# Patient Record
Sex: MALE | Race: WHITE | NOT HISPANIC OR LATINO | Employment: OTHER | URBAN - METROPOLITAN AREA
[De-identification: names, ages, dates, MRNs, and addresses within clinical notes are randomized per-mention and may not be internally consistent; named-entity substitution may affect disease eponyms.]

---

## 2024-01-23 ENCOUNTER — HOSPITAL ENCOUNTER (INPATIENT)
Facility: HOSPITAL | Age: 64
LOS: 9 days | Discharge: NURSING, REHAB OR LONG-TERM CARE | DRG: 603 | End: 2024-02-01
Attending: INTERNAL MEDICINE | Admitting: INTERNAL MEDICINE
Payer: MEDICARE

## 2024-01-23 ENCOUNTER — APPOINTMENT (OUTPATIENT)
Dept: RADIOLOGY | Facility: HOSPITAL | Age: 64
DRG: 603 | End: 2024-01-23
Payer: MEDICARE

## 2024-01-23 ENCOUNTER — HOSPITAL ENCOUNTER (INPATIENT)
Facility: HOSPITAL | Age: 64
LOS: 1 days | Discharge: OTHER INSTITUTION | DRG: 603 | End: 2024-01-23
Attending: STUDENT IN AN ORGANIZED HEALTH CARE EDUCATION/TRAINING PROGRAM | Admitting: INTERNAL MEDICINE
Payer: MEDICARE

## 2024-01-23 ENCOUNTER — APPOINTMENT (OUTPATIENT)
Dept: CARDIOLOGY | Facility: HOSPITAL | Age: 64
DRG: 603 | End: 2024-01-23
Payer: MEDICARE

## 2024-01-23 VITALS
OXYGEN SATURATION: 98 % | DIASTOLIC BLOOD PRESSURE: 56 MMHG | TEMPERATURE: 97.3 F | HEART RATE: 75 BPM | HEIGHT: 74 IN | RESPIRATION RATE: 18 BRPM | BODY MASS INDEX: 33.07 KG/M2 | WEIGHT: 257.72 LBS | SYSTOLIC BLOOD PRESSURE: 122 MMHG

## 2024-01-23 DIAGNOSIS — A48.0 GAS GANGRENE OF FOOT (MULTI): Primary | ICD-10-CM

## 2024-01-23 DIAGNOSIS — A48.0 GAS GANGRENE OF FOOT (MULTI): ICD-10-CM

## 2024-01-23 DIAGNOSIS — N17.9 ACUTE KIDNEY INJURY (CMS-HCC): ICD-10-CM

## 2024-01-23 DIAGNOSIS — L97.513: ICD-10-CM

## 2024-01-23 DIAGNOSIS — E08.621: ICD-10-CM

## 2024-01-23 DIAGNOSIS — M86.171 OSTEOMYELITIS OF ANKLE OR FOOT, RIGHT, ACUTE (MULTI): Primary | ICD-10-CM

## 2024-01-23 DIAGNOSIS — L02.611 ABSCESS OF RIGHT FOOT: ICD-10-CM

## 2024-01-23 LAB
ALBUMIN SERPL-MCNC: 3.3 G/DL (ref 3.5–5)
ALP BLD-CCNC: 147 U/L (ref 35–125)
ALT SERPL-CCNC: 30 U/L (ref 5–40)
ANION GAP SERPL CALC-SCNC: 16 MMOL/L
ANION GAP SERPL CALC-SCNC: 17 MMOL/L
AST SERPL-CCNC: 37 U/L (ref 5–40)
BASOPHILS # BLD AUTO: 0.04 X10*3/UL (ref 0–0.1)
BASOPHILS NFR BLD AUTO: 0.2 %
BILIRUB SERPL-MCNC: 0.3 MG/DL (ref 0.1–1.2)
BUN SERPL-MCNC: 76 MG/DL (ref 8–25)
BUN SERPL-MCNC: 78 MG/DL (ref 8–25)
CALCIUM SERPL-MCNC: 10.1 MG/DL (ref 8.5–10.4)
CALCIUM SERPL-MCNC: 9.4 MG/DL (ref 8.5–10.4)
CHLORIDE SERPL-SCNC: 95 MMOL/L (ref 97–107)
CHLORIDE SERPL-SCNC: 96 MMOL/L (ref 97–107)
CK SERPL-CCNC: 738 U/L (ref 24–195)
CO2 SERPL-SCNC: 18 MMOL/L (ref 24–31)
CO2 SERPL-SCNC: 20 MMOL/L (ref 24–31)
CREAT SERPL-MCNC: 2 MG/DL (ref 0.4–1.6)
CREAT SERPL-MCNC: 2.5 MG/DL (ref 0.4–1.6)
EGFRCR SERPLBLD CKD-EPI 2021: 29 ML/MIN/1.73M*2
EGFRCR SERPLBLD CKD-EPI 2021: 37 ML/MIN/1.73M*2
EOSINOPHIL # BLD AUTO: 0.02 X10*3/UL (ref 0–0.7)
EOSINOPHIL NFR BLD AUTO: 0.1 %
ERYTHROCYTE [DISTWIDTH] IN BLOOD BY AUTOMATED COUNT: 12.9 % (ref 11.5–14.5)
FLUAV RNA RESP QL NAA+PROBE: NOT DETECTED
FLUBV RNA RESP QL NAA+PROBE: NOT DETECTED
GLUCOSE BLD MANUAL STRIP-MCNC: 99 MG/DL (ref 74–99)
GLUCOSE SERPL-MCNC: 116 MG/DL (ref 65–99)
GLUCOSE SERPL-MCNC: 95 MG/DL (ref 65–99)
HCT VFR BLD AUTO: 28.1 % (ref 41–52)
HGB BLD-MCNC: 9.7 G/DL (ref 13.5–17.5)
IMM GRANULOCYTES # BLD AUTO: 0.15 X10*3/UL (ref 0–0.7)
IMM GRANULOCYTES NFR BLD AUTO: 0.9 % (ref 0–0.9)
LACTATE BLDV-SCNC: 0.9 MMOL/L (ref 0.4–2)
LYMPHOCYTES # BLD AUTO: 0.64 X10*3/UL (ref 1.2–4.8)
LYMPHOCYTES NFR BLD AUTO: 3.8 %
MCH RBC QN AUTO: 30.9 PG (ref 26–34)
MCHC RBC AUTO-ENTMCNC: 34.5 G/DL (ref 32–36)
MCV RBC AUTO: 90 FL (ref 80–100)
MONOCYTES # BLD AUTO: 1.14 X10*3/UL (ref 0.1–1)
MONOCYTES NFR BLD AUTO: 6.9 %
NEUTROPHILS # BLD AUTO: 14.65 X10*3/UL (ref 1.2–7.7)
NEUTROPHILS NFR BLD AUTO: 88.1 %
NRBC BLD-RTO: 0 /100 WBCS (ref 0–0)
PLATELET # BLD AUTO: 202 X10*3/UL (ref 150–450)
POTASSIUM SERPL-SCNC: 3.8 MMOL/L (ref 3.4–5.1)
POTASSIUM SERPL-SCNC: 5.1 MMOL/L (ref 3.4–5.1)
PROT SERPL-MCNC: 7.1 G/DL (ref 5.9–7.9)
RBC # BLD AUTO: 3.14 X10*6/UL (ref 4.5–5.9)
SARS-COV-2 RNA RESP QL NAA+PROBE: NOT DETECTED
SODIUM SERPL-SCNC: 131 MMOL/L (ref 133–145)
SODIUM SERPL-SCNC: 131 MMOL/L (ref 133–145)
WBC # BLD AUTO: 16.6 X10*3/UL (ref 4.4–11.3)

## 2024-01-23 PROCEDURE — 76770 US EXAM ABDO BACK WALL COMP: CPT | Mod: FR

## 2024-01-23 PROCEDURE — 36415 COLL VENOUS BLD VENIPUNCTURE: CPT | Performed by: PHYSICIAN ASSISTANT

## 2024-01-23 PROCEDURE — 82947 ASSAY GLUCOSE BLOOD QUANT: CPT

## 2024-01-23 PROCEDURE — 76770 US EXAM ABDO BACK WALL COMP: CPT | Mod: FOREIGN READ | Performed by: RADIOLOGY

## 2024-01-23 PROCEDURE — 1200000002 HC GENERAL ROOM WITH TELEMETRY DAILY

## 2024-01-23 PROCEDURE — 80053 COMPREHEN METABOLIC PANEL: CPT | Performed by: PHYSICIAN ASSISTANT

## 2024-01-23 PROCEDURE — 82550 ASSAY OF CK (CPK): CPT | Performed by: INTERNAL MEDICINE

## 2024-01-23 PROCEDURE — 2500000004 HC RX 250 GENERAL PHARMACY W/ HCPCS (ALT 636 FOR OP/ED): Performed by: INTERNAL MEDICINE

## 2024-01-23 PROCEDURE — 87205 SMEAR GRAM STAIN: CPT | Mod: WESLAB | Performed by: PHYSICIAN ASSISTANT

## 2024-01-23 PROCEDURE — 96365 THER/PROPH/DIAG IV INF INIT: CPT

## 2024-01-23 PROCEDURE — 86901 BLOOD TYPING SEROLOGIC RH(D): CPT | Performed by: INTERNAL MEDICINE

## 2024-01-23 PROCEDURE — 80048 BASIC METABOLIC PNL TOTAL CA: CPT | Performed by: INTERNAL MEDICINE

## 2024-01-23 PROCEDURE — 87636 SARSCOV2 & INF A&B AMP PRB: CPT | Performed by: STUDENT IN AN ORGANIZED HEALTH CARE EDUCATION/TRAINING PROGRAM

## 2024-01-23 PROCEDURE — 83605 ASSAY OF LACTIC ACID: CPT | Performed by: PHYSICIAN ASSISTANT

## 2024-01-23 PROCEDURE — 87040 BLOOD CULTURE FOR BACTERIA: CPT | Mod: WESLAB | Performed by: PHYSICIAN ASSISTANT

## 2024-01-23 PROCEDURE — 73590 X-RAY EXAM OF LOWER LEG: CPT | Mod: RT,FR

## 2024-01-23 PROCEDURE — 83036 HEMOGLOBIN GLYCOSYLATED A1C: CPT | Performed by: INTERNAL MEDICINE

## 2024-01-23 PROCEDURE — 99291 CRITICAL CARE FIRST HOUR: CPT | Mod: 25 | Performed by: STUDENT IN AN ORGANIZED HEALTH CARE EDUCATION/TRAINING PROGRAM

## 2024-01-23 PROCEDURE — 96375 TX/PRO/DX INJ NEW DRUG ADDON: CPT

## 2024-01-23 PROCEDURE — 73630 X-RAY EXAM OF FOOT: CPT | Mod: RT

## 2024-01-23 PROCEDURE — 73590 X-RAY EXAM OF LOWER LEG: CPT | Mod: RIGHT SIDE | Performed by: RADIOLOGY

## 2024-01-23 PROCEDURE — 93005 ELECTROCARDIOGRAM TRACING: CPT

## 2024-01-23 PROCEDURE — 85610 PROTHROMBIN TIME: CPT | Performed by: INTERNAL MEDICINE

## 2024-01-23 PROCEDURE — 2500000004 HC RX 250 GENERAL PHARMACY W/ HCPCS (ALT 636 FOR OP/ED): Performed by: PHYSICIAN ASSISTANT

## 2024-01-23 PROCEDURE — 1210000001 HC SEMI-PRIVATE ROOM DAILY

## 2024-01-23 PROCEDURE — 96374 THER/PROPH/DIAG INJ IV PUSH: CPT | Mod: 59

## 2024-01-23 PROCEDURE — 85025 COMPLETE CBC W/AUTO DIFF WBC: CPT | Performed by: PHYSICIAN ASSISTANT

## 2024-01-23 RX ORDER — FINASTERIDE 5 MG/1
5 TABLET, FILM COATED ORAL DAILY
Status: DISCONTINUED | OUTPATIENT
Start: 2024-01-24 | End: 2024-02-01 | Stop reason: HOSPADM

## 2024-01-23 RX ORDER — ACETAMINOPHEN 325 MG/1
650 TABLET ORAL EVERY 4 HOURS PRN
Status: DISCONTINUED | OUTPATIENT
Start: 2024-01-23 | End: 2024-02-01 | Stop reason: HOSPADM

## 2024-01-23 RX ORDER — CARVEDILOL 12.5 MG/1
12.5 TABLET ORAL
Status: DISCONTINUED | OUTPATIENT
Start: 2024-01-24 | End: 2024-02-01 | Stop reason: HOSPADM

## 2024-01-23 RX ORDER — ASPIRIN 81 MG/1
81 TABLET ORAL DAILY
Status: CANCELLED | OUTPATIENT
Start: 2024-01-23

## 2024-01-23 RX ORDER — DEXTROSE 50 % IN WATER (D50W) INTRAVENOUS SYRINGE
25
Status: DISCONTINUED | OUTPATIENT
Start: 2024-01-23 | End: 2024-02-01 | Stop reason: HOSPADM

## 2024-01-23 RX ORDER — FINASTERIDE 5 MG/1
5 TABLET, FILM COATED ORAL DAILY
COMMUNITY

## 2024-01-23 RX ORDER — INSULIN LISPRO 100 [IU]/ML
0-10 INJECTION, SOLUTION INTRAVENOUS; SUBCUTANEOUS EVERY 4 HOURS
Status: DISCONTINUED | OUTPATIENT
Start: 2024-01-23 | End: 2024-01-25

## 2024-01-23 RX ORDER — FUROSEMIDE 20 MG/1
20 TABLET ORAL DAILY
COMMUNITY

## 2024-01-23 RX ORDER — INSULIN LISPRO 100 [IU]/ML
0-5 INJECTION, SOLUTION INTRAVENOUS; SUBCUTANEOUS
Status: CANCELLED | OUTPATIENT
Start: 2024-01-23

## 2024-01-23 RX ORDER — SODIUM CHLORIDE 9 MG/ML
75 INJECTION, SOLUTION INTRAVENOUS CONTINUOUS
Status: CANCELLED | OUTPATIENT
Start: 2024-01-23

## 2024-01-23 RX ORDER — DEXTROSE MONOHYDRATE 100 MG/ML
0.3 INJECTION, SOLUTION INTRAVENOUS ONCE AS NEEDED
Status: DISCONTINUED | OUTPATIENT
Start: 2024-01-23 | End: 2024-02-01 | Stop reason: HOSPADM

## 2024-01-23 RX ORDER — ATORVASTATIN CALCIUM 40 MG/1
40 TABLET, FILM COATED ORAL DAILY
Status: DISCONTINUED | OUTPATIENT
Start: 2024-01-24 | End: 2024-01-23

## 2024-01-23 RX ORDER — ACETAMINOPHEN 160 MG/5ML
650 SOLUTION ORAL EVERY 4 HOURS PRN
Status: DISCONTINUED | OUTPATIENT
Start: 2024-01-23 | End: 2024-01-25

## 2024-01-23 RX ORDER — FUROSEMIDE 20 MG/1
20 TABLET ORAL DAILY
Status: CANCELLED | OUTPATIENT
Start: 2024-01-23

## 2024-01-23 RX ORDER — VANCOMYCIN 1.25 G/250ML
1750 INJECTION, SOLUTION INTRAVENOUS ONCE
Status: COMPLETED | OUTPATIENT
Start: 2024-01-23 | End: 2024-01-23

## 2024-01-23 RX ORDER — SPIRONOLACTONE 25 MG/1
25 TABLET ORAL DAILY
COMMUNITY

## 2024-01-23 RX ORDER — OLANZAPINE 10 MG/1
20 TABLET ORAL NIGHTLY
Status: DISCONTINUED | OUTPATIENT
Start: 2024-01-23 | End: 2024-02-01 | Stop reason: HOSPADM

## 2024-01-23 RX ORDER — PANTOPRAZOLE SODIUM 40 MG/10ML
40 INJECTION, POWDER, LYOPHILIZED, FOR SOLUTION INTRAVENOUS DAILY
Status: DISCONTINUED | OUTPATIENT
Start: 2024-01-24 | End: 2024-01-25

## 2024-01-23 RX ORDER — ATORVASTATIN CALCIUM 40 MG/1
40 TABLET, FILM COATED ORAL
Status: ON HOLD | COMMUNITY
Start: 2023-11-27 | End: 2024-01-23 | Stop reason: WASHOUT

## 2024-01-23 RX ORDER — ASPIRIN 81 MG/1
81 TABLET ORAL DAILY
Status: DISCONTINUED | OUTPATIENT
Start: 2024-01-24 | End: 2024-02-01 | Stop reason: HOSPADM

## 2024-01-23 RX ORDER — FINASTERIDE 5 MG/1
5 TABLET, FILM COATED ORAL DAILY
Status: CANCELLED | OUTPATIENT
Start: 2024-01-23

## 2024-01-23 RX ORDER — HEPARIN SODIUM 5000 [USP'U]/ML
5000 INJECTION, SOLUTION INTRAVENOUS; SUBCUTANEOUS EVERY 8 HOURS
Status: DISCONTINUED | OUTPATIENT
Start: 2024-01-23 | End: 2024-02-01 | Stop reason: HOSPADM

## 2024-01-23 RX ORDER — TALC
3 POWDER (GRAM) TOPICAL NIGHTLY
Status: DISCONTINUED | OUTPATIENT
Start: 2024-01-23 | End: 2024-02-01 | Stop reason: HOSPADM

## 2024-01-23 RX ORDER — ASPIRIN 81 MG/1
81 TABLET ORAL DAILY
COMMUNITY

## 2024-01-23 RX ORDER — MEROPENEM 1 G/1
1 INJECTION, POWDER, FOR SOLUTION INTRAVENOUS EVERY 12 HOURS
Status: DISCONTINUED | OUTPATIENT
Start: 2024-01-24 | End: 2024-01-27

## 2024-01-23 RX ORDER — DEXTROSE 50 % IN WATER (D50W) INTRAVENOUS SYRINGE
25
Status: CANCELLED | OUTPATIENT
Start: 2024-01-23

## 2024-01-23 RX ORDER — CARVEDILOL 12.5 MG/1
12.5 TABLET ORAL
COMMUNITY

## 2024-01-23 RX ORDER — GEMFIBROZIL 600 MG/1
600 TABLET, FILM COATED ORAL
COMMUNITY

## 2024-01-23 RX ORDER — CARVEDILOL 12.5 MG/1
12.5 TABLET ORAL
Status: CANCELLED | OUTPATIENT
Start: 2024-01-23

## 2024-01-23 RX ORDER — POLYETHYLENE GLYCOL 3350 17 G/17G
17 POWDER, FOR SOLUTION ORAL DAILY
Status: DISCONTINUED | OUTPATIENT
Start: 2024-01-24 | End: 2024-01-25

## 2024-01-23 RX ORDER — SODIUM CHLORIDE 9 MG/ML
100 INJECTION, SOLUTION INTRAVENOUS CONTINUOUS
Status: DISCONTINUED | OUTPATIENT
Start: 2024-01-23 | End: 2024-01-25

## 2024-01-23 RX ORDER — OLANZAPINE 20 MG/1
20 TABLET ORAL NIGHTLY
COMMUNITY

## 2024-01-23 RX ORDER — LISINOPRIL 20 MG/1
20 TABLET ORAL DAILY
COMMUNITY

## 2024-01-23 RX ORDER — PANTOPRAZOLE SODIUM 40 MG/1
40 TABLET, DELAYED RELEASE ORAL DAILY
Status: DISCONTINUED | OUTPATIENT
Start: 2024-01-24 | End: 2024-02-01 | Stop reason: HOSPADM

## 2024-01-23 RX ORDER — DEXTROSE MONOHYDRATE 100 MG/ML
0.3 INJECTION, SOLUTION INTRAVENOUS ONCE AS NEEDED
Status: CANCELLED | OUTPATIENT
Start: 2024-01-23

## 2024-01-23 RX ORDER — ACETAMINOPHEN 650 MG/1
650 SUPPOSITORY RECTAL EVERY 4 HOURS PRN
Status: DISCONTINUED | OUTPATIENT
Start: 2024-01-23 | End: 2024-01-25

## 2024-01-23 RX ORDER — CLINDAMYCIN PHOSPHATE 600 MG/50ML
600 INJECTION, SOLUTION INTRAVENOUS ONCE
Status: COMPLETED | OUTPATIENT
Start: 2024-01-23 | End: 2024-01-23

## 2024-01-23 RX ORDER — ATORVASTATIN CALCIUM 40 MG/1
40 TABLET, FILM COATED ORAL DAILY
Status: CANCELLED | OUTPATIENT
Start: 2024-01-23

## 2024-01-23 RX ORDER — GEMFIBROZIL 600 MG/1
600 TABLET, FILM COATED ORAL
Status: CANCELLED | OUTPATIENT
Start: 2024-01-23

## 2024-01-23 RX ORDER — METFORMIN HYDROCHLORIDE EXTENDED-RELEASE TABLETS 500 MG/1
500 TABLET, FILM COATED, EXTENDED RELEASE ORAL
COMMUNITY

## 2024-01-23 RX ORDER — ATORVASTATIN CALCIUM 40 MG/1
40 TABLET, FILM COATED ORAL DAILY
COMMUNITY

## 2024-01-23 RX ADMIN — SODIUM CHLORIDE, SODIUM LACTATE, POTASSIUM CHLORIDE, AND CALCIUM CHLORIDE 500 ML: 600; 310; 30; 20 INJECTION, SOLUTION INTRAVENOUS at 23:34

## 2024-01-23 RX ADMIN — SODIUM CHLORIDE 100 ML/HR: 900 INJECTION, SOLUTION INTRAVENOUS at 23:32

## 2024-01-23 RX ADMIN — PIPERACILLIN SODIUM AND TAZOBACTAM SODIUM 3.38 G: 3; .375 INJECTION, SOLUTION INTRAVENOUS at 14:23

## 2024-01-23 RX ADMIN — VANCOMYCIN 1750 MG: 1.25 INJECTION, SOLUTION INTRAVENOUS at 14:23

## 2024-01-23 RX ADMIN — OLANZAPINE 20 MG: 10 TABLET, FILM COATED ORAL at 23:41

## 2024-01-23 RX ADMIN — HEPARIN SODIUM 5000 UNITS: 5000 INJECTION, SOLUTION INTRAVENOUS; SUBCUTANEOUS at 23:32

## 2024-01-23 RX ADMIN — CLINDAMYCIN PHOSPHATE 600 MG: 600 INJECTION, SOLUTION INTRAVENOUS at 15:14

## 2024-01-23 RX ADMIN — SODIUM CHLORIDE 1000 ML: 900 INJECTION, SOLUTION INTRAVENOUS at 14:17

## 2024-01-23 SDOH — SOCIAL STABILITY: SOCIAL INSECURITY: DOES ANYONE TRY TO KEEP YOU FROM HAVING/CONTACTING OTHER FRIENDS OR DOING THINGS OUTSIDE YOUR HOME?: NO

## 2024-01-23 SDOH — SOCIAL STABILITY: SOCIAL INSECURITY: HAVE YOU HAD THOUGHTS OF HARMING ANYONE ELSE?: NO

## 2024-01-23 SDOH — SOCIAL STABILITY: SOCIAL INSECURITY: HAS ANYONE EVER THREATENED TO HURT YOUR FAMILY OR YOUR PETS?: NO

## 2024-01-23 SDOH — SOCIAL STABILITY: SOCIAL INSECURITY: ARE THERE ANY APPARENT SIGNS OF INJURIES/BEHAVIORS THAT COULD BE RELATED TO ABUSE/NEGLECT?: NO

## 2024-01-23 SDOH — SOCIAL STABILITY: SOCIAL INSECURITY: DO YOU FEEL ANYONE HAS EXPLOITED OR TAKEN ADVANTAGE OF YOU FINANCIALLY OR OF YOUR PERSONAL PROPERTY?: NO

## 2024-01-23 SDOH — SOCIAL STABILITY: SOCIAL INSECURITY: ARE YOU OR HAVE YOU BEEN THREATENED OR ABUSED PHYSICALLY, EMOTIONALLY, OR SEXUALLY BY ANYONE?: NO

## 2024-01-23 SDOH — SOCIAL STABILITY: SOCIAL INSECURITY: ABUSE: ADULT

## 2024-01-23 SDOH — SOCIAL STABILITY: SOCIAL INSECURITY: WERE YOU ABLE TO COMPLETE ALL THE BEHAVIORAL HEALTH SCREENINGS?: YES

## 2024-01-23 SDOH — SOCIAL STABILITY: SOCIAL INSECURITY: DO YOU FEEL UNSAFE GOING BACK TO THE PLACE WHERE YOU ARE LIVING?: NO

## 2024-01-23 ASSESSMENT — LIFESTYLE VARIABLES
SKIP TO QUESTIONS 9-10: 0
HOW OFTEN DO YOU HAVE A DRINK CONTAINING ALCOHOL: 2-4 TIMES A MONTH
HOW MANY STANDARD DRINKS CONTAINING ALCOHOL DO YOU HAVE ON A TYPICAL DAY: 1 OR 2
REASON UNABLE TO ASSESS: NO
HAVE YOU EVER FELT YOU SHOULD CUT DOWN ON YOUR DRINKING: NO
EVER FELT BAD OR GUILTY ABOUT YOUR DRINKING: NO
HOW OFTEN DO YOU HAVE 6 OR MORE DRINKS ON ONE OCCASION: LESS THAN MONTHLY
HAVE PEOPLE ANNOYED YOU BY CRITICIZING YOUR DRINKING: NO
AUDIT-C TOTAL SCORE: 3
AUDIT-C TOTAL SCORE: 3
EVER HAD A DRINK FIRST THING IN THE MORNING TO STEADY YOUR NERVES TO GET RID OF A HANGOVER: NO

## 2024-01-23 ASSESSMENT — COGNITIVE AND FUNCTIONAL STATUS - GENERAL
MOBILITY SCORE: 24
PATIENT BASELINE BEDBOUND: NO
DAILY ACTIVITIY SCORE: 24

## 2024-01-23 ASSESSMENT — COLUMBIA-SUICIDE SEVERITY RATING SCALE - C-SSRS

## 2024-01-23 ASSESSMENT — PAIN SCALES - GENERAL
PAINLEVEL_OUTOF10: 7
PAINLEVEL_OUTOF10: 0 - NO PAIN

## 2024-01-23 ASSESSMENT — ACTIVITIES OF DAILY LIVING (ADL)
FEEDING YOURSELF: INDEPENDENT
HEARING - RIGHT EAR: FUNCTIONAL
DRESSING YOURSELF: INDEPENDENT
PATIENT'S MEMORY ADEQUATE TO SAFELY COMPLETE DAILY ACTIVITIES?: YES
LACK_OF_TRANSPORTATION: NO
WALKS IN HOME: INDEPENDENT
GROOMING: INDEPENDENT
TOILETING: INDEPENDENT
BATHING: INDEPENDENT
ADEQUATE_TO_COMPLETE_ADL: YES
HEARING - LEFT EAR: FUNCTIONAL
JUDGMENT_ADEQUATE_SAFELY_COMPLETE_DAILY_ACTIVITIES: YES

## 2024-01-23 ASSESSMENT — ENCOUNTER SYMPTOMS
MUSCULOSKELETAL NEGATIVE: 1
CONSTITUTIONAL NEGATIVE: 1
CARDIOVASCULAR NEGATIVE: 1
WOUND: 1
EYES NEGATIVE: 1
ENDOCRINE NEGATIVE: 1
ALLERGIC/IMMUNOLOGIC NEGATIVE: 1
HEMATOLOGIC/LYMPHATIC NEGATIVE: 1
PSYCHIATRIC NEGATIVE: 1
NEUROLOGICAL NEGATIVE: 1
RESPIRATORY NEGATIVE: 1
GASTROINTESTINAL NEGATIVE: 1

## 2024-01-23 ASSESSMENT — PAIN DESCRIPTION - FREQUENCY: FREQUENCY: CONSTANT/CONTINUOUS

## 2024-01-23 ASSESSMENT — PAIN - FUNCTIONAL ASSESSMENT
PAIN_FUNCTIONAL_ASSESSMENT: 0-10
PAIN_FUNCTIONAL_ASSESSMENT: 0-10

## 2024-01-23 ASSESSMENT — PATIENT HEALTH QUESTIONNAIRE - PHQ9
2. FEELING DOWN, DEPRESSED OR HOPELESS: NOT AT ALL
1. LITTLE INTEREST OR PLEASURE IN DOING THINGS: NOT AT ALL
SUM OF ALL RESPONSES TO PHQ9 QUESTIONS 1 & 2: 0

## 2024-01-23 ASSESSMENT — PAIN DESCRIPTION - ORIENTATION: ORIENTATION: RIGHT

## 2024-01-23 ASSESSMENT — PAIN DESCRIPTION - LOCATION: LOCATION: FOOT

## 2024-01-23 ASSESSMENT — PAIN DESCRIPTION - ONSET: ONSET: SUDDEN

## 2024-01-23 ASSESSMENT — PAIN DESCRIPTION - DESCRIPTORS: DESCRIPTORS: SORE

## 2024-01-23 ASSESSMENT — PAIN DESCRIPTION - PROGRESSION: CLINICAL_PROGRESSION: NOT CHANGED

## 2024-01-23 ASSESSMENT — PAIN DESCRIPTION - PAIN TYPE: TYPE: ACUTE PAIN

## 2024-01-23 NOTE — PROGRESS NOTES
Vancomycin Dosing by Pharmacy- EMERGENCY DEPARTMENT    Claude Coley is a 63 y.o. year old male who Pharmacy has been consulted to give a ONE TIME ONLY vancomycin dose in the Emergency Department for cellulitis, skin and soft tissue.     Visit Vitals  /51 (BP Location: Left arm, Patient Position: Sitting)   Pulse 86   Temp 36.3 °C (97.3 °F)   Resp 18        Lab Results   Component Value Date    CREATININE 0.7 07/09/2018        Patient weight is   Wt Readings from Last 1 Encounters:   01/23/24 117 kg (257 lb 11.5 oz)        Assessment/Plan     Patient will be given a one time loading dose of 1750 mg  Pharmacy will sign off at this time. If vancomycin is to be continued, please re-consult Pharmacy.       Nasir Naqvi, PharmD

## 2024-01-23 NOTE — ED NOTES
Placed call to pharmacy.   Both clindamycin and vanco are compatible.     Jazz Mccarthy, MAO  01/23/24 1505

## 2024-01-23 NOTE — ED PROVIDER NOTES
HPI   Chief Complaint   Patient presents with    Foot Infection     8 days ago I started having swelling and redness in my rt foot I have weeping and the pain is soriness       A 63-year-old male past medical history of hypertension, CHF, hyperlipidemia, and diabetes who presents to the emergency department with concerns right foot infection.  Patient states that he has never had any known ulcers or foot infections prior.  Patient states that he knows over the course of the last couple weeks he has had a opening on the right lower part of his foot.  He states that he has decreased sensation in the feet bilaterally.  The patient states that over the last 8 days he has noticed his right foot becoming swollen, red, tender, warm and this has been spreading up his leg to roughly mid calf.  He denies subjective fever or chills.  He states that he has not been seen for this foot redness yet.  Patient states that he went to urgent care today and they recommend he come to the emergency department for further evaluation.      Please see HPI for pertinent positive and negative ROS                    [place] Coma Scale Score: 15                  Patient History   History reviewed. No pertinent past medical history.  History reviewed. No pertinent surgical history.  No family history on file.  Social History     Tobacco Use    Smoking status: Not on file    Smokeless tobacco: Not on file   Substance Use Topics    Alcohol use: Not on file    Drug use: Not on file       Physical Exam   ED Triage Vitals [01/23/24 1117]   Temp Heart Rate Respirations BP   36.3 °C (97.3 °F) 86 18 119/51      Pulse Ox Temp src Heart Rate Source Patient Position   99 % -- Monitor Sitting      BP Location FiO2 (%)     Left arm --       Physical Exam  GENERAL APPEARANCE: Awake and alert. No acute distress.   VITAL SIGNS: As per the nurses' triage record.  HEENT: Normocephalic, atraumatic. Extraocular muscles are intact. Conjunctiva are pink. Negative  scleral icterus. Mucous membranes are moist. Tongue in the midline. Oropharynx clear, uvula midline.   NECK: Soft, nontender and supple, full gross ROM, no meningeal signs.  CHEST: Nontender to palpation. Clear to auscultation bilaterally. No rales, rhonchi, or wheezing. Symmetric rise and fall of chest wall.   HEART: Clear S1 and S2. Regular rate and rhythm. No murmurs appreciated on auscultation.  Strong and equal pulses in the extremities.  ABDOMEN: Soft, nontender, nondistended, positive bowel sounds, no palpable masses.  MUSCULOSKELETAL: The calves are nontender to palpation. Full gross active range of motion.   NEUROLOGICAL: Awake, alert and oriented x 3. Motor power intact in the upper and lower extremities. Sensation is intact to light touch in the upper and lower extremities. Patient answering questions appropriately.   IMMUNOLOGICAL: No lymphatic streaking noted  DERMATOLOGIC: Right foot extending to right distal leg with erythema, warmth, swelling, and edema.  There is a ulcer at the dorsal lateral aspect of right foot with erythematous base.  On the lateral aspect of right foot, there is a fluctuant tender region with overlying macerated skin.  PYSCH: Cooperative with appropriate mood and affect.    ED Course & MDM   ED Course as of 01/23/24 1615   Tue Jan 23, 2024   1458 Consult placed to podiatry. [SC]   1458 CMP shows evidence of acute kidney injury. [SC]   1458 CBC shows leukocytosis.  Neutrophilia. [SC]      ED Course User Index  [SC] Mairlee Massey PA-C         Diagnoses as of 01/23/24 1615   Gas gangrene of foot (CMS/HCC)   Abscess of right foot   Acute kidney injury (CMS/HCC)       Medical Decision Making  Parts of this chart have been completed using voice recognition software. Please excuse any errors of transcription.  My thought process and reason for plan has been formulated from the time that I saw the patient until the time of disposition and is not specific to one specific moment  during their visit and furthermore my MDM encompasses this entire chart and not only this text box.      HPI: Detailed above.    Exam: A medically appropriate exam performed, outlined above, given the known history and presentation.    History obtained from: Patient    Social Determinants of Health considered during this visit: Lives at home    Medications given during visit:  Medications   sodium chloride 0.9 % bolus 1,000 mL (0 mL intravenous Stopped 1/23/24 1447)   piperacillin-tazobactam-dextrose (Zosyn) IV 3.375 g (0 g intravenous Stopped 1/23/24 1453)   vancomycin-diluent combo no.1 (Xellia) IVPB 1,750 mg (0 mg intravenous Stopped 1/23/24 1608)   clindamycin (Cleocin) 600 mg in dextrose 5% water 50 mL (0 mg intravenous Stopped 1/23/24 1544)        Diagnostic/tests  Labs Reviewed   CBC WITH AUTO DIFFERENTIAL - Abnormal       Result Value    WBC 16.6 (*)     nRBC 0.0      RBC 3.14 (*)     Hemoglobin 9.7 (*)     Hematocrit 28.1 (*)     MCV 90      MCH 30.9      MCHC 34.5      RDW 12.9      Platelets 202      Neutrophils % 88.1      Immature Granulocytes %, Automated 0.9      Lymphocytes % 3.8      Monocytes % 6.9      Eosinophils % 0.1      Basophils % 0.2      Neutrophils Absolute 14.65 (*)     Immature Granulocytes Absolute, Automated 0.15      Lymphocytes Absolute 0.64 (*)     Monocytes Absolute 1.14 (*)     Eosinophils Absolute 0.02      Basophils Absolute 0.04     COMPREHENSIVE METABOLIC PANEL - Abnormal    Glucose 116 (*)     Sodium 131 (*)     Potassium 5.1      Chloride 95 (*)     Bicarbonate 20 (*)     Urea Nitrogen 78 (*)     Creatinine 2.50 (*)     eGFR 29 (*)     Calcium 10.1      Albumin 3.3 (*)     Alkaline Phosphatase 147 (*)     Total Protein 7.1      AST 37      Bilirubin, Total 0.3      ALT 30      Anion Gap 16     SARS-COV-2 AND INFLUENZA A/B PCR - Normal    Flu A Result Not Detected      Flu B Result Not Detected      Coronavirus 2019, PCR Not Detected      Narrative:     This assay has  received FDA Emergency Use Authorization (EUA) and  is only authorized for the duration of time that circumstances exist to justify the authorization of the emergency use of in vitro diagnostic tests for the detection of SARS-CoV-2 virus and/or diagnosis of COVID-19 infection under section 564(b)(1) of the Act, 21 U.S.C. 360bbb-3(b)(1). Testing for SARS-CoV-2 is only recommended for patients who meet current clinical and/or epidemiological criteria as defined by federal, state, or local public health directives. This assay is an in vitro diagnostic nucleic acid amplification test for the qualitative detection of SARS-CoV-2, Influenza A, and Influenza B from nasopharyngeal specimens and has been validated for use at [facility]. Negative results do not preclude COVID-19 infections or Influenza A/B infections, and should not be used as the sole basis for diagnosis, treatment, or other management decisions. If Influenza A/B and RSV PCR results are negative, testing for Parainfluenza virus, Adenovirus and Metapneumovirus is routinely performed for [facility] [place] [place] oncology and intensive care inpatients, and is available on other patients by placing an add-on request.    BLOOD GAS LACTIC ACID, VENOUS - Normal    POCT Lactate, Venous 0.9     TISSUE/WOUND CULTURE/SMEAR   BLOOD CULTURE   BLOOD CULTURE      XR foot right 3+ views   Final Result   Soft tissue infection of the right foot centered along the lateral   aspect of the forefoot with probable abscess formation and gas   gangrene. However, no plain film evidence of osteomyelitis is   observed.        Signed by: Kirstie Moy 1/23/2024 2:34 PM   Dictation workstation:   [id number]           Considerations/further MDM:  Patient was seen in conjucntion with my supervising physician,  Dr. Taylor. Please refer to his note.    Considered evaluated for necrotizing fasciitis, osteomyelitis, gangrene    X-ray shows evidence of gas gangrene.  CMP shows  evidence of acute kidney injury.  CBC shows leukocytosis with a white blood cell count of 16.6.  Lactic acid 0.9.  Patient was treated with IV fluids, IV clindamycin, IV Zosyn, and IV vancomycin.     I discussed the case with Dr. Perry.  He states the patient needs to have right foot debrided in OR as soon as possible.  Originally, the plan was to admit the patient to [facility] to have procedure done tonight.  However, the OR says that they have multiple add-on cases and will not have time for surgery until 12 AM.  Dr. Perry request for patient to be transferred to [facility] so he can perform I&D at [facility].  When I first spoke to Dr. Perry and it was not known that there is no OR time available at [facility], I then proceeded to speak with admitting doctor at [facility], Dr. Brandt.  He did accept the patient.,  However, immediately after admitting patient to [facility] Dr. Perry called back stating that there was not an OR time available at [facility] and he would like the patient transferred to [facility] under general medicine so he can perform the surgery at [facility].    Discussed the case with [facility] admitting physician, Dr. Ivey who accepted patient.  The patient will be transferred to [facility] via [facility].  The patient was agreeable to this plan.   Patient stable for transfer to [facility] and admission per my assessment and further management of patient will be deferred to the inpatient setting including Dr. Ivey see note and Dr. Perry.     Procedure  Procedures          Marilee Massey PA-C  01/23/24 7124

## 2024-01-24 ENCOUNTER — ANESTHESIA EVENT (OUTPATIENT)
Dept: OPERATING ROOM | Facility: HOSPITAL | Age: 64
DRG: 603 | End: 2024-01-24
Payer: MEDICARE

## 2024-01-24 ENCOUNTER — ANESTHESIA (OUTPATIENT)
Dept: OPERATING ROOM | Facility: HOSPITAL | Age: 64
DRG: 603 | End: 2024-01-24
Payer: MEDICARE

## 2024-01-24 PROBLEM — E11.9 DIABETES MELLITUS, TYPE 2 (MULTI): Status: ACTIVE | Noted: 2024-01-24

## 2024-01-24 PROBLEM — I10 HTN (HYPERTENSION): Status: ACTIVE | Noted: 2024-01-24

## 2024-01-24 PROBLEM — E78.5 HYPERLIPIDEMIA: Status: ACTIVE | Noted: 2024-01-24

## 2024-01-24 LAB
ABO GROUP (TYPE) IN BLOOD: NORMAL
ALBUMIN SERPL-MCNC: 2.6 G/DL (ref 3.5–5)
ALP BLD-CCNC: 112 U/L (ref 35–125)
ALT SERPL-CCNC: 30 U/L (ref 5–40)
ANION GAP SERPL CALC-SCNC: 14 MMOL/L
ANTIBODY SCREEN: NORMAL
APPEARANCE UR: CLEAR
AST SERPL-CCNC: 41 U/L (ref 5–40)
ATRIAL RATE: 76 BPM
BACTERIA #/AREA URNS AUTO: ABNORMAL /HPF
BASOPHILS # BLD AUTO: 0.01 X10*3/UL (ref 0–0.1)
BASOPHILS NFR BLD AUTO: 0.1 %
BILIRUB SERPL-MCNC: 0.3 MG/DL (ref 0.1–1.2)
BILIRUB UR STRIP.AUTO-MCNC: NEGATIVE MG/DL
BUN SERPL-MCNC: 70 MG/DL (ref 8–25)
CALCIUM SERPL-MCNC: 8.7 MG/DL (ref 8.5–10.4)
CHLORIDE SERPL-SCNC: 102 MMOL/L (ref 97–107)
CK SERPL-CCNC: 592 U/L (ref 24–195)
CO2 SERPL-SCNC: 16 MMOL/L (ref 24–31)
COLOR UR: YELLOW
CREAT SERPL-MCNC: 1.7 MG/DL (ref 0.4–1.6)
CREAT UR-MCNC: 80.1 MG/DL
EGFRCR SERPLBLD CKD-EPI 2021: 45 ML/MIN/1.73M*2
EOSINOPHIL # BLD AUTO: 0.02 X10*3/UL (ref 0–0.7)
EOSINOPHIL NFR BLD AUTO: 0.2 %
ERYTHROCYTE [DISTWIDTH] IN BLOOD BY AUTOMATED COUNT: 12.7 % (ref 11.5–14.5)
EST. AVERAGE GLUCOSE BLD GHB EST-MCNC: 82 MG/DL
FERRITIN SERPL-MCNC: 494 NG/ML (ref 30–400)
GLUCOSE BLD MANUAL STRIP-MCNC: 100 MG/DL (ref 74–99)
GLUCOSE BLD MANUAL STRIP-MCNC: 101 MG/DL (ref 74–99)
GLUCOSE BLD MANUAL STRIP-MCNC: 103 MG/DL (ref 74–99)
GLUCOSE BLD MANUAL STRIP-MCNC: 135 MG/DL (ref 74–99)
GLUCOSE BLD MANUAL STRIP-MCNC: 89 MG/DL (ref 74–99)
GLUCOSE BLD MANUAL STRIP-MCNC: 92 MG/DL (ref 74–99)
GLUCOSE SERPL-MCNC: 93 MG/DL (ref 65–99)
GLUCOSE UR STRIP.AUTO-MCNC: NORMAL MG/DL
HBA1C MFR BLD: 4.5 %
HCT VFR BLD AUTO: 26.7 % (ref 41–52)
HGB BLD-MCNC: 9.2 G/DL (ref 13.5–17.5)
HYALINE CASTS #/AREA URNS AUTO: ABNORMAL /LPF
IMM GRANULOCYTES # BLD AUTO: 0.33 X10*3/UL (ref 0–0.7)
IMM GRANULOCYTES NFR BLD AUTO: 2.7 % (ref 0–0.9)
INR PPP: 1.2 (ref 0.9–1.2)
IRON SATN MFR SERPL: ABNORMAL %
IRON SERPL-MCNC: <20 UG/DL (ref 45–160)
KETONES UR STRIP.AUTO-MCNC: NEGATIVE MG/DL
LEUKOCYTE ESTERASE UR QL STRIP.AUTO: NEGATIVE
LYMPHOCYTES # BLD AUTO: 0.62 X10*3/UL (ref 1.2–4.8)
LYMPHOCYTES NFR BLD AUTO: 5.2 %
MCH RBC QN AUTO: 30.4 PG (ref 26–34)
MCHC RBC AUTO-ENTMCNC: 34.5 G/DL (ref 32–36)
MCV RBC AUTO: 88 FL (ref 80–100)
MONOCYTES # BLD AUTO: 1 X10*3/UL (ref 0.1–1)
MONOCYTES NFR BLD AUTO: 8.3 %
MUCOUS THREADS #/AREA URNS AUTO: ABNORMAL /LPF
NEUTROPHILS # BLD AUTO: 10.05 X10*3/UL (ref 1.2–7.7)
NEUTROPHILS NFR BLD AUTO: 83.5 %
NITRITE UR QL STRIP.AUTO: NEGATIVE
NRBC BLD-RTO: 0 /100 WBCS (ref 0–0)
P AXIS: 58 DEGREES
P OFFSET: 212 MS
P ONSET: 147 MS
PH UR STRIP.AUTO: 5 [PH]
PLATELET # BLD AUTO: 185 X10*3/UL (ref 150–450)
POTASSIUM SERPL-SCNC: 3.7 MMOL/L (ref 3.4–5.1)
PR INTERVAL: 154 MS
PROT SERPL-MCNC: 6.2 G/DL (ref 5.9–7.9)
PROT UR STRIP.AUTO-MCNC: ABNORMAL MG/DL
PROT UR-MCNC: 31 MG/DL
PROT/CREAT UR: 0.39 MG/MG CREAT
PROTHROMBIN TIME: 12.7 SECONDS (ref 9.3–12.7)
Q ONSET: 224 MS
QRS COUNT: 12 BEATS
QRS DURATION: 84 MS
QT INTERVAL: 350 MS
QTC CALCULATION(BAZETT): 393 MS
QTC FREDERICIA: 378 MS
R AXIS: 72 DEGREES
RBC # BLD AUTO: 3.03 X10*6/UL (ref 4.5–5.9)
RBC # UR STRIP.AUTO: ABNORMAL /UL
RBC #/AREA URNS AUTO: ABNORMAL /HPF
RH FACTOR (ANTIGEN D): NORMAL
SODIUM SERPL-SCNC: 132 MMOL/L (ref 133–145)
SP GR UR STRIP.AUTO: 1.02
SQUAMOUS #/AREA URNS AUTO: ABNORMAL /HPF
T AXIS: 84 DEGREES
T OFFSET: 399 MS
TIBC SERPL-MCNC: ABNORMAL UG/DL
TROPONIN T SERPL-MCNC: 55 NG/L
UIBC SERPL-MCNC: 133 UG/DL (ref 110–370)
UROBILINOGEN UR STRIP.AUTO-MCNC: NORMAL MG/DL
VANCOMYCIN SERPL-MCNC: 7.4 UG/ML (ref 10–20)
VENTRICULAR RATE: 76 BPM
WBC # BLD AUTO: 12 X10*3/UL (ref 4.4–11.3)
WBC #/AREA URNS AUTO: ABNORMAL /HPF

## 2024-01-24 PROCEDURE — 85025 COMPLETE CBC W/AUTO DIFF WBC: CPT | Performed by: INTERNAL MEDICINE

## 2024-01-24 PROCEDURE — 2500000001 HC RX 250 WO HCPCS SELF ADMINISTERED DRUGS (ALT 637 FOR MEDICARE OP): Performed by: INTERNAL MEDICINE

## 2024-01-24 PROCEDURE — 3600000007 HC OR TIME - EACH INCREMENTAL 1 MINUTE - PROCEDURE LEVEL TWO: Performed by: PODIATRIST

## 2024-01-24 PROCEDURE — 3700000002 HC GENERAL ANESTHESIA TIME - EACH INCREMENTAL 1 MINUTE: Performed by: PODIATRIST

## 2024-01-24 PROCEDURE — 84484 ASSAY OF TROPONIN QUANT: CPT | Performed by: INTERNAL MEDICINE

## 2024-01-24 PROCEDURE — 82550 ASSAY OF CK (CPK): CPT | Performed by: INTERNAL MEDICINE

## 2024-01-24 PROCEDURE — 3600000002 HC OR TIME - INITIAL BASE CHARGE - PROCEDURE LEVEL TWO: Performed by: PODIATRIST

## 2024-01-24 PROCEDURE — 88311 DECALCIFY TISSUE: CPT | Performed by: PATHOLOGY

## 2024-01-24 PROCEDURE — 82947 ASSAY GLUCOSE BLOOD QUANT: CPT

## 2024-01-24 PROCEDURE — 80202 ASSAY OF VANCOMYCIN: CPT | Performed by: INTERNAL MEDICINE

## 2024-01-24 PROCEDURE — 2720000007 HC OR 272 NO HCPCS: Performed by: PODIATRIST

## 2024-01-24 PROCEDURE — 7100000002 HC RECOVERY ROOM TIME - EACH INCREMENTAL 1 MINUTE: Performed by: PODIATRIST

## 2024-01-24 PROCEDURE — 88311 DECALCIFY TISSUE: CPT | Mod: TC | Performed by: INTERNAL MEDICINE

## 2024-01-24 PROCEDURE — 36415 COLL VENOUS BLD VENIPUNCTURE: CPT | Performed by: INTERNAL MEDICINE

## 2024-01-24 PROCEDURE — 80053 COMPREHEN METABOLIC PANEL: CPT | Performed by: INTERNAL MEDICINE

## 2024-01-24 PROCEDURE — 88305 TISSUE EXAM BY PATHOLOGIST: CPT | Performed by: PATHOLOGY

## 2024-01-24 PROCEDURE — 3700000001 HC GENERAL ANESTHESIA TIME - INITIAL BASE CHARGE: Performed by: PODIATRIST

## 2024-01-24 PROCEDURE — 2500000005 HC RX 250 GENERAL PHARMACY W/O HCPCS: Performed by: ANESTHESIOLOGY

## 2024-01-24 PROCEDURE — 82728 ASSAY OF FERRITIN: CPT | Performed by: NURSE PRACTITIONER

## 2024-01-24 PROCEDURE — 81001 URINALYSIS AUTO W/SCOPE: CPT | Performed by: INTERNAL MEDICINE

## 2024-01-24 PROCEDURE — 2500000004 HC RX 250 GENERAL PHARMACY W/ HCPCS (ALT 636 FOR OP/ED): Performed by: ANESTHESIOLOGY

## 2024-01-24 PROCEDURE — 87070 CULTURE OTHR SPECIMN AEROBIC: CPT | Mod: TRILAB,WESLAB | Performed by: INTERNAL MEDICINE

## 2024-01-24 PROCEDURE — 83540 ASSAY OF IRON: CPT | Performed by: NURSE PRACTITIONER

## 2024-01-24 PROCEDURE — 2500000005 HC RX 250 GENERAL PHARMACY W/O HCPCS: Performed by: PODIATRIST

## 2024-01-24 PROCEDURE — 7100000001 HC RECOVERY ROOM TIME - INITIAL BASE CHARGE: Performed by: PODIATRIST

## 2024-01-24 PROCEDURE — 82570 ASSAY OF URINE CREATININE: CPT | Performed by: INTERNAL MEDICINE

## 2024-01-24 PROCEDURE — 1200000002 HC GENERAL ROOM WITH TELEMETRY DAILY

## 2024-01-24 PROCEDURE — 2500000004 HC RX 250 GENERAL PHARMACY W/ HCPCS (ALT 636 FOR OP/ED): Performed by: INTERNAL MEDICINE

## 2024-01-24 RX ORDER — SODIUM CHLORIDE, SODIUM LACTATE, POTASSIUM CHLORIDE, CALCIUM CHLORIDE 600; 310; 30; 20 MG/100ML; MG/100ML; MG/100ML; MG/100ML
100 INJECTION, SOLUTION INTRAVENOUS CONTINUOUS
Status: DISCONTINUED | OUTPATIENT
Start: 2024-01-24 | End: 2024-01-24 | Stop reason: HOSPADM

## 2024-01-24 RX ORDER — VANCOMYCIN 1.25 G/250ML
1750 INJECTION, SOLUTION INTRAVENOUS ONCE
Status: COMPLETED | OUTPATIENT
Start: 2024-01-24 | End: 2024-01-24

## 2024-01-24 RX ORDER — ONDANSETRON HYDROCHLORIDE 2 MG/ML
4 INJECTION, SOLUTION INTRAVENOUS ONCE AS NEEDED
Status: DISCONTINUED | OUTPATIENT
Start: 2024-01-24 | End: 2024-01-24 | Stop reason: HOSPADM

## 2024-01-24 RX ORDER — ONDANSETRON HYDROCHLORIDE 2 MG/ML
INJECTION, SOLUTION INTRAVENOUS AS NEEDED
Status: DISCONTINUED | OUTPATIENT
Start: 2024-01-24 | End: 2024-01-24

## 2024-01-24 RX ORDER — LIDOCAINE HYDROCHLORIDE 10 MG/ML
0.1 INJECTION INFILTRATION; PERINEURAL ONCE
Status: DISCONTINUED | OUTPATIENT
Start: 2024-01-24 | End: 2024-01-24 | Stop reason: HOSPADM

## 2024-01-24 RX ORDER — LIDOCAINE HYDROCHLORIDE 10 MG/ML
INJECTION INFILTRATION; PERINEURAL AS NEEDED
Status: DISCONTINUED | OUTPATIENT
Start: 2024-01-24 | End: 2024-01-24

## 2024-01-24 RX ORDER — SODIUM CHLORIDE, SODIUM LACTATE, POTASSIUM CHLORIDE, CALCIUM CHLORIDE 600; 310; 30; 20 MG/100ML; MG/100ML; MG/100ML; MG/100ML
INJECTION, SOLUTION INTRAVENOUS CONTINUOUS PRN
Status: DISCONTINUED | OUTPATIENT
Start: 2024-01-24 | End: 2024-01-24

## 2024-01-24 RX ORDER — HYDRALAZINE HYDROCHLORIDE 20 MG/ML
5 INJECTION INTRAMUSCULAR; INTRAVENOUS EVERY 30 MIN PRN
Status: DISCONTINUED | OUTPATIENT
Start: 2024-01-24 | End: 2024-01-24 | Stop reason: HOSPADM

## 2024-01-24 RX ORDER — ALBUTEROL SULFATE 0.83 MG/ML
2.5 SOLUTION RESPIRATORY (INHALATION) ONCE AS NEEDED
Status: DISCONTINUED | OUTPATIENT
Start: 2024-01-24 | End: 2024-01-24 | Stop reason: HOSPADM

## 2024-01-24 RX ORDER — PROPOFOL 10 MG/ML
INJECTION, EMULSION INTRAVENOUS AS NEEDED
Status: DISCONTINUED | OUTPATIENT
Start: 2024-01-24 | End: 2024-01-24

## 2024-01-24 RX ORDER — BUPIVACAINE HYDROCHLORIDE 5 MG/ML
INJECTION, SOLUTION PERINEURAL AS NEEDED
Status: DISCONTINUED | OUTPATIENT
Start: 2024-01-24 | End: 2024-01-24 | Stop reason: HOSPADM

## 2024-01-24 RX ORDER — MIDAZOLAM HYDROCHLORIDE 1 MG/ML
INJECTION, SOLUTION INTRAMUSCULAR; INTRAVENOUS AS NEEDED
Status: DISCONTINUED | OUTPATIENT
Start: 2024-01-24 | End: 2024-01-24

## 2024-01-24 RX ADMIN — HEPARIN SODIUM 5000 UNITS: 5000 INJECTION, SOLUTION INTRAVENOUS; SUBCUTANEOUS at 21:11

## 2024-01-24 RX ADMIN — PANTOPRAZOLE SODIUM 40 MG: 40 TABLET, DELAYED RELEASE ORAL at 11:05

## 2024-01-24 RX ADMIN — SODIUM CHLORIDE 100 ML/HR: 900 INJECTION, SOLUTION INTRAVENOUS at 17:04

## 2024-01-24 RX ADMIN — MEROPENEM 1 G: 1 INJECTION, POWDER, FOR SOLUTION INTRAVENOUS at 23:29

## 2024-01-24 RX ADMIN — OLANZAPINE 20 MG: 10 TABLET, FILM COATED ORAL at 21:11

## 2024-01-24 RX ADMIN — CARVEDILOL 12.5 MG: 12.5 TABLET, FILM COATED ORAL at 17:04

## 2024-01-24 RX ADMIN — PROPOFOL 170 MG: 10 INJECTION, EMULSION INTRAVENOUS at 08:54

## 2024-01-24 RX ADMIN — ACETAMINOPHEN 650 MG: 325 TABLET ORAL at 17:10

## 2024-01-24 RX ADMIN — VANCOMYCIN 1750 MG: 1.25 INJECTION, SOLUTION INTRAVENOUS at 18:56

## 2024-01-24 RX ADMIN — MIDAZOLAM HYDROCHLORIDE 2 MG: 1 INJECTION, SOLUTION INTRAMUSCULAR; INTRAVENOUS at 08:54

## 2024-01-24 RX ADMIN — ASPIRIN 81 MG: 81 TABLET, COATED ORAL at 11:15

## 2024-01-24 RX ADMIN — SODIUM CHLORIDE 100 ML/HR: 900 INJECTION, SOLUTION INTRAVENOUS at 11:16

## 2024-01-24 RX ADMIN — HEPARIN SODIUM 5000 UNITS: 5000 INJECTION, SOLUTION INTRAVENOUS; SUBCUTANEOUS at 14:16

## 2024-01-24 RX ADMIN — FINASTERIDE 5 MG: 5 TABLET, FILM COATED ORAL at 11:05

## 2024-01-24 RX ADMIN — MEROPENEM 1 G: 1 INJECTION, POWDER, FOR SOLUTION INTRAVENOUS at 00:13

## 2024-01-24 RX ADMIN — SODIUM CHLORIDE, POTASSIUM CHLORIDE, SODIUM LACTATE AND CALCIUM CHLORIDE: 600; 310; 30; 20 INJECTION, SOLUTION INTRAVENOUS at 08:54

## 2024-01-24 RX ADMIN — SODIUM CHLORIDE, SODIUM LACTATE, POTASSIUM CHLORIDE, AND CALCIUM CHLORIDE 500 ML: 600; 310; 30; 20 INJECTION, SOLUTION INTRAVENOUS at 02:11

## 2024-01-24 RX ADMIN — LIDOCAINE HYDROCHLORIDE 50 MG: 10 INJECTION, SOLUTION INFILTRATION; PERINEURAL at 08:54

## 2024-01-24 RX ADMIN — MEROPENEM 1 G: 1 INJECTION, POWDER, FOR SOLUTION INTRAVENOUS at 11:04

## 2024-01-24 RX ADMIN — CARVEDILOL 12.5 MG: 12.5 TABLET, FILM COATED ORAL at 11:17

## 2024-01-24 RX ADMIN — ONDANSETRON HYDROCHLORIDE 4 MG: 2 INJECTION INTRAMUSCULAR; INTRAVENOUS at 09:26

## 2024-01-24 RX ADMIN — POLYETHYLENE GLYCOL 3350 17 G: 17 POWDER, FOR SOLUTION ORAL at 11:04

## 2024-01-24 SDOH — HEALTH STABILITY: MENTAL HEALTH: CURRENT SMOKER: 0

## 2024-01-24 ASSESSMENT — ENCOUNTER SYMPTOMS
FATIGUE: 0
ADENOPATHY: 0
ARTHRALGIAS: 0
SHORTNESS OF BREATH: 0
NECK PAIN: 0
PHOTOPHOBIA: 0
WOUND: 1
COUGH: 0
WEAKNESS: 1
FREQUENCY: 0
HEMATURIA: 0
SEIZURES: 0
WHEEZING: 0
SORE THROAT: 0
MYALGIAS: 0
CONSTIPATION: 0
APNEA: 0
TREMORS: 0
SPEECH DIFFICULTY: 0
VOMITING: 0
DYSURIA: 0
JOINT SWELLING: 1
PALPITATIONS: 0
DIZZINESS: 0
RECTAL PAIN: 0
NAUSEA: 0
DIARRHEA: 0
COLOR CHANGE: 0
BACK PAIN: 0
FEVER: 0
NUMBNESS: 0
CHILLS: 0
CONFUSION: 0
POLYPHAGIA: 0
HEADACHES: 0
HALLUCINATIONS: 0
SINUS PRESSURE: 0
LIGHT-HEADEDNESS: 0
SLEEP DISTURBANCE: 0
BLOOD IN STOOL: 0
POLYDIPSIA: 0
ABDOMINAL PAIN: 0
BRUISES/BLEEDS EASILY: 0

## 2024-01-24 ASSESSMENT — COGNITIVE AND FUNCTIONAL STATUS - GENERAL
CLIMB 3 TO 5 STEPS WITH RAILING: A LOT
STANDING UP FROM CHAIR USING ARMS: A LITTLE
DAILY ACTIVITIY SCORE: 24
MOVING TO AND FROM BED TO CHAIR: A LITTLE
DAILY ACTIVITIY SCORE: 24
MOBILITY SCORE: 18
WALKING IN HOSPITAL ROOM: A LITTLE
WALKING IN HOSPITAL ROOM: A LOT
MOBILITY SCORE: 22
CLIMB 3 TO 5 STEPS WITH RAILING: A LITTLE

## 2024-01-24 ASSESSMENT — PAIN SCALES - GENERAL
PAINLEVEL_OUTOF10: 0 - NO PAIN

## 2024-01-24 ASSESSMENT — PAIN DESCRIPTION - LOCATION: LOCATION: FOOT

## 2024-01-24 ASSESSMENT — PAIN - FUNCTIONAL ASSESSMENT
PAIN_FUNCTIONAL_ASSESSMENT: 0-10

## 2024-01-24 ASSESSMENT — PAIN DESCRIPTION - DESCRIPTORS: DESCRIPTORS: SORE

## 2024-01-24 ASSESSMENT — PAIN DESCRIPTION - ORIENTATION: ORIENTATION: RIGHT

## 2024-01-24 NOTE — CONSULTS
.Reason For Consult   acute kidney injury    History Of Present Illness  Claude Coley is a 63 y.o. male who has history of diabetes mellitus type 2 however he denies any history of kidney disease in the past  however we do not have any baseline creatinine level on him for more than 5 years the patient apparently has been complaining of pain and infection in his right foot came to the emergency room was diagnosed with severely infected and gangrenous right foot X it was noted that his creatinine level was elevated so I was consulted for acute kidney injury the patient denies any nausea vomiting diarrhea fever chills. .     Review of Systems  Review of Systems   Constitutional:  Negative for chills, fatigue and fever.   HENT:  Negative for sinus pressure, sore throat and tinnitus.    Eyes:  Negative for photophobia and visual disturbance.   Respiratory:  Negative for apnea, cough, shortness of breath and wheezing.    Cardiovascular:  Negative for chest pain, palpitations and leg swelling.   Gastrointestinal:  Negative for abdominal pain, blood in stool, constipation, diarrhea, nausea, rectal pain and vomiting.   Endocrine: Negative for cold intolerance, heat intolerance, polydipsia, polyphagia and polyuria.   Genitourinary:  Negative for decreased urine volume, dysuria, frequency, hematuria and urgency.   Musculoskeletal:  Positive for joint swelling. Negative for arthralgias, back pain, myalgias and neck pain.   Skin:  Positive for wound. Negative for color change, pallor and rash.   Neurological:  Positive for weakness. Negative for dizziness, tremors, seizures, syncope, speech difficulty, light-headedness, numbness and headaches.   Hematological:  Negative for adenopathy. Does not bruise/bleed easily.   Psychiatric/Behavioral:  Negative for confusion, hallucinations, sleep disturbance and suicidal ideas.         Past Medical History  He has a past medical history of Diabetes mellitus (CMS/[facility]).    Surgical  History  He has no past surgical history on file.     Social History  He reports that he has never smoked. He has never used smokeless tobacco. No history on file for alcohol use and drug use.    Family History  No family history on file.     Current Facility-Administered Medications:     acetaminophen (Tylenol) tablet 650 mg, 650 mg, oral, q4h PRN **OR** acetaminophen (Tylenol) oral liquid 650 mg, 650 mg, oral, q4h PRN **OR** acetaminophen (Tylenol) suppository 650 mg, 650 mg, rectal, q4h PRN, Connie Neumann MD    aspirin EC tablet 81 mg, 81 mg, oral, Daily, Connie Neumann MD, 81 mg at 01/24/24 1115    carvedilol (Coreg) tablet 12.5 mg, 12.5 mg, oral, BID with meals, Connie Neumann MD, 12.5 mg at 01/24/24 1117    dextrose 10 % in water (D10W) infusion, 0.3 g/kg/hr, intravenous, Once PRN, Connie Neumann MD    dextrose 50 % injection 25 g, 25 g, intravenous, q15 min PRN, Connie Neumann MD    finasteride (Proscar) tablet 5 mg, 5 mg, oral, Daily, Connie Neumann MD, 5 mg at 01/24/24 1105    glucagon (Glucagen) injection 1 mg, 1 mg, intramuscular, q15 min PRN, Connie Neumann MD    heparin (porcine) injection 5,000 Units, 5,000 Units, subcutaneous, q8h, Connie Neumann MD, 5,000 Units at 01/23/24 2332    insulin lispro (HumaLOG) injection 0-10 Units, 0-10 Units, subcutaneous, q4h, Connie Neumann MD    melatonin tablet 3 mg, 3 mg, oral, Nightly, Connie Neumann MD    meropenem (Merrem) in sodium chloride 0.9 % 100 mL IV 1 g, 1 g, intravenous, q12h, Connie Neumann MD, Stopped at 01/24/24 1134    OLANZapine (ZyPREXA) tablet 20 mg, 20 mg, oral, Nightly, Connie Neumann MD, 20 mg at 01/23/24 2341    pantoprazole (ProtoNix) EC tablet 40 mg, 40 mg, oral, Daily, 40 mg at 01/24/24 1105 **OR** pantoprazole (ProtoNix) injection 40 mg, 40 mg, intravenous, Daily, Connie Neumann MD    polyethylene glycol (Glycolax, Miralax) packet 17 g,  17 g, oral, Daily, Connie Neumann MD, 17 g at 01/24/24 1104    sodium chloride 0.9% infusion, 100 mL/hr, intravenous, Continuous, Connie Neumann MD, Last Rate: 100 mL/hr at 01/24/24 1116, 100 mL/hr at 01/24/24 1116   Allergies  Bee sting kit and Tramadol         Physical Exam  Physical Exam  Constitutional:       General: He is not in acute distress.     Appearance: He is not toxic-appearing.   HENT:      Head: Normocephalic and atraumatic.   Eyes:      Extraocular Movements: Extraocular movements intact.      Pupils: Pupils are equal, round, and reactive to light.   Neck:      Vascular: No carotid bruit.   Cardiovascular:      Rate and Rhythm: Normal rate and regular rhythm.   Pulmonary:      Effort: No respiratory distress.      Breath sounds: No stridor. No wheezing, rhonchi or rales.   Chest:      Chest wall: No tenderness.   Abdominal:      General: There is no distension.      Palpations: There is no mass.      Tenderness: There is no abdominal tenderness. There is no right CVA tenderness, left CVA tenderness or guarding.      Hernia: No hernia is present.   Musculoskeletal:         General: No swelling or tenderness.      Cervical back: No rigidity.      Right lower leg: No edema.      Left lower leg: No edema.      Comments:   Patient has infected and gangrenous right foot   Lymphadenopathy:      Cervical: No cervical adenopathy.   Skin:     General: Skin is warm and dry.      Coloration: Skin is not jaundiced or pale.      Findings: Erythema present. No bruising.   Neurological:      General: No focal deficit present.      Mental Status: He is alert and oriented to person, place, and time.   Psychiatric:         Mood and Affect: Mood normal.         Behavior: Behavior normal.              I&O 24HR    Intake/Output Summary (Last 24 hours) at 1/24/2024 1408  Last data filed at 1/24/2024 1200  Gross per 24 hour   Intake 2020 ml   Output 1975 ml   Net 45 ml       Vitals 24HR  Heart Rate:   "[70-92]   Temp:  [35.8 °C (96.4 °F)-37.2 °C (99 °F)]   Resp:  [16-25]   BP: (112-149)/(46-61)   Height:  [188 cm (6' 2.02\")]   Weight:  [114 kg (252 lb 6.4 oz)]   SpO2:  [92 %-99 %]     Relevant Results        Results for orders placed or performed during the hospital encounter of 01/23/24 (from the past 96 hour(s))   POCT GLUCOSE   Result Value Ref Range    POCT Glucose 99 74 - 99 mg/dL   Hemoglobin A1C   Result Value Ref Range    Hemoglobin A1C 4.5 See below %    Estimated Average Glucose 82 Not Established mg/dL   Type And Screen   Result Value Ref Range    ABO TYPE B     Rh TYPE POS     ANTIBODY SCREEN NEG    Protime-INR   Result Value Ref Range    Protime 12.7 9.3 - 12.7 seconds    INR 1.2 0.9 - 1.2   Creatine Kinase   Result Value Ref Range    Creatine Kinase 738 (H) 24 - 195 U/L   Basic Metabolic Panel   Result Value Ref Range    Glucose 95 65 - 99 mg/dL    Sodium 131 (L) 133 - 145 mmol/L    Potassium 3.8 3.4 - 5.1 mmol/L    Chloride 96 (L) 97 - 107 mmol/L    Bicarbonate 18 (L) 24 - 31 mmol/L    Urea Nitrogen 76 (H) 8 - 25 mg/dL    Creatinine 2.00 (H) 0.40 - 1.60 mg/dL    eGFR 37 (L) >60 mL/min/1.73m*2    Calcium 9.4 8.5 - 10.4 mg/dL    Anion Gap 17 <=19 mmol/L   Urinalysis with Reflex Microscopic   Result Value Ref Range    Color, Urine Yellow Light-Yellow, Yellow, Dark-Yellow    Appearance, Urine Clear Clear    Specific Gravity, Urine 1.018 1.005 - 1.035    pH, Urine 5.0 5.0, 5.5, 6.0, 6.5, 7.0, 7.5, 8.0    Protein, Urine 30 (1+) (A) NEGATIVE, 10 (TRACE), 20 (TRACE) mg/dL    Glucose, Urine Normal Normal mg/dL    Blood, Urine 0.06 (1+) (A) NEGATIVE    Ketones, Urine NEGATIVE NEGATIVE mg/dL    Bilirubin, Urine NEGATIVE NEGATIVE    Urobilinogen, Urine Normal Normal mg/dL    Nitrite, Urine NEGATIVE NEGATIVE    Leukocyte Esterase, Urine NEGATIVE NEGATIVE   Microscopic Only, Urine   Result Value Ref Range    WBC, Urine NONE 1-5, NONE /HPF    RBC, Urine 1-2 NONE, 1-2, 3-5 /HPF    Squamous Epithelial Cells, Urine " 1-9 (SPARSE) Reference range not established. /HPF    Bacteria, Urine 1+ (A) NONE SEEN /HPF    Mucus, Urine FEW Reference range not established. /LPF    Hyaline Casts, Urine 1+ (A) NONE /LPF   POCT GLUCOSE   Result Value Ref Range    POCT Glucose 100 (H) 74 - 99 mg/dL   Serial Troponin, Initial ([facility])   Result Value Ref Range    Troponin T, High Sensitivity 55 (HH) <=14 ng/L   Serial Troponin, 2 Hour ([facility])   Result Value Ref Range    Troponin T, High Sensitivity 55 (HH) <=14 ng/L   CBC and Auto Differential   Result Value Ref Range    WBC 12.0 (H) 4.4 - 11.3 x10*3/uL    nRBC 0.0 0.0 - 0.0 /100 WBCs    RBC 3.03 (L) 4.50 - 5.90 x10*6/uL    Hemoglobin 9.2 (L) 13.5 - 17.5 g/dL    Hematocrit 26.7 (L) 41.0 - 52.0 %    MCV 88 80 - 100 fL    MCH 30.4 26.0 - 34.0 pg    MCHC 34.5 32.0 - 36.0 g/dL    RDW 12.7 11.5 - 14.5 %    Platelets 185 150 - 450 x10*3/uL    Neutrophils % 83.5 40.0 - 80.0 %    Immature Granulocytes %, Automated 2.7 (H) 0.0 - 0.9 %    Lymphocytes % 5.2 13.0 - 44.0 %    Monocytes % 8.3 2.0 - 10.0 %    Eosinophils % 0.2 0.0 - 6.0 %    Basophils % 0.1 0.0 - 2.0 %    Neutrophils Absolute 10.05 (H) 1.20 - 7.70 x10*3/uL    Immature Granulocytes Absolute, Automated 0.33 0.00 - 0.70 x10*3/uL    Lymphocytes Absolute 0.62 (L) 1.20 - 4.80 x10*3/uL    Monocytes Absolute 1.00 0.10 - 1.00 x10*3/uL    Eosinophils Absolute 0.02 0.00 - 0.70 x10*3/uL    Basophils Absolute 0.01 0.00 - 0.10 x10*3/uL   Comprehensive metabolic panel   Result Value Ref Range    Glucose 93 65 - 99 mg/dL    Sodium 132 (L) 133 - 145 mmol/L    Potassium 3.7 3.4 - 5.1 mmol/L    Chloride 102 97 - 107 mmol/L    Bicarbonate 16 (L) 24 - 31 mmol/L    Urea Nitrogen 70 (H) 8 - 25 mg/dL    Creatinine 1.70 (H) 0.40 - 1.60 mg/dL    eGFR 45 (L) >60 mL/min/1.73m*2    Calcium 8.7 8.5 - 10.4 mg/dL    Albumin 2.6 (L) 3.5 - 5.0 g/dL    Alkaline Phosphatase 112 35 - 125 U/L    Total Protein 6.2 5.9 - 7.9 g/dL    AST 41 (H) 5 - 40 U/L    Bilirubin, Total 0.3 0.1 - 1.2  mg/dL    ALT 30 5 - 40 U/L    Anion Gap 14 <=19 mmol/L   Creatine Kinase   Result Value Ref Range    Creatine Kinase 592 (H) 24 - 195 U/L   POCT GLUCOSE   Result Value Ref Range    POCT Glucose 101 (H) 74 - 99 mg/dL   Serial Troponin, 6 Hour ([facility])   Result Value Ref Range    Troponin T, High Sensitivity 55 (HH) <=14 ng/L   Iron and TIBC   Result Value Ref Range    Iron <20 (L) 45 - 160 ug/dL    UIBC 133 110 - 370 ug/dL    TIBC      % Saturation     Ferritin   Result Value Ref Range    Ferritin 494 (H) 30 - 400 ng/mL   POCT GLUCOSE   Result Value Ref Range    POCT Glucose 89 74 - 99 mg/dL   POCT GLUCOSE   Result Value Ref Range    POCT Glucose 135 (H) 74 - 99 mg/dL          Assessment/Plan     ECG 12 lead    Result Date: 1/24/2024  Normal sinus rhythm Possible Septal infarct , age undetermined Abnormal ECG No previous ECGs available Confirmed by Bobo Thornton ([id number]) on 1/24/2024 2:03:37 PM    US renal complete    Result Date: 1/24/2024  STUDY: Renal and Bladder Ultrasound; 1/23/2024 10:36 PM. INDICATION:  Acute renal failure.  COMPARISON:  None available. ACCESSION NUMBER(S): [id number] ORDERING CLINICIAN: GARCIA DUARTE TECHNIQUE: Ultrasound of the Kidneys and Bladder. Multiple images of the abdomen were obtained. FINDINGS: RIGHT KIDNEY: The right kidney measures 12.7 cm in length. Renal cortical echotexture is normal. There is no hydronephrosis. There are no stones. There is a cyst with a thick internal septation that measures 1.9 x 2.1 x 1.5 cm within the superior pole. LEFT KIDNEY: The left kidney measures 12.9 cm in length. Renal cortical echotexture is normal. There is no hydronephrosis. There are no stones. There are no cysts.  BLADDER: The urinary bladder is anechoic. The distended bladder shows no evidence of wall thickening. The right ureteral jet is visualized. The prostate gland measures 5.0 x 3.6 x 4.8 cm (44cc).       Septated right renal cyst consistent with a Bosniak class II lesion. No  hydronephrosis bilaterally.  Follow-up ultrasound is recommended in 6 months. Enlarged prostate. Signed by Carlos Luke    XR tibia fibula right 2 views    Result Date: 1/23/2024  STUDY: Tibia and Fibula Radiographs; 1/23/2024 at 10:42 PM INDICATION: Soft tissue gas. COMPARISON: XR right foot 1/23/2024. ACCESSION NUMBER(S): [id number] ORDERING CLINICIAN: GARCIA DUARTE TECHNIQUE:  4 view(s) of the right tibia and fibula. FINDINGS:  There is no displaced fracture.  The alignment is anatomic. Superficial soft tissue edema is noted.  Peripheral vascular calcifications are present.  Soft tissue emphysema is seen along the dorsal aspect of the midfoot and anterior to the distal tibia.    Generalized soft tissue edema with soft tissue emphysema seen surrounding the ventral aspect of the ankle and dorsal aspect of the midfoot, concerning for soft tissue infection. Peripheral vascular disease. No acute osseous normality identified. Signed by Carlos Luke    XR foot right 3+ views    Result Date: 1/23/2024  Interpreted By:  Kirstie Moy, STUDY: XR FOOT RIGHT 3+ VIEWS 1/23/2024 2:03 pm   INDICATION: Swelling   COMPARISON: None available.   ACCESSION NUMBER(S): [id number]   ORDERING CLINICIAN: GARCIA DUARTE   TECHNIQUE: Three views of right foot   FINDINGS: There is marked soft tissue swelling of the right ankle and foot. There are multiple pockets of gas seen within the right forefoot laterally centered about the 5th MTP joint with this gas extending into the webspace between the 4th and 5th toes and also extending into the midfoot and hindfoot anteriorly.   However, no obvious bony destruction is observed to indicate osteomyelitis.       Soft tissue infection of the right foot centered along the lateral aspect of the forefoot with probable abscess formation and gas gangrene. However, no plain film evidence of osteomyelitis is observed.   Signed by: Kirstie Moy 1/23/2024 2:34 PM Dictation workstation:    [id number]     Impression:   acute kidney injury versus underlying chronic kidney disease could be associated with current infection prerenal azotemia  associated with the use of diuretics and ACE inhibitorhowever the patient may very well have underlying diabetic nephropathy and nephrosclerosis   infected and gangrenous right foot   complex renal cyst    recommendations:   gentle IV hydration   discontinue diuretics   avoid ARB's and ACE inhibitors   urine protein creatinine ratio   IV antibiotics per infectious disease   vascular consult   no indication for dialysis therapy at this time we will continue to monitor renal function very closely       thank you for your consult    Pablo James

## 2024-01-24 NOTE — NURSING NOTE
Agree with previous assessment. Patient awake resting in bed watching TV. RLE elevated.  Podiatry in to see patient, aware of increased shadowing to RLE dressing. Podiatry stated they reinforced and if there continues to be more shadowing onto new layer of ACE wrap to inform them. Call light within reach. Will continue plan of care.

## 2024-01-24 NOTE — NURSING NOTE
Patient has no complaints of pain at this time, awake A&O x4 resting in bed with RLE elevated, and dressing to RLE has shadowing. Call light within reach. Will continue plan of care.

## 2024-01-24 NOTE — ED NOTES
Gave report to JEANNA Moura.     Updated her that [facility] was called by Lisette Gleason RN.  This nurse filled out EMTLA had it signed by patient and DrFreddy, face sheet and [facility] form filled out and completed.     Jazz Mccarthy LPN  01/23/24 1923

## 2024-01-24 NOTE — ANESTHESIA PREPROCEDURE EVALUATION
Patient: Claude Coley    Procedure Information       Date/Time: 01/24/24 0830    Procedure: Incision and Drainage Lower Extremity (Right) - Incision and drainage with debridement of nonviable tissue and bone, right foot and ankle.  Please have 6L normal saline. Swab culture will be collected.    Location: [facility] / [facility]    Surgeons: Darryl Perry DPM          Past Medical History:   Diagnosis Date    Diabetes mellitus (CMS/HCC)         Relevant Problems   Cardiovascular   (+) HTN (hypertension)   (+) Hyperlipidemia      Endocrine   (+) Diabetes mellitus, type 2 (CMS/HCC)      Infectious Disease   (+) Gas gangrene of foot (CMS/HCC)     History reviewed. No pertinent surgical history.   Clinical information reviewed:   Tobacco  Allergies  Meds   Med Hx  Surg Hx   Fam Hx  Soc Hx        NPO Detail:  No data recorded     Physical Exam    Airway  Mallampati: II  TM distance: >3 FB  Neck ROM: full     Cardiovascular    Dental    Pulmonary    Abdominal            Anesthesia Plan    History of general anesthesia?: no  History of complications of general anesthesia?: unknown/emergency    ASA 3     general     The patient is not a current smoker.  Patient was not previously instructed to abstain from smoking on day of procedure.  Patient did not smoke on day of procedure.    intravenous induction   Postoperative administration of opioids is intended.  Anesthetic plan and risks discussed with patient.    Plan discussed with attending.

## 2024-01-24 NOTE — PROGRESS NOTES
Claude Coley is a 63 y.o. male on day 1 of admission presenting with Gas gangrene of foot (CMS/[facility]).    Subjective   Seen and examined patient is cleared medically to proceed to surgical debridement and removal of devitalized and dead tissue and surgical exploration of his gangrenous cellulitic gas-forming organism infection of his right lower extremity.  Surgery was delayed due to inability to obtain an open operating room yesterday he is on broad-spectrum antibiotics he will likely require extensive debridement and drainage possibly a two-stage procedure as well as likely a wound VAC and evaluation for osteomyelitis I would anticipate this patient requiring 4 to 6 weeks of IV antibiotics and likely needing a PICC line placement for this will obtain ID consultation and await surgical findings of our colleagues in podiatry       Objective     Physical Exam  Vitals and nursing note reviewed.   Constitutional:       Appearance: Normal appearance.   HENT:      Head: Normocephalic and atraumatic.      Mouth/Throat:      Mouth: Mucous membranes are moist.   Eyes:      Extraocular Movements: Extraocular movements intact.      Pupils: Pupils are equal, round, and reactive to light.   Cardiovascular:      Rate and Rhythm: Normal rate and regular rhythm.      Pulses: Normal pulses.      Heart sounds: Normal heart sounds.   Pulmonary:      Effort: Pulmonary effort is normal.      Breath sounds: Normal breath sounds.   Abdominal:      General: Abdomen is flat.      Palpations: Abdomen is soft.   Musculoskeletal:         General: Normal range of motion.      Cervical back: Normal range of motion and neck supple.   Skin:     General: Skin is warm.      Capillary Refill: Capillary refill takes less than 2 seconds.   Neurological:      General: No focal deficit present.      Mental Status: He is alert and oriented to person, place, and time. Mental status is at baseline.   Psychiatric:         Mood and Affect: Mood normal.  "        Last Recorded Vitals  Blood pressure 124/55, pulse 71, temperature 36.6 °C (97.9 °F), temperature source Temporal, resp. rate 20, height 1.88 m (6' 2.02\"), weight 114 kg (252 lb 6.4 oz), SpO2 94 %.  Intake/Output last 3 Shifts:  I/O last 3 completed shifts:  In: 1421.7 (12.4 mL/kg) [I.V.:321.7 (2.8 mL/kg); IV Piggyback:1100]  Out: 1575 (13.8 mL/kg) [Urine:1575 (0.4 mL/kg/hr)]  Weight: 114.5 kg     Relevant Results              Results for orders placed or performed during the hospital encounter of 01/23/24 (from the past 24 hour(s))   POCT GLUCOSE   Result Value Ref Range    POCT Glucose 99 74 - 99 mg/dL   Hemoglobin A1C   Result Value Ref Range    Hemoglobin A1C 4.5 See below %    Estimated Average Glucose 82 Not Established mg/dL   Type And Screen   Result Value Ref Range    ABO TYPE B     Rh TYPE POS     ANTIBODY SCREEN NEG    Protime-INR   Result Value Ref Range    Protime 12.7 9.3 - 12.7 seconds    INR 1.2 0.9 - 1.2   Creatine Kinase   Result Value Ref Range    Creatine Kinase 738 (H) 24 - 195 U/L   Basic Metabolic Panel   Result Value Ref Range    Glucose 95 65 - 99 mg/dL    Sodium 131 (L) 133 - 145 mmol/L    Potassium 3.8 3.4 - 5.1 mmol/L    Chloride 96 (L) 97 - 107 mmol/L    Bicarbonate 18 (L) 24 - 31 mmol/L    Urea Nitrogen 76 (H) 8 - 25 mg/dL    Creatinine 2.00 (H) 0.40 - 1.60 mg/dL    eGFR 37 (L) >60 mL/min/1.73m*2    Calcium 9.4 8.5 - 10.4 mg/dL    Anion Gap 17 <=19 mmol/L   Urinalysis with Reflex Microscopic   Result Value Ref Range    Color, Urine Yellow Light-Yellow, Yellow, Dark-Yellow    Appearance, Urine Clear Clear    Specific Gravity, Urine 1.018 1.005 - 1.035    pH, Urine 5.0 5.0, 5.5, 6.0, 6.5, 7.0, 7.5, 8.0    Protein, Urine 30 (1+) (A) NEGATIVE, 10 (TRACE), 20 (TRACE) mg/dL    Glucose, Urine Normal Normal mg/dL    Blood, Urine 0.06 (1+) (A) NEGATIVE    Ketones, Urine NEGATIVE NEGATIVE mg/dL    Bilirubin, Urine NEGATIVE NEGATIVE    Urobilinogen, Urine Normal Normal mg/dL    Nitrite, " Urine NEGATIVE NEGATIVE    Leukocyte Esterase, Urine NEGATIVE NEGATIVE   Microscopic Only, Urine   Result Value Ref Range    WBC, Urine NONE 1-5, NONE /HPF    RBC, Urine 1-2 NONE, 1-2, 3-5 /HPF    Squamous Epithelial Cells, Urine 1-9 (SPARSE) Reference range not established. /HPF    Bacteria, Urine 1+ (A) NONE SEEN /HPF    Mucus, Urine FEW Reference range not established. /LPF    Hyaline Casts, Urine 1+ (A) NONE /LPF   POCT GLUCOSE   Result Value Ref Range    POCT Glucose 100 (H) 74 - 99 mg/dL   Serial Troponin, Initial ([facility])   Result Value Ref Range    Troponin T, High Sensitivity 55 (HH) <=14 ng/L   Serial Troponin, 2 Hour ([facility])   Result Value Ref Range    Troponin T, High Sensitivity 55 (HH) <=14 ng/L   CBC and Auto Differential   Result Value Ref Range    WBC 12.0 (H) 4.4 - 11.3 x10*3/uL    nRBC 0.0 0.0 - 0.0 /100 WBCs    RBC 3.03 (L) 4.50 - 5.90 x10*6/uL    Hemoglobin 9.2 (L) 13.5 - 17.5 g/dL    Hematocrit 26.7 (L) 41.0 - 52.0 %    MCV 88 80 - 100 fL    MCH 30.4 26.0 - 34.0 pg    MCHC 34.5 32.0 - 36.0 g/dL    RDW 12.7 11.5 - 14.5 %    Platelets 185 150 - 450 x10*3/uL    Neutrophils % 83.5 40.0 - 80.0 %    Immature Granulocytes %, Automated 2.7 (H) 0.0 - 0.9 %    Lymphocytes % 5.2 13.0 - 44.0 %    Monocytes % 8.3 2.0 - 10.0 %    Eosinophils % 0.2 0.0 - 6.0 %    Basophils % 0.1 0.0 - 2.0 %    Neutrophils Absolute 10.05 (H) 1.20 - 7.70 x10*3/uL    Immature Granulocytes Absolute, Automated 0.33 0.00 - 0.70 x10*3/uL    Lymphocytes Absolute 0.62 (L) 1.20 - 4.80 x10*3/uL    Monocytes Absolute 1.00 0.10 - 1.00 x10*3/uL    Eosinophils Absolute 0.02 0.00 - 0.70 x10*3/uL    Basophils Absolute 0.01 0.00 - 0.10 x10*3/uL   Comprehensive metabolic panel   Result Value Ref Range    Glucose 93 65 - 99 mg/dL    Sodium 132 (L) 133 - 145 mmol/L    Potassium 3.7 3.4 - 5.1 mmol/L    Chloride 102 97 - 107 mmol/L    Bicarbonate 16 (L) 24 - 31 mmol/L    Urea Nitrogen 70 (H) 8 - 25 mg/dL    Creatinine 1.70 (H) 0.40 - 1.60 mg/dL     eGFR 45 (L) >60 mL/min/1.73m*2    Calcium 8.7 8.5 - 10.4 mg/dL    Albumin 2.6 (L) 3.5 - 5.0 g/dL    Alkaline Phosphatase 112 35 - 125 U/L    Total Protein 6.2 5.9 - 7.9 g/dL    AST 41 (H) 5 - 40 U/L    Bilirubin, Total 0.3 0.1 - 1.2 mg/dL    ALT 30 5 - 40 U/L    Anion Gap 14 <=19 mmol/L   Creatine Kinase   Result Value Ref Range    Creatine Kinase 592 (H) 24 - 195 U/L   POCT GLUCOSE   Result Value Ref Range    POCT Glucose 101 (H) 74 - 99 mg/dL   Serial Troponin, 6 Hour ([facility])   Result Value Ref Range    Troponin T, High Sensitivity 55 (HH) <=14 ng/L   Iron and TIBC   Result Value Ref Range    Iron      UIBC      TIBC      % Saturation                       Assessment/Plan   Principal Problem:    Gas gangrene of foot (CMS/HCC)  Active Problems:    HTN (hypertension)    Hyperlipidemia    Diabetes mellitus, type 2 (CMS/HCC)    Chronic kidney disease stage III elevated troponin secondary to CKD and early rhabdomyolysis with associated elevated CPK.  Patient has had preoperative stress testing which was negative for premature coronary disease he denies personal history of chest pain congestive heart failure or stroke is medically optimized for the urgent surgery of surgical debridement for gas gangrene continue broad-spectrum antibiotics obtain infectious disease consult anticipate need for PICC line and long-term antibiotics       I spent 40 minutes in the professional and overall care of this patient.      Melvin Ivey DO

## 2024-01-24 NOTE — CONSULTS
PODIATRY CONSULT NOTE    SERVICE DATE: 1/24/2024   SERVICE TIME:  6:30 PM    REASON FOR CONSULT: RLE Gas Gangrene  PRIMARY CARE PHYSICIAN: Darryl Jenkins MD    Subjective   HPI:  Mr. Coley is a 63 y.o. male who presents for RLE wound. Pt states that he developed the wound on the bottom of his right lateral foot a few weeks ago. Has been keeping it covered. Denies any establishment with a podiatrist. States that over the last three days the RLE has gotten progressively more swollen and red. States that he started to feel ill and came to the ED. Pt is a DM however states that overall he is well controlled. Pt expressed concern over losing part of his feet. At this time pt denies any consitutional symptoms and is stable. Pt on IV Vanc, Zosyn and Clinda. Overall cellulitis appears to be improving. Gas was noted on plain film images. No other pedal complaints.    ROS: 10-point review of systems was performed and is otherwise negative except as noted in HPI.  PMH: Reviewed/documented below.  PSH:  Noncontributory except per HPI   FH: Reviewed and noncontributory   SOCIAL:  Reviewed/documented below.  ALLERGIES: Reviewed/documented below.  MEDS: Reviewed/documented below.  VS: Reviewed/documented below.    Past Medical History:   Diagnosis Date    Diabetes mellitus (CMS/HCC)      History reviewed. No pertinent surgical history.  No family history on file.  Social History     Tobacco Use    Smoking status: Never    Smokeless tobacco: Never      Medications Prior to Admission   Medication Sig Dispense Refill Last Dose    aspirin 81 mg EC tablet Take 1 tablet (81 mg) by mouth once daily.   1/22/2024 at 1500    atorvastatin (Lipitor) 40 mg tablet Take 1 tablet (40 mg) by mouth once daily.   1/22/2024 at 1500    carvedilol (Coreg) 12.5 mg tablet Take 1 tablet (12.5 mg) by mouth 2 times a day with meals.   1/22/2024 at 1500    finasteride (Proscar) 5 mg tablet Take 1 tablet (5 mg) by mouth once daily. Do not  crush, chew, or split.   1/22/2024 at 1500    furosemide (Lasix) 20 mg tablet Take 1 tablet (20 mg) by mouth once daily.   1/22/2024 at 1500    gemfibrozil (Lopid) 600 mg tablet Take 1 tablet (600 mg) by mouth 2 times a day before meals.   1/23/2024    lisinopril 20 mg tablet Take 1 tablet (20 mg) by mouth once daily.   1/22/2024 at 1500    metFORMIN, OSM, (Fortamet) 500 mg 24 hr tablet Take 1 tablet (500 mg) by mouth once daily in the evening. Take with meals. Do not crush, chew, or split.   1/22/2024 at 1500    OLANZapine (ZyPREXA) 20 mg tablet Take 1 tablet (20 mg) by mouth once daily at bedtime.   1/22/2024 at 1500    spironolactone (Aldactone) 25 mg tablet Take 1 tablet (25 mg) by mouth once daily.   1/22/2024 at 1500        Medications:  Scheduled Meds: aspirin, 81 mg, oral, Daily  carvedilol, 12.5 mg, oral, BID with meals  finasteride, 5 mg, oral, Daily  heparin (porcine), 5,000 Units, subcutaneous, q8h  insulin lispro, 0-10 Units, subcutaneous, q4h  melatonin, 3 mg, oral, Nightly  meropenem, 1 g, intravenous, q12h  OLANZapine, 20 mg, oral, Nightly  pantoprazole, 40 mg, oral, Daily   Or  pantoprazole, 40 mg, intravenous, Daily  polyethylene glycol, 17 g, oral, Daily      Continuous Infusions: sodium chloride 0.9%, 100 mL/hr, Last Rate: 100 mL/hr (01/24/24 2639)      PRN Meds: PRN medications: acetaminophen **OR** acetaminophen **OR** acetaminophen, dextrose 10 % in water (D10W), dextrose, glucagon    Allergies as of 01/23/2024 - Reviewed 01/23/2024   Allergen Reaction Noted    Bee sting kit Anaphylaxis 12/07/2019    Tramadol Rash 01/07/2020            Objective   PHYSICAL EXAM:  Physical Exam Performed:  Vitals:    01/24/24 1500   BP: (!) 114/49   Pulse: 68   Resp: 18   Temp: 37.4 °C (99.3 °F)   SpO2: 92%     Body mass index is 32.39 kg/m².    Patient is AOx3 and in no acute distress. Patient is alert and cooperative. Sitting comfortably in bed with dressing intact with serosanguinous strikethrough  noted.    Vascular: Palpable DP/PT pulses B/L. Severe pitting edema noted B/L. Hair growth absent B/L. CFT<5 to B/L hallux. Temperature is warm to cool from tibial tuberosity to distal digits B/L. No lymphatic streaking noted B/L.    Musculoskeletal: Gross active and passive ROM diminished to age and activity level. Moves all extremities spontaneously. No pain to palpation at feet B/L.     Neurological: Intact light touch sensation B/L. Pain stimuli intact B/L. Denies any numbness, burning or tingling.    Dermatologic: Skin appears diffusely xerotic B/L. Web spaces 1-4 B/L are clean, dry and intact. No rashes or nodules noted B/L. No hyperkeratotic tissue noted B/L.     Wound: Plantar Lateral Forefoot  Measurements: 2.0 x 2.5 x 0.8 cm  Mixed wound base of Fibrotic and necrotic tissue.   Moderate serosanguinous drainage with fibrotic slough.   Moderate basil-wound maceration.   Moderate basil-wound erythema.   Moderate evidence of ascending cellulitis or lymphangitis.  Significant palpable fluctuance. Significant malodor. Significant increased warmth.   Positive  probe to bone or deep structures within the wound base.   Dorsal  tunneling to area of fluctuance to dorsal lateral forefoot.  Mild undermining.     Location: Lateral Forefoot  Measurements: Area of necrosis measuring 4.5 x 5.5 cm with significant palpable crepitus and fluctuance noted. Small area of opening.  Mild serosanguinous drainage with fibrotic slough.   Significant erythema extending up the leg to the mid-tibial shaft.  Significant pitting leg edema. Significant malodor and increased warmth.    LABS:   Results for orders placed or performed during the hospital encounter of 01/23/24 (from the past 24 hour(s))   POCT GLUCOSE   Result Value Ref Range    POCT Glucose 99 74 - 99 mg/dL   Hemoglobin A1C   Result Value Ref Range    Hemoglobin A1C 4.5 See below %    Estimated Average Glucose 82 Not Established mg/dL   Type And Screen   Result Value Ref Range     ABO TYPE B     Rh TYPE POS     ANTIBODY SCREEN NEG    Protime-INR   Result Value Ref Range    Protime 12.7 9.3 - 12.7 seconds    INR 1.2 0.9 - 1.2   Creatine Kinase   Result Value Ref Range    Creatine Kinase 738 (H) 24 - 195 U/L   Basic Metabolic Panel   Result Value Ref Range    Glucose 95 65 - 99 mg/dL    Sodium 131 (L) 133 - 145 mmol/L    Potassium 3.8 3.4 - 5.1 mmol/L    Chloride 96 (L) 97 - 107 mmol/L    Bicarbonate 18 (L) 24 - 31 mmol/L    Urea Nitrogen 76 (H) 8 - 25 mg/dL    Creatinine 2.00 (H) 0.40 - 1.60 mg/dL    eGFR 37 (L) >60 mL/min/1.73m*2    Calcium 9.4 8.5 - 10.4 mg/dL    Anion Gap 17 <=19 mmol/L   Urinalysis with Reflex Microscopic   Result Value Ref Range    Color, Urine Yellow Light-Yellow, Yellow, Dark-Yellow    Appearance, Urine Clear Clear    Specific Gravity, Urine 1.018 1.005 - 1.035    pH, Urine 5.0 5.0, 5.5, 6.0, 6.5, 7.0, 7.5, 8.0    Protein, Urine 30 (1+) (A) NEGATIVE, 10 (TRACE), 20 (TRACE) mg/dL    Glucose, Urine Normal Normal mg/dL    Blood, Urine 0.06 (1+) (A) NEGATIVE    Ketones, Urine NEGATIVE NEGATIVE mg/dL    Bilirubin, Urine NEGATIVE NEGATIVE    Urobilinogen, Urine Normal Normal mg/dL    Nitrite, Urine NEGATIVE NEGATIVE    Leukocyte Esterase, Urine NEGATIVE NEGATIVE   Microscopic Only, Urine   Result Value Ref Range    WBC, Urine NONE 1-5, NONE /HPF    RBC, Urine 1-2 NONE, 1-2, 3-5 /HPF    Squamous Epithelial Cells, Urine 1-9 (SPARSE) Reference range not established. /HPF    Bacteria, Urine 1+ (A) NONE SEEN /HPF    Mucus, Urine FEW Reference range not established. /LPF    Hyaline Casts, Urine 1+ (A) NONE /LPF   POCT GLUCOSE   Result Value Ref Range    POCT Glucose 100 (H) 74 - 99 mg/dL   Serial Troponin, Initial ([facility])   Result Value Ref Range    Troponin T, High Sensitivity 55 (HH) <=14 ng/L   Serial Troponin, 2 Hour ([facility])   Result Value Ref Range    Troponin T, High Sensitivity 55 (HH) <=14 ng/L   CBC and Auto Differential   Result Value Ref Range    WBC 12.0 (H) 4.4 - 11.3  x10*3/uL    nRBC 0.0 0.0 - 0.0 /100 WBCs    RBC 3.03 (L) 4.50 - 5.90 x10*6/uL    Hemoglobin 9.2 (L) 13.5 - 17.5 g/dL    Hematocrit 26.7 (L) 41.0 - 52.0 %    MCV 88 80 - 100 fL    MCH 30.4 26.0 - 34.0 pg    MCHC 34.5 32.0 - 36.0 g/dL    RDW 12.7 11.5 - 14.5 %    Platelets 185 150 - 450 x10*3/uL    Neutrophils % 83.5 40.0 - 80.0 %    Immature Granulocytes %, Automated 2.7 (H) 0.0 - 0.9 %    Lymphocytes % 5.2 13.0 - 44.0 %    Monocytes % 8.3 2.0 - 10.0 %    Eosinophils % 0.2 0.0 - 6.0 %    Basophils % 0.1 0.0 - 2.0 %    Neutrophils Absolute 10.05 (H) 1.20 - 7.70 x10*3/uL    Immature Granulocytes Absolute, Automated 0.33 0.00 - 0.70 x10*3/uL    Lymphocytes Absolute 0.62 (L) 1.20 - 4.80 x10*3/uL    Monocytes Absolute 1.00 0.10 - 1.00 x10*3/uL    Eosinophils Absolute 0.02 0.00 - 0.70 x10*3/uL    Basophils Absolute 0.01 0.00 - 0.10 x10*3/uL   Comprehensive metabolic panel   Result Value Ref Range    Glucose 93 65 - 99 mg/dL    Sodium 132 (L) 133 - 145 mmol/L    Potassium 3.7 3.4 - 5.1 mmol/L    Chloride 102 97 - 107 mmol/L    Bicarbonate 16 (L) 24 - 31 mmol/L    Urea Nitrogen 70 (H) 8 - 25 mg/dL    Creatinine 1.70 (H) 0.40 - 1.60 mg/dL    eGFR 45 (L) >60 mL/min/1.73m*2    Calcium 8.7 8.5 - 10.4 mg/dL    Albumin 2.6 (L) 3.5 - 5.0 g/dL    Alkaline Phosphatase 112 35 - 125 U/L    Total Protein 6.2 5.9 - 7.9 g/dL    AST 41 (H) 5 - 40 U/L    Bilirubin, Total 0.3 0.1 - 1.2 mg/dL    ALT 30 5 - 40 U/L    Anion Gap 14 <=19 mmol/L   Creatine Kinase   Result Value Ref Range    Creatine Kinase 592 (H) 24 - 195 U/L   POCT GLUCOSE   Result Value Ref Range    POCT Glucose 101 (H) 74 - 99 mg/dL   Serial Troponin, 6 Hour ([facility])   Result Value Ref Range    Troponin T, High Sensitivity 55 (HH) <=14 ng/L   Iron and TIBC   Result Value Ref Range    Iron <20 (L) 45 - 160 ug/dL    UIBC 133 110 - 370 ug/dL    TIBC      % Saturation     Ferritin   Result Value Ref Range    Ferritin 494 (H) 30 - 400 ng/mL   POCT GLUCOSE   Result Value Ref Range     "POCT Glucose 89 74 - 99 mg/dL   POCT GLUCOSE   Result Value Ref Range    POCT Glucose 135 (H) 74 - 99 mg/dL   Vancomycin   Result Value Ref Range    Vancomycin 7.4 (L) 10.0 - 20.0 ug/mL   Protein, Urine Random   Result Value Ref Range    Total Protein, Urine Random 31 NOT ESTABLISHED mg/dL    Creatinine, Urine Random 80.1 NOT ESTABLISHED mg/dL    T. Protein/Creatinine Ratio 0.39 (H) <0.20 mg/mg Creat   POCT GLUCOSE   Result Value Ref Range    POCT Glucose 103 (H) 74 - 99 mg/dL      Lab Results   Component Value Date    HGBA1C 4.5 01/23/2024      No results found for: \"CRP\"   No results found for: \"SEDRATE\"     Results from last 7 days   Lab Units 01/24/24  0454   WBC AUTO x10*3/uL 12.0*   RBC AUTO x10*6/uL 3.03*   HEMOGLOBIN g/dL 9.2*   HEMATOCRIT % 26.7*     Results from last 7 days   Lab Units 01/24/24  0455   SODIUM mmol/L 132*   POTASSIUM mmol/L 3.7   CHLORIDE mmol/L 102   CO2 mmol/L 16*   BUN mg/dL 70*   CREATININE mg/dL 1.70*   CALCIUM mg/dL 8.7   BILIRUBIN TOTAL mg/dL 0.3   ALT U/L 30   AST U/L 41*     Results from last 7 days   Lab Units 01/24/24  0007   COLOR U  Yellow   APPEARANCE U  Clear   PH U  5.0   SPEC GRAV UR  1.018   PROTEIN U mg/dL 30 (1+)*   BLOOD UR  0.06 (1+)*   NITRITE U  NEGATIVE   WBC UR /HPF NONE   BACTERIA UR /HPF 1+*       IMAGING REVIEW:  ECG 12 lead    Result Date: 1/24/2024  Normal sinus rhythm Possible Septal infarct , age undetermined Abnormal ECG No previous ECGs available Confirmed by Bobo Thornton ([id number]) on 1/24/2024 2:03:37 PM    US renal complete    Result Date: 1/24/2024  STUDY: Renal and Bladder Ultrasound; 1/23/2024 10:36 PM. INDICATION:  Acute renal failure.  COMPARISON:  None available. ACCESSION NUMBER(S): [id number] ORDERING CLINICIAN: GARCIA DUARTE TECHNIQUE: Ultrasound of the Kidneys and Bladder. Multiple images of the abdomen were obtained. FINDINGS: RIGHT KIDNEY: The right kidney measures 12.7 cm in length. Renal cortical echotexture is normal. There is no " hydronephrosis. There are no stones. There is a cyst with a thick internal septation that measures 1.9 x 2.1 x 1.5 cm within the superior pole. LEFT KIDNEY: The left kidney measures 12.9 cm in length. Renal cortical echotexture is normal. There is no hydronephrosis. There are no stones. There are no cysts.  BLADDER: The urinary bladder is anechoic. The distended bladder shows no evidence of wall thickening. The right ureteral jet is visualized. The prostate gland measures 5.0 x 3.6 x 4.8 cm (44cc).       Septated right renal cyst consistent with a Bosniak class II lesion. No hydronephrosis bilaterally.  Follow-up ultrasound is recommended in 6 months. Enlarged prostate. Signed by Carlos Luke    XR tibia fibula right 2 views    Result Date: 1/23/2024  STUDY: Tibia and Fibula Radiographs; 1/23/2024 at 10:42 PM INDICATION: Soft tissue gas. COMPARISON: XR right foot 1/23/2024. ACCESSION NUMBER(S): [id number] ORDERING CLINICIAN: GARCIA DUARTE TECHNIQUE:  4 view(s) of the right tibia and fibula. FINDINGS:  There is no displaced fracture.  The alignment is anatomic. Superficial soft tissue edema is noted.  Peripheral vascular calcifications are present.  Soft tissue emphysema is seen along the dorsal aspect of the midfoot and anterior to the distal tibia.    Generalized soft tissue edema with soft tissue emphysema seen surrounding the ventral aspect of the ankle and dorsal aspect of the midfoot, concerning for soft tissue infection. Peripheral vascular disease. No acute osseous normality identified. Signed by Carlos Luke    XR foot right 3+ views    Result Date: 1/23/2024  Interpreted By:  Kirstie Moy, STUDY: XR FOOT RIGHT 3+ VIEWS 1/23/2024 2:03 pm   INDICATION: Swelling   COMPARISON: None available.   ACCESSION NUMBER(S): [id number]   ORDERING CLINICIAN: GARCIA DUARTE   TECHNIQUE: Three views of right foot   FINDINGS: There is marked soft tissue swelling of the right ankle and foot. There are multiple  pockets of gas seen within the right forefoot laterally centered about the 5th MTP joint with this gas extending into the webspace between the 4th and 5th toes and also extending into the midfoot and hindfoot anteriorly.   However, no obvious bony destruction is observed to indicate osteomyelitis.       Soft tissue infection of the right foot centered along the lateral aspect of the forefoot with probable abscess formation and gas gangrene. However, no plain film evidence of osteomyelitis is observed.   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  - Charts, labs, vitals and imaging all reviewed.   - Imaging: XR R Foot with soft tissue gas noted to the level of the ankle joint.  - Labs: WBC 12  - Wound culture: Pending  - Blood culture: Pending    Plan:  - Abx: IV abx per primary - ID to be consulted.  - Pt to require emergent surgical intervention with debridement of soft tissue and possibly bone this hospital visit. Discussed this in detail with patient as well as risks and benefits. Discussed the need for staged procedure due to the acuity of current state.  Due to OR availability at [facility] pt set up for transfer to [facility] for emergent I&D with debridement tomorrow AM.  - Surgery scheduled for tomorrow AM. NPO aftermidnight.  - Dressings: Betadine wet to dry dressing.  - Nursing staff is able to change/reinforce dressing if & as necessary until next day’s dressing change. Thank you.  - Podiatry will continue to follow while in house. Will continue to finalize plan based on intra-operative findings.    DVT ppx: Per primary.   Weightbearing: NWB  RLE  Discharge: Pt to follow up 1 week after discharge with Dr. Perry    Case to be discussed with attending, A&P above reflects a tentative plan. Please await for the final signature from the attending physician on service.    Marly Mcclain DPM PGY-2  Podiatric Medicine & Surgery  Please Drag[employer] message me with any questions or concerns.            SIGNATURE: Marly Mcclain DPM PATIENT NAME: Claude Coley   DATE: January 24, 2024 MRN: [id number]   TIME: 6:21 PM CONTACT: Haiku message

## 2024-01-24 NOTE — NURSING NOTE
Patient arrived on unit in room 459 alert and oriented, in stable condition. Bed lock and in low position, call light within reach. Patient educated on the importance of using call light. Awaiting admit orders from MD    Pt has not has anything to eat or drink since 1400 on 1/23

## 2024-01-24 NOTE — PROGRESS NOTES
Asked to see this patient with a history of atherosclerotic heart disease and LAD  discovered in 2020 managed medically, subsequent ischemic cardiomyopathy with normalization of ejection fraction on an echocardiogram in 2022 normally seen by Dr. Young at the [facility] for preoperative cardiac risk stratification.  When I arrived to his room he was already down for surgery.  Will sign off.  Please call/reconsult should there be any questions or concerns in the postoperative period.

## 2024-01-24 NOTE — CONSULTS
Inpatient consult to Infectious Diseases  Consult performed by: Srikanth Saravia MD  Consult ordered by: Connei Neumann MD        Primary MD: Darryl Jenkins MD    Reason For Consult  Right foot infection    History Of Present Illness  Claude Coley is a 63 y.o. male presenting with infected right foot wound.  He reports they have had an ulcer on the bottom of his right foot for about 1 to 2 years.  He developed a dorsal foot ulcer over the last couple of days.  He denies any antecedent trauma.  He developed pain swelling and redness and came to the hospital for further evaluation of Shreya.  He had an x-ray done which remarkable for gas and was taken to the emergency room for incision and debridement, and amputation of right fifth digits.  He is on IV vancomycin and meropenem  His workup was remarkable for elevated serum creatinine.  Past Medical History  He has a past medical history of Diabetes mellitus (CMS/[facility]).    Surgical History  He has no past surgical history on file.     Social History     Occupational History    Not on file   Tobacco Use    Smoking status: Never    Smokeless tobacco: Never   Substance and Sexual Activity    Alcohol use: Not on file    Drug use: Not on file    Sexual activity: Not on file     Travel History   Travel since 12/24/23    No documented travel since 12/24/23         Family History  No family history on file.  Allergies  Bee sting kit and Tramadol       There is no immunization history on file for this patient.  Medications  Home medications:  Medications Prior to Admission   Medication Sig Dispense Refill Last Dose    aspirin 81 mg EC tablet Take 1 tablet (81 mg) by mouth once daily.   1/22/2024 at 1500    atorvastatin (Lipitor) 40 mg tablet Take 1 tablet (40 mg) by mouth once daily.   1/22/2024 at 1500    carvedilol (Coreg) 12.5 mg tablet Take 1 tablet (12.5 mg) by mouth 2 times a day with meals.   1/22/2024 at 1500    finasteride (Proscar) 5 mg tablet  "Take 1 tablet (5 mg) by mouth once daily. Do not crush, chew, or split.   1/22/2024 at 1500    furosemide (Lasix) 20 mg tablet Take 1 tablet (20 mg) by mouth once daily.   1/22/2024 at 1500    gemfibrozil (Lopid) 600 mg tablet Take 1 tablet (600 mg) by mouth 2 times a day before meals.   1/23/2024    lisinopril 20 mg tablet Take 1 tablet (20 mg) by mouth once daily.   1/22/2024 at 1500    metFORMIN, OSM, (Fortamet) 500 mg 24 hr tablet Take 1 tablet (500 mg) by mouth once daily in the evening. Take with meals. Do not crush, chew, or split.   1/22/2024 at 1500    OLANZapine (ZyPREXA) 20 mg tablet Take 1 tablet (20 mg) by mouth once daily at bedtime.   1/22/2024 at 1500    spironolactone (Aldactone) 25 mg tablet Take 1 tablet (25 mg) by mouth once daily.   1/22/2024 at 1500     Current medications:  Scheduled medications  aspirin, 81 mg, oral, Daily  carvedilol, 12.5 mg, oral, BID with meals  finasteride, 5 mg, oral, Daily  heparin (porcine), 5,000 Units, subcutaneous, q8h  insulin lispro, 0-10 Units, subcutaneous, q4h  lidocaine, 0.1 mL, subcutaneous, Once  melatonin, 3 mg, oral, Nightly  meropenem, 1 g, intravenous, q12h  OLANZapine, 20 mg, oral, Nightly  pantoprazole, 40 mg, oral, Daily   Or  pantoprazole, 40 mg, intravenous, Daily  polyethylene glycol, 17 g, oral, Daily      Continuous medications  lactated Ringer's, 100 mL/hr  sodium chloride 0.9%, 100 mL/hr, Last Rate: 100 mL/hr (01/24/24 0601)      PRN medications  PRN medications: acetaminophen **OR** acetaminophen **OR** acetaminophen, dextrose 10 % in water (D10W), dextrose, glucagon, HYDROmorphone, HYDROmorphone, ondansetron, oxygen    Review of Systems   Skin:  Positive for rash and wound.   All other systems reviewed and are negative.       Objective  Range of Vitals (last 24 hours)  Heart Rate:  [74-92]   Temp:  [35.8 °C (96.4 °F)-37.2 °C (99 °F)]   Resp:  [16-25]   BP: (119-149)/(46-61)   Height:  [188 cm (6' 2\")-188 cm (6' 2.02\")]   Weight:  [114 kg " (252 lb 6.4 oz)-117 kg (257 lb 11.5 oz)]   SpO2:  [92 %-99 %]   Daily Weight  01/23/24 : 114 kg (252 lb 6.4 oz)    Body mass index is 32.39 kg/m².     Physical Exam  Constitutional:       Appearance: Normal appearance.   HENT:      Head: Normocephalic and atraumatic.      Nose: Nose normal.   Eyes:      Extraocular Movements: Extraocular movements intact.      Conjunctiva/sclera: Conjunctivae normal.   Cardiovascular:      Rate and Rhythm: Regular rhythm.      Heart sounds: Normal heart sounds.   Pulmonary:      Breath sounds: Normal breath sounds.   Abdominal:      General: Bowel sounds are normal.      Palpations: Abdomen is soft.   Musculoskeletal:      Cervical back: Normal range of motion and neck supple.      Comments: Right foot swelling   Skin:     Comments: Right foot dressing   Neurological:      Mental Status: He is alert.   Psychiatric:         Mood and Affect: Mood normal.         Behavior: Behavior normal.          Relevant Results  Outside Hospital Results    Labs  Results from last 72 hours   Lab Units 01/24/24  0454 01/23/24  1416   WBC AUTO x10*3/uL 12.0* 16.6*   HEMOGLOBIN g/dL 9.2* 9.7*   HEMATOCRIT % 26.7* 28.1*   PLATELETS AUTO x10*3/uL 185 202   NEUTROS PCT AUTO % 83.5 88.1   LYMPHS PCT AUTO % 5.2 3.8   MONOS PCT AUTO % 8.3 6.9   EOS PCT AUTO % 0.2 0.1     Results from last 72 hours   Lab Units 01/24/24  0455 01/23/24  2259 01/23/24  1416   SODIUM mmol/L 132* 131* 131*   POTASSIUM mmol/L 3.7 3.8 5.1   CHLORIDE mmol/L 102 96* 95*   CO2 mmol/L 16* 18* 20*   BUN mg/dL 70* 76* 78*   CREATININE mg/dL 1.70* 2.00* 2.50*   GLUCOSE mg/dL 93 95 116*   CALCIUM mg/dL 8.7 9.4 10.1   ANION GAP mmol/L 14 17 16   EGFR mL/min/1.73m*2 45* 37* 29*     Results from last 72 hours   Lab Units 01/24/24  0455 01/23/24  1416   ALK PHOS U/L 112 147*   BILIRUBIN TOTAL mg/dL 0.3 0.3   PROTEIN TOTAL g/dL 6.2 7.1   ALT U/L 30 30   AST U/L 41* 37   ALBUMIN g/dL 2.6* 3.3*     Estimated Creatinine Clearance: 61.3 mL/min (A)  "(by C-G formula based on SCr of 1.7 mg/dL (H)).  No results found for: \"CRP\", \"SEDRATE\"  No results found for: \"HIV1X2\", \"HIVCONF\", \"TPGTCD6VP\"  No results found for: \"HEPCABINIT\", \"HEPCAB\", \"HCVPCRQUANT\"  Microbiology  Reviewed-blood and wound cultures pending  Imaging  ECG 12 lead    Result Date: 1/24/2024  Normal sinus rhythm Possible Septal infarct , age undetermined Abnormal ECG No previous ECGs available Confirmed by Bobo Thornton ([id number]) on 1/24/2024 2:03:37 PM    US renal complete    Result Date: 1/24/2024  STUDY: Renal and Bladder Ultrasound; 1/23/2024 10:36 PM. INDICATION:  Acute renal failure.  COMPARISON:  None available. ACCESSION NUMBER(S): [id number] ORDERING CLINICIAN: GARCIA DUARTE TECHNIQUE: Ultrasound of the Kidneys and Bladder. Multiple images of the abdomen were obtained. FINDINGS: RIGHT KIDNEY: The right kidney measures 12.7 cm in length. Renal cortical echotexture is normal. There is no hydronephrosis. There are no stones. There is a cyst with a thick internal septation that measures 1.9 x 2.1 x 1.5 cm within the superior pole. LEFT KIDNEY: The left kidney measures 12.9 cm in length. Renal cortical echotexture is normal. There is no hydronephrosis. There are no stones. There are no cysts.  BLADDER: The urinary bladder is anechoic. The distended bladder shows no evidence of wall thickening. The right ureteral jet is visualized. The prostate gland measures 5.0 x 3.6 x 4.8 cm (44cc).       Septated right renal cyst consistent with a Bosniak class II lesion. No hydronephrosis bilaterally.  Follow-up ultrasound is recommended in 6 months. Enlarged prostate. Signed by Carlos Luke    XR tibia fibula right 2 views    Result Date: 1/23/2024  STUDY: Tibia and Fibula Radiographs; 1/23/2024 at 10:42 PM INDICATION: Soft tissue gas. COMPARISON: XR right foot 1/23/2024. ACCESSION NUMBER(S): [id number] ORDERING CLINICIAN: GARCIA DUARTE TECHNIQUE:  4 view(s) of the right tibia and fibula. " FINDINGS:  There is no displaced fracture.  The alignment is anatomic. Superficial soft tissue edema is noted.  Peripheral vascular calcifications are present.  Soft tissue emphysema is seen along the dorsal aspect of the midfoot and anterior to the distal tibia.    Generalized soft tissue edema with soft tissue emphysema seen surrounding the ventral aspect of the ankle and dorsal aspect of the midfoot, concerning for soft tissue infection. Peripheral vascular disease. No acute osseous normality identified. Signed by Carlos Luke    XR foot right 3+ views    Result Date: 1/23/2024  Interpreted By:  Kirstie Moy, STUDY: XR FOOT RIGHT 3+ VIEWS 1/23/2024 2:03 pm   INDICATION: Swelling   COMPARISON: None available.   ACCESSION NUMBER(S): [id number]   ORDERING CLINICIAN: GARCIA DUARTE   TECHNIQUE: Three views of right foot   FINDINGS: There is marked soft tissue swelling of the right ankle and foot. There are multiple pockets of gas seen within the right forefoot laterally centered about the 5th MTP joint with this gas extending into the webspace between the 4th and 5th toes and also extending into the midfoot and hindfoot anteriorly.   However, no obvious bony destruction is observed to indicate osteomyelitis.       Soft tissue infection of the right foot centered along the lateral aspect of the forefoot with probable abscess formation and gas gangrene. However, no plain film evidence of osteomyelitis is observed.   Signed by: Kirstie Moy 1/23/2024 2:34 PM Dictation workstation:   [id number]    Assessment/Plan   Right diabetic foot ulcer-Hall 4  Right foot osteomyelitis/abscess  Type 2 diabetes with peripheral angiopathy with gangrene  Resolving acute kidney injury  Resolving leukocytosis    Continue vancomycin  Continue meropenem  Follow-up wound culture  Follow-up blood cultures  Glycemic control  Supportive care  Monitor temperature and WBC  Further recommendations based on culture results      Srikanth STACY  MD Rani

## 2024-01-24 NOTE — ANESTHESIA PROCEDURE NOTES
Airway  Date/Time: 1/24/2024 9:00 AM  Urgency: emergent    Airway not difficult    Staffing  Performed: attending   Authorized by: Vikram White MD    Performed by: Vikram White MD  Patient location during procedure: OR    Consent for Airway (if performed for an anesthetic, see related documentation for consents)  Patient identity confirmed: verbally with patient  Consent: The procedure was performed in an emergent situation. Verbal consent obtained.  Consent given by: patient      Indications and Patient Condition  Indications for airway management: anesthesia  Spontaneous ventilation: present  Sedation level: deep  Preoxygenated: yes  Patient position: sniffing      Final Airway Details  Final airway type: supraglottic airway      Successful airway: classic  Size 4     Number of attempts at approach: 1

## 2024-01-24 NOTE — ANESTHESIA POSTPROCEDURE EVALUATION
Patient: Claude Coley    Procedure Summary       Date: 01/24/24 Room / Location: [facility] 03 / [facility]    Anesthesia Start: 0852 Anesthesia Stop: 0945    Procedure: Incision and Drainage Lower Extremity (Right: Foot) Diagnosis:       Gas gangrene of foot (CMS/HCC)      (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.0])    Surgeons: Darryl Perry DPM Responsible Provider: Vikram White MD    Anesthesia Type: general ASA Status: 3            Anesthesia Type: general    Vitals Value Taken Time   /61 01/24/24 0946   Temp 36.6 01/24/24 0946   Pulse 75 01/24/24 0946   Resp 25 01/24/24 0946   SpO2 99 01/24/24 0946       Anesthesia Post Evaluation    Patient location during evaluation: PACU  Patient participation: complete - patient participated  Level of consciousness: awake and alert  Pain management: adequate  Airway patency: patent  Cardiovascular status: acceptable  Respiratory status: acceptable  Hydration status: acceptable  Postoperative Nausea and Vomiting: none        There were no known notable events for this encounter.

## 2024-01-24 NOTE — H&P
History Of Present Illness  Claude Coley is a 63 y.o. male presenting with right foot wound drainage and swelling.  This patient noted some swelling and drainage from his right foot couple days ago.  Over the last 2448 hrs. the swelling has been dramatically increasing in the foot and the ankle and the lower calf to a point that he has had difficulty moving the ankle.  Not a lot of pain denies any fevers or chills.  He presented to the emergency room where he was found to have gas gangrene.  Case discussed with Dr. Perry from podiatry who transferred the patient here we did did to take him to surgery emergently.  This could not be done at the hospital for just go reasons.  After the patient presented here the nurse contacted him and now the plan is for the patient to go to surgically early morning in about 8 hours from now.  When I saw the patient room 459 he has no acute complaints.  He denies any chest pain shortness of breath nausea vomiting fevers or chills.  He tells me that the swelling of the foot he has developed mostly over the last 48 hours there is not a lot of pain though.  He denies any urinary complaints or back pain.     Past Medical History  He has a past medical history of Diabetes mellitus (CMS/[facility]).  He is only on metformin for his diabetes.  He does not he has history of congestive heart failure and hypertension no known history of coronary disease though  Surgical History  He has no past surgical history on file.  Review  Social History  He has no history on file for tobacco use, alcohol use, and drug use.  Reviewed  Family History  No family history on file.     Allergies  Bee sting kit and Tramadol    ROS  Review of Systems   Constitutional: Negative.    HENT: Negative.     Eyes: Negative.    Respiratory: Negative.     Cardiovascular: Negative.    Gastrointestinal: Negative.    Endocrine: Negative.    Genitourinary: Negative.    Musculoskeletal: Negative.    Skin:  Positive for wound.    Allergic/Immunologic: Negative.    Neurological: Negative.    Hematological: Negative.    Psychiatric/Behavioral: Negative.     All other systems reviewed and are negative.       Last Recorded Vitals  BP (!) 142/49 (BP Location: Left arm, Patient Position: Lying)   Pulse 87   Temp 36 °C (96.8 °F) (Temporal)   Resp 19   SpO2 95%     Physical Exam  I saw this patient on 459.  This is a  male who is alert oriented x 3 cooperative adequate to the situation  Normocephalic atraumatic EOMI PERRLA  Neck supple  Chest clear  Heart regular S1-S2 distant  Abdomen soft nontender  Extremities left foot appears completely normal good pulses  The right foot is significantly swollen with the swelling and erythema extending into the distal to mid calf.  There is a wound on the lateral aspect of the forefoot draining some pus there is also skin change on the lateral aspect of the foot of darkened black skin.  Due to the swelling I had to use the Doppler to detect pulses which were very easily detected for dorsalis pedis and tibialis posterior pulses  Neurologic exam nonfocal  Psych no psychosis    Relevant Results  Reviewed lab work and x-ray    ASSESSMENT/PLAN  Assessment/Plan   Principal Problem:    Gas gangrene of foot (CMS/HCC)    Will continue with broad-spectrum biotics.  He needs surgery.  Will check repeat BMP in view of elevated creatinine we will hold lisinopril and diuretics.  I am not sure how much fluid bolus he has received but will give additional.  Will check CK level.  As of now I feel that he is stable to wait for urgent/emergent podiatry surgery.  As for his renal failure his most recent normal creatinine was from 5 years ago.  Assume this is acute we will consult renal check UA rule out obstruction  As far as diabetes sliding scale n.p.o.  He is a full code       Connie Neumann MD

## 2024-01-24 NOTE — CARE PLAN
The patient's goals for the shift include  monitor s/s of infection    The clinical goals for the shift include pain control, atbx    Problem: Pain  Goal: My pain/discomfort is manageable  Outcome: Progressing     Problem: Safety  Goal: Patient will be injury free during hospitalization  Outcome: Progressing  Goal: I will remain free of falls  Outcome: Progressing     Problem: Daily Care  Goal: Daily care needs are met  Outcome: Progressing     Problem: Psychosocial Needs  Goal: Demonstrates ability to cope with hospitalization/illness  Outcome: Progressing  Goal: Collaborate with me, my family, and caregiver to identify my specific goals  Outcome: Progressing  Flowsheets (Taken 1/23/2024 2052)  Cultural Requests During Hospitalization: pt denies  Spiritual Requests During Hospitalization: pt denies     Problem: Discharge Barriers  Goal: My discharge needs are met  Outcome: Progressing

## 2024-01-24 NOTE — SIGNIFICANT EVENT
Podiatry Post-Operative Recommendations:    S/P EMERGENT Right Foot Wide Incision and Drainage with Excisional Debridement of Nonviable Soft Tissue and Bone with Amputation of 5th Digit    Plan:  Weightbearing status: NWB to surgical extremity  Precautions: Fall Risk  Pain: per primary  Abx: Resume scheduled floor Abx  Intra-op cultures pending  DVT Ppx: May resume chemoppx per primary team  Dressing: Packed open with Surgicel, betadine-soaked plain packing, DSD.   To be remain clean, dry, and intact unless changed by Podiatry.  Diet: Restart Diabetic Diet      Pt was transferred to [facility] due to OR availability at [facility] for emergent I&D of RLE 2/2 gas gangrene. Intra-op cultures obtained however significant soft tissue and bone necrosis noted laterally. Intra-op decision to proceed with 5th digit amputation due to extensive necrosis and pus. Multiple proximal tracks detected and flushed. Area was flushed with 12 L saline. Patient will return to the OR, tentatively Friday 01/26/24, for possible partial 4th and 5th partial rays vs open TMA.     This patient will be followed by the Podiatry service. Please Epic Chat or page the corresponding residents below with questions or concerns.     Husam Lopez DPM PGY-2  Podiatric Medicine and Surgery   Epic Secure Chat

## 2024-01-24 NOTE — OP NOTE
PODIATRIC OPERATIVE REPORT    LOG ID: [id number]  SURGERY/PROCEDURE DATE: 1/24/2024  OR LOCATION: [facility]    SURGEON(S)/PROCEDURALIST(S) AND ASSISTANT(S):  Primary: Darryl Perry DPM  Assistants:   Sonido Bolaños DPM Fellow  Marly Mcclain, MEGAM PGY-2  Husam Lopez DPM PGY-2     OTHER OR STAFF:  Circulator: Khang Bertrand RN  Scrub Person: Elisa Fox RN    SURGERY/PROCEDURE(S):  Incision and Drainage Right Lower Extremity [40452]  Amputation of Right 5th Digit [18225]  Debridement Down to and Including Level of Muscle >20 subcutaneous cm [35083 + 40400]    PRE-OP/PRE-PROCEDURE DIAGNOSIS:   Pre-op Diagnosis     * 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.0]    POST-OP/POST-PROCEDURE DIAGNOSIS: Same as Pre-Op    ANESTHESIA:   Anesthesia: Consult  ASA: III  Anesthesia Staff: Anesthesiologist: Vikram White MD  Intra-op Medications:   Administrations occurring from 0830 to 0945 on 01/24/24:   Medication Name Total Dose   BUPivacaine HCl (Marcaine) 0.5 % (5 mg/mL) injection 10 mL       ESTIMATED BLOOD LOSS: 200 mL    SPECIMENS:   Order Name Source Comment Collection Info Order Time   TISSUE/WOUND CULTURE/SMEAR DIGIT, ACCESSORY RIGHT FOOT Pre-op diagnosis:  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.0] Collected By: Darryl Perry DPM 1/24/2024  9:19 AM     Release result to [facility]   Immediate        SURGICAL PATHOLOGY EXAM DIGIT FIFTH, RIGHT FOOT Pre-op diagnosis:  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.0] Collected By: Darryl Perry DPM 1/24/2024  9:19 AM       IMPLANTABLE DEVICES:  Nothing was implanted during the procedure    FINDINGS: Intraoperative findings consistent with clinical and radiographic findings.    DRAINS: None    TOURNIQUET TIMES:       COMPLICATIONS:  Pt tolerated procedure well. Significant tracking along multiple tendon sheaths; 5th right digit needed amputation to allow for further tracking exploration planetary due to how extensive infection was dorsally. Pt was unable to obtain cardiac  clearance prior to procedure due to emergent nature of the case.        SURGERY/PROCEDURE DETAILS:  INDICATIONS FOR PROCEDURE:   The patient with diagnosis as outlined above presents for podiatric surgical intervention today. The patient has attempted and failed conservative treatment as outlined in preoperative clinic notes and wishes to proceed with surgical intervention at this time. The nature of the deformity, problems anticipated procedures, recovery/convalescence, risks/complications including but not limited to numbness, CRPS, over/under correction, problems healing of soft tissue or bone, postoperative wound infection, wound dehiscence, DVT and/or persistent pain/disability have been explained to the patient in detail. An updated H&P and consent have been completed prior to today’s surgical intervention. The patient states that they have been NPO since midnight. No guarantees were given or implied, but it is expected that the patient will have a favorable outcome.  It is with this understanding that we proceed.     PRE-PROCEDURE INFORMATION:  In pre-op holding area, the right extremity to be operated on was clearly marked and the patient verified correct laterality of the marking.  The patient was brought to the operating room and placed on the operating table in the supine position.  A timeout was performed in which identification of the correct patient, procedure, location, and materials was done. Following general sedation, local anesthesia was obtained utilizing 10 cc of 0.5% Marcaine Plain. The right foot was then scrubbed, prepped and draped in the usual aseptic manner.     DESCRIPTION OF PROCEDURE:   At this time attention was then directed to the right lower extremity where the ulceration is noted with necrotic bone exposed to plantar foot as well as large area of soft tissue necrosis to the lateral forefoot with significant cellulitis and fluctuance extending to the ankle joint. A linear ellipse  incision was made around the area of necrosis laterally down to the level of bone. Copious amounts of purulent drainage drained at this time. Further purulence was expressed from the 5th digit as well as from the ankle joint distally. Multiple tracks were identified at this time and were further inspected using a Metzenbaum scissors; with every track more purulence was able to be expressed. A culture was obtained at this time. At this time it was decided that due to the necrosis of the soft tissue to the 5th digit and the need to explore further the plantar foot tendons for tracking, the 5th digit would need to be amputated in which it was disarticulated at the level of the MPJ. Extensive necrosis of the bone was noted to the 5th and 4th metatarsal heads. Multiple plantar tracks were identified at this time in which a large pock of purulence at the medial ankle was expressed. A total of approximately 50 ml of purulent drainage was expressed in total. A rongeur and #15 blade was used debride all nonviable soft tissue of the incisional ulceration. There was signficant amounts of necrosis of the subcutaneous tissue and the muscle. Pre-debridement measurements of the incisional wound was 12 x 8.4 x 2.8 cm. Post debridement measurements was 13 x 8.6 x 5.6 cm. The digit was send for pathology and micro. The 5th metatarsal head appeared necrotic. A pulse lavage was then used to irrigate the wound and tracks using 12 L of saline. Betadine soaked 1/4 in packing was applied to the tracks and to the open wound base. Surgicel was applied to the wound base to aid in hemostasis control. Dressing was applied consisting of 4x4 gauze, ABDs, Kerlix, and ACE.      POSTOPERATIVE INFORMATION: The patient tolerated the above noted procedure and anesthesia well and was transferred to the PACU with vital signs stable, and vascular status intact with capillary refill intact to the most distal aspect of the operative extremity. Postoperative  instructions reviewed in detail with the patient and family with written instructions provided. Patient will return to floor. Should any problems, questions, or concerns arise, primary team to luis or [facility] podiatry team.    This is Marly Mcclain DPM dictating the operative note for Dr. Vicky DPM.      SIGNATURE: Marly Mcclain DPM PATIENT NAME: Claude Coley   DATE: January 24, 2024 MRN: [id number]   TIME: 6:39 PM

## 2024-01-24 NOTE — NURSING NOTE
Assumed care of patient. Patient resting in bed, breathing regular and unlabored. Call light within reach. Bed alarm on. Will continue plan of care.

## 2024-01-24 NOTE — PROGRESS NOTES
Vancomycin Dosing by Pharmacy- INITIAL    Claude Coley is a 63 y.o. year old male who Pharmacy has been consulted for vancomycin dosing for cellulitis, skin and soft tissue. Based on the patient's indication and renal status this patient will be dosed based on a goal trough/random level of 15-20.     Renal function is currently declining, patient presenting in [facility].     Visit Vitals  BP (!) 142/49 (BP Location: Left arm, Patient Position: Lying)   Pulse 87   Temp 36 °C (96.8 °F) (Temporal)   Resp 19        Lab Results   Component Value Date    CREATININE 2.50 (H) 01/23/2024    CREATININE 0.7 07/09/2018          Assessment/Plan     Patient has already been given a loading dose of 1750 mg in ED at [facility] 1/23 1400.  Will obtain random level 24 hours after initial dose and redose if appropriate.  Follow-up level will be ordered on 1/24 at 1400 unless clinically indicated sooner.  Will continue to monitor renal function daily while on vancomycin and order serum creatinine at least every 48 hours if not already ordered.  Follow for continued vancomycin needs, clinical response, and signs/symptoms of toxicity.       DEANA OVALLE, PharmD

## 2024-01-24 NOTE — CARE PLAN
The patient's goals for the shift include  comfort and eat.    The clinical goals for the shift include pain management, elevate RLE, antibioitcs, and monitor labs/VS/and I&O's.    Problem: Pain  Goal: My pain/discomfort is manageable  Outcome: Progressing     Problem: Safety  Goal: Patient will be injury free during hospitalization  Outcome: Progressing     Problem: Daily Care  Goal: Daily care needs are met  Outcome: Progressing     Problem: Psychosocial Needs  Goal: Demonstrates ability to cope with hospitalization/illness  Outcome: Progressing  Goal: Collaborate with me, my family, and caregiver to identify my specific goals  Outcome: Progressing     Problem: Skin  Goal: Decreased wound size/increased tissue granulation at next dressing change  Outcome: Progressing     Problem: Skin  Goal: Participates in plan/prevention/treatment measures  Outcome: Progressing     Problem: Skin  Goal: Prevent/manage excess moisture  Outcome: Progressing     Problem: Deep Vein Thrombosis  Goal: I will remain free from complications of deep vein thrombosis and maintain current level of mobility  Outcome: Progressing     Problem: Discharge Barriers  Goal: My discharge needs are met  Outcome: Progressing

## 2024-01-25 LAB
ALBUMIN SERPL-MCNC: 2.2 G/DL (ref 3.5–5)
ALP BLD-CCNC: 127 U/L (ref 35–125)
ALT SERPL-CCNC: 43 U/L (ref 5–40)
ANION GAP SERPL CALC-SCNC: 12 MMOL/L
AST SERPL-CCNC: 60 U/L (ref 5–40)
BASOPHILS # BLD MANUAL: 0 X10*3/UL (ref 0–0.1)
BASOPHILS NFR BLD MANUAL: 0 %
BILIRUB SERPL-MCNC: 0.3 MG/DL (ref 0.1–1.2)
BUN SERPL-MCNC: 61 MG/DL (ref 8–25)
CALCIUM SERPL-MCNC: 8.1 MG/DL (ref 8.5–10.4)
CHLORIDE SERPL-SCNC: 107 MMOL/L (ref 97–107)
CK SERPL-CCNC: 289 U/L (ref 24–195)
CO2 SERPL-SCNC: 17 MMOL/L (ref 24–31)
CREAT SERPL-MCNC: 1.6 MG/DL (ref 0.4–1.6)
EGFRCR SERPLBLD CKD-EPI 2021: 49 ML/MIN/1.73M*2
EOSINOPHIL # BLD MANUAL: 0.11 X10*3/UL (ref 0–0.7)
EOSINOPHIL NFR BLD MANUAL: 1 %
ERYTHROCYTE [DISTWIDTH] IN BLOOD BY AUTOMATED COUNT: 12.7 % (ref 11.5–14.5)
GLUCOSE BLD MANUAL STRIP-MCNC: 102 MG/DL (ref 74–99)
GLUCOSE BLD MANUAL STRIP-MCNC: 117 MG/DL (ref 74–99)
GLUCOSE BLD MANUAL STRIP-MCNC: 143 MG/DL (ref 74–99)
GLUCOSE BLD MANUAL STRIP-MCNC: 178 MG/DL (ref 74–99)
GLUCOSE BLD MANUAL STRIP-MCNC: 95 MG/DL (ref 74–99)
GLUCOSE SERPL-MCNC: 98 MG/DL (ref 65–99)
HCT VFR BLD AUTO: 22.8 % (ref 41–52)
HGB BLD-MCNC: 7.7 G/DL (ref 13.5–17.5)
IMM GRANULOCYTES # BLD AUTO: 0.78 X10*3/UL (ref 0–0.7)
IMM GRANULOCYTES NFR BLD AUTO: 6.9 % (ref 0–0.9)
LYMPHOCYTES # BLD MANUAL: 1.03 X10*3/UL (ref 1.2–4.8)
LYMPHOCYTES NFR BLD MANUAL: 9 %
MCH RBC QN AUTO: 30.4 PG (ref 26–34)
MCHC RBC AUTO-ENTMCNC: 33.8 G/DL (ref 32–36)
MCV RBC AUTO: 90 FL (ref 80–100)
METAMYELOCYTES # BLD MANUAL: 0.11 X10*3/UL
METAMYELOCYTES NFR BLD MANUAL: 1 %
MONOCYTES # BLD MANUAL: 0.91 X10*3/UL (ref 0.1–1)
MONOCYTES NFR BLD MANUAL: 8 %
NEUTROPHILS # BLD MANUAL: 9.24 X10*3/UL (ref 1.2–7.7)
NEUTS BAND # BLD MANUAL: 0.46 X10*3/UL (ref 0–0.7)
NEUTS BAND NFR BLD MANUAL: 4 %
NEUTS SEG # BLD MANUAL: 8.78 X10*3/UL (ref 1.2–7)
NEUTS SEG NFR BLD MANUAL: 77 %
NRBC BLD-RTO: 0 /100 WBCS (ref 0–0)
PLATELET # BLD AUTO: 189 X10*3/UL (ref 150–450)
POTASSIUM SERPL-SCNC: 3.5 MMOL/L (ref 3.4–5.1)
PROT SERPL-MCNC: 5.6 G/DL (ref 5.9–7.9)
RBC # BLD AUTO: 2.53 X10*6/UL (ref 4.5–5.9)
RBC MORPH BLD: ABNORMAL
SODIUM SERPL-SCNC: 136 MMOL/L (ref 133–145)
TOTAL CELLS COUNTED BLD: 100
VANCOMYCIN SERPL-MCNC: 12.6 UG/ML (ref 10–20)
WBC # BLD AUTO: 11.4 X10*3/UL (ref 4.4–11.3)

## 2024-01-25 PROCEDURE — 36415 COLL VENOUS BLD VENIPUNCTURE: CPT | Performed by: INTERNAL MEDICINE

## 2024-01-25 PROCEDURE — 2500000001 HC RX 250 WO HCPCS SELF ADMINISTERED DRUGS (ALT 637 FOR MEDICARE OP): Performed by: INTERNAL MEDICINE

## 2024-01-25 PROCEDURE — 80202 ASSAY OF VANCOMYCIN: CPT | Performed by: INTERNAL MEDICINE

## 2024-01-25 PROCEDURE — 2500000004 HC RX 250 GENERAL PHARMACY W/ HCPCS (ALT 636 FOR OP/ED): Performed by: INTERNAL MEDICINE

## 2024-01-25 PROCEDURE — 82550 ASSAY OF CK (CPK): CPT | Performed by: INTERNAL MEDICINE

## 2024-01-25 PROCEDURE — 97165 OT EVAL LOW COMPLEX 30 MIN: CPT | Mod: GO

## 2024-01-25 PROCEDURE — 85027 COMPLETE CBC AUTOMATED: CPT | Performed by: INTERNAL MEDICINE

## 2024-01-25 PROCEDURE — 97162 PT EVAL MOD COMPLEX 30 MIN: CPT | Mod: GP

## 2024-01-25 PROCEDURE — 2500000004 HC RX 250 GENERAL PHARMACY W/ HCPCS (ALT 636 FOR OP/ED): Performed by: NURSE PRACTITIONER

## 2024-01-25 PROCEDURE — 1200000002 HC GENERAL ROOM WITH TELEMETRY DAILY

## 2024-01-25 PROCEDURE — 80053 COMPREHEN METABOLIC PANEL: CPT | Performed by: INTERNAL MEDICINE

## 2024-01-25 PROCEDURE — 99222 1ST HOSP IP/OBS MODERATE 55: CPT | Performed by: NURSE PRACTITIONER

## 2024-01-25 PROCEDURE — 82947 ASSAY GLUCOSE BLOOD QUANT: CPT

## 2024-01-25 PROCEDURE — 85007 BL SMEAR W/DIFF WBC COUNT: CPT | Performed by: INTERNAL MEDICINE

## 2024-01-25 RX ORDER — INSULIN LISPRO 100 [IU]/ML
0-10 INJECTION, SOLUTION INTRAVENOUS; SUBCUTANEOUS
Status: DISCONTINUED | OUTPATIENT
Start: 2024-01-25 | End: 2024-02-01 | Stop reason: HOSPADM

## 2024-01-25 RX ORDER — VANCOMYCIN 1.25 G/250ML
1750 INJECTION, SOLUTION INTRAVENOUS ONCE
Status: COMPLETED | OUTPATIENT
Start: 2024-01-25 | End: 2024-01-25

## 2024-01-25 RX ORDER — CYANOCOBALAMIN 1000 UG/ML
1000 INJECTION, SOLUTION INTRAMUSCULAR; SUBCUTANEOUS ONCE
Status: COMPLETED | OUTPATIENT
Start: 2024-01-25 | End: 2024-01-25

## 2024-01-25 RX ORDER — AMOXICILLIN 250 MG
1 CAPSULE ORAL 2 TIMES DAILY
Status: DISCONTINUED | OUTPATIENT
Start: 2024-01-25 | End: 2024-02-01 | Stop reason: HOSPADM

## 2024-01-25 RX ORDER — POLYETHYLENE GLYCOL 3350 17 G/17G
17 POWDER, FOR SOLUTION ORAL DAILY
Status: DISCONTINUED | OUTPATIENT
Start: 2024-01-25 | End: 2024-02-01 | Stop reason: HOSPADM

## 2024-01-25 RX ORDER — LIDOCAINE HYDROCHLORIDE 10 MG/ML
5 INJECTION INFILTRATION; PERINEURAL ONCE
Status: DISCONTINUED | OUTPATIENT
Start: 2024-01-25 | End: 2024-02-01 | Stop reason: HOSPADM

## 2024-01-25 RX ADMIN — POLYETHYLENE GLYCOL 3350 17 G: 17 POWDER, FOR SOLUTION ORAL at 08:42

## 2024-01-25 RX ADMIN — SODIUM CHLORIDE 100 ML/HR: 900 INJECTION, SOLUTION INTRAVENOUS at 06:03

## 2024-01-25 RX ADMIN — VANCOMYCIN 1750 MG: 1.25 INJECTION, SOLUTION INTRAVENOUS at 20:56

## 2024-01-25 RX ADMIN — MEROPENEM 1 G: 1 INJECTION, POWDER, FOR SOLUTION INTRAVENOUS at 11:40

## 2024-01-25 RX ADMIN — FINASTERIDE 5 MG: 5 TABLET, FILM COATED ORAL at 08:42

## 2024-01-25 RX ADMIN — ACETAMINOPHEN 650 MG: 325 TABLET ORAL at 01:21

## 2024-01-25 RX ADMIN — HEPARIN SODIUM 5000 UNITS: 5000 INJECTION, SOLUTION INTRAVENOUS; SUBCUTANEOUS at 22:43

## 2024-01-25 RX ADMIN — SENNOSIDES AND DOCUSATE SODIUM 1 TABLET: 8.6; 5 TABLET ORAL at 20:56

## 2024-01-25 RX ADMIN — PANTOPRAZOLE SODIUM 40 MG: 40 TABLET, DELAYED RELEASE ORAL at 08:42

## 2024-01-25 RX ADMIN — CYANOCOBALAMIN 1000 MCG: 1000 INJECTION, SOLUTION INTRAMUSCULAR; SUBCUTANEOUS at 10:15

## 2024-01-25 RX ADMIN — OLANZAPINE 20 MG: 10 TABLET, FILM COATED ORAL at 20:56

## 2024-01-25 RX ADMIN — CARVEDILOL 12.5 MG: 12.5 TABLET, FILM COATED ORAL at 16:15

## 2024-01-25 RX ADMIN — IRON SUCROSE 200 MG: 20 INJECTION, SOLUTION INTRAVENOUS at 10:15

## 2024-01-25 RX ADMIN — SENNOSIDES AND DOCUSATE SODIUM 1 TABLET: 8.6; 5 TABLET ORAL at 12:12

## 2024-01-25 RX ADMIN — POLYETHYLENE GLYCOL 3350 17 G: 17 POWDER, FOR SOLUTION ORAL at 12:00

## 2024-01-25 RX ADMIN — ASPIRIN 81 MG: 81 TABLET, COATED ORAL at 08:42

## 2024-01-25 RX ADMIN — Medication 3 MG: at 20:56

## 2024-01-25 RX ADMIN — HEPARIN SODIUM 5000 UNITS: 5000 INJECTION, SOLUTION INTRAVENOUS; SUBCUTANEOUS at 14:13

## 2024-01-25 RX ADMIN — HEPARIN SODIUM 5000 UNITS: 5000 INJECTION, SOLUTION INTRAVENOUS; SUBCUTANEOUS at 06:03

## 2024-01-25 RX ADMIN — CARVEDILOL 12.5 MG: 12.5 TABLET, FILM COATED ORAL at 08:42

## 2024-01-25 RX ADMIN — MEROPENEM 1 G: 1 INJECTION, POWDER, FOR SOLUTION INTRAVENOUS at 23:40

## 2024-01-25 ASSESSMENT — COGNITIVE AND FUNCTIONAL STATUS - GENERAL
HELP NEEDED FOR BATHING: A LITTLE
DAILY ACTIVITIY SCORE: 23
DRESSING REGULAR UPPER BODY CLOTHING: A LITTLE
PERSONAL GROOMING: A LITTLE
WALKING IN HOSPITAL ROOM: A LOT
DRESSING REGULAR UPPER BODY CLOTHING: A LITTLE
CLIMB 3 TO 5 STEPS WITH RAILING: TOTAL
MOVING TO AND FROM BED TO CHAIR: TOTAL
TOILETING: A LITTLE
DRESSING REGULAR LOWER BODY CLOTHING: TOTAL
MOBILITY SCORE: 10
STANDING UP FROM CHAIR USING ARMS: A LITTLE
EATING MEALS: A LITTLE
TOILETING: TOTAL
MOBILITY SCORE: 13
CLIMB 3 TO 5 STEPS WITH RAILING: TOTAL
DAILY ACTIVITIY SCORE: 18
TURNING FROM BACK TO SIDE WHILE IN FLAT BAD: A LITTLE
CLIMB 3 TO 5 STEPS WITH RAILING: A LITTLE
MOBILITY SCORE: 19
STANDING UP FROM CHAIR USING ARMS: A LOT
MOVING FROM LYING ON BACK TO SITTING ON SIDE OF FLAT BED WITH BEDRAILS: A LITTLE
DAILY ACTIVITIY SCORE: 13
STANDING UP FROM CHAIR USING ARMS: TOTAL
MOVING TO AND FROM BED TO CHAIR: A LOT
WALKING IN HOSPITAL ROOM: TOTAL
TURNING FROM BACK TO SIDE WHILE IN FLAT BAD: A LITTLE
PERSONAL GROOMING: A LITTLE
WALKING IN HOSPITAL ROOM: A LITTLE
MOVING TO AND FROM BED TO CHAIR: A LITTLE
DRESSING REGULAR LOWER BODY CLOTHING: A LOT
TOILETING: A LITTLE
HELP NEEDED FOR BATHING: A LOT
TURNING FROM BACK TO SIDE WHILE IN FLAT BAD: A LITTLE
MOVING FROM LYING ON BACK TO SITTING ON SIDE OF FLAT BED WITH BEDRAILS: A LITTLE

## 2024-01-25 ASSESSMENT — PAIN - FUNCTIONAL ASSESSMENT
PAIN_FUNCTIONAL_ASSESSMENT: 0-10

## 2024-01-25 ASSESSMENT — ENCOUNTER SYMPTOMS
AGITATION: 0
NAUSEA: 0
ABDOMINAL DISTENTION: 0
ABDOMINAL PAIN: 0
FEVER: 0
WEAKNESS: 1
COUGH: 0
BLOOD IN STOOL: 0
WOUND: 1
PALPITATIONS: 0
CHILLS: 0
APPETITE CHANGE: 0
CONSTIPATION: 1
DIARRHEA: 0
HEADACHES: 0
CONFUSION: 0
ARTHRALGIAS: 1
DIZZINESS: 0
LIGHT-HEADEDNESS: 0
DIAPHORESIS: 0
ACTIVITY CHANGE: 1
HEMATURIA: 0
VOMITING: 0
ANAL BLEEDING: 0
SHORTNESS OF BREATH: 0
WHEEZING: 0
JOINT SWELLING: 1
DIFFICULTY URINATING: 0
BRUISES/BLEEDS EASILY: 0
FATIGUE: 0
NUMBNESS: 1

## 2024-01-25 ASSESSMENT — PAIN SCALES - GENERAL
PAINLEVEL_OUTOF10: 4
PAINLEVEL_OUTOF10: 0 - NO PAIN
PAINLEVEL_OUTOF10: 3
PAINLEVEL_OUTOF10: 0 - NO PAIN
PAINLEVEL_OUTOF10: 1
PAINLEVEL_OUTOF10: 0 - NO PAIN

## 2024-01-25 ASSESSMENT — ACTIVITIES OF DAILY LIVING (ADL)
BATHING_ASSISTANCE: MAXIMAL
ADL_ASSISTANCE: INDEPENDENT

## 2024-01-25 ASSESSMENT — PAIN DESCRIPTION - ORIENTATION: ORIENTATION: RIGHT

## 2024-01-25 ASSESSMENT — PAIN DESCRIPTION - LOCATION: LOCATION: FOOT

## 2024-01-25 NOTE — PROGRESS NOTES
Claude Coley is a 63 y.o. male on day 2 of admission presenting with Gas gangrene of foot (CMS/[facility]).    Subjective   Seen and examined patient is postop day 1 will require redo surgery and additional debridement tomorrow dissipate plan for additional surgery beginning of the week he will require ultimately 6 weeks of IV antibiotics given the complex nature of his infection coordinated with infectious disease we have requested a PICC line placement he will require a skilled facility at discharge for ongoing wound care needs IV antibiotics and physical and Occupational Therapy would anticipate the patient's discharge approximately Tuesday or Wednesday of next week pending surgical intervention Friday and possibly repeat again on Monday or Tuesday.  Will coordinate with surgical and infectious disease team appreciate input from nephrology patient's creatinine has improved back to his usual baseline and continues to improve with IV fluids and reflects prerenal azotemia secondary to his infection and dehydration in the perioperative period       Objective     Physical Exam  Vitals and nursing note reviewed.   Constitutional:       Appearance: Normal appearance.   HENT:      Head: Normocephalic and atraumatic.      Mouth/Throat:      Mouth: Mucous membranes are moist.   Eyes:      Extraocular Movements: Extraocular movements intact.      Pupils: Pupils are equal, round, and reactive to light.   Cardiovascular:      Rate and Rhythm: Normal rate and regular rhythm.      Pulses: Normal pulses.      Heart sounds: Normal heart sounds.   Pulmonary:      Effort: Pulmonary effort is normal.      Breath sounds: Normal breath sounds.   Abdominal:      Palpations: Abdomen is soft.   Musculoskeletal:         General: Normal range of motion.      Cervical back: Normal range of motion and neck supple.   Skin:     General: Skin is warm and dry.      Capillary Refill: Capillary refill takes less than 2 seconds.   Neurological:       "General: No focal deficit present.      Mental Status: He is alert and oriented to person, place, and time. Mental status is at baseline.   Psychiatric:         Mood and Affect: Mood normal.         Last Recorded Vitals  Blood pressure 116/52, pulse 63, temperature 36.9 °C (98.4 °F), temperature source Temporal, resp. rate 18, height 1.88 m (6' 2.02\"), weight 114 kg (252 lb 6.4 oz), SpO2 97 %.  Intake/Output last 3 Shifts:  I/O last 3 completed shifts:  In: 5098.3 (44.5 mL/kg) [P.O.:900; I.V.:2548.3 (22.3 mL/kg); IV Piggyback:1650]  Out: 3350 (29.3 mL/kg) [Urine:3350 (0.8 mL/kg/hr)]  Weight: 114.5 kg     Relevant Results           Results for orders placed or performed during the hospital encounter of 01/23/24 (from the past 24 hour(s))   POCT GLUCOSE   Result Value Ref Range    POCT Glucose 135 (H) 74 - 99 mg/dL   Vancomycin   Result Value Ref Range    Vancomycin 7.4 (L) 10.0 - 20.0 ug/mL   Protein, Urine Random   Result Value Ref Range    Total Protein, Urine Random 31 NOT ESTABLISHED mg/dL    Creatinine, Urine Random 80.1 NOT ESTABLISHED mg/dL    T. Protein/Creatinine Ratio 0.39 (H) <0.20 mg/mg Creat   POCT GLUCOSE   Result Value Ref Range    POCT Glucose 103 (H) 74 - 99 mg/dL   POCT GLUCOSE   Result Value Ref Range    POCT Glucose 92 74 - 99 mg/dL   POCT GLUCOSE   Result Value Ref Range    POCT Glucose 95 74 - 99 mg/dL   CBC and Auto Differential   Result Value Ref Range    WBC 11.4 (H) 4.4 - 11.3 x10*3/uL    nRBC 0.0 0.0 - 0.0 /100 WBCs    RBC 2.53 (L) 4.50 - 5.90 x10*6/uL    Hemoglobin 7.7 (L) 13.5 - 17.5 g/dL    Hematocrit 22.8 (L) 41.0 - 52.0 %    MCV 90 80 - 100 fL    MCH 30.4 26.0 - 34.0 pg    MCHC 33.8 32.0 - 36.0 g/dL    RDW 12.7 11.5 - 14.5 %    Platelets 189 150 - 450 x10*3/uL    Immature Granulocytes %, Automated 6.9 (H) 0.0 - 0.9 %    Immature Granulocytes Absolute, Automated 0.78 (H) 0.00 - 0.70 x10*3/uL   Comprehensive Metabolic Panel   Result Value Ref Range    Glucose 98 65 - 99 mg/dL    Sodium " 136 133 - 145 mmol/L    Potassium 3.5 3.4 - 5.1 mmol/L    Chloride 107 97 - 107 mmol/L    Bicarbonate 17 (L) 24 - 31 mmol/L    Urea Nitrogen 61 (H) 8 - 25 mg/dL    Creatinine 1.60 0.40 - 1.60 mg/dL    eGFR 49 (L) >60 mL/min/1.73m*2    Calcium 8.1 (L) 8.5 - 10.4 mg/dL    Albumin 2.2 (L) 3.5 - 5.0 g/dL    Alkaline Phosphatase 127 (H) 35 - 125 U/L    Total Protein 5.6 (L) 5.9 - 7.9 g/dL    AST 60 (H) 5 - 40 U/L    Bilirubin, Total 0.3 0.1 - 1.2 mg/dL    ALT 43 (H) 5 - 40 U/L    Anion Gap 12 <=19 mmol/L   Creatine Kinase   Result Value Ref Range    Creatine Kinase 289 (H) 24 - 195 U/L   Manual Differential   Result Value Ref Range    Neutrophils %, Manual 77.0 40.0 - 80.0 %    Bands %, Manual 4.0 0.0 - 5.0 %    Lymphocytes %, Manual 9.0 13.0 - 44.0 %    Monocytes %, Manual 8.0 2.0 - 10.0 %    Eosinophils %, Manual 1.0 0.0 - 6.0 %    Basophils %, Manual 0.0 0.0 - 2.0 %    Metamyelocytes %, Manual 1.0 0.0 - 0.0 %    Seg Neutrophils Absolute, Manual 8.78 (H) 1.20 - 7.00 x10*3/uL    Bands Absolute, Manual 0.46 0.00 - 0.70 x10*3/uL    Lymphocytes Absolute, Manual 1.03 (L) 1.20 - 4.80 x10*3/uL    Monocytes Absolute, Manual 0.91 0.10 - 1.00 x10*3/uL    Eosinophils Absolute, Manual 0.11 0.00 - 0.70 x10*3/uL    Basophils Absolute, Manual 0.00 0.00 - 0.10 x10*3/uL    Metamyelocytes Absolute, Manual 0.11 0.00 - 0.00 x10*3/uL    Total Cells Counted 100     Neutrophils Absolute, Manual 9.24 (H) 1.20 - 7.70 x10*3/uL    RBC Morphology No significant RBC morphology present    POCT GLUCOSE   Result Value Ref Range    POCT Glucose 102 (H) 74 - 99 mg/dL     No results found for the last 90 days.                       Assessment/Plan   Principal Problem:    Gas gangrene of foot (CMS/HCC)  Active Problems:    HTN (hypertension)    Hyperlipidemia    Diabetes mellitus, type 2 (CMS/HCC)    Acute renal failure/ATN-improved secondary to prerenal azotemia    Continue IV fluids IV antibiotics initiate PT OT plan for surgery tomorrow likely  additional surgery, beginning of the week on Monday for hopefully final closure awaiting results of cultures       I spent 40 minutes in the professional and overall care of this patient.      Melvin Ivey, DO

## 2024-01-25 NOTE — PROGRESS NOTES
Met with patient bedside.  Discussed discharge planning.  1/25 - Patient given SNF list. He wants to speak with family. Informed there will be another surgery, likely, tomorrow. Possible PICC and wound vac.   WILL NEED precert - likely 3 days with [employer].        Eden Chua RN

## 2024-01-25 NOTE — PROGRESS NOTES
"Claude Coley is a 63 y.o. male on day 2 of admission presenting with Gas gangrene of foot (CMS/HCC).      Subjective   Patient resting comfortably in bed. Patient stated that he first noticed swelling in his right foot around [facility] time and attributed it to drinking more (12 pack every couple of days and [place] at [facility]).  Came to the hospital 2 days ago after swelling, redness, and warmth seemed to be worsening.   Patient currently complains of constipation which has been present since yesterday following his surgery for a gangrene of his right foot in which his right fifth digit was amputated. Patient denies vomiting, diarrhea, abdominal pain, chest pain.  Patient says the surgical team is meeting with him later today. Patient does complain of a \"rapid firing\" of his heart for approximately 5 beats earlier this morning which resolved spontaneously. He has not felt this again since. It was not associated with any chest pain.  A second surgery is planned for tomorrow to further irrigate and debride tissue.   Patient also admits to increased swelling in his right leg compared to his left. Says he does not typically have extremity swelling with the exception of his hospitalization for heart failure approximately 4 years ago.          Objective     Last Recorded Vitals  /52 (BP Location: Right arm, Patient Position: Lying)   Pulse 63   Temp 36.9 °C (98.4 °F) (Temporal)   Resp 18   Wt 114 kg (252 lb 6.4 oz)   SpO2 97%   Intake/Output last 3 Shifts:    Intake/Output Summary (Last 24 hours) at 1/25/2024 1050  Last data filed at 1/25/2024 0834  Gross per 24 hour   Intake 4176.66 ml   Output 2125 ml   Net 2051.66 ml       Admission Weight  Weight: 114 kg (252 lb 6.4 oz) (01/23/24 2300)    Daily Weight  01/23/24 : 114 kg (252 lb 6.4 oz)    Image Results  ECG 12 lead  Normal sinus rhythm  Possible Septal infarct , age undetermined  Abnormal ECG  No previous ECGs available  Confirmed by Bobo Thornton " ([id number]) on 1/24/2024 2:03:37 PM  US renal complete  Narrative: STUDY:  Renal and Bladder Ultrasound; 1/23/2024 10:36 PM.  INDICATION:    Acute renal failure.    COMPARISON:    None available.  ACCESSION NUMBER(S):  [id number]  ORDERING CLINICIAN:  GARCIA DUARTE  TECHNIQUE:  Ultrasound of the Kidneys and Bladder. Multiple images of the abdomen  were obtained.  FINDINGS:  RIGHT KIDNEY:  The right kidney measures 12.7 cm in length. Renal cortical  echotexture is normal. There is no hydronephrosis. There are no  stones. There is a cyst with a thick internal septation that measures  1.9 x 2.1 x 1.5 cm within the superior pole.  LEFT KIDNEY:  The left kidney measures 12.9 cm in length. Renal cortical echotexture  is normal. There is no hydronephrosis. There are no stones. There are  no cysts.     BLADDER:   The urinary bladder is anechoic. The distended bladder shows no  evidence of wall thickening. The right ureteral jet is visualized. The  prostate gland measures 5.0 x 3.6 x 4.8 cm (44cc).     Impression: Septated right renal cyst consistent with a Bosniak class II lesion.  No hydronephrosis bilaterally.  Follow-up ultrasound is recommended in  6 months.  Enlarged prostate.  Signed by Carlos Luke      Physical Exam  Constitutional:       General: He is not in acute distress.     Appearance: Normal appearance. He is ill-appearing. He is not diaphoretic.   HENT:      Head: Normocephalic and atraumatic.      Mouth/Throat:      Mouth: Mucous membranes are moist.   Eyes:      Pupils: Pupils are equal, round, and reactive to light.   Cardiovascular:      Rate and Rhythm: Normal rate and regular rhythm.      Pulses: Normal pulses.      Heart sounds: Normal heart sounds.   Pulmonary:      Effort: Pulmonary effort is normal.      Breath sounds: Normal breath sounds.   Abdominal:      General: Abdomen is flat. Bowel sounds are normal. There is no distension.      Palpations: Abdomen is soft.      Tenderness: There is no  abdominal tenderness. There is no guarding.   Musculoskeletal:      Right lower leg: Edema present.      Left lower leg: Edema present.      Comments: Both lower extremities appeared to have mild edema, with the right being slightly more edematous    Skin:     General: Skin is warm and dry.      Coloration: Skin is not jaundiced.      Findings: No rash.   Neurological:      General: No focal deficit present.      Mental Status: He is alert and oriented to person, place, and time.      Cranial Nerves: No cranial nerve deficit.   Psychiatric:         Mood and Affect: Mood normal.         Behavior: Behavior normal.         Thought Content: Thought content normal.         Judgment: Judgment normal.         Scheduled medications  aspirin, 81 mg, oral, Daily  carvedilol, 12.5 mg, oral, BID with meals  finasteride, 5 mg, oral, Daily  heparin (porcine), 5,000 Units, subcutaneous, q8h  insulin lispro, 0-10 Units, subcutaneous, q4h  iron sucrose, 200 mg, intravenous, Daily  lidocaine, 5 mL, infiltration, Once  melatonin, 3 mg, oral, Nightly  meropenem, 1 g, intravenous, q12h  OLANZapine, 20 mg, oral, Nightly  pantoprazole, 40 mg, oral, Daily  polyethylene glycol, 17 g, oral, Daily      Continuous medications     PRN medications  PRN medications: acetaminophen **OR** [DISCONTINUED] acetaminophen **OR** [DISCONTINUED] acetaminophen, alteplase, dextrose 10 % in water (D10W), dextrose, glucagon  Results for orders placed or performed during the hospital encounter of 01/23/24 (from the past 24 hour(s))   POCT GLUCOSE   Result Value Ref Range    POCT Glucose 135 (H) 74 - 99 mg/dL   Vancomycin   Result Value Ref Range    Vancomycin 7.4 (L) 10.0 - 20.0 ug/mL   Protein, Urine Random   Result Value Ref Range    Total Protein, Urine Random 31 NOT ESTABLISHED mg/dL    Creatinine, Urine Random 80.1 NOT ESTABLISHED mg/dL    T. Protein/Creatinine Ratio 0.39 (H) <0.20 mg/mg Creat   POCT GLUCOSE   Result Value Ref Range    POCT Glucose 103  (H) 74 - 99 mg/dL   POCT GLUCOSE   Result Value Ref Range    POCT Glucose 92 74 - 99 mg/dL   POCT GLUCOSE   Result Value Ref Range    POCT Glucose 95 74 - 99 mg/dL   CBC and Auto Differential   Result Value Ref Range    WBC 11.4 (H) 4.4 - 11.3 x10*3/uL    nRBC 0.0 0.0 - 0.0 /100 WBCs    RBC 2.53 (L) 4.50 - 5.90 x10*6/uL    Hemoglobin 7.7 (L) 13.5 - 17.5 g/dL    Hematocrit 22.8 (L) 41.0 - 52.0 %    MCV 90 80 - 100 fL    MCH 30.4 26.0 - 34.0 pg    MCHC 33.8 32.0 - 36.0 g/dL    RDW 12.7 11.5 - 14.5 %    Platelets 189 150 - 450 x10*3/uL    Immature Granulocytes %, Automated 6.9 (H) 0.0 - 0.9 %    Immature Granulocytes Absolute, Automated 0.78 (H) 0.00 - 0.70 x10*3/uL   Comprehensive Metabolic Panel   Result Value Ref Range    Glucose 98 65 - 99 mg/dL    Sodium 136 133 - 145 mmol/L    Potassium 3.5 3.4 - 5.1 mmol/L    Chloride 107 97 - 107 mmol/L    Bicarbonate 17 (L) 24 - 31 mmol/L    Urea Nitrogen 61 (H) 8 - 25 mg/dL    Creatinine 1.60 0.40 - 1.60 mg/dL    eGFR 49 (L) >60 mL/min/1.73m*2    Calcium 8.1 (L) 8.5 - 10.4 mg/dL    Albumin 2.2 (L) 3.5 - 5.0 g/dL    Alkaline Phosphatase 127 (H) 35 - 125 U/L    Total Protein 5.6 (L) 5.9 - 7.9 g/dL    AST 60 (H) 5 - 40 U/L    Bilirubin, Total 0.3 0.1 - 1.2 mg/dL    ALT 43 (H) 5 - 40 U/L    Anion Gap 12 <=19 mmol/L   Creatine Kinase   Result Value Ref Range    Creatine Kinase 289 (H) 24 - 195 U/L   Manual Differential   Result Value Ref Range    Neutrophils %, Manual 77.0 40.0 - 80.0 %    Bands %, Manual 4.0 0.0 - 5.0 %    Lymphocytes %, Manual 9.0 13.0 - 44.0 %    Monocytes %, Manual 8.0 2.0 - 10.0 %    Eosinophils %, Manual 1.0 0.0 - 6.0 %    Basophils %, Manual 0.0 0.0 - 2.0 %    Metamyelocytes %, Manual 1.0 0.0 - 0.0 %    Seg Neutrophils Absolute, Manual 8.78 (H) 1.20 - 7.00 x10*3/uL    Bands Absolute, Manual 0.46 0.00 - 0.70 x10*3/uL    Lymphocytes Absolute, Manual 1.03 (L) 1.20 - 4.80 x10*3/uL    Monocytes Absolute, Manual 0.91 0.10 - 1.00 x10*3/uL    Eosinophils Absolute,  Manual 0.11 0.00 - 0.70 x10*3/uL    Basophils Absolute, Manual 0.00 0.00 - 0.10 x10*3/uL    Metamyelocytes Absolute, Manual 0.11 0.00 - 0.00 x10*3/uL    Total Cells Counted 100     Neutrophils Absolute, Manual 9.24 (H) 1.20 - 7.70 x10*3/uL    RBC Morphology No significant RBC morphology present    POCT GLUCOSE   Result Value Ref Range    POCT Glucose 102 (H) 74 - 99 mg/dL         Assessment/Plan        Principal Problem:    Gas gangrene of foot (CMS/HCC)  Active Problems:    HTN (hypertension)    Hyperlipidemia    Diabetes mellitus, type 2 (CMS/HCC)    Gas gangrene of foot   Surgery performed yesterday appears to have had no complications with patient recovering well. Second surgery apparently scheduled for tomorrow to further debride  Continue broad spectrum antibiotics  Monitor for compartment syndrome in right leg  Anticipating PICC line placement for outpatient IV antibiotic treatment of gas gangrene  FRANCISCA on CKD 3  Possibly secondary to rhabdomyolysis with his osteomyelitis and CK being elevated   Resolving with Creatinine, eGFR, and CK all improving  Renal function panel tomorrow morning  T2DM  Controlled -- continue current regimen      Continue current management of this patient. Monitor patient's constipation level after the second surgery is completed.               Liam Levin OMS3  1/25/2024  11:15 AM

## 2024-01-25 NOTE — CARE PLAN
The patient's goals for the shift include pain control, atbx     The clinical goals for the shift include pain management, elevate RLE, antibioitcs, and monitor labs/VS/and I&O's    Problem: Pain  Goal: My pain/discomfort is manageable  Outcome: Progressing     Problem: Safety  Goal: Patient will be injury free during hospitalization  Outcome: Progressing  Goal: I will remain free of falls  Outcome: Progressing     Problem: Daily Care  Goal: Daily care needs are met  Outcome: Progressing     Problem: Psychosocial Needs  Goal: Demonstrates ability to cope with hospitalization/illness  Outcome: Progressing  Goal: Collaborate with me, my family, and caregiver to identify my specific goals  Outcome: Progressing     Problem: Discharge Barriers  Goal: My discharge needs are met  Outcome: Progressing     Problem: Skin  Goal: Decreased wound size/increased tissue granulation at next dressing change  Outcome: Progressing  Goal: Participates in plan/prevention/treatment measures  Outcome: Progressing  Goal: Prevent/manage excess moisture  Outcome: Progressing  Goal: Prevent/minimize sheer/friction injuries  Outcome: Progressing  Goal: Promote/optimize nutrition  Outcome: Progressing  Goal: Promote skin healing  Outcome: Progressing     Problem: Deep Vein Thrombosis  Goal: I will remain free from complications of deep vein thrombosis and maintain current level of mobility  Outcome: Progressing      DISPLAY PLAN FREE TEXT

## 2024-01-25 NOTE — CONSULTS
Consults-Blood management    Reason For Consult  anemia    History Of Present Illness  Claude Coley is a 63 y.o. male presenting with swelling, redness and drainage  in right foot of 8day duration. Symtoms increased 3days prior to admit which promted ED for eval. Xray right foot .  IMPRESSION:  Soft tissue infection of the right foot centered along the lateral  aspect of the forefoot with probable abscess formation and gas  gangrene. However, no plain film evidence of osteomyelitis is  observed  On 24 pt had right lower ext I&D with amputation of right 5th digit.    Pt anemic/normocytic on admit 24 H/H 9.7/H/H trend down post op 7.7/22.8-severe decline some from hemodilution combined with blood loss from I&D , however iron panel shows JOHN, FE less than 20, ferritin 494, elevated d/t infection  Past Medical History  DM, chronic systolic heart failure, HTN,     Surgical History  Dental bone graft (2016)     Social History  From home alone, current smoker 27 cigarettes daily for 20 years, ETOH 12pack beer weekly, denies recreational drug use    Family History  Mother  age 81, pneumonia, COPD, father  age 82, DM, HTN,HLD, afib     Allergies  Bee sting kit and Tramadol    Review of Systems   Constitutional:  Positive for activity change. Negative for appetite change, chills, diaphoresis, fatigue and fever.   HENT:  Negative for congestion and nosebleeds.    Eyes:  Negative for visual disturbance.   Respiratory:  Negative for cough, shortness of breath and wheezing.    Cardiovascular:  Positive for leg swelling (RLE swelling). Negative for chest pain and palpitations.   Gastrointestinal:  Positive for constipation. Negative for abdominal distention, abdominal pain, anal bleeding, blood in stool, diarrhea, nausea and vomiting.        Chronic constipation, eats prunes daily and stool softeners prn   Endocrine: Negative for cold intolerance and heat intolerance.   Genitourinary:  Negative for  difficulty urinating and hematuria.   Musculoskeletal:  Positive for arthralgias and joint swelling.   Skin:  Positive for wound.   Neurological:  Positive for weakness and numbness (diabetic neuropathy). Negative for dizziness, light-headedness and headaches.   Hematological:  Does not bruise/bleed easily.   Psychiatric/Behavioral:  Negative for agitation and confusion.         Physical Exam  Constitutional:       Appearance: Normal appearance.   HENT:      Head: Normocephalic and atraumatic.      Right Ear: External ear normal.      Left Ear: External ear normal.      Nose: Nose normal.      Mouth/Throat:      Mouth: Mucous membranes are moist.      Pharynx: Oropharynx is clear.   Eyes:      Conjunctiva/sclera: Conjunctivae normal.      Pupils: Pupils are equal, round, and reactive to light.   Cardiovascular:      Rate and Rhythm: Normal rate and regular rhythm.      Heart sounds: Normal heart sounds. No murmur heard.  Pulmonary:      Effort: Pulmonary effort is normal. No respiratory distress.      Breath sounds: Normal breath sounds. No wheezing, rhonchi or rales.   Chest:      Chest wall: No tenderness.   Abdominal:      General: Bowel sounds are normal. There is no distension.      Palpations: Abdomen is soft.      Tenderness: There is no abdominal tenderness.   Musculoskeletal:         General: Swelling and tenderness present.      Cervical back: Normal range of motion and neck supple.      Right lower leg: Edema present.      Left lower leg: No edema.   Skin:     Capillary Refill: Capillary refill takes 2 to 3 seconds.      Coloration: Skin is not pale.      Findings: Erythema present. No bruising, lesion or rash.   Neurological:      General: No focal deficit present.      Mental Status: He is alert and oriented to person, place, and time. Mental status is at baseline.   Psychiatric:         Mood and Affect: Mood normal.         Behavior: Behavior normal.          Last Recorded Vitals  Blood pressure  "116/52, pulse 63, temperature 36.9 °C (98.4 °F), temperature source Temporal, resp. rate 18, height 1.88 m (6' 2.02\"), weight 114 kg (252 lb 6.4 oz), SpO2 97 %.    Relevant Results  Results for orders placed or performed during the hospital encounter of 01/23/24 (from the past 96 hour(s))   POCT GLUCOSE   Result Value Ref Range    POCT Glucose 99 74 - 99 mg/dL   Hemoglobin A1C   Result Value Ref Range    Hemoglobin A1C 4.5 See below %    Estimated Average Glucose 82 Not Established mg/dL   Type And Screen   Result Value Ref Range    ABO TYPE B     Rh TYPE POS     ANTIBODY SCREEN NEG    Protime-INR   Result Value Ref Range    Protime 12.7 9.3 - 12.7 seconds    INR 1.2 0.9 - 1.2   Creatine Kinase   Result Value Ref Range    Creatine Kinase 738 (H) 24 - 195 U/L   Basic Metabolic Panel   Result Value Ref Range    Glucose 95 65 - 99 mg/dL    Sodium 131 (L) 133 - 145 mmol/L    Potassium 3.8 3.4 - 5.1 mmol/L    Chloride 96 (L) 97 - 107 mmol/L    Bicarbonate 18 (L) 24 - 31 mmol/L    Urea Nitrogen 76 (H) 8 - 25 mg/dL    Creatinine 2.00 (H) 0.40 - 1.60 mg/dL    eGFR 37 (L) >60 mL/min/1.73m*2    Calcium 9.4 8.5 - 10.4 mg/dL    Anion Gap 17 <=19 mmol/L   Urinalysis with Reflex Microscopic   Result Value Ref Range    Color, Urine Yellow Light-Yellow, Yellow, Dark-Yellow    Appearance, Urine Clear Clear    Specific Gravity, Urine 1.018 1.005 - 1.035    pH, Urine 5.0 5.0, 5.5, 6.0, 6.5, 7.0, 7.5, 8.0    Protein, Urine 30 (1+) (A) NEGATIVE, 10 (TRACE), 20 (TRACE) mg/dL    Glucose, Urine Normal Normal mg/dL    Blood, Urine 0.06 (1+) (A) NEGATIVE    Ketones, Urine NEGATIVE NEGATIVE mg/dL    Bilirubin, Urine NEGATIVE NEGATIVE    Urobilinogen, Urine Normal Normal mg/dL    Nitrite, Urine NEGATIVE NEGATIVE    Leukocyte Esterase, Urine NEGATIVE NEGATIVE   Microscopic Only, Urine   Result Value Ref Range    WBC, Urine NONE 1-5, NONE /HPF    RBC, Urine 1-2 NONE, 1-2, 3-5 /HPF    Squamous Epithelial Cells, Urine 1-9 (SPARSE) Reference range " not established. /HPF    Bacteria, Urine 1+ (A) NONE SEEN /HPF    Mucus, Urine FEW Reference range not established. /LPF    Hyaline Casts, Urine 1+ (A) NONE /LPF   POCT GLUCOSE   Result Value Ref Range    POCT Glucose 100 (H) 74 - 99 mg/dL   Serial Troponin, Initial ([facility])   Result Value Ref Range    Troponin T, High Sensitivity 55 (HH) <=14 ng/L   Serial Troponin, 2 Hour ([facility])   Result Value Ref Range    Troponin T, High Sensitivity 55 (HH) <=14 ng/L   CBC and Auto Differential   Result Value Ref Range    WBC 12.0 (H) 4.4 - 11.3 x10*3/uL    nRBC 0.0 0.0 - 0.0 /100 WBCs    RBC 3.03 (L) 4.50 - 5.90 x10*6/uL    Hemoglobin 9.2 (L) 13.5 - 17.5 g/dL    Hematocrit 26.7 (L) 41.0 - 52.0 %    MCV 88 80 - 100 fL    MCH 30.4 26.0 - 34.0 pg    MCHC 34.5 32.0 - 36.0 g/dL    RDW 12.7 11.5 - 14.5 %    Platelets 185 150 - 450 x10*3/uL    Neutrophils % 83.5 40.0 - 80.0 %    Immature Granulocytes %, Automated 2.7 (H) 0.0 - 0.9 %    Lymphocytes % 5.2 13.0 - 44.0 %    Monocytes % 8.3 2.0 - 10.0 %    Eosinophils % 0.2 0.0 - 6.0 %    Basophils % 0.1 0.0 - 2.0 %    Neutrophils Absolute 10.05 (H) 1.20 - 7.70 x10*3/uL    Immature Granulocytes Absolute, Automated 0.33 0.00 - 0.70 x10*3/uL    Lymphocytes Absolute 0.62 (L) 1.20 - 4.80 x10*3/uL    Monocytes Absolute 1.00 0.10 - 1.00 x10*3/uL    Eosinophils Absolute 0.02 0.00 - 0.70 x10*3/uL    Basophils Absolute 0.01 0.00 - 0.10 x10*3/uL   Comprehensive metabolic panel   Result Value Ref Range    Glucose 93 65 - 99 mg/dL    Sodium 132 (L) 133 - 145 mmol/L    Potassium 3.7 3.4 - 5.1 mmol/L    Chloride 102 97 - 107 mmol/L    Bicarbonate 16 (L) 24 - 31 mmol/L    Urea Nitrogen 70 (H) 8 - 25 mg/dL    Creatinine 1.70 (H) 0.40 - 1.60 mg/dL    eGFR 45 (L) >60 mL/min/1.73m*2    Calcium 8.7 8.5 - 10.4 mg/dL    Albumin 2.6 (L) 3.5 - 5.0 g/dL    Alkaline Phosphatase 112 35 - 125 U/L    Total Protein 6.2 5.9 - 7.9 g/dL    AST 41 (H) 5 - 40 U/L    Bilirubin, Total 0.3 0.1 - 1.2 mg/dL    ALT 30 5 - 40 U/L     Anion Gap 14 <=19 mmol/L   Creatine Kinase   Result Value Ref Range    Creatine Kinase 592 (H) 24 - 195 U/L   POCT GLUCOSE   Result Value Ref Range    POCT Glucose 101 (H) 74 - 99 mg/dL   Serial Troponin, 6 Hour ([facility])   Result Value Ref Range    Troponin T, High Sensitivity 55 (HH) <=14 ng/L   Iron and TIBC   Result Value Ref Range    Iron <20 (L) 45 - 160 ug/dL    UIBC 133 110 - 370 ug/dL    TIBC      % Saturation     Ferritin   Result Value Ref Range    Ferritin 494 (H) 30 - 400 ng/mL   Tissue/Wound Culture/Smear    Specimen: DIGIT, ACCESSORY RIGHT FOOT; Swab   Result Value Ref Range    Tissue/Wound Culture/Smear Culture in progress     Gram Stain No polymorphonuclear leukocytes seen     Gram Stain (4+) Abundant Mixed Gram positive bacteria    POCT GLUCOSE   Result Value Ref Range    POCT Glucose 89 74 - 99 mg/dL   POCT GLUCOSE   Result Value Ref Range    POCT Glucose 135 (H) 74 - 99 mg/dL   Vancomycin   Result Value Ref Range    Vancomycin 7.4 (L) 10.0 - 20.0 ug/mL   Protein, Urine Random   Result Value Ref Range    Total Protein, Urine Random 31 NOT ESTABLISHED mg/dL    Creatinine, Urine Random 80.1 NOT ESTABLISHED mg/dL    T. Protein/Creatinine Ratio 0.39 (H) <0.20 mg/mg Creat   POCT GLUCOSE   Result Value Ref Range    POCT Glucose 103 (H) 74 - 99 mg/dL   POCT GLUCOSE   Result Value Ref Range    POCT Glucose 92 74 - 99 mg/dL   POCT GLUCOSE   Result Value Ref Range    POCT Glucose 95 74 - 99 mg/dL   CBC and Auto Differential   Result Value Ref Range    WBC 11.4 (H) 4.4 - 11.3 x10*3/uL    nRBC 0.0 0.0 - 0.0 /100 WBCs    RBC 2.53 (L) 4.50 - 5.90 x10*6/uL    Hemoglobin 7.7 (L) 13.5 - 17.5 g/dL    Hematocrit 22.8 (L) 41.0 - 52.0 %    MCV 90 80 - 100 fL    MCH 30.4 26.0 - 34.0 pg    MCHC 33.8 32.0 - 36.0 g/dL    RDW 12.7 11.5 - 14.5 %    Platelets 189 150 - 450 x10*3/uL    Immature Granulocytes %, Automated 6.9 (H) 0.0 - 0.9 %    Immature Granulocytes Absolute, Automated 0.78 (H) 0.00 - 0.70 x10*3/uL   Comprehensive  Metabolic Panel   Result Value Ref Range    Glucose 98 65 - 99 mg/dL    Sodium 136 133 - 145 mmol/L    Potassium 3.5 3.4 - 5.1 mmol/L    Chloride 107 97 - 107 mmol/L    Bicarbonate 17 (L) 24 - 31 mmol/L    Urea Nitrogen 61 (H) 8 - 25 mg/dL    Creatinine 1.60 0.40 - 1.60 mg/dL    eGFR 49 (L) >60 mL/min/1.73m*2    Calcium 8.1 (L) 8.5 - 10.4 mg/dL    Albumin 2.2 (L) 3.5 - 5.0 g/dL    Alkaline Phosphatase 127 (H) 35 - 125 U/L    Total Protein 5.6 (L) 5.9 - 7.9 g/dL    AST 60 (H) 5 - 40 U/L    Bilirubin, Total 0.3 0.1 - 1.2 mg/dL    ALT 43 (H) 5 - 40 U/L    Anion Gap 12 <=19 mmol/L   Creatine Kinase   Result Value Ref Range    Creatine Kinase 289 (H) 24 - 195 U/L   Manual Differential   Result Value Ref Range    Neutrophils %, Manual 77.0 40.0 - 80.0 %    Bands %, Manual 4.0 0.0 - 5.0 %    Lymphocytes %, Manual 9.0 13.0 - 44.0 %    Monocytes %, Manual 8.0 2.0 - 10.0 %    Eosinophils %, Manual 1.0 0.0 - 6.0 %    Basophils %, Manual 0.0 0.0 - 2.0 %    Metamyelocytes %, Manual 1.0 0.0 - 0.0 %    Seg Neutrophils Absolute, Manual 8.78 (H) 1.20 - 7.00 x10*3/uL    Bands Absolute, Manual 0.46 0.00 - 0.70 x10*3/uL    Lymphocytes Absolute, Manual 1.03 (L) 1.20 - 4.80 x10*3/uL    Monocytes Absolute, Manual 0.91 0.10 - 1.00 x10*3/uL    Eosinophils Absolute, Manual 0.11 0.00 - 0.70 x10*3/uL    Basophils Absolute, Manual 0.00 0.00 - 0.10 x10*3/uL    Metamyelocytes Absolute, Manual 0.11 0.00 - 0.00 x10*3/uL    Total Cells Counted 100     Neutrophils Absolute, Manual 9.24 (H) 1.20 - 7.70 x10*3/uL    RBC Morphology No significant RBC morphology present    POCT GLUCOSE   Result Value Ref Range    POCT Glucose 102 (H) 74 - 99 mg/dL      Current Facility-Administered Medications:     acetaminophen (Tylenol) tablet 650 mg, 650 mg, oral, q4h PRN, 650 mg at 01/25/24 0121 **OR** [DISCONTINUED] acetaminophen (Tylenol) oral liquid 650 mg, 650 mg, oral, q4h PRN **OR** [DISCONTINUED] acetaminophen (Tylenol) suppository 650 mg, 650 mg, rectal, q4h  PRN, Connie Neumann MD    aspirin EC tablet 81 mg, 81 mg, oral, Daily, Connie Neumann MD, 81 mg at 01/25/24 0842    carvedilol (Coreg) tablet 12.5 mg, 12.5 mg, oral, BID with meals, Connie Neumann MD, 12.5 mg at 01/25/24 0842    dextrose 10 % in water (D10W) infusion, 0.3 g/kg/hr, intravenous, Once PRN, Connie Neumann MD    dextrose 50 % injection 25 g, 25 g, intravenous, q15 min PRN, Connie Neumann MD    finasteride (Proscar) tablet 5 mg, 5 mg, oral, Daily, Connie Neumann MD, 5 mg at 01/25/24 0842    glucagon (Glucagen) injection 1 mg, 1 mg, intramuscular, q15 min PRN, Connie Neumann MD    heparin (porcine) injection 5,000 Units, 5,000 Units, subcutaneous, q8h, Connie Neumann MD, 5,000 Units at 01/25/24 0603    insulin lispro (HumaLOG) injection 0-10 Units, 0-10 Units, subcutaneous, q4h, Connie Neumann MD    iron sucrose (Venofer) injection 200 mg, 200 mg, intravenous, Daily, Elisa Leblanc, APRN-CNP, 200 mg at 01/25/24 1015    melatonin tablet 3 mg, 3 mg, oral, Nightly, Connie Neumann MD    meropenem (Merrem) in sodium chloride 0.9 % 100 mL IV 1 g, 1 g, intravenous, q12h, Connie Neumann MD, Stopped at 01/24/24 6057    OLANZapine (ZyPREXA) tablet 20 mg, 20 mg, oral, Nightly, Connie Neumann MD, 20 mg at 01/24/24 2111    pantoprazole (ProtoNix) EC tablet 40 mg, 40 mg, oral, Daily, 40 mg at 01/25/24 0842 **OR** [DISCONTINUED] pantoprazole (ProtoNix) injection 40 mg, 40 mg, intravenous, Daily, Connie Neumann MD    polyethylene glycol (Glycolax, Miralax) packet 17 g, 17 g, oral, Daily, Connie Neumann MD, 17 g at 01/25/24 0842      Assessment/Plan   Infected/gangrenous right foot  S/p I&D and 5th toe amputation right foot    Normocytic anemia  Anemia of acute blood loss combined with anemia of iron deficient and inflammation    Venofer 200mg IV X4  B12 1000mcg IM X1  Lab to microsample blood draws-signage on  door

## 2024-01-25 NOTE — PROGRESS NOTES
Claude Coley is a 63 y.o. male on day 2 of admission presenting with Gas gangrene of foot (CMS/HCC).    Subjective   Interval History:    Pain controlled  Afebrile no chills  Denies shortness of breath or chest pain  Denies nausea, vomiting or diarrhea            Objective   Range of Vitals (last 24 hours)  Heart Rate:  [58-70]   Temp:  [36.4 °C (97.5 °F)-36.9 °C (98.4 °F)]   Resp:  [14-18]   BP: (107-124)/(43-56)   SpO2:  [93 %-97 %]   Daily Weight  01/23/24 : 114 kg (252 lb 6.4 oz)    Body mass index is 32.39 kg/m².    Physical Exam  Constitutional:       Appearance: Normal appearance.   HENT:      Head: Normocephalic and atraumatic.      Nose: Nose normal.   Eyes:      Extraocular Movements: Extraocular movements intact.      Conjunctiva/sclera: Conjunctivae normal.   Cardiovascular:      Rate and Rhythm: Regular rhythm.      Heart sounds: Normal heart sounds.   Pulmonary:      Breath sounds: Normal breath sounds.   Abdominal:      General: Bowel sounds are normal.      Palpations: Abdomen is soft.   Musculoskeletal:      Cervical back: Normal range of motion and neck supple.      Comments: Right foot swelling   Skin:     Comments: Right foot dressing   Neurological:      Mental Status: He is alert.   Psychiatric:         Mood and Affect: Mood normal.         Behavior: Behavior normal.     Antibiotics  atorvastatin (Lipitor) tablet  aspirin EC tablet 81 mg  atorvastatin (Lipitor) tablet 40 mg  carvedilol (Coreg) tablet 12.5 mg  finasteride (Proscar) tablet 5 mg  OLANZapine (ZyPREXA) tablet 20 mg  pantoprazole (ProtoNix) EC tablet 40 mg  pantoprazole (ProtoNix) injection 40 mg  acetaminophen (Tylenol) tablet 650 mg  acetaminophen (Tylenol) oral liquid 650 mg  acetaminophen (Tylenol) suppository 650 mg  melatonin tablet 3 mg  polyethylene glycol (Glycolax, Miralax) packet 17 g  sodium chloride 0.9% infusion  lactated Ringer's bolus 500 mL  meropenem-vaborbactam (Vabomere) 1 g in sodium chloride 0.9% 250 mL  IV  dextrose 50 % injection 25 g  glucagon (Glucagen) injection 1 mg  dextrose 10 % in water (D10W) infusion  insulin lispro (HumaLOG) injection 0-10 Units  heparin (porcine) injection 5,000 Units  lactated Ringer's bolus 500 mL  meropenem (Merrem) in sodium chloride 0.9 % 100 mL IV 1 g  bupivacaine PF (Marcaine) injection  - Omnicell Override Pull  lidocaine (Xylocaine) 10 mg/mL (1 %) injection 1 mg  oxygen (O2) therapy  lactated Ringer's infusion  ondansetron (Zofran) injection 4 mg  hydrALAZINE (Apresoline) injection 5 mg  albuterol 2.5 mg /3 mL (0.083 %) nebulizer solution 2.5 mg  HYDROmorphone PF (Dilaudid) injection 0.2 mg  HYDROmorphone (Dilaudid) injection 0.4 mg  BUPivacaine HCl (Marcaine) 0.5 % (5 mg/mL) injection  lidocaine (Xylocaine) 10 mg/mL (1 %) injection 1 mg  oxygen (O2) therapy  lactated Ringer's infusion  ondansetron (Zofran) injection 4 mg  HYDROmorphone PF (Dilaudid) injection 0.2 mg  HYDROmorphone (Dilaudid) injection 0.4 mg  vancomycin-diluent combo no.1 (Xellia) IVPB 1,750 mg  iron sucrose (Venofer) injection 200 mg  cyanocobalamin (Vitamin B-12) injection 1,000 mcg  lidocaine (Xylocaine) 10 mg/mL (1 %) injection 50 mg  alteplase (Cathflo Activase) injection 2 mg  polyethylene glycol (Glycolax, Miralax) packet 17 g  sennosides-docusate sodium (Nieves-Colace) 8.6-50 mg per tablet 1 tablet  insulin lispro (HumaLOG) injection 0-10 Units      Relevant Results  Labs  Results from last 72 hours   Lab Units 01/25/24  0511 01/24/24  0454 01/23/24  1416   WBC AUTO x10*3/uL 11.4* 12.0* 16.6*   HEMOGLOBIN g/dL 7.7* 9.2* 9.7*   HEMATOCRIT % 22.8* 26.7* 28.1*   PLATELETS AUTO x10*3/uL 189 185 202   NEUTROS PCT AUTO %  --  83.5 88.1   LYMPHO PCT MAN % 9.0  --   --    LYMPHS PCT AUTO %  --  5.2 3.8   MONO PCT MAN % 8.0  --   --    MONOS PCT AUTO %  --  8.3 6.9   EOSINO PCT MAN % 1.0  --   --    EOS PCT AUTO %  --  0.2 0.1     Results from last 72 hours   Lab Units 01/25/24  0511 01/24/24  0455 01/23/24  8498  "  SODIUM mmol/L 136 132* 131*   POTASSIUM mmol/L 3.5 3.7 3.8   CHLORIDE mmol/L 107 102 96*   CO2 mmol/L 17* 16* 18*   BUN mg/dL 61* 70* 76*   CREATININE mg/dL 1.60 1.70* 2.00*   GLUCOSE mg/dL 98 93 95   CALCIUM mg/dL 8.1* 8.7 9.4   ANION GAP mmol/L 12 14 17   EGFR mL/min/1.73m*2 49* 45* 37*     Results from last 72 hours   Lab Units 01/25/24  0511 01/24/24  0455 01/23/24  1416   ALK PHOS U/L 127* 112 147*   BILIRUBIN TOTAL mg/dL 0.3 0.3 0.3   PROTEIN TOTAL g/dL 5.6* 6.2 7.1   ALT U/L 43* 30 30   AST U/L 60* 41* 37   ALBUMIN g/dL 2.2* 2.6* 3.3*     Estimated Creatinine Clearance: 65.1 mL/min (by C-G formula based on SCr of 1.6 mg/dL).  No results found for: \"CRP\"  Microbiology  Susceptibility data from last 14 days.  Collected Specimen Info Organism   01/23/24 Tissue/Biopsy from Wound/Tissue Mixed Gram-Positive and Gram-Negative Bacteria       Imaging  ECG 12 lead    Result Date: 1/24/2024  Normal sinus rhythm Possible Septal infarct , age undetermined Abnormal ECG No previous ECGs available Confirmed by Bobo Thornton ([id number]) on 1/24/2024 2:03:37 PM    US renal complete    Result Date: 1/24/2024  STUDY: Renal and Bladder Ultrasound; 1/23/2024 10:36 PM. INDICATION:  Acute renal failure.  COMPARISON:  None available. ACCESSION NUMBER(S): [id number] ORDERING CLINICIAN: GARCIA DUARTE TECHNIQUE: Ultrasound of the Kidneys and Bladder. Multiple images of the abdomen were obtained. FINDINGS: RIGHT KIDNEY: The right kidney measures 12.7 cm in length. Renal cortical echotexture is normal. There is no hydronephrosis. There are no stones. There is a cyst with a thick internal septation that measures 1.9 x 2.1 x 1.5 cm within the superior pole. LEFT KIDNEY: The left kidney measures 12.9 cm in length. Renal cortical echotexture is normal. There is no hydronephrosis. There are no stones. There are no cysts.  BLADDER: The urinary bladder is anechoic. The distended bladder shows no evidence of wall thickening. The right ureteral " jet is visualized. The prostate gland measures 5.0 x 3.6 x 4.8 cm (44cc).       Septated right renal cyst consistent with a Bosniak class II lesion. No hydronephrosis bilaterally.  Follow-up ultrasound is recommended in 6 months. Enlarged prostate. Signed by Carlos Luke    XR tibia fibula right 2 views    Result Date: 1/23/2024  STUDY: Tibia and Fibula Radiographs; 1/23/2024 at 10:42 PM INDICATION: Soft tissue gas. COMPARISON: XR right foot 1/23/2024. ACCESSION NUMBER(S): [id number] ORDERING CLINICIAN: GARCIA DUARTE TECHNIQUE:  4 view(s) of the right tibia and fibula. FINDINGS:  There is no displaced fracture.  The alignment is anatomic. Superficial soft tissue edema is noted.  Peripheral vascular calcifications are present.  Soft tissue emphysema is seen along the dorsal aspect of the midfoot and anterior to the distal tibia.    Generalized soft tissue edema with soft tissue emphysema seen surrounding the ventral aspect of the ankle and dorsal aspect of the midfoot, concerning for soft tissue infection. Peripheral vascular disease. No acute osseous normality identified. Signed by Carlos Luke    XR foot right 3+ views    Result Date: 1/23/2024  Interpreted By:  Kirstie Moy, STUDY: XR FOOT RIGHT 3+ VIEWS 1/23/2024 2:03 pm   INDICATION: Swelling   COMPARISON: None available.   ACCESSION NUMBER(S): [id number]   ORDERING CLINICIAN: GARCIA DUARTE   TECHNIQUE: Three views of right foot   FINDINGS: There is marked soft tissue swelling of the right ankle and foot. There are multiple pockets of gas seen within the right forefoot laterally centered about the 5th MTP joint with this gas extending into the webspace between the 4th and 5th toes and also extending into the midfoot and hindfoot anteriorly.   However, no obvious bony destruction is observed to indicate osteomyelitis.       Soft tissue infection of the right foot centered along the lateral aspect of the forefoot with probable abscess formation and gas  gangrene. However, no plain film evidence of osteomyelitis is observed.   Signed by: Kirstie Moy 1/23/2024 2:34 PM Dictation workstation:   [id number]           Assessment/Plan   Right diabetic foot ulcer-Hall 4  Right foot osteomyelitis/abscess  Type 2 diabetes with peripheral angiopathy with gangrene  Resolving acute kidney injury  Resolving leukocytosis     Continue vancomycin  Continue meropenem  Follow-up wound culture  Follow-up blood cultures  Glycemic control  Supportive care  Monitor temperature and WBC  Further recommendations based on culture results    Bere Jacobo, APRN-CNP

## 2024-01-25 NOTE — CARE PLAN
Problem: Pain  Goal: My pain/discomfort is manageable  Outcome: Progressing   The patient's goals for the shift include      The clinical goals for the shift include pain control, IV atbx

## 2024-01-25 NOTE — PROGRESS NOTES
"Vancomycin Dosing by Pharmacy- FOLLOW UP    Claude Coley is a 63 y.o. year old male who Pharmacy has been consulted for vancomycin dosing for cellulitis, skin and soft tissue. Based on the patient's indication and renal status this patient is being dosed based on a goal trough/random level of 10-15.     Renal function is currently improving.    Current vancomycin dose: 1750 mg given every once hours    Most recent trough level: 12.6 mcg/mL    Visit Vitals  /50 (BP Location: Right arm, Patient Position: Sitting)   Pulse 70   Temp 36.4 °C (97.5 °F) (Oral)   Resp 16        Lab Results   Component Value Date    CREATININE 1.60 01/25/2024    CREATININE 1.70 (H) 01/24/2024    CREATININE 2.00 (H) 01/23/2024    CREATININE 2.50 (H) 01/23/2024        Patient weight is No results found for: \"PTWEIGHT\"    No results found for: \"CULTURE\"     I/O last 3 completed shifts:  In: 5098.3 (44.5 mL/kg) [P.O.:900; I.V.:2548.3 (22.3 mL/kg); IV Piggyback:1650]  Out: 3350 (29.3 mL/kg) [Urine:3350 (0.8 mL/kg/hr)]  Weight: 114.5 kg   [unfilled]    No results found for: \"PATIENTTEMP\"     Assessment/Plan    Within goal random/trough level    This dosing regimen is predicted by [employer] to result in the following pharmacokinetic parameters:  <<<<<PASTE [employer] DATA HERE>>>>>    The next level will be obtained on 1-26-24 at 1500hr. May be obtained sooner if clinically indicated.   Will continue to monitor renal function daily while on vancomycin and order serum creatinine at least every 48 hours if not already ordered.  Follow for continued vancomycin needs, clinical response, and signs/symptoms of toxicity.       Lee Lagos, PharmD           "

## 2024-01-25 NOTE — PROGRESS NOTES
Physical Therapy    Physical Therapy Evaluation    Patient Name: Claude Coley  MRN: [id number]  Today's Date: 1/25/2024   Time Calculation  Start Time: 1355  Stop Time: 1405  Time Calculation (min): 10 min    Assessment/Plan   PT Assessment  PT Assessment Results: Decreased strength, Decreased endurance, Impaired balance, Decreased mobility, Impaired judgement, Decreased safety awareness, Impaired sensation, Orthopedic restrictions, Pain  Rehab Prognosis: Good  Barriers to Discharge: NWB R LE  Evaluation/Treatment Tolerance: Patient limited by fatigue  Medical Staff Made Aware: Yes  Strengths: Ability to acquire knowledge  Barriers to Participation: Comorbidities  End of Session Communication: Bedside nurse  Assessment Comment: 63 yr old male admitted with gas gangrene of foot, underwent R 5th metatarsal amputation, NWB R LE. Patient presents with B LE weakness, impaired functional activity tolerance, impaired balance which limits safe mobility. Patient requires mod A x 2, high fall risk, and is scheduled for further sx tomorrow. Recommend high intensity rehab at this time.  End of Session Patient Position: Bed, 4 rail up, Alarm on  IP OR SWING BED PT PLAN  Inpatient or Swing Bed: Inpatient  PT Plan  Treatment/Interventions: Bed mobility, Transfer training, Gait training, Balance training, Neuromuscular re-education, Strengthening, Endurance training, Therapeutic exercise, Therapeutic activity, Home exercise program, Positioning  PT Plan: Skilled PT  PT Frequency: Daily  PT Discharge Recommendations: High intensity level of continued care  Equipment Recommended upon Discharge:  (to be determined)  PT Recommended Transfer Status: Total assist  PT - OK to Discharge: Yes      Subjective   General Visit Information:  General  Reason for Referral: Impaired mobility  Referred By: Dr. Ivey  Past Medical History Relevant to Rehab: HTN, CHF, hyperlipidemia, DM  Prior to Session Communication: Bedside nurse  Patient  Position Received: Bed, 4 rail up (alarm on)  Preferred Learning Style: verbal  General Comment: Patient is supine in bed upon entry into room and is agreeable to PT.  Home Living:  Home Living  Type of Home: Apartment  Lives With: Alone  Home Adaptive Equipment: Walker rolling or standard  Home Layout: One level  Home Access: Level entry  Prior Level of Function:  Prior Function Per Pt/Caregiver Report  Level of [place]: Independent with ADLs and functional transfers, Independent with homemaking with ambulation  Prior Function Comments: + drives, no community services  Precautions:     Vital Signs:     Objective   Pain:  Pain Assessment  Pain Assessment: 0-10  Pain Score: 0 - No pain  Pain Type: Surgical pain  Pain Location: Foot  Pain Orientation: Right  Pain Interventions: Repositioned  Cognition:  Cognition  Orientation Level: Oriented X4    General Assessments:                  Activity Tolerance  Endurance: Decreased tolerance for upright activites       Coordination  Coordination Comment: effortful due to NWB R LE    Postural Control  Postural Control:  (forward head, rounded shoulders)    Static Sitting Balance  Static Sitting-Balance Support: Feet supported  Static Sitting-Level of Assistance: Independent  Dynamic Sitting Balance  Dynamic Sitting-Balance Support: Feet supported  Dynamic Sitting-Comments: close Supervision    Static Standing Balance  Static Standing-Balance Support: Bilateral upper extremity supported  Static Standing-Level of Assistance: Dependent  Static Standing-Comment/Number of Minutes: mod A x 2 to maintain static stand, not able to maintain NWB on R LE  Dynamic Standing Balance  Dynamic Standing-Balance Support: Bilateral upper extremity supported  Dynamic Standing-Comments: 2 steps up along side of bed mod A x 2 with RW, not able to maintain NWB R LE  Functional Assessments:  Bed Mobility 1  Bed Mobility 1: Supine to sitting, Sitting to supine  Level of Assistance 1: Close  supervision  Bed Mobility Comments 1: patient lowers R LE to floor without assistance, verbal cues to maintain NWB R LE on bed during scooting    Transfers  Transfer: Yes  Transfer 1  Transfer From 1: Bed to  Transfer to 1: Stand  Technique 1: Sit to stand, Stand to sit  Transfer Device 1: Walker  Transfer Level of Assistance 1: Moderate assistance, +2, Maximum verbal cues  Trials/Comments 1: max verbal cues for NWB R LE, unable to maintain. Attempted 2 steps up along side of bed mod A x 2, not able to maintain NWB R LE.  Extremity/Trunk Assessments:          RLE   RLE : Exceptions to WFL (hip, knee 3/5, ankle in dressing so not tested. ROM hip and knee WFL)  LLE   LLE : Exceptions to WFL (hip, knee, ankle 3/5. ROM WFL)  Outcome Measures:  [facility] Basic Mobility  Turning from your back to your side while in a flat bed without using bedrails: A little  Moving from lying on your back to sitting on the side of a flat bed without using bedrails: A little  Moving to and from bed to chair (including a wheelchair): Total  Standing up from a chair using your arms (e.g. wheelchair or bedside chair): Total  To walk in hospital room: Total  Climbing 3-5 steps with railing: Total  Basic Mobility - Total Score: 10    Encounter Problems       Encounter Problems (Active)       PT Goals       Patient will transfer sit to stand and stand to sit with  contact guard assist to facilitate mobility.  (Progressing)       Start:  01/25/24    Expected End:  02/08/24            Patient will transfer bed to chair and chair to bed with contact guard assist to facilitate mobility.  (Progressing)       Start:  01/25/24    Expected End:  02/08/24            Patient will amb 10 feet with rolling walker device including no turns on even surface with minimal assist to facilitate safe mobility.  (Progressing)       Start:  01/25/24    Expected End:  02/08/24            Demonstrate safe mobility techniques while maintaining WB status R LE. (Progressing)        Start:  01/25/24    Expected End:  02/08/24                   Education Documentation  Mobility Training, taught by Susanne Castro PT at 1/25/2024  3:25 PM.  Learner: Patient  Readiness: Acceptance  Method: Explanation  Response: Needs Reinforcement  Comment: Education re; safe mobility, NWB status R LE.    Education Comments  No comments found.

## 2024-01-25 NOTE — NURSING NOTE
Chart reviewed for contraindications to PICC placement. Renal clearance provided by Dr. Jules Villatoro.    PICC to be placed tomorrow in dominant arm.

## 2024-01-25 NOTE — PROGRESS NOTES
PODIATRY SERVICE CONSULT PROGRESS NOTE    SERVICE DATE: 1/25/2024   SERVICE TIME:  1:35 PM     Subjective   INTERVAL HPI:   Pt was seen at bedside.  Pain well controlled.  Patient denies any constitutional symptoms.   No other pedal complaints.      Pt expressed concerns of constipation. On bowel regimen per primary.    Medications:  Scheduled Meds: aspirin, 81 mg, oral, Daily  carvedilol, 12.5 mg, oral, BID with meals  finasteride, 5 mg, oral, Daily  heparin (porcine), 5,000 Units, subcutaneous, q8h  insulin lispro, 0-10 Units, subcutaneous, q4h  iron sucrose, 200 mg, intravenous, Daily  lidocaine, 5 mL, infiltration, Once  melatonin, 3 mg, oral, Nightly  meropenem, 1 g, intravenous, q12h  OLANZapine, 20 mg, oral, Nightly  pantoprazole, 40 mg, oral, Daily  polyethylene glycol, 17 g, oral, Daily  sennosides-docusate sodium, 1 tablet, oral, BID      Continuous Infusions:    PRN Meds: PRN medications: acetaminophen **OR** [DISCONTINUED] acetaminophen **OR** [DISCONTINUED] acetaminophen, alteplase, dextrose 10 % in water (D10W), dextrose, glucagon         Objective   PHYSICAL EXAM:  Physical Exam Performed:  Vitals:    01/25/24 1100   BP: 120/56   Pulse: 66   Resp: 18   Temp: 36.8 °C (98.2 °F)   SpO2: 96%     Body mass index is 32.39 kg/m².    Patient is AOx3 and in no acute distress. Patient is alert and cooperative. Sitting comfortably in bed with dressing clean, dry and intact. Heel off-loading boots in place B/L.    Vascular: Palpable DP/PT pulses B/L. Severe pitting edema R however much improved. Hair growth absent B/L. CFT<5 to B/L hallux. Temperature is warm to cool from tibial tuberosity to distal digits B/L. No lymphatic streaking noted B/L.    Musculoskeletal: Gross active and passive ROM diminished to age and activity level. Moves all extremities spontaneously. No pain to palpation at feet B/L. Open incision to right lateral foot with amputation of 5th digit.    Neurological: Absent light touch sensation  B/L. Pain stimuli absent B/L. Denies any numbness, burning or tingling.    Dermatologic: Skin appears diffusely xerotic B/L. Web spaces 1-4 B/L are clean, dry and intact. No rashes or nodules noted B/L. No hyperkeratotic tissue noted B/L.     Wound: Open Amputation Site - Right Lateral Forefoot  Wound base mixed fibrotic and granular tissue, necrotic exposed 5th metatarsal head.  Moderate serosanguinous drainage without active bleeding. No expressible purulence today.  Moderate basil-wound maceration.   Mild basil-wound erythema.   Mild evidence of ascending cellulitis or lymphangitis.  No palpable fluctuance. Mild malodor. Mild increased warmth.   Tracking noted dorsally to the ankle and plantar to the medial ankle.  Moderate undermining.       LABS:   Results for orders placed or performed during the hospital encounter of 01/23/24 (from the past 24 hour(s))   POCT GLUCOSE   Result Value Ref Range    POCT Glucose 135 (H) 74 - 99 mg/dL   Vancomycin   Result Value Ref Range    Vancomycin 7.4 (L) 10.0 - 20.0 ug/mL   Protein, Urine Random   Result Value Ref Range    Total Protein, Urine Random 31 NOT ESTABLISHED mg/dL    Creatinine, Urine Random 80.1 NOT ESTABLISHED mg/dL    T. Protein/Creatinine Ratio 0.39 (H) <0.20 mg/mg Creat   POCT GLUCOSE   Result Value Ref Range    POCT Glucose 103 (H) 74 - 99 mg/dL   POCT GLUCOSE   Result Value Ref Range    POCT Glucose 92 74 - 99 mg/dL   POCT GLUCOSE   Result Value Ref Range    POCT Glucose 95 74 - 99 mg/dL   CBC and Auto Differential   Result Value Ref Range    WBC 11.4 (H) 4.4 - 11.3 x10*3/uL    nRBC 0.0 0.0 - 0.0 /100 WBCs    RBC 2.53 (L) 4.50 - 5.90 x10*6/uL    Hemoglobin 7.7 (L) 13.5 - 17.5 g/dL    Hematocrit 22.8 (L) 41.0 - 52.0 %    MCV 90 80 - 100 fL    MCH 30.4 26.0 - 34.0 pg    MCHC 33.8 32.0 - 36.0 g/dL    RDW 12.7 11.5 - 14.5 %    Platelets 189 150 - 450 x10*3/uL    Immature Granulocytes %, Automated 6.9 (H) 0.0 - 0.9 %    Immature Granulocytes Absolute, Automated  "0.78 (H) 0.00 - 0.70 x10*3/uL   Comprehensive Metabolic Panel   Result Value Ref Range    Glucose 98 65 - 99 mg/dL    Sodium 136 133 - 145 mmol/L    Potassium 3.5 3.4 - 5.1 mmol/L    Chloride 107 97 - 107 mmol/L    Bicarbonate 17 (L) 24 - 31 mmol/L    Urea Nitrogen 61 (H) 8 - 25 mg/dL    Creatinine 1.60 0.40 - 1.60 mg/dL    eGFR 49 (L) >60 mL/min/1.73m*2    Calcium 8.1 (L) 8.5 - 10.4 mg/dL    Albumin 2.2 (L) 3.5 - 5.0 g/dL    Alkaline Phosphatase 127 (H) 35 - 125 U/L    Total Protein 5.6 (L) 5.9 - 7.9 g/dL    AST 60 (H) 5 - 40 U/L    Bilirubin, Total 0.3 0.1 - 1.2 mg/dL    ALT 43 (H) 5 - 40 U/L    Anion Gap 12 <=19 mmol/L   Creatine Kinase   Result Value Ref Range    Creatine Kinase 289 (H) 24 - 195 U/L   Manual Differential   Result Value Ref Range    Neutrophils %, Manual 77.0 40.0 - 80.0 %    Bands %, Manual 4.0 0.0 - 5.0 %    Lymphocytes %, Manual 9.0 13.0 - 44.0 %    Monocytes %, Manual 8.0 2.0 - 10.0 %    Eosinophils %, Manual 1.0 0.0 - 6.0 %    Basophils %, Manual 0.0 0.0 - 2.0 %    Metamyelocytes %, Manual 1.0 0.0 - 0.0 %    Seg Neutrophils Absolute, Manual 8.78 (H) 1.20 - 7.00 x10*3/uL    Bands Absolute, Manual 0.46 0.00 - 0.70 x10*3/uL    Lymphocytes Absolute, Manual 1.03 (L) 1.20 - 4.80 x10*3/uL    Monocytes Absolute, Manual 0.91 0.10 - 1.00 x10*3/uL    Eosinophils Absolute, Manual 0.11 0.00 - 0.70 x10*3/uL    Basophils Absolute, Manual 0.00 0.00 - 0.10 x10*3/uL    Metamyelocytes Absolute, Manual 0.11 0.00 - 0.00 x10*3/uL    Total Cells Counted 100     Neutrophils Absolute, Manual 9.24 (H) 1.20 - 7.70 x10*3/uL    RBC Morphology No significant RBC morphology present    POCT GLUCOSE   Result Value Ref Range    POCT Glucose 102 (H) 74 - 99 mg/dL   POCT GLUCOSE   Result Value Ref Range    POCT Glucose 178 (H) 74 - 99 mg/dL      Lab Results   Component Value Date    HGBA1C 4.5 01/23/2024      No results found for: \"CRP\"   No results found for: \"SEDRATE\"     Results from last 7 days   Lab Units 01/25/24  0511 "   WBC AUTO x10*3/uL 11.4*   RBC AUTO x10*6/uL 2.53*   HEMOGLOBIN g/dL 7.7*   HEMATOCRIT % 22.8*     Results from last 7 days   Lab Units 01/25/24  0511   SODIUM mmol/L 136   POTASSIUM mmol/L 3.5   CHLORIDE mmol/L 107   CO2 mmol/L 17*   BUN mg/dL 61*   CREATININE mg/dL 1.60   CALCIUM mg/dL 8.1*   BILIRUBIN TOTAL mg/dL 0.3   ALT U/L 43*   AST U/L 60*     Results from last 7 days   Lab Units 01/24/24  0007   COLOR U  Yellow   APPEARANCE U  Clear   PH U  5.0   SPEC GRAV UR  1.018   PROTEIN U mg/dL 30 (1+)*   BLOOD UR  0.06 (1+)*   NITRITE U  NEGATIVE   WBC UR /HPF NONE   BACTERIA UR /HPF 1+*       IMAGING REVIEW:  ECG 12 lead    Result Date: 1/24/2024  Normal sinus rhythm Possible Septal infarct , age undetermined Abnormal ECG No previous ECGs available Confirmed by Bobo Thornton ([id number]) on 1/24/2024 2:03:37 PM    US renal complete    Result Date: 1/24/2024  STUDY: Renal and Bladder Ultrasound; 1/23/2024 10:36 PM. INDICATION:  Acute renal failure.  COMPARISON:  None available. ACCESSION NUMBER(S): [id number] ORDERING CLINICIAN: GARCIA DUARTE TECHNIQUE: Ultrasound of the Kidneys and Bladder. Multiple images of the abdomen were obtained. FINDINGS: RIGHT KIDNEY: The right kidney measures 12.7 cm in length. Renal cortical echotexture is normal. There is no hydronephrosis. There are no stones. There is a cyst with a thick internal septation that measures 1.9 x 2.1 x 1.5 cm within the superior pole. LEFT KIDNEY: The left kidney measures 12.9 cm in length. Renal cortical echotexture is normal. There is no hydronephrosis. There are no stones. There are no cysts.  BLADDER: The urinary bladder is anechoic. The distended bladder shows no evidence of wall thickening. The right ureteral jet is visualized. The prostate gland measures 5.0 x 3.6 x 4.8 cm (44cc).       Septated right renal cyst consistent with a Bosniak class II lesion. No hydronephrosis bilaterally.  Follow-up ultrasound is recommended in 6 months. Enlarged  prostate. Signed by Carlos Luke    XR tibia fibula right 2 views    Result Date: 1/23/2024  STUDY: Tibia and Fibula Radiographs; 1/23/2024 at 10:42 PM INDICATION: Soft tissue gas. COMPARISON: XR right foot 1/23/2024. ACCESSION NUMBER(S): [id number] ORDERING CLINICIAN: GARCIA DUARTE TECHNIQUE:  4 view(s) of the right tibia and fibula. FINDINGS:  There is no displaced fracture.  The alignment is anatomic. Superficial soft tissue edema is noted.  Peripheral vascular calcifications are present.  Soft tissue emphysema is seen along the dorsal aspect of the midfoot and anterior to the distal tibia.    Generalized soft tissue edema with soft tissue emphysema seen surrounding the ventral aspect of the ankle and dorsal aspect of the midfoot, concerning for soft tissue infection. Peripheral vascular disease. No acute osseous normality identified. Signed by Carlos Luke    XR foot right 3+ views    Result Date: 1/23/2024  Interpreted By:  Kirstie Moy, STUDY: XR FOOT RIGHT 3+ VIEWS 1/23/2024 2:03 pm   INDICATION: Swelling   COMPARISON: None available.   ACCESSION NUMBER(S): [id number]   ORDERING CLINICIAN: GARCIA DUARTE   TECHNIQUE: Three views of right foot   FINDINGS: There is marked soft tissue swelling of the right ankle and foot. There are multiple pockets of gas seen within the right forefoot laterally centered about the 5th MTP joint with this gas extending into the webspace between the 4th and 5th toes and also extending into the midfoot and hindfoot anteriorly.   However, no obvious bony destruction is observed to indicate osteomyelitis.       Soft tissue infection of the right foot centered along the lateral aspect of the forefoot with probable abscess formation and gas gangrene. However, no plain film evidence of osteomyelitis is observed.   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  - Charts, labs, vitals and imaging all reviewed.   - Imaging: XR R Foot with soft tissue gas noted to the level of the ankle joint.  - Labs: WBC 11.4 today, downtrending; A1C 4.5%; Hbg 7.7  - Wound culture: 4+ Mixed G+ & G- Bacteria - Speciation and Sensitivities Pending   - Blood culture: Pending - NGTD      Plan:  - Abx: IV Vancomycin and Meropenem per ID - Planning PICC at discharge.  - Pt underwent emergent I&D with amputation of the 5th digit of the right foot after transfer to [facility] from [facility] due to OR availability. Due to emergent nature of case cardiac clearance was not obtained. Pt tolerated procedure well. Incision was packed open with planned staged procedure depending how soft tissue responded.  - Plans to return to OR tomorrow for further debridement and resection of 5th metatarsal head with application of grafts and wound vac to the Right foot. Discussed plan with pt in detail in which he expressed understanding. Also discussed risks and benefits as well as risk of needing further debridement/amputation in the future in which he expressed understanding.  - Surgery scheduled for tomorrow early afternoon. NPO aftermidnight.  - Dressings: Betadine wet to dry dressing. Changed today.   - Nursing staff is able to reinforce dressing if & as necessary. Thank you.  - Podiatry will continue to follow while in house.   - Pt OK to discharge to SNF     DVT ppx: Per primary.   Weightbearing: NWB RLE - OK to transfer with WB to heel with surgical shoe in place.  Discharge: Pt to follow up 1 week after discharge with Dr. Perry. Will have discharge plan updated.     Case to be discussed with attending, A&P above  reflects a tentative plan. Please await for the final signature from the attending physician on service.     Marly Mcclain DPM PGY-2  Podiatric Medicine & Surgery  Please [place] message me with any questions or concerns.           SIGNATURE: Marly cMclain DPM PATIENT NAME: Claude Coley   DATE: January 25, 2024 MRN: [id number]   TIME: 1:35 PM CONTACT: Haiku Message

## 2024-01-25 NOTE — CARE PLAN
Problem: PT Goals  Goal: Patient will transfer sit to stand and stand to sit with  contact guard assist to facilitate mobility.   Outcome: Progressing  Goal: Patient will transfer bed to chair and chair to bed with contact guard assist to facilitate mobility.   Outcome: Progressing  Goal: Patient will amb 10 feet with rolling walker device including no turns on even surface with minimal assist to facilitate safe mobility.   Outcome: Progressing  Goal: Demonstrate safe mobility techniques while maintaining WB status R LE.  Outcome: Progressing

## 2024-01-25 NOTE — PROGRESS NOTES
"Claude Coley is a 63 y.o. male on day 2 of admission presenting with Gas gangrene of foot (CMS/HCC).    Subjective    patient was seen yesterday for acute kidney injury associated with dehydration and infected and gangrenous right foot biotic therapy and IV hydration he feels well he has no complaints at all and resting comfortably in bed       Objective     Physical Exam  Neck:      Vascular: No carotid bruit.   Cardiovascular:      Rate and Rhythm: Normal rate and regular rhythm.      Heart sounds: No murmur heard.     No friction rub. No gallop.   Pulmonary:      Breath sounds: No wheezing, rhonchi or rales.   Chest:      Chest wall: No tenderness.   Abdominal:      General: There is no distension.      Tenderness: There is no abdominal tenderness. There is no guarding or rebound.   Musculoskeletal:         General: No swelling or tenderness.      Cervical back: Neck supple.      Right lower leg: No edema.      Left lower leg: No edema.      Comments:  gangrenous and infected right foot   Lymphadenopathy:      Cervical: No cervical adenopathy.         Last Recorded Vitals  Blood pressure 116/52, pulse 63, temperature 36.9 °C (98.4 °F), temperature source Temporal, resp. rate 18, height 1.88 m (6' 2.02\"), weight 114 kg (252 lb 6.4 oz), SpO2 97 %.    Intake/Output last 3 Shifts:  I/O last 3 completed shifts:  In: 5098.3 (44.5 mL/kg) [P.O.:900; I.V.:2548.3 (22.3 mL/kg); IV Piggyback:1650]  Out: 3350 (29.3 mL/kg) [Urine:3350 (0.8 mL/kg/hr)]  Weight: 114.5 kg     Current Facility-Administered Medications:     acetaminophen (Tylenol) tablet 650 mg, 650 mg, oral, q4h PRN, 650 mg at 01/25/24 0121 **OR** acetaminophen (Tylenol) oral liquid 650 mg, 650 mg, oral, q4h PRN **OR** acetaminophen (Tylenol) suppository 650 mg, 650 mg, rectal, q4h PRN, Connie Neumann MD    aspirin EC tablet 81 mg, 81 mg, oral, Daily, Connie Neumann MD, 81 mg at 01/25/24 0842    carvedilol (Coreg) tablet 12.5 mg, 12.5 mg, oral, " BID with meals, Connie Neumann MD, 12.5 mg at 01/25/24 0842    dextrose 10 % in water (D10W) infusion, 0.3 g/kg/hr, intravenous, Once PRN, Connie Neumann MD    dextrose 50 % injection 25 g, 25 g, intravenous, q15 min PRN, Connie Neumann MD    finasteride (Proscar) tablet 5 mg, 5 mg, oral, Daily, Connie Neumann MD, 5 mg at 01/25/24 0842    glucagon (Glucagen) injection 1 mg, 1 mg, intramuscular, q15 min PRN, Connie Neumann MD    heparin (porcine) injection 5,000 Units, 5,000 Units, subcutaneous, q8h, Connie Neumann MD, 5,000 Units at 01/25/24 0603    insulin lispro (HumaLOG) injection 0-10 Units, 0-10 Units, subcutaneous, q4h, Connie Neumann MD    melatonin tablet 3 mg, 3 mg, oral, Nightly, Connie Neumann MD    meropenem (Merrem) in sodium chloride 0.9 % 100 mL IV 1 g, 1 g, intravenous, q12h, Connie Neumann MD, Stopped at 01/24/24 2359    OLANZapine (ZyPREXA) tablet 20 mg, 20 mg, oral, Nightly, Connie Neumann MD, 20 mg at 01/24/24 2111    pantoprazole (ProtoNix) EC tablet 40 mg, 40 mg, oral, Daily, 40 mg at 01/25/24 0842 **OR** pantoprazole (ProtoNix) injection 40 mg, 40 mg, intravenous, Daily, Connie Neumann MD    polyethylene glycol (Glycolax, Miralax) packet 17 g, 17 g, oral, Daily, Connie Neumann MD, 17 g at 01/25/24 0842    sodium chloride 0.9% infusion, 100 mL/hr, intravenous, Continuous, Connie Neumann MD, Last Rate: 100 mL/hr at 01/25/24 0603, 100 mL/hr at 01/25/24 0603   Relevant Results    Results for orders placed or performed during the hospital encounter of 01/23/24 (from the past 96 hour(s))   POCT GLUCOSE   Result Value Ref Range    POCT Glucose 99 74 - 99 mg/dL   Hemoglobin A1C   Result Value Ref Range    Hemoglobin A1C 4.5 See below %    Estimated Average Glucose 82 Not Established mg/dL   Type And Screen   Result Value Ref Range    ABO TYPE B     Rh TYPE POS     ANTIBODY SCREEN NEG    Protime-INR    Result Value Ref Range    Protime 12.7 9.3 - 12.7 seconds    INR 1.2 0.9 - 1.2   Creatine Kinase   Result Value Ref Range    Creatine Kinase 738 (H) 24 - 195 U/L   Basic Metabolic Panel   Result Value Ref Range    Glucose 95 65 - 99 mg/dL    Sodium 131 (L) 133 - 145 mmol/L    Potassium 3.8 3.4 - 5.1 mmol/L    Chloride 96 (L) 97 - 107 mmol/L    Bicarbonate 18 (L) 24 - 31 mmol/L    Urea Nitrogen 76 (H) 8 - 25 mg/dL    Creatinine 2.00 (H) 0.40 - 1.60 mg/dL    eGFR 37 (L) >60 mL/min/1.73m*2    Calcium 9.4 8.5 - 10.4 mg/dL    Anion Gap 17 <=19 mmol/L   Urinalysis with Reflex Microscopic   Result Value Ref Range    Color, Urine Yellow Light-Yellow, Yellow, Dark-Yellow    Appearance, Urine Clear Clear    Specific Gravity, Urine 1.018 1.005 - 1.035    pH, Urine 5.0 5.0, 5.5, 6.0, 6.5, 7.0, 7.5, 8.0    Protein, Urine 30 (1+) (A) NEGATIVE, 10 (TRACE), 20 (TRACE) mg/dL    Glucose, Urine Normal Normal mg/dL    Blood, Urine 0.06 (1+) (A) NEGATIVE    Ketones, Urine NEGATIVE NEGATIVE mg/dL    Bilirubin, Urine NEGATIVE NEGATIVE    Urobilinogen, Urine Normal Normal mg/dL    Nitrite, Urine NEGATIVE NEGATIVE    Leukocyte Esterase, Urine NEGATIVE NEGATIVE   Microscopic Only, Urine   Result Value Ref Range    WBC, Urine NONE 1-5, NONE /HPF    RBC, Urine 1-2 NONE, 1-2, 3-5 /HPF    Squamous Epithelial Cells, Urine 1-9 (SPARSE) Reference range not established. /HPF    Bacteria, Urine 1+ (A) NONE SEEN /HPF    Mucus, Urine FEW Reference range not established. /LPF    Hyaline Casts, Urine 1+ (A) NONE /LPF   POCT GLUCOSE   Result Value Ref Range    POCT Glucose 100 (H) 74 - 99 mg/dL   Serial Troponin, Initial ([facility])   Result Value Ref Range    Troponin T, High Sensitivity 55 (HH) <=14 ng/L   Serial Troponin, 2 Hour ([facility])   Result Value Ref Range    Troponin T, High Sensitivity 55 (HH) <=14 ng/L   CBC and Auto Differential   Result Value Ref Range    WBC 12.0 (H) 4.4 - 11.3 x10*3/uL    nRBC 0.0 0.0 - 0.0 /100 WBCs    RBC 3.03 (L) 4.50 - 5.90  x10*6/uL    Hemoglobin 9.2 (L) 13.5 - 17.5 g/dL    Hematocrit 26.7 (L) 41.0 - 52.0 %    MCV 88 80 - 100 fL    MCH 30.4 26.0 - 34.0 pg    MCHC 34.5 32.0 - 36.0 g/dL    RDW 12.7 11.5 - 14.5 %    Platelets 185 150 - 450 x10*3/uL    Neutrophils % 83.5 40.0 - 80.0 %    Immature Granulocytes %, Automated 2.7 (H) 0.0 - 0.9 %    Lymphocytes % 5.2 13.0 - 44.0 %    Monocytes % 8.3 2.0 - 10.0 %    Eosinophils % 0.2 0.0 - 6.0 %    Basophils % 0.1 0.0 - 2.0 %    Neutrophils Absolute 10.05 (H) 1.20 - 7.70 x10*3/uL    Immature Granulocytes Absolute, Automated 0.33 0.00 - 0.70 x10*3/uL    Lymphocytes Absolute 0.62 (L) 1.20 - 4.80 x10*3/uL    Monocytes Absolute 1.00 0.10 - 1.00 x10*3/uL    Eosinophils Absolute 0.02 0.00 - 0.70 x10*3/uL    Basophils Absolute 0.01 0.00 - 0.10 x10*3/uL   Comprehensive metabolic panel   Result Value Ref Range    Glucose 93 65 - 99 mg/dL    Sodium 132 (L) 133 - 145 mmol/L    Potassium 3.7 3.4 - 5.1 mmol/L    Chloride 102 97 - 107 mmol/L    Bicarbonate 16 (L) 24 - 31 mmol/L    Urea Nitrogen 70 (H) 8 - 25 mg/dL    Creatinine 1.70 (H) 0.40 - 1.60 mg/dL    eGFR 45 (L) >60 mL/min/1.73m*2    Calcium 8.7 8.5 - 10.4 mg/dL    Albumin 2.6 (L) 3.5 - 5.0 g/dL    Alkaline Phosphatase 112 35 - 125 U/L    Total Protein 6.2 5.9 - 7.9 g/dL    AST 41 (H) 5 - 40 U/L    Bilirubin, Total 0.3 0.1 - 1.2 mg/dL    ALT 30 5 - 40 U/L    Anion Gap 14 <=19 mmol/L   Creatine Kinase   Result Value Ref Range    Creatine Kinase 592 (H) 24 - 195 U/L   POCT GLUCOSE   Result Value Ref Range    POCT Glucose 101 (H) 74 - 99 mg/dL   Serial Troponin, 6 Hour ([facility])   Result Value Ref Range    Troponin T, High Sensitivity 55 (HH) <=14 ng/L   Iron and TIBC   Result Value Ref Range    Iron <20 (L) 45 - 160 ug/dL    UIBC 133 110 - 370 ug/dL    TIBC      % Saturation     Ferritin   Result Value Ref Range    Ferritin 494 (H) 30 - 400 ng/mL   POCT GLUCOSE   Result Value Ref Range    POCT Glucose 89 74 - 99 mg/dL   POCT GLUCOSE   Result Value Ref Range     POCT Glucose 135 (H) 74 - 99 mg/dL   Vancomycin   Result Value Ref Range    Vancomycin 7.4 (L) 10.0 - 20.0 ug/mL   Protein, Urine Random   Result Value Ref Range    Total Protein, Urine Random 31 NOT ESTABLISHED mg/dL    Creatinine, Urine Random 80.1 NOT ESTABLISHED mg/dL    T. Protein/Creatinine Ratio 0.39 (H) <0.20 mg/mg Creat   POCT GLUCOSE   Result Value Ref Range    POCT Glucose 103 (H) 74 - 99 mg/dL   POCT GLUCOSE   Result Value Ref Range    POCT Glucose 92 74 - 99 mg/dL   POCT GLUCOSE   Result Value Ref Range    POCT Glucose 95 74 - 99 mg/dL   CBC and Auto Differential   Result Value Ref Range    WBC 11.4 (H) 4.4 - 11.3 x10*3/uL    nRBC 0.0 0.0 - 0.0 /100 WBCs    RBC 2.53 (L) 4.50 - 5.90 x10*6/uL    Hemoglobin 7.7 (L) 13.5 - 17.5 g/dL    Hematocrit 22.8 (L) 41.0 - 52.0 %    MCV 90 80 - 100 fL    MCH 30.4 26.0 - 34.0 pg    MCHC 33.8 32.0 - 36.0 g/dL    RDW 12.7 11.5 - 14.5 %    Platelets 189 150 - 450 x10*3/uL    Immature Granulocytes %, Automated 6.9 (H) 0.0 - 0.9 %    Immature Granulocytes Absolute, Automated 0.78 (H) 0.00 - 0.70 x10*3/uL   Comprehensive Metabolic Panel   Result Value Ref Range    Glucose 98 65 - 99 mg/dL    Sodium 136 133 - 145 mmol/L    Potassium 3.5 3.4 - 5.1 mmol/L    Chloride 107 97 - 107 mmol/L    Bicarbonate 17 (L) 24 - 31 mmol/L    Urea Nitrogen 61 (H) 8 - 25 mg/dL    Creatinine 1.60 0.40 - 1.60 mg/dL    eGFR 49 (L) >60 mL/min/1.73m*2    Calcium 8.1 (L) 8.5 - 10.4 mg/dL    Albumin 2.2 (L) 3.5 - 5.0 g/dL    Alkaline Phosphatase 127 (H) 35 - 125 U/L    Total Protein 5.6 (L) 5.9 - 7.9 g/dL    AST 60 (H) 5 - 40 U/L    Bilirubin, Total 0.3 0.1 - 1.2 mg/dL    ALT 43 (H) 5 - 40 U/L    Anion Gap 12 <=19 mmol/L   Creatine Kinase   Result Value Ref Range    Creatine Kinase 289 (H) 24 - 195 U/L   Manual Differential   Result Value Ref Range    Neutrophils %, Manual 77.0 40.0 - 80.0 %    Bands %, Manual 4.0 0.0 - 5.0 %    Lymphocytes %, Manual 9.0 13.0 - 44.0 %    Monocytes %, Manual 8.0 2.0  - 10.0 %    Eosinophils %, Manual 1.0 0.0 - 6.0 %    Basophils %, Manual 0.0 0.0 - 2.0 %    Metamyelocytes %, Manual 1.0 0.0 - 0.0 %    Seg Neutrophils Absolute, Manual 8.78 (H) 1.20 - 7.00 x10*3/uL    Bands Absolute, Manual 0.46 0.00 - 0.70 x10*3/uL    Lymphocytes Absolute, Manual 1.03 (L) 1.20 - 4.80 x10*3/uL    Monocytes Absolute, Manual 0.91 0.10 - 1.00 x10*3/uL    Eosinophils Absolute, Manual 0.11 0.00 - 0.70 x10*3/uL    Basophils Absolute, Manual 0.00 0.00 - 0.10 x10*3/uL    Metamyelocytes Absolute, Manual 0.11 0.00 - 0.00 x10*3/uL    Total Cells Counted 100     Neutrophils Absolute, Manual 9.24 (H) 1.20 - 7.70 x10*3/uL    RBC Morphology No significant RBC morphology present    POCT GLUCOSE   Result Value Ref Range    POCT Glucose 102 (H) 74 - 99 mg/dL       Assessment/Plan      acute kidney injury  renal function is improving creatinine is down my plan to discontinue IV fluids and continue to monitor renal function very closely   infected and gangrenous right foot   complex renal cyst which is septated needs follow-up every 6 months             Jules Villatoro MD

## 2024-01-25 NOTE — PROGRESS NOTES
Occupational Therapy    Evaluation    Patient Name: Claude Coley  MRN: [id number]  Today's Date: 1/25/2024  Time Calculation  Start Time: 1354  Stop Time: 1404  Time Calculation (min): 10 min    Assessment  IP OT Assessment  OT Assessment: 62 y/o male s/p emergent I&D of Rt foot infection.  On eval, he presents significantly below baseline, requiring assist x2 for all xfers, mobility and self care d/t decreased strength, balance, activity tolerance and ability to maintain NWB status.  Skilled OT services are required to maximize safety/IND prior to DC  Prognosis: Excellent  Barriers to Discharge: Decreased caregiver support  Evaluation/Treatment Tolerance: Patient tolerated treatment well  Medical Staff Made Aware: Yes  End of Session Communication: Bedside nurse  End of Session Patient Position: Bed, 2 rail up  Plan:  Treatment Interventions: ADL retraining, Functional transfer training, UE strengthening/ROM, Endurance training, Cognitive reorientation, Patient/family training, Equipment evaluation/education, Compensatory technique education  OT Frequency: 3 times per week  OT Discharge Recommendations: High intensity level of continued care  OT Recommended Transfer Status: Maximum assist, Assist of 2  OT - OK to Discharge: Yes    Subjective   Current Problem:  1. Gas gangrene of foot (CMS/HCC)  Case Request Operating Room: Incision and Drainage Lower Extremity    Case Request Operating Room: Incision and Drainage Lower Extremity    Surgical Pathology Exam    Surgical Pathology Exam    Tissue/Wound Culture/Smear    Tissue/Wound Culture/Smear    Case Request Operating Room: Foot Transmetatarsal Amputation    Case Request Operating Room: Foot Transmetatarsal Amputation        General:  General  Reason for Referral: Decreased ADLs  Referred By: Dr. Ivey  Past Medical History Relevant to Rehab: HTN, CHF, hyperlipidemia, DM  Family/Caregiver Present: No  Prior to Session Communication: Bedside nurse  Patient  Position Received: Bed, 3 rail up  Preferred Learning Style: verbal  General Comment: Cleared by nsg, pt met in supine, agreeable to therapy assessment  Precautions:  LE Weight Bearing Status: Right Non-Weight Bearing  Medical Precautions: Fall precautions    Pain:  Pain Assessment  Pain Assessment: 0-10  Pain Score: 4  Pain Type: Surgical pain  Pain Location: Foot  Pain Orientation: Right    Objective   Cognition:  Overall Cognitive Status: Impaired (decreased insight, safety awareness)  Orientation Level: Oriented X4    Home Living:  Type of Home: Apartment  Lives With: Alone  Home Adaptive Equipment: Walker rolling or standard  Home Layout: One level  Home Access: Level entry  Home Living Comments: 1 level apt, has siblings who will assist intermittently   Prior Function:  Level of [place]: Independent with ADLs and functional transfers, Independent with homemaking with ambulation  Receives Help From: Family  ADL Assistance: Independent  Homemaking Assistance: Independent  Ambulatory Assistance: Independent  Vocational: On disability  Prior Function Comments: denies falls. IND PTA, was driving. no community services    ADL:  Eating Assistance: Stand by  Eating Deficit: Setup  Grooming Assistance: Stand by  Grooming Deficit: Setup  Bathing Assistance: Maximal  Bathing Deficit:  (anticipated)  UE Dressing Assistance: Minimal  LE Dressing Assistance: Maximal  LE Dressing Deficit:  (anticipated)  Toileting Assistance with Device: Total  Toileting Deficit:  (anticipated)  ADL Comments: poor adherence to NWB, assist x2 in standing phase of all ADLs anticipated at this time    Activity Tolerance:  Endurance: Decreased tolerance for upright activites  Activity Tolerance Comments: d/t post op pain and decline in function    Bed Mobility/Transfers:   Bed Mobility  Bed Mobility: Yes  Bed Mobility 1  Bed Mobility 1: Supine to sitting  Level of Assistance 1: Close supervision  Bed Mobility Comments 1: HOB elevated,  increased time/effort required  Bed Mobility 2  Bed Mobility  2: Sitting to supine  Level of Assistance 2: Close supervision  Bed Mobility Comments 2: for safety    Transfers  Transfer: Yes  Transfer 1  Transfer From 1: Bed to  Transfer to 1: Stand  Technique 1: Sit to stand  Transfer Device 1: Walker  Transfer Level of Assistance 1: Moderate assistance, +2  Trials/Comments 1: mod A x2 for STS from EOB, highly effortful completion. poor adherence to NWB.  Unable to reposition LE to maintain WB restriction despite cues and instruction on proper technique. unsafe to attempt additional mobility    Sitting Balance:  Static Sitting Balance  Static Sitting-Balance Support: Bilateral upper extremity supported, Feet supported  Static Sitting-Level of Assistance: Close supervision    Vision: Vision - Basic Assessment  Current Vision: Wears glasses all the time  Sensation:  Light Touch: Partial deficits in the LLE, Partial deficits in the RLE (chronic neuropathy)  Strength:  Strength Comments: 4-/5 BUEs  Hand Function:  Hand Function  Gross Grasp: Functional  Coordination: Functional  Extremities: RUE   RUE : Within Functional Limits and LUE   LUE: Within Functional Limits    Outcome Measures: [facility] Daily Activity  Putting on and taking off regular lower body clothing: Total  Bathing (including washing, rinsing, drying): A lot  Putting on and taking off regular upper body clothing: A little  Toileting, which includes using toilet, bedpan or urinal: Total  Taking care of personal grooming such as brushing teeth: A little  Eating Meals: A little  Daily Activity - Total Score: 13      Education Documentation  Body Mechanics, taught by Miquel Fink OT at 1/25/2024  3:18 PM.  Learner: Patient  Readiness: Acceptance  Method: Explanation  Response: Needs Reinforcement    Precautions, taught by Miquel Fink OT at 1/25/2024  3:18 PM.  Learner: Patient  Readiness: Acceptance  Method: Explanation  Response: Needs  Reinforcement    Education Comments  No comments found.      Goals:   Encounter Problems       Encounter Problems (Active)       OT Goals       ADLs (Progressing)       Start:  01/25/24    Expected End:  02/01/24       Patient will perform ADLs with Min A using AE/compensatory techniques as needed.          Functional Transfers (Progressing)       Start:  01/25/24    Expected End:  02/01/24       Patient will complete bed, chair, toilet xfer with MIN A using LRD.          Precautions (Progressing)       Start:  01/25/24    Expected End:  02/01/24       Patient will independently verbalize post op precautions in preparation for ADLs.

## 2024-01-26 ENCOUNTER — ANESTHESIA (OUTPATIENT)
Dept: OPERATING ROOM | Facility: HOSPITAL | Age: 64
DRG: 603 | End: 2024-01-26
Payer: MEDICARE

## 2024-01-26 ENCOUNTER — APPOINTMENT (OUTPATIENT)
Dept: RADIOLOGY | Facility: HOSPITAL | Age: 64
DRG: 603 | End: 2024-01-26
Payer: MEDICARE

## 2024-01-26 ENCOUNTER — ANESTHESIA EVENT (OUTPATIENT)
Dept: OPERATING ROOM | Facility: HOSPITAL | Age: 64
DRG: 603 | End: 2024-01-26
Payer: MEDICARE

## 2024-01-26 LAB
ALBUMIN SERPL-MCNC: 2.2 G/DL (ref 3.5–5)
ALP BLD-CCNC: 159 U/L (ref 35–125)
ALT SERPL-CCNC: 90 U/L (ref 5–40)
ANION GAP SERPL CALC-SCNC: 9 MMOL/L
AST SERPL-CCNC: 97 U/L (ref 5–40)
B-LACTAMASE ORGANISM ISLT: POSITIVE
BACTERIA SPEC CULT: ABNORMAL
BACTERIA SPEC CULT: NORMAL
BASOPHILS # BLD MANUAL: 0 X10*3/UL (ref 0–0.1)
BASOPHILS NFR BLD MANUAL: 0 %
BILIRUB SERPL-MCNC: 0.3 MG/DL (ref 0.1–1.2)
BUN SERPL-MCNC: 46 MG/DL (ref 8–25)
CALCIUM SERPL-MCNC: 8.1 MG/DL (ref 8.5–10.4)
CHLORIDE SERPL-SCNC: 109 MMOL/L (ref 97–107)
CK SERPL-CCNC: 122 U/L (ref 24–195)
CO2 SERPL-SCNC: 18 MMOL/L (ref 24–31)
CREAT SERPL-MCNC: 1.3 MG/DL (ref 0.4–1.6)
EGFRCR SERPLBLD CKD-EPI 2021: 63 ML/MIN/1.73M*2
EOSINOPHIL # BLD MANUAL: 0.17 X10*3/UL (ref 0–0.7)
EOSINOPHIL NFR BLD MANUAL: 1 %
ERYTHROCYTE [DISTWIDTH] IN BLOOD BY AUTOMATED COUNT: 12.8 % (ref 11.5–14.5)
GLUCOSE BLD MANUAL STRIP-MCNC: 120 MG/DL (ref 74–99)
GLUCOSE BLD MANUAL STRIP-MCNC: 167 MG/DL (ref 74–99)
GLUCOSE BLD MANUAL STRIP-MCNC: 90 MG/DL (ref 74–99)
GLUCOSE SERPL-MCNC: 107 MG/DL (ref 65–99)
GRAM STN SPEC: ABNORMAL
GRAM STN SPEC: ABNORMAL
GRAM STN SPEC: NORMAL
GRAM STN SPEC: NORMAL
HCT VFR BLD AUTO: 23.3 % (ref 41–52)
HCT VFR BLD AUTO: 24.1 % (ref 41–52)
HGB BLD-MCNC: 7.9 G/DL (ref 13.5–17.5)
HGB BLD-MCNC: 8.3 G/DL (ref 13.5–17.5)
IMM GRANULOCYTES # BLD AUTO: 1.57 X10*3/UL (ref 0–0.7)
IMM GRANULOCYTES NFR BLD AUTO: 9.5 % (ref 0–0.9)
LABORATORY COMMENT REPORT: NORMAL
LYMPHOCYTES # BLD MANUAL: 2.64 X10*3/UL (ref 1.2–4.8)
LYMPHOCYTES NFR BLD MANUAL: 16 %
MCH RBC QN AUTO: 30.3 PG (ref 26–34)
MCHC RBC AUTO-ENTMCNC: 33.9 G/DL (ref 32–36)
MCV RBC AUTO: 89 FL (ref 80–100)
METAMYELOCYTES # BLD MANUAL: 0.83 X10*3/UL
METAMYELOCYTES NFR BLD MANUAL: 5 %
MONOCYTES # BLD MANUAL: 0.5 X10*3/UL (ref 0.1–1)
MONOCYTES NFR BLD MANUAL: 3 %
NEUTROPHILS # BLD MANUAL: 12.38 X10*3/UL (ref 1.2–7.7)
NEUTS BAND # BLD MANUAL: 1.32 X10*3/UL (ref 0–0.7)
NEUTS BAND NFR BLD MANUAL: 8 %
NEUTS SEG # BLD MANUAL: 11.06 X10*3/UL (ref 1.2–7)
NEUTS SEG NFR BLD MANUAL: 67 %
NRBC BLD-RTO: 0.1 /100 WBCS (ref 0–0)
PATH REPORT.FINAL DX SPEC: NORMAL
PATH REPORT.GROSS SPEC: NORMAL
PATH REPORT.RELEVANT HX SPEC: NORMAL
PATH REPORT.TOTAL CANCER: NORMAL
PLATELET # BLD AUTO: 236 X10*3/UL (ref 150–450)
POTASSIUM SERPL-SCNC: 3.7 MMOL/L (ref 3.4–5.1)
PROT SERPL-MCNC: 5.8 G/DL (ref 5.9–7.9)
RBC # BLD AUTO: 2.61 X10*6/UL (ref 4.5–5.9)
RBC MORPH BLD: ABNORMAL
SODIUM SERPL-SCNC: 136 MMOL/L (ref 133–145)
TOTAL CELLS COUNTED BLD: 100
VANCOMYCIN SERPL-MCNC: 12.6 UG/ML (ref 10–20)
WBC # BLD AUTO: 16.5 X10*3/UL (ref 4.4–11.3)

## 2024-01-26 PROCEDURE — 36415 COLL VENOUS BLD VENIPUNCTURE: CPT

## 2024-01-26 PROCEDURE — 2720000007 HC OR 272 NO HCPCS: Performed by: PODIATRIST

## 2024-01-26 PROCEDURE — 87081 CULTURE SCREEN ONLY: CPT | Mod: TRILAB | Performed by: INTERNAL MEDICINE

## 2024-01-26 PROCEDURE — 2500000001 HC RX 250 WO HCPCS SELF ADMINISTERED DRUGS (ALT 637 FOR MEDICARE OP): Performed by: INTERNAL MEDICINE

## 2024-01-26 PROCEDURE — 3600000008 HC OR TIME - EACH INCREMENTAL 1 MINUTE - PROCEDURE LEVEL THREE: Performed by: PODIATRIST

## 2024-01-26 PROCEDURE — 82947 ASSAY GLUCOSE BLOOD QUANT: CPT

## 2024-01-26 PROCEDURE — 2500000005 HC RX 250 GENERAL PHARMACY W/O HCPCS: Performed by: PODIATRIST

## 2024-01-26 PROCEDURE — 3700000002 HC GENERAL ANESTHESIA TIME - EACH INCREMENTAL 1 MINUTE: Performed by: PODIATRIST

## 2024-01-26 PROCEDURE — 80202 ASSAY OF VANCOMYCIN: CPT | Performed by: INTERNAL MEDICINE

## 2024-01-26 PROCEDURE — 85007 BL SMEAR W/DIFF WBC COUNT: CPT | Performed by: INTERNAL MEDICINE

## 2024-01-26 PROCEDURE — 0753T DGTZ GLS MCRSCP SLD LEVEL IV: CPT | Mod: TC | Performed by: INTERNAL MEDICINE

## 2024-01-26 PROCEDURE — 87102 FUNGUS ISOLATION CULTURE: CPT | Mod: TRILAB,WESLAB | Performed by: PODIATRIST

## 2024-01-26 PROCEDURE — 6360000003 HC OR 636 NO HCPCS: Performed by: PODIATRIST

## 2024-01-26 PROCEDURE — 7100000002 HC RECOVERY ROOM TIME - EACH INCREMENTAL 1 MINUTE: Performed by: PODIATRIST

## 2024-01-26 PROCEDURE — 99231 SBSQ HOSP IP/OBS SF/LOW 25: CPT | Performed by: NURSE PRACTITIONER

## 2024-01-26 PROCEDURE — C9363 INTEGRA MESHED BIL WOUND MAT: HCPCS | Performed by: PODIATRIST

## 2024-01-26 PROCEDURE — 2780000003 HC OR 278 NO HCPCS

## 2024-01-26 PROCEDURE — 88305 TISSUE EXAM BY PATHOLOGIST: CPT | Performed by: PATHOLOGY

## 2024-01-26 PROCEDURE — 7100000001 HC RECOVERY ROOM TIME - INITIAL BASE CHARGE: Performed by: PODIATRIST

## 2024-01-26 PROCEDURE — 2500000002 HC RX 250 W HCPCS SELF ADMINISTERED DRUGS (ALT 637 FOR MEDICARE OP, ALT 636 FOR OP/ED): Performed by: INTERNAL MEDICINE

## 2024-01-26 PROCEDURE — 2780000003 HC OR 278 NO HCPCS: Performed by: PODIATRIST

## 2024-01-26 PROCEDURE — 2500000005 HC RX 250 GENERAL PHARMACY W/O HCPCS: Performed by: NURSE ANESTHETIST, CERTIFIED REGISTERED

## 2024-01-26 PROCEDURE — 3700000001 HC GENERAL ANESTHESIA TIME - INITIAL BASE CHARGE: Performed by: PODIATRIST

## 2024-01-26 PROCEDURE — 2500000004 HC RX 250 GENERAL PHARMACY W/ HCPCS (ALT 636 FOR OP/ED): Performed by: INTERNAL MEDICINE

## 2024-01-26 PROCEDURE — 73630 X-RAY EXAM OF FOOT: CPT | Mod: RT

## 2024-01-26 PROCEDURE — 1200000002 HC GENERAL ROOM WITH TELEMETRY DAILY

## 2024-01-26 PROCEDURE — 82550 ASSAY OF CK (CPK): CPT | Performed by: INTERNAL MEDICINE

## 2024-01-26 PROCEDURE — 2500000004 HC RX 250 GENERAL PHARMACY W/ HCPCS (ALT 636 FOR OP/ED): Performed by: NURSE PRACTITIONER

## 2024-01-26 PROCEDURE — 36415 COLL VENOUS BLD VENIPUNCTURE: CPT | Performed by: INTERNAL MEDICINE

## 2024-01-26 PROCEDURE — 3600000003 HC OR TIME - INITIAL BASE CHARGE - PROCEDURE LEVEL THREE: Performed by: PODIATRIST

## 2024-01-26 PROCEDURE — 84075 ASSAY ALKALINE PHOSPHATASE: CPT | Performed by: INTERNAL MEDICINE

## 2024-01-26 PROCEDURE — 87181 SC STD AGAR DILUTION PER AGT: CPT | Performed by: PODIATRIST

## 2024-01-26 PROCEDURE — 85014 HEMATOCRIT: CPT

## 2024-01-26 PROCEDURE — 85027 COMPLETE CBC AUTOMATED: CPT | Performed by: INTERNAL MEDICINE

## 2024-01-26 PROCEDURE — 2500000004 HC RX 250 GENERAL PHARMACY W/ HCPCS (ALT 636 FOR OP/ED): Performed by: NURSE ANESTHETIST, CERTIFIED REGISTERED

## 2024-01-26 PROCEDURE — 36569 INSJ PICC 5 YR+ W/O IMAGING: CPT

## 2024-01-26 PROCEDURE — C1751 CATH, INF, PER/CENT/MIDLINE: HCPCS

## 2024-01-26 DEVICE — MICROMATRIX® UBM STANDARD PARTICULATE 1000 MG
Type: IMPLANTABLE DEVICE | Site: FOOT | Status: FUNCTIONAL
Brand: MICROMATRIX®

## 2024-01-26 DEVICE — INTEGRA® MESHED BILAYER WOUND MATRIX 4 IN*10 IN (10 CM*25 CM)
Type: IMPLANTABLE DEVICE | Site: FOOT | Status: FUNCTIONAL
Brand: INTEGRA®

## 2024-01-26 RX ORDER — LIDOCAINE HYDROCHLORIDE 10 MG/ML
5 INJECTION INFILTRATION; PERINEURAL ONCE
Status: DISCONTINUED | OUTPATIENT
Start: 2024-01-26 | End: 2024-02-01 | Stop reason: HOSPADM

## 2024-01-26 RX ORDER — BUPIVACAINE HYDROCHLORIDE 5 MG/ML
INJECTION, SOLUTION PERINEURAL AS NEEDED
Status: DISCONTINUED | OUTPATIENT
Start: 2024-01-26 | End: 2024-01-26 | Stop reason: HOSPADM

## 2024-01-26 RX ORDER — SODIUM CHLORIDE, SODIUM LACTATE, POTASSIUM CHLORIDE, CALCIUM CHLORIDE 600; 310; 30; 20 MG/100ML; MG/100ML; MG/100ML; MG/100ML
100 INJECTION, SOLUTION INTRAVENOUS CONTINUOUS
Status: DISCONTINUED | OUTPATIENT
Start: 2024-01-26 | End: 2024-01-26 | Stop reason: HOSPADM

## 2024-01-26 RX ORDER — MIDAZOLAM HYDROCHLORIDE 1 MG/ML
1 INJECTION, SOLUTION INTRAMUSCULAR; INTRAVENOUS ONCE AS NEEDED
Status: DISCONTINUED | OUTPATIENT
Start: 2024-01-26 | End: 2024-01-26 | Stop reason: HOSPADM

## 2024-01-26 RX ORDER — LIDOCAINE HYDROCHLORIDE 10 MG/ML
0.1 INJECTION INFILTRATION; PERINEURAL ONCE
Status: DISCONTINUED | OUTPATIENT
Start: 2024-01-26 | End: 2024-01-26 | Stop reason: HOSPADM

## 2024-01-26 RX ORDER — ONDANSETRON HYDROCHLORIDE 2 MG/ML
4 INJECTION, SOLUTION INTRAVENOUS ONCE AS NEEDED
Status: DISCONTINUED | OUTPATIENT
Start: 2024-01-26 | End: 2024-01-26 | Stop reason: HOSPADM

## 2024-01-26 RX ORDER — FENTANYL CITRATE 50 UG/ML
INJECTION, SOLUTION INTRAMUSCULAR; INTRAVENOUS AS NEEDED
Status: DISCONTINUED | OUTPATIENT
Start: 2024-01-26 | End: 2024-01-26

## 2024-01-26 RX ORDER — SODIUM CHLORIDE 9 MG/ML
INJECTION, SOLUTION INTRAVENOUS CONTINUOUS PRN
Status: DISCONTINUED | OUTPATIENT
Start: 2024-01-26 | End: 2024-01-26

## 2024-01-26 RX ORDER — MIDAZOLAM HYDROCHLORIDE 1 MG/ML
INJECTION, SOLUTION INTRAMUSCULAR; INTRAVENOUS AS NEEDED
Status: DISCONTINUED | OUTPATIENT
Start: 2024-01-26 | End: 2024-01-26

## 2024-01-26 RX ORDER — MEPERIDINE HYDROCHLORIDE 25 MG/ML
12.5 INJECTION INTRAMUSCULAR; INTRAVENOUS; SUBCUTANEOUS EVERY 10 MIN PRN
Status: DISCONTINUED | OUTPATIENT
Start: 2024-01-26 | End: 2024-01-26 | Stop reason: HOSPADM

## 2024-01-26 RX ORDER — VANCOMYCIN 1.25 G/250ML
1750 INJECTION, SOLUTION INTRAVENOUS EVERY 24 HOURS
Status: DISCONTINUED | OUTPATIENT
Start: 2024-01-26 | End: 2024-02-01

## 2024-01-26 RX ORDER — ALBUTEROL SULFATE 0.83 MG/ML
2.5 SOLUTION RESPIRATORY (INHALATION) ONCE
Status: DISCONTINUED | OUTPATIENT
Start: 2024-01-26 | End: 2024-01-26 | Stop reason: HOSPADM

## 2024-01-26 RX ORDER — SODIUM CHLORIDE, SODIUM LACTATE, POTASSIUM CHLORIDE, CALCIUM CHLORIDE 600; 310; 30; 20 MG/100ML; MG/100ML; MG/100ML; MG/100ML
INJECTION, SOLUTION INTRAVENOUS CONTINUOUS PRN
Status: DISCONTINUED | OUTPATIENT
Start: 2024-01-26 | End: 2024-01-26

## 2024-01-26 RX ORDER — PROPOFOL 10 MG/ML
INJECTION, EMULSION INTRAVENOUS AS NEEDED
Status: DISCONTINUED | OUTPATIENT
Start: 2024-01-26 | End: 2024-01-26

## 2024-01-26 RX ORDER — FENTANYL CITRATE 50 UG/ML
50 INJECTION, SOLUTION INTRAMUSCULAR; INTRAVENOUS EVERY 5 MIN PRN
Status: DISCONTINUED | OUTPATIENT
Start: 2024-01-26 | End: 2024-01-26 | Stop reason: HOSPADM

## 2024-01-26 RX ORDER — KETAMINE HYDROCHLORIDE 10 MG/ML
INJECTION, SOLUTION INTRAMUSCULAR; INTRAVENOUS AS NEEDED
Status: DISCONTINUED | OUTPATIENT
Start: 2024-01-26 | End: 2024-01-26

## 2024-01-26 RX ORDER — PROPOFOL 10 MG/ML
INJECTION, EMULSION INTRAVENOUS CONTINUOUS PRN
Status: DISCONTINUED | OUTPATIENT
Start: 2024-01-26 | End: 2024-01-26

## 2024-01-26 RX ORDER — LIDOCAINE HYDROCHLORIDE 10 MG/ML
INJECTION INFILTRATION; PERINEURAL AS NEEDED
Status: DISCONTINUED | OUTPATIENT
Start: 2024-01-26 | End: 2024-01-26

## 2024-01-26 RX ADMIN — KETAMINE HYDROCHLORIDE 5 MG: 10 INJECTION, SOLUTION INTRAMUSCULAR; INTRAVENOUS at 13:31

## 2024-01-26 RX ADMIN — KETAMINE HYDROCHLORIDE 15 MG: 10 INJECTION, SOLUTION INTRAMUSCULAR; INTRAVENOUS at 12:44

## 2024-01-26 RX ADMIN — KETAMINE HYDROCHLORIDE 5 MG: 10 INJECTION, SOLUTION INTRAMUSCULAR; INTRAVENOUS at 13:17

## 2024-01-26 RX ADMIN — SODIUM CHLORIDE: 9 INJECTION, SOLUTION INTRAVENOUS at 12:24

## 2024-01-26 RX ADMIN — MIDAZOLAM HYDROCHLORIDE 2 MG: 1 INJECTION, SOLUTION INTRAMUSCULAR; INTRAVENOUS at 12:29

## 2024-01-26 RX ADMIN — FENTANYL CITRATE 50 MCG: 0.05 INJECTION, SOLUTION INTRAMUSCULAR; INTRAVENOUS at 12:42

## 2024-01-26 RX ADMIN — SODIUM CHLORIDE, POTASSIUM CHLORIDE, SODIUM LACTATE AND CALCIUM CHLORIDE: 600; 310; 30; 20 INJECTION, SOLUTION INTRAVENOUS at 12:37

## 2024-01-26 RX ADMIN — KETAMINE HYDROCHLORIDE 5 MG: 10 INJECTION, SOLUTION INTRAMUSCULAR; INTRAVENOUS at 13:05

## 2024-01-26 RX ADMIN — MIDAZOLAM HYDROCHLORIDE 1 MG: 1 INJECTION, SOLUTION INTRAMUSCULAR; INTRAVENOUS at 13:12

## 2024-01-26 RX ADMIN — LIDOCAINE HYDROCHLORIDE 5 ML: 10 INJECTION, SOLUTION INFILTRATION; PERINEURAL at 12:29

## 2024-01-26 RX ADMIN — VANCOMYCIN 1750 MG: 1.25 INJECTION, SOLUTION INTRAVENOUS at 22:37

## 2024-01-26 RX ADMIN — INSULIN LISPRO 2 UNITS: 100 INJECTION, SOLUTION INTRAVENOUS; SUBCUTANEOUS at 20:58

## 2024-01-26 RX ADMIN — HEPARIN SODIUM 5000 UNITS: 5000 INJECTION, SOLUTION INTRAVENOUS; SUBCUTANEOUS at 21:00

## 2024-01-26 RX ADMIN — ACETAMINOPHEN 650 MG: 325 TABLET ORAL at 20:58

## 2024-01-26 RX ADMIN — KETAMINE HYDROCHLORIDE 5 MG: 10 INJECTION, SOLUTION INTRAMUSCULAR; INTRAVENOUS at 13:12

## 2024-01-26 RX ADMIN — IRON SUCROSE 200 MG: 20 INJECTION, SOLUTION INTRAVENOUS at 05:12

## 2024-01-26 RX ADMIN — KETAMINE HYDROCHLORIDE 5 MG: 10 INJECTION, SOLUTION INTRAMUSCULAR; INTRAVENOUS at 13:23

## 2024-01-26 RX ADMIN — PROPOFOL 30 MG: 10 INJECTION, EMULSION INTRAVENOUS at 12:43

## 2024-01-26 RX ADMIN — HEPARIN SODIUM 5000 UNITS: 5000 INJECTION, SOLUTION INTRAVENOUS; SUBCUTANEOUS at 05:12

## 2024-01-26 RX ADMIN — PROPOFOL 70 MG: 10 INJECTION, EMULSION INTRAVENOUS at 12:29

## 2024-01-26 RX ADMIN — OLANZAPINE 20 MG: 10 TABLET, FILM COATED ORAL at 20:58

## 2024-01-26 RX ADMIN — HEPARIN SODIUM 5000 UNITS: 5000 INJECTION, SOLUTION INTRAVENOUS; SUBCUTANEOUS at 14:42

## 2024-01-26 RX ADMIN — MEROPENEM 1 G: 1 INJECTION, POWDER, FOR SOLUTION INTRAVENOUS at 12:03

## 2024-01-26 RX ADMIN — FENTANYL CITRATE 25 MCG: 0.05 INJECTION, SOLUTION INTRAMUSCULAR; INTRAVENOUS at 12:50

## 2024-01-26 RX ADMIN — Medication 3 MG: at 20:58

## 2024-01-26 RX ADMIN — KETAMINE HYDROCHLORIDE 10 MG: 10 INJECTION, SOLUTION INTRAMUSCULAR; INTRAVENOUS at 12:57

## 2024-01-26 RX ADMIN — SENNOSIDES AND DOCUSATE SODIUM 1 TABLET: 8.6; 5 TABLET ORAL at 20:58

## 2024-01-26 RX ADMIN — MIDAZOLAM HYDROCHLORIDE 1 MG: 1 INJECTION, SOLUTION INTRAMUSCULAR; INTRAVENOUS at 13:25

## 2024-01-26 RX ADMIN — FENTANYL CITRATE 25 MCG: 0.05 INJECTION, SOLUTION INTRAMUSCULAR; INTRAVENOUS at 12:47

## 2024-01-26 RX ADMIN — PROPOFOL 75 MCG/KG/MIN: 10 INJECTION, EMULSION INTRAVENOUS at 12:29

## 2024-01-26 RX ADMIN — CARVEDILOL 12.5 MG: 12.5 TABLET, FILM COATED ORAL at 16:50

## 2024-01-26 ASSESSMENT — COGNITIVE AND FUNCTIONAL STATUS - GENERAL
MOVING TO AND FROM BED TO CHAIR: A LOT
CLIMB 3 TO 5 STEPS WITH RAILING: TOTAL
WALKING IN HOSPITAL ROOM: A LOT
STANDING UP FROM CHAIR USING ARMS: A LOT
HELP NEEDED FOR BATHING: A LITTLE
DAILY ACTIVITIY SCORE: 18
PERSONAL GROOMING: A LITTLE
DRESSING REGULAR LOWER BODY CLOTHING: A LOT
DRESSING REGULAR UPPER BODY CLOTHING: A LITTLE
TURNING FROM BACK TO SIDE WHILE IN FLAT BAD: A LITTLE
MOVING FROM LYING ON BACK TO SITTING ON SIDE OF FLAT BED WITH BEDRAILS: A LITTLE
TOILETING: A LITTLE
MOBILITY SCORE: 13

## 2024-01-26 ASSESSMENT — PAIN - FUNCTIONAL ASSESSMENT
PAIN_FUNCTIONAL_ASSESSMENT: 0-10

## 2024-01-26 ASSESSMENT — PAIN SCALES - GENERAL
PAINLEVEL_OUTOF10: 0 - NO PAIN
PAINLEVEL_OUTOF10: 4
PAINLEVEL_OUTOF10: 1
PAINLEVEL_OUTOF10: 0 - NO PAIN

## 2024-01-26 ASSESSMENT — PAIN SCALES - WONG BAKER: WONGBAKER_NUMERICALRESPONSE: NO HURT

## 2024-01-26 ASSESSMENT — PAIN DESCRIPTION - LOCATION: LOCATION: FOOT

## 2024-01-26 ASSESSMENT — PAIN DESCRIPTION - ORIENTATION: ORIENTATION: RIGHT

## 2024-01-26 NOTE — NURSING NOTE
Pt back in his room from surgery, alertand orientedx3, wound vac attached with pt rt foot, call light within reach.

## 2024-01-26 NOTE — PROGRESS NOTES
Patient was seen for acute kidney injury admitted with gangrenous and infected right foot his renal function had improved significantly as a matter fact his creatinine down to 1.3 mg/dL today I will sign off please call if needed

## 2024-01-26 NOTE — PROGRESS NOTES
"Physical Therapy                 Therapy Communication Note    Patient Name: Cladue Coley  MRN: [id number]  Today's Date: 1/26/2024     Discipline: Physical Therapy    Missed Visit Reason: Missed Visit Reason: Patient refused (Pt reports \"I'm going for surgery in about an hour\" Pt declined PT visit at this time.)    Missed Time: Attempt    Comment:  "

## 2024-01-26 NOTE — CARE PLAN
The patient's goals for the shift include  comfort    The clinical goals for the shift include pain control

## 2024-01-26 NOTE — INTERVAL H&P NOTE
H&P reviewed. Since this H&P, patient underwent emergent I&D with fifth toe amputation of right foot for gas gangrene and has been recovering well. The patient was examined and there are no other changes to the H&P.

## 2024-01-26 NOTE — PROGRESS NOTES
Claude Coley is a 63 y.o. male on day 3 of admission presenting with Gas gangrene of foot (CMS/HCC).    Subjective   Interval History:   Discussed with nursing  Patient off unit in surgery-when rounding  Afebrile     Reviewed chart  Reviewed culture    Objective   Range of Vitals (last 24 hours)  Heart Rate:  [64-71]   Temp:  [36.1 °C (97 °F)-36.8 °C (98.2 °F)]   Resp:  [16-24]   BP: (121-142)/(43-64)   SpO2:  [94 %-98 %]   Daily Weight  01/23/24 : 114 kg (252 lb 6.4 oz)    Body mass index is 32.39 kg/m².    Physical Exam    Antibiotics  atorvastatin (Lipitor) tablet  aspirin EC tablet 81 mg  atorvastatin (Lipitor) tablet 40 mg  carvedilol (Coreg) tablet 12.5 mg  finasteride (Proscar) tablet 5 mg  OLANZapine (ZyPREXA) tablet 20 mg  pantoprazole (ProtoNix) EC tablet 40 mg  pantoprazole (ProtoNix) injection 40 mg  acetaminophen (Tylenol) tablet 650 mg  acetaminophen (Tylenol) oral liquid 650 mg  acetaminophen (Tylenol) suppository 650 mg  melatonin tablet 3 mg  polyethylene glycol (Glycolax, Miralax) packet 17 g  sodium chloride 0.9% infusion  lactated Ringer's bolus 500 mL  meropenem-vaborbactam (Vabomere) 1 g in sodium chloride 0.9% 250 mL IV  dextrose 50 % injection 25 g  glucagon (Glucagen) injection 1 mg  dextrose 10 % in water (D10W) infusion  insulin lispro (HumaLOG) injection 0-10 Units  heparin (porcine) injection 5,000 Units  lactated Ringer's bolus 500 mL  meropenem (Merrem) in sodium chloride 0.9 % 100 mL IV 1 g  bupivacaine PF (Marcaine) injection  - Omnicell Override Pull  lidocaine (Xylocaine) 10 mg/mL (1 %) injection 1 mg  oxygen (O2) therapy  lactated Ringer's infusion  ondansetron (Zofran) injection 4 mg  hydrALAZINE (Apresoline) injection 5 mg  albuterol 2.5 mg /3 mL (0.083 %) nebulizer solution 2.5 mg  HYDROmorphone PF (Dilaudid) injection 0.2 mg  HYDROmorphone (Dilaudid) injection 0.4 mg  BUPivacaine HCl (Marcaine) 0.5 % (5 mg/mL) injection  lidocaine (Xylocaine) 10 mg/mL (1 %) injection 1  "mg  oxygen (O2) therapy  lactated Ringer's infusion  ondansetron (Zofran) injection 4 mg  HYDROmorphone PF (Dilaudid) injection 0.2 mg  HYDROmorphone (Dilaudid) injection 0.4 mg  vancomycin-diluent combo no.1 (Xellia) IVPB 1,750 mg  iron sucrose (Venofer) injection 200 mg  cyanocobalamin (Vitamin B-12) injection 1,000 mcg  lidocaine (Xylocaine) 10 mg/mL (1 %) injection 50 mg  alteplase (Cathflo Activase) injection 2 mg  polyethylene glycol (Glycolax, Miralax) packet 17 g  sennosides-docusate sodium (Nieves-Colace) 8.6-50 mg per tablet 1 tablet  insulin lispro (HumaLOG) injection 0-10 Units      Relevant Results  Labs  Results from last 72 hours   Lab Units 01/26/24  0512 01/25/24  0511 01/24/24  0454 01/23/24  1416   WBC AUTO x10*3/uL 16.5* 11.4* 12.0* 16.6*   HEMOGLOBIN g/dL 7.9* 7.7* 9.2* 9.7*   HEMATOCRIT % 23.3* 22.8* 26.7* 28.1*   PLATELETS AUTO x10*3/uL 236 189 185 202   NEUTROS PCT AUTO %  --   --  83.5 88.1   LYMPHO PCT MAN % 16.0 9.0  --   --    LYMPHS PCT AUTO %  --   --  5.2 3.8   MONO PCT MAN % 3.0 8.0  --   --    MONOS PCT AUTO %  --   --  8.3 6.9   EOSINO PCT MAN % 1.0 1.0  --   --    EOS PCT AUTO %  --   --  0.2 0.1       Results from last 72 hours   Lab Units 01/26/24  0512 01/25/24  0511 01/24/24  0455   SODIUM mmol/L 136 136 132*   POTASSIUM mmol/L 3.7 3.5 3.7   CHLORIDE mmol/L 109* 107 102   CO2 mmol/L 18* 17* 16*   BUN mg/dL 46* 61* 70*   CREATININE mg/dL 1.30 1.60 1.70*   GLUCOSE mg/dL 107* 98 93   CALCIUM mg/dL 8.1* 8.1* 8.7   ANION GAP mmol/L 9 12 14   EGFR mL/min/1.73m*2 63 49* 45*       Results from last 72 hours   Lab Units 01/26/24  0512 01/25/24  0511 01/24/24  0455   ALK PHOS U/L 159* 127* 112   BILIRUBIN TOTAL mg/dL 0.3 0.3 0.3   PROTEIN TOTAL g/dL 5.8* 5.6* 6.2   ALT U/L 90* 43* 30   AST U/L 97* 60* 41*   ALBUMIN g/dL 2.2* 2.2* 2.6*       Estimated Creatinine Clearance: 80.1 mL/min (by C-G formula based on SCr of 1.3 mg/dL).  No results found for: \"CRP\"  Microbiology  Susceptibility data " from last 14 days.  Collected Specimen Info Organism   01/23/24 Tissue/Biopsy from Wound/Tissue Mixed Gram-Positive and Gram-Negative Bacteria         Imaging  ECG 12 lead    Result Date: 1/24/2024  Normal sinus rhythm Possible Septal infarct , age undetermined Abnormal ECG No previous ECGs available Confirmed by Bobo Thornton ([id number]) on 1/24/2024 2:03:37 PM    US renal complete    Result Date: 1/24/2024  STUDY: Renal and Bladder Ultrasound; 1/23/2024 10:36 PM. INDICATION:  Acute renal failure.  COMPARISON:  None available. ACCESSION NUMBER(S): [id number] ORDERING CLINICIAN: GARCIA DUARTE TECHNIQUE: Ultrasound of the Kidneys and Bladder. Multiple images of the abdomen were obtained. FINDINGS: RIGHT KIDNEY: The right kidney measures 12.7 cm in length. Renal cortical echotexture is normal. There is no hydronephrosis. There are no stones. There is a cyst with a thick internal septation that measures 1.9 x 2.1 x 1.5 cm within the superior pole. LEFT KIDNEY: The left kidney measures 12.9 cm in length. Renal cortical echotexture is normal. There is no hydronephrosis. There are no stones. There are no cysts.  BLADDER: The urinary bladder is anechoic. The distended bladder shows no evidence of wall thickening. The right ureteral jet is visualized. The prostate gland measures 5.0 x 3.6 x 4.8 cm (44cc).       Septated right renal cyst consistent with a Bosniak class II lesion. No hydronephrosis bilaterally.  Follow-up ultrasound is recommended in 6 months. Enlarged prostate. Signed by Carlos Luke    XR tibia fibula right 2 views    Result Date: 1/23/2024  STUDY: Tibia and Fibula Radiographs; 1/23/2024 at 10:42 PM INDICATION: Soft tissue gas. COMPARISON: XR right foot 1/23/2024. ACCESSION NUMBER(S): [id number] ORDERING CLINICIAN: GARCIA DUARTE TECHNIQUE:  4 view(s) of the right tibia and fibula. FINDINGS:  There is no displaced fracture.  The alignment is anatomic. Superficial soft tissue edema is noted.   Peripheral vascular calcifications are present.  Soft tissue emphysema is seen along the dorsal aspect of the midfoot and anterior to the distal tibia.    Generalized soft tissue edema with soft tissue emphysema seen surrounding the ventral aspect of the ankle and dorsal aspect of the midfoot, concerning for soft tissue infection. Peripheral vascular disease. No acute osseous normality identified. Signed by Carlos Luke    XR foot right 3+ views    Result Date: 1/23/2024  Interpreted By:  Kirstie Moy, STUDY: XR FOOT RIGHT 3+ VIEWS 1/23/2024 2:03 pm   INDICATION: Swelling   COMPARISON: None available.   ACCESSION NUMBER(S): [id number]   ORDERING CLINICIAN: GARCIA DUARTE   TECHNIQUE: Three views of right foot   FINDINGS: There is marked soft tissue swelling of the right ankle and foot. There are multiple pockets of gas seen within the right forefoot laterally centered about the 5th MTP joint with this gas extending into the webspace between the 4th and 5th toes and also extending into the midfoot and hindfoot anteriorly.   However, no obvious bony destruction is observed to indicate osteomyelitis.       Soft tissue infection of the right foot centered along the lateral aspect of the forefoot with probable abscess formation and gas gangrene. However, no plain film evidence of osteomyelitis is observed.   Signed by: Kirstie Moy 1/23/2024 2:34 PM Dictation workstation:   [id number]           Assessment/Plan   Right diabetic foot ulcer-Hall 4  Right foot osteomyelitis/abscess  Type 2 diabetes with peripheral angiopathy with gangrene  Resolving acute kidney injury  Resolving leukocytosis     Continue vancomycin  Continue meropenem  Follow-up wound culture  Follow-up blood cultures  Glycemic control  Supportive care  Monitor temperature and WBC  Podiatry follow up-plan for irrigation and debridement 1/26  Follow up intraoperative findings  Further recommendations based on culture results    Bere Jacobo,  APRN-CNP

## 2024-01-26 NOTE — PROGRESS NOTES
"Claude Coley is a 63 y.o. male on day 3 of admission presenting with Gas gangrene of foot (CMS/[facility]).    Subjective   Seen and examined patient is scheduled to the OR today at approximately 12 noon rumor is for possible transmetatarsal amputation patient is unaware of this surgical plan and I have asked for clarification from podiatric surgery as renal function is clinically improved appreciate input from nephrology continue IV fluids ID notes reviewed PICC line is planned today he will need skilled facility with wound care and anticipated 4 to 6 weeks of IV antibiotics with discharge anticipated Monday or Tuesday pending on surgery performed today        Objective     Physical Exam  Vitals and nursing note reviewed.   Constitutional:       Appearance: Normal appearance.   HENT:      Head: Normocephalic and atraumatic.      Mouth/Throat:      Mouth: Mucous membranes are moist.   Eyes:      Extraocular Movements: Extraocular movements intact.      Pupils: Pupils are equal, round, and reactive to light.   Cardiovascular:      Rate and Rhythm: Normal rate and regular rhythm.      Pulses: Normal pulses.      Heart sounds: Normal heart sounds.   Pulmonary:      Effort: Pulmonary effort is normal.      Breath sounds: Normal breath sounds.   Abdominal:      General: Abdomen is flat.      Palpations: Abdomen is soft.   Musculoskeletal:         General: Normal range of motion.      Cervical back: Normal range of motion and neck supple.   Skin:     General: Skin is warm and dry.      Capillary Refill: Capillary refill takes less than 2 seconds.   Neurological:      General: No focal deficit present.      Mental Status: He is alert and oriented to person, place, and time. Mental status is at baseline.   Psychiatric:         Mood and Affect: Mood normal.         Last Recorded Vitals  Blood pressure (!) 121/48, pulse 68, temperature 36.8 °C (98.2 °F), temperature source Temporal, resp. rate 17, height 1.88 m (6' 2.02\"), " weight 114 kg (252 lb 6.4 oz), SpO2 95 %.  Intake/Output last 3 Shifts:  I/O last 3 completed shifts:  In: 3610 (31.5 mL/kg) [P.O.:1680; I.V.:1030 (9 mL/kg); IV Piggyback:900]  Out: 2175 (19 mL/kg) [Urine:2175 (0.5 mL/kg/hr)]  Weight: 114.5 kg     Relevant Results              Results for orders placed or performed during the hospital encounter of 01/23/24 (from the past 24 hour(s))   POCT GLUCOSE   Result Value Ref Range    POCT Glucose 178 (H) 74 - 99 mg/dL   POCT GLUCOSE   Result Value Ref Range    POCT Glucose 117 (H) 74 - 99 mg/dL   Vancomycin   Result Value Ref Range    Vancomycin 12.6 10.0 - 20.0 ug/mL   POCT GLUCOSE   Result Value Ref Range    POCT Glucose 143 (H) 74 - 99 mg/dL   CBC and Auto Differential   Result Value Ref Range    WBC 16.5 (H) 4.4 - 11.3 x10*3/uL    nRBC 0.1 (H) 0.0 - 0.0 /100 WBCs    RBC 2.61 (L) 4.50 - 5.90 x10*6/uL    Hemoglobin 7.9 (L) 13.5 - 17.5 g/dL    Hematocrit 23.3 (L) 41.0 - 52.0 %    MCV 89 80 - 100 fL    MCH 30.3 26.0 - 34.0 pg    MCHC 33.9 32.0 - 36.0 g/dL    RDW 12.8 11.5 - 14.5 %    Platelets 236 150 - 450 x10*3/uL    Immature Granulocytes %, Automated 9.5 (H) 0.0 - 0.9 %    Immature Granulocytes Absolute, Automated 1.57 (H) 0.00 - 0.70 x10*3/uL   Comprehensive Metabolic Panel   Result Value Ref Range    Glucose 107 (H) 65 - 99 mg/dL    Sodium 136 133 - 145 mmol/L    Potassium 3.7 3.4 - 5.1 mmol/L    Chloride 109 (H) 97 - 107 mmol/L    Bicarbonate 18 (L) 24 - 31 mmol/L    Urea Nitrogen 46 (H) 8 - 25 mg/dL    Creatinine 1.30 0.40 - 1.60 mg/dL    eGFR 63 >60 mL/min/1.73m*2    Calcium 8.1 (L) 8.5 - 10.4 mg/dL    Albumin 2.2 (L) 3.5 - 5.0 g/dL    Alkaline Phosphatase 159 (H) 35 - 125 U/L    Total Protein 5.8 (L) 5.9 - 7.9 g/dL    AST 97 (H) 5 - 40 U/L    Bilirubin, Total 0.3 0.1 - 1.2 mg/dL    ALT 90 (H) 5 - 40 U/L    Anion Gap 9 <=19 mmol/L   Creatine Kinase   Result Value Ref Range    Creatine Kinase 122 24 - 195 U/L   Manual Differential   Result Value Ref Range     Neutrophils %, Manual 67.0 40.0 - 80.0 %    Bands %, Manual 8.0 0.0 - 5.0 %    Lymphocytes %, Manual 16.0 13.0 - 44.0 %    Monocytes %, Manual 3.0 2.0 - 10.0 %    Eosinophils %, Manual 1.0 0.0 - 6.0 %    Basophils %, Manual 0.0 0.0 - 2.0 %    Metamyelocytes %, Manual 5.0 0.0 - 0.0 %    Seg Neutrophils Absolute, Manual 11.06 (H) 1.20 - 7.00 x10*3/uL    Bands Absolute, Manual 1.32 (H) 0.00 - 0.70 x10*3/uL    Lymphocytes Absolute, Manual 2.64 1.20 - 4.80 x10*3/uL    Monocytes Absolute, Manual 0.50 0.10 - 1.00 x10*3/uL    Eosinophils Absolute, Manual 0.17 0.00 - 0.70 x10*3/uL    Basophils Absolute, Manual 0.00 0.00 - 0.10 x10*3/uL    Metamyelocytes Absolute, Manual 0.83 0.00 - 0.00 x10*3/uL    Total Cells Counted 100     Neutrophils Absolute, Manual 12.38 (H) 1.20 - 7.70 x10*3/uL    RBC Morphology No significant RBC morphology present    POCT GLUCOSE   Result Value Ref Range    POCT Glucose 120 (H) 74 - 99 mg/dL     Scheduled medications  aspirin, 81 mg, oral, Daily  carvedilol, 12.5 mg, oral, BID with meals  finasteride, 5 mg, oral, Daily  heparin (porcine), 5,000 Units, subcutaneous, q8h  insulin lispro, 0-10 Units, subcutaneous, Before meals & nightly  iron sucrose, 200 mg, intravenous, Daily  lidocaine, 5 mL, infiltration, Once  melatonin, 3 mg, oral, Nightly  meropenem, 1 g, intravenous, q12h  OLANZapine, 20 mg, oral, Nightly  pantoprazole, 40 mg, oral, Daily  polyethylene glycol, 17 g, oral, Daily  sennosides-docusate sodium, 1 tablet, oral, BID      Continuous medications     PRN medications  PRN medications: acetaminophen **OR** [DISCONTINUED] acetaminophen **OR** [DISCONTINUED] acetaminophen, alteplase, dextrose 10 % in water (D10W), dextrose, glucagon    Susceptibility data from last 90 days.  Collected Specimen Info Organism   01/23/24 Tissue/Biopsy from Wound/Tissue Mixed Gram-Positive and Gram-Negative Bacteria                      Assessment/Plan   Principal Problem:    Gas gangrene of foot  (CMS/[facility])  Active Problems:    HTN (hypertension)    Hyperlipidemia    Diabetes mellitus, type 2 (CMS/[facility])    Plan for surgery today we will discuss with podiatric service planned surgery continue DVT prophylaxis IV antibiotics plan for PICC line discharge to skilled facility anticipated Monday Tuesday for ongoing wound care IV antibiotics and physical and Occupational Therapy       I spent 40 minutes in the professional and overall care of this patient.      Melvin Ivey, DO

## 2024-01-26 NOTE — NURSING NOTE
Reviewed all PICC education with patient. He confirmed understanding and stated he has no questions at this time. Consent obtained and R (dominant) sided veins assessed per renal. Pt was positioned for comfort and sterile field set up. 4 fr single lumen PICC placed to R basilic vein. Flushes easily with brisk blood return. Curos cap and purple wristband applied. Pt tolerated well. PICC literature and card in chart.

## 2024-01-26 NOTE — OP NOTE
PODIATRIC OPERATIVE REPORT    LOG ID: [id number]  SURGERY/PROCEDURE DATE: 1/26/2024  OR LOCATION: [facility]    SURGEON(S)/PROCEDURALIST(S) AND ASSISTANT(S):  Primary: Sonido Bolaños DPM  Assistants:   Marly Mcclain DPM PGY-2  Husam Lopez DPM PGY-2    OTHER OR STAFF:  Circulator: Rajiv Bone RN  Scrub Person: Tigist Morse    SURGERY/PROCEDURE(S):  Incision of Bone Cortex, Right Foot [61191]  Partial Amputation of 5th Metatarsal Bone, Right Foot [ 51702]  Incision & Drainage Below Fascia with Tendon Involvement, Right Foot [84265]  Debridement Down to and Including Level of Bone >20 square cm [98215 + 25985]  Application of Integra Bilayer Graft, Right Foot [04449]  Application of Wound Vac, Right Foot [37649]    PRE-OP/PRE-PROCEDURE DIAGNOSIS:   Pre-op Diagnosis     * 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.0]     * Osteomyelitis of ankle or foot, right, acute (CMS/HCC) [M86.171]    POST-OP/POST-PROCEDURE DIAGNOSIS:   Same as Pre-Op     * Abscess Right Foot [L02.611]    ANESTHESIA:   Anesthesia: Monitor Anesthesia Care  ASA: III  Anesthesia Staff: CRNA: LUTHER Burger  Intra-op Medications:   Administrations occurring from 1200 to 1300 on 01/26/24:   Medication Name Total Dose   BUPivacaine HCl (Marcaine) 0.5 % (5 mg/mL) injection 20 mL   thrombin solution 15,000 Units   meropenem (Merrem) in sodium chloride 0.9 % 100 mL IV 1 g 100 mL       ESTIMATED BLOOD LOSS: less than 100 mL    SPECIMENS:   Order Name Source Comment Collection Info Order Time   AFB CULTURE/SMEAR BONE RESECTION Pre-op diagnosis:  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.0] Collected By: Sonido Bolaños DPM 1/26/2024 12:56 PM     Release result to MyChart   Immediate        FUNGAL CULTURE/SMEAR BONE RESECTION Pre-op diagnosis:  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.0] Collected By: Sonido Bolaños DPM 1/26/2024 12:56 PM     Release result to MyChart   Immediate        TISSUE/WOUND CULTURE/SMEAR BONE RESECTION Pre-op diagnosis:  Gas gangrene  of foot (CMS/HCC) [A48.0] Collected By: Sonido Bolaños DPM 1/26/2024 12:56 PM     Release result to [facility]   Immediate        SURGICAL PATHOLOGY EXAM BONE BIOPSY (DECAL) Pre-op diagnosis:  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.0] Collected By: Sonido Bolaños DPM 1/26/2024 12:56 PM   DERMPATH-SURGICAL PATHOLOGY BONE RESECTION Pre-op diagnosis:  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.0] Collected By: Sonido Bolaños DPM 1/26/2024 12:56 PM     Release result to [facility]   Immediate            IMPLANTABLE DEVICES:  Implant Name Type Inv. Item Serial No.  Lot No. LRB No. Used Action   MICROMATRIX 1000MG - [id number] Implant MICROMATRIX 1000MG  [employer] [id number] Right 1 Implanted   WOUND MATRIX, INTEGRA, MESHED BILAYER, 4 X 10IN, 1 SHEET - [id number] Graft WOUND MATRIX, INTEGRA, MESHED BILAYER, 4 X 10IN, 1 SHEET [id number] INTEGRA LIFESCIENCE:NEUROSCI [id number] Right 1 Implanted       FINDINGS: Intraoperative findings consistent with clinical and radiographic findings.    DRAINS: None    TOURNIQUET TIMES:       COMPLICATIONS:  No complications. Still extensive tracking both plantar and dorsal along the tendon sheaths. Significant liquefactive necrosis to the plantar forefoot with muscle necrosis which resulted in more extensive debridement than initially  planned.        SURGERY/PROCEDURE DETAILS:  INDICATIONS FOR PROCEDURE:   The patient with diagnosis as outlined above presents for podiatric surgical intervention today. The patient has already underwent emergent I&D with amputation of 5th right digit which was packed open with intent for staged procedure. Upon initial surgery, infection was more extensive. Plan today to further debride with partial amputation of the 5th ray. An updated H&P and consent have been completed prior to today’s surgical intervention. The patient states that they have been NPO since midnight. No guarantees were given or implied, but it is expected that the patient will have a favorable  outcome.  It is with this understanding that we proceed.     PRE-PROCEDURE INFORMATION:  In pre-op holding area, the right extremity to be operated on was clearly marked and the patient verified correct laterality of the marking.  The patient was brought to the operating room and left on the hospital cart in the supine position.  A timeout was performed in which identification of the correct patient, procedure, location, and materials was done. Following MAC sedation, local anesthesia was obtained utilizing 14 cc of 0.5% Marcaine Plain. The right foot was then scrubbed, prepped and draped in the usual aseptic manner. An Esmarch bandage was then utilized as calf tourniquet.     DESCRIPTION OF PROCEDURE:   Attention was directed to the right foot where a large incisional wound was noted to the lateral forefoot with exposed 5th metatarsal head. There was significant necrosis of the metatarsal head. The area of known track dorsally was again further explored in which further collection of purulent drainage was identified and drained from the foot manually; approximately 50 ml was drained from the dorsal track. Attention was then directed to the plantar track. While exploring and draining further purulent drainage, area of fluctuance was noted along the track that was unable to be expressed. At this time a linear incision was made at the plantar fluctuance that drained another 20 ml of purulent drainage. At this time the two wounds were further assessed and all nonviable tissue was debrided with a #15 blade or a rongeur, the plantar wound was debrided down to and including necrotic muscle and bone. Pre-debridement measurement were 13 x 8.6 x 5.6 cm; post-debridement measurements were 18 x 9.2 x 5.8 cm. A sagittal saw was then used to resect the 5th metatarsal head at the midshaft, this was removed from the operative field and send for pathology and culture. The tissue bridge between the two incisional wound was now  further assessed. At this point it was deamed that the tissue bridge would not survive and was sharply excised connecting the two incisional wounds. A pulse lavage was then used to pressure irrigate the wound and the tracks using 3L of saline. All bleeders were cauterized. Thrombin soaked gel foam was then applied to the proximal part of the incisional wound where significant oozing was noted. On the back table, 1 G of Acel MicroMatrix was mixed with 1 G of Vancomycin powder and applied to the wound base and into the tracks proximally. A 4 x 10 in meshed Integra Bilayer was applied to the wound base and adhered using skin staples. A KCI wound vac was applied over the graft according to manufacture protocol and ran at 125 mmHg with proper seal noted on machine. A dressing was then applied over the vac consisting of ABD, Kerlix and ACE compression from toes to knee.    POSTOPERATIVE INFORMATION: The patient tolerated the above noted procedure and anesthesia well and was transferred to the PACU with vital signs stable, and vascular status intact with capillary refill intact to the most distal aspect of the operative extremity. Postoperative instructions reviewed in detail with the patient and family with written instructions provided. Patient will return to floor. Should any problems, questions, or concerns arise, primary team to luis or Y[facility] podiatry team.    This is Marly Mcclain DPM dictating the operative note for Dr. Miky DPM.      SIGNATURE: Marly Mcclain DPM PATIENT NAME: Claude Coley   DATE: January 26, 2024 MRN: [id number]   TIME: 4:20 PM

## 2024-01-26 NOTE — ANESTHESIA PREPROCEDURE EVALUATION
Patient: Claude Coley    Procedure Information       Date/Time: 01/26/24 1200    Procedure: Foot Transmetatarsal Amputation (Right) - Pulse lavage, 3L saline, wound vac, skin prep    Location: [facility] / [facility]    Surgeons: Darryl Perry DPM            Relevant Problems   Anesthesia (within normal limits)      Cardiovascular   (+) HTN (hypertension)   (+) Hyperlipidemia      Endocrine   (+) Diabetes mellitus, type 2 (CMS/HCC)      Infectious Disease   (+) Gas gangrene of foot (CMS/HCC)       Clinical information reviewed:   Tobacco  Allergies  Meds  Problems  Med Hx  Surg Hx   Fam Hx  Soc   Hx        NPO Detail:  No data recorded     Physical Exam    Airway  Mallampati: II  TM distance: >3 FB     Cardiovascular    Dental - normal exam     Pulmonary    Abdominal            Anesthesia Plan    History of general anesthesia?: yes  History of complications of general anesthesia?: no    ASA 3     general     intravenous induction   Anesthetic plan and risks discussed with patient.

## 2024-01-26 NOTE — PROGRESS NOTES
Blood management follow up of this JOHN pt on day 3 of admission presenting with Gas gangrene of foot (CMS/HCC). He is 2 day s/p right lower ext I&D with amputation of right 5th digit. Possible return to surgery today for right foot transmetatarsal amputation.  Slight improvement H/H 7.9/23.3.    Subjective   Post op pain controlled, mild erythema noted RLE, dressing foot dry/intact, serosanguinous drainage to dressing yesterday with dressing change. Chronic neuropathy, he c/o tired, denies headache, vertigo, fever, chills, chest pain, tachycardia, sob, +occasional dry cough, current smoker 27cigarrettes a day, encouraged smoking cessation, constipation resolved  loose dark brown stools on colace bid, and miralax daily. Denies abd pain, tenderness, nausea, bloating, appetite good, currently NPO for surgery later today.        Objective     Physical Exam  Vitals reviewed.   Constitutional:       Appearance: Normal appearance.   HENT:      Head: Normocephalic and atraumatic.      Right Ear: External ear normal.      Left Ear: External ear normal.      Nose: Nose normal.      Mouth/Throat:      Pharynx: Oropharynx is clear.   Eyes:      Conjunctiva/sclera: Conjunctivae normal.      Pupils: Pupils are equal, round, and reactive to light.   Cardiovascular:      Rate and Rhythm: Normal rate and regular rhythm.      Heart sounds: Normal heart sounds. No murmur heard.  Pulmonary:      Effort: Pulmonary effort is normal. No respiratory distress.      Breath sounds: Normal breath sounds. No wheezing, rhonchi or rales.   Chest:      Chest wall: No tenderness.   Abdominal:      General: Abdomen is flat. Bowel sounds are normal. There is no distension.      Palpations: Abdomen is soft.      Tenderness: There is no abdominal tenderness.   Musculoskeletal:         General: Swelling and tenderness present.      Cervical back: Normal range of motion and neck supple.      Right lower leg: Edema present.      Left lower leg: No edema.  "  Skin:     General: Skin is warm and dry.      Capillary Refill: Capillary refill takes 2 to 3 seconds.      Coloration: Skin is not pale.      Findings: Bruising and erythema present. No lesion or rash.      Comments: Mild erythema RLE, dressing right foot dry/intact   Neurological:      General: No focal deficit present.      Mental Status: He is alert and oriented to person, place, and time. Mental status is at baseline.   Psychiatric:         Mood and Affect: Mood normal.         Behavior: Behavior normal.         Last Recorded Vitals  Blood pressure (!) 121/48, pulse 68, temperature 36.8 °C (98.2 °F), temperature source Temporal, resp. rate 17, height 1.88 m (6' 2.02\"), weight 114 kg (252 lb 6.4 oz), SpO2 95 %.  Intake/Output last 3 Shifts:  I/O last 3 completed shifts:  In: 3610 (31.5 mL/kg) [P.O.:1680; I.V.:1030 (9 mL/kg); IV Piggyback:900]  Out: 2175 (19 mL/kg) [Urine:2175 (0.5 mL/kg/hr)]  Weight: 114.5 kg     Relevant Results  Results for orders placed or performed during the hospital encounter of 01/23/24 (from the past 24 hour(s))   POCT GLUCOSE   Result Value Ref Range    POCT Glucose 178 (H) 74 - 99 mg/dL   POCT GLUCOSE   Result Value Ref Range    POCT Glucose 117 (H) 74 - 99 mg/dL   Vancomycin   Result Value Ref Range    Vancomycin 12.6 10.0 - 20.0 ug/mL   POCT GLUCOSE   Result Value Ref Range    POCT Glucose 143 (H) 74 - 99 mg/dL   CBC and Auto Differential   Result Value Ref Range    WBC 16.5 (H) 4.4 - 11.3 x10*3/uL    nRBC 0.1 (H) 0.0 - 0.0 /100 WBCs    RBC 2.61 (L) 4.50 - 5.90 x10*6/uL    Hemoglobin 7.9 (L) 13.5 - 17.5 g/dL    Hematocrit 23.3 (L) 41.0 - 52.0 %    MCV 89 80 - 100 fL    MCH 30.3 26.0 - 34.0 pg    MCHC 33.9 32.0 - 36.0 g/dL    RDW 12.8 11.5 - 14.5 %    Platelets 236 150 - 450 x10*3/uL    Immature Granulocytes %, Automated 9.5 (H) 0.0 - 0.9 %    Immature Granulocytes Absolute, Automated 1.57 (H) 0.00 - 0.70 x10*3/uL   Comprehensive Metabolic Panel   Result Value Ref Range    Glucose " 107 (H) 65 - 99 mg/dL    Sodium 136 133 - 145 mmol/L    Potassium 3.7 3.4 - 5.1 mmol/L    Chloride 109 (H) 97 - 107 mmol/L    Bicarbonate 18 (L) 24 - 31 mmol/L    Urea Nitrogen 46 (H) 8 - 25 mg/dL    Creatinine 1.30 0.40 - 1.60 mg/dL    eGFR 63 >60 mL/min/1.73m*2    Calcium 8.1 (L) 8.5 - 10.4 mg/dL    Albumin 2.2 (L) 3.5 - 5.0 g/dL    Alkaline Phosphatase 159 (H) 35 - 125 U/L    Total Protein 5.8 (L) 5.9 - 7.9 g/dL    AST 97 (H) 5 - 40 U/L    Bilirubin, Total 0.3 0.1 - 1.2 mg/dL    ALT 90 (H) 5 - 40 U/L    Anion Gap 9 <=19 mmol/L   Creatine Kinase   Result Value Ref Range    Creatine Kinase 122 24 - 195 U/L   Manual Differential   Result Value Ref Range    Neutrophils %, Manual 67.0 40.0 - 80.0 %    Bands %, Manual 8.0 0.0 - 5.0 %    Lymphocytes %, Manual 16.0 13.0 - 44.0 %    Monocytes %, Manual 3.0 2.0 - 10.0 %    Eosinophils %, Manual 1.0 0.0 - 6.0 %    Basophils %, Manual 0.0 0.0 - 2.0 %    Metamyelocytes %, Manual 5.0 0.0 - 0.0 %    Seg Neutrophils Absolute, Manual 11.06 (H) 1.20 - 7.00 x10*3/uL    Bands Absolute, Manual 1.32 (H) 0.00 - 0.70 x10*3/uL    Lymphocytes Absolute, Manual 2.64 1.20 - 4.80 x10*3/uL    Monocytes Absolute, Manual 0.50 0.10 - 1.00 x10*3/uL    Eosinophils Absolute, Manual 0.17 0.00 - 0.70 x10*3/uL    Basophils Absolute, Manual 0.00 0.00 - 0.10 x10*3/uL    Metamyelocytes Absolute, Manual 0.83 0.00 - 0.00 x10*3/uL    Total Cells Counted 100     Neutrophils Absolute, Manual 12.38 (H) 1.20 - 7.70 x10*3/uL    RBC Morphology No significant RBC morphology present    POCT GLUCOSE   Result Value Ref Range    POCT Glucose 120 (H) 74 - 99 mg/dL     Current Facility-Administered Medications:     acetaminophen (Tylenol) tablet 650 mg, 650 mg, oral, q4h PRN, 650 mg at 01/25/24 0121 **OR** [DISCONTINUED] acetaminophen (Tylenol) oral liquid 650 mg, 650 mg, oral, q4h PRN **OR** [DISCONTINUED] acetaminophen (Tylenol) suppository 650 mg, 650 mg, rectal, q4h PRN, Connie Neumann MD    alteplase  (Cathflo Activase) injection 2 mg, 2 mg, intra-catheter, PRN, Melvin Ivey DO    aspirin EC tablet 81 mg, 81 mg, oral, Daily, Connie Neumann MD, 81 mg at 01/25/24 0842    carvedilol (Coreg) tablet 12.5 mg, 12.5 mg, oral, BID with meals, Connie Neumann MD, 12.5 mg at 01/25/24 1615    dextrose 10 % in water (D10W) infusion, 0.3 g/kg/hr, intravenous, Once PRN, Connie Neumann MD    dextrose 50 % injection 25 g, 25 g, intravenous, q15 min PRN, Connie Neumann MD    finasteride (Proscar) tablet 5 mg, 5 mg, oral, Daily, Connie Neumann MD, 5 mg at 01/25/24 0842    glucagon (Glucagen) injection 1 mg, 1 mg, intramuscular, q15 min PRN, Connie Neumann MD    heparin (porcine) injection 5,000 Units, 5,000 Units, subcutaneous, q8h, Connie Neumann MD, 5,000 Units at 01/26/24 0512    insulin lispro (HumaLOG) injection 0-10 Units, 0-10 Units, subcutaneous, Before meals & nightly, Melvin Ivey DO    iron sucrose (Venofer) injection 200 mg, 200 mg, intravenous, Daily, Elisa Leblanc, APRN-CNP, 200 mg at 01/26/24 0512    lidocaine (Xylocaine) 10 mg/mL (1 %) injection 50 mg, 5 mL, infiltration, Once, Melvin Ivey DO    melatonin tablet 3 mg, 3 mg, oral, Nightly, Connie Neumann MD, 3 mg at 01/25/24 2056    meropenem (Merrem) in sodium chloride 0.9 % 100 mL IV 1 g, 1 g, intravenous, q12h, Connie Neumann MD, Stopped at 01/26/24 0010    OLANZapine (ZyPREXA) tablet 20 mg, 20 mg, oral, Nightly, Connie Neumann MD, 20 mg at 01/25/24 2056    pantoprazole (ProtoNix) EC tablet 40 mg, 40 mg, oral, Daily, 40 mg at 01/25/24 0842 **OR** [DISCONTINUED] pantoprazole (ProtoNix) injection 40 mg, 40 mg, intravenous, Daily, Connie Neumann MD    polyethylene glycol (Glycolax, Miralax) packet 17 g, 17 g, oral, Daily, Melvin Ivey DO, 17 g at 01/25/24 1200    sennosides-docusate sodium (Nieves-Colace) 8.6-50 mg per tablet 1 tablet, 1 tablet,  oral, BID, Melvin Ivey DO, 1 tablet at 01/25/24 2056                              Assessment/Plan   Principal Problem:    Gas gangrene of foot (CMS/HCC)  Active Problems:    HTN (hypertension)    Hyperlipidemia    Diabetes mellitus, type 2 (CMS/HCC)  Infected/gangrenous right foot  S/p I&D and 5th toe amputation right foot     Normocytic anemia  Anemia of acute blood loss combined with anemia of iron deficient and inflammation     Venofer 200mg IV X4  S/p B12 1000mcg IM X1  Lab to microsample blood draws-signage on door        Elisa Leblanc, APRN-CNP

## 2024-01-26 NOTE — CARE PLAN
Problem: Pain  Goal: My pain/discomfort is manageable  Outcome: Progressing   The patient's goals for the shift include      The clinical goals for the shift include pain control

## 2024-01-26 NOTE — SIGNIFICANT EVENT
Podiatry Post-Operative Recommendations:     S/P RIGHT Partial 5th Ray Resection, Incision of Bone Cortex, Wide Excisional Debridement Down to and Including Muscle Necrosis, Application of Graft, Application of Wound Vac     Plan:  Weightbearing status: NWB to surgical extremity - Bed Rest for Today  Precautions: Fall Risk  Pain: per primary  Abx: Resume scheduled floor Abx  Intra-op cultures pending  DVT Ppx: May resume chemoppx per primary team  Dressing: Wound Vac @ 125 mmHg with DSD.   Diet: Restart Diabetic Diet     Case to be discussed with attending, A&P above reflects a tentative plan. Please await for the final signature from the attending physician on service.    Marly Mcclain DPM PGY-2  Podiatric Medicine & Surgery  Please Doc[facility]alo me with any questions or concerns.

## 2024-01-27 LAB
ALBUMIN SERPL-MCNC: 2.2 G/DL (ref 3.5–5)
ALP BLD-CCNC: 155 U/L (ref 35–125)
ALT SERPL-CCNC: 88 U/L (ref 5–40)
ANION GAP SERPL CALC-SCNC: 9 MMOL/L
AST SERPL-CCNC: 75 U/L (ref 5–40)
BACTERIA BLD CULT: NORMAL
BACTERIA BLD CULT: NORMAL
BASOPHILS # BLD MANUAL: 0 X10*3/UL (ref 0–0.1)
BASOPHILS NFR BLD MANUAL: 0 %
BILIRUB SERPL-MCNC: 0.3 MG/DL (ref 0.1–1.2)
BUN SERPL-MCNC: 33 MG/DL (ref 8–25)
CALCIUM SERPL-MCNC: 7.9 MG/DL (ref 8.5–10.4)
CHLORIDE SERPL-SCNC: 106 MMOL/L (ref 97–107)
CK SERPL-CCNC: 51 U/L (ref 24–195)
CO2 SERPL-SCNC: 19 MMOL/L (ref 24–31)
CREAT SERPL-MCNC: 1.1 MG/DL (ref 0.4–1.6)
EGFRCR SERPLBLD CKD-EPI 2021: 76 ML/MIN/1.73M*2
EOSINOPHIL # BLD MANUAL: 0.94 X10*3/UL (ref 0–0.7)
EOSINOPHIL NFR BLD MANUAL: 5 %
ERYTHROCYTE [DISTWIDTH] IN BLOOD BY AUTOMATED COUNT: 12.9 % (ref 11.5–14.5)
GLUCOSE BLD MANUAL STRIP-MCNC: 115 MG/DL (ref 74–99)
GLUCOSE BLD MANUAL STRIP-MCNC: 131 MG/DL (ref 74–99)
GLUCOSE BLD MANUAL STRIP-MCNC: 135 MG/DL (ref 74–99)
GLUCOSE BLD MANUAL STRIP-MCNC: 146 MG/DL (ref 74–99)
GLUCOSE SERPL-MCNC: 100 MG/DL (ref 65–99)
HCT VFR BLD AUTO: 21.7 % (ref 41–52)
HGB BLD-MCNC: 7.4 G/DL (ref 13.5–17.5)
IMM GRANULOCYTES # BLD AUTO: 1.69 X10*3/UL (ref 0–0.7)
IMM GRANULOCYTES NFR BLD AUTO: 9 % (ref 0–0.9)
LYMPHOCYTES # BLD MANUAL: 1.68 X10*3/UL (ref 1.2–4.8)
LYMPHOCYTES NFR BLD MANUAL: 9 %
MCH RBC QN AUTO: 30.6 PG (ref 26–34)
MCHC RBC AUTO-ENTMCNC: 34.1 G/DL (ref 32–36)
MCV RBC AUTO: 90 FL (ref 80–100)
METAMYELOCYTES # BLD MANUAL: 0.19 X10*3/UL
METAMYELOCYTES NFR BLD MANUAL: 1 %
MONOCYTES # BLD MANUAL: 0.75 X10*3/UL (ref 0.1–1)
MONOCYTES NFR BLD MANUAL: 4 %
NEUTROPHILS # BLD MANUAL: 15.15 X10*3/UL (ref 1.2–7.7)
NEUTS BAND # BLD MANUAL: 3.37 X10*3/UL (ref 0–0.7)
NEUTS BAND NFR BLD MANUAL: 18 %
NEUTS SEG # BLD MANUAL: 11.78 X10*3/UL (ref 1.2–7)
NEUTS SEG NFR BLD MANUAL: 63 %
NRBC BLD-RTO: 0.2 /100 WBCS (ref 0–0)
PLATELET # BLD AUTO: 230 X10*3/UL (ref 150–450)
POLYCHROMASIA BLD QL SMEAR: ABNORMAL
POTASSIUM SERPL-SCNC: 3.7 MMOL/L (ref 3.4–5.1)
PROT SERPL-MCNC: 5.5 G/DL (ref 5.9–7.9)
RBC # BLD AUTO: 2.42 X10*6/UL (ref 4.5–5.9)
RBC MORPH BLD: ABNORMAL
SODIUM SERPL-SCNC: 134 MMOL/L (ref 133–145)
TOTAL CELLS COUNTED BLD: 100
WBC # BLD AUTO: 18.7 X10*3/UL (ref 4.4–11.3)

## 2024-01-27 PROCEDURE — 80053 COMPREHEN METABOLIC PANEL: CPT | Performed by: INTERNAL MEDICINE

## 2024-01-27 PROCEDURE — 2500000004 HC RX 250 GENERAL PHARMACY W/ HCPCS (ALT 636 FOR OP/ED): Performed by: INTERNAL MEDICINE

## 2024-01-27 PROCEDURE — 36415 COLL VENOUS BLD VENIPUNCTURE: CPT | Performed by: INTERNAL MEDICINE

## 2024-01-27 PROCEDURE — 82947 ASSAY GLUCOSE BLOOD QUANT: CPT

## 2024-01-27 PROCEDURE — 2500000004 HC RX 250 GENERAL PHARMACY W/ HCPCS (ALT 636 FOR OP/ED): Performed by: NURSE PRACTITIONER

## 2024-01-27 PROCEDURE — 2500000001 HC RX 250 WO HCPCS SELF ADMINISTERED DRUGS (ALT 637 FOR MEDICARE OP): Performed by: INTERNAL MEDICINE

## 2024-01-27 PROCEDURE — 85027 COMPLETE CBC AUTOMATED: CPT | Performed by: INTERNAL MEDICINE

## 2024-01-27 PROCEDURE — 82550 ASSAY OF CK (CPK): CPT | Performed by: INTERNAL MEDICINE

## 2024-01-27 PROCEDURE — 85007 BL SMEAR W/DIFF WBC COUNT: CPT | Performed by: INTERNAL MEDICINE

## 2024-01-27 PROCEDURE — 1200000002 HC GENERAL ROOM WITH TELEMETRY DAILY

## 2024-01-27 PROCEDURE — 97530 THERAPEUTIC ACTIVITIES: CPT | Mod: GP

## 2024-01-27 RX ADMIN — MEROPENEM 1 G: 1 INJECTION, POWDER, FOR SOLUTION INTRAVENOUS at 13:09

## 2024-01-27 RX ADMIN — FINASTERIDE 5 MG: 5 TABLET, FILM COATED ORAL at 08:25

## 2024-01-27 RX ADMIN — Medication 3 MG: at 22:00

## 2024-01-27 RX ADMIN — OLANZAPINE 20 MG: 10 TABLET, FILM COATED ORAL at 22:00

## 2024-01-27 RX ADMIN — SENNOSIDES AND DOCUSATE SODIUM 1 TABLET: 8.6; 5 TABLET ORAL at 22:00

## 2024-01-27 RX ADMIN — CARVEDILOL 12.5 MG: 12.5 TABLET, FILM COATED ORAL at 16:25

## 2024-01-27 RX ADMIN — SENNOSIDES AND DOCUSATE SODIUM 1 TABLET: 8.6; 5 TABLET ORAL at 08:25

## 2024-01-27 RX ADMIN — HEPARIN SODIUM 5000 UNITS: 5000 INJECTION, SOLUTION INTRAVENOUS; SUBCUTANEOUS at 14:07

## 2024-01-27 RX ADMIN — VANCOMYCIN 1750 MG: 1.25 INJECTION, SOLUTION INTRAVENOUS at 22:03

## 2024-01-27 RX ADMIN — CARVEDILOL 12.5 MG: 12.5 TABLET, FILM COATED ORAL at 08:25

## 2024-01-27 RX ADMIN — HEPARIN SODIUM 5000 UNITS: 5000 INJECTION, SOLUTION INTRAVENOUS; SUBCUTANEOUS at 22:00

## 2024-01-27 RX ADMIN — PANTOPRAZOLE SODIUM 40 MG: 40 TABLET, DELAYED RELEASE ORAL at 08:25

## 2024-01-27 RX ADMIN — POLYETHYLENE GLYCOL 3350 17 G: 17 POWDER, FOR SOLUTION ORAL at 08:25

## 2024-01-27 RX ADMIN — MEROPENEM 1 G: 1 INJECTION, POWDER, FOR SOLUTION INTRAVENOUS at 00:23

## 2024-01-27 RX ADMIN — IRON SUCROSE 200 MG: 20 INJECTION, SOLUTION INTRAVENOUS at 05:05

## 2024-01-27 RX ADMIN — ASPIRIN 81 MG: 81 TABLET, COATED ORAL at 08:25

## 2024-01-27 RX ADMIN — HEPARIN SODIUM 5000 UNITS: 5000 INJECTION, SOLUTION INTRAVENOUS; SUBCUTANEOUS at 05:05

## 2024-01-27 RX ADMIN — PIPERACILLIN SODIUM AND TAZOBACTAM SODIUM 3.38 G: 3; .375 INJECTION, SOLUTION INTRAVENOUS at 01:30

## 2024-01-27 ASSESSMENT — COGNITIVE AND FUNCTIONAL STATUS - GENERAL
STANDING UP FROM CHAIR USING ARMS: A LITTLE
TURNING FROM BACK TO SIDE WHILE IN FLAT BAD: A LITTLE
TOILETING: A LITTLE
MOVING TO AND FROM BED TO CHAIR: A LOT
MOVING TO AND FROM BED TO CHAIR: A LOT
TURNING FROM BACK TO SIDE WHILE IN FLAT BAD: A LITTLE
DAILY ACTIVITIY SCORE: 18
WALKING IN HOSPITAL ROOM: A LOT
DRESSING REGULAR LOWER BODY CLOTHING: A LOT
STANDING UP FROM CHAIR USING ARMS: A LITTLE
STANDING UP FROM CHAIR USING ARMS: A LOT
WALKING IN HOSPITAL ROOM: A LOT
HELP NEEDED FOR BATHING: A LITTLE
PERSONAL GROOMING: A LITTLE
DRESSING REGULAR UPPER BODY CLOTHING: A LITTLE
MOBILITY SCORE: 13
MOVING FROM LYING ON BACK TO SITTING ON SIDE OF FLAT BED WITH BEDRAILS: A LITTLE
MOVING TO AND FROM BED TO CHAIR: A LOT
WALKING IN HOSPITAL ROOM: A LOT
CLIMB 3 TO 5 STEPS WITH RAILING: TOTAL
MOBILITY SCORE: 15
CLIMB 3 TO 5 STEPS WITH RAILING: TOTAL
MOVING FROM LYING ON BACK TO SITTING ON SIDE OF FLAT BED WITH BEDRAILS: A LITTLE
MOBILITY SCORE: 14
CLIMB 3 TO 5 STEPS WITH RAILING: TOTAL
TURNING FROM BACK TO SIDE WHILE IN FLAT BAD: A LITTLE

## 2024-01-27 ASSESSMENT — PAIN - FUNCTIONAL ASSESSMENT
PAIN_FUNCTIONAL_ASSESSMENT: 0-10
PAIN_FUNCTIONAL_ASSESSMENT: 0-10

## 2024-01-27 ASSESSMENT — PAIN SCALES - GENERAL
PAINLEVEL_OUTOF10: 0 - NO PAIN

## 2024-01-27 NOTE — PROGRESS NOTES
Physical Therapy    Physical Therapy Treatment    Patient Name: Claude Coley  MRN: [id number]  Today's Date: 1/27/2024  Time Calculation  Start Time: 1200  Stop Time: 1223  Time Calculation (min): 23 min       Assessment/Plan   PT Assessment  PT Assessment Results: Decreased strength, Decreased endurance, Impaired balance, Decreased mobility, Impaired judgement, Decreased safety awareness, Impaired sensation, Orthopedic restrictions, Pain  Rehab Prognosis: Good  End of Session Communication: Bedside nurse  Assessment Comment: Patient demonstrates improved mobility this date and improved ability to maintain NWB R LE. Requires overall mod A x 1 for safety. Remains a fall risk. Declines ther ex this date.  End of Session Patient Position: Up in chair     PT Plan  Treatment/Interventions: Bed mobility, Transfer training  PT Plan: Skilled PT  PT Frequency: Daily  PT Discharge Recommendations: High intensity level of continued care  Equipment Recommended upon Discharge:  (to be determined)  PT Recommended Transfer Status: Assist x2, Assistive device  PT - OK to Discharge: Yes      General Visit Information:   PT  Visit  PT Received On: 01/27/24  General  Reason for Referral: Impaired mobility  Referred By: Dr. Ivey  Past Medical History Relevant to Rehab: HTN, CHF, hyperlipidemia, DM  Missed Visit: Yes  Prior to Session Communication: Bedside nurse  Patient Position Received: Bed, 4 rail up  Preferred Learning Style: verbal  General Comment: Patient had R wound vac placed yesterday 1/26/24. Patient remains NWB R LE. RN clears patient for treatment and patient is agreeable.    Subjective   Precautions:  Precautions  LE Weight Bearing Status: Right Non-Weight Bearing  Medical Precautions: Fall precautions         Objective   Pain:  Pain Assessment  Pain Assessment: 0-10  Pain Score: 0 - No pain  Cognition:  Cognition  Orientation Level: Oriented X4       Activity Tolerance:  Activity Tolerance  Endurance: Decreased  tolerance for upright activites  Activity Tolerance Comments: d/t post op pain and decline in function  Treatments:       Bed Mobility 1  Bed Mobility 1: Supine to sitting  Level of Assistance 1: Close supervision  Bed Mobility Comments 1: cues to attend to line of wound vac during mobility for safety.    Ambulation/Gait Training 1  Surface 1: Level tile  Device 1: Rolling walker  Assistance 1: Moderate assistance  Comments/Distance (ft) 1: Patient takes 3 side steps bed to chair, slides R LE toes along floor. Cues for NWB R LE. Appears to maintain NWB R LE with significant support of B UE on RW.  Transfer 1  Transfer From 1: Bed to  Transfer to 1: Chair with arms  Technique 1: Sit to stand, Stand to sit  Transfer Device 1: Walker  Transfer Level of Assistance 1: Minimum assistance  Trials/Comments 1: stand from raised bed min A x 1, patient takes 3 side steps with RW bed to chair mod A x 1. Stand to sit in chair min A x 1. Verbal cues throughout for safe technique.    Outcome Measures:  [facility] Basic Mobility  Turning from your back to your side while in a flat bed without using bedrails: A little  Moving from lying on your back to sitting on the side of a flat bed without using bedrails: A little  Moving to and from bed to chair (including a wheelchair): A lot  Standing up from a chair using your arms (e.g. wheelchair or bedside chair): A little  To walk in hospital room: A lot  Climbing 3-5 steps with railing: Total  Basic Mobility - Total Score: 14    Education Documentation  Mobility Training, taught by Susanne Castro PT at 1/27/2024 12:44 PM.  Learner: Patient  Readiness: Acceptance  Method: Explanation  Response: Needs Reinforcement  Comment: Education re: continued NWB R LE s/p wound vac placement and also education provided re: importance of attending to position of wound vac line.    Education Comments  No comments found.        OP EDUCATION:       Encounter Problems       Encounter Problems (Active)        PT Goals       Patient will transfer sit to stand and stand to sit with  contact guard assist to facilitate mobility.  (Progressing)       Start:  01/25/24    Expected End:  02/08/24            Patient will transfer bed to chair and chair to bed with contact guard assist to facilitate mobility.  (Progressing)       Start:  01/25/24    Expected End:  02/08/24            Patient will amb 10 feet with rolling walker device including no turns on even surface with minimal assist to facilitate safe mobility.  (Progressing)       Start:  01/25/24    Expected End:  02/08/24            Demonstrate safe mobility techniques while maintaining WB status R LE. (Progressing)       Start:  01/25/24    Expected End:  02/08/24

## 2024-01-27 NOTE — PROGRESS NOTES
PODIATRY SERVICE CONSULT PROGRESS NOTE    SERVICE DATE: 1/27/2024   SERVICE TIME:  1:19 PM     Subjective   INTERVAL HPI:   Patient was seen at bedside.  Pain well controlled.  Notes that he became sweaty and felt warm yesterday evening around 8PM, which coincides with his temperature spike that was promptly relieved with Tylenol.  Patient also reports continued constipation, which patient describes a chronic problem for which he has never been fully evaluated.  Patient denies any other constitutional symptoms.   No other pedal complaints.     Medications:  Scheduled Meds: aspirin, 81 mg, oral, Daily  carvedilol, 12.5 mg, oral, BID with meals  finasteride, 5 mg, oral, Daily  heparin (porcine), 5,000 Units, subcutaneous, q8h  insulin lispro, 0-10 Units, subcutaneous, Before meals & nightly  iron sucrose, 200 mg, intravenous, Daily  lidocaine, 5 mL, infiltration, Once  lidocaine, 5 mL, infiltration, Once  melatonin, 3 mg, oral, Nightly  meropenem, 1 g, intravenous, q12h  OLANZapine, 20 mg, oral, Nightly  pantoprazole, 40 mg, oral, Daily  polyethylene glycol, 17 g, oral, Daily  sennosides-docusate sodium, 1 tablet, oral, BID  vancomycin-diluent combo no.1, 1,750 mg, intravenous, q24h      Continuous Infusions:    PRN Meds: PRN medications: acetaminophen **OR** [DISCONTINUED] acetaminophen **OR** [DISCONTINUED] acetaminophen, alteplase, alteplase, dextrose 10 % in water (D10W), dextrose, glucagon         Objective   PHYSICAL EXAM:  Physical Exam Performed:  Vitals:    01/27/24 1100   BP: (!) 128/44   Pulse: 64   Resp: 17   Temp: 36.5 °C (97.7 °F)   SpO2: 95%     Body mass index is 32.39 kg/m².    Patient is AOx3 and in no acute distress. Patient is alert and cooperative. Sitting comfortably in bed with dressing clean, dry and intact.     Vascular: Palpable DP/PT pulses B/L. Severe pitting edema R however much improved. Hair growth absent B/L. CFT<5 to B/L hallux. Temperature is warm to cool from tibial tuberosity to  distal digits B/L. No lymphatic streaking noted B/L.    Musculoskeletal: Gross active and passive ROM diminished to age and activity level. Moves all extremities spontaneously. No pain to palpation at feet B/L. Wound vac dressing in place to RLE    Neurological: Absent light touch sensation B/L. Pain stimuli absent B/L. Denies any numbness, burning or tingling.    Dermatologic: Skin appears diffusely xerotic B/L. Web spaces 1-4 B/L are clean, dry and intact. No rashes or nodules noted B/L. No hyperkeratotic tissue noted B/L.     Wound: Open Amputation Site - Right Lateral Forefoot  Vac dressing in place. Dressing clean, dry, and intact.  Vac canister with 200cc of dark red drainage. Van functionint    LABS:   Results for orders placed or performed during the hospital encounter of 01/23/24 (from the past 24 hour(s))   POCT GLUCOSE   Result Value Ref Range    POCT Glucose 90 74 - 99 mg/dL   Hemoglobin and hematocrit, blood   Result Value Ref Range    Hemoglobin 8.3 (L) 13.5 - 17.5 g/dL    Hematocrit 24.1 (L) 41.0 - 52.0 %   POCT GLUCOSE   Result Value Ref Range    POCT Glucose 167 (H) 74 - 99 mg/dL   Vancomycin   Result Value Ref Range    Vancomycin 12.6 10.0 - 20.0 ug/mL   CBC and Auto Differential   Result Value Ref Range    WBC 18.7 (H) 4.4 - 11.3 x10*3/uL    nRBC 0.2 (H) 0.0 - 0.0 /100 WBCs    RBC 2.42 (L) 4.50 - 5.90 x10*6/uL    Hemoglobin 7.4 (L) 13.5 - 17.5 g/dL    Hematocrit 21.7 (L) 41.0 - 52.0 %    MCV 90 80 - 100 fL    MCH 30.6 26.0 - 34.0 pg    MCHC 34.1 32.0 - 36.0 g/dL    RDW 12.9 11.5 - 14.5 %    Platelets 230 150 - 450 x10*3/uL    Immature Granulocytes %, Automated 9.0 (H) 0.0 - 0.9 %    Immature Granulocytes Absolute, Automated 1.69 (H) 0.00 - 0.70 x10*3/uL   Comprehensive Metabolic Panel   Result Value Ref Range    Glucose 100 (H) 65 - 99 mg/dL    Sodium 134 133 - 145 mmol/L    Potassium 3.7 3.4 - 5.1 mmol/L    Chloride 106 97 - 107 mmol/L    Bicarbonate 19 (L) 24 - 31 mmol/L    Urea Nitrogen 33 (H)  "8 - 25 mg/dL    Creatinine 1.10 0.40 - 1.60 mg/dL    eGFR 76 >60 mL/min/1.73m*2    Calcium 7.9 (L) 8.5 - 10.4 mg/dL    Albumin 2.2 (L) 3.5 - 5.0 g/dL    Alkaline Phosphatase 155 (H) 35 - 125 U/L    Total Protein 5.5 (L) 5.9 - 7.9 g/dL    AST 75 (H) 5 - 40 U/L    Bilirubin, Total 0.3 0.1 - 1.2 mg/dL    ALT 88 (H) 5 - 40 U/L    Anion Gap 9 <=19 mmol/L   Creatine Kinase   Result Value Ref Range    Creatine Kinase 51 24 - 195 U/L   Manual Differential   Result Value Ref Range    Neutrophils %, Manual 63.0 40.0 - 80.0 %    Bands %, Manual 18.0 0.0 - 5.0 %    Lymphocytes %, Manual 9.0 13.0 - 44.0 %    Monocytes %, Manual 4.0 2.0 - 10.0 %    Eosinophils %, Manual 5.0 0.0 - 6.0 %    Basophils %, Manual 0.0 0.0 - 2.0 %    Metamyelocytes %, Manual 1.0 0.0 - 0.0 %    Seg Neutrophils Absolute, Manual 11.78 (H) 1.20 - 7.00 x10*3/uL    Bands Absolute, Manual 3.37 (H) 0.00 - 0.70 x10*3/uL    Lymphocytes Absolute, Manual 1.68 1.20 - 4.80 x10*3/uL    Monocytes Absolute, Manual 0.75 0.10 - 1.00 x10*3/uL    Eosinophils Absolute, Manual 0.94 (H) 0.00 - 0.70 x10*3/uL    Basophils Absolute, Manual 0.00 0.00 - 0.10 x10*3/uL    Metamyelocytes Absolute, Manual 0.19 0.00 - 0.00 x10*3/uL    Total Cells Counted 100     Neutrophils Absolute, Manual 15.15 (H) 1.20 - 7.70 x10*3/uL    RBC Morphology See Below     Polychromasia Mild    POCT GLUCOSE   Result Value Ref Range    POCT Glucose 115 (H) 74 - 99 mg/dL   POCT GLUCOSE   Result Value Ref Range    POCT Glucose 135 (H) 74 - 99 mg/dL      Lab Results   Component Value Date    HGBA1C 4.5 01/23/2024      No results found for: \"CRP\"   No results found for: \"SEDRATE\"     Results from last 7 days   Lab Units 01/27/24  0507   WBC AUTO x10*3/uL 18.7*   RBC AUTO x10*6/uL 2.42*   HEMOGLOBIN g/dL 7.4*   HEMATOCRIT % 21.7*     Results from last 7 days   Lab Units 01/27/24  0507   SODIUM mmol/L 134   POTASSIUM mmol/L 3.7   CHLORIDE mmol/L 106   CO2 mmol/L 19*   BUN mg/dL 33*   CREATININE mg/dL 1.10 "   CALCIUM mg/dL 7.9*   BILIRUBIN TOTAL mg/dL 0.3   ALT U/L 88*   AST U/L 75*     Results from last 7 days   Lab Units 01/24/24  0007   COLOR U  Yellow   APPEARANCE U  Clear   PH U  5.0   SPEC GRAV UR  1.018   PROTEIN U mg/dL 30 (1+)*   BLOOD UR  0.06 (1+)*   NITRITE U  NEGATIVE   WBC UR /HPF NONE   BACTERIA UR /HPF 1+*       IMAGING REVIEW:  ECG 12 lead    Result Date: 1/24/2024  Normal sinus rhythm Possible Septal infarct , age undetermined Abnormal ECG No previous ECGs available Confirmed by Bobo Thornton ([id number]) on 1/24/2024 2:03:37 PM    US renal complete    Result Date: 1/24/2024  STUDY: Renal and Bladder Ultrasound; 1/23/2024 10:36 PM. INDICATION:  Acute renal failure.  COMPARISON:  None available. ACCESSION NUMBER(S): [id number] ORDERING CLINICIAN: GARCIA DUARTE TECHNIQUE: Ultrasound of the Kidneys and Bladder. Multiple images of the abdomen were obtained. FINDINGS: RIGHT KIDNEY: The right kidney measures 12.7 cm in length. Renal cortical echotexture is normal. There is no hydronephrosis. There are no stones. There is a cyst with a thick internal septation that measures 1.9 x 2.1 x 1.5 cm within the superior pole. LEFT KIDNEY: The left kidney measures 12.9 cm in length. Renal cortical echotexture is normal. There is no hydronephrosis. There are no stones. There are no cysts.  BLADDER: The urinary bladder is anechoic. The distended bladder shows no evidence of wall thickening. The right ureteral jet is visualized. The prostate gland measures 5.0 x 3.6 x 4.8 cm (44cc).       Septated right renal cyst consistent with a Bosniak class II lesion. No hydronephrosis bilaterally.  Follow-up ultrasound is recommended in 6 months. Enlarged prostate. Signed by Carlos Luke    XR tibia fibula right 2 views    Result Date: 1/23/2024  STUDY: Tibia and Fibula Radiographs; 1/23/2024 at 10:42 PM INDICATION: Soft tissue gas. COMPARISON: XR right foot 1/23/2024. ACCESSION NUMBER(S): [id number] ORDERING CLINICIAN: GARCIA NICHOLS  FELICIA TECHNIQUE:  4 view(s) of the right tibia and fibula. FINDINGS:  There is no displaced fracture.  The alignment is anatomic. Superficial soft tissue edema is noted.  Peripheral vascular calcifications are present.  Soft tissue emphysema is seen along the dorsal aspect of the midfoot and anterior to the distal tibia.    Generalized soft tissue edema with soft tissue emphysema seen surrounding the ventral aspect of the ankle and dorsal aspect of the midfoot, concerning for soft tissue infection. Peripheral vascular disease. No acute osseous normality identified. Signed by Carlos Luke    XR foot right 3+ views    Result Date: 1/23/2024  Interpreted By:  Kirstie Moy, STUDY: XR FOOT RIGHT 3+ VIEWS 1/23/2024 2:03 pm   INDICATION: Swelling   COMPARISON: None available.   ACCESSION NUMBER(S): [id number]   ORDERING CLINICIAN: GARCIA DUARTE   TECHNIQUE: Three views of right foot   FINDINGS: There is marked soft tissue swelling of the right ankle and foot. There are multiple pockets of gas seen within the right forefoot laterally centered about the 5th MTP joint with this gas extending into the webspace between the 4th and 5th toes and also extending into the midfoot and hindfoot anteriorly.   However, no obvious bony destruction is observed to indicate osteomyelitis.       Soft tissue infection of the right foot centered along the lateral aspect of the forefoot with probable abscess formation and gas gangrene. However, no plain film evidence of osteomyelitis is observed.   Signed by: Kirstie Moy 1/23/2024 2:34 PM Dictation workstation:   [id number]            Assessment/Plan   ASSESSMENT & PLAN:    #Gas Gangrene, RLE  #s/p I&D with Amputation of R 5th Digit (DOS: 01/24/24)  #s/p R 5th ray resection, application bilayer and wound vac (DOS: 01/26/24)  #DM Type II w/ Ulceration  #Chronic Non-Pressure Ulceration w/ Necrosis of Bone, Right Foot  #PAD  #Localized Edema  #Cellulitis, RLE     - Patient was seen and  evaluated; all findings were discussed and all questions were answered to patient's satisfaction.  - Charts, labs, vitals and imaging all reviewed.   - Imaging: XR R Foot with soft tissue gas noted to the level of the ankle joint and post-surgical changes.  - Vitals: temperature peaked at 99.9 yesterday evening at 20:13, was quickly relieved with 650mg acetaminophen.  - Labs: WBC 18.7 with bands and elevated ANC. Still trending upward today after 16.5 yesterday and 11.4 the day before. HGB stable at 7.4. . HbA1c 4.5%.  - Wound culture: 4+ Mixed G+ & G- Bacteria - Speciation and Sensitivities Pending   - Blood culture: Pending - NGTD at 3 days     Plan:  - Abx: IV Vancomycin and Meropenem per ID - PICC placed in RUE yesterday evening.  - Pt underwent emergent I&D with amputation of the 5th digit of the right foot after transfer to [facility] from [facility] due to OR availability. Due to emergent nature of case cardiac clearance was not obtained. Pt tolerated procedure well. Incision was packed open. Pt returned to OR for further excisional debridement of right 5th metatarsal and further I&D of dorsal and plantar foot with application of bilayer graft and application of wound vac.   - Dressings: Vac dressing to remain clean, dry, and intact. 200cc drainage today.  - Nursing staff is able to reinforce dressing if & as necessary. Thank you.  - Podiatry will continue to follow while in house.   - Pt OK to discharge to SNF     DVT ppx: Per primary.   Weightbearing: NWB RLE - OK to transfer with WB to heel with surgical shoe in place.  Discharge: Pt to follow up 1 week after discharge with Dr. Perry. Will have discharge plan updated.     Case to be discussed with attending, A&P above reflects a tentative plan. Please await for the final signature from the attending physician on service.    This patient will be followed by the Podiatry service. Please Epic Chat the corresponding residents below with questions or  concerns.      Husam Lopez DPM PGY-2  Podiatric Medicine and Surgery   Epic Chat            SIGNATURE: Husam Lopez DPM PATIENT NAME: Claude Coley   DATE: January 27, 2024 MRN: [id number]   TIME: 1:19 PM CONTACT: Haiku Message

## 2024-01-27 NOTE — PROGRESS NOTES
Claude Coley is a 63 y.o. male on day 4 of admission presenting with Gas gangrene of foot (CMS/HCC).    Subjective   Interval History:       Review of Systems   All other systems reviewed and are negative.      Objective   Range of Vitals (last 24 hours)  Heart Rate:  [62-74]   Temp:  [36.2 °C (97.2 °F)-37.7 °C (99.9 °F)]   Resp:  [17-21]   BP: (114-152)/(44-61)   SpO2:  [95 %-98 %]   Daily Weight  01/23/24 : 114 kg (252 lb 6.4 oz)    Body mass index is 32.39 kg/m².    Physical Exam  onstitutional:       Appearance: Normal appearance.   HENT:      Head: Normocephalic and atraumatic.      Nose: Nose normal.   Eyes:      Extraocular Movements: Extraocular movements intact.      Conjunctiva/sclera: Conjunctivae normal.   Cardiovascular:      Rate and Rhythm: Regular rhythm.      Heart sounds: Normal heart sounds.   Pulmonary:      Breath sounds: Normal breath sounds.   Abdominal:      General: Bowel sounds are normal.      Palpations: Abdomen is soft.   Musculoskeletal:      Cervical back: Normal range of motion and neck supple.      Comments: Right foot swelling   Skin:     Comments: Right foot dressing   Neurological:      Mental Status: He is alert.   Psychiatric:         Mood and Affect: Mood normal.         Behavior: Behavior normal.        Antibiotics  atorvastatin (Lipitor) tablet  aspirin EC tablet 81 mg  atorvastatin (Lipitor) tablet 40 mg  carvedilol (Coreg) tablet 12.5 mg  finasteride (Proscar) tablet 5 mg  OLANZapine (ZyPREXA) tablet 20 mg  pantoprazole (ProtoNix) EC tablet 40 mg  pantoprazole (ProtoNix) injection 40 mg  acetaminophen (Tylenol) tablet 650 mg  acetaminophen (Tylenol) oral liquid 650 mg  acetaminophen (Tylenol) suppository 650 mg  melatonin tablet 3 mg  polyethylene glycol (Glycolax, Miralax) packet 17 g  sodium chloride 0.9% infusion  lactated Ringer's bolus 500 mL  meropenem-vaborbactam (Vabomere) 1 g in sodium chloride 0.9% 250 mL IV  dextrose 50 % injection 25 g  glucagon (Glucagen)  injection 1 mg  dextrose 10 % in water (D10W) infusion  insulin lispro (HumaLOG) injection 0-10 Units  heparin (porcine) injection 5,000 Units  lactated Ringer's bolus 500 mL  meropenem (Merrem) in sodium chloride 0.9 % 100 mL IV 1 g  bupivacaine PF (Marcaine) injection  - Omnicell Override Pull  lidocaine (Xylocaine) 10 mg/mL (1 %) injection 1 mg  oxygen (O2) therapy  lactated Ringer's infusion  ondansetron (Zofran) injection 4 mg  hydrALAZINE (Apresoline) injection 5 mg  albuterol 2.5 mg /3 mL (0.083 %) nebulizer solution 2.5 mg  HYDROmorphone PF (Dilaudid) injection 0.2 mg  HYDROmorphone (Dilaudid) injection 0.4 mg  BUPivacaine HCl (Marcaine) 0.5 % (5 mg/mL) injection  lidocaine (Xylocaine) 10 mg/mL (1 %) injection 1 mg  oxygen (O2) therapy  lactated Ringer's infusion  ondansetron (Zofran) injection 4 mg  HYDROmorphone PF (Dilaudid) injection 0.2 mg  HYDROmorphone (Dilaudid) injection 0.4 mg  vancomycin-diluent combo no.1 (Xellia) IVPB 1,750 mg  iron sucrose (Venofer) injection 200 mg  cyanocobalamin (Vitamin B-12) injection 1,000 mcg  lidocaine (Xylocaine) 10 mg/mL (1 %) injection 50 mg  alteplase (Cathflo Activase) injection 2 mg  polyethylene glycol (Glycolax, Miralax) packet 17 g  sennosides-docusate sodium (Nieves-Colace) 8.6-50 mg per tablet 1 tablet  insulin lispro (HumaLOG) injection 0-10 Units  vancomycin-diluent combo no.1 (Xellia) IVPB 1,750 mg  lidocaine (Xylocaine) 10 mg/mL (1 %) injection 1 mg  lactated Ringer's infusion  HYDROmorphone PF (Dilaudid) injection 0.2 mg  fentaNYL PF (Sublimaze) injection 50 mcg  midazolam (Versed) injection 1 mg  ondansetron (Zofran) injection 4 mg  promethazine (Phenergan) 6.25 mg in sodium chloride 0.9% 50 mL IV  meperidine PF (Demerol) injection 12.5 mg  albuterol 2.5 mg /3 mL (0.083 %) nebulizer solution 2.5 mg  vancomycin (Vancocin) vial for injection  - Omnicell Override Pull  bupivacaine PF (Marcaine) injection  - Omnicell Override Pull  BUPivacaine HCl (Marcaine)  "0.5 % (5 mg/mL) injection  thrombin (recombinant) (Recothrom) topical solution  - Omnicell Override Pull  thrombin (recombinant) (Recothrom) topical solution  - Omnicell Override Pull  thrombin solution  lidocaine (Xylocaine) 10 mg/mL (1 %) injection 50 mg  alteplase (Cathflo Activase) injection 2 mg  vancomycin-diluent combo no.1 (Xellia) IVPB 1,750 mg      Relevant Results  Labs  Results from last 72 hours   Lab Units 01/27/24  0507 01/26/24  1727 01/26/24  0512 01/25/24  0511   WBC AUTO x10*3/uL 18.7*  --  16.5* 11.4*   HEMOGLOBIN g/dL 7.4* 8.3* 7.9* 7.7*   HEMATOCRIT % 21.7* 24.1* 23.3* 22.8*   PLATELETS AUTO x10*3/uL 230  --  236 189   LYMPHO PCT MAN % 9.0  --  16.0 9.0   MONO PCT MAN % 4.0  --  3.0 8.0   EOSINO PCT MAN % 5.0  --  1.0 1.0     Results from last 72 hours   Lab Units 01/27/24  0507 01/26/24  0512 01/25/24  0511   SODIUM mmol/L 134 136 136   POTASSIUM mmol/L 3.7 3.7 3.5   CHLORIDE mmol/L 106 109* 107   CO2 mmol/L 19* 18* 17*   BUN mg/dL 33* 46* 61*   CREATININE mg/dL 1.10 1.30 1.60   GLUCOSE mg/dL 100* 107* 98   CALCIUM mg/dL 7.9* 8.1* 8.1*   ANION GAP mmol/L 9 9 12   EGFR mL/min/1.73m*2 76 63 49*     Results from last 72 hours   Lab Units 01/27/24  0507 01/26/24  0512 01/25/24  0511   ALK PHOS U/L 155* 159* 127*   BILIRUBIN TOTAL mg/dL 0.3 0.3 0.3   PROTEIN TOTAL g/dL 5.5* 5.8* 5.6*   ALT U/L 88* 90* 43*   AST U/L 75* 97* 60*   ALBUMIN g/dL 2.2* 2.2* 2.2*     Estimated Creatinine Clearance: 94.7 mL/min (by C-G formula based on SCr of 1.1 mg/dL).  No results found for: \"CRP\"  Microbiology  Susceptibility data from last 14 days.  Collected Specimen Info Organism   01/24/24 Swab from DIGIT, ACCESSORY RIGHT FOOT Mixed Aerobic and Anaerobic Bacteria    01/23/24 Tissue/Biopsy from Wound/Tissue Mixed Gram-Positive and Gram-Negative Bacteria   Reviewed  Imaging  XR foot right 3+ views    Result Date: 1/26/2024  Interpreted By:  Christina Doran, STUDY: XR FOOT RIGHT 3+ VIEWS  1/26/2024 6:46 pm   " INDICATION: Signs/Symptoms:post op ray resection   COMPARISON: None.   ACCESSION NUMBER(S): [id number]   ORDERING CLINICIAN: ANGEL LUIS BORGES   TECHNIQUE: Three views of the Right foot were performed.   FINDINGS: Interval postsurgical changes of distal right 5th digit ray resection. Soft tissue swelling and gas is seen.       See findings.   MACRO: None.   Signed by: Christina Doran 1/26/2024 8:09 PM Dictation workstation:   [id number]    ECG 12 lead    Result Date: 1/24/2024  Normal sinus rhythm Possible Septal infarct , age undetermined Abnormal ECG No previous ECGs available Confirmed by Bobo Thornton ([id number]) on 1/24/2024 2:03:37 PM    US renal complete    Result Date: 1/24/2024  STUDY: Renal and Bladder Ultrasound; 1/23/2024 10:36 PM. INDICATION:  Acute renal failure.  COMPARISON:  None available. ACCESSION NUMBER(S): [id number] ORDERING CLINICIAN: GARCIA DUARTE TECHNIQUE: Ultrasound of the Kidneys and Bladder. Multiple images of the abdomen were obtained. FINDINGS: RIGHT KIDNEY: The right kidney measures 12.7 cm in length. Renal cortical echotexture is normal. There is no hydronephrosis. There are no stones. There is a cyst with a thick internal septation that measures 1.9 x 2.1 x 1.5 cm within the superior pole. LEFT KIDNEY: The left kidney measures 12.9 cm in length. Renal cortical echotexture is normal. There is no hydronephrosis. There are no stones. There are no cysts.  BLADDER: The urinary bladder is anechoic. The distended bladder shows no evidence of wall thickening. The right ureteral jet is visualized. The prostate gland measures 5.0 x 3.6 x 4.8 cm (44cc).       Septated right renal cyst consistent with a Bosniak class II lesion. No hydronephrosis bilaterally.  Follow-up ultrasound is recommended in 6 months. Enlarged prostate. Signed by Carlos Luke    XR tibia fibula right 2 views    Result Date: 1/23/2024  STUDY: Tibia and Fibula Radiographs; 1/23/2024 at 10:42 PM INDICATION: Soft tissue  gas. COMPARISON: XR right foot 1/23/2024. ACCESSION NUMBER(S): [id number] ORDERING CLINICIAN: GARCIA DUARTE TECHNIQUE:  4 view(s) of the right tibia and fibula. FINDINGS:  There is no displaced fracture.  The alignment is anatomic. Superficial soft tissue edema is noted.  Peripheral vascular calcifications are present.  Soft tissue emphysema is seen along the dorsal aspect of the midfoot and anterior to the distal tibia.    Generalized soft tissue edema with soft tissue emphysema seen surrounding the ventral aspect of the ankle and dorsal aspect of the midfoot, concerning for soft tissue infection. Peripheral vascular disease. No acute osseous normality identified. Signed by Carlos Luke    XR foot right 3+ views    Result Date: 1/23/2024  Interpreted By:  Kirstie Moy, STUDY: XR FOOT RIGHT 3+ VIEWS 1/23/2024 2:03 pm   INDICATION: Swelling   COMPARISON: None available.   ACCESSION NUMBER(S): [id number]   ORDERING CLINICIAN: GARCIA DUARTE   TECHNIQUE: Three views of right foot   FINDINGS: There is marked soft tissue swelling of the right ankle and foot. There are multiple pockets of gas seen within the right forefoot laterally centered about the 5th MTP joint with this gas extending into the webspace between the 4th and 5th toes and also extending into the midfoot and hindfoot anteriorly.   However, no obvious bony destruction is observed to indicate osteomyelitis.       Soft tissue infection of the right foot centered along the lateral aspect of the forefoot with probable abscess formation and gas gangrene. However, no plain film evidence of osteomyelitis is observed.   Signed by: Kirstie Moy 1/23/2024 2:34 PM Dictation workstation:   [id number]     Assessment/Plan   Right diabetic foot ulcer-Hall 4  Right foot osteomyelitis/abscess-suspect polymicrobial, negative wound culture  Type 2 diabetes with peripheral angiopathy with gangrene  Resolving acute kidney injury  Resolving leukocytosis     Continue  vancomycin  Discontinue meropenem  IV Zosyn  Follow-up wound culture  Follow-up blood cultures  Glycemic control  Supportive care  Monitor temperature and WBC  Long-term plan is IV vancomycin and Zosyn to 3/6/2024  Discharge planning      Srikanth Saravia MD

## 2024-01-27 NOTE — CARE PLAN
The patient's goals for the shift include  comfort    The clinical goals for the shift include comfort and safety

## 2024-01-27 NOTE — CARE PLAN
Problem: Pain  Goal: My pain/discomfort is manageable  Outcome: Progressing     Problem: Safety  Goal: Patient will be injury free during hospitalization  Outcome: Progressing  Goal: I will remain free of falls  Outcome: Progressing     Problem: Daily Care  Goal: Daily care needs are met  Outcome: Progressing     Problem: Psychosocial Needs  Goal: Demonstrates ability to cope with hospitalization/illness  Outcome: Progressing  Goal: Collaborate with me, my family, and caregiver to identify my specific goals  Outcome: Progressing     Problem: Discharge Barriers  Goal: My discharge needs are met  Outcome: Progressing     Problem: Skin  Goal: Decreased wound size/increased tissue granulation at next dressing change  Outcome: Progressing  Goal: Participates in plan/prevention/treatment measures  Outcome: Progressing  Goal: Prevent/manage excess moisture  Outcome: Progressing  Goal: Prevent/minimize sheer/friction injuries  Outcome: Progressing  Goal: Promote/optimize nutrition  Outcome: Progressing  Goal: Promote skin healing  Outcome: Progressing     Problem: Deep Vein Thrombosis  Goal: I will remain free from complications of deep vein thrombosis and maintain current level of mobility  Outcome: Progressing   The patient's goals for the shift include      The clinical goals for the shift include comfort and safety    Over the shift, the patient did not make progress toward the following goals. Barriers to progression include infection. Recommendations to address these barriers include iv abx, therapy.

## 2024-01-27 NOTE — PROGRESS NOTES
"Vancomycin Dosing by Pharmacy- FOLLOW UP    Claude Coley is a 63 y.o. year old male who Pharmacy has been consulted for vancomycin dosing for cellulitis, skin and soft tissue. Based on the patient's indication and renal status this patient is being dosed based on a goal AUC of 400-600.     Renal function is currently improving.  Patient presented with FRANCISCA and was dosed by level.   As of today, scr returned to baseline, nephrology signed off, will switch to AUC dosing model.    Most recent random level: 12 mcg/mL    Visit Vitals  /52 (BP Location: Left arm, Patient Position: Lying)   Pulse 74   Temp 37.7 °C (99.9 °F) (Oral)   Resp 20        Lab Results   Component Value Date    CREATININE 1.30 01/26/2024    CREATININE 1.60 01/25/2024    CREATININE 1.70 (H) 01/24/2024    CREATININE 2.00 (H) 01/23/2024        Patient weight is No results found for: \"PTWEIGHT\"    No results found for: \"CULTURE\"     I/O last 3 completed shifts:  In: 2030 (17.7 mL/kg) [P.O.:1680; IV Piggyback:350]  Out: 1130 (9.9 mL/kg) [Urine:1100 (0.3 mL/kg/hr); Blood:30]  Weight: 114.5 kg   [unfilled]    No results found for: \"PATIENTTEMP\"     Assessment/Plan    Below goal random/trough level, improving renal function.  Orders entered for vanco 1750mg q24h and follow up utilizing AUC dosing if renal function remains stable.    This dosing regimen is predicted by [employer] to result in the following pharmacokinetic parameters:  <<<<<[employer] DATA >>>>>    Loading dose: N/A  Regimen: 1750 mg IV every 24 hours.  Start time: 21:53 on 01/26/2024  Exposure target: AUC24 (range)400-600 mg/L.hr   AUC24,ss: 461 mg/L.hr  Probability of AUC24 > 400: 83 %  Ctrough,ss: 13.2 mg/L  Probability of Ctrough,ss > 20: 4 %  Probability of nephrotoxicity (Lodise ADRYAN 2009): 8 %    The next level will be obtained on 1/28 at 0500. May be obtained sooner if clinically indicated.   Will continue to monitor renal function daily while on vancomycin and order serum " creatinine at least every 48 hours if not already ordered.  Follow for continued vancomycin needs, clinical response, and signs/symptoms of toxicity.       DEANA OVALLE, MckayD

## 2024-01-27 NOTE — PROGRESS NOTES
Claude Coley is a 63 y.o. male on day 4 of admission presenting with Gas gangrene of foot (CMS/HCC).      Subjective   63-year-old who had incision and drainage of his right foot to the gas gangrene.  Patient overall reports no pain admits to swelling in her toes.  Patient denies chest pain or shortness of breath or nausea.  Patient denies any dysphagia or odontophagia.  Admits to generalized difficulty in ambulation due to recent surgery.       Objective          Vitals 24HR  Heart Rate:  [62-74]   Temp:  [36.2 °C (97.2 °F)-37.7 °C (99.9 °F)]   Resp:  [17-21]   BP: (114-152)/(44-61)   SpO2:  [95 %-98 %]         Intake/Output last 3 Shifts:    Intake/Output Summary (Last 24 hours) at 1/27/2024 1405  Last data filed at 1/27/2024 1044  Gross per 24 hour   Intake 675 ml   Output 500 ml   Net 175 ml       Physical Exam  Constitutional:       Appearance: Normal appearance.   HENT:      Head: Normocephalic and atraumatic.   Cardiovascular:      Rate and Rhythm: Normal rate and regular rhythm.   Pulmonary:      Effort: Pulmonary effort is normal.      Breath sounds: Normal breath sounds.   Abdominal:      General: Abdomen is flat.      Palpations: Abdomen is soft.   Musculoskeletal:      Comments: Right foot bandage appears clean dry intact swelling of the right toes.   Skin:     General: Skin is warm.   Neurological:      General: No focal deficit present.      Mental Status: He is alert.   Psychiatric:         Mood and Affect: Mood normal.         Relevant Results               Assessment/Plan      This patient has a central line   Reason for the central line remaining today? Parenteral medication          Principal Problem:    Gas gangrene of foot (CMS/HCC)  Active Problems:    HTN (hypertension)    Hyperlipidemia    Diabetes mellitus, type 2 (CMS/HCC)    1.  Status post incision and drainage right foot.  By podiatry service continued PICC line monitoring and IV antibiotics.  Vancomycin and Merrem  2.  Hypertension  stable on Coreg 12.5 oral twice daily  3.  Diabetes type 2 continue low sliding scale blood sugar appears to be stable  4.  GERD continue Protonix  5 3 plan of care continue PT OT therapy consider skilled nursing placement       I spent less than 30 minutes in the professional and overall care of this patient.      Heath Turner MD

## 2024-01-27 NOTE — DISCHARGE INSTRUCTIONS
PODIATRY DISCHARGE INSTRUCTIONS  Please call [phone] to schedule appointment with Dr. Perry for hospital follow-up in 1 week after discharge or sooner if any problems or questions arise.  Please remain partial weight bearing to the heel with surgical shoe in place.  Keep dressing clean and dry until your first follow up appointment. Wound vac in place running at 125 mmHg. Keep in place. See wound care orders for if vac detects a leak/stops working.  Do not shower unless using a cast protector to protect dressing.  If dressing gets wet, change or call office immediately as this can lead to increased risk of infection.  Elevate surgical extremity as much as possible to help with pain and swelling.  If you have any problems or notice any unusual symptoms such as bleeding, excessive pain, fever, redness, swelling and/or discharge please call your Dr. Perry's office at [phone].

## 2024-01-28 LAB
ALBUMIN SERPL-MCNC: 2.1 G/DL (ref 3.5–5)
ALP BLD-CCNC: 163 U/L (ref 35–125)
ALT SERPL-CCNC: 78 U/L (ref 5–40)
ANION GAP SERPL CALC-SCNC: 8 MMOL/L
AST SERPL-CCNC: 55 U/L (ref 5–40)
BASOPHILS # BLD MANUAL: 0 X10*3/UL (ref 0–0.1)
BASOPHILS NFR BLD MANUAL: 0 %
BILIRUB SERPL-MCNC: 0.3 MG/DL (ref 0.1–1.2)
BUN SERPL-MCNC: 25 MG/DL (ref 8–25)
CALCIUM SERPL-MCNC: 7.8 MG/DL (ref 8.5–10.4)
CHLORIDE SERPL-SCNC: 104 MMOL/L (ref 97–107)
CO2 SERPL-SCNC: 20 MMOL/L (ref 24–31)
CREAT SERPL-MCNC: 1.1 MG/DL (ref 0.4–1.6)
EGFRCR SERPLBLD CKD-EPI 2021: 76 ML/MIN/1.73M*2
EOSINOPHIL # BLD MANUAL: 0.26 X10*3/UL (ref 0–0.7)
EOSINOPHIL NFR BLD MANUAL: 2 %
ERYTHROCYTE [DISTWIDTH] IN BLOOD BY AUTOMATED COUNT: 13 % (ref 11.5–14.5)
GLUCOSE BLD MANUAL STRIP-MCNC: 109 MG/DL (ref 74–99)
GLUCOSE BLD MANUAL STRIP-MCNC: 114 MG/DL (ref 74–99)
GLUCOSE BLD MANUAL STRIP-MCNC: 128 MG/DL (ref 74–99)
GLUCOSE BLD MANUAL STRIP-MCNC: 131 MG/DL (ref 74–99)
GLUCOSE SERPL-MCNC: 105 MG/DL (ref 65–99)
HCT VFR BLD AUTO: 21.5 % (ref 41–52)
HGB BLD-MCNC: 7.3 G/DL (ref 13.5–17.5)
IMM GRANULOCYTES # BLD AUTO: 1.47 X10*3/UL (ref 0–0.7)
IMM GRANULOCYTES NFR BLD AUTO: 11.3 % (ref 0–0.9)
LYMPHOCYTES # BLD MANUAL: 0.91 X10*3/UL (ref 1.2–4.8)
LYMPHOCYTES NFR BLD MANUAL: 7 %
MCH RBC QN AUTO: 30.2 PG (ref 26–34)
MCHC RBC AUTO-ENTMCNC: 34 G/DL (ref 32–36)
MCV RBC AUTO: 89 FL (ref 80–100)
METAMYELOCYTES # BLD MANUAL: 0.65 X10*3/UL
METAMYELOCYTES NFR BLD MANUAL: 5 %
MONOCYTES # BLD MANUAL: 0.13 X10*3/UL (ref 0.1–1)
MONOCYTES NFR BLD MANUAL: 1 %
MYELOCYTES # BLD MANUAL: 0.13 X10*3/UL
MYELOCYTES NFR BLD MANUAL: 1 %
NEUTROPHILS # BLD MANUAL: 10.92 X10*3/UL (ref 1.2–7.7)
NEUTS BAND # BLD MANUAL: 0.65 X10*3/UL (ref 0–0.7)
NEUTS BAND NFR BLD MANUAL: 5 %
NEUTS SEG # BLD MANUAL: 10.27 X10*3/UL (ref 1.2–7)
NEUTS SEG NFR BLD MANUAL: 79 %
NRBC BLD-RTO: 0.2 /100 WBCS (ref 0–0)
PLATELET # BLD AUTO: 245 X10*3/UL (ref 150–450)
POLYCHROMASIA BLD QL SMEAR: ABNORMAL
POTASSIUM SERPL-SCNC: 3.9 MMOL/L (ref 3.4–5.1)
PROT SERPL-MCNC: 5.9 G/DL (ref 5.9–7.9)
RBC # BLD AUTO: 2.42 X10*6/UL (ref 4.5–5.9)
RBC MORPH BLD: ABNORMAL
SODIUM SERPL-SCNC: 132 MMOL/L (ref 133–145)
STAPHYLOCOCCUS SPEC CULT: NORMAL
TOTAL CELLS COUNTED BLD: 100
WBC # BLD AUTO: 13 X10*3/UL (ref 4.4–11.3)

## 2024-01-28 PROCEDURE — 85007 BL SMEAR W/DIFF WBC COUNT: CPT | Performed by: INTERNAL MEDICINE

## 2024-01-28 PROCEDURE — 84075 ASSAY ALKALINE PHOSPHATASE: CPT | Performed by: INTERNAL MEDICINE

## 2024-01-28 PROCEDURE — 82947 ASSAY GLUCOSE BLOOD QUANT: CPT

## 2024-01-28 PROCEDURE — 1200000002 HC GENERAL ROOM WITH TELEMETRY DAILY

## 2024-01-28 PROCEDURE — 97530 THERAPEUTIC ACTIVITIES: CPT | Mod: GP | Performed by: PHYSICAL THERAPIST

## 2024-01-28 PROCEDURE — 85027 COMPLETE CBC AUTOMATED: CPT | Performed by: INTERNAL MEDICINE

## 2024-01-28 PROCEDURE — 2500000001 HC RX 250 WO HCPCS SELF ADMINISTERED DRUGS (ALT 637 FOR MEDICARE OP): Performed by: INTERNAL MEDICINE

## 2024-01-28 PROCEDURE — 2500000004 HC RX 250 GENERAL PHARMACY W/ HCPCS (ALT 636 FOR OP/ED): Performed by: INTERNAL MEDICINE

## 2024-01-28 PROCEDURE — 82550 ASSAY OF CK (CPK): CPT | Performed by: INTERNAL MEDICINE

## 2024-01-28 PROCEDURE — 97110 THERAPEUTIC EXERCISES: CPT | Mod: GP | Performed by: PHYSICAL THERAPIST

## 2024-01-28 PROCEDURE — 2500000004 HC RX 250 GENERAL PHARMACY W/ HCPCS (ALT 636 FOR OP/ED): Performed by: NURSE PRACTITIONER

## 2024-01-28 PROCEDURE — 36591 DRAW BLOOD OFF VENOUS DEVICE: CPT | Performed by: INTERNAL MEDICINE

## 2024-01-28 PROCEDURE — 80202 ASSAY OF VANCOMYCIN: CPT | Performed by: INTERNAL MEDICINE

## 2024-01-28 RX ORDER — VANCOMYCIN/0.9 % SOD CHLORIDE 1.75G/25
1.75 PLASTIC BAG, INJECTION (ML) INTRAVENOUS DAILY
Qty: 250 ML | Refills: 35 | Status: SHIPPED | OUTPATIENT
Start: 2024-01-28 | End: 2024-02-01 | Stop reason: HOSPADM

## 2024-01-28 RX ADMIN — HEPARIN SODIUM 5000 UNITS: 5000 INJECTION, SOLUTION INTRAVENOUS; SUBCUTANEOUS at 14:15

## 2024-01-28 RX ADMIN — PIPERACILLIN SODIUM AND TAZOBACTAM SODIUM 3.38 G: 3; .375 INJECTION, SOLUTION INTRAVENOUS at 09:24

## 2024-01-28 RX ADMIN — FINASTERIDE 5 MG: 5 TABLET, FILM COATED ORAL at 09:22

## 2024-01-28 RX ADMIN — PANTOPRAZOLE SODIUM 40 MG: 40 TABLET, DELAYED RELEASE ORAL at 09:22

## 2024-01-28 RX ADMIN — SENNOSIDES AND DOCUSATE SODIUM 1 TABLET: 8.6; 5 TABLET ORAL at 09:22

## 2024-01-28 RX ADMIN — PIPERACILLIN SODIUM AND TAZOBACTAM SODIUM 3.38 G: 3; .375 INJECTION, SOLUTION INTRAVENOUS at 20:26

## 2024-01-28 RX ADMIN — IRON SUCROSE 200 MG: 20 INJECTION, SOLUTION INTRAVENOUS at 06:12

## 2024-01-28 RX ADMIN — CARVEDILOL 12.5 MG: 12.5 TABLET, FILM COATED ORAL at 09:22

## 2024-01-28 RX ADMIN — CARVEDILOL 12.5 MG: 12.5 TABLET, FILM COATED ORAL at 17:27

## 2024-01-28 RX ADMIN — SENNOSIDES AND DOCUSATE SODIUM 1 TABLET: 8.6; 5 TABLET ORAL at 20:26

## 2024-01-28 RX ADMIN — ASPIRIN 81 MG: 81 TABLET, COATED ORAL at 09:22

## 2024-01-28 RX ADMIN — PIPERACILLIN SODIUM AND TAZOBACTAM SODIUM 3.38 G: 3; .375 INJECTION, SOLUTION INTRAVENOUS at 14:15

## 2024-01-28 RX ADMIN — ACETAMINOPHEN 650 MG: 325 TABLET ORAL at 09:27

## 2024-01-28 RX ADMIN — HEPARIN SODIUM 5000 UNITS: 5000 INJECTION, SOLUTION INTRAVENOUS; SUBCUTANEOUS at 06:12

## 2024-01-28 RX ADMIN — Medication 3 MG: at 20:26

## 2024-01-28 RX ADMIN — VANCOMYCIN 1750 MG: 1.25 INJECTION, SOLUTION INTRAVENOUS at 21:51

## 2024-01-28 RX ADMIN — OLANZAPINE 20 MG: 10 TABLET, FILM COATED ORAL at 20:26

## 2024-01-28 RX ADMIN — POLYETHYLENE GLYCOL 3350 17 G: 17 POWDER, FOR SOLUTION ORAL at 09:24

## 2024-01-28 ASSESSMENT — COGNITIVE AND FUNCTIONAL STATUS - GENERAL
CLIMB 3 TO 5 STEPS WITH RAILING: TOTAL
PERSONAL GROOMING: A LITTLE
WALKING IN HOSPITAL ROOM: A LOT
TOILETING: A LITTLE
WALKING IN HOSPITAL ROOM: A LOT
TURNING FROM BACK TO SIDE WHILE IN FLAT BAD: A LITTLE
MOVING TO AND FROM BED TO CHAIR: A LITTLE
HELP NEEDED FOR BATHING: A LITTLE
CLIMB 3 TO 5 STEPS WITH RAILING: TOTAL
DRESSING REGULAR LOWER BODY CLOTHING: A LOT
MOVING TO AND FROM BED TO CHAIR: A LOT
DAILY ACTIVITIY SCORE: 18
DRESSING REGULAR UPPER BODY CLOTHING: A LITTLE
STANDING UP FROM CHAIR USING ARMS: A LITTLE
MOBILITY SCORE: 15
MOBILITY SCORE: 18

## 2024-01-28 ASSESSMENT — PAIN SCALES - GENERAL
PAINLEVEL_OUTOF10: 4
PAINLEVEL_OUTOF10: 5 - MODERATE PAIN

## 2024-01-28 ASSESSMENT — PAIN - FUNCTIONAL ASSESSMENT
PAIN_FUNCTIONAL_ASSESSMENT: 0-10
PAIN_FUNCTIONAL_ASSESSMENT: 0-10

## 2024-01-28 ASSESSMENT — PAIN DESCRIPTION - DESCRIPTORS: DESCRIPTORS: THROBBING

## 2024-01-28 NOTE — CARE PLAN
The patient's goals for the shift include      The clinical goals for the shift include safety and rest      Problem: Safety  Goal: Patient will be injury free during hospitalization  Outcome: Progressing  Goal: I will remain free of falls  Outcome: Progressing     Problem: Daily Care  Goal: Daily care needs are met  Outcome: Progressing

## 2024-01-28 NOTE — PROGRESS NOTES
Claude Coley is a 63 y.o. male on day 5 of admission presenting with Gas gangrene of foot (CMS/HCC).    Subjective   Interval History:       Afebrile, no chills  No foot pain  No chest pain or shortness of breath  No nausea vomiting or diarrhea    Review of Systems   All other systems reviewed and are negative.      Objective   Range of Vitals (last 24 hours)  Heart Rate:  [63-67]   Temp:  [36.5 °C (97.7 °F)-37.4 °C (99.3 °F)]   Resp:  [14-26]   BP: (122-131)/(44-57)   SpO2:  [93 %-96 %]   Daily Weight  01/23/24 : 114 kg (252 lb 6.4 oz)    Body mass index is 32.39 kg/m².    Physical Exam  Constitutional:       Appearance: Normal appearance.   HENT:      Head: Normocephalic and atraumatic.      Nose: Nose normal.   Eyes:      Extraocular Movements: Extraocular movements intact.      Conjunctiva/sclera: Conjunctivae normal.   Cardiovascular:      Rate and Rhythm: Regular rhythm.      Heart sounds: Normal heart sounds.   Pulmonary:      Breath sounds: Normal breath sounds.   Abdominal:      General: Bowel sounds are normal.      Palpations: Abdomen is soft.   Musculoskeletal:      Cervical back: Normal range of motion and neck supple.      Comments: Right foot swelling   Skin:     Comments: Right foot dressing   Neurological:      Mental Status: He is alert.   Psychiatric:         Mood and Affect: Mood normal.         Behavior: Behavior normal.     Antibiotics  atorvastatin (Lipitor) tablet  aspirin EC tablet 81 mg  atorvastatin (Lipitor) tablet 40 mg  carvedilol (Coreg) tablet 12.5 mg  finasteride (Proscar) tablet 5 mg  OLANZapine (ZyPREXA) tablet 20 mg  pantoprazole (ProtoNix) EC tablet 40 mg  pantoprazole (ProtoNix) injection 40 mg  acetaminophen (Tylenol) tablet 650 mg  acetaminophen (Tylenol) oral liquid 650 mg  acetaminophen (Tylenol) suppository 650 mg  melatonin tablet 3 mg  polyethylene glycol (Glycolax, Miralax) packet 17 g  sodium chloride 0.9% infusion  lactated Ringer's bolus 500  mL  meropenem-vaborbactam (Vabomere) 1 g in sodium chloride 0.9% 250 mL IV  dextrose 50 % injection 25 g  glucagon (Glucagen) injection 1 mg  dextrose 10 % in water (D10W) infusion  insulin lispro (HumaLOG) injection 0-10 Units  heparin (porcine) injection 5,000 Units  lactated Ringer's bolus 500 mL  meropenem (Merrem) in sodium chloride 0.9 % 100 mL IV 1 g  bupivacaine PF (Marcaine) injection  - Omnicell Override Pull  lidocaine (Xylocaine) 10 mg/mL (1 %) injection 1 mg  oxygen (O2) therapy  lactated Ringer's infusion  ondansetron (Zofran) injection 4 mg  hydrALAZINE (Apresoline) injection 5 mg  albuterol 2.5 mg /3 mL (0.083 %) nebulizer solution 2.5 mg  HYDROmorphone PF (Dilaudid) injection 0.2 mg  HYDROmorphone (Dilaudid) injection 0.4 mg  BUPivacaine HCl (Marcaine) 0.5 % (5 mg/mL) injection  lidocaine (Xylocaine) 10 mg/mL (1 %) injection 1 mg  oxygen (O2) therapy  lactated Ringer's infusion  ondansetron (Zofran) injection 4 mg  HYDROmorphone PF (Dilaudid) injection 0.2 mg  HYDROmorphone (Dilaudid) injection 0.4 mg  vancomycin-diluent combo no.1 (Xellia) IVPB 1,750 mg  iron sucrose (Venofer) injection 200 mg  cyanocobalamin (Vitamin B-12) injection 1,000 mcg  lidocaine (Xylocaine) 10 mg/mL (1 %) injection 50 mg  alteplase (Cathflo Activase) injection 2 mg  polyethylene glycol (Glycolax, Miralax) packet 17 g  sennosides-docusate sodium (Nieves-Colace) 8.6-50 mg per tablet 1 tablet  insulin lispro (HumaLOG) injection 0-10 Units  vancomycin-diluent combo no.1 (Xellia) IVPB 1,750 mg  lidocaine (Xylocaine) 10 mg/mL (1 %) injection 1 mg  lactated Ringer's infusion  HYDROmorphone PF (Dilaudid) injection 0.2 mg  fentaNYL PF (Sublimaze) injection 50 mcg  midazolam (Versed) injection 1 mg  ondansetron (Zofran) injection 4 mg  promethazine (Phenergan) 6.25 mg in sodium chloride 0.9% 50 mL IV  meperidine PF (Demerol) injection 12.5 mg  albuterol 2.5 mg /3 mL (0.083 %) nebulizer solution 2.5 mg  vancomycin (Vancocin) vial for  "injection  - Omnicell Override Pull  bupivacaine PF (Marcaine) injection  - Omnicell Override Pull  BUPivacaine HCl (Marcaine) 0.5 % (5 mg/mL) injection  thrombin (recombinant) (Recothrom) topical solution  - Omnicell Override Pull  thrombin (recombinant) (Recothrom) topical solution  - Omnicell Override Pull  thrombin solution  lidocaine (Xylocaine) 10 mg/mL (1 %) injection 50 mg  alteplase (Cathflo Activase) injection 2 mg  vancomycin-diluent combo no.1 (Xellia) IVPB 1,750 mg  piperacillin-tazobactam-dextrose (Zosyn) IV 3.375 g      Relevant Results  Labs  Results from last 72 hours   Lab Units 01/27/24  0507 01/26/24  1727 01/26/24  0512   WBC AUTO x10*3/uL 18.7*  --  16.5*   HEMOGLOBIN g/dL 7.4* 8.3* 7.9*   HEMATOCRIT % 21.7* 24.1* 23.3*   PLATELETS AUTO x10*3/uL 230  --  236   LYMPHO PCT MAN % 9.0  --  16.0   MONO PCT MAN % 4.0  --  3.0   EOSINO PCT MAN % 5.0  --  1.0     Results from last 72 hours   Lab Units 01/27/24  0507 01/26/24  0512   SODIUM mmol/L 134 136   POTASSIUM mmol/L 3.7 3.7   CHLORIDE mmol/L 106 109*   CO2 mmol/L 19* 18*   BUN mg/dL 33* 46*   CREATININE mg/dL 1.10 1.30   GLUCOSE mg/dL 100* 107*   CALCIUM mg/dL 7.9* 8.1*   ANION GAP mmol/L 9 9   EGFR mL/min/1.73m*2 76 63     Results from last 72 hours   Lab Units 01/27/24  0507 01/26/24  0512   ALK PHOS U/L 155* 159*   BILIRUBIN TOTAL mg/dL 0.3 0.3   PROTEIN TOTAL g/dL 5.5* 5.8*   ALT U/L 88* 90*   AST U/L 75* 97*   ALBUMIN g/dL 2.2* 2.2*     Estimated Creatinine Clearance: 94.7 mL/min (by C-G formula based on SCr of 1.1 mg/dL).  No results found for: \"CRP\"  Microbiology  Susceptibility data from last 14 days.  Collected Specimen Info Organism   01/24/24 Swab from DIGIT, ACCESSORY RIGHT FOOT Mixed Aerobic and Anaerobic Bacteria    01/23/24 Tissue/Biopsy from Wound/Tissue Mixed Gram-Positive and Gram-Negative Bacteria     Imaging  XR foot right 3+ views    Result Date: 1/26/2024  Interpreted By:  Christina Doran, STUDY: XR FOOT RIGHT 3+ VIEWS  " 1/26/2024 6:46 pm   INDICATION: Signs/Symptoms:post op ray resection   COMPARISON: None.   ACCESSION NUMBER(S): [id number]   ORDERING CLINICIAN: ANGEL LUIS BORGES   TECHNIQUE: Three views of the Right foot were performed.   FINDINGS: Interval postsurgical changes of distal right 5th digit ray resection. Soft tissue swelling and gas is seen.       See findings.   MACRO: None.   Signed by: Christina Doran 1/26/2024 8:09 PM Dictation workstation:   [id number]    ECG 12 lead    Result Date: 1/24/2024  Normal sinus rhythm Possible Septal infarct , age undetermined Abnormal ECG No previous ECGs available Confirmed by Bobo Thornton ([id number]) on 1/24/2024 2:03:37 PM    US renal complete    Result Date: 1/24/2024  STUDY: Renal and Bladder Ultrasound; 1/23/2024 10:36 PM. INDICATION:  Acute renal failure.  COMPARISON:  None available. ACCESSION NUMBER(S): [id number] ORDERING CLINICIAN: GARCIA DUARTE TECHNIQUE: Ultrasound of the Kidneys and Bladder. Multiple images of the abdomen were obtained. FINDINGS: RIGHT KIDNEY: The right kidney measures 12.7 cm in length. Renal cortical echotexture is normal. There is no hydronephrosis. There are no stones. There is a cyst with a thick internal septation that measures 1.9 x 2.1 x 1.5 cm within the superior pole. LEFT KIDNEY: The left kidney measures 12.9 cm in length. Renal cortical echotexture is normal. There is no hydronephrosis. There are no stones. There are no cysts.  BLADDER: The urinary bladder is anechoic. The distended bladder shows no evidence of wall thickening. The right ureteral jet is visualized. The prostate gland measures 5.0 x 3.6 x 4.8 cm (44cc).       Septated right renal cyst consistent with a Bosniak class II lesion. No hydronephrosis bilaterally.  Follow-up ultrasound is recommended in 6 months. Enlarged prostate. Signed by Carlos Luke    XR tibia fibula right 2 views    Result Date: 1/23/2024  STUDY: Tibia and Fibula Radiographs; 1/23/2024 at 10:42 PM  INDICATION: Soft tissue gas. COMPARISON: XR right foot 1/23/2024. ACCESSION NUMBER(S): [id number] ORDERING CLINICIAN: GARCIA DUARTE TECHNIQUE:  4 view(s) of the right tibia and fibula. FINDINGS:  There is no displaced fracture.  The alignment is anatomic. Superficial soft tissue edema is noted.  Peripheral vascular calcifications are present.  Soft tissue emphysema is seen along the dorsal aspect of the midfoot and anterior to the distal tibia.    Generalized soft tissue edema with soft tissue emphysema seen surrounding the ventral aspect of the ankle and dorsal aspect of the midfoot, concerning for soft tissue infection. Peripheral vascular disease. No acute osseous normality identified. Signed by Carlos Luke    XR foot right 3+ views    Result Date: 1/23/2024  Interpreted By:  Kirstie Moy, STUDY: XR FOOT RIGHT 3+ VIEWS 1/23/2024 2:03 pm   INDICATION: Swelling   COMPARISON: None available.   ACCESSION NUMBER(S): [id number]   ORDERING CLINICIAN: GARCIA DUARTE   TECHNIQUE: Three views of right foot   FINDINGS: There is marked soft tissue swelling of the right ankle and foot. There are multiple pockets of gas seen within the right forefoot laterally centered about the 5th MTP joint with this gas extending into the webspace between the 4th and 5th toes and also extending into the midfoot and hindfoot anteriorly.   However, no obvious bony destruction is observed to indicate osteomyelitis.       Soft tissue infection of the right foot centered along the lateral aspect of the forefoot with probable abscess formation and gas gangrene. However, no plain film evidence of osteomyelitis is observed.   Signed by: Kirstie Moy 1/23/2024 2:34 PM Dictation workstation:   [id number]     Assessment/Plan     Right diabetic foot ulcer-Hall 4  Right foot osteomyelitis/abscess-suspect polymicrobial, negative wound culture  Type 2 diabetes with peripheral angiopathy with gangrene  Resolving acute kidney  injury  Leukocytosis-multifactorial     Continue vancomycin  IV Zosyn  Glycemic control  Supportive care  Monitor temperature and WBC  Long-term plan is IV vancomycin and Zosyn to 3/6/2024  Discharge planning    Srikanth Saravia MD

## 2024-01-28 NOTE — CARE PLAN
Problem: Pain  Goal: My pain/discomfort is manageable  Outcome: Progressing     Problem: Safety  Goal: Patient will be injury free during hospitalization  Outcome: Progressing  Goal: I will remain free of falls  Outcome: Progressing     Problem: Daily Care  Goal: Daily care needs are met  Outcome: Progressing     Problem: Psychosocial Needs  Goal: Demonstrates ability to cope with hospitalization/illness  Outcome: Progressing  Goal: Collaborate with me, my family, and caregiver to identify my specific goals  Outcome: Progressing     Problem: Discharge Barriers  Goal: My discharge needs are met  Outcome: Progressing     Problem: Skin  Goal: Decreased wound size/increased tissue granulation at next dressing change  Outcome: Progressing  Goal: Participates in plan/prevention/treatment measures  Outcome: Progressing  Goal: Prevent/manage excess moisture  Outcome: Progressing  Goal: Prevent/minimize sheer/friction injuries  Outcome: Progressing  Goal: Promote/optimize nutrition  Outcome: Progressing  Goal: Promote skin healing  Outcome: Progressing     Problem: Deep Vein Thrombosis  Goal: I will remain free from complications of deep vein thrombosis and maintain current level of mobility  Outcome: Progressing   The patient's goals for the shift include      The clinical goals for the shift include safety and rest    Over the shift, the patient did not make progress toward the following goals. Barriers to progression include weakness. Recommendations to address these barriers include continued PT/OT.

## 2024-01-28 NOTE — PROGRESS NOTES
[facility] attempted to discuss dc planning with the pt, called pts room x2 at extension [phone] and received no answer x2. Call made to pts home number in case this is a cell phone and voicemail received. No contacts listed to reach out to.      01/28/24 1211   Discharge Planning   Patient expects to be discharged to: [facility]

## 2024-01-28 NOTE — PROGRESS NOTES
Physical Therapy    Physical Therapy Treatment    Patient Name: Claude Coley  MRN: [id number]  Today's Date: 1/28/2024  Time Calculation  Start Time: 1100  Stop Time: 1130  Time Calculation (min): 30 min       Assessment/Plan   PT Assessment  Rehab Prognosis: Good  Assessment Comment: Pt tolerated transfers and ther ex well, no increase in pain. Refused ambulation with RW aroundhis room or going to bathroom .  PT Plan  Inpatient/Swing Bed or Outpatient: Inpatient  PT Plan  Treatment/Interventions: Transfer training, Gait training, Stair training, Balance training, Neuromuscular re-education, Strengthening, Endurance training  PT Plan: Skilled PT  PT Frequency: Daily  PT Discharge Recommendations: High intensity level of continued care  Equipment Recommended upon Discharge:  (to be determined)  PT Recommended Transfer Status: Assist x2, Assistive device  PT - OK to Discharge: Yes      General Visit Information:   PT  Visit  PT Received On: 01/28/24  General  Reason for Referral: gangrene, 5th ray amputation  General Comment: Pt reported he stayd up in his chair yesterday for 50 minutes and would like to do it again today.    Subjective   Precautions:     Vital Signs:       Objective   Pain:  Pain Assessment  Pain Assessment: 0-10  Pain Score: 4  Pain Type: Surgical pain  Pain Location: Foot  Pain Orientation: Right  Pain Descriptors: Throbbing  Response to Interventions: no increase in pain  Cognition:     Postural Control:     Extremity/Trunk Assessments:    Activity Tolerance:     Treatments:  Therapeutic Exercise  Therapeutic Exercise Performed: Yes  Therapeutic Exercise Activity 1: hip flexion x30  Therapeutic Exercise Activity 2: LAQ x30  Therapeutic Exercise Activity 3: hip add with pillow 3sec x30  Therapeutic Exercise Activity 4: hip abd with PT resistance 3sx15  Therapeutic Exercise Activity 5: LLE AP's HR/TR, RLE toe flex/ext    Therapeutic Activity  Therapeutic Activity Performed: Yes  Therapeutic  Activity 1: SPT to the Left rom bed to bedside chair. Pt declined ambulation.    Balance/Neuromuscular Re-Education  Balance/Neuromuscular Re-Education Activity Performed:  (worked static standing balance without UE 30sec)    Bed Mobility  Bed Mobility: Yes  Bed Mobility 1  Bed Mobility 1: Supine to sitting, Side lying left to sit, Rolling left  Level of Assistance 1: Modified independent    Ambulation/Gait Training  Ambulation/Gait Training Performed: No  Transfers  Transfer: Yes  Transfer 1  Transfer From 1: Bed to  Transfer to 1: Chair with arms  Technique 1: Stand pivot  Transfer Device 1: Walker  Transfer Level of Assistance 1: Close supervision  Trials/Comments 1: no toe off on RLE, derek lcontact only  Pt left sitting in bedside chair, needs in reach, nursing notified.   Outcome Measures:   [facility] 15/24    Education Documentation  No documentation found.  Education Comments  No comments found.        OP EDUCATION:       Encounter Problems       Encounter Problems (Active)       PT Goals       Patient will transfer sit to stand and stand to sit with  contact guard assist to facilitate mobility.  (Progressing)       Start:  01/25/24    Expected End:  02/08/24            Patient will transfer bed to chair and chair to bed with contact guard assist to facilitate mobility.  (Progressing)       Start:  01/25/24    Expected End:  02/08/24            Patient will amb 10 feet with rolling walker device including no turns on even surface with minimal assist to facilitate safe mobility.  (Progressing)       Start:  01/25/24    Expected End:  02/08/24            Demonstrate safe mobility techniques while maintaining WB status R LE. (Progressing)       Start:  01/25/24    Expected End:  02/08/24

## 2024-01-28 NOTE — PROGRESS NOTES
Claude Coley is a 63 y.o. male on day 5 of admission presenting with Gas gangrene of foot (CMS/HCC).      Subjective   63-year-old male currently denies any chest pain or abdominal pain admits to right foot appears to be less swollen than yesterday.  Patient denies any fevers chills.  Seen eating breakfast without difficulty.  Patient denies any fevers or chills or nausea or vomiting.       Objective          Vitals 24HR  Heart Rate:  [63-67]   Temp:  [36.5 °C (97.7 °F)-37.4 °C (99.3 °F)]   Resp:  [14-26]   BP: (122-131)/(44-57)   SpO2:  [93 %-96 %]       Intake/Output last 3 Shifts:    Intake/Output Summary (Last 24 hours) at 1/28/2024 1053  Last data filed at 1/28/2024 0956  Gross per 24 hour   Intake 525 ml   Output 250 ml   Net 275 ml       Physical Exam  Constitutional:       Appearance: Normal appearance.   HENT:      Head: Normocephalic and atraumatic.   Cardiovascular:      Rate and Rhythm: Normal rate and regular rhythm.   Pulmonary:      Effort: Pulmonary effort is normal.      Breath sounds: Normal breath sounds.   Abdominal:      General: Abdomen is flat.      Palpations: Abdomen is soft.   Musculoskeletal:      Comments: Right foot bandage appears clean dry intact swelling of the right toes.   Skin:     General: Skin is warm.   Neurological:      General: No focal deficit present.      Mental Status: He is alert.   Psychiatric:         Mood and Affect: Mood normal.   Relevant Results               Assessment/Plan              Principal Problem:    Gas gangrene of foot (CMS/HCC)  Active Problems:    HTN (hypertension)    Hyperlipidemia    Diabetes mellitus, type 2 (CMS/HCC)    1.  Status post incision and drainage right foot.  By podiatry service continued PICC line monitoring and IV antibiotics.  Vancomycin and Merrem  2.  Hypertension stable on Coreg 12.5 oral twice daily  3.  Diabetes type 2 continue low sliding scale blood sugar appears to be stable  4.  GERD continue Protonix  5 3 plan of care  continue PT OT therapy consider skilled nursing placement          I spent less than 30 minutes in the professional and overall care of this patient.      Heath Turner MD

## 2024-01-28 NOTE — PROGRESS NOTES
PODIATRY SERVICE CONSULT PROGRESS NOTE    SERVICE DATE: 1/28/2024   SERVICE TIME:  1:50 PM     Subjective   INTERVAL HPI:   Patient was seen at bedside. His brother is present in bedside chair.  Pain well controlled.  New scant drainage on deeper layers of dressing.  Patient denies any other constitutional symptoms.   No other pedal complaints.       Medications:  Scheduled Meds: aspirin, 81 mg, oral, Daily  carvedilol, 12.5 mg, oral, BID with meals  finasteride, 5 mg, oral, Daily  heparin (porcine), 5,000 Units, subcutaneous, q8h  insulin lispro, 0-10 Units, subcutaneous, Before meals & nightly  lidocaine, 5 mL, infiltration, Once  lidocaine, 5 mL, infiltration, Once  melatonin, 3 mg, oral, Nightly  OLANZapine, 20 mg, oral, Nightly  pantoprazole, 40 mg, oral, Daily  piperacillin-tazobactam, 3.375 g, intravenous, q6h  polyethylene glycol, 17 g, oral, Daily  sennosides-docusate sodium, 1 tablet, oral, BID  vancomycin-diluent combo no.1, 1,750 mg, intravenous, q24h      Continuous Infusions:    PRN Meds: PRN medications: acetaminophen **OR** [DISCONTINUED] acetaminophen **OR** [DISCONTINUED] acetaminophen, alteplase, alteplase, dextrose 10 % in water (D10W), dextrose, glucagon         Objective   PHYSICAL EXAM:  Physical Exam Performed:  Vitals:    01/28/24 0736   BP: 130/57   Pulse: 64   Resp: 14   Temp: 37 °C (98.6 °F)   SpO2: 93%     Body mass index is 32.39 kg/m².    Patient is AOx3 and in no acute distress. Patient is alert and cooperative. Sitting comfortably in bed with dressing dry and intact with some deep strikethrough.     Vascular: Palpable DP/PT pulses B/L. Severe pitting edema R however much improved. Hair growth absent B/L. CFT<5 to B/L hallux. Temperature is warm to cool from tibial tuberosity to distal digits B/L. No lymphatic streaking noted B/L.    Musculoskeletal: Gross active and passive ROM diminished to age and activity level. Moves all extremities spontaneously. No pain to palpation at feet  "B/L. Wound vac dressing in place to RLE    Neurological: Absent light touch sensation B/L. Pain stimuli absent B/L. Denies any numbness, burning or tingling.    Dermatologic: Skin appears diffusely xerotic B/L. Web spaces 1-4 B/L are clean, dry and intact. No rashes or nodules noted B/L. No hyperkeratotic tissue noted B/L.     Wound: Open Amputation Site - Right Lateral Forefoot  Vac dressing in place. Proximal anterior margin of vac dressing at anterior ankle with appreciable seropurulent drainage and dressing notably lifted by drainage. Area of vac dressing uncovered to reveal 1-2cc of drainage. Dressing otherwise clean, dry, and intact.  Vac canister with 275cc of dark red drainage, 75cc in last 24 hours. Vac functioning without alerts.            LABS:   Results for orders placed or performed during the hospital encounter of 01/23/24 (from the past 24 hour(s))   POCT GLUCOSE   Result Value Ref Range    POCT Glucose 131 (H) 74 - 99 mg/dL   POCT GLUCOSE   Result Value Ref Range    POCT Glucose 146 (H) 74 - 99 mg/dL   POCT GLUCOSE   Result Value Ref Range    POCT Glucose 114 (H) 74 - 99 mg/dL   POCT GLUCOSE   Result Value Ref Range    POCT Glucose 109 (H) 74 - 99 mg/dL      Lab Results   Component Value Date    HGBA1C 4.5 01/23/2024      No results found for: \"CRP\"   No results found for: \"SEDRATE\"     Results from last 7 days   Lab Units 01/27/24  0507   WBC AUTO x10*3/uL 18.7*   RBC AUTO x10*6/uL 2.42*   HEMOGLOBIN g/dL 7.4*   HEMATOCRIT % 21.7*     Results from last 7 days   Lab Units 01/27/24  0507   SODIUM mmol/L 134   POTASSIUM mmol/L 3.7   CHLORIDE mmol/L 106   CO2 mmol/L 19*   BUN mg/dL 33*   CREATININE mg/dL 1.10   CALCIUM mg/dL 7.9*   BILIRUBIN TOTAL mg/dL 0.3   ALT U/L 88*   AST U/L 75*     Results from last 7 days   Lab Units 01/24/24  0007   COLOR U  Yellow   APPEARANCE U  Clear   PH U  5.0   SPEC GRAV UR  1.018   PROTEIN U mg/dL 30 (1+)*   BLOOD UR  0.06 (1+)*   NITRITE U  NEGATIVE   WBC UR /HPF NONE "   BACTERIA UR /HPF 1+*       IMAGING REVIEW:  ECG 12 lead    Result Date: 1/24/2024  Normal sinus rhythm Possible Septal infarct , age undetermined Abnormal ECG No previous ECGs available Confirmed by Bobo Thornton ([id number]) on 1/24/2024 2:03:37 PM    US renal complete    Result Date: 1/24/2024  STUDY: Renal and Bladder Ultrasound; 1/23/2024 10:36 PM. INDICATION:  Acute renal failure.  COMPARISON:  None available. ACCESSION NUMBER(S): [id number] ORDERING CLINICIAN: GARCIA DUARTE TECHNIQUE: Ultrasound of the Kidneys and Bladder. Multiple images of the abdomen were obtained. FINDINGS: RIGHT KIDNEY: The right kidney measures 12.7 cm in length. Renal cortical echotexture is normal. There is no hydronephrosis. There are no stones. There is a cyst with a thick internal septation that measures 1.9 x 2.1 x 1.5 cm within the superior pole. LEFT KIDNEY: The left kidney measures 12.9 cm in length. Renal cortical echotexture is normal. There is no hydronephrosis. There are no stones. There are no cysts.  BLADDER: The urinary bladder is anechoic. The distended bladder shows no evidence of wall thickening. The right ureteral jet is visualized. The prostate gland measures 5.0 x 3.6 x 4.8 cm (44cc).       Septated right renal cyst consistent with a Bosniak class II lesion. No hydronephrosis bilaterally.  Follow-up ultrasound is recommended in 6 months. Enlarged prostate. Signed by Carlos Luke    XR tibia fibula right 2 views    Result Date: 1/23/2024  STUDY: Tibia and Fibula Radiographs; 1/23/2024 at 10:42 PM INDICATION: Soft tissue gas. COMPARISON: XR right foot 1/23/2024. ACCESSION NUMBER(S): [id number] ORDERING CLINICIAN: GARCIA DUARTE TECHNIQUE:  4 view(s) of the right tibia and fibula. FINDINGS:  There is no displaced fracture.  The alignment is anatomic. Superficial soft tissue edema is noted.  Peripheral vascular calcifications are present.  Soft tissue emphysema is seen along the dorsal aspect of the midfoot and  anterior to the distal tibia.    Generalized soft tissue edema with soft tissue emphysema seen surrounding the ventral aspect of the ankle and dorsal aspect of the midfoot, concerning for soft tissue infection. Peripheral vascular disease. No acute osseous normality identified. Signed by Carlos Luke    XR foot right 3+ views    Result Date: 1/23/2024  Interpreted By:  Kirstie Moy, STUDY: XR FOOT RIGHT 3+ VIEWS 1/23/2024 2:03 pm   INDICATION: Swelling   COMPARISON: None available.   ACCESSION NUMBER(S): [id number]   ORDERING CLINICIAN: GARCIA DUARTE   TECHNIQUE: Three views of right foot   FINDINGS: There is marked soft tissue swelling of the right ankle and foot. There are multiple pockets of gas seen within the right forefoot laterally centered about the 5th MTP joint with this gas extending into the webspace between the 4th and 5th toes and also extending into the midfoot and hindfoot anteriorly.   However, no obvious bony destruction is observed to indicate osteomyelitis.       Soft tissue infection of the right foot centered along the lateral aspect of the forefoot with probable abscess formation and gas gangrene. However, no plain film evidence of osteomyelitis is observed.   Signed by: Kirstie Moy 1/23/2024 2:34 PM Dictation workstation:   [id number]            Assessment/Plan   ASSESSMENT & PLAN:    #Gas Gangrene, RLE  #s/p I&D with Amputation of R 5th Digit (DOS: 01/24/24)  #s/p R 5th ray resection, application bilayer and wound vac (DOS: 01/26/24)  #DM Type II w/ Ulceration  #Chronic Non-Pressure Ulceration w/ Necrosis of Bone, Right Foot  #PAD  #Localized Edema  #Cellulitis, RLE     - Patient was seen and evaluated; all findings were discussed and all questions were answered to patient's satisfaction.  - Charts, labs, vitals and imaging all reviewed.   - Imaging: XR R Foot with soft tissue gas noted to the level of the ankle joint and post-surgical changes.  - Labs: New labs collected but not  resulted yet today. WBC 18.7 yesterday and 16.5 and 11.4 the days before. . HbA1c 4.5%.  - Surgical pathology from 01/24 findings consistent with focal acute osteomyelitis of right 5th proximal phalanx as well as abscess formation and marked necrosis of soft tissues  - Wound culture: 4+ Mixed G+ & G- Bacteria  - Intra-op culture from 01/26 pending  - Blood culture: Pending - NGTD at 4 days and final     Plan:  - Abx: IV Vancomycin and Zosyn per ID - PICC in RUE.  - Pt underwent emergent I&D with amputation of the 5th digit of the right foot after transfer to [facility] from [facility] due to OR availability. Due to emergent nature of case cardiac clearance was not obtained. Pt tolerated procedure well. Incision was packed open. Pt returned to OR for further excisional debridement of right 5th metatarsal and further I&D of dorsal and plantar foot with application of bilayer graft and application of wound vac. Plan to further manage with long-term IV antibiotics.  - Dressings: Vac dressing to remain clean, dry, and intact. To be changed by podiatry tomorrow.  275cc drainage present, 75cc over last 24 hours.  - Nursing staff is able to reinforce dressing if & as necessary. Thank you.  - Podiatry will continue to follow while in house.   - Pt OK to discharge to SNF     DVT ppx: Per primary.   Weightbearing: NWB RLE - OK to transfer with WB to heel with surgical shoe in place.  Discharge: Pt to follow up 1 week after discharge with Dr. Perry. Will have discharge plan updated.     Case to be discussed with attending, A&P above reflects a tentative plan. Please await for the final signature from the attending physician on service.    This patient will be followed by the Podiatry service. Please Epic Chat the corresponding residents below with questions or concerns.      Husam Lopez DPM PGY-2  Podiatric Medicine and Surgery   Epic Chat            SIGNATURE: Husam Lopez DPM PATIENT NAME: Claude Reedjasonvalentino    DATE: January 28, 2024 MRN: [id number]   TIME: 1:50 PM CONTACT: Haiku Message

## 2024-01-29 LAB
ALBUMIN SERPL-MCNC: NORMAL G/DL
ALP BLD-CCNC: NORMAL U/L
ALT SERPL-CCNC: NORMAL U/L
ANION GAP SERPL CALC-SCNC: NORMAL MMOL/L
AST SERPL-CCNC: NORMAL U/L
BASOPHILS # BLD MANUAL: 0 X10*3/UL (ref 0–0.1)
BASOPHILS # BLD MANUAL: 0.12 X10*3/UL (ref 0–0.1)
BASOPHILS NFR BLD MANUAL: 0 %
BASOPHILS NFR BLD MANUAL: 1 %
BILIRUB SERPL-MCNC: NORMAL MG/DL
BUN SERPL-MCNC: NORMAL MG/DL
CALCIUM SERPL-MCNC: NORMAL MG/DL
CHLORIDE SERPL-SCNC: NORMAL MMOL/L
CK SERPL-CCNC: NORMAL U/L
CO2 SERPL-SCNC: NORMAL MMOL/L
CREAT SERPL-MCNC: NORMAL MG/DL
EGFRCR SERPLBLD CKD-EPI 2021: NORMAL ML/MIN/{1.73_M2}
EOSINOPHIL # BLD MANUAL: 0.42 X10*3/UL (ref 0–0.7)
EOSINOPHIL # BLD MANUAL: 0.47 X10*3/UL (ref 0–0.7)
EOSINOPHIL NFR BLD MANUAL: 3 %
EOSINOPHIL NFR BLD MANUAL: 4 %
ERYTHROCYTE [DISTWIDTH] IN BLOOD BY AUTOMATED COUNT: 13 % (ref 11.5–14.5)
ERYTHROCYTE [DISTWIDTH] IN BLOOD BY AUTOMATED COUNT: 13.3 % (ref 11.5–14.5)
GLUCOSE BLD MANUAL STRIP-MCNC: 105 MG/DL (ref 74–99)
GLUCOSE BLD MANUAL STRIP-MCNC: 113 MG/DL (ref 74–99)
GLUCOSE BLD MANUAL STRIP-MCNC: 113 MG/DL (ref 74–99)
GLUCOSE BLD MANUAL STRIP-MCNC: 116 MG/DL (ref 74–99)
GLUCOSE BLD MANUAL STRIP-MCNC: 95 MG/DL (ref 74–99)
GLUCOSE SERPL-MCNC: NORMAL MG/DL
HCT VFR BLD AUTO: 20.7 % (ref 41–52)
HCT VFR BLD AUTO: 22.3 % (ref 41–52)
HGB BLD-MCNC: 7.1 G/DL (ref 13.5–17.5)
HGB BLD-MCNC: 7.1 G/DL (ref 13.5–17.5)
IMM GRANULOCYTES # BLD AUTO: 1.22 X10*3/UL (ref 0–0.7)
IMM GRANULOCYTES # BLD AUTO: 1.34 X10*3/UL (ref 0–0.7)
IMM GRANULOCYTES NFR BLD AUTO: 11.5 % (ref 0–0.9)
IMM GRANULOCYTES NFR BLD AUTO: 8.7 % (ref 0–0.9)
LYMPHOCYTES # BLD MANUAL: 1.41 X10*3/UL (ref 1.2–4.8)
LYMPHOCYTES # BLD MANUAL: 1.64 X10*3/UL (ref 1.2–4.8)
LYMPHOCYTES NFR BLD MANUAL: 10 %
LYMPHOCYTES NFR BLD MANUAL: 14 %
MCH RBC QN AUTO: 30.5 PG (ref 26–34)
MCH RBC QN AUTO: 30.7 PG (ref 26–34)
MCHC RBC AUTO-ENTMCNC: 31.8 G/DL (ref 32–36)
MCHC RBC AUTO-ENTMCNC: 34.3 G/DL (ref 32–36)
MCV RBC AUTO: 90 FL (ref 80–100)
MCV RBC AUTO: 96 FL (ref 80–100)
METAMYELOCYTES # BLD MANUAL: 0.35 X10*3/UL
METAMYELOCYTES NFR BLD MANUAL: 3 %
MONOCYTES # BLD MANUAL: 0.35 X10*3/UL (ref 0.1–1)
MONOCYTES # BLD MANUAL: 0.42 X10*3/UL (ref 0.1–1)
MONOCYTES NFR BLD MANUAL: 3 %
MONOCYTES NFR BLD MANUAL: 3 %
MYELOCYTES # BLD MANUAL: 0.35 X10*3/UL
MYELOCYTES NFR BLD MANUAL: 3 %
NEUTROPHILS # BLD MANUAL: 11.85 X10*3/UL (ref 1.2–7.7)
NEUTROPHILS # BLD MANUAL: 8.42 X10*3/UL (ref 1.2–7.7)
NEUTS BAND # BLD MANUAL: 0.23 X10*3/UL (ref 0–0.7)
NEUTS BAND # BLD MANUAL: 1.13 X10*3/UL (ref 0–0.7)
NEUTS BAND NFR BLD MANUAL: 2 %
NEUTS BAND NFR BLD MANUAL: 8 %
NEUTS SEG # BLD MANUAL: 10.72 X10*3/UL (ref 1.2–7)
NEUTS SEG # BLD MANUAL: 8.19 X10*3/UL (ref 1.2–7)
NEUTS SEG NFR BLD MANUAL: 70 %
NEUTS SEG NFR BLD MANUAL: 76 %
NRBC BLD-RTO: 0 /100 WBCS (ref 0–0)
NRBC BLD-RTO: 0.2 /100 WBCS (ref 0–0)
PLATELET # BLD AUTO: 232 X10*3/UL (ref 150–450)
PLATELET # BLD AUTO: 234 X10*3/UL (ref 150–450)
POLYCHROMASIA BLD QL SMEAR: ABNORMAL
POLYCHROMASIA BLD QL SMEAR: ABNORMAL
POTASSIUM SERPL-SCNC: NORMAL MMOL/L
PROT SERPL-MCNC: NORMAL G/DL
RBC # BLD AUTO: 2.31 X10*6/UL (ref 4.5–5.9)
RBC # BLD AUTO: 2.33 X10*6/UL (ref 4.5–5.9)
RBC MORPH BLD: ABNORMAL
RBC MORPH BLD: ABNORMAL
SODIUM SERPL-SCNC: NORMAL MMOL/L
TOTAL CELLS COUNTED BLD: 100
TOTAL CELLS COUNTED BLD: 100
VANCOMYCIN SERPL-MCNC: 23 UG/ML (ref 10–20)
VANCOMYCIN SERPL-MCNC: NORMAL UG/ML
WBC # BLD AUTO: 11.7 X10*3/UL (ref 4.4–11.3)
WBC # BLD AUTO: 14.1 X10*3/UL (ref 4.4–11.3)

## 2024-01-29 PROCEDURE — 85007 BL SMEAR W/DIFF WBC COUNT: CPT

## 2024-01-29 PROCEDURE — 80202 ASSAY OF VANCOMYCIN: CPT | Performed by: INTERNAL MEDICINE

## 2024-01-29 PROCEDURE — 82947 ASSAY GLUCOSE BLOOD QUANT: CPT

## 2024-01-29 PROCEDURE — 2500000004 HC RX 250 GENERAL PHARMACY W/ HCPCS (ALT 636 FOR OP/ED): Performed by: INTERNAL MEDICINE

## 2024-01-29 PROCEDURE — 97535 SELF CARE MNGMENT TRAINING: CPT | Mod: GO,CO

## 2024-01-29 PROCEDURE — 2500000004 HC RX 250 GENERAL PHARMACY W/ HCPCS (ALT 636 FOR OP/ED): Performed by: NURSE PRACTITIONER

## 2024-01-29 PROCEDURE — 2500000001 HC RX 250 WO HCPCS SELF ADMINISTERED DRUGS (ALT 637 FOR MEDICARE OP): Performed by: INTERNAL MEDICINE

## 2024-01-29 PROCEDURE — 97116 GAIT TRAINING THERAPY: CPT | Mod: GP,CQ

## 2024-01-29 PROCEDURE — 97110 THERAPEUTIC EXERCISES: CPT | Mod: GP,CQ

## 2024-01-29 PROCEDURE — 85027 COMPLETE CBC AUTOMATED: CPT

## 2024-01-29 PROCEDURE — 1200000002 HC GENERAL ROOM WITH TELEMETRY DAILY

## 2024-01-29 PROCEDURE — 99231 SBSQ HOSP IP/OBS SF/LOW 25: CPT | Performed by: NURSE PRACTITIONER

## 2024-01-29 RX ADMIN — PANTOPRAZOLE SODIUM 40 MG: 40 TABLET, DELAYED RELEASE ORAL at 09:00

## 2024-01-29 RX ADMIN — IRON SUCROSE 200 MG: 20 INJECTION, SOLUTION INTRAVENOUS at 13:11

## 2024-01-29 RX ADMIN — Medication 3 MG: at 20:02

## 2024-01-29 RX ADMIN — PIPERACILLIN SODIUM AND TAZOBACTAM SODIUM 3.38 G: 3; .375 INJECTION, SOLUTION INTRAVENOUS at 02:15

## 2024-01-29 RX ADMIN — SENNOSIDES AND DOCUSATE SODIUM 1 TABLET: 8.6; 5 TABLET ORAL at 09:00

## 2024-01-29 RX ADMIN — ACETAMINOPHEN 650 MG: 325 TABLET ORAL at 23:49

## 2024-01-29 RX ADMIN — ACETAMINOPHEN 650 MG: 325 TABLET ORAL at 02:05

## 2024-01-29 RX ADMIN — PIPERACILLIN SODIUM AND TAZOBACTAM SODIUM 3.38 G: 3; .375 INJECTION, SOLUTION INTRAVENOUS at 20:01

## 2024-01-29 RX ADMIN — CARVEDILOL 12.5 MG: 12.5 TABLET, FILM COATED ORAL at 09:00

## 2024-01-29 RX ADMIN — VANCOMYCIN 1750 MG: 1.25 INJECTION, SOLUTION INTRAVENOUS at 21:36

## 2024-01-29 RX ADMIN — FINASTERIDE 5 MG: 5 TABLET, FILM COATED ORAL at 09:00

## 2024-01-29 RX ADMIN — ASPIRIN 81 MG: 81 TABLET, COATED ORAL at 09:01

## 2024-01-29 RX ADMIN — HEPARIN SODIUM 5000 UNITS: 5000 INJECTION, SOLUTION INTRAVENOUS; SUBCUTANEOUS at 13:11

## 2024-01-29 RX ADMIN — PIPERACILLIN SODIUM AND TAZOBACTAM SODIUM 3.38 G: 3; .375 INJECTION, SOLUTION INTRAVENOUS at 14:33

## 2024-01-29 RX ADMIN — HEPARIN SODIUM 5000 UNITS: 5000 INJECTION, SOLUTION INTRAVENOUS; SUBCUTANEOUS at 21:36

## 2024-01-29 RX ADMIN — SENNOSIDES AND DOCUSATE SODIUM 1 TABLET: 8.6; 5 TABLET ORAL at 20:02

## 2024-01-29 RX ADMIN — OLANZAPINE 20 MG: 10 TABLET, FILM COATED ORAL at 20:02

## 2024-01-29 RX ADMIN — HEPARIN SODIUM 5000 UNITS: 5000 INJECTION, SOLUTION INTRAVENOUS; SUBCUTANEOUS at 05:37

## 2024-01-29 RX ADMIN — CARVEDILOL 12.5 MG: 12.5 TABLET, FILM COATED ORAL at 16:39

## 2024-01-29 RX ADMIN — PIPERACILLIN SODIUM AND TAZOBACTAM SODIUM 3.38 G: 3; .375 INJECTION, SOLUTION INTRAVENOUS at 09:03

## 2024-01-29 RX ADMIN — POLYETHYLENE GLYCOL 3350 17 G: 17 POWDER, FOR SOLUTION ORAL at 09:00

## 2024-01-29 ASSESSMENT — PAIN SCALES - GENERAL
PAINLEVEL_OUTOF10: 0 - NO PAIN
PAINLEVEL_OUTOF10: 4
PAINLEVEL_OUTOF10: 4
PAINLEVEL_OUTOF10: 0 - NO PAIN

## 2024-01-29 ASSESSMENT — COGNITIVE AND FUNCTIONAL STATUS - GENERAL
DAILY ACTIVITIY SCORE: 20
MOBILITY SCORE: 16
STANDING UP FROM CHAIR USING ARMS: A LOT
DAILY ACTIVITIY SCORE: 20
MOVING TO AND FROM BED TO CHAIR: A LITTLE
MOVING TO AND FROM BED TO CHAIR: A LITTLE
WALKING IN HOSPITAL ROOM: A LOT
WALKING IN HOSPITAL ROOM: A LOT
DRESSING REGULAR LOWER BODY CLOTHING: A LITTLE
HELP NEEDED FOR BATHING: A LITTLE
CLIMB 3 TO 5 STEPS WITH RAILING: TOTAL
CLIMB 3 TO 5 STEPS WITH RAILING: TOTAL
DAILY ACTIVITIY SCORE: 20
DRESSING REGULAR UPPER BODY CLOTHING: A LITTLE
HELP NEEDED FOR BATHING: A LITTLE
STANDING UP FROM CHAIR USING ARMS: A LOT
MOVING TO AND FROM BED TO CHAIR: A LITTLE
DRESSING REGULAR LOWER BODY CLOTHING: A LITTLE
DRESSING REGULAR UPPER BODY CLOTHING: A LITTLE
HELP NEEDED FOR BATHING: A LITTLE
CLIMB 3 TO 5 STEPS WITH RAILING: TOTAL
TOILETING: A LITTLE
TOILETING: A LITTLE
MOBILITY SCORE: 16
DRESSING REGULAR UPPER BODY CLOTHING: A LITTLE
STANDING UP FROM CHAIR USING ARMS: A LOT
DRESSING REGULAR LOWER BODY CLOTHING: A LITTLE
TOILETING: A LITTLE
WALKING IN HOSPITAL ROOM: A LOT
MOBILITY SCORE: 16

## 2024-01-29 ASSESSMENT — PAIN - FUNCTIONAL ASSESSMENT
PAIN_FUNCTIONAL_ASSESSMENT: 0-10
PAIN_FUNCTIONAL_ASSESSMENT: FLACC (FACE, LEGS, ACTIVITY, CRY, CONSOLABILITY)
PAIN_FUNCTIONAL_ASSESSMENT: 0-10

## 2024-01-29 ASSESSMENT — PAIN DESCRIPTION - LOCATION
LOCATION: KNEE
LOCATION: FOOT

## 2024-01-29 ASSESSMENT — ACTIVITIES OF DAILY LIVING (ADL): BATHING_LEVEL_OF_ASSISTANCE: CONTACT GUARD

## 2024-01-29 ASSESSMENT — PAIN DESCRIPTION - ORIENTATION
ORIENTATION: RIGHT
ORIENTATION: RIGHT

## 2024-01-29 NOTE — CARE PLAN
The patient's goals for the shift include      The clinical goals for the shift include IV atb, rest      Problem: Pain  Goal: My pain/discomfort is manageable  Outcome: Progressing     Problem: Safety  Goal: Patient will be injury free during hospitalization  Outcome: Progressing  Goal: I will remain free of falls  Outcome: Progressing     Problem: Daily Care  Goal: Daily care needs are met  Outcome: Progressing     Problem: Psychosocial Needs  Goal: Demonstrates ability to cope with hospitalization/illness  Outcome: Progressing  Goal: Collaborate with me, my family, and caregiver to identify my specific goals  Outcome: Progressing

## 2024-01-29 NOTE — PROGRESS NOTES
"Vancomycin Dosing by Pharmacy- FOLLOW UP    Claude Coley is a 63 y.o. year old male who Pharmacy has been consulted for vancomycin dosing for cellulitis, skin and soft tissue. Based on the patient's indication and renal status this patient is being dosed based on a goal AUC of 400-600.     Renal function is currently stable.    Current vancomycin dose: 1750 mg given every 24 hours    Estimated vancomycin AUC on current dose: 428 mg/L.hr     Visit Vitals  /73 (BP Location: Left arm, Patient Position: Lying)   Pulse 64   Temp 36.2 °C (97.2 °F) (Temporal)   Resp 16        Lab Results   Component Value Date    CREATININE 1.10 01/28/2024    CREATININE  01/28/2024      Comment:      Sample Unsuitable For Testing- Cancelled Test Called To-RB By:    CREATININE 1.10 01/27/2024    CREATININE 1.30 01/26/2024        Patient weight is No results found for: \"PTWEIGHT\"    No results found for: \"CULTURE\"     I/O last 3 completed shifts:  In: 250 (2.2 mL/kg) [P.O.:250]  Out: 250 (2.2 mL/kg) [Urine:250 (0.1 mL/kg/hr)]  Weight: 114.5 kg   [unfilled]    No results found for: \"PATIENTTEMP\"     Assessment/Plan    Within goal AUC range. Continue current vancomycin regimen.    This dosing regimen is predicted by [employer] to result in the following pharmacokinetic parameters:  Loading dose: N/A  Regimen: 1750 mg IV every 24 hours.  Start time: 21:51 on 01/29/2024  Exposure target: AUC24 (range)400-600 mg/L.hr   AUC24,ss: 428 mg/L.hr  Probability of AUC24 > 400: 69 %  Ctrough,ss: 11.7 mg/L  Probability of Ctrough,ss > 20: 2 %  Probability of nephrotoxicity (Lodise ADRYAN 2009): 7 %    The next level will be obtained on 2/5 at 0500. May be obtained sooner if clinically indicated.   Will continue to monitor renal function daily while on vancomycin and order serum creatinine at least every 48 hours if not already ordered.  Follow for continued vancomycin needs, clinical response, and signs/symptoms of toxicity.       HEATHER CORRALES, " PharmD

## 2024-01-29 NOTE — PROGRESS NOTES
Claude Coley is a 63 y.o. male on day 6 of admission presenting with Gas gangrene of foot (CMS/HCC).      Subjective   Patient denies any new complaints. He was experiencing some soreness in right foot which was relieved with acetaminophen. Patient's constipation has resolved. He is flatulating and passing 2 stools per day. Patient is sleeping, eating, and drinking without complaints. States plan is to go to nursing home while he regains mobility before returning home.        Objective     Last Recorded Vitals  /73 (BP Location: Left arm, Patient Position: Lying)   Pulse 64   Temp 36.2 °C (97.2 °F) (Temporal)   Resp 16   Wt 114 kg (252 lb 6.4 oz)   SpO2 96%   Intake/Output last 3 Shifts:    Intake/Output Summary (Last 24 hours) at 1/29/2024 1001  Last data filed at 1/29/2024 0903  Gross per 24 hour   Intake 350 ml   Output --   Net 350 ml       Admission Weight  Weight: 114 kg (252 lb 6.4 oz) (01/23/24 2300)    Daily Weight  01/23/24 : 114 kg (252 lb 6.4 oz)    Image Results  XR foot right 3+ views  Narrative: Interpreted By:  Christina Doran,   STUDY:  XR FOOT RIGHT 3+ VIEWS  1/26/2024 6:46 pm      INDICATION:  Signs/Symptoms:post op ray resection      COMPARISON:  None.      ACCESSION NUMBER(S):  [id number]      ORDERING CLINICIAN:  ANGEL LUIS BORGES      TECHNIQUE:  Three views of the Right foot were performed.      FINDINGS:  Interval postsurgical changes of distal right 5th digit ray  resection. Soft tissue swelling and gas is seen.      Impression: See findings.      MACRO:  None.      Signed by: Christina Doran 1/26/2024 8:09 PM  Dictation workstation:   [id number]      Physical Exam  Constitutional:       Appearance: Normal appearance.   HENT:      Head: Normocephalic and atraumatic.      Mouth/Throat:      Mouth: Mucous membranes are moist.   Eyes:      Pupils: Pupils are equal, round, and reactive to light.   Cardiovascular:      Rate and Rhythm: Normal rate and regular rhythm.       Pulses: Normal pulses.      Heart sounds: Normal heart sounds.   Pulmonary:      Effort: Pulmonary effort is normal.      Breath sounds: Normal breath sounds.   Abdominal:      General: Abdomen is flat. There is no distension.      Palpations: Abdomen is soft. There is no mass.      Tenderness: There is no abdominal tenderness.   Musculoskeletal:      Right lower leg: No edema.      Left lower leg: No edema.   Skin:     General: Skin is warm and dry.      Capillary Refill: Capillary refill takes less than 2 seconds.   Neurological:      General: No focal deficit present.      Mental Status: He is alert and oriented to person, place, and time.   Psychiatric:         Mood and Affect: Mood normal.         Behavior: Behavior normal.         Thought Content: Thought content normal.         Scheduled medications  aspirin, 81 mg, oral, Daily  carvedilol, 12.5 mg, oral, BID with meals  finasteride, 5 mg, oral, Daily  heparin (porcine), 5,000 Units, subcutaneous, q8h  insulin lispro, 0-10 Units, subcutaneous, Before meals & nightly  lidocaine, 5 mL, infiltration, Once  lidocaine, 5 mL, infiltration, Once  melatonin, 3 mg, oral, Nightly  OLANZapine, 20 mg, oral, Nightly  pantoprazole, 40 mg, oral, Daily  piperacillin-tazobactam, 3.375 g, intravenous, q6h  polyethylene glycol, 17 g, oral, Daily  sennosides-docusate sodium, 1 tablet, oral, BID  vancomycin-diluent combo no.1, 1,750 mg, intravenous, q24h      Continuous medications     PRN medications  PRN medications: acetaminophen **OR** [DISCONTINUED] acetaminophen **OR** [DISCONTINUED] acetaminophen, alteplase, alteplase, dextrose 10 % in water (D10W), dextrose, glucagon  Results for orders placed or performed during the hospital encounter of 01/23/24 (from the past 24 hour(s))   POCT GLUCOSE   Result Value Ref Range    POCT Glucose 109 (H) 74 - 99 mg/dL   POCT GLUCOSE   Result Value Ref Range    POCT Glucose 128 (H) 74 - 99 mg/dL   CBC and Auto Differential   Result Value  Ref Range    WBC 13.0 (H) 4.4 - 11.3 x10*3/uL    nRBC 0.2 (H) 0.0 - 0.0 /100 WBCs    RBC 2.42 (L) 4.50 - 5.90 x10*6/uL    Hemoglobin 7.3 (L) 13.5 - 17.5 g/dL    Hematocrit 21.5 (L) 41.0 - 52.0 %    MCV 89 80 - 100 fL    MCH 30.2 26.0 - 34.0 pg    MCHC 34.0 32.0 - 36.0 g/dL    RDW 13.0 11.5 - 14.5 %    Platelets 245 150 - 450 x10*3/uL    Immature Granulocytes %, Automated 11.3 (H) 0.0 - 0.9 %    Immature Granulocytes Absolute, Automated 1.47 (H) 0.00 - 0.70 x10*3/uL   Comprehensive Metabolic Panel   Result Value Ref Range    Glucose 105 (H) 65 - 99 mg/dL    Sodium 132 (L) 133 - 145 mmol/L    Potassium 3.9 3.4 - 5.1 mmol/L    Chloride 104 97 - 107 mmol/L    Bicarbonate 20 (L) 24 - 31 mmol/L    Urea Nitrogen 25 8 - 25 mg/dL    Creatinine 1.10 0.40 - 1.60 mg/dL    eGFR 76 >60 mL/min/1.73m*2    Calcium 7.8 (L) 8.5 - 10.4 mg/dL    Albumin 2.1 (L) 3.5 - 5.0 g/dL    Alkaline Phosphatase 163 (H) 35 - 125 U/L    Total Protein 5.9 5.9 - 7.9 g/dL    AST 55 (H) 5 - 40 U/L    Bilirubin, Total 0.3 0.1 - 1.2 mg/dL    ALT 78 (H) 5 - 40 U/L    Anion Gap 8 <=19 mmol/L   Manual Differential   Result Value Ref Range    Neutrophils %, Manual 79.0 40.0 - 80.0 %    Bands %, Manual 5.0 0.0 - 5.0 %    Lymphocytes %, Manual 7.0 13.0 - 44.0 %    Monocytes %, Manual 1.0 2.0 - 10.0 %    Eosinophils %, Manual 2.0 0.0 - 6.0 %    Basophils %, Manual 0.0 0.0 - 2.0 %    Metamyelocytes %, Manual 5.0 0.0 - 0.0 %    Myelocytes %, Manual 1.0 0.0 - 0.0 %    Seg Neutrophils Absolute, Manual 10.27 (H) 1.20 - 7.00 x10*3/uL    Bands Absolute, Manual 0.65 0.00 - 0.70 x10*3/uL    Lymphocytes Absolute, Manual 0.91 (L) 1.20 - 4.80 x10*3/uL    Monocytes Absolute, Manual 0.13 0.10 - 1.00 x10*3/uL    Eosinophils Absolute, Manual 0.26 0.00 - 0.70 x10*3/uL    Basophils Absolute, Manual 0.00 0.00 - 0.10 x10*3/uL    Metamyelocytes Absolute, Manual 0.65 0.00 - 0.00 x10*3/uL    Myelocytes Absolute, Manual 0.13 0.00 - 0.00 x10*3/uL    Total Cells Counted 100      Neutrophils Absolute, Manual 10.92 (H) 1.20 - 7.70 x10*3/uL    RBC Morphology See Below     Polychromasia Mild    POCT GLUCOSE   Result Value Ref Range    POCT Glucose 131 (H) 74 - 99 mg/dL   CBC and Auto Differential   Result Value Ref Range    WBC 11.7 (H) 4.4 - 11.3 x10*3/uL    nRBC 0.0 0.0 - 0.0 /100 WBCs    RBC 2.31 (L) 4.50 - 5.90 x10*6/uL    Hemoglobin 7.1 (L) 13.5 - 17.5 g/dL    Hematocrit 20.7 (L) 41.0 - 52.0 %    MCV 90 80 - 100 fL    MCH 30.7 26.0 - 34.0 pg    MCHC 34.3 32.0 - 36.0 g/dL    RDW 13.0 11.5 - 14.5 %    Platelets 234 150 - 450 x10*3/uL    Immature Granulocytes %, Automated 11.5 (H) 0.0 - 0.9 %    Immature Granulocytes Absolute, Automated 1.34 (H) 0.00 - 0.70 x10*3/uL   Vancomycin   Result Value Ref Range    Vancomycin 23.0 (H) 10.0 - 20.0 ug/mL   Manual Differential   Result Value Ref Range    Neutrophils %, Manual 70.0 40.0 - 80.0 %    Bands %, Manual 2.0 0.0 - 5.0 %    Lymphocytes %, Manual 14.0 13.0 - 44.0 %    Monocytes %, Manual 3.0 2.0 - 10.0 %    Eosinophils %, Manual 4.0 0.0 - 6.0 %    Basophils %, Manual 1.0 0.0 - 2.0 %    Metamyelocytes %, Manual 3.0 0.0 - 0.0 %    Myelocytes %, Manual 3.0 0.0 - 0.0 %    Seg Neutrophils Absolute, Manual 8.19 (H) 1.20 - 7.00 x10*3/uL    Bands Absolute, Manual 0.23 0.00 - 0.70 x10*3/uL    Lymphocytes Absolute, Manual 1.64 1.20 - 4.80 x10*3/uL    Monocytes Absolute, Manual 0.35 0.10 - 1.00 x10*3/uL    Eosinophils Absolute, Manual 0.47 0.00 - 0.70 x10*3/uL    Basophils Absolute, Manual 0.12 (H) 0.00 - 0.10 x10*3/uL    Metamyelocytes Absolute, Manual 0.35 0.00 - 0.00 x10*3/uL    Myelocytes Absolute, Manual 0.35 0.00 - 0.00 x10*3/uL    Total Cells Counted 100     Neutrophils Absolute, Manual 8.42 (H) 1.20 - 7.70 x10*3/uL    RBC Morphology See Below     Polychromasia Mild    POCT GLUCOSE   Result Value Ref Range    POCT Glucose 95 74 - 99 mg/dL         Assessment/Plan        This patient has a central line   Reason for the central line remaining today?  Parenteral medication            Principal Problem:    Gas gangrene of foot (CMS/HCC)  Active Problems:    HTN (hypertension)    Hyperlipidemia    Diabetes mellitus, type 2 (CMS/HCC)    Gas gangrene of right foot -- continue IV vanco and zosyn.  PICC line insertion. Dressing to be changed today.   T2DM -- controlled  Hypertension -- controlled              Liam Levin, OMS3  1/29/2024  10:10 AM

## 2024-01-29 NOTE — PROGRESS NOTES
Blood management follow up of this JOHN pt on day 6 of admission presenting with Gas gangrene of foot (CMS/HCC). He is 5 day s/p I&D with amputation of right 5th digit, returned to surgery 1/26/24 for right 5th digit resectioned and application of wound vac. H/H 7.1/20.7    Subjective   Reports post op pain controlled, tylenol for pain, dressing/wound vac changed this morning per podiatry. Dressing dry/intact,  +chronic paresthesia bilat feet, denies headache, vertigo, fever, chills, chest pain, palpitation, sob, roas, cough, on room air, denies abd pain, tenderness ,bloating, nausea, appetite good, constipation resolved, he is taking daily miralax and pericolace.  having loose stools, denies hematochezia, melena, hematuria,dysuria, +RLE edema,        Objective     Physical Exam  Constitutional:       Appearance: Normal appearance.   HENT:      Head: Normocephalic and atraumatic.      Right Ear: External ear normal.      Left Ear: External ear normal.      Nose: Nose normal.      Mouth/Throat:      Pharynx: Oropharynx is clear.   Eyes:      Conjunctiva/sclera: Conjunctivae normal.      Pupils: Pupils are equal, round, and reactive to light.   Cardiovascular:      Rate and Rhythm: Normal rate and regular rhythm.      Heart sounds: Normal heart sounds. No murmur heard.  Pulmonary:      Effort: Pulmonary effort is normal. No respiratory distress.      Breath sounds: Normal breath sounds. No wheezing, rhonchi or rales.   Chest:      Chest wall: No tenderness.   Abdominal:      General: Bowel sounds are normal. There is no distension.      Palpations: Abdomen is soft.      Tenderness: There is no abdominal tenderness.   Musculoskeletal:         General: Swelling and tenderness present.      Cervical back: Normal range of motion and neck supple.      Right lower leg: Edema present.   Skin:     General: Skin is warm and dry.      Capillary Refill: Capillary refill takes 2 to 3 seconds.      Findings: No bruising, erythema,  "lesion or rash.      Comments: Dressing/wound vac intact RLE    Neurological:      General: No focal deficit present.      Mental Status: He is alert and oriented to person, place, and time. Mental status is at baseline.   Psychiatric:         Mood and Affect: Mood normal.         Behavior: Behavior normal.         Last Recorded Vitals  Blood pressure 138/63, pulse 64, temperature 36.5 °C (97.7 °F), temperature source Temporal, resp. rate 16, height 1.88 m (6' 2.02\"), weight 114 kg (252 lb 6.4 oz), SpO2 97 %.  Intake/Output last 3 Shifts:  I/O last 3 completed shifts:  In: 250 (2.2 mL/kg) [P.O.:250]  Out: 250 (2.2 mL/kg) [Urine:250 (0.1 mL/kg/hr)]  Weight: 114.5 kg     Relevant Results  Results for orders placed or performed during the hospital encounter of 01/23/24 (from the past 24 hour(s))   POCT GLUCOSE   Result Value Ref Range    POCT Glucose 128 (H) 74 - 99 mg/dL   CBC and Auto Differential   Result Value Ref Range    WBC 13.0 (H) 4.4 - 11.3 x10*3/uL    nRBC 0.2 (H) 0.0 - 0.0 /100 WBCs    RBC 2.42 (L) 4.50 - 5.90 x10*6/uL    Hemoglobin 7.3 (L) 13.5 - 17.5 g/dL    Hematocrit 21.5 (L) 41.0 - 52.0 %    MCV 89 80 - 100 fL    MCH 30.2 26.0 - 34.0 pg    MCHC 34.0 32.0 - 36.0 g/dL    RDW 13.0 11.5 - 14.5 %    Platelets 245 150 - 450 x10*3/uL    Immature Granulocytes %, Automated 11.3 (H) 0.0 - 0.9 %    Immature Granulocytes Absolute, Automated 1.47 (H) 0.00 - 0.70 x10*3/uL   Comprehensive Metabolic Panel   Result Value Ref Range    Glucose 105 (H) 65 - 99 mg/dL    Sodium 132 (L) 133 - 145 mmol/L    Potassium 3.9 3.4 - 5.1 mmol/L    Chloride 104 97 - 107 mmol/L    Bicarbonate 20 (L) 24 - 31 mmol/L    Urea Nitrogen 25 8 - 25 mg/dL    Creatinine 1.10 0.40 - 1.60 mg/dL    eGFR 76 >60 mL/min/1.73m*2    Calcium 7.8 (L) 8.5 - 10.4 mg/dL    Albumin 2.1 (L) 3.5 - 5.0 g/dL    Alkaline Phosphatase 163 (H) 35 - 125 U/L    Total Protein 5.9 5.9 - 7.9 g/dL    AST 55 (H) 5 - 40 U/L    Bilirubin, Total 0.3 0.1 - 1.2 mg/dL    ALT 78 " (H) 5 - 40 U/L    Anion Gap 8 <=19 mmol/L   Manual Differential   Result Value Ref Range    Neutrophils %, Manual 79.0 40.0 - 80.0 %    Bands %, Manual 5.0 0.0 - 5.0 %    Lymphocytes %, Manual 7.0 13.0 - 44.0 %    Monocytes %, Manual 1.0 2.0 - 10.0 %    Eosinophils %, Manual 2.0 0.0 - 6.0 %    Basophils %, Manual 0.0 0.0 - 2.0 %    Metamyelocytes %, Manual 5.0 0.0 - 0.0 %    Myelocytes %, Manual 1.0 0.0 - 0.0 %    Seg Neutrophils Absolute, Manual 10.27 (H) 1.20 - 7.00 x10*3/uL    Bands Absolute, Manual 0.65 0.00 - 0.70 x10*3/uL    Lymphocytes Absolute, Manual 0.91 (L) 1.20 - 4.80 x10*3/uL    Monocytes Absolute, Manual 0.13 0.10 - 1.00 x10*3/uL    Eosinophils Absolute, Manual 0.26 0.00 - 0.70 x10*3/uL    Basophils Absolute, Manual 0.00 0.00 - 0.10 x10*3/uL    Metamyelocytes Absolute, Manual 0.65 0.00 - 0.00 x10*3/uL    Myelocytes Absolute, Manual 0.13 0.00 - 0.00 x10*3/uL    Total Cells Counted 100     Neutrophils Absolute, Manual 10.92 (H) 1.20 - 7.70 x10*3/uL    RBC Morphology See Below     Polychromasia Mild    POCT GLUCOSE   Result Value Ref Range    POCT Glucose 131 (H) 74 - 99 mg/dL   CBC and Auto Differential   Result Value Ref Range    WBC 11.7 (H) 4.4 - 11.3 x10*3/uL    nRBC 0.0 0.0 - 0.0 /100 WBCs    RBC 2.31 (L) 4.50 - 5.90 x10*6/uL    Hemoglobin 7.1 (L) 13.5 - 17.5 g/dL    Hematocrit 20.7 (L) 41.0 - 52.0 %    MCV 90 80 - 100 fL    MCH 30.7 26.0 - 34.0 pg    MCHC 34.3 32.0 - 36.0 g/dL    RDW 13.0 11.5 - 14.5 %    Platelets 234 150 - 450 x10*3/uL    Immature Granulocytes %, Automated 11.5 (H) 0.0 - 0.9 %    Immature Granulocytes Absolute, Automated 1.34 (H) 0.00 - 0.70 x10*3/uL   Vancomycin   Result Value Ref Range    Vancomycin 23.0 (H) 10.0 - 20.0 ug/mL   Manual Differential   Result Value Ref Range    Neutrophils %, Manual 70.0 40.0 - 80.0 %    Bands %, Manual 2.0 0.0 - 5.0 %    Lymphocytes %, Manual 14.0 13.0 - 44.0 %    Monocytes %, Manual 3.0 2.0 - 10.0 %    Eosinophils %, Manual 4.0 0.0 - 6.0 %     Basophils %, Manual 1.0 0.0 - 2.0 %    Metamyelocytes %, Manual 3.0 0.0 - 0.0 %    Myelocytes %, Manual 3.0 0.0 - 0.0 %    Seg Neutrophils Absolute, Manual 8.19 (H) 1.20 - 7.00 x10*3/uL    Bands Absolute, Manual 0.23 0.00 - 0.70 x10*3/uL    Lymphocytes Absolute, Manual 1.64 1.20 - 4.80 x10*3/uL    Monocytes Absolute, Manual 0.35 0.10 - 1.00 x10*3/uL    Eosinophils Absolute, Manual 0.47 0.00 - 0.70 x10*3/uL    Basophils Absolute, Manual 0.12 (H) 0.00 - 0.10 x10*3/uL    Metamyelocytes Absolute, Manual 0.35 0.00 - 0.00 x10*3/uL    Myelocytes Absolute, Manual 0.35 0.00 - 0.00 x10*3/uL    Total Cells Counted 100     Neutrophils Absolute, Manual 8.42 (H) 1.20 - 7.70 x10*3/uL    RBC Morphology See Below     Polychromasia Mild    POCT GLUCOSE   Result Value Ref Range    POCT Glucose 95 74 - 99 mg/dL   POCT GLUCOSE   Result Value Ref Range    POCT Glucose 113 (H) 74 - 99 mg/dL      Current Facility-Administered Medications:     acetaminophen (Tylenol) tablet 650 mg, 650 mg, oral, q4h PRN, 650 mg at 01/29/24 0205 **OR** [DISCONTINUED] acetaminophen (Tylenol) oral liquid 650 mg, 650 mg, oral, q4h PRN **OR** [DISCONTINUED] acetaminophen (Tylenol) suppository 650 mg, 650 mg, rectal, q4h PRN, Connie Neumann MD    alteplase (Cathflo Activase) injection 2 mg, 2 mg, intra-catheter, PRN, Melvin Ivey DO    alteplase (Cathflo Activase) injection 2 mg, 2 mg, intra-catheter, PRN, Srikanth Saravia MD    aspirin EC tablet 81 mg, 81 mg, oral, Daily, Connie Neumann MD, 81 mg at 01/29/24 0901    carvedilol (Coreg) tablet 12.5 mg, 12.5 mg, oral, BID with meals, Connie Neumann MD, 12.5 mg at 01/29/24 0900    dextrose 10 % in water (D10W) infusion, 0.3 g/kg/hr, intravenous, Once PRN, Connie Neumann MD    dextrose 50 % injection 25 g, 25 g, intravenous, q15 min PRN, Connie Neumann MD    finasteride (Proscar) tablet 5 mg, 5 mg, oral, Daily, Connie Neumann MD, 5 mg at 01/29/24 0900     glucagon (Glucagen) injection 1 mg, 1 mg, intramuscular, q15 min PRN, Connie Neumann MD    heparin (porcine) injection 5,000 Units, 5,000 Units, subcutaneous, q8h, Connie Neumann MD, 5,000 Units at 01/29/24 0537    insulin lispro (HumaLOG) injection 0-10 Units, 0-10 Units, subcutaneous, Before meals & nightly, Melvin Ivey DO, 2 Units at 01/26/24 2058    iron sucrose (Venofer) injection 200 mg, 200 mg, intravenous, Once, Elisa Leblanc, APRN-CNP    lidocaine (Xylocaine) 10 mg/mL (1 %) injection 50 mg, 5 mL, infiltration, Once, Melvin Ivey DO    lidocaine (Xylocaine) 10 mg/mL (1 %) injection 50 mg, 5 mL, infiltration, Once, Srikanth Saravia MD    melatonin tablet 3 mg, 3 mg, oral, Nightly, Connie Neumann MD, 3 mg at 01/28/24 2026    OLANZapine (ZyPREXA) tablet 20 mg, 20 mg, oral, Nightly, Connie Neumann MD, 20 mg at 01/28/24 2026    pantoprazole (ProtoNix) EC tablet 40 mg, 40 mg, oral, Daily, 40 mg at 01/29/24 0900 **OR** [DISCONTINUED] pantoprazole (ProtoNix) injection 40 mg, 40 mg, intravenous, Daily, Connie Neumann MD    piperacillin-tazobactam-dextrose (Zosyn) IV 3.375 g, 3.375 g, intravenous, q6h, Srikanth Saravia MD, Stopped at 01/29/24 0933    polyethylene glycol (Glycolax, Miralax) packet 17 g, 17 g, oral, Daily, Melvin Ivey DO, 17 g at 01/29/24 0900    sennosides-docusate sodium (Nieves-Colace) 8.6-50 mg per tablet 1 tablet, 1 tablet, oral, BID, Melvin Ivey DO, 1 tablet at 01/29/24 0900    vancomycin-diluent combo no.1 (Xellia) IVPB 1,750 mg, 1,750 mg, intravenous, q24h, Connie Neumann MD, Stopped at 01/28/24 5121                              Assessment/Plan   Principal Problem:    Gas gangrene of foot (CMS/HCC)  Active Problems:    HTN (hypertension)    Hyperlipidemia    Diabetes mellitus, type 2 (CMS/HCC)  nfected/gangrenous right foot  S/p I&D and 5th toe amputation right foot  S/p  5th digit resectioned and  application of wound vac.    Normocytic anemia  Anemia of acute blood loss combined with anemia of iron deficient and inflammation     Venofer 200mg IV X5  S/p B12 1000mcg IM X1  Lab to microsample blood draws-signage on door           Elisa Leblanc, ALISE-CNP

## 2024-01-29 NOTE — NURSING NOTE
On SONYA baldwin single lumen PICC line dressing is bloody at insertion site. Dressing changed with sterile technique. Skin is otherwise intact and without redness, edema, breakdown or exudate. Line flushes easily with brisk blood return. Blue cap replaced and Curos cap applied.

## 2024-01-29 NOTE — CARE PLAN
The patient's goals for the shift include comfort.     The clinical goals for the shift include safety, work with PT/OT, OOB to chair, pain management, and monitor labs/VS/wound VAC.     Problem: Pain  Goal: My pain/discomfort is manageable  1/29/2024 1333 by Klaudia Guadalupe RN  Outcome: Progressing  1/29/2024 1333 by Klaudia Guadalupe RN  Outcome: Progressing     Problem: Safety  Goal: Patient will be injury free during hospitalization  1/29/2024 1333 by Klaudia Guadalupe RN  Outcome: Progressing  1/29/2024 1333 by Klaudia Guadalupe RN  Outcome: Progressing     Problem: Daily Care  Goal: Daily care needs are met  1/29/2024 1333 by Klaudia Guadalupe RN  Outcome: Progressing  1/29/2024 1333 by Klaudia Guadalupe RN  Outcome: Progressing     Problem: Psychosocial Needs  Goal: Demonstrates ability to cope with hospitalization/illness  1/29/2024 1333 by Klaudia Guadalupe RN  Outcome: Progressing  1/29/2024 1333 by Klaudia Guadalupe RN  Outcome: Progressing  Goal: Collaborate with me, my family, and caregiver to identify my specific goals  1/29/2024 1333 by Klaudia Guadalupe RN  Outcome: Progressing  1/29/2024 1333 by Klaudia Guadalupe RN  Outcome: Progressing     Problem: Skin  Goal: Decreased wound size/increased tissue granulation at next dressing change  1/29/2024 1333 by Klaudia Guadalupe RN  Outcome: Progressing  1/29/2024 1333 by Klaudia Guadalupe RN  Outcome: Progressing     Problem: Skin  Goal: Participates in plan/prevention/treatment measures  1/29/2024 1333 by Klaudia Guadalupe RN  Outcome: Progressing  1/29/2024 1333 by Klaudia Guadalupe RN  Outcome: Progressing     Problem: Skin  Goal: Prevent/manage excess moisture  1/29/2024 1333 by Klaudia Guadalupe RN  Outcome: Progressing  1/29/2024 1333 by Klaudia Guadalupe RN  Outcome: Progressing     Problem: Skin  Goal: Prevent/minimize sheer/friction injuries  1/29/2024 1333 by Klaudia Guadalupe RN  Outcome: Progressing  1/29/2024 1333 by Klaudia Guadalupe RN  Outcome: Progressing     Problem: Deep  Vein Thrombosis  Goal: I will remain free from complications of deep vein thrombosis and maintain current level of mobility  1/29/2024 1333 by Klaudia Guadalupe RN  Outcome: Progressing  1/29/2024 1333 by Klaudia Guadalupe RN  Outcome: Progressing     Problem: Discharge Barriers  Goal: My discharge needs are met  1/29/2024 1333 by Klaudia Guadalupe RN  Outcome: Progressing  1/29/2024 1333 by Klaudia Guadalupe RN  Outcome: Progressing

## 2024-01-29 NOTE — PROGRESS NOTES
Physical Therapy    Physical Therapy Treatment    Patient Name: Claude Coley  MRN: [id number]  Today's Date: 1/29/2024  Time Calculation  Start Time: 1000  Stop Time: 1028  Time Calculation (min): 28 min       Assessment/Plan   PT Assessment  Rehab Prognosis: Good  Medical Staff Made Aware: Yes  End of Session Communication: Bedside nurse  Assessment Comment: NWB R LE  End of Session Patient Position: Up in chair (Instruicted not top get up on his own Needs atr each)  PT Plan  Inpatient/Swing Bed or Outpatient: Inpatient  PT Plan  Treatment/Interventions: Bed mobility, Transfer training, Gait training, Strengthening, Endurance training, Range of motion, Therapeutic exercise, Therapeutic activity  PT Plan: Skilled PT  PT Frequency: Daily  PT Discharge Recommendations: High intensity level of continued care  Equipment Recommended upon Discharge: Wheeled walker  PT Recommended Transfer Status: Assistive device, Assist x2 (Second person for safety NWB R LE)  PT - OK to Discharge: Yes      General Visit Information:   PT  Visit  PT Received On: 01/29/24  General  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 Ivey  Past Medical History Relevant to Rehab: HTN, CHF, hyperlipidemia, DM  Prior to Session Communication: Bedside nurse  Patient Position Received: Bed, 4 rail up  Preferred Learning Style: verbal  General Comment: Cleared by nurse to see.Patient agreeable to therapy    Subjective   Precautions:  Precautions  LE Weight Bearing Status: Right Non-Weight Bearing (Right LE)  Medical Precautions: Fall precautions  Vital Signs:       Objective   Pain:  Pain Assessment  Pain Assessment: 0-10  Pain Score: 0 - No pain  Pain Type: Surgical pain  Pain Location: Foot  Pain Orientation: Right  Cognition:  Cognition  Orientation Level: Oriented X4  Postural Control:     Extremity/Trunk Assessments:    Activity Tolerance:     Treatments:  Therapeutic Exercise  Therapeutic Exercise Performed:  Yes  Therapeutic Exercise Activity 1: B LE supine ther ex: ankle pumps L LE, quad/gluteals ets, heelslides R foot off surface, SAQs,hip abduction/adduction x 20    Bed Mobility  Bed Mobility: Yes  Bed Mobility 1  Bed Mobility 1: Supine to sitting  Level of Assistance 1: Modified independent  Bed Mobility Comments 1: HOB elevated  with Mod I    Ambulation/Gait Training  Ambulation/Gait Training Performed: Yes  Ambulation/Gait Training 1  Surface 1: Level tile  Device 1: Rolling walker  Assistance 1: Moderate assistance (second person for safety with wound vac /IV)  Quality of Gait 1:  (NWB R LE)  Comments/Distance (ft) 1: 3 side steps from bed to chair -sliding R foot along floor Maintains NWB R LE    Outcome Measures:  [facility] Basic Mobility  Turning from your back to your side while in a flat bed without using bedrails: None  Moving from lying on your back to sitting on the side of a flat bed without using bedrails: None  Moving to and from bed to chair (including a wheelchair): A little  Standing up from a chair using your arms (e.g. wheelchair or bedside chair): A lot  To walk in hospital room: A lot  Climbing 3-5 steps with railing: Total  Basic Mobility - Total Score: 16    Education Documentation  Mobility Training, taught by Ivana Severino PTA at 1/29/2024 10:45 AM.  Learner: Patient  Readiness: Acceptance  Method: Explanation  Response: Verbalizes Understanding    Education Comments  No comments found.        OP EDUCATION:       Encounter Problems       Encounter Problems (Active)       PT Goals       Patient will transfer sit to stand and stand to sit with  contact guard assist to facilitate mobility.  (Progressing)       Start:  01/25/24    Expected End:  02/08/24            Patient will transfer bed to chair and chair to bed with contact guard assist to facilitate mobility.  (Progressing)       Start:  01/25/24    Expected End:  02/08/24            Patient will amb 10 feet with rolling walker device  including no turns on even surface with minimal assist to facilitate safe mobility.  (Progressing)       Start:  01/25/24    Expected End:  02/08/24            Demonstrate safe mobility techniques while maintaining WB status R LE. (Progressing)       Start:  01/25/24    Expected End:  02/08/24

## 2024-01-29 NOTE — PROGRESS NOTES
Occupational Therapy    OT Treatment    Patient Name: Claude Coley  MRN: [id number]  Today's Date: 1/29/2024  Time Calculation  Start Time: 1032  Stop Time: 1057  Time Calculation (min): 25 min         Assessment:  End of Session Communication:  (NP in room to change foot dressing end of session)  End of Session Patient Position: Bed, 3 rail up (all needs in reach)  OT Assessment Results: Decreased ADL status, Decreased safe judgment during ADL, Decreased endurance, Decreased functional mobility (Pt appears to be placing weight on RLE desite cues/education)  Plan:  Treatment Interventions: ADL retraining, Functional transfer training  OT Frequency: 3 times per week  OT Discharge Recommendations: High intensity level of continued care  Equipment Recommended upon Discharge: Wheeled walker  OT Recommended Transfer Status: Assist of 1  OT - OK to Discharge: Yes  Treatment Interventions: ADL retraining, Functional transfer training    Subjective   Previous Visit Info:  OT Last Visit  OT Received On: 01/29/24  General:  General  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 Ivey  Past Medical History Relevant to Rehab: HTN, CHF, hyperlipidemia, DM  Prior to Session Communication: Bedside nurse  Patient Position Received: Up in chair  General Comment: Agreeable to treatment, wanting to get back to bed.  Precautions:  LE Weight Bearing Status: Right Non-Weight Bearing  Medical Precautions: Fall precautions     Pain:  Pain Assessment  Pain Assessment: 0-10  Pain Score: 0 - No pain    Objective         Activities of Daily Living: Grooming  Grooming Level of Assistance: Setup  Grooming Where Assessed: Chair  Grooming Comments: wash face, comb hair    UE Bathing  UE Bathing Level of Assistance: Setup  UE Bathing Where Assessed:  (chair)  UE Bathing Comments: sponge bathing with CHG soap    LE Bathing  LE Bathing Level of Assistance: Contact guard  LE Bathing Where Assessed:  (chair)  LE Bathing  Comments: Pt sponge bathed LLE, seated, assist for basil area, in stance, holding onto bed rail as needed for stability, frequent cues for NWB RLE  Functional Standing Tolerance:  Time: 2 minutes  Activity: self care  Functional Standing Tolerance Comments: fair balance  Bed Mobility/Transfers: Bed Mobility 1  Bed Mobility 1: Sitting to supine  Level of Assistance 1: Modified independent    Transfer 1  Transfer From 1: Chair with arms to  Transfer to 1: Bed  Technique 1: To right  Transfer Device 1: Walker  Transfer Level of Assistance 1: Contact guard  Trials/Comments 1: Pt takes about 4 steps from chair>bed with cues for off weighting RLE    Outcome Measures:[facility] Daily Activity  Putting on and taking off regular lower body clothing: A little  Bathing (including washing, rinsing, drying): A little  Putting on and taking off regular upper body clothing: A little  Toileting, which includes using toilet, bedpan or urinal: A little  Taking care of personal grooming such as brushing teeth: None  Eating Meals: None  Daily Activity - Total Score: 20        Education Documentation  Precautions, taught by GUNNER Wilson at 1/29/2024 11:08 AM.  Learner: Patient  Readiness: Acceptance  Method: Explanation, Demonstration  Response: Verbalizes Understanding, Needs Reinforcement    ADL Training, taught by GUNNER Wilson at 1/29/2024 11:08 AM.  Learner: Patient  Readiness: Acceptance  Method: Explanation, Demonstration  Response: Verbalizes Understanding, Needs Reinforcement    Education Comments  No comments found.        Goals:  Encounter Problems       Encounter Problems (Active)       OT Goals       ADLs (Progressing)       Start:  01/25/24    Expected End:  02/01/24       Patient will perform ADLs with Min A using AE/compensatory techniques as needed.          Functional Transfers (Progressing)       Start:  01/25/24    Expected End:  02/01/24       Patient will complete bed, chair, toilet xfer with MIN A using LRD.           Precautions (Progressing)       Start:  01/25/24    Expected End:  02/01/24       Patient will independently verbalize post op precautions in preparation for ADLs.

## 2024-01-29 NOTE — PROGRESS NOTES
Met with patient bedside.  Choices obtained of [facility] and [facility] acute rehab.  Patient will be leaving with a PICC for Vanco 1.75 G  every day and Zosyn 3.375 g every 6 hours, both until 3/6/2024.  Will need precert once accepted by a facility.  Patient also has a wound vac.    Eden Chua RN

## 2024-01-29 NOTE — PROGRESS NOTES
Claude Coley is a 63 y.o. male on day 6 of admission presenting with Gas gangrene of foot (CMS/HCC).    Subjective   Interval History:       Afebrile, no chills  Denies foot pain  Denies chest pain or shortness of breath  Denies nausea vomiting or diarrhea      Objective   Range of Vitals (last 24 hours)  Heart Rate:  [58-65]   Temp:  [36.2 °C (97.2 °F)-37.6 °C (99.7 °F)]   Resp:  [14-17]   BP: (106-138)/(48-73)   SpO2:  [94 %-98 %]   Daily Weight  01/23/24 : 114 kg (252 lb 6.4 oz)    Body mass index is 32.39 kg/m².    Physical Exam  Constitutional:       Appearance: Normal appearance.   HENT:      Head: Normocephalic and atraumatic.      Nose: Nose normal.   Eyes:      Extraocular Movements: Extraocular movements intact.      Conjunctiva/sclera: Conjunctivae normal.   Cardiovascular:      Rate and Rhythm: Regular rhythm.      Heart sounds: Normal heart sounds.   Pulmonary:      Breath sounds: Normal breath sounds.   Abdominal:      General: Bowel sounds are normal.      Palpations: Abdomen is soft.   Musculoskeletal:      Cervical back: Normal range of motion and neck supple.      Comments: Right foot swelling   Skin:     Comments: Right foot dressing, wound vac intact  Neurological:      Mental Status: He is alert.   Psychiatric:         Mood and Affect: Mood normal.         Behavior: Behavior normal.     Antibiotics  atorvastatin (Lipitor) tablet  aspirin EC tablet 81 mg  atorvastatin (Lipitor) tablet 40 mg  carvedilol (Coreg) tablet 12.5 mg  finasteride (Proscar) tablet 5 mg  OLANZapine (ZyPREXA) tablet 20 mg  pantoprazole (ProtoNix) EC tablet 40 mg  pantoprazole (ProtoNix) injection 40 mg  acetaminophen (Tylenol) tablet 650 mg  acetaminophen (Tylenol) oral liquid 650 mg  acetaminophen (Tylenol) suppository 650 mg  melatonin tablet 3 mg  polyethylene glycol (Glycolax, Miralax) packet 17 g  sodium chloride 0.9% infusion  lactated Ringer's bolus 500 mL  meropenem-vaborbactam (Vabomere) 1 g in sodium chloride  0.9% 250 mL IV  dextrose 50 % injection 25 g  glucagon (Glucagen) injection 1 mg  dextrose 10 % in water (D10W) infusion  insulin lispro (HumaLOG) injection 0-10 Units  heparin (porcine) injection 5,000 Units  lactated Ringer's bolus 500 mL  meropenem (Merrem) in sodium chloride 0.9 % 100 mL IV 1 g  bupivacaine PF (Marcaine) injection  - Omnicell Override Pull  lidocaine (Xylocaine) 10 mg/mL (1 %) injection 1 mg  oxygen (O2) therapy  lactated Ringer's infusion  ondansetron (Zofran) injection 4 mg  hydrALAZINE (Apresoline) injection 5 mg  albuterol 2.5 mg /3 mL (0.083 %) nebulizer solution 2.5 mg  HYDROmorphone PF (Dilaudid) injection 0.2 mg  HYDROmorphone (Dilaudid) injection 0.4 mg  BUPivacaine HCl (Marcaine) 0.5 % (5 mg/mL) injection  lidocaine (Xylocaine) 10 mg/mL (1 %) injection 1 mg  oxygen (O2) therapy  lactated Ringer's infusion  ondansetron (Zofran) injection 4 mg  HYDROmorphone PF (Dilaudid) injection 0.2 mg  HYDROmorphone (Dilaudid) injection 0.4 mg  vancomycin-diluent combo no.1 (Xellia) IVPB 1,750 mg  iron sucrose (Venofer) injection 200 mg  cyanocobalamin (Vitamin B-12) injection 1,000 mcg  lidocaine (Xylocaine) 10 mg/mL (1 %) injection 50 mg  alteplase (Cathflo Activase) injection 2 mg  polyethylene glycol (Glycolax, Miralax) packet 17 g  sennosides-docusate sodium (Nieves-Colace) 8.6-50 mg per tablet 1 tablet  insulin lispro (HumaLOG) injection 0-10 Units  vancomycin-diluent combo no.1 (Xellia) IVPB 1,750 mg  lidocaine (Xylocaine) 10 mg/mL (1 %) injection 1 mg  lactated Ringer's infusion  HYDROmorphone PF (Dilaudid) injection 0.2 mg  fentaNYL PF (Sublimaze) injection 50 mcg  midazolam (Versed) injection 1 mg  ondansetron (Zofran) injection 4 mg  promethazine (Phenergan) 6.25 mg in sodium chloride 0.9% 50 mL IV  meperidine PF (Demerol) injection 12.5 mg  albuterol 2.5 mg /3 mL (0.083 %) nebulizer solution 2.5 mg  vancomycin (Vancocin) vial for injection  - Omnicell Override Pull  bupivacaine PF  "(Marcaine) injection  - Omnicell Override Pull  BUPivacaine HCl (Marcaine) 0.5 % (5 mg/mL) injection  thrombin (recombinant) (Recothrom) topical solution  - Omnicell Override Pull  thrombin (recombinant) (Recothrom) topical solution  - Omnicell Override Pull  thrombin solution  lidocaine (Xylocaine) 10 mg/mL (1 %) injection 50 mg  alteplase (Cathflo Activase) injection 2 mg  vancomycin-diluent combo no.1 (Xellia) IVPB 1,750 mg  piperacillin-tazobactam-dextrose (Zosyn) IV 3.375 g      Relevant Results  Labs  Results from last 72 hours   Lab Units 01/29/24  0537 01/28/24  1723 01/28/24  0618   WBC AUTO x10*3/uL 11.7* 13.0* 14.1*   HEMOGLOBIN g/dL 7.1* 7.3* 7.1*   HEMATOCRIT % 20.7* 21.5* 22.3*   PLATELETS AUTO x10*3/uL 234 245 232   LYMPHO PCT MAN % 14.0 7.0 10.0   MONO PCT MAN % 3.0 1.0 3.0   EOSINO PCT MAN % 4.0 2.0 3.0       Results from last 72 hours   Lab Units 01/28/24  1723 01/27/24  0507   SODIUM mmol/L 132* 134   POTASSIUM mmol/L 3.9 3.7   CHLORIDE mmol/L 104 106   CO2 mmol/L 20* 19*   BUN mg/dL 25 33*   CREATININE mg/dL 1.10 1.10   GLUCOSE mg/dL 105* 100*   CALCIUM mg/dL 7.8* 7.9*   ANION GAP mmol/L 8 9   EGFR mL/min/1.73m*2 76 76       Results from last 72 hours   Lab Units 01/28/24  1723 01/27/24  0507   ALK PHOS U/L 163* 155*   BILIRUBIN TOTAL mg/dL 0.3 0.3   PROTEIN TOTAL g/dL 5.9 5.5*   ALT U/L 78* 88*   AST U/L 55* 75*   ALBUMIN g/dL 2.1* 2.2*       Estimated Creatinine Clearance: 94.7 mL/min (by C-G formula based on SCr of 1.1 mg/dL).  No results found for: \"CRP\"  Microbiology  Susceptibility data from last 14 days.  Collected Specimen Info Organism   01/24/24 Swab from DIGIT, ACCESSORY RIGHT FOOT Mixed Aerobic and Anaerobic Bacteria    01/23/24 Tissue/Biopsy from Wound/Tissue Mixed Gram-Positive and Gram-Negative Bacteria       Imaging  XR foot right 3+ views    Result Date: 1/26/2024  Interpreted By:  Christina Doran, STUDY: XR FOOT RIGHT 3+ VIEWS  1/26/2024 6:46 pm   INDICATION: " Signs/Symptoms:post op ray resection   COMPARISON: None.   ACCESSION NUMBER(S): [id number]   ORDERING CLINICIAN: ANGEL LUIS BORGES   TECHNIQUE: Three views of the Right foot were performed.   FINDINGS: Interval postsurgical changes of distal right 5th digit ray resection. Soft tissue swelling and gas is seen.       See findings.   MACRO: None.   Signed by: Christina Doran 1/26/2024 8:09 PM Dictation workstation:   [id number]    ECG 12 lead    Result Date: 1/24/2024  Normal sinus rhythm Possible Septal infarct , age undetermined Abnormal ECG No previous ECGs available Confirmed by Bobo Thornton ([id number]) on 1/24/2024 2:03:37 PM    US renal complete    Result Date: 1/24/2024  STUDY: Renal and Bladder Ultrasound; 1/23/2024 10:36 PM. INDICATION:  Acute renal failure.  COMPARISON:  None available. ACCESSION NUMBER(S): [id number] ORDERING CLINICIAN: GARCIA DUARTE TECHNIQUE: Ultrasound of the Kidneys and Bladder. Multiple images of the abdomen were obtained. FINDINGS: RIGHT KIDNEY: The right kidney measures 12.7 cm in length. Renal cortical echotexture is normal. There is no hydronephrosis. There are no stones. There is a cyst with a thick internal septation that measures 1.9 x 2.1 x 1.5 cm within the superior pole. LEFT KIDNEY: The left kidney measures 12.9 cm in length. Renal cortical echotexture is normal. There is no hydronephrosis. There are no stones. There are no cysts.  BLADDER: The urinary bladder is anechoic. The distended bladder shows no evidence of wall thickening. The right ureteral jet is visualized. The prostate gland measures 5.0 x 3.6 x 4.8 cm (44cc).       Septated right renal cyst consistent with a Bosniak class II lesion. No hydronephrosis bilaterally.  Follow-up ultrasound is recommended in 6 months. Enlarged prostate. Signed by Carlos Luke    XR tibia fibula right 2 views    Result Date: 1/23/2024  STUDY: Tibia and Fibula Radiographs; 1/23/2024 at 10:42 PM INDICATION: Soft tissue gas.  COMPARISON: XR right foot 1/23/2024. ACCESSION NUMBER(S): [id number] ORDERING CLINICIAN: GARCIA DUARTE TECHNIQUE:  4 view(s) of the right tibia and fibula. FINDINGS:  There is no displaced fracture.  The alignment is anatomic. Superficial soft tissue edema is noted.  Peripheral vascular calcifications are present.  Soft tissue emphysema is seen along the dorsal aspect of the midfoot and anterior to the distal tibia.    Generalized soft tissue edema with soft tissue emphysema seen surrounding the ventral aspect of the ankle and dorsal aspect of the midfoot, concerning for soft tissue infection. Peripheral vascular disease. No acute osseous normality identified. Signed by Carlos Luke    XR foot right 3+ views    Result Date: 1/23/2024  Interpreted By:  Kirstie Moy, STUDY: XR FOOT RIGHT 3+ VIEWS 1/23/2024 2:03 pm   INDICATION: Swelling   COMPARISON: None available.   ACCESSION NUMBER(S): [id number]   ORDERING CLINICIAN: GARCIA DUARTE   TECHNIQUE: Three views of right foot   FINDINGS: There is marked soft tissue swelling of the right ankle and foot. There are multiple pockets of gas seen within the right forefoot laterally centered about the 5th MTP joint with this gas extending into the webspace between the 4th and 5th toes and also extending into the midfoot and hindfoot anteriorly.   However, no obvious bony destruction is observed to indicate osteomyelitis.       Soft tissue infection of the right foot centered along the lateral aspect of the forefoot with probable abscess formation and gas gangrene. However, no plain film evidence of osteomyelitis is observed.   Signed by: Kirstie Moy 1/23/2024 2:34 PM Dictation workstation:   [id number]     Assessment/Plan     Right diabetic foot ulcer-Hall 4  Right foot osteomyelitis/abscess-suspect polymicrobial, negative wound culture  Type 2 diabetes with peripheral angiopathy with gangrene  Resolving acute kidney injury  Leukocytosis-multifactorial,  improving     Continue vancomycin  Continue IV Zosyn  Glycemic control  Supportive care  Monitor temperature and WBC  Long-term plan is IV vancomycin and Zosyn to 3/6/2024-see discharge rec  Discharge planning    ALISE Penny-CNP

## 2024-01-30 LAB
ALBUMIN SERPL-MCNC: 2.3 G/DL (ref 3.5–5)
ALP BLD-CCNC: 193 U/L (ref 35–125)
ALT SERPL-CCNC: 51 U/L (ref 5–40)
ANION GAP SERPL CALC-SCNC: 10 MMOL/L
AST SERPL-CCNC: 23 U/L (ref 5–40)
BASOPHILS # BLD MANUAL: 0 X10*3/UL (ref 0–0.1)
BASOPHILS NFR BLD MANUAL: 0 %
BILIRUB SERPL-MCNC: 0.3 MG/DL (ref 0.1–1.2)
BUN SERPL-MCNC: 15 MG/DL (ref 8–25)
CALCIUM SERPL-MCNC: 8.2 MG/DL (ref 8.5–10.4)
CHLORIDE SERPL-SCNC: 105 MMOL/L (ref 97–107)
CO2 SERPL-SCNC: 19 MMOL/L (ref 24–31)
CREAT SERPL-MCNC: 1 MG/DL (ref 0.4–1.6)
EGFRCR SERPLBLD CKD-EPI 2021: 86 ML/MIN/1.73M*2
EOSINOPHIL # BLD MANUAL: 0 X10*3/UL (ref 0–0.7)
EOSINOPHIL NFR BLD MANUAL: 0 %
ERYTHROCYTE [DISTWIDTH] IN BLOOD BY AUTOMATED COUNT: 13.2 % (ref 11.5–14.5)
GLUCOSE BLD MANUAL STRIP-MCNC: 108 MG/DL (ref 74–99)
GLUCOSE BLD MANUAL STRIP-MCNC: 111 MG/DL (ref 74–99)
GLUCOSE BLD MANUAL STRIP-MCNC: 129 MG/DL (ref 74–99)
GLUCOSE BLD MANUAL STRIP-MCNC: 99 MG/DL (ref 74–99)
GLUCOSE SERPL-MCNC: 119 MG/DL (ref 65–99)
HCT VFR BLD AUTO: 21.9 % (ref 41–52)
HGB BLD-MCNC: 7.5 G/DL (ref 13.5–17.5)
IMM GRANULOCYTES # BLD AUTO: 1.14 X10*3/UL (ref 0–0.7)
IMM GRANULOCYTES NFR BLD AUTO: 9.9 % (ref 0–0.9)
LYMPHOCYTES # BLD MANUAL: 1.84 X10*3/UL (ref 1.2–4.8)
LYMPHOCYTES NFR BLD MANUAL: 16 %
MCH RBC QN AUTO: 30.5 PG (ref 26–34)
MCHC RBC AUTO-ENTMCNC: 34.2 G/DL (ref 32–36)
MCV RBC AUTO: 89 FL (ref 80–100)
METAMYELOCYTES # BLD MANUAL: 0.35 X10*3/UL
METAMYELOCYTES NFR BLD MANUAL: 3 %
MONOCYTES # BLD MANUAL: 0.46 X10*3/UL (ref 0.1–1)
MONOCYTES NFR BLD MANUAL: 4 %
MYELOCYTES # BLD MANUAL: 0.23 X10*3/UL
MYELOCYTES NFR BLD MANUAL: 2 %
NEUTS SEG # BLD MANUAL: 8.63 X10*3/UL (ref 1.2–7)
NEUTS SEG NFR BLD MANUAL: 75 %
NRBC BLD-RTO: 0.3 /100 WBCS (ref 0–0)
PLATELET # BLD AUTO: 273 X10*3/UL (ref 150–450)
POLYCHROMASIA BLD QL SMEAR: ABNORMAL
POTASSIUM SERPL-SCNC: 4.1 MMOL/L (ref 3.4–5.1)
PROT SERPL-MCNC: 6.2 G/DL (ref 5.9–7.9)
RBC # BLD AUTO: 2.46 X10*6/UL (ref 4.5–5.9)
RBC MORPH BLD: ABNORMAL
SODIUM SERPL-SCNC: 134 MMOL/L (ref 133–145)
TOTAL CELLS COUNTED BLD: 100
WBC # BLD AUTO: 11.5 X10*3/UL (ref 4.4–11.3)

## 2024-01-30 PROCEDURE — 97110 THERAPEUTIC EXERCISES: CPT | Mod: GO,CO

## 2024-01-30 PROCEDURE — 2500000004 HC RX 250 GENERAL PHARMACY W/ HCPCS (ALT 636 FOR OP/ED): Performed by: NURSE PRACTITIONER

## 2024-01-30 PROCEDURE — 82947 ASSAY GLUCOSE BLOOD QUANT: CPT

## 2024-01-30 PROCEDURE — 97116 GAIT TRAINING THERAPY: CPT | Mod: GP,CQ

## 2024-01-30 PROCEDURE — 2500000001 HC RX 250 WO HCPCS SELF ADMINISTERED DRUGS (ALT 637 FOR MEDICARE OP): Performed by: INTERNAL MEDICINE

## 2024-01-30 PROCEDURE — 85027 COMPLETE CBC AUTOMATED: CPT | Performed by: NURSE PRACTITIONER

## 2024-01-30 PROCEDURE — 80053 COMPREHEN METABOLIC PANEL: CPT | Performed by: NURSE PRACTITIONER

## 2024-01-30 PROCEDURE — 99231 SBSQ HOSP IP/OBS SF/LOW 25: CPT | Performed by: NURSE PRACTITIONER

## 2024-01-30 PROCEDURE — 85007 BL SMEAR W/DIFF WBC COUNT: CPT | Performed by: NURSE PRACTITIONER

## 2024-01-30 PROCEDURE — 2500000004 HC RX 250 GENERAL PHARMACY W/ HCPCS (ALT 636 FOR OP/ED): Performed by: INTERNAL MEDICINE

## 2024-01-30 PROCEDURE — 97110 THERAPEUTIC EXERCISES: CPT | Mod: GP,CQ

## 2024-01-30 PROCEDURE — 1200000002 HC GENERAL ROOM WITH TELEMETRY DAILY

## 2024-01-30 RX ORDER — ACETAMINOPHEN 325 MG/1
650 TABLET ORAL EVERY 4 HOURS PRN
Qty: 30 TABLET | Refills: 0 | Status: SHIPPED | OUTPATIENT
Start: 2024-01-30

## 2024-01-30 RX ORDER — HEPARIN SODIUM 5000 [USP'U]/ML
5000 INJECTION, SOLUTION INTRAVENOUS; SUBCUTANEOUS EVERY 8 HOURS
Qty: 100 ML | Refills: 0 | Status: SHIPPED | OUTPATIENT
Start: 2024-01-30

## 2024-01-30 RX ADMIN — PIPERACILLIN SODIUM AND TAZOBACTAM SODIUM 3.38 G: 3; .375 INJECTION, SOLUTION INTRAVENOUS at 08:42

## 2024-01-30 RX ADMIN — PIPERACILLIN SODIUM AND TAZOBACTAM SODIUM 3.38 G: 3; .375 INJECTION, SOLUTION INTRAVENOUS at 14:37

## 2024-01-30 RX ADMIN — PIPERACILLIN SODIUM AND TAZOBACTAM SODIUM 3.38 G: 3; .375 INJECTION, SOLUTION INTRAVENOUS at 20:27

## 2024-01-30 RX ADMIN — CARVEDILOL 12.5 MG: 12.5 TABLET, FILM COATED ORAL at 08:42

## 2024-01-30 RX ADMIN — ASPIRIN 81 MG: 81 TABLET, COATED ORAL at 08:42

## 2024-01-30 RX ADMIN — POLYETHYLENE GLYCOL 3350 17 G: 17 POWDER, FOR SOLUTION ORAL at 08:42

## 2024-01-30 RX ADMIN — VANCOMYCIN 1750 MG: 1.25 INJECTION, SOLUTION INTRAVENOUS at 21:08

## 2024-01-30 RX ADMIN — HEPARIN SODIUM 5000 UNITS: 5000 INJECTION, SOLUTION INTRAVENOUS; SUBCUTANEOUS at 21:08

## 2024-01-30 RX ADMIN — FINASTERIDE 5 MG: 5 TABLET, FILM COATED ORAL at 08:42

## 2024-01-30 RX ADMIN — Medication 3 MG: at 20:27

## 2024-01-30 RX ADMIN — SENNOSIDES AND DOCUSATE SODIUM 1 TABLET: 8.6; 5 TABLET ORAL at 08:42

## 2024-01-30 RX ADMIN — IRON SUCROSE 200 MG: 20 INJECTION, SOLUTION INTRAVENOUS at 11:32

## 2024-01-30 RX ADMIN — HEPARIN SODIUM 5000 UNITS: 5000 INJECTION, SOLUTION INTRAVENOUS; SUBCUTANEOUS at 05:59

## 2024-01-30 RX ADMIN — OLANZAPINE 20 MG: 10 TABLET, FILM COATED ORAL at 20:27

## 2024-01-30 RX ADMIN — ACETAMINOPHEN 650 MG: 325 TABLET ORAL at 11:34

## 2024-01-30 RX ADMIN — SENNOSIDES AND DOCUSATE SODIUM 1 TABLET: 8.6; 5 TABLET ORAL at 20:27

## 2024-01-30 RX ADMIN — PIPERACILLIN SODIUM AND TAZOBACTAM SODIUM 3.38 G: 3; .375 INJECTION, SOLUTION INTRAVENOUS at 02:24

## 2024-01-30 RX ADMIN — PANTOPRAZOLE SODIUM 40 MG: 40 TABLET, DELAYED RELEASE ORAL at 08:42

## 2024-01-30 RX ADMIN — CARVEDILOL 12.5 MG: 12.5 TABLET, FILM COATED ORAL at 16:53

## 2024-01-30 RX ADMIN — HEPARIN SODIUM 5000 UNITS: 5000 INJECTION, SOLUTION INTRAVENOUS; SUBCUTANEOUS at 14:37

## 2024-01-30 ASSESSMENT — PAIN - FUNCTIONAL ASSESSMENT
PAIN_FUNCTIONAL_ASSESSMENT: WONG-BAKER FACES
PAIN_FUNCTIONAL_ASSESSMENT: WONG-BAKER FACES
PAIN_FUNCTIONAL_ASSESSMENT: 0-10

## 2024-01-30 ASSESSMENT — COGNITIVE AND FUNCTIONAL STATUS - GENERAL
CLIMB 3 TO 5 STEPS WITH RAILING: TOTAL
WALKING IN HOSPITAL ROOM: TOTAL
MOVING TO AND FROM BED TO CHAIR: A LOT
DRESSING REGULAR LOWER BODY CLOTHING: A LITTLE
DAILY ACTIVITIY SCORE: 18
MOBILITY SCORE: 14
DRESSING REGULAR UPPER BODY CLOTHING: A LITTLE
HELP NEEDED FOR BATHING: A LITTLE
STANDING UP FROM CHAIR USING ARMS: A LOT
DAILY ACTIVITIY SCORE: 20
DRESSING REGULAR LOWER BODY CLOTHING: A LITTLE
STANDING UP FROM CHAIR USING ARMS: A LOT
DRESSING REGULAR UPPER BODY CLOTHING: A LITTLE
WALKING IN HOSPITAL ROOM: TOTAL
TOILETING: A LITTLE
CLIMB 3 TO 5 STEPS WITH RAILING: TOTAL
MOVING TO AND FROM BED TO CHAIR: A LOT
WALKING IN HOSPITAL ROOM: TOTAL
STANDING UP FROM CHAIR USING ARMS: A LITTLE
HELP NEEDED FOR BATHING: A LITTLE
DRESSING REGULAR LOWER BODY CLOTHING: A LITTLE
PERSONAL GROOMING: A LITTLE
TOILETING: A LITTLE
MOBILITY SCORE: 14
TOILETING: A LOT
MOVING TO AND FROM BED TO CHAIR: A LOT
CLIMB 3 TO 5 STEPS WITH RAILING: TOTAL
DRESSING REGULAR UPPER BODY CLOTHING: A LITTLE
HELP NEEDED FOR BATHING: A LITTLE
DAILY ACTIVITIY SCORE: 20
MOBILITY SCORE: 15

## 2024-01-30 ASSESSMENT — PAIN SCALES - GENERAL
PAINLEVEL_OUTOF10: 0 - NO PAIN
PAINLEVEL_OUTOF10: 3
PAINLEVEL_OUTOF10: 0 - NO PAIN
PAINLEVEL_OUTOF10: 1
PAINLEVEL_OUTOF10: 0 - NO PAIN

## 2024-01-30 ASSESSMENT — PAIN DESCRIPTION - LOCATION: LOCATION: FOOT

## 2024-01-30 ASSESSMENT — PAIN DESCRIPTION - DESCRIPTORS
DESCRIPTORS: ACHING
DESCRIPTORS: SORE

## 2024-01-30 ASSESSMENT — PAIN DESCRIPTION - ORIENTATION: ORIENTATION: RIGHT

## 2024-01-30 NOTE — CARE PLAN
The patient's goals for the shift include pain control     The clinical goals for the shift include safety

## 2024-01-30 NOTE — NURSING NOTE
Agree with previous assessment. Patient resting in bed, breathing regular and unlabored. Call light within reach. Bed alarm on. Will continue plan of care.

## 2024-01-30 NOTE — PROGRESS NOTES
Claude Coley is a 63 y.o. male on day 7 of admission presenting with Gas gangrene of foot (CMS/HCC).      Subjective   Patient seen and examined resting comfortably in bed. Patient complained of constipation. Last bowel movement was yesterday which patient stated was normal color and somewhat formed. Stated that the laxative treatment he received last week worked well. Patient also complained of fatigue associated with poor sleep. Patient said he is sleeping around 4 hours a night and does not nap during the day.  Patient had dressing on right foot changed yesterday. Denies pain in foot or elsewhere in the body.  PICC line area appears non-erythematous and patient is without pain. Patient said he has chosen [facility] to be transferred to before eventually returning home.        Objective     Last Recorded Vitals  /58 (BP Location: Left arm, Patient Position: Lying)   Pulse 60   Temp 35.9 °C (96.6 °F) (Temporal)   Resp 17   Wt 114 kg (252 lb 6.4 oz)   SpO2 97%   Intake/Output last 3 Shifts:    Intake/Output Summary (Last 24 hours) at 1/30/2024 0932  Last data filed at 1/30/2024 0912  Gross per 24 hour   Intake 2110 ml   Output 2150 ml   Net -40 ml       Admission Weight  Weight: 114 kg (252 lb 6.4 oz) (01/23/24 2300)    Daily Weight  01/23/24 : 114 kg (252 lb 6.4 oz)    Image Results  XR foot right 3+ views  Narrative: Interpreted By:  Christina Doran,   STUDY:  XR FOOT RIGHT 3+ VIEWS  1/26/2024 6:46 pm      INDICATION:  Signs/Symptoms:post op ray resection      COMPARISON:  None.      ACCESSION NUMBER(S):  [id number]      ORDERING CLINICIAN:  ANGEL LUIS BORGES      TECHNIQUE:  Three views of the Right foot were performed.      FINDINGS:  Interval postsurgical changes of distal right 5th digit ray  resection. Soft tissue swelling and gas is seen.      Impression: See findings.      MACRO:  None.      Signed by: Christina Doran 1/26/2024 8:09 PM  Dictation workstation:    [id number]      Physical Exam  Constitutional:       Appearance: Normal appearance.   HENT:      Head: Normocephalic and atraumatic.      Mouth/Throat:      Mouth: Mucous membranes are moist.   Eyes:      Extraocular Movements: Extraocular movements intact.      Pupils: Pupils are equal, round, and reactive to light.   Cardiovascular:      Rate and Rhythm: Normal rate and regular rhythm.      Pulses: Normal pulses.      Heart sounds: Normal heart sounds.   Pulmonary:      Effort: Pulmonary effort is normal.      Breath sounds: Normal breath sounds.   Abdominal:      General: Abdomen is flat.      Palpations: Abdomen is soft.   Musculoskeletal:      Comments: Wound dressing covering right foot   Skin:     General: Skin is warm and dry.      Capillary Refill: Capillary refill takes less than 2 seconds.      Coloration: Skin is not jaundiced.   Neurological:      General: No focal deficit present.      Mental Status: He is alert and oriented to person, place, and time.   Psychiatric:         Mood and Affect: Mood normal.         Behavior: Behavior normal.         Scheduled medications  aspirin, 81 mg, oral, Daily  carvedilol, 12.5 mg, oral, BID with meals  finasteride, 5 mg, oral, Daily  heparin (porcine), 5,000 Units, subcutaneous, q8h  insulin lispro, 0-10 Units, subcutaneous, Before meals & nightly  lidocaine, 5 mL, infiltration, Once  lidocaine, 5 mL, infiltration, Once  melatonin, 3 mg, oral, Nightly  OLANZapine, 20 mg, oral, Nightly  pantoprazole, 40 mg, oral, Daily  piperacillin-tazobactam, 3.375 g, intravenous, q6h  polyethylene glycol, 17 g, oral, Daily  sennosides-docusate sodium, 1 tablet, oral, BID  vancomycin-diluent combo no.1, 1,750 mg, intravenous, q24h      Continuous medications     PRN medications  PRN medications: acetaminophen **OR** [DISCONTINUED] acetaminophen **OR** [DISCONTINUED] acetaminophen, alteplase, alteplase, dextrose 10 % in water (D10W), dextrose, glucagon  Results for orders placed  or performed during the hospital encounter of 01/23/24 (from the past 24 hour(s))   POCT GLUCOSE   Result Value Ref Range    POCT Glucose 113 (H) 74 - 99 mg/dL   POCT GLUCOSE   Result Value Ref Range    POCT Glucose 116 (H) 74 - 99 mg/dL   POCT GLUCOSE   Result Value Ref Range    POCT Glucose 105 (H) 74 - 99 mg/dL   POCT GLUCOSE   Result Value Ref Range    POCT Glucose 99 74 - 99 mg/dL            Assessment/Plan            Principal Problem:    Gas gangrene of foot (CMS/[facility])  Active Problems:    HTN (hypertension)    Hyperlipidemia    Diabetes mellitus, type 2 (CMS/[facility])    Gas gangrene of foot -- patient appears to be recovering well from I&D. Plan is for 6 weeks IV antibiotics through PICC line. PT/OT at [facility]    HTN and diabetes controlled. Continue regimen outpatient and follow up with primary care. Patient is stable for discharge to skilled nursing facility               MARTY Headley  1/30/2024  9:40 AM

## 2024-01-30 NOTE — CARE PLAN
The patient's goals for the shift include comfort.     The clinical goals for the shift include safety, work with PT/OT, OOB to chair, pain management, and monitor labs/VS/wound VAC.         Problem: Pain  Goal: My pain/discomfort is manageable  Outcome: Progressing     Problem: Safety  Goal: Patient will be injury free during hospitalization  Outcome: Progressing     Problem: Daily Care  Goal: Daily care needs are met  Outcome: Progressing     Problem: Psychosocial Needs  Goal: Demonstrates ability to cope with hospitalization/illness  Outcome: Progressing  Goal: Collaborate with me, my family, and caregiver to identify my specific goals  Outcome: Progressing     Problem: Skin  Goal: Decreased wound size/increased tissue granulation at next dressing change  Outcome: Progressing  Goal: Participates in plan/prevention/treatment measures  Outcome: Progressing  Goal: Prevent/manage excess moisture  Outcome: Progressing  Goal: Prevent/minimize sheer/friction injuries  Outcome: Progressing  Goal: Promote/optimize nutrition  Outcome: Progressing  Goal: Promote skin healing  Outcome: Progressing     Problem: Deep Vein Thrombosis  Goal: I will remain free from complications of deep vein thrombosis and maintain current level of mobility  Outcome: Progressing     Problem: Discharge Barriers  Goal: My discharge needs are met  Outcome: Progressing

## 2024-01-30 NOTE — PROGRESS NOTES
Blood management follow up for JOHN of this pt on day 7 of admission presenting with Gas gangrene of foot (CMS/HCC). He is 6day s/p I&D with amputation of right 5th digit, returned to surgery 1/26/24 for right 5th digit resectioned and application of wound vac.   Wound vac total drainage 900ml.     Subjective   Pt reports post op pain controlled, no drainage to dressing, +RLE edema, +chronic peripheral neuropathy, denies headache, vertigo, fever, chills,chest pain, palpitation, sob, rosa, cough, congestion, abd pain, tenderness, nausea, bloating, constipation resolved he is on colace and miralax, denies hematuria, dysuria, rashes or lesions.       Objective     Physical Exam  Constitutional:       Appearance: Normal appearance.   HENT:      Head: Normocephalic and atraumatic.      Right Ear: External ear normal.      Left Ear: External ear normal.      Nose: Nose normal.      Mouth/Throat:      Pharynx: Oropharynx is clear.   Eyes:      Conjunctiva/sclera: Conjunctivae normal.      Pupils: Pupils are equal, round, and reactive to light.   Cardiovascular:      Rate and Rhythm: Normal rate and regular rhythm.      Heart sounds: Normal heart sounds. No murmur heard.  Pulmonary:      Effort: Pulmonary effort is normal.      Breath sounds: Normal breath sounds. No wheezing, rhonchi or rales.   Chest:      Chest wall: No tenderness.   Abdominal:      General: Bowel sounds are normal. There is no distension.      Palpations: Abdomen is soft.      Tenderness: There is no abdominal tenderness.   Musculoskeletal:         General: Tenderness present.      Right lower leg: Edema present.   Skin:     General: Skin is warm and dry.      Capillary Refill: Capillary refill takes 2 to 3 seconds.      Coloration: Skin is not pale.      Findings: No bruising, erythema, lesion or rash.      Comments: Dressing right lower ext dry/intact, wound vac patent, drain serosanguinous fluid   Neurological:      General: No focal deficit present.  "     Mental Status: He is alert and oriented to person, place, and time. Mental status is at baseline.   Psychiatric:         Mood and Affect: Mood normal.         Behavior: Behavior normal.         Last Recorded Vitals  Blood pressure 125/58, pulse 60, temperature 35.9 °C (96.6 °F), temperature source Temporal, resp. rate 17, height 1.88 m (6' 2.02\"), weight 114 kg (252 lb 6.4 oz), SpO2 97 %.  Intake/Output last 3 Shifts:  I/O last 3 completed shifts:  In: 2410 (21.1 mL/kg) [P.O.:800; IV Piggyback:1610]  Out: 1750 (15.3 mL/kg) [Urine:1750 (0.4 mL/kg/hr)]  Weight: 114.5 kg     Relevant Results  Results for orders placed or performed during the hospital encounter of 01/23/24 (from the past 96 hour(s))   Fungal Culture/Smear    Specimen: BONE RESECTION; Tissue   Result Value Ref Range    Fungal Culture/Smear       Culture in progress, a report will be issued when positive or after 2 weeks of incubation.    Fungal Smear No fungal elements seen    Tissue/Wound Culture/Smear    Specimen: BONE RESECTION; Tissue   Result Value Ref Range    Tissue/Wound Culture/Smear No growth to date     Gram Stain No polymorphonuclear leukocytes seen     Gram Stain No organisms seen    POCT GLUCOSE   Result Value Ref Range    POCT Glucose 90 74 - 99 mg/dL   Hemoglobin and hematocrit, blood   Result Value Ref Range    Hemoglobin 8.3 (L) 13.5 - 17.5 g/dL    Hematocrit 24.1 (L) 41.0 - 52.0 %   Staphylococcus Aureus/MRSA Colonization, Culture - PICC Only    Specimen: Anterior Nares; Swab   Result Value Ref Range    Staph/MRSA Screen Culture No Staphylococcus aureus isolated    POCT GLUCOSE   Result Value Ref Range    POCT Glucose 167 (H) 74 - 99 mg/dL   Vancomycin   Result Value Ref Range    Vancomycin 12.6 10.0 - 20.0 ug/mL   CBC and Auto Differential   Result Value Ref Range    WBC 18.7 (H) 4.4 - 11.3 x10*3/uL    nRBC 0.2 (H) 0.0 - 0.0 /100 WBCs    RBC 2.42 (L) 4.50 - 5.90 x10*6/uL    Hemoglobin 7.4 (L) 13.5 - 17.5 g/dL    Hematocrit 21.7 " (L) 41.0 - 52.0 %    MCV 90 80 - 100 fL    MCH 30.6 26.0 - 34.0 pg    MCHC 34.1 32.0 - 36.0 g/dL    RDW 12.9 11.5 - 14.5 %    Platelets 230 150 - 450 x10*3/uL    Immature Granulocytes %, Automated 9.0 (H) 0.0 - 0.9 %    Immature Granulocytes Absolute, Automated 1.69 (H) 0.00 - 0.70 x10*3/uL   Comprehensive Metabolic Panel   Result Value Ref Range    Glucose 100 (H) 65 - 99 mg/dL    Sodium 134 133 - 145 mmol/L    Potassium 3.7 3.4 - 5.1 mmol/L    Chloride 106 97 - 107 mmol/L    Bicarbonate 19 (L) 24 - 31 mmol/L    Urea Nitrogen 33 (H) 8 - 25 mg/dL    Creatinine 1.10 0.40 - 1.60 mg/dL    eGFR 76 >60 mL/min/1.73m*2    Calcium 7.9 (L) 8.5 - 10.4 mg/dL    Albumin 2.2 (L) 3.5 - 5.0 g/dL    Alkaline Phosphatase 155 (H) 35 - 125 U/L    Total Protein 5.5 (L) 5.9 - 7.9 g/dL    AST 75 (H) 5 - 40 U/L    Bilirubin, Total 0.3 0.1 - 1.2 mg/dL    ALT 88 (H) 5 - 40 U/L    Anion Gap 9 <=19 mmol/L   Creatine Kinase   Result Value Ref Range    Creatine Kinase 51 24 - 195 U/L   Manual Differential   Result Value Ref Range    Neutrophils %, Manual 63.0 40.0 - 80.0 %    Bands %, Manual 18.0 0.0 - 5.0 %    Lymphocytes %, Manual 9.0 13.0 - 44.0 %    Monocytes %, Manual 4.0 2.0 - 10.0 %    Eosinophils %, Manual 5.0 0.0 - 6.0 %    Basophils %, Manual 0.0 0.0 - 2.0 %    Metamyelocytes %, Manual 1.0 0.0 - 0.0 %    Seg Neutrophils Absolute, Manual 11.78 (H) 1.20 - 7.00 x10*3/uL    Bands Absolute, Manual 3.37 (H) 0.00 - 0.70 x10*3/uL    Lymphocytes Absolute, Manual 1.68 1.20 - 4.80 x10*3/uL    Monocytes Absolute, Manual 0.75 0.10 - 1.00 x10*3/uL    Eosinophils Absolute, Manual 0.94 (H) 0.00 - 0.70 x10*3/uL    Basophils Absolute, Manual 0.00 0.00 - 0.10 x10*3/uL    Metamyelocytes Absolute, Manual 0.19 0.00 - 0.00 x10*3/uL    Total Cells Counted 100     Neutrophils Absolute, Manual 15.15 (H) 1.20 - 7.70 x10*3/uL    RBC Morphology See Below     Polychromasia Mild    POCT GLUCOSE   Result Value Ref Range    POCT Glucose 115 (H) 74 - 99 mg/dL   POCT  GLUCOSE   Result Value Ref Range    POCT Glucose 135 (H) 74 - 99 mg/dL   POCT GLUCOSE   Result Value Ref Range    POCT Glucose 131 (H) 74 - 99 mg/dL   POCT GLUCOSE   Result Value Ref Range    POCT Glucose 146 (H) 74 - 99 mg/dL   CBC and Auto Differential   Result Value Ref Range    WBC 14.1 (H) 4.4 - 11.3 x10*3/uL    nRBC 0.2 (H) 0.0 - 0.0 /100 WBCs    RBC 2.33 (L) 4.50 - 5.90 x10*6/uL    Hemoglobin 7.1 (L) 13.5 - 17.5 g/dL    Hematocrit 22.3 (L) 41.0 - 52.0 %    MCV 96 80 - 100 fL    MCH 30.5 26.0 - 34.0 pg    MCHC 31.8 (L) 32.0 - 36.0 g/dL    RDW 13.3 11.5 - 14.5 %    Platelets 232 150 - 450 x10*3/uL    Immature Granulocytes %, Automated 8.7 (H) 0.0 - 0.9 %    Immature Granulocytes Absolute, Automated 1.22 (H) 0.00 - 0.70 x10*3/uL   Comprehensive Metabolic Panel   Result Value Ref Range    Glucose      Sodium      Potassium      Chloride      Bicarbonate      Urea Nitrogen      Creatinine      eGFR      Calcium      Albumin      Alkaline Phosphatase      Total Protein      AST      Bilirubin, Total      ALT      Anion Gap     Creatine Kinase   Result Value Ref Range    Creatine Kinase     Vancomycin   Result Value Ref Range    Vancomycin     Manual Differential   Result Value Ref Range    Neutrophils %, Manual 76.0 40.0 - 80.0 %    Bands %, Manual 8.0 0.0 - 5.0 %    Lymphocytes %, Manual 10.0 13.0 - 44.0 %    Monocytes %, Manual 3.0 2.0 - 10.0 %    Eosinophils %, Manual 3.0 0.0 - 6.0 %    Basophils %, Manual 0.0 0.0 - 2.0 %    Seg Neutrophils Absolute, Manual 10.72 (H) 1.20 - 7.00 x10*3/uL    Bands Absolute, Manual 1.13 (H) 0.00 - 0.70 x10*3/uL    Lymphocytes Absolute, Manual 1.41 1.20 - 4.80 x10*3/uL    Monocytes Absolute, Manual 0.42 0.10 - 1.00 x10*3/uL    Eosinophils Absolute, Manual 0.42 0.00 - 0.70 x10*3/uL    Basophils Absolute, Manual 0.00 0.00 - 0.10 x10*3/uL    Total Cells Counted 100     Neutrophils Absolute, Manual 11.85 (H) 1.20 - 7.70 x10*3/uL    RBC Morphology See Below     Polychromasia Mild     POCT GLUCOSE   Result Value Ref Range    POCT Glucose 114 (H) 74 - 99 mg/dL   POCT GLUCOSE   Result Value Ref Range    POCT Glucose 109 (H) 74 - 99 mg/dL   POCT GLUCOSE   Result Value Ref Range    POCT Glucose 128 (H) 74 - 99 mg/dL   CBC and Auto Differential   Result Value Ref Range    WBC 13.0 (H) 4.4 - 11.3 x10*3/uL    nRBC 0.2 (H) 0.0 - 0.0 /100 WBCs    RBC 2.42 (L) 4.50 - 5.90 x10*6/uL    Hemoglobin 7.3 (L) 13.5 - 17.5 g/dL    Hematocrit 21.5 (L) 41.0 - 52.0 %    MCV 89 80 - 100 fL    MCH 30.2 26.0 - 34.0 pg    MCHC 34.0 32.0 - 36.0 g/dL    RDW 13.0 11.5 - 14.5 %    Platelets 245 150 - 450 x10*3/uL    Immature Granulocytes %, Automated 11.3 (H) 0.0 - 0.9 %    Immature Granulocytes Absolute, Automated 1.47 (H) 0.00 - 0.70 x10*3/uL   Comprehensive Metabolic Panel   Result Value Ref Range    Glucose 105 (H) 65 - 99 mg/dL    Sodium 132 (L) 133 - 145 mmol/L    Potassium 3.9 3.4 - 5.1 mmol/L    Chloride 104 97 - 107 mmol/L    Bicarbonate 20 (L) 24 - 31 mmol/L    Urea Nitrogen 25 8 - 25 mg/dL    Creatinine 1.10 0.40 - 1.60 mg/dL    eGFR 76 >60 mL/min/1.73m*2    Calcium 7.8 (L) 8.5 - 10.4 mg/dL    Albumin 2.1 (L) 3.5 - 5.0 g/dL    Alkaline Phosphatase 163 (H) 35 - 125 U/L    Total Protein 5.9 5.9 - 7.9 g/dL    AST 55 (H) 5 - 40 U/L    Bilirubin, Total 0.3 0.1 - 1.2 mg/dL    ALT 78 (H) 5 - 40 U/L    Anion Gap 8 <=19 mmol/L   Manual Differential   Result Value Ref Range    Neutrophils %, Manual 79.0 40.0 - 80.0 %    Bands %, Manual 5.0 0.0 - 5.0 %    Lymphocytes %, Manual 7.0 13.0 - 44.0 %    Monocytes %, Manual 1.0 2.0 - 10.0 %    Eosinophils %, Manual 2.0 0.0 - 6.0 %    Basophils %, Manual 0.0 0.0 - 2.0 %    Metamyelocytes %, Manual 5.0 0.0 - 0.0 %    Myelocytes %, Manual 1.0 0.0 - 0.0 %    Seg Neutrophils Absolute, Manual 10.27 (H) 1.20 - 7.00 x10*3/uL    Bands Absolute, Manual 0.65 0.00 - 0.70 x10*3/uL    Lymphocytes Absolute, Manual 0.91 (L) 1.20 - 4.80 x10*3/uL    Monocytes Absolute, Manual 0.13 0.10 - 1.00  x10*3/uL    Eosinophils Absolute, Manual 0.26 0.00 - 0.70 x10*3/uL    Basophils Absolute, Manual 0.00 0.00 - 0.10 x10*3/uL    Metamyelocytes Absolute, Manual 0.65 0.00 - 0.00 x10*3/uL    Myelocytes Absolute, Manual 0.13 0.00 - 0.00 x10*3/uL    Total Cells Counted 100     Neutrophils Absolute, Manual 10.92 (H) 1.20 - 7.70 x10*3/uL    RBC Morphology See Below     Polychromasia Mild    POCT GLUCOSE   Result Value Ref Range    POCT Glucose 131 (H) 74 - 99 mg/dL   CBC and Auto Differential   Result Value Ref Range    WBC 11.7 (H) 4.4 - 11.3 x10*3/uL    nRBC 0.0 0.0 - 0.0 /100 WBCs    RBC 2.31 (L) 4.50 - 5.90 x10*6/uL    Hemoglobin 7.1 (L) 13.5 - 17.5 g/dL    Hematocrit 20.7 (L) 41.0 - 52.0 %    MCV 90 80 - 100 fL    MCH 30.7 26.0 - 34.0 pg    MCHC 34.3 32.0 - 36.0 g/dL    RDW 13.0 11.5 - 14.5 %    Platelets 234 150 - 450 x10*3/uL    Immature Granulocytes %, Automated 11.5 (H) 0.0 - 0.9 %    Immature Granulocytes Absolute, Automated 1.34 (H) 0.00 - 0.70 x10*3/uL   Vancomycin   Result Value Ref Range    Vancomycin 23.0 (H) 10.0 - 20.0 ug/mL   Manual Differential   Result Value Ref Range    Neutrophils %, Manual 70.0 40.0 - 80.0 %    Bands %, Manual 2.0 0.0 - 5.0 %    Lymphocytes %, Manual 14.0 13.0 - 44.0 %    Monocytes %, Manual 3.0 2.0 - 10.0 %    Eosinophils %, Manual 4.0 0.0 - 6.0 %    Basophils %, Manual 1.0 0.0 - 2.0 %    Metamyelocytes %, Manual 3.0 0.0 - 0.0 %    Myelocytes %, Manual 3.0 0.0 - 0.0 %    Seg Neutrophils Absolute, Manual 8.19 (H) 1.20 - 7.00 x10*3/uL    Bands Absolute, Manual 0.23 0.00 - 0.70 x10*3/uL    Lymphocytes Absolute, Manual 1.64 1.20 - 4.80 x10*3/uL    Monocytes Absolute, Manual 0.35 0.10 - 1.00 x10*3/uL    Eosinophils Absolute, Manual 0.47 0.00 - 0.70 x10*3/uL    Basophils Absolute, Manual 0.12 (H) 0.00 - 0.10 x10*3/uL    Metamyelocytes Absolute, Manual 0.35 0.00 - 0.00 x10*3/uL    Myelocytes Absolute, Manual 0.35 0.00 - 0.00 x10*3/uL    Total Cells Counted 100     Neutrophils Absolute,  Manual 8.42 (H) 1.20 - 7.70 x10*3/uL    RBC Morphology See Below     Polychromasia Mild    POCT GLUCOSE   Result Value Ref Range    POCT Glucose 95 74 - 99 mg/dL   POCT GLUCOSE   Result Value Ref Range    POCT Glucose 113 (H) 74 - 99 mg/dL   POCT GLUCOSE   Result Value Ref Range    POCT Glucose 116 (H) 74 - 99 mg/dL   POCT GLUCOSE   Result Value Ref Range    POCT Glucose 105 (H) 74 - 99 mg/dL   POCT GLUCOSE   Result Value Ref Range    POCT Glucose 99 74 - 99 mg/dL   CBC and Auto Differential   Result Value Ref Range    WBC 11.5 (H) 4.4 - 11.3 x10*3/uL    nRBC 0.3 (H) 0.0 - 0.0 /100 WBCs    RBC 2.46 (L) 4.50 - 5.90 x10*6/uL    Hemoglobin 7.5 (L) 13.5 - 17.5 g/dL    Hematocrit 21.9 (L) 41.0 - 52.0 %    MCV 89 80 - 100 fL    MCH 30.5 26.0 - 34.0 pg    MCHC 34.2 32.0 - 36.0 g/dL    RDW 13.2 11.5 - 14.5 %    Platelets 273 150 - 450 x10*3/uL    Neutrophils %      Immature Granulocytes %, Automated      Lymphocytes %      Monocytes %      Eosinophils %      Basophils %      Neutrophils Absolute      Lymphocytes Absolute      Monocytes Absolute      Eosinophils Absolute      Basophils Absolute        Current Facility-Administered Medications:     acetaminophen (Tylenol) tablet 650 mg, 650 mg, oral, q4h PRN, 650 mg at 01/30/24 1134 **OR** [DISCONTINUED] acetaminophen (Tylenol) oral liquid 650 mg, 650 mg, oral, q4h PRN **OR** [DISCONTINUED] acetaminophen (Tylenol) suppository 650 mg, 650 mg, rectal, q4h PRN, Connie Neumann MD    alteplase (Cathflo Activase) injection 2 mg, 2 mg, intra-catheter, PRN, Melvin Ivey DO    alteplase (Cathflo Activase) injection 2 mg, 2 mg, intra-catheter, PRN, Srikanth Saravia MD    aspirin EC tablet 81 mg, 81 mg, oral, Daily, Connie Neumann MD, 81 mg at 01/30/24 0842    carvedilol (Coreg) tablet 12.5 mg, 12.5 mg, oral, BID with meals, Connie Neumann MD, 12.5 mg at 01/30/24 0842    dextrose 10 % in water (D10W) infusion, 0.3 g/kg/hr, intravenous, Once PRN,  Connie Neumann MD    dextrose 50 % injection 25 g, 25 g, intravenous, q15 min PRN, Connie Neumann MD    finasteride (Proscar) tablet 5 mg, 5 mg, oral, Daily, Connie Neumann MD, 5 mg at 01/30/24 0842    glucagon (Glucagen) injection 1 mg, 1 mg, intramuscular, q15 min PRN, Connie Neumann MD    heparin (porcine) injection 5,000 Units, 5,000 Units, subcutaneous, q8h, Connie Neumann MD, 5,000 Units at 01/30/24 0559    insulin lispro (HumaLOG) injection 0-10 Units, 0-10 Units, subcutaneous, Before meals & nightly, Melvin Ivey DO, 2 Units at 01/26/24 2058    lidocaine (Xylocaine) 10 mg/mL (1 %) injection 50 mg, 5 mL, infiltration, Once, Melvin Ivey DO    lidocaine (Xylocaine) 10 mg/mL (1 %) injection 50 mg, 5 mL, infiltration, Once, Srikanth Saravia MD    melatonin tablet 3 mg, 3 mg, oral, Nightly, Connie Neumann MD, 3 mg at 01/29/24 2002    OLANZapine (ZyPREXA) tablet 20 mg, 20 mg, oral, Nightly, Connie Neumann MD, 20 mg at 01/29/24 2002    pantoprazole (ProtoNix) EC tablet 40 mg, 40 mg, oral, Daily, 40 mg at 01/30/24 0842 **OR** [DISCONTINUED] pantoprazole (ProtoNix) injection 40 mg, 40 mg, intravenous, Daily, Connie Neumann MD    piperacillin-tazobactam-dextrose (Zosyn) IV 3.375 g, 3.375 g, intravenous, q6h, Srikanth Saravia MD, Stopped at 01/30/24 0912    polyethylene glycol (Glycolax, Miralax) packet 17 g, 17 g, oral, Daily, Melvin Ivey DO, 17 g at 01/30/24 0842    sennosides-docusate sodium (Nieves-Colace) 8.6-50 mg per tablet 1 tablet, 1 tablet, oral, BID, Melvin Ivey DO, 1 tablet at 01/30/24 0842    vancomycin-diluent combo no.1 (Xellia) IVPB 1,750 mg, 1,750 mg, intravenous, q24h, Connie Neumann MD, Stopped at 01/29/24 8195                              Assessment/Plan   Principal Problem:    Gas gangrene of foot (CMS/HCC)  Active Problems:    HTN (hypertension)    Hyperlipidemia    Diabetes  mellitus, type 2 (CMS/HCC)  S/p I&D and 5th toe amputation right foot  S/p right foot  5th digit resectioned and application of wound vac.     Normocytic anemia  Anemia of acute blood loss combined with anemia of iron deficient and inflammation     Venofer 200mg IV X5  S/p B12 1000mcg IM X1  Lab to microsample blood draws-signage on door        Elisa Leblanc, APRN-CNP

## 2024-01-30 NOTE — PROGRESS NOTES
PODIATRY SERVICE CONSULT PROGRESS NOTE    SERVICE DATE: 1/29/2024   SERVICE TIME:  11:04 PM     Subjective   INTERVAL HPI:   Patient was seen at bedside.   Pain well controlled.  Patient denies any other constitutional symptoms.   No other pedal complaints.     Wound vac running without issue.    Medications:  Scheduled Meds: aspirin, 81 mg, oral, Daily  carvedilol, 12.5 mg, oral, BID with meals  finasteride, 5 mg, oral, Daily  heparin (porcine), 5,000 Units, subcutaneous, q8h  insulin lispro, 0-10 Units, subcutaneous, Before meals & nightly  lidocaine, 5 mL, infiltration, Once  lidocaine, 5 mL, infiltration, Once  melatonin, 3 mg, oral, Nightly  OLANZapine, 20 mg, oral, Nightly  pantoprazole, 40 mg, oral, Daily  piperacillin-tazobactam, 3.375 g, intravenous, q6h  polyethylene glycol, 17 g, oral, Daily  sennosides-docusate sodium, 1 tablet, oral, BID  vancomycin-diluent combo no.1, 1,750 mg, intravenous, q24h      Continuous Infusions:    PRN Meds: PRN medications: acetaminophen **OR** [DISCONTINUED] acetaminophen **OR** [DISCONTINUED] acetaminophen, alteplase, alteplase, dextrose 10 % in water (D10W), dextrose, glucagon         Objective   PHYSICAL EXAM:  Physical Exam Performed:  Vitals:    01/29/24 1900   BP: 127/53   Pulse: 64   Resp: 17   Temp: 34.7 °C (94.5 °F)   SpO2: 97%     Body mass index is 32.39 kg/m².    Patient is AOx3 and in no acute distress. Patient is alert and cooperative. Sitting comfortably in bed with dressing dry and intact with some deep strikethrough.     Vascular: Palpable DP/PT pulses B/L. Severe pitting edema R however much improved. Hair growth absent B/L. CFT<5 to B/L hallux. Temperature is warm to cool from tibial tuberosity to distal digits B/L. No lymphatic streaking noted B/L.    Musculoskeletal: Gross active and passive ROM diminished to age and activity level. Moves all extremities spontaneously. No pain to palpation at feet B/L. Wound vac dressing in place to RLE    Neurological:  Absent light touch sensation B/L. Pain stimuli absent B/L. Denies any numbness, burning or tingling.    Dermatologic: Skin appears diffusely xerotic B/L. Web spaces 1-4 B/L are clean, dry and intact. No rashes or nodules noted B/L. No hyperkeratotic tissue noted B/L.     Wound: Open Amputation Site - Right Lateral Forefoot  Vac dressing in place. Proximal anterior margin of vac dressing at anterior ankle with dry stable eschar. Vac canister with 450cc of dark red drainage. Vac functioning without alerts.      LABS:   Results for orders placed or performed during the hospital encounter of 01/23/24 (from the past 24 hour(s))   CBC and Auto Differential   Result Value Ref Range    WBC 11.7 (H) 4.4 - 11.3 x10*3/uL    nRBC 0.0 0.0 - 0.0 /100 WBCs    RBC 2.31 (L) 4.50 - 5.90 x10*6/uL    Hemoglobin 7.1 (L) 13.5 - 17.5 g/dL    Hematocrit 20.7 (L) 41.0 - 52.0 %    MCV 90 80 - 100 fL    MCH 30.7 26.0 - 34.0 pg    MCHC 34.3 32.0 - 36.0 g/dL    RDW 13.0 11.5 - 14.5 %    Platelets 234 150 - 450 x10*3/uL    Immature Granulocytes %, Automated 11.5 (H) 0.0 - 0.9 %    Immature Granulocytes Absolute, Automated 1.34 (H) 0.00 - 0.70 x10*3/uL   Vancomycin   Result Value Ref Range    Vancomycin 23.0 (H) 10.0 - 20.0 ug/mL   Manual Differential   Result Value Ref Range    Neutrophils %, Manual 70.0 40.0 - 80.0 %    Bands %, Manual 2.0 0.0 - 5.0 %    Lymphocytes %, Manual 14.0 13.0 - 44.0 %    Monocytes %, Manual 3.0 2.0 - 10.0 %    Eosinophils %, Manual 4.0 0.0 - 6.0 %    Basophils %, Manual 1.0 0.0 - 2.0 %    Metamyelocytes %, Manual 3.0 0.0 - 0.0 %    Myelocytes %, Manual 3.0 0.0 - 0.0 %    Seg Neutrophils Absolute, Manual 8.19 (H) 1.20 - 7.00 x10*3/uL    Bands Absolute, Manual 0.23 0.00 - 0.70 x10*3/uL    Lymphocytes Absolute, Manual 1.64 1.20 - 4.80 x10*3/uL    Monocytes Absolute, Manual 0.35 0.10 - 1.00 x10*3/uL    Eosinophils Absolute, Manual 0.47 0.00 - 0.70 x10*3/uL    Basophils Absolute, Manual 0.12 (H) 0.00 - 0.10 x10*3/uL     "Metamyelocytes Absolute, Manual 0.35 0.00 - 0.00 x10*3/uL    Myelocytes Absolute, Manual 0.35 0.00 - 0.00 x10*3/uL    Total Cells Counted 100     Neutrophils Absolute, Manual 8.42 (H) 1.20 - 7.70 x10*3/uL    RBC Morphology See Below     Polychromasia Mild    POCT GLUCOSE   Result Value Ref Range    POCT Glucose 95 74 - 99 mg/dL   POCT GLUCOSE   Result Value Ref Range    POCT Glucose 113 (H) 74 - 99 mg/dL   POCT GLUCOSE   Result Value Ref Range    POCT Glucose 116 (H) 74 - 99 mg/dL   POCT GLUCOSE   Result Value Ref Range    POCT Glucose 105 (H) 74 - 99 mg/dL      Lab Results   Component Value Date    HGBA1C 4.5 01/23/2024      No results found for: \"CRP\"   No results found for: \"SEDRATE\"     Results from last 7 days   Lab Units 01/29/24  0537   WBC AUTO x10*3/uL 11.7*   RBC AUTO x10*6/uL 2.31*   HEMOGLOBIN g/dL 7.1*   HEMATOCRIT % 20.7*     Results from last 7 days   Lab Units 01/28/24  1723   SODIUM mmol/L 132*   POTASSIUM mmol/L 3.9   CHLORIDE mmol/L 104   CO2 mmol/L 20*   BUN mg/dL 25   CREATININE mg/dL 1.10   CALCIUM mg/dL 7.8*   BILIRUBIN TOTAL mg/dL 0.3   ALT U/L 78*   AST U/L 55*     Results from last 7 days   Lab Units 01/24/24  0007   COLOR U  Yellow   APPEARANCE U  Clear   PH U  5.0   SPEC GRAV UR  1.018   PROTEIN U mg/dL 30 (1+)*   BLOOD UR  0.06 (1+)*   NITRITE U  NEGATIVE   WBC UR /HPF NONE   BACTERIA UR /HPF 1+*       IMAGING REVIEW:  ECG 12 lead    Result Date: 1/24/2024  Normal sinus rhythm Possible Septal infarct , age undetermined Abnormal ECG No previous ECGs available Confirmed by Bobo Thornton ([id number]) on 1/24/2024 2:03:37 PM    US renal complete    Result Date: 1/24/2024  STUDY: Renal and Bladder Ultrasound; 1/23/2024 10:36 PM. INDICATION:  Acute renal failure.  COMPARISON:  None available. ACCESSION NUMBER(S): [id number] ORDERING CLINICIAN: GARCIA DUARTE TECHNIQUE: Ultrasound of the Kidneys and Bladder. Multiple images of the abdomen were obtained. FINDINGS: RIGHT KIDNEY: The right kidney " measures 12.7 cm in length. Renal cortical echotexture is normal. There is no hydronephrosis. There are no stones. There is a cyst with a thick internal septation that measures 1.9 x 2.1 x 1.5 cm within the superior pole. LEFT KIDNEY: The left kidney measures 12.9 cm in length. Renal cortical echotexture is normal. There is no hydronephrosis. There are no stones. There are no cysts.  BLADDER: The urinary bladder is anechoic. The distended bladder shows no evidence of wall thickening. The right ureteral jet is visualized. The prostate gland measures 5.0 x 3.6 x 4.8 cm (44cc).       Septated right renal cyst consistent with a Bosniak class II lesion. No hydronephrosis bilaterally.  Follow-up ultrasound is recommended in 6 months. Enlarged prostate. Signed by Carlos Luke    XR tibia fibula right 2 views    Result Date: 1/23/2024  STUDY: Tibia and Fibula Radiographs; 1/23/2024 at 10:42 PM INDICATION: Soft tissue gas. COMPARISON: XR right foot 1/23/2024. ACCESSION NUMBER(S): [id number] ORDERING CLINICIAN: GARCIA DUARTE TECHNIQUE:  4 view(s) of the right tibia and fibula. FINDINGS:  There is no displaced fracture.  The alignment is anatomic. Superficial soft tissue edema is noted.  Peripheral vascular calcifications are present.  Soft tissue emphysema is seen along the dorsal aspect of the midfoot and anterior to the distal tibia.    Generalized soft tissue edema with soft tissue emphysema seen surrounding the ventral aspect of the ankle and dorsal aspect of the midfoot, concerning for soft tissue infection. Peripheral vascular disease. No acute osseous normality identified. Signed by Carlos Luke    XR foot right 3+ views    Result Date: 1/23/2024  Interpreted By:  Kirstie Moy, STUDY: XR FOOT RIGHT 3+ VIEWS 1/23/2024 2:03 pm   INDICATION: Swelling   COMPARISON: None available.   ACCESSION NUMBER(S): [id number]   ORDERING CLINICIAN: GARCIA DUARTE   TECHNIQUE: Three views of right foot   FINDINGS: There is  marked soft tissue swelling of the right ankle and foot. There are multiple pockets of gas seen within the right forefoot laterally centered about the 5th MTP joint with this gas extending into the webspace between the 4th and 5th toes and also extending into the midfoot and hindfoot anteriorly.   However, no obvious bony destruction is observed to indicate osteomyelitis.       Soft tissue infection of the right foot centered along the lateral aspect of the forefoot with probable abscess formation and gas gangrene. However, no plain film evidence of osteomyelitis is observed.   Signed by: Kirstie Moy 1/23/2024 2:34 PM Dictation workstation:   [id number]            Assessment/Plan   ASSESSMENT & PLAN:    #Gas Gangrene, RLE  #s/p I&D with Amputation of R 5th Digit (DOS: 01/24/24)  #s/p R 5th ray resection, application bilayer and wound vac (DOS: 01/26/24)  #DM Type II w/ Ulceration  #Chronic Non-Pressure Ulceration w/ Necrosis of Bone, Right Foot  #PAD  #Localized Edema  #Cellulitis, RLE     - Patient was seen and evaluated; all findings were discussed and all questions were answered to patient's satisfaction.  - Charts, labs, vitals and imaging all reviewed.   - Imaging: XR R Foot with soft tissue gas noted to the level of the ankle joint and post-surgical changes.  - Labs: WBC down-trending to 11. H&H stabilizing. . HbA1c 4.5%.  - Surgical pathology from 01/24 findings consistent with focal acute osteomyelitis of right 5th proximal phalanx as well as abscess formation and marked necrosis of soft tissues  - Wound culture: 4+ Mixed G+ & G- Bacteria  - Intra-op culture from 01/26 pending  - Blood culture: Pending - NGTD at 4 days and final     Plan:  - Abx: IV Vancomycin and Zosyn per ID - PICC in RUE. Planning 6 weeks IV Vancomycin and Zosyn.  - Pt underwent emergent I&D with amputation of the 5th digit of the right foot after transfer to [facility] from [facility] due to OR availability. Due to emergent nature  of case cardiac clearance was not obtained. Pt tolerated procedure well. Incision was packed open. Pt returned to OR for further excisional debridement of right 5th metatarsal and further I&D of dorsal and plantar foot with application of bilayer graft and application of wound vac. Plan to further manage with long-term IV antibiotics.  - Dressings: Vac dressing to remain clean, dry, and intact. To be changed every Wednesday if pt still in house. Pt will need to be set up for wound vac for discharge.  - Nursing staff is able to reinforce dressing if & as necessary. Thank you. If wound vac malfunction and needs to be removed, please message podiatry staff. Dressing to be applied: betadine soaked gauze to wound bed covered by dry stile dressing with double layers of ABD and ACE.  - Podiatry will continue to follow while in house.   - Pt OK to discharge to SNF     DVT ppx: Per primary.   Weightbearing: NWB RLE - OK to transfer with WB to heel with surgical shoe in place.  Discharge: Pt to follow up 1 week after discharge with Dr. Perry. Will have discharge plan updated.     Case to be discussed with attending, A&P above reflects a tentative plan. Please await for the final signature from the attending physician on service.    Marly Mcclain DPM PGY-2  Podiatric Medicine & Surgery  Please Doc[facility] me with any questions or concerns.        SIGNATURE: Marly Mcclain DPM PATIENT NAME: Claude Coley   DATE: January 29, 2024 MRN: [id number]   TIME: 11:04 PM CONTACT: Haiku Message

## 2024-01-30 NOTE — DISCHARGE SUMMARY
Discharge Diagnosis  Gas gangrene of foot (CMS/[facility])    Issues Requiring Follow-Up  Skilled nursing facility discharge complete 6 weeks total of IV antibiotics ongoing wound care with wound VAC and close follow-up with Dr. Burris and infectious disease    Discharge Meds     Your medication list        START taking these medications        Instructions Last Dose Given Next Dose Due   acetaminophen 325 mg tablet  Commonly known as: Tylenol      Take 2 tablets (650 mg) by mouth every 4 hours if needed for mild pain (1 - 3).       heparin (porcine) 5,000 unit/mL injection      Inject 1 mL (5,000 Units) under the skin every 8 hours.       piperacillin-tazobactam 3.375 gram/50 mL IV  Commonly known as: Zosyn      Infuse 50 mL (3.375 g) into a venous catheter every 6 hours.       vancomycin in 0.9 % sodium chl 1.75 gram/250 mL solution      Infuse 1.75 g into a venous catheter once daily.              CONTINUE taking these medications        Instructions Last Dose Given Next Dose Due   aspirin 81 mg EC tablet           atorvastatin 40 mg tablet  Commonly known as: Lipitor           carvedilol 12.5 mg tablet  Commonly known as: Coreg           finasteride 5 mg tablet  Commonly known as: Proscar           furosemide 20 mg tablet  Commonly known as: Lasix           gemfibrozil 600 mg tablet  Commonly known as: Lopid           lisinopril 20 mg tablet           metFORMIN (OSM) 500 mg 24 hr tablet  Commonly known as: Fortamet           OLANZapine 20 mg tablet  Commonly known as: ZyPREXA           spironolactone 25 mg tablet  Commonly known as: Aldactone                     Where to Get Your Medications        You can get these medications from any pharmacy    Bring a paper prescription for each of these medications  piperacillin-tazobactam 3.375 gram/50 mL IV       Information about where to get these medications is not yet available    Ask your nurse or doctor about these medications  acetaminophen 325 mg tablet  heparin  (porcine) 5,000 unit/mL injection  vancomycin in 0.9 % sodium chl 1.75 gram/250 mL solution         Test Results Pending At Discharge  Pending Labs       Order Current Status    AFB Culture/Smear Collected (01/26/24 1255)    Surgical Pathology Exam In process    CBC and Auto Differential Preliminary result    Fungal Culture/Smear Preliminary result    Tissue/Wound Culture/Smear Preliminary result          Susceptibility data from last 90 days.  Collected Specimen Info Organism   01/24/24 Swab from DIGIT, ACCESSORY RIGHT FOOT Mixed Aerobic and Anaerobic Bacteria    01/23/24 Tissue/Biopsy from Wound/Tissue Mixed Gram-Positive and Gram-Negative Bacteria       Hospital Course   63-year-old  male with extensive diabetic foot wound presents with gangrene gas-forming organism underwent surgical debridement with Dr. Fatuma CAVANAUGH from podiatry infectious disease consultation placement of PICC line and subsequent additional revision debridement with placement of wound VAC he required skilled nursing facility for complex wound management wound VAC care IV antibiotics and PICC line management.  His condition at time of discharge is stable his prognosis is good.  Scheduled to complete course of vancomycin and Zosyn per infectious disease    Pertinent Physical Exam At Time of Discharge  Physical Exam  Vitals and nursing note reviewed.   Constitutional:       Appearance: Normal appearance.   HENT:      Head: Normocephalic and atraumatic.      Mouth/Throat:      Mouth: Mucous membranes are moist.   Eyes:      Extraocular Movements: Extraocular movements intact.      Pupils: Pupils are equal, round, and reactive to light.   Cardiovascular:      Rate and Rhythm: Normal rate and regular rhythm.      Pulses: Normal pulses.      Heart sounds: Normal heart sounds.   Pulmonary:      Effort: Pulmonary effort is normal.      Breath sounds: Normal breath sounds.   Abdominal:      General: Abdomen is flat.      Palpations: Abdomen is  soft.   Musculoskeletal:         General: Normal range of motion.      Cervical back: Normal range of motion and neck supple.   Skin:     General: Skin is warm and dry.      Capillary Refill: Capillary refill takes less than 2 seconds.   Neurological:      General: No focal deficit present.      Mental Status: He is alert and oriented to person, place, and time. Mental status is at baseline.   Psychiatric:         Mood and Affect: Mood normal.         Outpatient Follow-Up  No future appointments.      Melvin Ivey DO

## 2024-01-30 NOTE — PROGRESS NOTES
Claude Coley is a 63 y.o. male on day 7 of admission presenting with Gas gangrene of foot (CMS/HCC).    Subjective   Interval History:    Sitting up in chair     Afebrile, no chills  Denies foot pain  Denies chest pain or shortness of breath  Denies nausea vomiting or diarrhea-reports constipation       Objective   Range of Vitals (last 24 hours)  Heart Rate:  [56-74]   Temp:  [34.7 °C (94.5 °F)-36.7 °C (98.1 °F)]   Resp:  [15-17]   BP: (117-136)/(53-61)   SpO2:  [95 %-98 %]   Daily Weight  01/23/24 : 114 kg (252 lb 6.4 oz)    Body mass index is 32.39 kg/m².    Physical Exam  Constitutional:       Appearance: Normal appearance.   HENT:      Head: Normocephalic and atraumatic.      Nose: Nose normal.   Eyes:      Extraocular Movements: Extraocular movements intact.      Conjunctiva/sclera: Conjunctivae normal.   Cardiovascular:      Rate and Rhythm: Regular rhythm.      Heart sounds: Normal heart sounds.   Pulmonary:      Breath sounds: Normal breath sounds.   Abdominal:      General: Bowel sounds are normal.      Palpations: Abdomen is soft.   Musculoskeletal:      Cervical back: Normal range of motion and neck supple.      Comments: Right foot swelling   Skin:     Comments: Right foot dressing, wound vac intact  Neurological:      Mental Status: He is alert.   Psychiatric:         Mood and Affect: Mood normal.         Behavior: Behavior normal.     Antibiotics  atorvastatin (Lipitor) tablet  aspirin EC tablet 81 mg  atorvastatin (Lipitor) tablet 40 mg  carvedilol (Coreg) tablet 12.5 mg  finasteride (Proscar) tablet 5 mg  OLANZapine (ZyPREXA) tablet 20 mg  pantoprazole (ProtoNix) EC tablet 40 mg  pantoprazole (ProtoNix) injection 40 mg  acetaminophen (Tylenol) tablet 650 mg  acetaminophen (Tylenol) oral liquid 650 mg  acetaminophen (Tylenol) suppository 650 mg  melatonin tablet 3 mg  polyethylene glycol (Glycolax, Miralax) packet 17 g  sodium chloride 0.9% infusion  lactated Ringer's bolus 500  mL  meropenem-vaborbactam (Vabomere) 1 g in sodium chloride 0.9% 250 mL IV  dextrose 50 % injection 25 g  glucagon (Glucagen) injection 1 mg  dextrose 10 % in water (D10W) infusion  insulin lispro (HumaLOG) injection 0-10 Units  heparin (porcine) injection 5,000 Units  lactated Ringer's bolus 500 mL  meropenem (Merrem) in sodium chloride 0.9 % 100 mL IV 1 g  bupivacaine PF (Marcaine) injection  - Omnicell Override Pull  lidocaine (Xylocaine) 10 mg/mL (1 %) injection 1 mg  oxygen (O2) therapy  lactated Ringer's infusion  ondansetron (Zofran) injection 4 mg  hydrALAZINE (Apresoline) injection 5 mg  albuterol 2.5 mg /3 mL (0.083 %) nebulizer solution 2.5 mg  HYDROmorphone PF (Dilaudid) injection 0.2 mg  HYDROmorphone (Dilaudid) injection 0.4 mg  BUPivacaine HCl (Marcaine) 0.5 % (5 mg/mL) injection  lidocaine (Xylocaine) 10 mg/mL (1 %) injection 1 mg  oxygen (O2) therapy  lactated Ringer's infusion  ondansetron (Zofran) injection 4 mg  HYDROmorphone PF (Dilaudid) injection 0.2 mg  HYDROmorphone (Dilaudid) injection 0.4 mg  vancomycin-diluent combo no.1 (Xellia) IVPB 1,750 mg  iron sucrose (Venofer) injection 200 mg  cyanocobalamin (Vitamin B-12) injection 1,000 mcg  lidocaine (Xylocaine) 10 mg/mL (1 %) injection 50 mg  alteplase (Cathflo Activase) injection 2 mg  polyethylene glycol (Glycolax, Miralax) packet 17 g  sennosides-docusate sodium (Nieves-Colace) 8.6-50 mg per tablet 1 tablet  insulin lispro (HumaLOG) injection 0-10 Units  vancomycin-diluent combo no.1 (Xellia) IVPB 1,750 mg  lidocaine (Xylocaine) 10 mg/mL (1 %) injection 1 mg  lactated Ringer's infusion  HYDROmorphone PF (Dilaudid) injection 0.2 mg  fentaNYL PF (Sublimaze) injection 50 mcg  midazolam (Versed) injection 1 mg  ondansetron (Zofran) injection 4 mg  promethazine (Phenergan) 6.25 mg in sodium chloride 0.9% 50 mL IV  meperidine PF (Demerol) injection 12.5 mg  albuterol 2.5 mg /3 mL (0.083 %) nebulizer solution 2.5 mg  vancomycin (Vancocin) vial for  "injection  - Omnicell Override Pull  bupivacaine PF (Marcaine) injection  - Omnicell Override Pull  BUPivacaine HCl (Marcaine) 0.5 % (5 mg/mL) injection  thrombin (recombinant) (Recothrom) topical solution  - Omnicell Override Pull  thrombin (recombinant) (Recothrom) topical solution  - Omnicell Override Pull  thrombin solution  lidocaine (Xylocaine) 10 mg/mL (1 %) injection 50 mg  alteplase (Cathflo Activase) injection 2 mg  vancomycin-diluent combo no.1 (Xellia) IVPB 1,750 mg  piperacillin-tazobactam-dextrose (Zosyn) IV 3.375 g      Relevant Results  Labs  Results from last 72 hours   Lab Units 01/30/24  1131 01/29/24  0537 01/28/24  1723 01/28/24  0618   WBC AUTO x10*3/uL 11.5* 11.7* 13.0* 14.1*   HEMOGLOBIN g/dL 7.5* 7.1* 7.3* 7.1*   HEMATOCRIT % 21.9* 20.7* 21.5* 22.3*   PLATELETS AUTO x10*3/uL 273 234 245 232   LYMPHO PCT MAN %  --  14.0 7.0 10.0   MONO PCT MAN %  --  3.0 1.0 3.0   EOSINO PCT MAN %  --  4.0 2.0 3.0       Results from last 72 hours   Lab Units 01/30/24  1131 01/28/24  1723   SODIUM mmol/L 134 132*   POTASSIUM mmol/L  --  3.9   CHLORIDE mmol/L  --  104   CO2 mmol/L 19* 20*   BUN mg/dL  --  25   CREATININE mg/dL  --  1.10   GLUCOSE mg/dL 119* 105*   CALCIUM mg/dL  --  7.8*   ANION GAP mmol/L  --  8   EGFR mL/min/1.73m*2  --  76       Results from last 72 hours   Lab Units 01/30/24  1131 01/28/24  1723   ALK PHOS U/L  --  163*   BILIRUBIN TOTAL mg/dL 0.3 0.3   PROTEIN TOTAL g/dL 6.2 5.9   ALT U/L 51* 78*   AST U/L  --  55*   ALBUMIN g/dL  --  2.1*       Estimated Creatinine Clearance: 94.7 mL/min (by C-G formula based on SCr of 1.1 mg/dL).  No results found for: \"CRP\"  Microbiology  Susceptibility data from last 14 days.  Collected Specimen Info Organism   01/24/24 Swab from DIGIT, ACCESSORY RIGHT FOOT Mixed Aerobic and Anaerobic Bacteria    01/23/24 Tissue/Biopsy from Wound/Tissue Mixed Gram-Positive and Gram-Negative Bacteria       Imaging  XR foot right 3+ views    Result Date: " 1/26/2024  Interpreted By:  Christina Doran, STUDY: XR FOOT RIGHT 3+ VIEWS  1/26/2024 6:46 pm   INDICATION: Signs/Symptoms:post op ray resection   COMPARISON: None.   ACCESSION NUMBER(S): [id number]   ORDERING CLINICIAN: ANGEL LUIS BORGES   TECHNIQUE: Three views of the Right foot were performed.   FINDINGS: Interval postsurgical changes of distal right 5th digit ray resection. Soft tissue swelling and gas is seen.       See findings.   MACRO: None.   Signed by: Christina Doran 1/26/2024 8:09 PM Dictation workstation:   [id number]    ECG 12 lead    Result Date: 1/24/2024  Normal sinus rhythm Possible Septal infarct , age undetermined Abnormal ECG No previous ECGs available Confirmed by Bobo Thornton ([id number]) on 1/24/2024 2:03:37 PM    US renal complete    Result Date: 1/24/2024  STUDY: Renal and Bladder Ultrasound; 1/23/2024 10:36 PM. INDICATION:  Acute renal failure.  COMPARISON:  None available. ACCESSION NUMBER(S): [id number] ORDERING CLINICIAN: GARCIA DUARTE TECHNIQUE: Ultrasound of the Kidneys and Bladder. Multiple images of the abdomen were obtained. FINDINGS: RIGHT KIDNEY: The right kidney measures 12.7 cm in length. Renal cortical echotexture is normal. There is no hydronephrosis. There are no stones. There is a cyst with a thick internal septation that measures 1.9 x 2.1 x 1.5 cm within the superior pole. LEFT KIDNEY: The left kidney measures 12.9 cm in length. Renal cortical echotexture is normal. There is no hydronephrosis. There are no stones. There are no cysts.  BLADDER: The urinary bladder is anechoic. The distended bladder shows no evidence of wall thickening. The right ureteral jet is visualized. The prostate gland measures 5.0 x 3.6 x 4.8 cm (44cc).       Septated right renal cyst consistent with a Bosniak class II lesion. No hydronephrosis bilaterally.  Follow-up ultrasound is recommended in 6 months. Enlarged prostate. Signed by Carlos Luke    XR tibia fibula right 2 views    Result Date:  1/23/2024  STUDY: Tibia and Fibula Radiographs; 1/23/2024 at 10:42 PM INDICATION: Soft tissue gas. COMPARISON: XR right foot 1/23/2024. ACCESSION NUMBER(S): [id number] ORDERING CLINICIAN: GARCIA DUARTE TECHNIQUE:  4 view(s) of the right tibia and fibula. FINDINGS:  There is no displaced fracture.  The alignment is anatomic. Superficial soft tissue edema is noted.  Peripheral vascular calcifications are present.  Soft tissue emphysema is seen along the dorsal aspect of the midfoot and anterior to the distal tibia.    Generalized soft tissue edema with soft tissue emphysema seen surrounding the ventral aspect of the ankle and dorsal aspect of the midfoot, concerning for soft tissue infection. Peripheral vascular disease. No acute osseous normality identified. Signed by Carlos Luke    XR foot right 3+ views    Result Date: 1/23/2024  Interpreted By:  Kirstie Moy, STUDY: XR FOOT RIGHT 3+ VIEWS 1/23/2024 2:03 pm   INDICATION: Swelling   COMPARISON: None available.   ACCESSION NUMBER(S): [id number]   ORDERING CLINICIAN: GARCIA DUARTE   TECHNIQUE: Three views of right foot   FINDINGS: There is marked soft tissue swelling of the right ankle and foot. There are multiple pockets of gas seen within the right forefoot laterally centered about the 5th MTP joint with this gas extending into the webspace between the 4th and 5th toes and also extending into the midfoot and hindfoot anteriorly.   However, no obvious bony destruction is observed to indicate osteomyelitis.       Soft tissue infection of the right foot centered along the lateral aspect of the forefoot with probable abscess formation and gas gangrene. However, no plain film evidence of osteomyelitis is observed.   Signed by: Kirstie Moy 1/23/2024 2:34 PM Dictation workstation:   [id number]     Assessment/Plan     Right diabetic foot ulcer-Hall 4  Right foot osteomyelitis/abscess-suspect polymicrobial, negative wound culture  Type 2 diabetes with  peripheral angiopathy with gangrene  Resolving acute kidney injury  Leukocytosis-multifactorial, improving     Continue vancomycin  Continue IV Zosyn  Glycemic control  Supportive care  Monitor temperature and WBC  PICC line placed 1/16/24  Long-term plan is IV vancomycin and Zosyn to 3/6/2024-see discharge rec  Discharge planning    Bere Jacobo, ALISE-CNP

## 2024-01-30 NOTE — PROGRESS NOTES
"Occupational Therapy    OT Treatment    Patient Name: Claude Coley  MRN: [id number]  Today's Date: 1/30/2024  Time Calculation  Start Time: 1330  Stop Time: 1354  Time Calculation (min): 24 min         Assessment:        Plan:  Treatment Interventions: UE strengthening/ROM  OT Frequency: 3 times per week  OT Discharge Recommendations: High intensity level of continued care  Equipment Recommended upon Discharge: Wheeled walker  OT Recommended Transfer Status: Stand by assist  OT - OK to Discharge: Yes  Treatment Interventions: UE strengthening/ROM    Subjective   Previous Visit Info:  OT Last Visit  OT Received On: 01/30/24  General:  General  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 Ivey  Past Medical History Relevant to Rehab: HTN, CHF, hyperlipidemia, DM  Patient Position Received:  (On BSC)  General Comment: Agreerable to therex, declined chair\" I was up in the chair all morning\"  Precautions:        Objective       Bed Mobility/Transfers: Bed Mobility 1  Bed Mobility 1: Sitting to supine  Level of Assistance 1: Modified independent       Therapy/Activity: Therapeutic Exercise  Therapeutic Exercise Activity 1: bicep curls  Therapeutic Exercise Activity 2: shoulder elevation  Therapeutic Exercise Activity 3: shoulder ER/IR  Therapeutic Exercise Activity 4: scap protraction/retraction  Therapeutic Exercise Activity 5: 1 set each x 20 reps, 2 lb free wt. pt completes therex seated edge of bed, presents with fair from , cues and demo along for correct joint alignment, slower pace    Outcome Measures:[facility] Daily Activity  Putting on and taking off regular lower body clothing: A little  Bathing (including washing, rinsing, drying): A little  Putting on and taking off regular upper body clothing: A little  Toileting, which includes using toilet, bedpan or urinal: A little  Taking care of personal grooming such as brushing teeth: None  Eating Meals: None  Daily Activity - Total Score: " 20        Education Documentation  Precautions, taught by GUNNER Wilson at 1/30/2024  1:55 PM.  Learner: Patient  Readiness: Acceptance  Method: Explanation, Demonstration  Response: Verbalizes Understanding, Needs Reinforcement    Home Exercise Program, taught by GUNNER Wilson at 1/30/2024  1:55 PM.  Learner: Patient  Readiness: Acceptance  Method: Explanation, Demonstration  Response: Verbalizes Understanding, Needs Reinforcement    Education Comments  No comments found.      Goals:  Encounter Problems       Encounter Problems (Active)       OT Goals       ADLs (Progressing)       Start:  01/25/24    Expected End:  02/01/24       Patient will perform ADLs with Min A using AE/compensatory techniques as needed.          Functional Transfers (Progressing)       Start:  01/25/24    Expected End:  02/01/24       Patient will complete bed, chair, toilet xfer with MIN A using LRD.          Precautions (Progressing)       Start:  01/25/24    Expected End:  02/01/24       Patient will independently verbalize post op precautions in preparation for ADLs.

## 2024-01-30 NOTE — PROGRESS NOTES
Physical Therapy    Physical Therapy Treatment    Patient Name: Claude Coley  MRN: [id number]  Today's Date: 1/30/2024  Time Calculation  Start Time: 1000  Stop Time: 1028  Time Calculation (min): 28 min       Assessment/Plan   PT Assessment  Rehab Prognosis: Good  Barriers to Discharge: NWB R LE  Medical Staff Made Aware: Yes  End of Session Communication: Bedside nurse  Assessment Comment: MASON DENNISON  End of Session Patient Position: Up in chair (Instructes to sit up in chair for one hour Pilllow under R LE)  PT Plan  Inpatient/Swing Bed or Outpatient: Inpatient  PT Plan  Treatment/Interventions: Bed mobility, Transfer training, Gait training, Strengthening, Endurance training, Range of motion, Therapeutic exercise, Therapeutic activity  PT Plan: Skilled PT  PT Frequency: Daily  PT Discharge Recommendations: High intensity level of continued care  Equipment Recommended upon Discharge: Wheeled walker  PT Recommended Transfer Status: Assistive device, Assist x2 (Second person for safety)  PT - OK to Discharge: Yes      General Visit Information:   PT  Visit  PT Received On: 01/30/24  General  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 Ivey  Past Medical History Relevant to Rehab: HTN, CHF, hyperlipidemia, DM  Prior to Session Communication: Bedside nurse  Patient Position Received: Bed, 3 rail up, Alarm off, not on at start of session  Preferred Learning Style: verbal  General Comment: Cleared by guerrero silva to see.Patient agreeable to up in chair for one hour    Subjective   Precautions:  Precautions  LE Weight Bearing Status: Right Non-Weight Bearing  Medical Precautions: Fall precautions  Vital Signs:       Objective   Pain:  Pain Assessment  Pain Assessment: 0-10  Pain Score: 1  Pain Type: Surgical pain  Pain Location: Foot  Pain Orientation: Right  Pain Descriptors: Aching (with mobility)  Cognition:  Cognition  Orientation Level: Oriented X4  Postural Control:     Extremity/Trunk  Assessments:    Activity Tolerance:     Treatments:  Therapeutic Exercise  Therapeutic Exercise Performed: Yes  Therapeutic Exercise Activity 1: B LE supine ther ex: ankle pumps L LE, quad/gluteals ets, heelslides R foot off surface, SAQs,hip abduction/adduction x 20    Bed Mobility  Bed Mobility: Yes  Bed Mobility 1  Bed Mobility 1: Supine to sitting  Level of Assistance 1: Modified independent  Bed Mobility Comments 1: HOB elevated Increased time/effort    Ambulation/Gait Training  Ambulation/Gait Training Performed: Yes  Ambulation/Gait Training 1  Surface 1: Level tile  Device 1: Rolling walker  Assistance 1: Moderate assistance (Cues for NWB R LE Second person for safety)  Quality of Gait 1:  (NWB R LE)  Comments/Distance (ft) 1: 3 side steps from bed to bedside chair sliding R toes along floor  Cues NWB R LEwith significanr weight B UEs on RW  Ambulation/Gait Training 2  Surface 2: Level tile  Device 2: Rolling walker  Assistance 2: Moderate assistance (Second person for safety)  Comments/Distance (ft) 2: Attempted to take staps by hopping. Patient unable due to balance Unable to manage NWB R LE  Transfers  Transfer: Yes  Transfer 1  Transfer From 1: Bed to  Transfer to 1: Chair with arms  Technique 1: To left  Transfer Device 1: Walker  Transfer Level of Assistance 1: Moderate verbal cues (Second person for safety)  Trials/Comments 1: x 2 STS effort to complete Unable to hop due to balance    Outcome Measures:  [facility] Basic Mobility  Turning from your back to your side while in a flat bed without using bedrails: None  Moving from lying on your back to sitting on the side of a flat bed without using bedrails: None  Moving to and from bed to chair (including a wheelchair): A lot  Standing up from a chair using your arms (e.g. wheelchair or bedside chair): A lot  To walk in hospital room: A lot  Climbing 3-5 steps with railing: Total  Basic Mobility - Total Score: 15    Education Documentation  Mobility Training,  taught by Ivana Severino PTA at 1/30/2024 10:38 AM.  Learner: Patient  Readiness: Acceptance  Method: Explanation  Response: Verbalizes Understanding    Education Comments  No comments found.        OP EDUCATION:       Encounter Problems       Encounter Problems (Active)       PT Goals       Patient will transfer sit to stand and stand to sit with  contact guard assist to facilitate mobility.  (Progressing)       Start:  01/25/24    Expected End:  02/08/24            Patient will transfer bed to chair and chair to bed with contact guard assist to facilitate mobility.  (Progressing)       Start:  01/25/24    Expected End:  02/08/24            Patient will amb 10 feet with rolling walker device including no turns on even surface with minimal assist to facilitate safe mobility.  (Progressing)       Start:  01/25/24    Expected End:  02/08/24            Demonstrate safe mobility techniques while maintaining WB status R LE. (Progressing)       Start:  01/25/24    Expected End:  02/08/24

## 2024-01-31 LAB
ANION GAP SERPL CALC-SCNC: 10 MMOL/L
BUN SERPL-MCNC: 13 MG/DL (ref 8–25)
CALCIUM SERPL-MCNC: 8.1 MG/DL (ref 8.5–10.4)
CHLORIDE SERPL-SCNC: 108 MMOL/L (ref 97–107)
CO2 SERPL-SCNC: 19 MMOL/L (ref 24–31)
CREAT SERPL-MCNC: 1 MG/DL (ref 0.4–1.6)
EGFRCR SERPLBLD CKD-EPI 2021: 86 ML/MIN/1.73M*2
ERYTHROCYTE [DISTWIDTH] IN BLOOD BY AUTOMATED COUNT: 13.3 % (ref 11.5–14.5)
GLUCOSE BLD MANUAL STRIP-MCNC: 110 MG/DL (ref 74–99)
GLUCOSE BLD MANUAL STRIP-MCNC: 118 MG/DL (ref 74–99)
GLUCOSE BLD MANUAL STRIP-MCNC: 82 MG/DL (ref 74–99)
GLUCOSE SERPL-MCNC: 98 MG/DL (ref 65–99)
HCT VFR BLD AUTO: 23.5 % (ref 41–52)
HEMOCCULT SP1 STL QL: NEGATIVE
HGB BLD-MCNC: 7.9 G/DL (ref 13.5–17.5)
LABORATORY COMMENT REPORT: NORMAL
MCH RBC QN AUTO: 30.4 PG (ref 26–34)
MCHC RBC AUTO-ENTMCNC: 33.6 G/DL (ref 32–36)
MCV RBC AUTO: 90 FL (ref 80–100)
NRBC BLD-RTO: 0.2 /100 WBCS (ref 0–0)
PATH REPORT.FINAL DX SPEC: NORMAL
PATH REPORT.GROSS SPEC: NORMAL
PATH REPORT.RELEVANT HX SPEC: NORMAL
PATH REPORT.TOTAL CANCER: NORMAL
PLATELET # BLD AUTO: 276 X10*3/UL (ref 150–450)
POTASSIUM SERPL-SCNC: 4.3 MMOL/L (ref 3.4–5.1)
RBC # BLD AUTO: 2.6 X10*6/UL (ref 4.5–5.9)
SODIUM SERPL-SCNC: 137 MMOL/L (ref 133–145)
WBC # BLD AUTO: 10.4 X10*3/UL (ref 4.4–11.3)

## 2024-01-31 PROCEDURE — 2500000004 HC RX 250 GENERAL PHARMACY W/ HCPCS (ALT 636 FOR OP/ED): Performed by: INTERNAL MEDICINE

## 2024-01-31 PROCEDURE — 85027 COMPLETE CBC AUTOMATED: CPT | Performed by: INTERNAL MEDICINE

## 2024-01-31 PROCEDURE — 2500000004 HC RX 250 GENERAL PHARMACY W/ HCPCS (ALT 636 FOR OP/ED): Performed by: NURSE PRACTITIONER

## 2024-01-31 PROCEDURE — 97110 THERAPEUTIC EXERCISES: CPT | Mod: GO

## 2024-01-31 PROCEDURE — 82270 OCCULT BLOOD FECES: CPT | Performed by: INTERNAL MEDICINE

## 2024-01-31 PROCEDURE — 80048 BASIC METABOLIC PNL TOTAL CA: CPT | Performed by: INTERNAL MEDICINE

## 2024-01-31 PROCEDURE — 2500000001 HC RX 250 WO HCPCS SELF ADMINISTERED DRUGS (ALT 637 FOR MEDICARE OP): Performed by: INTERNAL MEDICINE

## 2024-01-31 PROCEDURE — 99231 SBSQ HOSP IP/OBS SF/LOW 25: CPT | Performed by: NURSE PRACTITIONER

## 2024-01-31 PROCEDURE — 97530 THERAPEUTIC ACTIVITIES: CPT | Mod: GP,CQ

## 2024-01-31 PROCEDURE — 82947 ASSAY GLUCOSE BLOOD QUANT: CPT

## 2024-01-31 PROCEDURE — 97110 THERAPEUTIC EXERCISES: CPT | Mod: GP,CQ

## 2024-01-31 PROCEDURE — 1200000002 HC GENERAL ROOM WITH TELEMETRY DAILY

## 2024-01-31 RX ORDER — KETOROLAC TROMETHAMINE 30 MG/ML
30 INJECTION, SOLUTION INTRAMUSCULAR; INTRAVENOUS ONCE
Status: COMPLETED | OUTPATIENT
Start: 2024-01-31 | End: 2024-01-31

## 2024-01-31 RX ADMIN — CARVEDILOL 12.5 MG: 12.5 TABLET, FILM COATED ORAL at 09:26

## 2024-01-31 RX ADMIN — POLYETHYLENE GLYCOL 3350 17 G: 17 POWDER, FOR SOLUTION ORAL at 09:26

## 2024-01-31 RX ADMIN — ACETAMINOPHEN 650 MG: 325 TABLET ORAL at 10:35

## 2024-01-31 RX ADMIN — PANTOPRAZOLE SODIUM 40 MG: 40 TABLET, DELAYED RELEASE ORAL at 09:26

## 2024-01-31 RX ADMIN — KETOROLAC TROMETHAMINE 30 MG: 30 INJECTION INTRAMUSCULAR; INTRAVENOUS at 11:43

## 2024-01-31 RX ADMIN — PIPERACILLIN SODIUM AND TAZOBACTAM SODIUM 3.38 G: 3; .375 INJECTION, SOLUTION INTRAVENOUS at 22:03

## 2024-01-31 RX ADMIN — FINASTERIDE 5 MG: 5 TABLET, FILM COATED ORAL at 09:26

## 2024-01-31 RX ADMIN — PIPERACILLIN SODIUM AND TAZOBACTAM SODIUM 3.38 G: 3; .375 INJECTION, SOLUTION INTRAVENOUS at 14:00

## 2024-01-31 RX ADMIN — PIPERACILLIN SODIUM AND TAZOBACTAM SODIUM 3.38 G: 3; .375 INJECTION, SOLUTION INTRAVENOUS at 09:27

## 2024-01-31 RX ADMIN — HEPARIN SODIUM 5000 UNITS: 5000 INJECTION, SOLUTION INTRAVENOUS; SUBCUTANEOUS at 05:40

## 2024-01-31 RX ADMIN — VANCOMYCIN 1750 MG: 1.25 INJECTION, SOLUTION INTRAVENOUS at 22:32

## 2024-01-31 RX ADMIN — SENNOSIDES AND DOCUSATE SODIUM 1 TABLET: 8.6; 5 TABLET ORAL at 22:03

## 2024-01-31 RX ADMIN — CARVEDILOL 12.5 MG: 12.5 TABLET, FILM COATED ORAL at 16:58

## 2024-01-31 RX ADMIN — SENNOSIDES AND DOCUSATE SODIUM 1 TABLET: 8.6; 5 TABLET ORAL at 09:26

## 2024-01-31 RX ADMIN — Medication 3 MG: at 22:03

## 2024-01-31 RX ADMIN — PIPERACILLIN SODIUM AND TAZOBACTAM SODIUM 3.38 G: 3; .375 INJECTION, SOLUTION INTRAVENOUS at 01:24

## 2024-01-31 RX ADMIN — OLANZAPINE 20 MG: 10 TABLET, FILM COATED ORAL at 22:03

## 2024-01-31 ASSESSMENT — COGNITIVE AND FUNCTIONAL STATUS - GENERAL
DAILY ACTIVITIY SCORE: 18
DAILY ACTIVITIY SCORE: 19
HELP NEEDED FOR BATHING: A LITTLE
HELP NEEDED FOR BATHING: A LITTLE
TOILETING: A LITTLE
CLIMB 3 TO 5 STEPS WITH RAILING: TOTAL
DRESSING REGULAR LOWER BODY CLOTHING: A LITTLE
WALKING IN HOSPITAL ROOM: TOTAL
TOILETING: A LITTLE
MOVING TO AND FROM BED TO CHAIR: A LOT
MOVING TO AND FROM BED TO CHAIR: A LOT
HELP NEEDED FOR BATHING: A LITTLE
DAILY ACTIVITIY SCORE: 21
DRESSING REGULAR LOWER BODY CLOTHING: A LOT
MOVING TO AND FROM BED TO CHAIR: A LITTLE
CLIMB 3 TO 5 STEPS WITH RAILING: TOTAL
STANDING UP FROM CHAIR USING ARMS: A LOT
DRESSING REGULAR LOWER BODY CLOTHING: A LOT
STANDING UP FROM CHAIR USING ARMS: A LOT
DRESSING REGULAR UPPER BODY CLOTHING: A LITTLE
MOBILITY SCORE: 18
DRESSING REGULAR UPPER BODY CLOTHING: A LITTLE
PERSONAL GROOMING: A LITTLE
CLIMB 3 TO 5 STEPS WITH RAILING: TOTAL
PERSONAL GROOMING: A LITTLE
WALKING IN HOSPITAL ROOM: A LITTLE
WALKING IN HOSPITAL ROOM: TOTAL
MOBILITY SCORE: 14
MOBILITY SCORE: 14
STANDING UP FROM CHAIR USING ARMS: A LITTLE

## 2024-01-31 ASSESSMENT — PAIN - FUNCTIONAL ASSESSMENT
PAIN_FUNCTIONAL_ASSESSMENT: 0-10

## 2024-01-31 ASSESSMENT — PAIN SCALES - GENERAL
PAINLEVEL_OUTOF10: 0 - NO PAIN
PAINLEVEL_OUTOF10: 5 - MODERATE PAIN
PAINLEVEL_OUTOF10: 4
PAINLEVEL_OUTOF10: 0 - NO PAIN
PAINLEVEL_OUTOF10: 2
PAINLEVEL_OUTOF10: 0 - NO PAIN
PAINLEVEL_OUTOF10: 0 - NO PAIN

## 2024-01-31 ASSESSMENT — PAIN DESCRIPTION - LOCATION
LOCATION: FOOT
LOCATION: FOOT

## 2024-01-31 ASSESSMENT — PAIN DESCRIPTION - ORIENTATION
ORIENTATION: RIGHT
ORIENTATION: RIGHT

## 2024-01-31 NOTE — PROGRESS NOTES
"Vancomycin Dosing by Pharmacy- FOLLOW UP    Claude Coley is a 63 y.o. year old male who Pharmacy has been consulted for vancomycin dosing for cellulitis, skin and soft tissue. Based on the patient's indication and renal status this patient is being dosed based on a goal AUC of 400-600.     Renal function is currently stable.    Current vancomycin dose: 1750 mg given every 24 hours    Estimated vancomycin AUC on current dose: 401 mg/L.hr     Visit Vitals  /52 (BP Location: Left arm, Patient Position: Lying)   Pulse 62   Temp 36.7 °C (98.1 °F) (Temporal)   Resp 18        Lab Results   Component Value Date    CREATININE 1.00 01/31/2024    CREATININE 1.00 01/30/2024    CREATININE 1.10 01/28/2024    CREATININE  01/28/2024      Comment:      Sample Unsuitable For Testing- Cancelled Test Called To-RB By:        Patient weight is No results found for: \"PTWEIGHT\"    No results found for: \"CULTURE\"     I/O last 3 completed shifts:  In: 2650 (23.1 mL/kg) [P.O.:600; IV Piggyback:2050]  Out: 2650 (23.1 mL/kg) [Urine:2600 (0.6 mL/kg/hr); Drains:50]  Weight: 114.5 kg   [unfilled]    No results found for: \"PATIENTTEMP\"     Assessment/Plan    Within goal AUC range. Continue current vancomycin regimen.    This dosing regimen is predicted by [employer] to result in the following pharmacokinetic parameters:  Loading dose: N/A  Regimen: 1750 mg IV every 24 hours.  Start time: 21:08 on 01/31/2024  Exposure target: AUC24 (range)400-600 mg/L.hr   AUC24,ss: 401 mg/L.hr  Probability of AUC24 > 400: 51 %  Ctrough,ss: 10.6 mg/L  Probability of Ctrough,ss > 20: 1 %  Probability of nephrotoxicity (Lodise ADRYAN 2009): 6 %      The next level will be obtained on 2-5-24 at 0500hr. May be obtained sooner if clinically indicated.   Will continue to monitor renal function daily while on vancomycin and order serum creatinine at least every 48 hours if not already ordered.  Follow for continued vancomycin needs, clinical response, and " signs/symptoms of toxicity.       Lee Lagos, PharmD

## 2024-01-31 NOTE — NURSING NOTE
Patient OOB in recliner with RLE elevated with ice. Patient c/o pain 5/10 to Rt. Foot, tylenol given. Call light within reach. Will continue to monitor.

## 2024-01-31 NOTE — PROGRESS NOTES
Physical Therapy    Physical Therapy Treatment    Patient Name: Claude Coley  MRN: [id number]  Today's Date: 1/31/2024  Time Calculation  Start Time: 0910  Stop Time: 0940  Time Calculation (min): 30 min       Assessment/Plan   PT Assessment  Rehab Prognosis: Good  Barriers to Discharge: NWB R LE  Medical Staff Made Aware: Yes  End of Session Communication: Bedside nurse  Assessment Comment: NWB R JESI  End of Session Patient Position: Up in chair (Up in recliner B LE elevated Extra elevation R LE with cold pack on toes on R LE Instructed to sit up for one hour)  PT Plan  Inpatient/Swing Bed or Outpatient: Inpatient  PT Plan  Treatment/Interventions: Bed mobility, Transfer training, Gait training, Strengthening, Endurance training, Range of motion, Therapeutic exercise, Therapeutic activity  PT Plan: Skilled PT  PT Frequency: Daily  PT Discharge Recommendations: High intensity level of continued care  Equipment Recommended upon Discharge: Wheeled walker  PT Recommended Transfer Status: Assist x2, Assistive device (Second person for safety)  PT - OK to Discharge: Yes      General Visit Information:   PT  Visit  PT Received On: 01/31/24  General  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 Ivey  Past Medical History Relevant to Rehab: HTN, CHF, hyperlipidemia, DM  Prior to Session Communication: Bedside nurse  Patient Position Received: Bed, 3 rail up, Alarm off, not on at start of session  Preferred Learning Style: verbal  General Comment: Cleared by nurse to see.Patient agreeable to therapy.Up in reclinerthis am    Subjective   Precautions:  Precautions  LE Weight Bearing Status: Right Non-Weight Bearing  Medical Precautions: Fall precautions  Vital Signs:       Objective   Pain:  Pain Assessment  Pain Assessment: 0-10  Pain Score: 2 (with moving)  Pain Type: Surgical pain  Pain Location: Foot  Pain Orientation: Right  Cognition:  Cognition  Orientation Level: Oriented X4  Postural  Control:     Extremity/Trunk Assessments:    Activity Tolerance:     Treatments:  Therapeutic Exercise  Therapeutic Exercise Performed: Yes  Therapeutic Exercise Activity 1: B LE supine ther ex: ankle pumps L only, heelslides R foot off surface, hip abduction/adduction R foot off surface, SAQ!sx 20    Bed Mobility  Bed Mobility: Yes  Bed Mobility 1  Bed Mobility 1: Supine to sitting  Level of Assistance 1: Modified independent  Bed Mobility Comments 1: HOB elevated Increased time/ effort    Ambulation/Gait Training  Ambulation/Gait Training Performed: Yes  Ambulation/Gait Training 1  Surface 1: Level tile  Device 1: Rolling walker  Assistance 1: Moderate assistance (Second person for safey due to wound vac and IV)  Quality of Gait 1:  (NWB R LE)  Comments/Distance (ft) 1: 3 side steps from bed to recliner sliding R foot /toes along floor with NWB R LE with increased weight through B UEs  Transfers  Transfer: Yes  Transfer 1  Transfer From 1: Bed to  Transfer to 1: Stand, Chair with arms  Technique 1: To left  Transfer Device 1: Walker  Transfer Level of Assistance 1: Moderate assistance (Cues for NWB R LE Second person for safety)  Trials/Comments 1: x 2 stand pivot transfer with a few side steps NWB Rc LE  Transfers 2  Transfer From 2: Stand to  Transfer to 2: Sit, Chair with arms  Technique 2: Stand to sit  Transfer Device 2: Walker  Transfer Level of Assistance 2: Moderate assistance (second person for safety with NWB R LE)  Trials/Comments 2: x 1         Outcome Measures:  [facility] Basic Mobility  Turning from your back to your side while in a flat bed without using bedrails: None  Moving from lying on your back to sitting on the side of a flat bed without using bedrails: None  Moving to and from bed to chair (including a wheelchair): A lot  Standing up from a chair using your arms (e.g. wheelchair or bedside chair): A lot  To walk in hospital room: Total  Climbing 3-5 steps with railing: Total  Basic Mobility -  Total Score: 14    Education Documentation  Mobility Training, taught by Ivana Severino PTA at 1/31/2024 10:01 AM.  Learner: Patient  Readiness: Acceptance  Method: Explanation  Response: Verbalizes Understanding    Education Comments  No comments found.        OP EDUCATION:       Encounter Problems       Encounter Problems (Active)       PT Goals       Patient will transfer sit to stand and stand to sit with  contact guard assist to facilitate mobility.  (Progressing)       Start:  01/25/24    Expected End:  02/08/24            Patient will transfer bed to chair and chair to bed with contact guard assist to facilitate mobility.  (Progressing)       Start:  01/25/24    Expected End:  02/08/24            Patient will amb 10 feet with rolling walker device including no turns on even surface with minimal assist to facilitate safe mobility.  (Progressing)       Start:  01/25/24    Expected End:  02/08/24            Demonstrate safe mobility techniques while maintaining WB status R LE. (Progressing)       Start:  01/25/24    Expected End:  02/08/24

## 2024-01-31 NOTE — PROGRESS NOTES
Claude Coley is a 63 y.o. male on day 8 of admission presenting with Gas gangrene of foot (CMS/HCC).    Subjective   Interval History:    Sitting up in chair     Afebrile  Denies foot pain  Denies chest pain or shortness of breath  Denies nausea vomiting or diarrhea      Objective   Range of Vitals (last 24 hours)  Heart Rate:  [53-62]   Temp:  [36.1 °C (97 °F)-36.7 °C (98.1 °F)]   Resp:  [15-18]   BP: (120-145)/(47-56)   SpO2:  [93 %-97 %]   Daily Weight  01/23/24 : 114 kg (252 lb 6.4 oz)    Body mass index is 32.39 kg/m².    Physical Exam  Constitutional:       Appearance: Normal appearance.   HENT:      Head: Normocephalic and atraumatic.      Nose: Nose normal.   Eyes:      Extraocular Movements: Extraocular movements intact.      Conjunctiva/sclera: Conjunctivae normal.   Cardiovascular:      Rate and Rhythm: Regular rhythm.      Heart sounds: Normal heart sounds.   Pulmonary:      Breath sounds: Normal breath sounds.   Abdominal:      General: Bowel sounds are normal.      Palpations: Abdomen is soft.   Musculoskeletal:      Cervical back: Normal range of motion and neck supple.   Skin:     Comments: Right foot dressing, wound vac intact  Neurological:      Mental Status: He is alert.   Psychiatric:         Mood and Affect: Mood normal.         Behavior: Behavior normal.     Antibiotics  atorvastatin (Lipitor) tablet  aspirin EC tablet 81 mg  atorvastatin (Lipitor) tablet 40 mg  carvedilol (Coreg) tablet 12.5 mg  finasteride (Proscar) tablet 5 mg  OLANZapine (ZyPREXA) tablet 20 mg  pantoprazole (ProtoNix) EC tablet 40 mg  pantoprazole (ProtoNix) injection 40 mg  acetaminophen (Tylenol) tablet 650 mg  acetaminophen (Tylenol) oral liquid 650 mg  acetaminophen (Tylenol) suppository 650 mg  melatonin tablet 3 mg  polyethylene glycol (Glycolax, Miralax) packet 17 g  sodium chloride 0.9% infusion  lactated Ringer's bolus 500 mL  meropenem-vaborbactam (Vabomere) 1 g in sodium chloride 0.9% 250 mL IV  dextrose 50  % injection 25 g  glucagon (Glucagen) injection 1 mg  dextrose 10 % in water (D10W) infusion  insulin lispro (HumaLOG) injection 0-10 Units  heparin (porcine) injection 5,000 Units  lactated Ringer's bolus 500 mL  meropenem (Merrem) in sodium chloride 0.9 % 100 mL IV 1 g  bupivacaine PF (Marcaine) injection  - Omnicell Override Pull  lidocaine (Xylocaine) 10 mg/mL (1 %) injection 1 mg  oxygen (O2) therapy  lactated Ringer's infusion  ondansetron (Zofran) injection 4 mg  hydrALAZINE (Apresoline) injection 5 mg  albuterol 2.5 mg /3 mL (0.083 %) nebulizer solution 2.5 mg  HYDROmorphone PF (Dilaudid) injection 0.2 mg  HYDROmorphone (Dilaudid) injection 0.4 mg  BUPivacaine HCl (Marcaine) 0.5 % (5 mg/mL) injection  lidocaine (Xylocaine) 10 mg/mL (1 %) injection 1 mg  oxygen (O2) therapy  lactated Ringer's infusion  ondansetron (Zofran) injection 4 mg  HYDROmorphone PF (Dilaudid) injection 0.2 mg  HYDROmorphone (Dilaudid) injection 0.4 mg  vancomycin-diluent combo no.1 (Xellia) IVPB 1,750 mg  iron sucrose (Venofer) injection 200 mg  cyanocobalamin (Vitamin B-12) injection 1,000 mcg  lidocaine (Xylocaine) 10 mg/mL (1 %) injection 50 mg  alteplase (Cathflo Activase) injection 2 mg  polyethylene glycol (Glycolax, Miralax) packet 17 g  sennosides-docusate sodium (Nieves-Colace) 8.6-50 mg per tablet 1 tablet  insulin lispro (HumaLOG) injection 0-10 Units  vancomycin-diluent combo no.1 (Xellia) IVPB 1,750 mg  lidocaine (Xylocaine) 10 mg/mL (1 %) injection 1 mg  lactated Ringer's infusion  HYDROmorphone PF (Dilaudid) injection 0.2 mg  fentaNYL PF (Sublimaze) injection 50 mcg  midazolam (Versed) injection 1 mg  ondansetron (Zofran) injection 4 mg  promethazine (Phenergan) 6.25 mg in sodium chloride 0.9% 50 mL IV  meperidine PF (Demerol) injection 12.5 mg  albuterol 2.5 mg /3 mL (0.083 %) nebulizer solution 2.5 mg  vancomycin (Vancocin) vial for injection  - Omnicell Override Pull  bupivacaine PF (Marcaine) injection  - Omnicell  "Override Pull  BUPivacaine HCl (Marcaine) 0.5 % (5 mg/mL) injection  thrombin (recombinant) (Recothrom) topical solution  - Omnicell Override Pull  thrombin (recombinant) (Recothrom) topical solution  - Omnicell Override Pull  thrombin solution  lidocaine (Xylocaine) 10 mg/mL (1 %) injection 50 mg  alteplase (Cathflo Activase) injection 2 mg  vancomycin-diluent combo no.1 (Xellia) IVPB 1,750 mg  piperacillin-tazobactam-dextrose (Zosyn) IV 3.375 g      Relevant Results  Labs  Results from last 72 hours   Lab Units 01/31/24  0603 01/30/24  1131 01/29/24  0537 01/28/24  1723   WBC AUTO x10*3/uL 10.4 11.5* 11.7* 13.0*   HEMOGLOBIN g/dL 7.9* 7.5* 7.1* 7.3*   HEMATOCRIT % 23.5* 21.9* 20.7* 21.5*   PLATELETS AUTO x10*3/uL 276 273 234 245   LYMPHO PCT MAN %  --  16.0 14.0 7.0   MONO PCT MAN %  --  4.0 3.0 1.0   EOSINO PCT MAN %  --  0.0 4.0 2.0       Results from last 72 hours   Lab Units 01/31/24  0603 01/30/24  1131 01/28/24  1723   SODIUM mmol/L 137 134 132*   POTASSIUM mmol/L 4.3 4.1 3.9   CHLORIDE mmol/L 108* 105 104   CO2 mmol/L 19* 19* 20*   BUN mg/dL 13 15 25   CREATININE mg/dL 1.00 1.00 1.10   GLUCOSE mg/dL 98 119* 105*   CALCIUM mg/dL 8.1* 8.2* 7.8*   ANION GAP mmol/L 10 10 8   EGFR mL/min/1.73m*2 86 86 76       Results from last 72 hours   Lab Units 01/30/24  1131 01/28/24  1723   ALK PHOS U/L 193* 163*   BILIRUBIN TOTAL mg/dL 0.3 0.3   PROTEIN TOTAL g/dL 6.2 5.9   ALT U/L 51* 78*   AST U/L 23 55*   ALBUMIN g/dL 2.3* 2.1*       Estimated Creatinine Clearance: 104.1 mL/min (by C-G formula based on SCr of 1 mg/dL).  No results found for: \"CRP\"  Microbiology  Susceptibility data from last 14 days.  Collected Specimen Info Organism   01/24/24 Swab from DIGIT, ACCESSORY RIGHT FOOT Mixed Aerobic and Anaerobic Bacteria    01/23/24 Tissue/Biopsy from Wound/Tissue Mixed Gram-Positive and Gram-Negative Bacteria       Imaging  XR foot right 3+ views    Result Date: 1/26/2024  Interpreted By:  Christina Doran, STUDY: XR " FOOT RIGHT 3+ VIEWS  1/26/2024 6:46 pm   INDICATION: Signs/Symptoms:post op ray resection   COMPARISON: None.   ACCESSION NUMBER(S): [id number]   ORDERING CLINICIAN: ANGEL LUIS BORGES   TECHNIQUE: Three views of the Right foot were performed.   FINDINGS: Interval postsurgical changes of distal right 5th digit ray resection. Soft tissue swelling and gas is seen.       See findings.   MACRO: None.   Signed by: Christina Doran 1/26/2024 8:09 PM Dictation workstation:   [id number]    ECG 12 lead    Result Date: 1/24/2024  Normal sinus rhythm Possible Septal infarct , age undetermined Abnormal ECG No previous ECGs available Confirmed by Bobo Thornton ([id number]) on 1/24/2024 2:03:37 PM    US renal complete    Result Date: 1/24/2024  STUDY: Renal and Bladder Ultrasound; 1/23/2024 10:36 PM. INDICATION:  Acute renal failure.  COMPARISON:  None available. ACCESSION NUMBER(S): [id number] ORDERING CLINICIAN: GARCIA DUARTE TECHNIQUE: Ultrasound of the Kidneys and Bladder. Multiple images of the abdomen were obtained. FINDINGS: RIGHT KIDNEY: The right kidney measures 12.7 cm in length. Renal cortical echotexture is normal. There is no hydronephrosis. There are no stones. There is a cyst with a thick internal septation that measures 1.9 x 2.1 x 1.5 cm within the superior pole. LEFT KIDNEY: The left kidney measures 12.9 cm in length. Renal cortical echotexture is normal. There is no hydronephrosis. There are no stones. There are no cysts.  BLADDER: The urinary bladder is anechoic. The distended bladder shows no evidence of wall thickening. The right ureteral jet is visualized. The prostate gland measures 5.0 x 3.6 x 4.8 cm (44cc).       Septated right renal cyst consistent with a Bosniak class II lesion. No hydronephrosis bilaterally.  Follow-up ultrasound is recommended in 6 months. Enlarged prostate. Signed by Carlos Luke    XR tibia fibula right 2 views    Result Date: 1/23/2024  STUDY: Tibia and Fibula Radiographs;  1/23/2024 at 10:42 PM INDICATION: Soft tissue gas. COMPARISON: XR right foot 1/23/2024. ACCESSION NUMBER(S): [id number] ORDERING CLINICIAN: GARCIA DUARTE TECHNIQUE:  4 view(s) of the right tibia and fibula. FINDINGS:  There is no displaced fracture.  The alignment is anatomic. Superficial soft tissue edema is noted.  Peripheral vascular calcifications are present.  Soft tissue emphysema is seen along the dorsal aspect of the midfoot and anterior to the distal tibia.    Generalized soft tissue edema with soft tissue emphysema seen surrounding the ventral aspect of the ankle and dorsal aspect of the midfoot, concerning for soft tissue infection. Peripheral vascular disease. No acute osseous normality identified. Signed by Carlos Luke    XR foot right 3+ views    Result Date: 1/23/2024  Interpreted By:  Kirstie Moy, STUDY: XR FOOT RIGHT 3+ VIEWS 1/23/2024 2:03 pm   INDICATION: Swelling   COMPARISON: None available.   ACCESSION NUMBER(S): [id number]   ORDERING CLINICIAN: GARCIA DUARTE   TECHNIQUE: Three views of right foot   FINDINGS: There is marked soft tissue swelling of the right ankle and foot. There are multiple pockets of gas seen within the right forefoot laterally centered about the 5th MTP joint with this gas extending into the webspace between the 4th and 5th toes and also extending into the midfoot and hindfoot anteriorly.   However, no obvious bony destruction is observed to indicate osteomyelitis.       Soft tissue infection of the right foot centered along the lateral aspect of the forefoot with probable abscess formation and gas gangrene. However, no plain film evidence of osteomyelitis is observed.   Signed by: Kirstie Moy 1/23/2024 2:34 PM Dictation workstation:   [id number]     Assessment/Plan     Right diabetic foot ulcer-Hall 4  Right foot osteomyelitis/abscess-suspect polymicrobial, negative wound culture  Type 2 diabetes with peripheral angiopathy with gangrene  Resolved acute  kidney injury  Leukocytosis-resolved     Continue vancomycin  Continue Zosyn  Glycemic control  Supportive care  Monitor temperature and WBC  PICC line placed 1/16/24  Long-term plan is IV vancomycin and Zosyn to 3/6/2024-see discharge rec  Discharge planning    Rachelle MOON Staff, APRN-CNP

## 2024-01-31 NOTE — PROGRESS NOTES
pending   Certification Number  [id number]     Precert never started until today.  [facility], physician and nursing updated on the mistake.    Eden Chua RN

## 2024-01-31 NOTE — PROGRESS NOTES
PODIATRY SERVICE CONSULT PROGRESS NOTE    SERVICE DATE: 1/31/2024   SERVICE TIME:  4:24 PM     Subjective   INTERVAL HPI:   Patient was seen at bedside.   Pain well controlled.  Patient denies any other constitutional symptoms.   No other pedal complaints.     Wound vac running without issue.    Medications:  Scheduled Meds: [Held by provider] aspirin, 81 mg, oral, Daily  carvedilol, 12.5 mg, oral, BID with meals  finasteride, 5 mg, oral, Daily  [Held by provider] heparin (porcine), 5,000 Units, subcutaneous, q8h  insulin lispro, 0-10 Units, subcutaneous, Before meals & nightly  lidocaine, 5 mL, infiltration, Once  lidocaine, 5 mL, infiltration, Once  melatonin, 3 mg, oral, Nightly  OLANZapine, 20 mg, oral, Nightly  pantoprazole, 40 mg, oral, Daily  piperacillin-tazobactam, 3.375 g, intravenous, q6h  polyethylene glycol, 17 g, oral, Daily  sennosides-docusate sodium, 1 tablet, oral, BID  vancomycin-diluent combo no.1, 1,750 mg, intravenous, q24h      Continuous Infusions:    PRN Meds: PRN medications: acetaminophen **OR** [DISCONTINUED] acetaminophen **OR** [DISCONTINUED] acetaminophen, alteplase, alteplase, dextrose 10 % in water (D10W), dextrose, glucagon         Objective   PHYSICAL EXAM:  Physical Exam Performed:  Vitals:    01/31/24 1554   BP: 137/60   Pulse: 55   Resp: 15   Temp: 35.8 °C (96.4 °F)   SpO2: 96%     Body mass index is 32.39 kg/m².    Patient is AOx3 and in no acute distress. Patient is alert and cooperative. Sitting comfortably in bed with dressing clean, dry, and intact.     Vascular: Palpable DP/PT pulses B/L. Severe pitting edema R however much improved. Hair growth absent B/L. CFT<5 to B/L hallux. Temperature is warm to cool from tibial tuberosity to distal digits B/L. No lymphatic streaking noted B/L.    Musculoskeletal: Gross active and passive ROM diminished to age and activity level. Moves all extremities spontaneously. No pain to palpation at feet B/L. Wound vac dressing in place to  "RLE    Neurological: Absent light touch sensation B/L. Pain stimuli absent B/L. Denies any numbness, burning or tingling.    Dermatologic: Skin appears diffusely xerotic B/L. Web spaces 1-4 B/L are clean, dry and intact. No rashes or nodules noted B/L. No hyperkeratotic tissue noted B/L.     Wound: Open Amputation Site - Right Lateral Forefoot  Vac dressing in place. Proximal anterior margin of vac dressing at anterior ankle with dry stable eschar. Vac canister with 300cc of dark red drainage. Vac functioning without alerts.      LABS:   Results for orders placed or performed during the hospital encounter of 01/23/24 (from the past 24 hour(s))   POCT GLUCOSE   Result Value Ref Range    POCT Glucose 108 (H) 74 - 99 mg/dL   CBC   Result Value Ref Range    WBC 10.4 4.4 - 11.3 x10*3/uL    nRBC 0.2 (H) 0.0 - 0.0 /100 WBCs    RBC 2.60 (L) 4.50 - 5.90 x10*6/uL    Hemoglobin 7.9 (L) 13.5 - 17.5 g/dL    Hematocrit 23.5 (L) 41.0 - 52.0 %    MCV 90 80 - 100 fL    MCH 30.4 26.0 - 34.0 pg    MCHC 33.6 32.0 - 36.0 g/dL    RDW 13.3 11.5 - 14.5 %    Platelets 276 150 - 450 x10*3/uL   Basic Metabolic Panel   Result Value Ref Range    Glucose 98 65 - 99 mg/dL    Sodium 137 133 - 145 mmol/L    Potassium 4.3 3.4 - 5.1 mmol/L    Chloride 108 (H) 97 - 107 mmol/L    Bicarbonate 19 (L) 24 - 31 mmol/L    Urea Nitrogen 13 8 - 25 mg/dL    Creatinine 1.00 0.40 - 1.60 mg/dL    eGFR 86 >60 mL/min/1.73m*2    Calcium 8.1 (L) 8.5 - 10.4 mg/dL    Anion Gap 10 <=19 mmol/L   Occult Blood, Stool    Specimen: Stool   Result Value Ref Range    Occult Blood, Stool X1 Negative Negative   POCT GLUCOSE   Result Value Ref Range    POCT Glucose 110 (H) 74 - 99 mg/dL      Lab Results   Component Value Date    HGBA1C 4.5 01/23/2024      No results found for: \"CRP\"   No results found for: \"SEDRATE\"     Results from last 7 days   Lab Units 01/31/24  0603   WBC AUTO x10*3/uL 10.4   RBC AUTO x10*6/uL 2.60*   HEMOGLOBIN g/dL 7.9*   HEMATOCRIT % 23.5*     Results from " last 7 days   Lab Units 01/31/24  0603 01/30/24  1131   SODIUM mmol/L 137 134   POTASSIUM mmol/L 4.3 4.1   CHLORIDE mmol/L 108* 105   CO2 mmol/L 19* 19*   BUN mg/dL 13 15   CREATININE mg/dL 1.00 1.00   CALCIUM mg/dL 8.1* 8.2*   BILIRUBIN TOTAL mg/dL  --  0.3   ALT U/L  --  51*   AST U/L  --  23             IMAGING REVIEW:  ECG 12 lead    Result Date: 1/24/2024  Normal sinus rhythm Possible Septal infarct , age undetermined Abnormal ECG No previous ECGs available Confirmed by Bobo Thornton ([id number]) on 1/24/2024 2:03:37 PM    US renal complete    Result Date: 1/24/2024  STUDY: Renal and Bladder Ultrasound; 1/23/2024 10:36 PM. INDICATION:  Acute renal failure.  COMPARISON:  None available. ACCESSION NUMBER(S): [id number] ORDERING CLINICIAN: GARCIA DUARTE TECHNIQUE: Ultrasound of the Kidneys and Bladder. Multiple images of the abdomen were obtained. FINDINGS: RIGHT KIDNEY: The right kidney measures 12.7 cm in length. Renal cortical echotexture is normal. There is no hydronephrosis. There are no stones. There is a cyst with a thick internal septation that measures 1.9 x 2.1 x 1.5 cm within the superior pole. LEFT KIDNEY: The left kidney measures 12.9 cm in length. Renal cortical echotexture is normal. There is no hydronephrosis. There are no stones. There are no cysts.  BLADDER: The urinary bladder is anechoic. The distended bladder shows no evidence of wall thickening. The right ureteral jet is visualized. The prostate gland measures 5.0 x 3.6 x 4.8 cm (44cc).       Septated right renal cyst consistent with a Bosniak class II lesion. No hydronephrosis bilaterally.  Follow-up ultrasound is recommended in 6 months. Enlarged prostate. Signed by Carlos Luke    XR tibia fibula right 2 views    Result Date: 1/23/2024  STUDY: Tibia and Fibula Radiographs; 1/23/2024 at 10:42 PM INDICATION: Soft tissue gas. COMPARISON: XR right foot 1/23/2024. ACCESSION NUMBER(S): [id number] ORDERING CLINICIAN: GARCIA DUARTE  TECHNIQUE:  4 view(s) of the right tibia and fibula. FINDINGS:  There is no displaced fracture.  The alignment is anatomic. Superficial soft tissue edema is noted.  Peripheral vascular calcifications are present.  Soft tissue emphysema is seen along the dorsal aspect of the midfoot and anterior to the distal tibia.    Generalized soft tissue edema with soft tissue emphysema seen surrounding the ventral aspect of the ankle and dorsal aspect of the midfoot, concerning for soft tissue infection. Peripheral vascular disease. No acute osseous normality identified. Signed by Carlos Luke    XR foot right 3+ views    Result Date: 1/23/2024  Interpreted By:  Kirstie Moy, STUDY: XR FOOT RIGHT 3+ VIEWS 1/23/2024 2:03 pm   INDICATION: Swelling   COMPARISON: None available.   ACCESSION NUMBER(S): [id number]   ORDERING CLINICIAN: GARCIA DUARTE   TECHNIQUE: Three views of right foot   FINDINGS: There is marked soft tissue swelling of the right ankle and foot. There are multiple pockets of gas seen within the right forefoot laterally centered about the 5th MTP joint with this gas extending into the webspace between the 4th and 5th toes and also extending into the midfoot and hindfoot anteriorly.   However, no obvious bony destruction is observed to indicate osteomyelitis.       Soft tissue infection of the right foot centered along the lateral aspect of the forefoot with probable abscess formation and gas gangrene. However, no plain film evidence of osteomyelitis is observed.   Signed by: Kirstie Moy 1/23/2024 2:34 PM Dictation workstation:   [id number]            Assessment/Plan   ASSESSMENT & PLAN:    #Gas Gangrene, RLE  #s/p I&D with Amputation of R 5th Digit (DOS: 01/24/24)  #s/p R 5th ray resection, application bilayer and wound vac (DOS: 01/26/24)  #DM Type II w/ Ulceration  #Chronic Non-Pressure Ulceration w/ Necrosis of Bone, Right Foot  #PAD  #Localized Edema  #Cellulitis, RLE     - Patient was seen and evaluated;  all findings were discussed and all questions were answered to patient's satisfaction.  - Charts, labs, vitals and imaging all reviewed.   - Imaging: XR R Foot with soft tissue gas noted to the level of the ankle joint and post-surgical changes.  - Labs: WBC down-trending to 10.4 today. H&H stabilizing at 7.9 today. BGL 98. HbA1c 4.5%.  - Surgical pathology from 01/24 and 01/26 findings consistent with acute osteomyelitis and necrosis of soft tissues  - Wound culture: 4+ Mixed G+ & G- Bacteria  - Intra-op bone culture from 01/26 grew 1+ Bacteroides fragilis and 2+ Strep anginosus  - Blood culture: NGTD at 4 days and final     Plan:  - Abx: IV Vancomycin and Zosyn per ID - PICC in RU. Planning 6 weeks IV Vancomycin and Zosyn.  - Pt underwent emergent I&D with amputation of the 5th digit of the right foot after transfer to [facility] from [facility] due to OR availability. Due to emergent nature of case cardiac clearance was not obtained. Pt tolerated procedure well. Incision was packed open. Pt returned to OR for further excisional debridement of right 5th metatarsal and further I&D of dorsal and plantar foot with application of bilayer graft and application of wound vac. Plan to further manage with long-term IV antibiotics.  - Dressings: Vac dressing to remain clean, dry, and intact until outpatient follow-up with Podiatry. Last changed Monday 1/29.  - Nursing staff is able to reinforce dressing if & as necessary. Thank you. If wound vac malfunction and needs to be removed, please message podiatry staff. Dressing to be applied: betadine soaked gauze to wound bed covered by dry stile dressing with double layers of ABD and ACE.  - Podiatry will sign off at this time. Outpatient follow-up is scheduled.  - Pt clear for discharge to SNF. Precert pending.     DVT ppx: Per primary.   Weightbearing: NWB RLE - OK to transfer with WB to heel with surgical shoe in place.  Discharge: Pt to follow up 1 week after discharge with  Dr. Perry. Will have discharge plan updated.     Case to be discussed with attending, A&P above reflects a tentative plan. Please await for the final signature from the attending physician on service.    This patient will be followed by the Podiatry service. Please Epic Chat the corresponding residents below with questions or concerns.      Husam Lopez DPM PGY-2  Podiatric Medicine and Surgery   Epic Chat        SIGNATURE: Husam Lopez DPM PATIENT NAME: Claude Coley   DATE: January 31, 2024 MRN: [id number]   TIME: 4:24 PM CONTACT: Haik Marivel

## 2024-01-31 NOTE — NURSING NOTE
Patient with Rt arm single lumen picc, dressing D&I, patient states his picc is positional when in use and has to keep arm straight when infusing. Currently infusing without difficulty. Blue cap changed, blood return verified. Dressing change done using sterile technique, catheter pushed out to 3 cm when dressing removed (was at 0 cm with insertion), ext catheter repositioned so not to kink with arm movement, infusing without difficulty when complete.

## 2024-01-31 NOTE — CARE PLAN
The patient's goals for the shift include comfort and discharge to rehab.     The clinical goals for the shift include safety, work with PT/OT, OOB to chair, pain management, and monitor labs/VS/wound VAC.     Problem: Pain  Goal: My pain/discomfort is manageable  Outcome: Progressing     Problem: Safety  Goal: Patient will be injury free during hospitalization  Outcome: Progressing     Problem: Daily Care  Goal: Daily care needs are met  Outcome: Progressing     Problem: Psychosocial Needs  Goal: Demonstrates ability to cope with hospitalization/illness  Outcome: Progressing  Goal: Collaborate with me, my family, and caregiver to identify my specific goals  Outcome: Progressing     Problem: Skin  Goal: Decreased wound size/increased tissue granulation at next dressing change  Outcome: Progressing     Problem: Skin  Goal: Participates in plan/prevention/treatment measures  Outcome: Progressing     Problem: Skin  Goal: Prevent/manage excess moisture  Outcome: Progressing     Problem: Skin  Goal: Promote skin healing  Outcome: Progressing     Problem: Deep Vein Thrombosis  Goal: I will remain free from complications of deep vein thrombosis and maintain current level of mobility  Outcome: Progressing     Problem: Discharge Barriers  Goal: My discharge needs are met  Outcome: Progressing

## 2024-01-31 NOTE — PROGRESS NOTES
Claude Coley is a 63 y.o. male on day 8 of admission presenting with Gas gangrene of foot (CMS/[facility]).      Subjective   Patient seen and examined resting comfortably in bed. Patient says his constipation is much better now. Patient did have a bloody bowel movement since yesterday.  Denies diarrhea, rectal pain. States he had been straining himself going to the bathroom and has had blood in his stool in the past when he is constipated or has not eaten much, which has been the case in the hospital.  Patient had a colonoscopy a year and a half ago which was normal.         Objective     Last Recorded Vitals  /52 (BP Location: Left arm, Patient Position: Lying)   Pulse 62   Temp 36.7 °C (98.1 °F) (Temporal)   Resp 18   Wt 114 kg (252 lb 6.4 oz)   SpO2 96%   Intake/Output last 3 Shifts:    Intake/Output Summary (Last 24 hours) at 1/31/2024 0938  Last data filed at 1/31/2024 0843  Gross per 24 hour   Intake 1100 ml   Output 1675 ml   Net -575 ml       Admission Weight  Weight: 114 kg (252 lb 6.4 oz) (01/23/24 2300)    Daily Weight  01/23/24 : 114 kg (252 lb 6.4 oz)    Image Results  XR foot right 3+ views  Narrative: Interpreted By:  Christina Doran,   STUDY:  XR FOOT RIGHT 3+ VIEWS  1/26/2024 6:46 pm      INDICATION:  Signs/Symptoms:post op ray resection      COMPARISON:  None.      ACCESSION NUMBER(S):  [id number]      ORDERING CLINICIAN:  ANGEL LUIS BORGES      TECHNIQUE:  Three views of the Right foot were performed.      FINDINGS:  Interval postsurgical changes of distal right 5th digit ray  resection. Soft tissue swelling and gas is seen.      Impression: See findings.      MACRO:  None.      Signed by: Christina Doran 1/26/2024 8:09 PM  Dictation workstation:   [id number]      Physical Exam  Constitutional:       General: He is not in acute distress.     Appearance: He is ill-appearing.   HENT:      Head: Normocephalic and atraumatic.      Mouth/Throat:      Mouth: Mucous membranes are moist.    Eyes:      Extraocular Movements: Extraocular movements intact.      Pupils: Pupils are equal, round, and reactive to light.   Cardiovascular:      Rate and Rhythm: Normal rate and regular rhythm.      Pulses: Normal pulses.      Heart sounds: Normal heart sounds.   Pulmonary:      Effort: Pulmonary effort is normal.      Breath sounds: Normal breath sounds.   Abdominal:      General: Abdomen is flat.      Palpations: Abdomen is soft.   Musculoskeletal:         General: Signs of injury present.      Comments: Dressing on right foot from I&D surgery   Skin:     General: Skin is warm and dry.   Neurological:      General: No focal deficit present.      Mental Status: He is alert and oriented to person, place, and time.   Psychiatric:         Mood and Affect: Mood normal.         Behavior: Behavior normal.         Scheduled medications  [Held by provider] aspirin, 81 mg, oral, Daily  carvedilol, 12.5 mg, oral, BID with meals  finasteride, 5 mg, oral, Daily  [Held by provider] heparin (porcine), 5,000 Units, subcutaneous, q8h  insulin lispro, 0-10 Units, subcutaneous, Before meals & nightly  lidocaine, 5 mL, infiltration, Once  lidocaine, 5 mL, infiltration, Once  melatonin, 3 mg, oral, Nightly  OLANZapine, 20 mg, oral, Nightly  pantoprazole, 40 mg, oral, Daily  piperacillin-tazobactam, 3.375 g, intravenous, q6h  polyethylene glycol, 17 g, oral, Daily  sennosides-docusate sodium, 1 tablet, oral, BID  vancomycin-diluent combo no.1, 1,750 mg, intravenous, q24h      Continuous medications     PRN medications  PRN medications: acetaminophen **OR** [DISCONTINUED] acetaminophen **OR** [DISCONTINUED] acetaminophen, alteplase, alteplase, dextrose 10 % in water (D10W), dextrose, glucagon  Results for orders placed or performed during the hospital encounter of 01/23/24 (from the past 24 hour(s))   CBC and Auto Differential   Result Value Ref Range    WBC 11.5 (H) 4.4 - 11.3 x10*3/uL    nRBC 0.3 (H) 0.0 - 0.0 /100 WBCs    RBC  2.46 (L) 4.50 - 5.90 x10*6/uL    Hemoglobin 7.5 (L) 13.5 - 17.5 g/dL    Hematocrit 21.9 (L) 41.0 - 52.0 %    MCV 89 80 - 100 fL    MCH 30.5 26.0 - 34.0 pg    MCHC 34.2 32.0 - 36.0 g/dL    RDW 13.2 11.5 - 14.5 %    Platelets 273 150 - 450 x10*3/uL    Immature Granulocytes %, Automated 9.9 (H) 0.0 - 0.9 %    Immature Granulocytes Absolute, Automated 1.14 (H) 0.00 - 0.70 x10*3/uL   Comprehensive Metabolic Panel   Result Value Ref Range    Glucose 119 (H) 65 - 99 mg/dL    Sodium 134 133 - 145 mmol/L    Potassium 4.1 3.4 - 5.1 mmol/L    Chloride 105 97 - 107 mmol/L    Bicarbonate 19 (L) 24 - 31 mmol/L    Urea Nitrogen 15 8 - 25 mg/dL    Creatinine 1.00 0.40 - 1.60 mg/dL    eGFR 86 >60 mL/min/1.73m*2    Calcium 8.2 (L) 8.5 - 10.4 mg/dL    Albumin 2.3 (L) 3.5 - 5.0 g/dL    Alkaline Phosphatase 193 (H) 35 - 125 U/L    Total Protein 6.2 5.9 - 7.9 g/dL    AST 23 5 - 40 U/L    Bilirubin, Total 0.3 0.1 - 1.2 mg/dL    ALT 51 (H) 5 - 40 U/L    Anion Gap 10 <=19 mmol/L   Manual Differential   Result Value Ref Range    Neutrophils %, Manual 75.0 40.0 - 80.0 %    Lymphocytes %, Manual 16.0 13.0 - 44.0 %    Monocytes %, Manual 4.0 2.0 - 10.0 %    Eosinophils %, Manual 0.0 0.0 - 6.0 %    Basophils %, Manual 0.0 0.0 - 2.0 %    Metamyelocytes %, Manual 3.0 0.0 - 0.0 %    Myelocytes %, Manual 2.0 0.0 - 0.0 %    Seg Neutrophils Absolute, Manual 8.63 (H) 1.20 - 7.00 x10*3/uL    Lymphocytes Absolute, Manual 1.84 1.20 - 4.80 x10*3/uL    Monocytes Absolute, Manual 0.46 0.10 - 1.00 x10*3/uL    Eosinophils Absolute, Manual 0.00 0.00 - 0.70 x10*3/uL    Basophils Absolute, Manual 0.00 0.00 - 0.10 x10*3/uL    Metamyelocytes Absolute, Manual 0.35 0.00 - 0.00 x10*3/uL    Myelocytes Absolute, Manual 0.23 0.00 - 0.00 x10*3/uL    Total Cells Counted 100     RBC Morphology See Below     Polychromasia Mild    POCT GLUCOSE   Result Value Ref Range    POCT Glucose 129 (H) 74 - 99 mg/dL   POCT GLUCOSE   Result Value Ref Range    POCT Glucose 111 (H) 74 - 99  mg/dL   POCT GLUCOSE   Result Value Ref Range    POCT Glucose 108 (H) 74 - 99 mg/dL   CBC   Result Value Ref Range    WBC 10.4 4.4 - 11.3 x10*3/uL    nRBC 0.2 (H) 0.0 - 0.0 /100 WBCs    RBC 2.60 (L) 4.50 - 5.90 x10*6/uL    Hemoglobin 7.9 (L) 13.5 - 17.5 g/dL    Hematocrit 23.5 (L) 41.0 - 52.0 %    MCV 90 80 - 100 fL    MCH 30.4 26.0 - 34.0 pg    MCHC 33.6 32.0 - 36.0 g/dL    RDW 13.3 11.5 - 14.5 %    Platelets 276 150 - 450 x10*3/uL   Basic Metabolic Panel   Result Value Ref Range    Glucose 98 65 - 99 mg/dL    Sodium 137 133 - 145 mmol/L    Potassium 4.3 3.4 - 5.1 mmol/L    Chloride 108 (H) 97 - 107 mmol/L    Bicarbonate 19 (L) 24 - 31 mmol/L    Urea Nitrogen 13 8 - 25 mg/dL    Creatinine 1.00 0.40 - 1.60 mg/dL    eGFR 86 >60 mL/min/1.73m*2    Calcium 8.1 (L) 8.5 - 10.4 mg/dL    Anion Gap 10 <=19 mmol/L         Assessment/Plan        This patient has a central line   Reason for the central line remaining today? Parenteral medication            Principal Problem:    Gas gangrene of foot (CMS/HCC)  Active Problems:    HTN (hypertension)    Hyperlipidemia    Diabetes mellitus, type 2 (CMS/HCC)    Gas gangrene --- Plans are to discharge patient to skilled nursing facility with 6 weeks IV antibiotic therapy for gas gangrene of the foot. Ongoing care for dressing and physical therapy to regain strength.   Bloody bowel movement -- Likely secondary to straining because of constipation and lack of normal bowel movements  HTN -- controlled  T2DM -- controlled       MARTY Headley  1/31/2024  9:50 AM

## 2024-01-31 NOTE — PROGRESS NOTES
Blood management follow up of this JOHN pt on day 8 of admission presenting with Gas gangrene of foot (CMS/HCC).7day s/p I&D with amputation of right 5th digit, returned to surgery 1/26/24 for right 5th digit resectioned and application of wound vac.   Wound vac total drainage 900ml.  Plus 275ml in wound vac collection. Increased H/H 7.9/23.5  From home alone, anticipate discharge rehab facility    Subjective   Says he is feeling better, pain rate 1/10. +chronic neuropathy lower ext, decreased right leg swelling, dressing dry, denies headache, vertigo, fever, chills, chest pain, sob, rosa, cough, abd pain, nausea, bloating, appetite good, having bowel movement, denies hematochezia, melena, hematuria, dysuria,        Objective     Physical Exam  Constitutional:       Appearance: Normal appearance.   HENT:      Head: Normocephalic and atraumatic.      Right Ear: External ear normal.      Left Ear: External ear normal.      Nose: Nose normal.      Mouth/Throat:      Mouth: Mucous membranes are moist.      Pharynx: Oropharynx is clear.   Eyes:      Conjunctiva/sclera: Conjunctivae normal.      Pupils: Pupils are equal, round, and reactive to light.   Cardiovascular:      Rate and Rhythm: Normal rate and regular rhythm.      Heart sounds: Normal heart sounds.   Pulmonary:      Effort: Pulmonary effort is normal.      Breath sounds: Normal breath sounds.   Abdominal:      General: Abdomen is flat. Bowel sounds are normal.      Palpations: Abdomen is soft.   Musculoskeletal:         General: Tenderness present.      Cervical back: Normal range of motion and neck supple.      Right lower leg: Edema present.      Left lower leg: No edema.      Comments: Less edema RLE, +right    Skin:     General: Skin is warm and dry.      Capillary Refill: Capillary refill takes 2 to 3 seconds.      Comments: Dressing RLE dry/intact, hemovac patent drain serosanguinous fluid   Neurological:      General: No focal deficit present.  "     Mental Status: He is alert and oriented to person, place, and time. Mental status is at baseline.   Psychiatric:         Mood and Affect: Mood normal.         Behavior: Behavior normal.         Last Recorded Vitals  Blood pressure 137/56, pulse 58, temperature 36.7 °C (98.1 °F), temperature source Temporal, resp. rate 18, height 1.88 m (6' 2.02\"), weight 114 kg (252 lb 6.4 oz), SpO2 97 %.  Intake/Output last 3 Shifts:  I/O last 3 completed shifts:  In: 2650 (23.1 mL/kg) [P.O.:600; IV Piggyback:2050]  Out: 2650 (23.1 mL/kg) [Urine:2600 (0.6 mL/kg/hr); Drains:50]  Weight: 114.5 kg     Relevant Results  Results for orders placed or performed during the hospital encounter of 01/23/24 (from the past 24 hour(s))   POCT GLUCOSE   Result Value Ref Range    POCT Glucose 111 (H) 74 - 99 mg/dL   POCT GLUCOSE   Result Value Ref Range    POCT Glucose 108 (H) 74 - 99 mg/dL   CBC   Result Value Ref Range    WBC 10.4 4.4 - 11.3 x10*3/uL    nRBC 0.2 (H) 0.0 - 0.0 /100 WBCs    RBC 2.60 (L) 4.50 - 5.90 x10*6/uL    Hemoglobin 7.9 (L) 13.5 - 17.5 g/dL    Hematocrit 23.5 (L) 41.0 - 52.0 %    MCV 90 80 - 100 fL    MCH 30.4 26.0 - 34.0 pg    MCHC 33.6 32.0 - 36.0 g/dL    RDW 13.3 11.5 - 14.5 %    Platelets 276 150 - 450 x10*3/uL   Basic Metabolic Panel   Result Value Ref Range    Glucose 98 65 - 99 mg/dL    Sodium 137 133 - 145 mmol/L    Potassium 4.3 3.4 - 5.1 mmol/L    Chloride 108 (H) 97 - 107 mmol/L    Bicarbonate 19 (L) 24 - 31 mmol/L    Urea Nitrogen 13 8 - 25 mg/dL    Creatinine 1.00 0.40 - 1.60 mg/dL    eGFR 86 >60 mL/min/1.73m*2    Calcium 8.1 (L) 8.5 - 10.4 mg/dL    Anion Gap 10 <=19 mmol/L   Occult Blood, Stool    Specimen: Stool   Result Value Ref Range    Occult Blood, Stool X1 Negative Negative   POCT GLUCOSE   Result Value Ref Range    POCT Glucose 110 (H) 74 - 99 mg/dL      Current Facility-Administered Medications:     acetaminophen (Tylenol) tablet 650 mg, 650 mg, oral, q4h PRN, 650 mg at 01/31/24 1035 **OR** " [DISCONTINUED] acetaminophen (Tylenol) oral liquid 650 mg, 650 mg, oral, q4h PRN **OR** [DISCONTINUED] acetaminophen (Tylenol) suppository 650 mg, 650 mg, rectal, q4h PRN, Connie Neumann MD    alteplase (Cathflo Activase) injection 2 mg, 2 mg, intra-catheter, PRN, Melvin Ivey DO    alteplase (Cathflo Activase) injection 2 mg, 2 mg, intra-catheter, PRN, Srikanth Saravia MD    [Held by provider] aspirin EC tablet 81 mg, 81 mg, oral, Daily, Connie Neumann MD, 81 mg at 01/30/24 0842    carvedilol (Coreg) tablet 12.5 mg, 12.5 mg, oral, BID with meals, Connie Neumann MD, 12.5 mg at 01/31/24 0926    dextrose 10 % in water (D10W) infusion, 0.3 g/kg/hr, intravenous, Once PRN, Connie Neumann MD    dextrose 50 % injection 25 g, 25 g, intravenous, q15 min PRN, Connie Neumann MD    finasteride (Proscar) tablet 5 mg, 5 mg, oral, Daily, Connie Neumann MD, 5 mg at 01/31/24 0926    glucagon (Glucagen) injection 1 mg, 1 mg, intramuscular, q15 min PRN, Connie Neumann MD    [Held by provider] heparin (porcine) injection 5,000 Units, 5,000 Units, subcutaneous, q8h, Connie Neumann MD, 5,000 Units at 01/31/24 0540    insulin lispro (HumaLOG) injection 0-10 Units, 0-10 Units, subcutaneous, Before meals & nightly, Melvin Ivey DO, 2 Units at 01/26/24 2058    lidocaine (Xylocaine) 10 mg/mL (1 %) injection 50 mg, 5 mL, infiltration, Once, Melvin Ivey DO    lidocaine (Xylocaine) 10 mg/mL (1 %) injection 50 mg, 5 mL, infiltration, Once, Srikanth Saravia MD    melatonin tablet 3 mg, 3 mg, oral, Nightly, Connie Neumann MD, 3 mg at 01/30/24 2027    OLANZapine (ZyPREXA) tablet 20 mg, 20 mg, oral, Nightly, Connie Neumann MD, 20 mg at 01/30/24 2027    pantoprazole (ProtoNix) EC tablet 40 mg, 40 mg, oral, Daily, 40 mg at 01/31/24 0926 **OR** [DISCONTINUED] pantoprazole (ProtoNix) injection 40 mg, 40 mg, intravenous, Daily, Connie CAROLINA  MD Thien    piperacillin-tazobactam-dextrose (Zosyn) IV 3.375 g, 3.375 g, intravenous, q6h, Srikanth Saravia MD, Stopped at 01/31/24 0957    polyethylene glycol (Glycolax, Miralax) packet 17 g, 17 g, oral, Daily, Melvin Ivey DO, 17 g at 01/31/24 0926    sennosides-docusate sodium (Nieves-Colace) 8.6-50 mg per tablet 1 tablet, 1 tablet, oral, BID, Melvin Ivey DO, 1 tablet at 01/31/24 0926    vancomycin-diluent combo no.1 (Xellia) IVPB 1,750 mg, 1,750 mg, intravenous, q24h, Connie Neumann MD, Stopped at 01/30/24 7760                              Assessment/Plan   Principal Problem:    Gas gangrene of foot (CMS/HCC)  Active Problems:    HTN (hypertension)    Hyperlipidemia    Diabetes mellitus, type 2 (CMS/HCC)  S/p I&D and 5th toe amputation right foot  S/p right foot  5th digit resectioned and application of wound vac.     Normocytic anemia  Anemia of acute blood loss combined with anemia of iron deficient and inflammation     Venofer 200mg IV X5  S/p B12 1000mcg IM X1  Lab to microsample blood draws-signage on door        ALISE Collins-CNP

## 2024-01-31 NOTE — CARE PLAN
The patient's goals for the shift include  comfort    The clinical goals for the shift include safety, comfort

## 2024-01-31 NOTE — NURSING NOTE
Pt stated that he had blood in his stool.  I did not see the patient bowel movement.  Pt and nursing assistant have been asked to let me or dayshift nurse know to let us know when he has another bowel movement.  Dr. Allison has been notified via message.

## 2024-01-31 NOTE — PROGRESS NOTES
Occupational Therapy    Occupational Therapy Treatment    Name: Claude Coley  MRN: [id number]  : 1960  Date: 24  Time Calculation  Start Time: 1142  Stop Time: 1153  Time Calculation (min): 11 min    Assessment:  End of Session Communication: Bedside nurse  End of Session Patient Position: Bed, 3 rail up, Alarm off, not on at start of session  Plan:  Treatment Interventions: UE strengthening/ROM  OT Frequency: 3 times per week  OT Discharge Recommendations: High intensity level of continued care  Equipment Recommended upon Discharge: Wheeled walker  OT Recommended Transfer Status: Stand by assist  OT - OK to Discharge: Yes    Subjective   Previous Visit Info:  OT Last Visit  OT Received On: 24  General:  General  Prior to Session Communication: Bedside nurse  Patient Position Received: Bed, 3 rail up, Alarm off, not on at start of session  Preferred Learning Style: verbal  General Comment: Cleared by nurse to see, pt agreeable to therex  Precautions:  LE Weight Bearing Status: Right Non-Weight Bearing  Medical Precautions: Fall precautions  Vitals:     Pain Assessment:  Pain Assessment  Pain Assessment: 0-10  Pain Score: 0 - No pain  Pain Type: Surgical pain  Pain Location: Foot  Pain Orientation: Right     Objective   Activities of Daily Living:     Functional Standing Tolerance:     Bed Mobility/Transfers:   IADL's:   Communication:     Splinting:     Therapy/Activity: Therapeutic Exercise  Therapeutic Exercise Performed: Yes  Therapeutic Exercise Activity 1: x15 (B shld flex/ ext)  Therapeutic Exercise Activity 2: x15 (B horiz. abd/ add)  Therapeutic Exercise Activity 3: x15 (B elbow flex/ ext)  Therapeutic Exercise Activity 4: x15 (B forearm sup/ pron.)  Therapeutic Exercise Activity 5: x15 (B wrist flex/ ext)  Sensory:     Cognitive Skill Development:     Vision:     Strength:     Other Activity:             Outcome Measures:  [facility] Daily Activity  Putting on and taking off regular lower  body clothing: A lot  Bathing (including washing, rinsing, drying): A little  Putting on and taking off regular upper body clothing: A little  Toileting, which includes using toilet, bedpan or urinal: A little  Taking care of personal grooming such as brushing teeth: A little  Eating Meals: None  Daily Activity - Total Score: 18        Education Documentation  Home Exercise Program, taught by Shahana Lee OT at 1/31/2024 12:10 PM.  Learner: Patient  Readiness: Acceptance  Method: Explanation  Response: Demonstrated Understanding, Needs Reinforcement    Education Comments  No comments found.      Goals:  Encounter Problems       Encounter Problems (Active)       OT Goals       ADLs (Progressing)       Start:  01/25/24    Expected End:  02/01/24       Patient will perform ADLs with Min A using AE/compensatory techniques as needed.          Functional Transfers (Progressing)       Start:  01/25/24    Expected End:  02/01/24       Patient will complete bed, chair, toilet xfer with MIN A using LRD.          Precautions (Progressing)       Start:  01/25/24    Expected End:  02/01/24       Patient will independently verbalize post op precautions in preparation for ADLs.             OT Goals       Functional endurance (Progressing)       Start:  01/31/24    Expected End:  02/14/24       Pt will demon. BUE exercises to improve functional endurance for self care tasks and functional transfers.

## 2024-02-01 VITALS
BODY MASS INDEX: 32.39 KG/M2 | RESPIRATION RATE: 16 BRPM | DIASTOLIC BLOOD PRESSURE: 55 MMHG | OXYGEN SATURATION: 98 % | HEIGHT: 74 IN | WEIGHT: 252.4 LBS | TEMPERATURE: 98.5 F | SYSTOLIC BLOOD PRESSURE: 132 MMHG | HEART RATE: 60 BPM

## 2024-02-01 LAB
GLUCOSE BLD MANUAL STRIP-MCNC: 90 MG/DL (ref 74–99)
GLUCOSE BLD MANUAL STRIP-MCNC: 95 MG/DL (ref 74–99)

## 2024-02-01 PROCEDURE — 97110 THERAPEUTIC EXERCISES: CPT | Mod: GP,CQ

## 2024-02-01 PROCEDURE — 2500000004 HC RX 250 GENERAL PHARMACY W/ HCPCS (ALT 636 FOR OP/ED): Performed by: INTERNAL MEDICINE

## 2024-02-01 PROCEDURE — 97530 THERAPEUTIC ACTIVITIES: CPT | Mod: GP,CQ

## 2024-02-01 PROCEDURE — 2500000004 HC RX 250 GENERAL PHARMACY W/ HCPCS (ALT 636 FOR OP/ED): Performed by: NURSE PRACTITIONER

## 2024-02-01 PROCEDURE — 2500000001 HC RX 250 WO HCPCS SELF ADMINISTERED DRUGS (ALT 637 FOR MEDICARE OP): Performed by: INTERNAL MEDICINE

## 2024-02-01 PROCEDURE — 82947 ASSAY GLUCOSE BLOOD QUANT: CPT

## 2024-02-01 PROCEDURE — 97116 GAIT TRAINING THERAPY: CPT | Mod: GP,CQ

## 2024-02-01 RX ORDER — VANCOMYCIN 1 G/200ML
1000 INJECTION, SOLUTION INTRAVENOUS EVERY 12 HOURS
Status: DISCONTINUED | OUTPATIENT
Start: 2024-02-01 | End: 2024-02-01

## 2024-02-01 RX ADMIN — FINASTERIDE 5 MG: 5 TABLET, FILM COATED ORAL at 08:05

## 2024-02-01 RX ADMIN — PIPERACILLIN SODIUM AND TAZOBACTAM SODIUM 3.38 G: 3; .375 INJECTION, SOLUTION INTRAVENOUS at 08:04

## 2024-02-01 RX ADMIN — SODIUM CHLORIDE 3 G: 900 INJECTION INTRAVENOUS at 12:44

## 2024-02-01 RX ADMIN — PANTOPRAZOLE SODIUM 40 MG: 40 TABLET, DELAYED RELEASE ORAL at 08:05

## 2024-02-01 RX ADMIN — CARVEDILOL 12.5 MG: 12.5 TABLET, FILM COATED ORAL at 08:05

## 2024-02-01 RX ADMIN — SENNOSIDES AND DOCUSATE SODIUM 1 TABLET: 8.6; 5 TABLET ORAL at 08:05

## 2024-02-01 RX ADMIN — PIPERACILLIN SODIUM AND TAZOBACTAM SODIUM 3.38 G: 3; .375 INJECTION, SOLUTION INTRAVENOUS at 02:50

## 2024-02-01 RX ADMIN — POLYETHYLENE GLYCOL 3350 17 G: 17 POWDER, FOR SOLUTION ORAL at 08:05

## 2024-02-01 ASSESSMENT — COGNITIVE AND FUNCTIONAL STATUS - GENERAL
MOVING TO AND FROM BED TO CHAIR: A LITTLE
STANDING UP FROM CHAIR USING ARMS: A LITTLE
MOBILITY SCORE: 18
DRESSING REGULAR LOWER BODY CLOTHING: A LOT
CLIMB 3 TO 5 STEPS WITH RAILING: TOTAL
HELP NEEDED FOR BATHING: A LITTLE
WALKING IN HOSPITAL ROOM: A LITTLE
DAILY ACTIVITIY SCORE: 21
STANDING UP FROM CHAIR USING ARMS: A LOT
WALKING IN HOSPITAL ROOM: TOTAL
MOVING TO AND FROM BED TO CHAIR: A LOT
MOBILITY SCORE: 14
CLIMB 3 TO 5 STEPS WITH RAILING: TOTAL

## 2024-02-01 ASSESSMENT — PAIN SCALES - GENERAL
PAINLEVEL_OUTOF10: 0 - NO PAIN
PAINLEVEL_OUTOF10: 2

## 2024-02-01 ASSESSMENT — PAIN DESCRIPTION - DESCRIPTORS: DESCRIPTORS: ACHING

## 2024-02-01 ASSESSMENT — PAIN - FUNCTIONAL ASSESSMENT: PAIN_FUNCTIONAL_ASSESSMENT: 0-10

## 2024-02-01 NOTE — CARE PLAN
The patient's goals for the shift include      The clinical goals for the shift include safety, work with PT/OT, OOB to chair, pain management, and monitor labs/VS/wound VAC      Problem: Pain  Goal: My pain/discomfort is manageable  Outcome: Progressing     Problem: Safety  Goal: Patient will be injury free during hospitalization  Outcome: Progressing  Goal: I will remain free of falls  Outcome: Progressing     Problem: Daily Care  Goal: Daily care needs are met  Outcome: Progressing     Problem: Psychosocial Needs  Goal: Demonstrates ability to cope with hospitalization/illness  Outcome: Progressing  Goal: Collaborate with me, my family, and caregiver to identify my specific goals  Outcome: Progressing     Problem: Discharge Barriers  Goal: My discharge needs are met  Outcome: Progressing     Problem: Skin  Goal: Decreased wound size/increased tissue granulation at next dressing change  Outcome: Progressing  Goal: Participates in plan/prevention/treatment measures  Outcome: Progressing  Goal: Prevent/manage excess moisture  Outcome: Progressing  Goal: Prevent/minimize sheer/friction injuries  Outcome: Progressing  Goal: Promote/optimize nutrition  Outcome: Progressing  Goal: Promote skin healing  Outcome: Progressing     Problem: Deep Vein Thrombosis  Goal: I will remain free from complications of deep vein thrombosis and maintain current level of mobility  Outcome: Progressing

## 2024-02-01 NOTE — PROGRESS NOTES
Claude Coley is a 63 y.o. male on day 9 of admission presenting with Gas gangrene of foot (CMS/HCC).    Subjective   Interval History:    Afebrile  Denies any pain  Denies cough, chest pain, or shortness of breath  Denies nausea vomiting or diarrhea      Objective   Range of Vitals (last 24 hours)  Heart Rate:  [55-61]   Temp:  [35.8 °C (96.4 °F)-36.8 °C (98.2 °F)]   Resp:  [15-18]   BP: (130-137)/(56-71)   SpO2:  [96 %-97 %]   Daily Weight  01/23/24 : 114 kg (252 lb 6.4 oz)    Body mass index is 32.39 kg/m².    Physical Exam  Constitutional:       Appearance: Normal appearance.   HENT:      Head: Normocephalic and atraumatic.      Nose: Nose normal.   Eyes:      Extraocular Movements: Extraocular movements intact.      Conjunctiva/sclera: Conjunctivae normal.   Cardiovascular:      Rate and Rhythm: Regular rhythm.      Heart sounds: Normal heart sounds.   Pulmonary:      Breath sounds: Normal breath sounds.   Abdominal:      General: Bowel sounds are normal.      Palpations: Abdomen is soft.   Musculoskeletal:      Cervical back: Normal range of motion and neck supple.   Skin:     Comments: Right foot dressing, wound vac intact with sanguinous drainage to chamber  Neurological:      Mental Status: He is alert.   Psychiatric:         Mood and Affect: Mood normal.         Behavior: Behavior normal.     Antibiotics  atorvastatin (Lipitor) tablet  aspirin EC tablet 81 mg  atorvastatin (Lipitor) tablet 40 mg  carvedilol (Coreg) tablet 12.5 mg  finasteride (Proscar) tablet 5 mg  OLANZapine (ZyPREXA) tablet 20 mg  pantoprazole (ProtoNix) EC tablet 40 mg  pantoprazole (ProtoNix) injection 40 mg  acetaminophen (Tylenol) tablet 650 mg  acetaminophen (Tylenol) oral liquid 650 mg  acetaminophen (Tylenol) suppository 650 mg  melatonin tablet 3 mg  polyethylene glycol (Glycolax, Miralax) packet 17 g  sodium chloride 0.9% infusion  lactated Ringer's bolus 500 mL  meropenem-vaborbactam (Vabomere) 1 g in sodium chloride 0.9%  250 mL IV  dextrose 50 % injection 25 g  glucagon (Glucagen) injection 1 mg  dextrose 10 % in water (D10W) infusion  insulin lispro (HumaLOG) injection 0-10 Units  heparin (porcine) injection 5,000 Units  lactated Ringer's bolus 500 mL  meropenem (Merrem) in sodium chloride 0.9 % 100 mL IV 1 g  bupivacaine PF (Marcaine) injection  - Omnicell Override Pull  lidocaine (Xylocaine) 10 mg/mL (1 %) injection 1 mg  oxygen (O2) therapy  lactated Ringer's infusion  ondansetron (Zofran) injection 4 mg  hydrALAZINE (Apresoline) injection 5 mg  albuterol 2.5 mg /3 mL (0.083 %) nebulizer solution 2.5 mg  HYDROmorphone PF (Dilaudid) injection 0.2 mg  HYDROmorphone (Dilaudid) injection 0.4 mg  BUPivacaine HCl (Marcaine) 0.5 % (5 mg/mL) injection  lidocaine (Xylocaine) 10 mg/mL (1 %) injection 1 mg  oxygen (O2) therapy  lactated Ringer's infusion  ondansetron (Zofran) injection 4 mg  HYDROmorphone PF (Dilaudid) injection 0.2 mg  HYDROmorphone (Dilaudid) injection 0.4 mg  vancomycin-diluent combo no.1 (Xellia) IVPB 1,750 mg  iron sucrose (Venofer) injection 200 mg  cyanocobalamin (Vitamin B-12) injection 1,000 mcg  lidocaine (Xylocaine) 10 mg/mL (1 %) injection 50 mg  alteplase (Cathflo Activase) injection 2 mg  polyethylene glycol (Glycolax, Miralax) packet 17 g  sennosides-docusate sodium (Nieves-Colace) 8.6-50 mg per tablet 1 tablet  insulin lispro (HumaLOG) injection 0-10 Units  vancomycin-diluent combo no.1 (Xellia) IVPB 1,750 mg  lidocaine (Xylocaine) 10 mg/mL (1 %) injection 1 mg  lactated Ringer's infusion  HYDROmorphone PF (Dilaudid) injection 0.2 mg  fentaNYL PF (Sublimaze) injection 50 mcg  midazolam (Versed) injection 1 mg  ondansetron (Zofran) injection 4 mg  promethazine (Phenergan) 6.25 mg in sodium chloride 0.9% 50 mL IV  meperidine PF (Demerol) injection 12.5 mg  albuterol 2.5 mg /3 mL (0.083 %) nebulizer solution 2.5 mg  vancomycin (Vancocin) vial for injection  - Omnicell Override Pull  bupivacaine PF (Marcaine)  "injection  - Omnicell Override Pull  BUPivacaine HCl (Marcaine) 0.5 % (5 mg/mL) injection  thrombin (recombinant) (Recothrom) topical solution  - Omnicell Override Pull  thrombin (recombinant) (Recothrom) topical solution  - Omnicell Override Pull  thrombin solution  lidocaine (Xylocaine) 10 mg/mL (1 %) injection 50 mg  alteplase (Cathflo Activase) injection 2 mg  vancomycin-diluent combo no.1 (Xellia) IVPB 1,750 mg  piperacillin-tazobactam-dextrose (Zosyn) IV 3.375 g      Relevant Results  Labs  Results from last 72 hours   Lab Units 01/31/24  0603 01/30/24  1131   WBC AUTO x10*3/uL 10.4 11.5*   HEMOGLOBIN g/dL 7.9* 7.5*   HEMATOCRIT % 23.5* 21.9*   PLATELETS AUTO x10*3/uL 276 273   LYMPHO PCT MAN %  --  16.0   MONO PCT MAN %  --  4.0   EOSINO PCT MAN %  --  0.0       Results from last 72 hours   Lab Units 01/31/24  0603 01/30/24  1131   SODIUM mmol/L 137 134   POTASSIUM mmol/L 4.3 4.1   CHLORIDE mmol/L 108* 105   CO2 mmol/L 19* 19*   BUN mg/dL 13 15   CREATININE mg/dL 1.00 1.00   GLUCOSE mg/dL 98 119*   CALCIUM mg/dL 8.1* 8.2*   ANION GAP mmol/L 10 10   EGFR mL/min/1.73m*2 86 86       Results from last 72 hours   Lab Units 01/30/24  1131   ALK PHOS U/L 193*   BILIRUBIN TOTAL mg/dL 0.3   PROTEIN TOTAL g/dL 6.2   ALT U/L 51*   AST U/L 23   ALBUMIN g/dL 2.3*       Estimated Creatinine Clearance: 104.1 mL/min (by C-G formula based on SCr of 1 mg/dL).  No results found for: \"CRP\"  Microbiology  Susceptibility data from last 14 days.  Collected Specimen Info Organism   01/26/24 Tissue from BONE RESECTION Bacteroides fragilis     Streptococcus anginosus group   01/24/24 Swab from DIGIT, ACCESSORY RIGHT FOOT Mixed Aerobic and Anaerobic Bacteria    01/23/24 Tissue/Biopsy from Wound/Tissue Mixed Gram-Positive and Gram-Negative Bacteria       Imaging  Reviewed     Assessment/Plan     Right diabetic foot ulcer-Hall 4  Right foot osteomyelitis/abscess-suspect polymicrobial, negative wound culture, culture from bone tissue " with streptococcus anginosus and bacteroides fragilis  Type 2 diabetes with peripheral angiopathy with gangrene  Resolved acute kidney injury  Leukocytosis-resolved     Continue vancomycin  Continue Zosyn  Glycemic control  Supportive care  Monitor temperature and WBC  PICC line placed 1/16/24  Long-term plan is IV vancomycin and Zosyn to 3/6/2024-see discharge rec  Discharge planning    Addendum 10:58 AM:  Bone tissue culture from 1/26/24 with streptococcus anginosus and bacteroides fragilis. De-escalate zosyn and vancomycin to IV unasyn-same stop date of 3/6.  Orders placed in discharge rec, discussed with case management.    Discussed with Dr Rani MOON Staff, APRN-CNP

## 2024-02-01 NOTE — PROGRESS NOTES
Claude Coley is a 63 y.o. male on day 9 of admission presenting with Gas gangrene of foot (CMS/HCC).      Subjective   Patient seen and examined resting comfortably in bed. Denies any complaints or new symptoms.  Says swelling in legs has seemed to go down. Denies chest pain, shortness of breath, vomiting, diarrhea, constipation. Dressing on right foot appears clean. Wound vac draining sanguineous fluid       Objective     Last Recorded Vitals  /61 (BP Location: Left arm)   Pulse 59   Temp 36.3 °C (97.3 °F) (Temporal)   Resp 18   Wt 114 kg (252 lb 6.4 oz)   SpO2 97%   Intake/Output last 3 Shifts:    Intake/Output Summary (Last 24 hours) at 2/1/2024 0929  Last data filed at 2/1/2024 0834  Gross per 24 hour   Intake 460 ml   Output 901 ml   Net -441 ml       Admission Weight  Weight: 114 kg (252 lb 6.4 oz) (01/23/24 2300)    Daily Weight  01/23/24 : 114 kg (252 lb 6.4 oz)    Image Results  XR foot right 3+ views  Narrative: Interpreted By:  Christina Doran,   STUDY:  XR FOOT RIGHT 3+ VIEWS  1/26/2024 6:46 pm      INDICATION:  Signs/Symptoms:post op ray resection      COMPARISON:  None.      ACCESSION NUMBER(S):  [id number]      ORDERING CLINICIAN:  ANGEL LUIS BORGES      TECHNIQUE:  Three views of the Right foot were performed.      FINDINGS:  Interval postsurgical changes of distal right 5th digit ray  resection. Soft tissue swelling and gas is seen.      Impression: See findings.      MACRO:  None.      Signed by: Christina Doran 1/26/2024 8:09 PM  Dictation workstation:   [id number]      Physical Exam  Vitals reviewed.   Constitutional:       Appearance: Normal appearance.   HENT:      Head: Normocephalic and atraumatic.      Mouth/Throat:      Mouth: Mucous membranes are moist.   Eyes:      Extraocular Movements: Extraocular movements intact.      Pupils: Pupils are equal, round, and reactive to light.   Cardiovascular:      Rate and Rhythm: Normal rate and regular rhythm.      Pulses: Normal  pulses.      Heart sounds: Normal heart sounds.   Pulmonary:      Effort: Pulmonary effort is normal.      Breath sounds: Normal breath sounds.   Abdominal:      General: Abdomen is flat.      Palpations: Abdomen is soft.   Musculoskeletal:      Comments: Wound dressing on right foot   Skin:     General: Skin is warm and dry.      Capillary Refill: Capillary refill takes less than 2 seconds.   Neurological:      General: No focal deficit present.      Mental Status: He is alert and oriented to person, place, and time.   Psychiatric:         Mood and Affect: Mood normal.         Behavior: Behavior normal.         Thought Content: Thought content normal.         Scheduled medications  [Held by provider] aspirin, 81 mg, oral, Daily  carvedilol, 12.5 mg, oral, BID with meals  finasteride, 5 mg, oral, Daily  [Held by provider] heparin (porcine), 5,000 Units, subcutaneous, q8h  insulin lispro, 0-10 Units, subcutaneous, Before meals & nightly  lidocaine, 5 mL, infiltration, Once  lidocaine, 5 mL, infiltration, Once  melatonin, 3 mg, oral, Nightly  OLANZapine, 20 mg, oral, Nightly  pantoprazole, 40 mg, oral, Daily  piperacillin-tazobactam, 3.375 g, intravenous, q6h  polyethylene glycol, 17 g, oral, Daily  sennosides-docusate sodium, 1 tablet, oral, BID  vancomycin-diluent combo no.1, 1,750 mg, intravenous, q24h      Continuous medications     PRN medications  PRN medications: acetaminophen **OR** [DISCONTINUED] acetaminophen **OR** [DISCONTINUED] acetaminophen, alteplase, alteplase, dextrose 10 % in water (D10W), dextrose, glucagon  Results for orders placed or performed during the hospital encounter of 01/23/24 (from the past 24 hour(s))   POCT GLUCOSE   Result Value Ref Range    POCT Glucose 110 (H) 74 - 99 mg/dL   POCT GLUCOSE   Result Value Ref Range    POCT Glucose 82 74 - 99 mg/dL   POCT GLUCOSE   Result Value Ref Range    POCT Glucose 118 (H) 74 - 99 mg/dL   POCT GLUCOSE   Result Value Ref Range    POCT Glucose 90 74  - 99 mg/dL         Assessment/Plan            Principal Problem:    Gas gangrene of foot (CMS/HCC)  Active Problems:    HTN (hypertension)    Hyperlipidemia    Diabetes mellitus, type 2 (CMS/HCC)    Plan is to continue antibiotics outpatient through picc line at SNF with ongoing wound care and wound vac. Follow up with infectious disease and podiatry.  Patient stable for discharge              Liam Levin OMS3  2/1/2024  9:34 AM

## 2024-02-01 NOTE — NURSING NOTE
Upon rounding right upper arm single lumen PICC with current CHG dressing dry and intact, IVF infusing

## 2024-02-01 NOTE — PROGRESS NOTES
Physical Therapy    Physical Therapy Treatment    Patient Name: Claude Coley  MRN: [id number]  Today's Date: 2/1/2024  Time Calculation  Start Time: 1005  Stop Time: 1033  Time Calculation (min): 28 min       Assessment/Plan   PT Assessment  Rehab Prognosis: Good  Barriers to Discharge: NWB R JESI  Medical Staff Made Aware: Yes  End of Session Communication: Bedside nurse  Assessment Comment: MASON DENNISON  End of Session Patient Position: Up in chair, Alarm off, not on at start of session  PT Plan  Inpatient/Swing Bed or Outpatient: Inpatient  PT Plan  Treatment/Interventions: Bed mobility, Transfer training, Gait training, Strengthening, Endurance training, Range of motion, Therapeutic exercise, Therapeutic activity  PT Plan: Skilled PT  PT Frequency: Daily  PT Discharge Recommendations: High intensity level of continued care  Equipment Recommended upon Discharge: Wheeled walker  PT Recommended Transfer Status: Assist x1, Assistive device  PT - OK to Discharge: Yes      General Visit Information:   PT  Visit  PT Received On: 02/01/24  General  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 Ivey  Past Medical History Relevant to Rehab: HTN, CHF, hyperlipidemia, DM  Prior to Session Communication: Bedside nurse  Patient Position Received: Bed, 3 rail up, Alarm off, not on at start of session  Preferred Learning Style: verbal  General Comment: Cleared by nurse Patient agreeable.    Subjective   Precautions:  Precautions  LE Weight Bearing Status: Right Non-Weight Bearing  Medical Precautions: Fall precautions  Vital Signs:       Objective   Pain:  Pain Assessment  Pain Assessment: 0-10  Pain Score: 2  Pain Type: Surgical pain  Pain Location: Foot  Pain Orientation: Right  Pain Descriptors: Aching (with mobility)  Cognition:  Cognition  Orientation Level: Oriented X4  Postural Control:     Extremity/Trunk Assessments:    Activity Tolerance:     Treatments:  Therapeutic Exercise  Therapeutic  Exercise Performed: Yes  Therapeutic Exercise Activity 1: B LE supine ther ex: ankle pumps R LE, quad/ gluteal sets, heelslides R LE off surface, hip abduction/adduction R LE off surface, SAQ's x 20    Bed Mobility  Bed Mobility: Yes  Bed Mobility 1  Bed Mobility 1: Supine to sitting  Level of Assistance 1: Modified independent  Bed Mobility Comments 1: Agreeable to out of bed    Transfers  Transfer: Yes  Transfer 1  Transfer From 1: Bed to  Transfer to 1: Stand, Chair with arms  Technique 1: To left  Transfer Device 1: Walker  Transfer Level of Assistance 1: Moderate assistance  Trials/Comments 1: x 1 trial with Mod A of 1 due to no IV at this time. Constant cues for NWB R LE (No alarm on chair  Instructed not to get up on his own. Nurse ok with no alarm)         Outcome Measures:  [facility] Basic Mobility  Turning from your back to your side while in a flat bed without using bedrails: None  Moving from lying on your back to sitting on the side of a flat bed without using bedrails: None  Moving to and from bed to chair (including a wheelchair): A lot  Standing up from a chair using your arms (e.g. wheelchair or bedside chair): A lot  To walk in hospital room: Total  Climbing 3-5 steps with railing: Total  Basic Mobility - Total Score: 14    Education Documentation  Mobility Training, taught by Ivana Severino PTA at 2/1/2024 10:51 AM.  Learner: Patient  Readiness: Acceptance  Method: Explanation  Response: Verbalizes Understanding    Education Comments  No comments found.        OP EDUCATION:       Encounter Problems       Encounter Problems (Active)       PT Goals       Patient will transfer sit to stand and stand to sit with  contact guard assist to facilitate mobility.  (Progressing)       Start:  01/25/24    Expected End:  02/08/24            Patient will transfer bed to chair and chair to bed with contact guard assist to facilitate mobility.  (Progressing)       Start:  01/25/24    Expected End:  02/08/24             Patient will amb 10 feet with rolling walker device including no turns on even surface with minimal assist to facilitate safe mobility.  (Progressing)       Start:  01/25/24    Expected End:  02/08/24            Demonstrate safe mobility techniques while maintaining WB status R LE. (Progressing)       Start:  01/25/24    Expected End:  02/08/24

## 2024-02-01 NOTE — CARE PLAN
The patient's goals for the shift include  discharge planning  Problem: Pain  Goal: My pain/discomfort is manageable  Outcome: Progressing     Problem: Safety  Goal: Patient will be injury free during hospitalization  Outcome: Progressing  Goal: I will remain free of falls  Outcome: Progressing     Problem: Daily Care  Goal: Daily care needs are met  Outcome: Progressing     Problem: Psychosocial Needs  Goal: Demonstrates ability to cope with hospitalization/illness  Outcome: Progressing  Goal: Collaborate with me, my family, and caregiver to identify my specific goals  Outcome: Progressing     Problem: Discharge Barriers  Goal: My discharge needs are met  Outcome: Progressing     Problem: Skin  Goal: Decreased wound size/increased tissue granulation at next dressing change  Outcome: Progressing  Goal: Participates in plan/prevention/treatment measures  Outcome: Progressing  Goal: Prevent/manage excess moisture  Outcome: Progressing  Goal: Prevent/minimize sheer/friction injuries  Outcome: Progressing  Goal: Promote/optimize nutrition  Outcome: Progressing  Goal: Promote skin healing  Outcome: Progressing     Problem: Deep Vein Thrombosis  Goal: I will remain free from complications of deep vein thrombosis and maintain current level of mobility  Outcome: Progressing       The clinical goals for the shift include Safety, Get discharged, asssit with pain control

## 2024-02-01 NOTE — PROGRESS NOTES
"Vancomycin Dosing by Pharmacy- FOLLOW UP    Claude Coley is a 63 y.o. year old male who Pharmacy has been consulted for vancomycin dosing for cellulitis, skin and soft tissue. Based on the patient's indication and renal status this patient is being dosed based on a goal AUC of 400-600.     Renal function is currently stable.    Current vancomycin dose: 1750 mg given every 24 hours    Estimated vancomycin AUC on current dose: 400 mg/L.hr     Visit Vitals  /61 (BP Location: Left arm)   Pulse 59   Temp 36.3 °C (97.3 °F) (Temporal)   Resp 18        Lab Results   Component Value Date    CREATININE 1.00 01/31/2024    CREATININE 1.00 01/30/2024    CREATININE 1.10 01/28/2024    CREATININE  01/28/2024      Comment:      Sample Unsuitable For Testing- Cancelled Test Called To-RB By:        Patient weight is No results found for: \"PTWEIGHT\"    No results found for: \"CULTURE\"     I/O last 3 completed shifts:  In: 850 (7.4 mL/kg) [P.O.:300; IV Piggyback:550]  Out: 1676 (14.6 mL/kg) [Urine:1575 (0.4 mL/kg/hr); Drains:100; Stool:1]  Weight: 114.5 kg   [unfilled]    No results found for: \"PATIENTTEMP\"     Assessment/Plan    Within goal range, but more optimal therapy identified. Starting 1g Q12hrs.    This dosing regimen is predicted by [employer] to result in the following pharmacokinetic parameters:  Loading dose: N/A  Regimen: 1000 mg IV every 12 hours.  Start time: 10:32 on 02/01/2024  Exposure target: AUC24 (range)400-600 mg/L.hr   AUC24,ss: 452 mg/L.hr  Probability of AUC24 > 400: 89 %  Ctrough,ss: 14.7 mg/L  Probability of Ctrough,ss > 20: 3 %  Probability of nephrotoxicity (Lodise ADRYAN 2009): 10 %    The next level will be obtained on 02/02/24 at 0500. May be obtained sooner if clinically indicated.   Will continue to monitor renal function daily while on vancomycin and order serum creatinine at least every 48 hours if not already ordered.  Follow for continued vancomycin needs, clinical response, and " signs/symptoms of toxicity.       HAWK FLORES

## 2024-02-01 NOTE — PROGRESS NOTES
PRECERT BACK!    Update/change to antibiotic and clinicals sent to [facility].  Anticipate discharge today.  Firming up plans at this time.  Eden Chua RN

## 2024-02-01 NOTE — CONSULTS
Vancomycin Dosing by Pharmacy- Cessation of Therapy    Consult to pharmacy for vancomycin dosing has been discontinued by the prescriber, pharmacy will sign off at this time.    Please call pharmacy if there are further questions or re-enter a consult if vancomycin is resumed.     HEATHER CORRALES, PharmD

## 2024-02-04 LAB
BACTERIA SPEC CULT: ABNORMAL
BACTERIA SPEC CULT: ABNORMAL
GRAM STN SPEC: ABNORMAL
GRAM STN SPEC: ABNORMAL

## 2024-02-05 ENCOUNTER — NURSING HOME VISIT (OUTPATIENT)
Dept: POST ACUTE CARE | Facility: EXTERNAL LOCATION | Age: 64
End: 2024-02-05
Payer: MEDICARE

## 2024-02-05 VITALS
DIASTOLIC BLOOD PRESSURE: 74 MMHG | TEMPERATURE: 98.6 F | OXYGEN SATURATION: 98 % | HEART RATE: 75 BPM | RESPIRATION RATE: 18 BRPM | SYSTOLIC BLOOD PRESSURE: 150 MMHG

## 2024-02-05 DIAGNOSIS — A48.0 GAS GANGRENE OF FOOT (MULTI): Primary | ICD-10-CM

## 2024-02-05 DIAGNOSIS — I10 HYPERTENSION, UNSPECIFIED TYPE: ICD-10-CM

## 2024-02-05 DIAGNOSIS — E78.5 HYPERLIPIDEMIA, UNSPECIFIED HYPERLIPIDEMIA TYPE: ICD-10-CM

## 2024-02-05 DIAGNOSIS — E11.9 TYPE 2 DIABETES MELLITUS WITHOUT COMPLICATION, WITHOUT LONG-TERM CURRENT USE OF INSULIN (MULTI): ICD-10-CM

## 2024-02-05 PROBLEM — I50.9 CHF (CONGESTIVE HEART FAILURE) (MULTI): Status: ACTIVE | Noted: 2024-02-05

## 2024-02-05 PROBLEM — D64.9 ANEMIA: Status: ACTIVE | Noted: 2024-02-05

## 2024-02-05 PROBLEM — N40.0 BPH (BENIGN PROSTATIC HYPERPLASIA): Status: ACTIVE | Noted: 2024-02-05

## 2024-02-05 PROCEDURE — 99309 SBSQ NF CARE MODERATE MDM 30: CPT | Performed by: PHYSICIAN ASSISTANT

## 2024-02-05 ASSESSMENT — ENCOUNTER SYMPTOMS
WHEEZING: 0
CONSTIPATION: 0
DYSURIA: 0
VOMITING: 0
CHILLS: 0
APPETITE CHANGE: 0
DIARRHEA: 0
SHORTNESS OF BREATH: 0
COUGH: 0
TREMORS: 0
FREQUENCY: 0
NERVOUS/ANXIOUS: 0
DIZZINESS: 0
CONFUSION: 0
NAUSEA: 0
ABDOMINAL PAIN: 0
HEADACHES: 0
FEVER: 0
HEMATURIA: 0
WEAKNESS: 0

## 2024-02-05 NOTE — LETTER
Patient: Claude Coley  : 1960    Encounter Date: 2024       Subjective  [id number] : Claude Coley is a 63 y.o. male admitted to [facility] for rehab.   HPI  Pt with h/o diabetes, chf, and HTN admitted with extensive diabetic foot wound to the right foot with gas gangrene.  He underwent a debridement with Dr Burris on .   He is on IV unasyn through 3/6.   Pt did have blood loss anemia and was give venofer x 4 and b12.   Pt has wound vac in place.   He reports no pain.  He offers no new complaints.   No shortness of breath or cough.  No abdominal pain. No n/v/c/d.   He has edema noted tot the right lower leg and foot.  Pt lives alone.  Code status is full code.   Review of Systems   Constitutional:  Negative for appetite change, chills and fever.   Respiratory:  Negative for cough, shortness of breath and wheezing.    Cardiovascular:  Negative for chest pain and leg swelling.   Gastrointestinal:  Negative for abdominal pain, constipation, diarrhea, nausea and vomiting.   Genitourinary:  Negative for dysuria, frequency and hematuria.   Neurological:  Negative for dizziness, tremors, weakness and headaches.   Psychiatric/Behavioral:  Negative for confusion. The patient is not nervous/anxious.    All other systems reviewed and are negative.      Objective  /74   Pulse 75   Temp 37 °C (98.6 °F)   Resp 18   SpO2 98%    Physical Exam  Constitutional:       General: He is not in acute distress.  Eyes:      Conjunctiva/sclera: Conjunctivae normal.      Pupils: Pupils are equal, round, and reactive to light.   Cardiovascular:      Rate and Rhythm: Normal rate and regular rhythm.      Heart sounds: No murmur heard.  Pulmonary:      Effort: Pulmonary effort is normal.      Breath sounds: No wheezing, rhonchi or rales.   Abdominal:      General: Abdomen is flat. Bowel sounds are normal. There is no distension.      Palpations: Abdomen is soft. There is no mass.      Tenderness: There is no  "abdominal tenderness.   Musculoskeletal:         General: Swelling present. Normal range of motion.      Comments: Right foot with wound vac in place.  Swelling to the foot and right lower leg.   No edema in the left lower leg.    Skin:     General: Skin is warm and dry.      Findings: No rash.   Neurological:      General: No focal deficit present.      Mental Status: He is alert and oriented to person, place, and time. Mental status is at baseline.       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   Continue wound care.  Wound vac in place.  Follow up with Dr Burris on 2/13.   Continue Unasyn through 3/6.  Weekly labs to Dr bella.  DVT prophylaxis with heparin.         HTN (hypertension) I10     Continue lisinopril.  Monitor blood pressures and adjust meds as needed         Hyperlipidemia E78.5     Continue home meds         Diabetes mellitus, type 2 (CMS/[facility]) E11.9     Continue metformin.  Monitor blood sugars and adjust meds as needed.               Time spent: 30 min in review of chart, labs and orders, consultation with pt and documentation.       Electronically Signed By: Ella Cook PA-C   2/5/24  1:35 PM  "

## 2024-02-05 NOTE — ASSESSMENT & PLAN NOTE
S/p debridement with Dr Burris on 1/26.   Continue wound care.  Wound vac in place.  Follow up with Dr Burris on 2/13.   Continue Unasyn through 3/6.  Weekly labs to Dr bella.  DVT prophylaxis with heparin.

## 2024-02-05 NOTE — PROGRESS NOTES
Subjective   [id number] : Claude Coley is a 63 y.o. male admitted to [facility] for rehab.   HPI  Pt with h/o diabetes, chf, and HTN admitted with extensive diabetic foot wound to the right foot with gas gangrene.  He underwent a debridement with Dr Burris on 1/26.   He is on IV unasyn through 3/6.   Pt did have blood loss anemia and was give venofer x 4 and b12.   Pt has wound vac in place.   He reports no pain.  He offers no new complaints.   No shortness of breath or cough.  No abdominal pain. No n/v/c/d.   He has edema noted tot the right lower leg and foot.  Pt lives alone.  Code status is full code.   Review of Systems   Constitutional:  Negative for appetite change, chills and fever.   Respiratory:  Negative for cough, shortness of breath and wheezing.    Cardiovascular:  Negative for chest pain and leg swelling.   Gastrointestinal:  Negative for abdominal pain, constipation, diarrhea, nausea and vomiting.   Genitourinary:  Negative for dysuria, frequency and hematuria.   Neurological:  Negative for dizziness, tremors, weakness and headaches.   Psychiatric/Behavioral:  Negative for confusion. The patient is not nervous/anxious.    All other systems reviewed and are negative.      Objective   /74   Pulse 75   Temp 37 °C (98.6 °F)   Resp 18   SpO2 98%    Physical Exam  Constitutional:       General: He is not in acute distress.  Eyes:      Conjunctiva/sclera: Conjunctivae normal.      Pupils: Pupils are equal, round, and reactive to light.   Cardiovascular:      Rate and Rhythm: Normal rate and regular rhythm.      Heart sounds: No murmur heard.  Pulmonary:      Effort: Pulmonary effort is normal.      Breath sounds: No wheezing, rhonchi or rales.   Abdominal:      General: Abdomen is flat. Bowel sounds are normal. There is no distension.      Palpations: Abdomen is soft. There is no mass.      Tenderness: There is no abdominal tenderness.   Musculoskeletal:         General: Swelling  "present. Normal range of motion.      Comments: Right foot with wound vac in place.  Swelling to the foot and right lower leg.   No edema in the left lower leg.    Skin:     General: Skin is warm and dry.      Findings: No rash.   Neurological:      General: No focal deficit present.      Mental Status: He is alert and oriented to person, place, and time. Mental status is at baseline.       No lab exists for component: \"CBC BMP\"  Assessment/Plan   Problem List Items Addressed This Visit             ICD-10-CM    Gas gangrene of foot (CMS/[facility]) - Primary A48.0     S/p debridement with Dr Burris on 1/26.   Continue wound care.  Wound vac in place.  Follow up with Dr Burris on 2/13.   Continue Unasyn through 3/6.  Weekly labs to Dr bella.  DVT prophylaxis with heparin.         HTN (hypertension) I10     Continue lisinopril.  Monitor blood pressures and adjust meds as needed         Hyperlipidemia E78.5     Continue home meds         Diabetes mellitus, type 2 (CMS/[facility]) E11.9     Continue metformin.  Monitor blood sugars and adjust meds as needed.               Time spent: 30 min in review of chart, labs and orders, consultation with pt and documentation.   "

## 2024-02-09 ENCOUNTER — LAB REQUISITION (OUTPATIENT)
Dept: LAB | Facility: HOSPITAL | Age: 64
End: 2024-02-09
Payer: MEDICARE

## 2024-02-09 LAB
BASOPHILS NFR FLD MANUAL: 0 %
BLASTS NFR FLD MANUAL: 0 %
CLARITY FLD: ABNORMAL
COLOR FLD: YELLOW
CRYSTALS FLD MICRO: ABNORMAL
EOSINOPHIL NFR FLD MANUAL: 0 %
IMMATURE GRANULOCYTES IN FLUID: 0 %
LYMPHOCYTES NFR FLD MANUAL: 0 %
MONOS+MACROS NFR FLD MANUAL: 6 %
NEUTROPHILS NFR FLD MANUAL: 94 %
OTHER CELLS NFR FLD MANUAL: 0 %
PLASMA CELLS NFR FLD MANUAL: 0 %
RBC # FLD AUTO: 4000 /UL
TOTAL CELLS COUNTED SNV: 100
WBC # FLD AUTO: ABNORMAL /UL

## 2024-02-09 PROCEDURE — 89060 EXAM SYNOVIAL FLUID CRYSTALS: CPT | Performed by: INTERNAL MEDICINE

## 2024-02-09 PROCEDURE — 89060 EXAM SYNOVIAL FLUID CRYSTALS: CPT | Mod: OUT,TRILAB,WESLAB | Performed by: INTERNAL MEDICINE

## 2024-02-09 PROCEDURE — 87205 SMEAR GRAM STAIN: CPT | Mod: OUT,TRILAB,WESLAB | Performed by: INTERNAL MEDICINE

## 2024-02-09 PROCEDURE — 89051 BODY FLUID CELL COUNT: CPT | Mod: OUT | Performed by: INTERNAL MEDICINE

## 2024-02-10 ENCOUNTER — DOCUMENTATION (OUTPATIENT)
Dept: INPATIENT UNIT | Facility: HOSPITAL | Age: 64
End: 2024-02-10
Payer: MEDICARE

## 2024-02-12 LAB
BACTERIA FLD CULT: NORMAL
GRAM STN SPEC: NORMAL
GRAM STN SPEC: NORMAL
PATH REVIEW-CRYSTALS: NORMAL

## 2024-02-15 ENCOUNTER — NURSING HOME VISIT (OUTPATIENT)
Dept: POST ACUTE CARE | Facility: EXTERNAL LOCATION | Age: 64
End: 2024-02-15
Payer: MEDICARE

## 2024-02-15 VITALS
WEIGHT: 258.2 LBS | BODY MASS INDEX: 33.14 KG/M2 | TEMPERATURE: 98.6 F | OXYGEN SATURATION: 98 % | RESPIRATION RATE: 18 BRPM | HEART RATE: 68 BPM | DIASTOLIC BLOOD PRESSURE: 76 MMHG | SYSTOLIC BLOOD PRESSURE: 163 MMHG

## 2024-02-15 DIAGNOSIS — M25.561 ACUTE PAIN OF RIGHT KNEE: ICD-10-CM

## 2024-02-15 DIAGNOSIS — I10 HYPERTENSION, UNSPECIFIED TYPE: ICD-10-CM

## 2024-02-15 DIAGNOSIS — E11.9 TYPE 2 DIABETES MELLITUS WITHOUT COMPLICATION, WITHOUT LONG-TERM CURRENT USE OF INSULIN (MULTI): ICD-10-CM

## 2024-02-15 DIAGNOSIS — E78.5 HYPERLIPIDEMIA, UNSPECIFIED HYPERLIPIDEMIA TYPE: ICD-10-CM

## 2024-02-15 DIAGNOSIS — A48.0 GAS GANGRENE OF FOOT (MULTI): Primary | ICD-10-CM

## 2024-02-15 PROCEDURE — 99309 SBSQ NF CARE MODERATE MDM 30: CPT | Performed by: PHYSICIAN ASSISTANT

## 2024-02-15 ASSESSMENT — ENCOUNTER SYMPTOMS
DYSURIA: 0
HEADACHES: 0
NAUSEA: 0
CONSTIPATION: 0
ABDOMINAL PAIN: 0
ARTHRALGIAS: 1
CHILLS: 0
TREMORS: 0
DIZZINESS: 0
WEAKNESS: 0
HEMATURIA: 0
COUGH: 0
SHORTNESS OF BREATH: 0
WHEEZING: 0
VOMITING: 0
DIARRHEA: 0
APPETITE CHANGE: 0
NERVOUS/ANXIOUS: 0
CONFUSION: 0
FEVER: 0
FREQUENCY: 0

## 2024-02-15 NOTE — PROGRESS NOTES
Subjective   [id number] : Claude Coley is a 63 y.o. male admitted to [facility] for rehab.   HPI  Pt with h/o diabetes, chf, and HTN admitted with extensive diabetic foot wound to the right foot with gas gangrene.  He underwent a debridement with Dr Burris on 1/26.   He is on IV unasyn through 3/6.  Pt seen while resting in bed.  He is alert, pleasant and interactive.  He has  been having pain in his right knee.   He was started on colchicine this week which he states has been helping.  He is to appointment with ortho to follow up on knee pain.   He was seen by Dr Burris on 2/13 and wound is healing.   He denies any pain.  No new issues or concerns.   Pt has wound vac in place.  No shortness of breath or cough.  No abdominal pain.  No n/v/c/d.   He has edema noted to the right lower leg and foot.  Pt lives alone.  Code status is full code.   Review of Systems   Constitutional:  Negative for appetite change, chills and fever.   Respiratory:  Negative for cough, shortness of breath and wheezing.    Cardiovascular:  Negative for chest pain and leg swelling.   Gastrointestinal:  Negative for abdominal pain, constipation, diarrhea, nausea and vomiting.   Genitourinary:  Negative for dysuria, frequency and hematuria.   Musculoskeletal:  Positive for arthralgias (right knee).   Neurological:  Negative for dizziness, tremors, weakness and headaches.   Psychiatric/Behavioral:  Negative for confusion. The patient is not nervous/anxious.    All other systems reviewed and are negative.      Objective   /76   Pulse 68   Temp 37 °C (98.6 °F)   Resp 18   Wt 117 kg (258 lb 3.2 oz)   SpO2 98%   BMI 33.14 kg/m²    Physical Exam  Constitutional:       General: He is not in acute distress.  Eyes:      Conjunctiva/sclera: Conjunctivae normal.      Pupils: Pupils are equal, round, and reactive to light.   Cardiovascular:      Rate and Rhythm: Normal rate and regular rhythm.      Heart sounds: No murmur  "heard.  Pulmonary:      Effort: Pulmonary effort is normal.      Breath sounds: No wheezing, rhonchi or rales.   Abdominal:      General: Abdomen is flat. Bowel sounds are normal. There is no distension.      Palpations: Abdomen is soft. There is no mass.      Tenderness: There is no abdominal tenderness.   Musculoskeletal:         General: Swelling present. Normal range of motion.      Comments: Right foot with wound vac in place.  Swelling to the foot and right lower leg.   No edema in the left lower leg.    Skin:     General: Skin is warm and dry.      Findings: No rash.   Neurological:      General: No focal deficit present.      Mental Status: He is alert and oriented to person, place, and time. Mental status is at baseline.       No lab exists for component: \"CBC BMP\"  Assessment/Plan   Gas gangrene of foot (CMS/[facility]) - Primary A48.0        S/p debridement with Dr Burris on 1/26.   Continue wound care.  Wound vac in place.  Follow up with Dr Burris on 2/20.   Continue Unasyn through 3/6.  Weekly labs to Dr bella.  DVT prophylaxis with heparin.           HTN (hypertension) I10       Continue lisinopril.  Monitor blood pressures and adjust meds as needed           Hyperlipidemia E78.5       Continue home meds           Diabetes mellitus, type 2 (CMS/[facility]) E11.9       Continue metformin.  Monitor blood sugars and adjust meds as needed.         Knee pain:  had joint aspiration which showed uric acid crystals.  He was started on colchicine.  He has appointment with ortho - Dr Last on 2/28.        Time spent: 30 min in review of chart, labs and orders, consultation with pt and documentation.   "

## 2024-02-15 NOTE — LETTER
Patient: Claude Coley  : 1960    Encounter Date: 02/15/2024       Subjective  [id number] : Claude Coley is a 63 y.o. male admitted to [facility] for rehab.   HPI  Pt with h/o diabetes, chf, and HTN admitted with extensive diabetic foot wound to the right foot with gas gangrene.  He underwent a debridement with Dr Burris on .   He is on IV unasyn through 3/6.  Pt seen while resting in bed.  He is alert, pleasant and interactive.  He has  been having pain in his right knee.   He was started on colchicine this week which he states has been helping.  He is to appointment with ortho to follow up on knee pain.   He was seen by Dr Burris on  and wound is healing.   He denies any pain.  No new issues or concerns.   Pt has wound vac in place.  No shortness of breath or cough.  No abdominal pain.  No n/v/c/d.   He has edema noted to the right lower leg and foot.  Pt lives alone.  Code status is full code.   Review of Systems   Constitutional:  Negative for appetite change, chills and fever.   Respiratory:  Negative for cough, shortness of breath and wheezing.    Cardiovascular:  Negative for chest pain and leg swelling.   Gastrointestinal:  Negative for abdominal pain, constipation, diarrhea, nausea and vomiting.   Genitourinary:  Negative for dysuria, frequency and hematuria.   Musculoskeletal:  Positive for arthralgias (right knee).   Neurological:  Negative for dizziness, tremors, weakness and headaches.   Psychiatric/Behavioral:  Negative for confusion. The patient is not nervous/anxious.    All other systems reviewed and are negative.      Objective  /76   Pulse 68   Temp 37 °C (98.6 °F)   Resp 18   Wt 117 kg (258 lb 3.2 oz)   SpO2 98%   BMI 33.14 kg/m²    Physical Exam  Constitutional:       General: He is not in acute distress.  Eyes:      Conjunctiva/sclera: Conjunctivae normal.      Pupils: Pupils are equal, round, and reactive to light.   Cardiovascular:      Rate and Rhythm:  "Normal rate and regular rhythm.      Heart sounds: No murmur heard.  Pulmonary:      Effort: Pulmonary effort is normal.      Breath sounds: No wheezing, rhonchi or rales.   Abdominal:      General: Abdomen is flat. Bowel sounds are normal. There is no distension.      Palpations: Abdomen is soft. There is no mass.      Tenderness: There is no abdominal tenderness.   Musculoskeletal:         General: Swelling present. Normal range of motion.      Comments: Right foot with wound vac in place.  Swelling to the foot and right lower leg.   No edema in the left lower leg.    Skin:     General: Skin is warm and dry.      Findings: No rash.   Neurological:      General: No focal deficit present.      Mental Status: He is alert and oriented to person, place, and time. Mental status is at baseline.       No lab exists for component: \"CBC BMP\"  Assessment/Plan  Gas gangrene of foot (CMS/[facility]) - Primary A48.0        S/p debridement with Dr Burris on 1/26.   Continue wound care.  Wound vac in place.  Follow up with Dr Burris on 2/20.   Continue Unasyn through 3/6.  Weekly labs to Dr bella.  DVT prophylaxis with heparin.           HTN (hypertension) I10       Continue lisinopril.  Monitor blood pressures and adjust meds as needed           Hyperlipidemia E78.5       Continue home meds           Diabetes mellitus, type 2 (CMS/[facility]) E11.9       Continue metformin.  Monitor blood sugars and adjust meds as needed.         Knee pain:  had joint aspiration which showed uric acid crystals.  He was started on colchicine.  He has appointment with [place] - Dr Last on 2/28.        Time spent: 30 min in review of chart, labs and orders, consultation with pt and documentation.       Electronically Signed By: Ella Cook PA-C   2/15/24  1:33 PM  "

## 2024-02-19 LAB
FUNGUS SPEC CULT: NORMAL
FUNGUS SPEC FUNGUS STN: NORMAL

## 2024-02-22 ENCOUNTER — NURSING HOME VISIT (OUTPATIENT)
Dept: POST ACUTE CARE | Facility: EXTERNAL LOCATION | Age: 64
End: 2024-02-22
Payer: MEDICARE

## 2024-02-22 VITALS
SYSTOLIC BLOOD PRESSURE: 171 MMHG | HEART RATE: 63 BPM | TEMPERATURE: 98.6 F | BODY MASS INDEX: 33.39 KG/M2 | OXYGEN SATURATION: 97 % | DIASTOLIC BLOOD PRESSURE: 81 MMHG | WEIGHT: 260.2 LBS | RESPIRATION RATE: 18 BRPM

## 2024-02-22 DIAGNOSIS — A48.0 GAS GANGRENE OF FOOT (MULTI): Primary | ICD-10-CM

## 2024-02-22 DIAGNOSIS — I10 HYPERTENSION, UNSPECIFIED TYPE: ICD-10-CM

## 2024-02-22 DIAGNOSIS — E78.5 HYPERLIPIDEMIA, UNSPECIFIED HYPERLIPIDEMIA TYPE: ICD-10-CM

## 2024-02-22 DIAGNOSIS — M25.561 ACUTE PAIN OF RIGHT KNEE: ICD-10-CM

## 2024-02-22 DIAGNOSIS — E11.9 TYPE 2 DIABETES MELLITUS WITHOUT COMPLICATION, WITHOUT LONG-TERM CURRENT USE OF INSULIN (MULTI): ICD-10-CM

## 2024-02-22 PROCEDURE — 99309 SBSQ NF CARE MODERATE MDM 30: CPT | Performed by: PHYSICIAN ASSISTANT

## 2024-02-22 ASSESSMENT — ENCOUNTER SYMPTOMS
ABDOMINAL PAIN: 0
SHORTNESS OF BREATH: 0
APPETITE CHANGE: 0
TREMORS: 0
WEAKNESS: 0
VOMITING: 0
HEADACHES: 0
HEMATURIA: 0
ARTHRALGIAS: 0
FEVER: 0
NAUSEA: 0
DIARRHEA: 0
CHILLS: 0
FREQUENCY: 0
WHEEZING: 0
DIZZINESS: 0
DYSURIA: 0
COUGH: 0
NERVOUS/ANXIOUS: 0
CONSTIPATION: 0
CONFUSION: 0

## 2024-02-22 NOTE — LETTER
Patient: Claude Coley  : 1960    Encounter Date: 2024       Subjective  [id number] : Claude Coley is a 63 y.o. male admitted to [facility] for rehab.     Pt with h/o diabetes, chf, and HTN admitted with extensive diabetic foot wound to the right foot with gas gangrene.  He underwent a debridement with Dr Burris on .   He is on IV unasyn through 3/6.  Pt seen while resting in bed.  He is alert, pleasant and interactive.   Pt states that he is doing well.   The pain in his right knee has resolved since starting the colchicine.  He states that he has cancelled the appointment with ortho as he is feeling better.   The swelling to the right leg appears to be somewhat better as well.  He denies any issues with pain.   He had follow up with podiatry on .  No new issues or concerns.   Pt has wound vac in place.  No shortness of breath or cough.  No abdominal pain.  No n/v/c/d.  Pt had routine labs today which were reviewed and are stable.   Pt lives alone.  Code status is full code.   Review of Systems   Constitutional:  Negative for appetite change, chills and fever.   Respiratory:  Negative for cough, shortness of breath and wheezing.    Cardiovascular:  Negative for chest pain and leg swelling.   Gastrointestinal:  Negative for abdominal pain, constipation, diarrhea, nausea and vomiting.   Genitourinary:  Negative for dysuria, frequency and hematuria.   Musculoskeletal:  Negative for arthralgias.   Neurological:  Negative for dizziness, tremors, weakness and headaches.   Psychiatric/Behavioral:  Negative for confusion. The patient is not nervous/anxious.    All other systems reviewed and are negative.      Objective  /81   Pulse 63   Temp 37 °C (98.6 °F)   Resp 18   Wt 118 kg (260 lb 3.2 oz)   SpO2 97%   BMI 33.39 kg/m²    Physical Exam  Constitutional:       General: He is not in acute distress.  Eyes:      Conjunctiva/sclera: Conjunctivae normal.      Pupils: Pupils are  "equal, round, and reactive to light.   Cardiovascular:      Rate and Rhythm: Normal rate and regular rhythm.      Heart sounds: No murmur heard.  Pulmonary:      Effort: Pulmonary effort is normal.      Breath sounds: No wheezing, rhonchi or rales.   Abdominal:      General: Abdomen is flat. Bowel sounds are normal. There is no distension.      Palpations: Abdomen is soft. There is no mass.      Tenderness: There is no abdominal tenderness.   Musculoskeletal:         General: Swelling present. Normal range of motion.      Comments: Right foot with wound vac in place.  Swelling to the foot and right lower leg.   No edema in the left lower leg.    Skin:     General: Skin is warm and dry.      Findings: No rash.   Neurological:      General: No focal deficit present.      Mental Status: He is alert and oriented to person, place, and time. Mental status is at baseline.       No lab exists for component: \"CBC BMP\"  Assessment/Plan  Gas gangrene of foot (CMS/[facility]) - Primary A48.0        S/p debridement with Dr Burris on 1/26.   Continue wound care.  Wound vac in place.  Follow up with Dr Burris on 2/27.   Continue Unasyn through 3/6.  Weekly labs to Dr bella.  DVT prophylaxis with heparin.           HTN (hypertension) I10       Continue lisinopril.  Monitor blood pressures and adjust meds as needed           Hyperlipidemia E78.5       Continue home meds           Diabetes mellitus, type 2 (CMS/[facility]) E11.9       Continue metformin.  Monitor blood sugars and adjust meds as needed.         Knee pain:  had joint aspiration which showed uric acid crystals.  He was started on colchicine. Pain improved.  Pt has cancelled his appointment with chico Last on 2/28.        Time spent: 30 min in review of chart, labs and orders, consultation with pt and documentation.       Electronically Signed By: Ella Cook PA-C   2/22/24  4:50 PM  "

## 2024-02-22 NOTE — PROGRESS NOTES
Subjective   [id number] : Claude Coley is a 63 y.o. male admitted to [facility] for rehab.     Pt with h/o diabetes, chf, and HTN admitted with extensive diabetic foot wound to the right foot with gas gangrene.  He underwent a debridement with Dr Burris on 1/26.   He is on IV unasyn through 3/6.  Pt seen while resting in bed.  He is alert, pleasant and interactive.   Pt states that he is doing well.   The pain in his right knee has resolved since starting the colchicine.  He states that he has cancelled the appointment with ortho as he is feeling better.   The swelling to the right leg appears to be somewhat better as well.  He denies any issues with pain.   He had follow up with podiatry on 2/20.  No new issues or concerns.   Pt has wound vac in place.  No shortness of breath or cough.  No abdominal pain.  No n/v/c/d.  Pt had routine labs today which were reviewed and are stable.   Pt lives alone.  Code status is full code.   Review of Systems   Constitutional:  Negative for appetite change, chills and fever.   Respiratory:  Negative for cough, shortness of breath and wheezing.    Cardiovascular:  Negative for chest pain and leg swelling.   Gastrointestinal:  Negative for abdominal pain, constipation, diarrhea, nausea and vomiting.   Genitourinary:  Negative for dysuria, frequency and hematuria.   Musculoskeletal:  Negative for arthralgias.   Neurological:  Negative for dizziness, tremors, weakness and headaches.   Psychiatric/Behavioral:  Negative for confusion. The patient is not nervous/anxious.    All other systems reviewed and are negative.      Objective   /81   Pulse 63   Temp 37 °C (98.6 °F)   Resp 18   Wt 118 kg (260 lb 3.2 oz)   SpO2 97%   BMI 33.39 kg/m²    Physical Exam  Constitutional:       General: He is not in acute distress.  Eyes:      Conjunctiva/sclera: Conjunctivae normal.      Pupils: Pupils are equal, round, and reactive to light.   Cardiovascular:      Rate and Rhythm:  "Normal rate and regular rhythm.      Heart sounds: No murmur heard.  Pulmonary:      Effort: Pulmonary effort is normal.      Breath sounds: No wheezing, rhonchi or rales.   Abdominal:      General: Abdomen is flat. Bowel sounds are normal. There is no distension.      Palpations: Abdomen is soft. There is no mass.      Tenderness: There is no abdominal tenderness.   Musculoskeletal:         General: Swelling present. Normal range of motion.      Comments: Right foot with wound vac in place.  Swelling to the foot and right lower leg.   No edema in the left lower leg.    Skin:     General: Skin is warm and dry.      Findings: No rash.   Neurological:      General: No focal deficit present.      Mental Status: He is alert and oriented to person, place, and time. Mental status is at baseline.       No lab exists for component: \"CBC BMP\"  Assessment/Plan   Gas gangrene of foot (CMS/[facility]) - Primary A48.0        S/p debridement with Dr Burris on 1/26.   Continue wound care.  Wound vac in place.  Follow up with Dr Burris on 2/27.   Continue Unasyn through 3/6.  Weekly labs to Dr bella.  DVT prophylaxis with heparin.           HTN (hypertension) I10       Continue lisinopril.  Monitor blood pressures and adjust meds as needed           Hyperlipidemia E78.5       Continue home meds           Diabetes mellitus, type 2 (CMS/[facility]) E11.9       Continue metformin.  Monitor blood sugars and adjust meds as needed.         Knee pain:  had joint aspiration which showed uric acid crystals.  He was started on colchicine. Pain improved.  Pt has cancelled his appointment with chico Last on 2/28.        Time spent: 30 min in review of chart, labs and orders, consultation with pt and documentation.   "

## 2024-02-29 ENCOUNTER — NURSING HOME VISIT (OUTPATIENT)
Dept: POST ACUTE CARE | Facility: EXTERNAL LOCATION | Age: 64
End: 2024-02-29
Payer: MEDICARE

## 2024-02-29 VITALS
TEMPERATURE: 98.4 F | HEART RATE: 68 BPM | OXYGEN SATURATION: 97 % | DIASTOLIC BLOOD PRESSURE: 73 MMHG | SYSTOLIC BLOOD PRESSURE: 138 MMHG | RESPIRATION RATE: 18 BRPM | BODY MASS INDEX: 33.37 KG/M2 | WEIGHT: 260 LBS

## 2024-02-29 DIAGNOSIS — E11.9 TYPE 2 DIABETES MELLITUS WITHOUT COMPLICATION, WITHOUT LONG-TERM CURRENT USE OF INSULIN (MULTI): ICD-10-CM

## 2024-02-29 DIAGNOSIS — E78.5 HYPERLIPIDEMIA, UNSPECIFIED HYPERLIPIDEMIA TYPE: ICD-10-CM

## 2024-02-29 DIAGNOSIS — I10 HYPERTENSION, UNSPECIFIED TYPE: ICD-10-CM

## 2024-02-29 DIAGNOSIS — A48.0 GAS GANGRENE OF FOOT (MULTI): Primary | ICD-10-CM

## 2024-02-29 DIAGNOSIS — M10.9 ACUTE GOUT, UNSPECIFIED CAUSE, UNSPECIFIED SITE: ICD-10-CM

## 2024-02-29 PROCEDURE — 99309 SBSQ NF CARE MODERATE MDM 30: CPT | Performed by: PHYSICIAN ASSISTANT

## 2024-02-29 ASSESSMENT — ENCOUNTER SYMPTOMS
CONFUSION: 0
TREMORS: 0
HEADACHES: 0
DIARRHEA: 0
WHEEZING: 0
ARTHRALGIAS: 0
CHILLS: 0
NAUSEA: 0
FEVER: 0
FREQUENCY: 0
APPETITE CHANGE: 0
WEAKNESS: 0
SHORTNESS OF BREATH: 0
NERVOUS/ANXIOUS: 0
DIZZINESS: 0
VOMITING: 0
CONSTIPATION: 0
DYSURIA: 0
ABDOMINAL PAIN: 0
COUGH: 0
HEMATURIA: 0

## 2024-02-29 NOTE — PROGRESS NOTES
Subjective   [id number] : Claude Coley is a 63 y.o. male admitted to [facility] for rehab.     Pt with h/o diabetes, chf, and HTN admitted with extensive diabetic foot wound to the right foot with gas gangrene.  He underwent a debridement with Dr Burris on 1/26.   He is on IV unasyn through 3/6.  Pt states that he is to have vascular studies completed in preparation for a possible skin and muscle graft to his wound.  He reports that he is feeling well.  He has no complaints or concerns.  He has continued edema in both his lower legs.   He continues on lasix.  He denies any pain.   Pt has wound vac in place.  No shortness of breath or cough.  No abdominal pain.  No n/v/c/d.  Pt had routine labs today which were reviewed and are stable.   Pt lives alone.  Code status is full code.   Review of Systems   Constitutional:  Negative for appetite change, chills and fever.   Respiratory:  Negative for cough, shortness of breath and wheezing.    Cardiovascular:  Negative for chest pain and leg swelling.   Gastrointestinal:  Negative for abdominal pain, constipation, diarrhea, nausea and vomiting.   Genitourinary:  Negative for dysuria, frequency and hematuria.   Musculoskeletal:  Negative for arthralgias.   Neurological:  Negative for dizziness, tremors, weakness and headaches.   Psychiatric/Behavioral:  Negative for confusion. The patient is not nervous/anxious.    All other systems reviewed and are negative.      Objective   /73   Pulse 68   Temp 36.9 °C (98.4 °F)   Resp 18   Wt 118 kg (260 lb)   SpO2 97%   BMI 33.37 kg/m²    Physical Exam  Constitutional:       General: He is not in acute distress.  Eyes:      Conjunctiva/sclera: Conjunctivae normal.      Pupils: Pupils are equal, round, and reactive to light.   Cardiovascular:      Rate and Rhythm: Normal rate and regular rhythm.      Heart sounds: No murmur heard.  Pulmonary:      Effort: Pulmonary effort is normal.      Breath sounds: No wheezing,  "rhonchi or rales.   Abdominal:      General: Abdomen is flat. Bowel sounds are normal. There is no distension.      Palpations: Abdomen is soft. There is no mass.      Tenderness: There is no abdominal tenderness.   Musculoskeletal:         General: Swelling present. Normal range of motion.      Comments: Right foot with wound vac in place.  Swelling to the foot and right lower leg.   No edema in the left lower leg.    Skin:     General: Skin is warm and dry.      Findings: No rash.   Neurological:      General: No focal deficit present.      Mental Status: He is alert and oriented to person, place, and time. Mental status is at baseline.       No lab exists for component: \"CBC BMP\"  Assessment/Plan   Gas gangrene of foot (CMS/[facility]) - Primary A48.0        S/p debridement with Dr Burris on 1/26.   Continue wound care.  Wound vac in place.  Follow up with Dr Burris on 3/5.   Continue Unasyn through 3/6.  Weekly labs to Dr bella.  DVT prophylaxis with heparin.           HTN (hypertension) I10       Continue lisinopril.  Monitor blood pressures and adjust meds as needed           Hyperlipidemia E78.5       Continue home meds           Diabetes mellitus, type 2 (CMS/[facility]) E11.9       Continue metformin.  Monitor blood sugars and adjust meds as needed.         Knee pain:  had joint aspiration which showed uric acid crystals.  He was started on colchicine. Pain improved.  Pt has cancelled his appointment with chico Last on 2/28.        Time spent: 30 min in review of chart, labs and orders, consultation with pt and documentation.   "

## 2024-02-29 NOTE — LETTER
Patient: Claude Coley  : 1960    Encounter Date: 2024       Subjective  [id number] : Claude Coley is a 63 y.o. male admitted to [facility] for rehab.     Pt with h/o diabetes, chf, and HTN admitted with extensive diabetic foot wound to the right foot with gas gangrene.  He underwent a debridement with Dr Burris on .   He is on IV unasyn through 3/6.  Pt states that he is to have vascular studies completed in preparation for a possible skin and muscle graft to his wound.  He reports that he is feeling well.  He has no complaints or concerns.  He has continued edema in both his lower legs.   He continues on lasix.  He denies any pain.   Pt has wound vac in place.  No shortness of breath or cough.  No abdominal pain.  No n/v/c/d.  Pt had routine labs today which were reviewed and are stable.   Pt lives alone.  Code status is full code.   Review of Systems   Constitutional:  Negative for appetite change, chills and fever.   Respiratory:  Negative for cough, shortness of breath and wheezing.    Cardiovascular:  Negative for chest pain and leg swelling.   Gastrointestinal:  Negative for abdominal pain, constipation, diarrhea, nausea and vomiting.   Genitourinary:  Negative for dysuria, frequency and hematuria.   Musculoskeletal:  Negative for arthralgias.   Neurological:  Negative for dizziness, tremors, weakness and headaches.   Psychiatric/Behavioral:  Negative for confusion. The patient is not nervous/anxious.    All other systems reviewed and are negative.      Objective  /73   Pulse 68   Temp 36.9 °C (98.4 °F)   Resp 18   Wt 118 kg (260 lb)   SpO2 97%   BMI 33.37 kg/m²    Physical Exam  Constitutional:       General: He is not in acute distress.  Eyes:      Conjunctiva/sclera: Conjunctivae normal.      Pupils: Pupils are equal, round, and reactive to light.   Cardiovascular:      Rate and Rhythm: Normal rate and regular rhythm.      Heart sounds: No murmur heard.  Pulmonary:  "     Effort: Pulmonary effort is normal.      Breath sounds: No wheezing, rhonchi or rales.   Abdominal:      General: Abdomen is flat. Bowel sounds are normal. There is no distension.      Palpations: Abdomen is soft. There is no mass.      Tenderness: There is no abdominal tenderness.   Musculoskeletal:         General: Swelling present. Normal range of motion.      Comments: Right foot with wound vac in place.  Swelling to the foot and right lower leg.   No edema in the left lower leg.    Skin:     General: Skin is warm and dry.      Findings: No rash.   Neurological:      General: No focal deficit present.      Mental Status: He is alert and oriented to person, place, and time. Mental status is at baseline.       No lab exists for component: \"CBC BMP\"  Assessment/Plan  Gas gangrene of foot (CMS/[facility]) - Primary A48.0        S/p debridement with Dr Burris on 1/26.   Continue wound care.  Wound vac in place.  Follow up with Dr Burris on 3/5.   Continue Unasyn through 3/6.  Weekly labs to Dr bella.  DVT prophylaxis with heparin.           HTN (hypertension) I10       Continue lisinopril.  Monitor blood pressures and adjust meds as needed           Hyperlipidemia E78.5       Continue home meds           Diabetes mellitus, type 2 (CMS/[facility]) E11.9       Continue metformin.  Monitor blood sugars and adjust meds as needed.         Knee pain:  had joint aspiration which showed uric acid crystals.  He was started on colchicine. Pain improved.  Pt has cancelled his appointment with chico Last on 2/28.        Time spent: 30 min in review of chart, labs and orders, consultation with pt and documentation.       Electronically Signed By: Ella Cook PA-C   2/29/24  3:56 PM  "

## 2024-03-07 ENCOUNTER — NURSING HOME VISIT (OUTPATIENT)
Dept: POST ACUTE CARE | Facility: EXTERNAL LOCATION | Age: 64
End: 2024-03-07
Payer: MEDICARE

## 2024-03-07 VITALS
OXYGEN SATURATION: 95 % | SYSTOLIC BLOOD PRESSURE: 159 MMHG | DIASTOLIC BLOOD PRESSURE: 66 MMHG | HEART RATE: 58 BPM | BODY MASS INDEX: 33.11 KG/M2 | WEIGHT: 258 LBS | TEMPERATURE: 98 F | RESPIRATION RATE: 18 BRPM

## 2024-03-07 DIAGNOSIS — E78.5 HYPERLIPIDEMIA, UNSPECIFIED HYPERLIPIDEMIA TYPE: ICD-10-CM

## 2024-03-07 DIAGNOSIS — I10 HYPERTENSION, UNSPECIFIED TYPE: ICD-10-CM

## 2024-03-07 DIAGNOSIS — A48.0 GAS GANGRENE OF FOOT (MULTI): Primary | ICD-10-CM

## 2024-03-07 DIAGNOSIS — M10.9 ACUTE GOUT, UNSPECIFIED CAUSE, UNSPECIFIED SITE: ICD-10-CM

## 2024-03-07 DIAGNOSIS — E11.9 TYPE 2 DIABETES MELLITUS WITHOUT COMPLICATION, WITHOUT LONG-TERM CURRENT USE OF INSULIN (MULTI): ICD-10-CM

## 2024-03-07 DIAGNOSIS — R60.0 BILATERAL LOWER EXTREMITY EDEMA: ICD-10-CM

## 2024-03-07 PROCEDURE — 99309 SBSQ NF CARE MODERATE MDM 30: CPT | Performed by: PHYSICIAN ASSISTANT

## 2024-03-07 ASSESSMENT — ENCOUNTER SYMPTOMS
WHEEZING: 0
ARTHRALGIAS: 0
CONFUSION: 0
APPETITE CHANGE: 0
CONSTIPATION: 0
NERVOUS/ANXIOUS: 0
DIARRHEA: 0
CHILLS: 0
TREMORS: 0
ABDOMINAL PAIN: 0
HEADACHES: 0
VOMITING: 0
SHORTNESS OF BREATH: 0
WEAKNESS: 0
DIZZINESS: 0
NAUSEA: 0
DYSURIA: 0
COUGH: 0
FREQUENCY: 0
FEVER: 0
HEMATURIA: 0

## 2024-03-07 NOTE — PROGRESS NOTES
Subjective   [id number] : Claude Coely is a 63 y.o. male admitted to [facility] for rehab.     Pt with h/o diabetes, chf, and HTN admitted with extensive diabetic foot wound to the right foot with gas gangrene.  He underwent a debridement with Dr Burris on 1/26.   He is on IV unasyn through 3/6.  Pt  going for vascular studies  today in preparation for a possible skin and muscle graft to his wound.  He is very concerned about lower leg edema.  His swelling looks much better today.   He states that he took lasix 40mg daily before and thinks that he needs the increased dose.  He offers no other complaints or concerns.   He denies any pain.   Pt has wound vac in place.  No shortness of breath or cough.  No abdominal pain.  No n/v/c/d.  Pt had routine labs today which were reviewed and are stable.   Pt lives alone.  Code status is full code.   Review of Systems   Constitutional:  Negative for appetite change, chills and fever.   Respiratory:  Negative for cough, shortness of breath and wheezing.    Cardiovascular:  Positive for leg swelling. Negative for chest pain.   Gastrointestinal:  Negative for abdominal pain, constipation, diarrhea, nausea and vomiting.   Genitourinary:  Negative for dysuria, frequency and hematuria.   Musculoskeletal:  Negative for arthralgias.   Neurological:  Negative for dizziness, tremors, weakness and headaches.   Psychiatric/Behavioral:  Negative for confusion. The patient is not nervous/anxious.    All other systems reviewed and are negative.      Objective   /66   Pulse 58   Temp 36.7 °C (98 °F)   Resp 18   Wt 117 kg (258 lb)   SpO2 95%   BMI 33.11 kg/m²    Physical Exam  Constitutional:       General: He is not in acute distress.  Eyes:      Conjunctiva/sclera: Conjunctivae normal.      Pupils: Pupils are equal, round, and reactive to light.   Cardiovascular:      Rate and Rhythm: Normal rate and regular rhythm.      Heart sounds: No murmur heard.  Pulmonary:       "Effort: Pulmonary effort is normal.      Breath sounds: No wheezing, rhonchi or rales.   Abdominal:      General: Abdomen is flat. Bowel sounds are normal. There is no distension.      Palpations: Abdomen is soft. There is no mass.      Tenderness: There is no abdominal tenderness.   Musculoskeletal:         General: Swelling present. Normal range of motion.      Comments: Right foot with wound vac in place.  Swelling to the foot and right lower leg.   No edema in the left lower leg.    Skin:     General: Skin is warm and dry.      Findings: No rash.   Neurological:      General: No focal deficit present.      Mental Status: He is alert and oriented to person, place, and time. Mental status is at baseline.       No lab exists for component: \"CBC BMP\"  Assessment/Plan   Gas gangrene of foot (CMS/[facility]) - Primary A48.0        S/p debridement with Dr Burris on 1/26.   Continue wound care.  Wound vac in place.  Follow up with Dr Burris.   Continue Unasyn through 3/6.  Weekly labs to Dr bella.  DVT prophylaxis with heparin.  Pt to have vascular studies today in preparation for possible muscle and skin grafting for his wound.           HTN (hypertension) I10       Continue lisinopril.  Monitor blood pressures and adjust meds as needed           Hyperlipidemia E78.5       Continue home meds           Diabetes mellitus, type 2 (CMS/HCC) E11.9       Continue metformin.  Monitor blood sugars and adjust meds as needed.         Knee pain:  had joint aspiration which showed uric acid crystals.  He was started on colchicine. Pain improved.  Pt has cancelled his appointment with ortho - Dr Last on 2/28.      Lower extremity edema:  overall swelling is improved.  - will increase lasix to 40mg daily x 3 days then decrease back to 20mg daily.  Monitor labs.      Time spent: 30 min in review of chart, labs and orders, consultation with pt and documentation.   "

## 2024-03-07 NOTE — LETTER
Patient: Claude Coley  : 1960    Encounter Date: 2024       Subjective  [id number] : Claude Coley is a 63 y.o. male admitted to [facility] for rehab.     Pt with h/o diabetes, chf, and HTN admitted with extensive diabetic foot wound to the right foot with gas gangrene.  He underwent a debridement with Dr Burris on .   He is on IV unasyn through 3/6.  Pt  going for vascular studies  today in preparation for a possible skin and muscle graft to his wound.  He is very concerned about lower leg edema.  His swelling looks much better today.   He states that he took lasix 40mg daily before and thinks that he needs the increased dose.  He offers no other complaints or concerns.   He denies any pain.   Pt has wound vac in place.  No shortness of breath or cough.  No abdominal pain.  No n/v/c/d.  Pt had routine labs today which were reviewed and are stable.   Pt lives alone.  Code status is full code.   Review of Systems   Constitutional:  Negative for appetite change, chills and fever.   Respiratory:  Negative for cough, shortness of breath and wheezing.    Cardiovascular:  Positive for leg swelling. Negative for chest pain.   Gastrointestinal:  Negative for abdominal pain, constipation, diarrhea, nausea and vomiting.   Genitourinary:  Negative for dysuria, frequency and hematuria.   Musculoskeletal:  Negative for arthralgias.   Neurological:  Negative for dizziness, tremors, weakness and headaches.   Psychiatric/Behavioral:  Negative for confusion. The patient is not nervous/anxious.    All other systems reviewed and are negative.      Objective  /66   Pulse 58   Temp 36.7 °C (98 °F)   Resp 18   Wt 117 kg (258 lb)   SpO2 95%   BMI 33.11 kg/m²    Physical Exam  Constitutional:       General: He is not in acute distress.  Eyes:      Conjunctiva/sclera: Conjunctivae normal.      Pupils: Pupils are equal, round, and reactive to light.   Cardiovascular:      Rate and Rhythm: Normal rate  "and regular rhythm.      Heart sounds: No murmur heard.  Pulmonary:      Effort: Pulmonary effort is normal.      Breath sounds: No wheezing, rhonchi or rales.   Abdominal:      General: Abdomen is flat. Bowel sounds are normal. There is no distension.      Palpations: Abdomen is soft. There is no mass.      Tenderness: There is no abdominal tenderness.   Musculoskeletal:         General: Swelling present. Normal range of motion.      Comments: Right foot with wound vac in place.  Swelling to the foot and right lower leg.   No edema in the left lower leg.    Skin:     General: Skin is warm and dry.      Findings: No rash.   Neurological:      General: No focal deficit present.      Mental Status: He is alert and oriented to person, place, and time. Mental status is at baseline.       No lab exists for component: \"CBC BMP\"  Assessment/Plan  Gas gangrene of foot (CMS/HCC) - Primary A48.0        S/p debridement with Dr Burris on 1/26.   Continue wound care.  Wound vac in place.  Follow up with Dr Burris.   Continue Unasyn through 3/6.  Weekly labs to Dr bella.  DVT prophylaxis with heparin.  Pt to have vascular studies today in preparation for possible muscle and skin grafting for his wound.           HTN (hypertension) I10       Continue lisinopril.  Monitor blood pressures and adjust meds as needed           Hyperlipidemia E78.5       Continue home meds           Diabetes mellitus, type 2 (CMS/HCC) E11.9       Continue metformin.  Monitor blood sugars and adjust meds as needed.         Knee pain:  had joint aspiration which showed uric acid crystals.  He was started on colchicine. Pain improved.  Pt has cancelled his appointment with ortho - Dr Last on 2/28.      Lower extremity edema:  overall swelling is improved.  - will increase lasix to 40mg daily x 3 days then decrease back to 20mg daily.  Monitor labs.      Time spent: 30 min in review of chart, labs and orders, consultation with pt and documentation. "       Electronically Signed By: Ella Cook PA-C   3/7/24  4:51 PM

## 2024-03-14 ENCOUNTER — NURSING HOME VISIT (OUTPATIENT)
Dept: POST ACUTE CARE | Facility: EXTERNAL LOCATION | Age: 64
End: 2024-03-14
Payer: MEDICARE

## 2024-03-14 VITALS
BODY MASS INDEX: 32.67 KG/M2 | HEART RATE: 62 BPM | SYSTOLIC BLOOD PRESSURE: 125 MMHG | OXYGEN SATURATION: 97 % | WEIGHT: 254.6 LBS | TEMPERATURE: 98.4 F | RESPIRATION RATE: 18 BRPM | DIASTOLIC BLOOD PRESSURE: 72 MMHG

## 2024-03-14 DIAGNOSIS — R60.0 BILATERAL LOWER EXTREMITY EDEMA: ICD-10-CM

## 2024-03-14 DIAGNOSIS — M10.9 ACUTE GOUT, UNSPECIFIED CAUSE, UNSPECIFIED SITE: ICD-10-CM

## 2024-03-14 DIAGNOSIS — I10 HYPERTENSION, UNSPECIFIED TYPE: ICD-10-CM

## 2024-03-14 DIAGNOSIS — E11.9 TYPE 2 DIABETES MELLITUS WITHOUT COMPLICATION, WITHOUT LONG-TERM CURRENT USE OF INSULIN (MULTI): ICD-10-CM

## 2024-03-14 DIAGNOSIS — E78.5 HYPERLIPIDEMIA, UNSPECIFIED HYPERLIPIDEMIA TYPE: ICD-10-CM

## 2024-03-14 DIAGNOSIS — A48.0 GAS GANGRENE OF FOOT (MULTI): Primary | ICD-10-CM

## 2024-03-14 PROCEDURE — 99309 SBSQ NF CARE MODERATE MDM 30: CPT | Performed by: PHYSICIAN ASSISTANT

## 2024-03-14 ASSESSMENT — ENCOUNTER SYMPTOMS
WHEEZING: 0
WEAKNESS: 0
VOMITING: 0
DIZZINESS: 0
NERVOUS/ANXIOUS: 0
APPETITE CHANGE: 0
SHORTNESS OF BREATH: 0
DIARRHEA: 0
FEVER: 0
ABDOMINAL PAIN: 0
TREMORS: 0
HEADACHES: 0
NAUSEA: 0
DYSURIA: 0
ARTHRALGIAS: 0
FREQUENCY: 0
HEMATURIA: 0
CHILLS: 0
CONFUSION: 0
COUGH: 0
CONSTIPATION: 0

## 2024-03-14 NOTE — LETTER
Patient: Claude Coley  : 1960    Encounter Date: 2024       Subjective  [id number] : Claude Coley is a 63 y.o. male admitted to [facility] for rehab.     Pt with h/o diabetes, chf, and HTN admitted with extensive diabetic foot wound to the right foot with gas gangrene.  He underwent a debridement with Dr Perry on .     Pt completed course of antibiotics on 3/6.   He continues to follow with Dr Perry.   He scheduled for surgical debridement of his wound on 3/27.  He reports no pain.   He is concerned about the swelling in his right foot/lower leg.   He has no swelling to the left lower leg.  He continues on lasix.  He offers no other complaints or concerns.   He denies any pain.   Pt has wound vac in place.  No shortness of breath or cough.  No abdominal pain.  No n/v/c/d.  Pt had routine labs today which were reviewed and are stable.   Pt lives alone.  Code status is full code.   Review of Systems   Constitutional:  Negative for appetite change, chills and fever.   Respiratory:  Negative for cough, shortness of breath and wheezing.    Cardiovascular:  Positive for leg swelling. Negative for chest pain.   Gastrointestinal:  Negative for abdominal pain, constipation, diarrhea, nausea and vomiting.   Genitourinary:  Negative for dysuria, frequency and hematuria.   Musculoskeletal:  Negative for arthralgias.   Neurological:  Negative for dizziness, tremors, weakness and headaches.   Psychiatric/Behavioral:  Negative for confusion. The patient is not nervous/anxious.    All other systems reviewed and are negative.      Objective  /72   Pulse 62   Temp 36.9 °C (98.4 °F)   Resp 18   Wt 115 kg (254 lb 9.6 oz)   SpO2 97%   BMI 32.67 kg/m²    Physical Exam  Constitutional:       General: He is not in acute distress.  Eyes:      Conjunctiva/sclera: Conjunctivae normal.      Pupils: Pupils are equal, round, and reactive to light.   Cardiovascular:      Rate and Rhythm: Normal rate and  "regular rhythm.      Heart sounds: No murmur heard.  Pulmonary:      Effort: Pulmonary effort is normal.      Breath sounds: No wheezing, rhonchi or rales.   Abdominal:      General: Abdomen is flat. Bowel sounds are normal. There is no distension.      Palpations: Abdomen is soft. There is no mass.      Tenderness: There is no abdominal tenderness.   Musculoskeletal:         General: Swelling present. Normal range of motion.      Comments: Right foot with wound vac in place.  Swelling to the foot and right lower leg.   No edema in the left lower leg.    Skin:     General: Skin is warm and dry.      Findings: No rash.   Neurological:      General: No focal deficit present.      Mental Status: He is alert and oriented to person, place, and time. Mental status is at baseline.       No lab exists for component: \"CBC BMP\"  Assessment/Plan  Gas gangrene of foot (CMS/HCC) - Primary A48.0        S/p debridement with Dr Perry on 1/26.   Continue wound care.  Wound vac in place.  Following with Dr Perry - appointment 3/19.   Completed Unasyn 3/6.  Weekly labs.  DVT prophylaxis with heparin. Pt states that he is to have surgery on 3/27.           HTN (hypertension) I10       Continue lisinopril.  Monitor blood pressures and adjust meds as needed           Hyperlipidemia E78.5       Continue home meds           Diabetes mellitus, type 2 (CMS/[facility]) E11.9       Continue metformin.  Monitor blood sugars and adjust meds as needed.         Gout right Knee pain:  had joint aspiration which showed uric acid crystals.  continue colchicine. Pain resolved.   Pt has cancelled his appointment with chico Last on 2/28.      Lower extremity edema:  overall swelling is improved.  - continue 20mg daily.  Monitor labs.        Time spent: 30 min in review of chart, labs and orders, consultation with pt and documentation.       Electronically Signed By: Ella Cook PA-C   3/14/24  2:37 PM  "

## 2024-03-14 NOTE — PROGRESS NOTES
Subjective   [id number] : Claude Coley is a 63 y.o. male admitted to [facility] for rehab.     Pt with h/o diabetes, chf, and HTN admitted with extensive diabetic foot wound to the right foot with gas gangrene.  He underwent a debridement with Dr Perry on 1/26.     Pt completed course of antibiotics on 3/6.   He continues to follow with Dr Perry.   He scheduled for surgical debridement of his wound on 3/27.  He reports no pain.   He is concerned about the swelling in his right foot/lower leg.   He has no swelling to the left lower leg.  He continues on lasix.  He offers no other complaints or concerns.   He denies any pain.   Pt has wound vac in place.  No shortness of breath or cough.  No abdominal pain.  No n/v/c/d.  Pt had routine labs today which were reviewed and are stable.   Pt lives alone.  Code status is full code.   Review of Systems   Constitutional:  Negative for appetite change, chills and fever.   Respiratory:  Negative for cough, shortness of breath and wheezing.    Cardiovascular:  Positive for leg swelling. Negative for chest pain.   Gastrointestinal:  Negative for abdominal pain, constipation, diarrhea, nausea and vomiting.   Genitourinary:  Negative for dysuria, frequency and hematuria.   Musculoskeletal:  Negative for arthralgias.   Neurological:  Negative for dizziness, tremors, weakness and headaches.   Psychiatric/Behavioral:  Negative for confusion. The patient is not nervous/anxious.    All other systems reviewed and are negative.      Objective   /72   Pulse 62   Temp 36.9 °C (98.4 °F)   Resp 18   Wt 115 kg (254 lb 9.6 oz)   SpO2 97%   BMI 32.67 kg/m²    Physical Exam  Constitutional:       General: He is not in acute distress.  Eyes:      Conjunctiva/sclera: Conjunctivae normal.      Pupils: Pupils are equal, round, and reactive to light.   Cardiovascular:      Rate and Rhythm: Normal rate and regular rhythm.      Heart sounds: No murmur heard.  Pulmonary:       "Effort: Pulmonary effort is normal.      Breath sounds: No wheezing, rhonchi or rales.   Abdominal:      General: Abdomen is flat. Bowel sounds are normal. There is no distension.      Palpations: Abdomen is soft. There is no mass.      Tenderness: There is no abdominal tenderness.   Musculoskeletal:         General: Swelling present. Normal range of motion.      Comments: Right foot with wound vac in place.  Swelling to the foot and right lower leg.   No edema in the left lower leg.    Skin:     General: Skin is warm and dry.      Findings: No rash.   Neurological:      General: No focal deficit present.      Mental Status: He is alert and oriented to person, place, and time. Mental status is at baseline.       No lab exists for component: \"CBC BMP\"  Assessment/Plan   Gas gangrene of foot (CMS/HCC) - Primary A48.0        S/p debridement with Dr Perry on 1/26.   Continue wound care.  Wound vac in place.  Following with Dr Perry - appointment 3/19.   Completed Unasyn 3/6.  Weekly labs.  DVT prophylaxis with heparin. Pt states that he is to have surgery on 3/27.           HTN (hypertension) I10       Continue lisinopril.  Monitor blood pressures and adjust meds as needed           Hyperlipidemia E78.5       Continue home meds           Diabetes mellitus, type 2 (CMS/HCC) E11.9       Continue metformin.  Monitor blood sugars and adjust meds as needed.         Gout right Knee pain:  had joint aspiration which showed uric acid crystals.  continue colchicine. Pain resolved.   Pt has cancelled his appointment with ortho Tank Last on 2/28.      Lower extremity edema:  overall swelling is improved.  - continue 20mg daily.  Monitor labs.        Time spent: 30 min in review of chart, labs and orders, consultation with pt and documentation.   "

## 2024-03-19 ENCOUNTER — APPOINTMENT (OUTPATIENT)
Dept: RADIOLOGY | Facility: HOSPITAL | Age: 64
DRG: 300 | End: 2024-03-19
Payer: MEDICARE

## 2024-03-19 ENCOUNTER — HOSPITAL ENCOUNTER (INPATIENT)
Facility: HOSPITAL | Age: 64
LOS: 6 days | Discharge: NURSING, REHAB OR LONG-TERM CARE | DRG: 300 | End: 2024-03-25
Attending: EMERGENCY MEDICINE | Admitting: INTERNAL MEDICINE
Payer: MEDICARE

## 2024-03-19 DIAGNOSIS — L03.115 CELLULITIS OF FOOT, RIGHT: Primary | ICD-10-CM

## 2024-03-19 DIAGNOSIS — A49.02 MRSA INFECTION: ICD-10-CM

## 2024-03-19 LAB
ANION GAP SERPL CALC-SCNC: 14 MMOL/L
BASOPHILS # BLD AUTO: 0.04 X10*3/UL (ref 0–0.1)
BASOPHILS NFR BLD AUTO: 0.6 %
BUN SERPL-MCNC: 49 MG/DL (ref 8–25)
CALCIUM SERPL-MCNC: 9.9 MG/DL (ref 8.5–10.4)
CHLORIDE SERPL-SCNC: 100 MMOL/L (ref 97–107)
CO2 SERPL-SCNC: 21 MMOL/L (ref 24–31)
CREAT SERPL-MCNC: 1.2 MG/DL (ref 0.4–1.6)
D DIMER PPP FEU-MCNC: 1.54 MG/L FEU (ref 0.19–0.5)
EGFRCR SERPLBLD CKD-EPI 2021: 69 ML/MIN/1.73M*2
EOSINOPHIL # BLD AUTO: 0.46 X10*3/UL (ref 0–0.7)
EOSINOPHIL NFR BLD AUTO: 7 %
ERYTHROCYTE [DISTWIDTH] IN BLOOD BY AUTOMATED COUNT: 13.3 % (ref 11.5–14.5)
ERYTHROCYTE [SEDIMENTATION RATE] IN BLOOD BY WESTERGREN METHOD: 47 MM/H (ref 0–20)
EST. AVERAGE GLUCOSE BLD GHB EST-MCNC: 80 MG/DL
GLUCOSE BLD MANUAL STRIP-MCNC: 102 MG/DL (ref 74–99)
GLUCOSE BLD MANUAL STRIP-MCNC: 110 MG/DL (ref 74–99)
GLUCOSE SERPL-MCNC: 120 MG/DL (ref 65–99)
HBA1C MFR BLD: 4.4 %
HCT VFR BLD AUTO: 32.8 % (ref 41–52)
HGB BLD-MCNC: 11.4 G/DL (ref 13.5–17.5)
IMM GRANULOCYTES # BLD AUTO: 0.03 X10*3/UL (ref 0–0.7)
IMM GRANULOCYTES NFR BLD AUTO: 0.5 % (ref 0–0.9)
LYMPHOCYTES # BLD AUTO: 1.2 X10*3/UL (ref 1.2–4.8)
LYMPHOCYTES NFR BLD AUTO: 18.3 %
MCH RBC QN AUTO: 30.1 PG (ref 26–34)
MCHC RBC AUTO-ENTMCNC: 34.8 G/DL (ref 32–36)
MCV RBC AUTO: 87 FL (ref 80–100)
MONOCYTES # BLD AUTO: 0.63 X10*3/UL (ref 0.1–1)
MONOCYTES NFR BLD AUTO: 9.6 %
NEUTROPHILS # BLD AUTO: 4.21 X10*3/UL (ref 1.2–7.7)
NEUTROPHILS NFR BLD AUTO: 64 %
NRBC BLD-RTO: ABNORMAL /100{WBCS}
PLATELET # BLD AUTO: 153 X10*3/UL (ref 150–450)
POTASSIUM SERPL-SCNC: 4.9 MMOL/L (ref 3.4–5.1)
RBC # BLD AUTO: 3.79 X10*6/UL (ref 4.5–5.9)
SODIUM SERPL-SCNC: 135 MMOL/L (ref 133–145)
WBC # BLD AUTO: 6.6 X10*3/UL (ref 4.4–11.3)

## 2024-03-19 PROCEDURE — 99285 EMERGENCY DEPT VISIT HI MDM: CPT | Mod: 25

## 2024-03-19 PROCEDURE — 96365 THER/PROPH/DIAG IV INF INIT: CPT

## 2024-03-19 PROCEDURE — 2500000002 HC RX 250 W HCPCS SELF ADMINISTERED DRUGS (ALT 637 FOR MEDICARE OP, ALT 636 FOR OP/ED): Mod: MUE | Performed by: INTERNAL MEDICINE

## 2024-03-19 PROCEDURE — 2500000004 HC RX 250 GENERAL PHARMACY W/ HCPCS (ALT 636 FOR OP/ED): Performed by: EMERGENCY MEDICINE

## 2024-03-19 PROCEDURE — 96361 HYDRATE IV INFUSION ADD-ON: CPT

## 2024-03-19 PROCEDURE — 93971 EXTREMITY STUDY: CPT

## 2024-03-19 PROCEDURE — 82947 ASSAY GLUCOSE BLOOD QUANT: CPT

## 2024-03-19 PROCEDURE — 80048 BASIC METABOLIC PNL TOTAL CA: CPT | Performed by: EMERGENCY MEDICINE

## 2024-03-19 PROCEDURE — 85025 COMPLETE CBC W/AUTO DIFF WBC: CPT | Performed by: EMERGENCY MEDICINE

## 2024-03-19 PROCEDURE — 2500000004 HC RX 250 GENERAL PHARMACY W/ HCPCS (ALT 636 FOR OP/ED): Performed by: INTERNAL MEDICINE

## 2024-03-19 PROCEDURE — 2500000001 HC RX 250 WO HCPCS SELF ADMINISTERED DRUGS (ALT 637 FOR MEDICARE OP): Performed by: INTERNAL MEDICINE

## 2024-03-19 PROCEDURE — 73630 X-RAY EXAM OF FOOT: CPT | Mod: RT

## 2024-03-19 PROCEDURE — 73630 X-RAY EXAM OF FOOT: CPT | Mod: RIGHT SIDE | Performed by: RADIOLOGY

## 2024-03-19 PROCEDURE — 85652 RBC SED RATE AUTOMATED: CPT | Performed by: EMERGENCY MEDICINE

## 2024-03-19 PROCEDURE — 83036 HEMOGLOBIN GLYCOSYLATED A1C: CPT | Performed by: INTERNAL MEDICINE

## 2024-03-19 PROCEDURE — 1200000002 HC GENERAL ROOM WITH TELEMETRY DAILY

## 2024-03-19 PROCEDURE — 85300 ANTITHROMBIN III ACTIVITY: CPT | Performed by: EMERGENCY MEDICINE

## 2024-03-19 PROCEDURE — 93971 EXTREMITY STUDY: CPT | Performed by: RADIOLOGY

## 2024-03-19 PROCEDURE — 2500000002 HC RX 250 W HCPCS SELF ADMINISTERED DRUGS (ALT 637 FOR MEDICARE OP, ALT 636 FOR OP/ED): Performed by: INTERNAL MEDICINE

## 2024-03-19 PROCEDURE — 87205 SMEAR GRAM STAIN: CPT | Mod: TRILAB,WESLAB | Performed by: EMERGENCY MEDICINE

## 2024-03-19 RX ORDER — ATORVASTATIN CALCIUM 40 MG/1
40 TABLET, FILM COATED ORAL DAILY
Status: DISCONTINUED | OUTPATIENT
Start: 2024-03-20 | End: 2024-03-25 | Stop reason: HOSPADM

## 2024-03-19 RX ORDER — ONDANSETRON HYDROCHLORIDE 2 MG/ML
4 INJECTION, SOLUTION INTRAVENOUS EVERY 8 HOURS PRN
Status: DISCONTINUED | OUTPATIENT
Start: 2024-03-19 | End: 2024-03-25 | Stop reason: HOSPADM

## 2024-03-19 RX ORDER — CEFTRIAXONE 2 G/50ML
2 INJECTION, SOLUTION INTRAVENOUS ONCE
Status: COMPLETED | OUTPATIENT
Start: 2024-03-19 | End: 2024-03-19

## 2024-03-19 RX ORDER — ONDANSETRON 4 MG/1
4 TABLET, ORALLY DISINTEGRATING ORAL EVERY 8 HOURS PRN
Status: DISCONTINUED | OUTPATIENT
Start: 2024-03-19 | End: 2024-03-25 | Stop reason: HOSPADM

## 2024-03-19 RX ORDER — GEMFIBROZIL 600 MG/1
600 TABLET, FILM COATED ORAL
Status: DISCONTINUED | OUTPATIENT
Start: 2024-03-19 | End: 2024-03-25 | Stop reason: HOSPADM

## 2024-03-19 RX ORDER — CARVEDILOL 12.5 MG/1
12.5 TABLET ORAL
Status: DISCONTINUED | OUTPATIENT
Start: 2024-03-19 | End: 2024-03-25 | Stop reason: HOSPADM

## 2024-03-19 RX ORDER — DEXTROSE 50 % IN WATER (D50W) INTRAVENOUS SYRINGE
25
Status: DISCONTINUED | OUTPATIENT
Start: 2024-03-19 | End: 2024-03-25 | Stop reason: HOSPADM

## 2024-03-19 RX ORDER — FINASTERIDE 5 MG/1
5 TABLET, FILM COATED ORAL DAILY
Status: DISCONTINUED | OUTPATIENT
Start: 2024-03-20 | End: 2024-03-25 | Stop reason: HOSPADM

## 2024-03-19 RX ORDER — DEXTROSE 50 % IN WATER (D50W) INTRAVENOUS SYRINGE
12.5
Status: DISCONTINUED | OUTPATIENT
Start: 2024-03-19 | End: 2024-03-25 | Stop reason: HOSPADM

## 2024-03-19 RX ORDER — OLANZAPINE 10 MG/1
20 TABLET ORAL NIGHTLY
Status: DISCONTINUED | OUTPATIENT
Start: 2024-03-19 | End: 2024-03-25 | Stop reason: HOSPADM

## 2024-03-19 RX ORDER — ACETAMINOPHEN 160 MG/5ML
650 SOLUTION ORAL EVERY 4 HOURS PRN
Status: DISCONTINUED | OUTPATIENT
Start: 2024-03-19 | End: 2024-03-25 | Stop reason: HOSPADM

## 2024-03-19 RX ORDER — HEPARIN SODIUM 5000 [USP'U]/ML
5000 INJECTION, SOLUTION INTRAVENOUS; SUBCUTANEOUS EVERY 12 HOURS
Status: DISCONTINUED | OUTPATIENT
Start: 2024-03-19 | End: 2024-03-25 | Stop reason: HOSPADM

## 2024-03-19 RX ORDER — ASPIRIN 81 MG/1
81 TABLET ORAL DAILY
Status: DISCONTINUED | OUTPATIENT
Start: 2024-03-20 | End: 2024-03-25 | Stop reason: HOSPADM

## 2024-03-19 RX ORDER — VANCOMYCIN 1 G/200ML
1 INJECTION, SOLUTION INTRAVENOUS EVERY 12 HOURS
Status: DISCONTINUED | OUTPATIENT
Start: 2024-03-19 | End: 2024-03-20

## 2024-03-19 RX ORDER — VANCOMYCIN 1.5 G/300ML
1500 INJECTION, SOLUTION INTRAVENOUS ONCE
Status: COMPLETED | OUTPATIENT
Start: 2024-03-19 | End: 2024-03-19

## 2024-03-19 RX ORDER — ACETAMINOPHEN 325 MG/1
650 TABLET ORAL EVERY 4 HOURS PRN
Status: DISCONTINUED | OUTPATIENT
Start: 2024-03-19 | End: 2024-03-25 | Stop reason: HOSPADM

## 2024-03-19 RX ORDER — INSULIN LISPRO 100 [IU]/ML
0-10 INJECTION, SOLUTION INTRAVENOUS; SUBCUTANEOUS
Status: DISCONTINUED | OUTPATIENT
Start: 2024-03-19 | End: 2024-03-23

## 2024-03-19 RX ORDER — TALC
3 POWDER (GRAM) TOPICAL NIGHTLY
Status: DISCONTINUED | OUTPATIENT
Start: 2024-03-19 | End: 2024-03-25 | Stop reason: HOSPADM

## 2024-03-19 RX ORDER — DOCUSATE SODIUM 100 MG/1
100 CAPSULE, LIQUID FILLED ORAL DAILY
Status: DISCONTINUED | OUTPATIENT
Start: 2024-03-19 | End: 2024-03-25 | Stop reason: HOSPADM

## 2024-03-19 RX ORDER — ACETAMINOPHEN 650 MG/1
650 SUPPOSITORY RECTAL EVERY 4 HOURS PRN
Status: DISCONTINUED | OUTPATIENT
Start: 2024-03-19 | End: 2024-03-25 | Stop reason: HOSPADM

## 2024-03-19 RX ORDER — VANCOMYCIN HYDROCHLORIDE 1 G/20ML
INJECTION, POWDER, LYOPHILIZED, FOR SOLUTION INTRAVENOUS DAILY PRN
Status: DISCONTINUED | OUTPATIENT
Start: 2024-03-19 | End: 2024-03-25 | Stop reason: HOSPADM

## 2024-03-19 RX ORDER — POLYETHYLENE GLYCOL 3350 17 G/17G
17 POWDER, FOR SOLUTION ORAL DAILY
Status: DISCONTINUED | OUTPATIENT
Start: 2024-03-20 | End: 2024-03-25 | Stop reason: HOSPADM

## 2024-03-19 RX ADMIN — DOCUSATE SODIUM 100 MG: 100 CAPSULE, LIQUID FILLED ORAL at 17:21

## 2024-03-19 RX ADMIN — Medication 3 MG: at 20:51

## 2024-03-19 RX ADMIN — HEPARIN SODIUM 5000 UNITS: 5000 INJECTION, SOLUTION INTRAVENOUS; SUBCUTANEOUS at 22:20

## 2024-03-19 RX ADMIN — CEFTRIAXONE SODIUM 2 G: 2 INJECTION, SOLUTION INTRAVENOUS at 12:53

## 2024-03-19 RX ADMIN — VANCOMYCIN 1 G: 1 INJECTION, SOLUTION INTRAVENOUS at 22:19

## 2024-03-19 RX ADMIN — PIPERACILLIN SODIUM AND TAZOBACTAM SODIUM 4.5 G: 4; .5 INJECTION, SOLUTION INTRAVENOUS at 23:22

## 2024-03-19 RX ADMIN — PIPERACILLIN SODIUM AND TAZOBACTAM SODIUM 4.5 G: 4; .5 INJECTION, SOLUTION INTRAVENOUS at 17:21

## 2024-03-19 RX ADMIN — VANCOMYCIN 1.5 G: 1.5 INJECTION, SOLUTION INTRAVENOUS at 15:03

## 2024-03-19 RX ADMIN — SODIUM CHLORIDE 500 ML: 900 INJECTION, SOLUTION INTRAVENOUS at 12:54

## 2024-03-19 RX ADMIN — OLANZAPINE 20 MG: 10 TABLET, FILM COATED ORAL at 21:03

## 2024-03-19 SDOH — SOCIAL STABILITY: SOCIAL INSECURITY: ABUSE: ADULT

## 2024-03-19 SDOH — SOCIAL STABILITY: SOCIAL INSECURITY: DO YOU FEEL ANYONE HAS EXPLOITED OR TAKEN ADVANTAGE OF YOU FINANCIALLY OR OF YOUR PERSONAL PROPERTY?: NO

## 2024-03-19 SDOH — SOCIAL STABILITY: SOCIAL INSECURITY: HAVE YOU HAD THOUGHTS OF HARMING ANYONE ELSE?: NO

## 2024-03-19 SDOH — SOCIAL STABILITY: SOCIAL INSECURITY: DO YOU FEEL UNSAFE GOING BACK TO THE PLACE WHERE YOU ARE LIVING?: NO

## 2024-03-19 SDOH — SOCIAL STABILITY: SOCIAL INSECURITY: HAS ANYONE EVER THREATENED TO HURT YOUR FAMILY OR YOUR PETS?: NO

## 2024-03-19 SDOH — SOCIAL STABILITY: SOCIAL INSECURITY: DOES ANYONE TRY TO KEEP YOU FROM HAVING/CONTACTING OTHER FRIENDS OR DOING THINGS OUTSIDE YOUR HOME?: NO

## 2024-03-19 SDOH — SOCIAL STABILITY: SOCIAL INSECURITY: ARE YOU OR HAVE YOU BEEN THREATENED OR ABUSED PHYSICALLY, EMOTIONALLY, OR SEXUALLY BY ANYONE?: NO

## 2024-03-19 SDOH — SOCIAL STABILITY: SOCIAL INSECURITY: ARE THERE ANY APPARENT SIGNS OF INJURIES/BEHAVIORS THAT COULD BE RELATED TO ABUSE/NEGLECT?: NO

## 2024-03-19 ASSESSMENT — PATIENT HEALTH QUESTIONNAIRE - PHQ9
2. FEELING DOWN, DEPRESSED OR HOPELESS: NOT AT ALL
SUM OF ALL RESPONSES TO PHQ9 QUESTIONS 1 & 2: 0
1. LITTLE INTEREST OR PLEASURE IN DOING THINGS: NOT AT ALL

## 2024-03-19 ASSESSMENT — PAIN - FUNCTIONAL ASSESSMENT: PAIN_FUNCTIONAL_ASSESSMENT: 0-10

## 2024-03-19 ASSESSMENT — COGNITIVE AND FUNCTIONAL STATUS - GENERAL
STANDING UP FROM CHAIR USING ARMS: A LITTLE
DRESSING REGULAR LOWER BODY CLOTHING: A LITTLE
MOBILITY SCORE: 19
DAILY ACTIVITIY SCORE: 22
WALKING IN HOSPITAL ROOM: A LITTLE
DAILY ACTIVITIY SCORE: 22
CLIMB 3 TO 5 STEPS WITH RAILING: A LOT
STANDING UP FROM CHAIR USING ARMS: A LITTLE
CLIMB 3 TO 5 STEPS WITH RAILING: A LOT
HELP NEEDED FOR BATHING: A LITTLE
DRESSING REGULAR LOWER BODY CLOTHING: A LITTLE
HELP NEEDED FOR BATHING: A LITTLE
MOBILITY SCORE: 19
MOVING TO AND FROM BED TO CHAIR: A LITTLE
MOVING TO AND FROM BED TO CHAIR: A LITTLE
PATIENT BASELINE BEDBOUND: NO
WALKING IN HOSPITAL ROOM: A LITTLE

## 2024-03-19 ASSESSMENT — ACTIVITIES OF DAILY LIVING (ADL)
ASSISTIVE_DEVICE: WALKER
FEEDING YOURSELF: INDEPENDENT
BATHING: INDEPENDENT
JUDGMENT_ADEQUATE_SAFELY_COMPLETE_DAILY_ACTIVITIES: YES
GROOMING: INDEPENDENT
TOILETING: INDEPENDENT
LACK_OF_TRANSPORTATION: NO
PATIENT'S MEMORY ADEQUATE TO SAFELY COMPLETE DAILY ACTIVITIES?: YES
DRESSING YOURSELF: INDEPENDENT
WALKS IN HOME: INDEPENDENT
ADEQUATE_TO_COMPLETE_ADL: YES
HEARING - RIGHT EAR: FUNCTIONAL
HEARING - LEFT EAR: FUNCTIONAL

## 2024-03-19 ASSESSMENT — LIFESTYLE VARIABLES
HOW MANY STANDARD DRINKS CONTAINING ALCOHOL DO YOU HAVE ON A TYPICAL DAY: 1 OR 2
HOW OFTEN DO YOU HAVE A DRINK CONTAINING ALCOHOL: 2-4 TIMES A MONTH
AUDIT-C TOTAL SCORE: 3
AUDIT-C TOTAL SCORE: 3
SKIP TO QUESTIONS 9-10: 0
HOW OFTEN DO YOU HAVE 6 OR MORE DRINKS ON ONE OCCASION: LESS THAN MONTHLY

## 2024-03-19 ASSESSMENT — COLUMBIA-SUICIDE SEVERITY RATING SCALE - C-SSRS
2. HAVE YOU ACTUALLY HAD ANY THOUGHTS OF KILLING YOURSELF?: NO
1. IN THE PAST MONTH, HAVE YOU WISHED YOU WERE DEAD OR WISHED YOU COULD GO TO SLEEP AND NOT WAKE UP?: NO
6. HAVE YOU EVER DONE ANYTHING, STARTED TO DO ANYTHING, OR PREPARED TO DO ANYTHING TO END YOUR LIFE?: NO

## 2024-03-19 ASSESSMENT — PAIN SCALES - GENERAL
PAINLEVEL_OUTOF10: 0 - NO PAIN
PAINLEVEL_OUTOF10: 0 - NO PAIN

## 2024-03-19 ASSESSMENT — PAIN DESCRIPTION - PROGRESSION: CLINICAL_PROGRESSION: NOT CHANGED

## 2024-03-19 NOTE — H&P
History Of Present Illness  Claude Coley is a 63 y.o. male presenting with diabetic foot infection .  Patient is a 63 years old  male with past medical history of diabetes mellitus type 2, hypertension, morbid obesity, history of right foot infection.  He went to see Dr. Perry  his regular podiatrist today in in his office.   He has history of surgery on the right foot.  Patient was found to have infected foot .  He was recommended to come in hospital for further evaluation treatment and IV antibiotics.  Patient denied high fever or chills.  Patient stated he was scheduled to have the surgery next week with Dr. Perry.  She denied chest pain shortness of breath.  He denied urinary urgency or frequency.  Denied to have any history of trauma.  Patient stated he finished 6 weeks antibiotics and he has been 2 weeks off antibiotics.  Patient denied chest pain or shortness of breath.  Denied numbness or tingling upper or lower extremities.  X-ray of the right foot did not revealed bony involvement  Patient on antibiotics and admitted to hospital for further evaluation and treatment.            Past Medical History  Past Medical History:   Diagnosis Date    Diabetes mellitus (CMS/[facility])        Surgical History  History reviewed. No pertinent surgical history.     Social History  He reports that he has never smoked. He has never used smokeless tobacco. No history on file for alcohol use and drug use.    Family History  No family history on file.     Allergies  Bee sting kit and Tramadol    Review of Systems     General: Negative for fever,  chills ,positive for fatigue.    HEENT: Negative for headache, blurring of vision or double vision.    Cardiovascular: Negative for chest pain, palpitations or orthopnea.    Respiratory: Negative for cough, shortness of breath or wheezing.    Gastrointestinal: Negative for nausea, vomiting, hematemesis, abdominal pain or diarrhea.   Genitourinary: Negative for dysuria,  "hematuria, frequency or nocturia.   Musculoskeletal: Positive for swelling of right foot.   Skin: Negative for rash, itching, or jaundice.   Hematologic: Negative for bleeding or bruising.   Neurologic: Negative for headache, loss of consciousness. syncope or seizures   Psychological: Negative for anxiety, hallucinations or depression.     Physical Exam     General:  cooperating during physical exam.  HEENT: Pupils are equal and reactive to light and commendation , oral mucosa moist, no JVD   Cardiovascular: Normal sinus rhythm, no MRG.  Lungs: Clear to auscultation bilaterally, no wheezing, no crackles, no dullness to percussion.  Abdomen: No hepatosplenomegaly appreciated, soft , not tender, positive bowel sounds, positive bowel movement.  Neuro: Alert and oriented x3, strength in upper and lower extremities , sensation intact.  Psych: Patient had great insight was going on  Musculoskeletal: Swelling of the right foot, right foot erythema   Vascular: Pulses are intact in upper and lower extremities  Skin: No petechiae, ecchymosis or other stigmata for dermatology disease.     Last Recorded Vitals  Blood pressure 145/70, pulse 64, temperature 36.6 °C (97.9 °F), temperature source Temporal, resp. rate 16, height 1.88 m (6' 2\"), weight 113 kg (250 lb 3.6 oz), SpO2 100 %.    Relevant Results       Assessment/Plan   Principal Problem:    Cellulitis of foot, right    Diabetic foot infection  Right foot cellulitis  X-ray of right foot no evidence of osteomyelitis  Cover aggressively with antibiotics  Start Zosyn and vancomycin  Consult Dr. Saravia from ID services  Consult Dr. Perry podiatry on the case  Wound culture  May need debridement.      Diabetes mellitus type 2.  Cover with insulin sliding scale  Hemoglobin A1c  Diabetic diet    Hypertension fairly controlled  Patient is on lisinopril  Monitor close    Hyperlipidemia    Right lower extremity swelling  Ultrasound of right lower extremity    DVT prophylaxis  GI " prophylaxis  Morbid obesity  Advise regarding diet    Waiting for consultant to see him soon  Check CBC and BMP in a.m.    Acute kidney injury.  Hold lisinopril today.  Check BMP in AM.         I spent 60 minutes in the professional and overall care of this patient.      Martina Fang MD

## 2024-03-19 NOTE — ED PROVIDER NOTES
Pt Name: Claude Coley  MRN: [id number]  Birthdate 1960  Date of evaluation: 3/19/2024  [facility]      CHIEF COMPLAINT    Chief Complaint   Patient presents with    Wound Infection     Pt is coming from a DR office for a right foot infection that the dr wants him to get IV antibotics.        HPI  63 y.o. male presents with right foot wound, history of infection.  Patient sent in by Dr. Darryl Perry DPM for antibiotics.  He has decree sensation right foot secondary to diabetes.  He has a history of IV antibiotics for 6 weeks.  He sees infectious disease.  He reports he was doing well till about 4 days ago.  When the foot started swelling.  No fever.  He is able to ambulate in the right lower extremity.  No specific injuries in the last 10 days.    REVIEW OF SYSTEMS     Review of Systems    Pmh:  Patient Active Problem List   Diagnosis    Gas gangrene of foot (CMS/HCC)    HTN (hypertension)    Hyperlipidemia    Diabetes mellitus, type 2 (CMS/HCC)    CHF (congestive heart failure) (CMS/HCC)    BPH (benign prostatic hyperplasia)    Anemia       CURRENT MEDICATIONS       Previous Medications    ACETAMINOPHEN (TYLENOL) 325 MG TABLET    Take 2 tablets (650 mg) by mouth every 4 hours if needed for mild pain (1 - 3).    ASPIRIN 81 MG EC TABLET    Take 1 tablet (81 mg) by mouth once daily.    ATORVASTATIN (LIPITOR) 40 MG TABLET    Take 1 tablet (40 mg) by mouth once daily.    CARVEDILOL (COREG) 12.5 MG TABLET    Take 1 tablet (12.5 mg) by mouth 2 times a day with meals.    FINASTERIDE (PROSCAR) 5 MG TABLET    Take 1 tablet (5 mg) by mouth once daily. Do not crush, chew, or split.    FUROSEMIDE (LASIX) 20 MG TABLET    Take 1 tablet (20 mg) by mouth once daily.    GEMFIBROZIL (LOPID) 600 MG TABLET    Take 1 tablet (600 mg) by mouth 2 times a day before meals.    HEPARIN SODIUM,PORCINE (HEPARIN, PORCINE,) 5,000 UNIT/ML INJECTION    Inject 1 mL (5,000 Units) under the skin every 8 hours.    LISINOPRIL 20 MG TABLET    Take  1 tablet (20 mg) by mouth once daily.    METFORMIN, OSM, (FORTAMET) 500 MG 24 HR TABLET    Take 1 tablet (500 mg) by mouth once daily in the evening. Take with meals. Do not crush, chew, or split.    OLANZAPINE (ZYPREXA) 20 MG TABLET    Take 1 tablet (20 mg) by mouth once daily at bedtime.    SPIRONOLACTONE (ALDACTONE) 25 MG TABLET    Take 1 tablet (25 mg) by mouth once daily.       No family history on file.    Social Determinants of Health     Tobacco Use: Low Risk  (3/19/2024)    Patient History     Smoking Tobacco Use: Never     Smokeless Tobacco Use: Never     Passive Exposure: Not on file   Alcohol Use: Not At Risk (1/23/2024)    AUDIT-C     Frequency of Alcohol Consumption: 2-4 times a month     Average Number of Drinks: 1 or 2     Frequency of Binge Drinking: Less than monthly   Financial Resource Strain: Low Risk  (1/23/2024)    Overall Financial Resource Strain (CARDIA)     Difficulty of Paying Living Expenses: Not hard at all   Food Insecurity: Not on file   Transportation Needs: No Transportation Needs (1/23/2024)    PRAPARE - Transportation     Lack of Transportation (Medical): No     Lack of Transportation (Non-Medical): No   Physical Activity: Not on file   Stress: Not on file   Social Connections: Not on file   Intimate Partner Violence: Not on file   Depression: Not at risk (1/23/2024)    PHQ-2     PHQ-2 Score: 0   Housing Stability: Low Risk  (1/23/2024)    Housing Stability Vital Sign     Unable to Pay for Housing in the Last Year: No     Number of Places Lived in the Last Year: 1     Unstable Housing in the Last Year: No   Utilities: Not on file   Digital Equity: Not on file       Physical Exam  Vitals and nursing note reviewed.   Constitutional:       Appearance: He is not toxic-appearing.   Cardiovascular:      Rate and Rhythm: Normal rate.      Pulses:           Dorsalis pedis pulses are 2+ on the right side.        Posterior tibial pulses are 2+ on the right side.   Musculoskeletal:       "Right lower leg: Swelling present.      Right ankle: Swelling present.      Right foot: Normal capillary refill. Swelling present.   Skin:     Capillary Refill: Capillary refill takes less than 2 seconds.      Coloration: Skin is not jaundiced.   Neurological:      Mental Status: He is alert.      Sensory: Sensory deficit (r foot) present.      Comments: Right lower extremity hip flexion hip extension knee flexion knee extension are 5 out of 5 motor strength.   Psychiatric:         Behavior: Behavior normal.         Thought Content: Thought content normal.       Vitals:    03/19/24 1202   BP: 140/64   BP Location: Left arm   Patient Position: Sitting   Pulse: 64   Resp: 18   Temp: 36.9 °C (98.4 °F)   TempSrc: Oral   SpO2: 97%   Weight: 113 kg (250 lb 3.6 oz)   Height: 1.88 m (6' 2\")         Medical Decision Making       SCREENINGS   [place] Coma Scale  Best Eye Response: Spontaneous  Best Verbal Response: Oriented  Best Motor Response: Follows commands  [place] Coma Scale Score: 15     Procedures     Imgaing:  XR foot right 3+ views   Final Result   Suspected skin wounds of the 5th amputation site with no osseous   acute abnormality.        Marked dorsal soft tissue swelling, potentially cellulitis.        Signed by: Lux Hernandez 3/19/2024 2:09 PM   Dictation workstation:   [id number]      Lower extremity venous duplex right    (Results Pending)       Labs:  Labs Reviewed   CBC WITH AUTO DIFFERENTIAL - Abnormal       Result Value    WBC 6.6      nRBC        RBC 3.79 (*)     Hemoglobin 11.4 (*)     Hematocrit 32.8 (*)     MCV 87      MCH 30.1      MCHC 34.8      RDW 13.3      Platelets 153      Neutrophils % 64.0      Immature Granulocytes %, Automated 0.5      Lymphocytes % 18.3      Monocytes % 9.6      Eosinophils % 7.0      Basophils % 0.6      Neutrophils Absolute 4.21      Immature Granulocytes Absolute, Automated 0.03      Lymphocytes Absolute 1.20      Monocytes Absolute 0.63      Eosinophils Absolute 0.46   "    Basophils Absolute 0.04     BASIC METABOLIC PANEL - Abnormal    Glucose 120 (*)     Sodium 135      Potassium 4.9      Chloride 100      Bicarbonate 21 (*)     Urea Nitrogen 49 (*)     Creatinine 1.20      eGFR 69      Calcium 9.9      Anion Gap 14     SEDIMENTATION RATE, AUTOMATED - Abnormal    Sedimentation Rate 47 (*)    D-DIMER, NON VTE - Abnormal    D-Dimer Non VTE, Quant (mg/L FEU) 1.54 (*)     Narrative:     THROMBOEMBOLIC EVENTS CANNOT BE EXCLUDED SOLELY ON THE BASIS OF THE D-DIMER LEVEL BEING WITHIN THE NORMAL REFERENCE RANGE. D-DIMER LEVELS LESS THAN 0.5 MG/L FEU IN CONJUNCTION WITH A LOW CLINICAL PROBABILITY HAVE AN EXCELLENT NEGATIVE PREDICTIVE VALUE IN EXCLUDING A DIAGNOSIS OF PULMONARY EMBOLUS (PE) OR DEEP VEIN THROMBOSIS (DVT). ELEVATED D-DIMER LEVELS ARE NOT SPECIFIC TO PE OR DVT, AND MAY BE SEEN IN PATIENTS WITH DIC, ADVANCED AGE, PREGNANCY, MALIGNANCY, LIVER DISEASE, INFECTION, AND INFLAMMATORY CONDITIONS AMONG OTHERS. D-DIMER LEVELS MAY BE DECREASED IN PATIENTS RECEIVING ANTI-COAGULATION THERAPY.   TISSUE/WOUND CULTURE/SMEAR       EKG:  No orders to display       Medications ordered:  Medications   vancomycin 1.5 g in 300 mL (Xellia) IVPB 1.5 g (has no administration in time range)   sodium chloride 0.9 % bolus 500 mL (500 mL intravenous New Bag 3/19/24 1254)   cefTRIAXone (Rocephin) 2 g IV in dextrose 5% 50 mL (2 g intravenous New Bag 3/19/24 1253)         Orders placed during visit:  XR foot right 3+ views   Final Result   Suspected skin wounds of the 5th amputation site with no osseous   acute abnormality.        Marked dorsal soft tissue swelling, potentially cellulitis.        Signed by: Lux Hernandez 3/19/2024 2:09 PM   Dictation workstation:   [id number]      Lower extremity venous duplex right    (Results Pending)       Diagnoses as of 03/19/24 1450   Cellulitis of foot, right       CONSULTS:  None    CRITICAL CARE TIME      Patient was ordered ceftriaxone IV for infection.  Prior  medical record was reviewed: Prior tissue culture findings were was sensitive to ceftriaxone    Patient with right foot wound's foot swelling likely wound infection.  Case was discussed with LUAN Allred patient was accepted for hospitalization.      Clinical impression:  1. Cellulitis of foot, right            DISPOSITION/PLAN   DISPOSITION Admit 03/19/2024 02:49:15 PM       I prescribed the following medications:  New Prescriptions    No medications on file       PATIENT REFERRED TO:  No follow-up provider specified.       Yan Brown MD  03/19/24 1611

## 2024-03-19 NOTE — PROGRESS NOTES
"Vancomycin Dosing by Pharmacy- INITIAL    Claude Coley is a 63 y.o. year old male who Pharmacy has been consulted for vancomycin dosing for cellulitis, skin and soft tissue. Based on the patient's indication and renal status this patient will be dosed based on a goal AUC of 400-600.     Patient has a history of diabetic foot infection and was recently on 6 weeks of IV antibiotics, finished ~2 weeks ago.    Renal function is currently stable.    Visit Vitals  /70 (BP Location: Left arm, Patient Position: Sitting)   Pulse 64   Temp 36.6 °C (97.9 °F) (Temporal)   Resp 16        Lab Results   Component Value Date    CREATININE 1.20 03/19/2024    CREATININE 1.20 03/14/2024    CREATININE 1.00 03/07/2024    CREATININE 1.00 02/29/2024        Patient weight is No results found for: \"PTWEIGHT\"    No results found for: \"CULTURE\"     No intake/output data recorded.  [unfilled]    No results found for: \"PATIENTTEMP\"       Assessment/Plan     Patient has already been given a dose of 1500 mg in ED.  Will initiate vancomycin maintenance,  1000 mg every 12 hours (1000/2200).    This dosing regimen is predicted by [employer] to result in the following pharmacokinetic parameters:  <<<<<[facility] DATA >>>>>    Loading dose: N/A  Regimen: 1000 mg IV every 12 hours.  Start time: 03:03 on 03/20/2024  Exposure target: AUC24 (range)400-600 mg/L.hr   AUC24,ss: 485 mg/L.hr  Probability of AUC24 > 400: 71 %  Ctrough,ss: 16.3 mg/L  Probability of Ctrough,ss > 20: 31 %  Probability of nephrotoxicity (Lodise ADRYAN 2009): 12 %    Follow-up level will be ordered on 3/20 at 0500 unless clinically indicated sooner.  Will continue to monitor renal function daily while on vancomycin and order serum creatinine at least every 48 hours if not already ordered.  Follow for continued vancomycin needs, clinical response, and signs/symptoms of toxicity.       DEANA OVALLE, PharmD       "

## 2024-03-19 NOTE — H&P (VIEW-ONLY)
History Of Present Illness  Claude Coley is a 63 y.o. male presenting with diabetic foot infection .  Patient is a 63 years old  male with past medical history of diabetes mellitus type 2, hypertension, morbid obesity, history of right foot infection.  He went to see Dr. Perry  his regular podiatrist today in in his office.   He has history of surgery on the right foot.  Patient was found to have infected foot .  He was recommended to come in hospital for further evaluation treatment and IV antibiotics.  Patient denied high fever or chills.  Patient stated he was scheduled to have the surgery next week with Dr. Perry.  She denied chest pain shortness of breath.  He denied urinary urgency or frequency.  Denied to have any history of trauma.  Patient stated he finished 6 weeks antibiotics and he has been 2 weeks off antibiotics.  Patient denied chest pain or shortness of breath.  Denied numbness or tingling upper or lower extremities.  X-ray of the right foot did not revealed bony involvement  Patient on antibiotics and admitted to hospital for further evaluation and treatment.            Past Medical History  Past Medical History:   Diagnosis Date    Diabetes mellitus (CMS/[facility])        Surgical History  History reviewed. No pertinent surgical history.     Social History  He reports that he has never smoked. He has never used smokeless tobacco. No history on file for alcohol use and drug use.    Family History  No family history on file.     Allergies  Bee sting kit and Tramadol    Review of Systems     General: Negative for fever,  chills ,positive for fatigue.    HEENT: Negative for headache, blurring of vision or double vision.    Cardiovascular: Negative for chest pain, palpitations or orthopnea.    Respiratory: Negative for cough, shortness of breath or wheezing.    Gastrointestinal: Negative for nausea, vomiting, hematemesis, abdominal pain or diarrhea.   Genitourinary: Negative for dysuria,  "hematuria, frequency or nocturia.   Musculoskeletal: Positive for swelling of right foot.   Skin: Negative for rash, itching, or jaundice.   Hematologic: Negative for bleeding or bruising.   Neurologic: Negative for headache, loss of consciousness. syncope or seizures   Psychological: Negative for anxiety, hallucinations or depression.     Physical Exam     General:  cooperating during physical exam.  HEENT: Pupils are equal and reactive to light and commendation , oral mucosa moist, no JVD   Cardiovascular: Normal sinus rhythm, no MRG.  Lungs: Clear to auscultation bilaterally, no wheezing, no crackles, no dullness to percussion.  Abdomen: No hepatosplenomegaly appreciated, soft , not tender, positive bowel sounds, positive bowel movement.  Neuro: Alert and oriented x3, strength in upper and lower extremities , sensation intact.  Psych: Patient had great insight was going on  Musculoskeletal: Swelling of the right foot, right foot erythema   Vascular: Pulses are intact in upper and lower extremities  Skin: No petechiae, ecchymosis or other stigmata for dermatology disease.     Last Recorded Vitals  Blood pressure 145/70, pulse 64, temperature 36.6 °C (97.9 °F), temperature source Temporal, resp. rate 16, height 1.88 m (6' 2\"), weight 113 kg (250 lb 3.6 oz), SpO2 100 %.    Relevant Results       Assessment/Plan   Principal Problem:    Cellulitis of foot, right    Diabetic foot infection  Right foot cellulitis  X-ray of right foot no evidence of osteomyelitis  Cover aggressively with antibiotics  Start Zosyn and vancomycin  Consult Dr. Saravia from ID services  Consult Dr. Perry podiatry on the case  Wound culture  May need debridement.      Diabetes mellitus type 2.  Cover with insulin sliding scale  Hemoglobin A1c  Diabetic diet    Hypertension fairly controlled  Patient is on lisinopril  Monitor close    Hyperlipidemia    Right lower extremity swelling  Ultrasound of right lower extremity    DVT prophylaxis  GI " prophylaxis  Morbid obesity  Advise regarding diet    Waiting for consultant to see him soon  Check CBC and BMP in a.m.    Acute kidney injury.  Hold lisinopril today.  Check BMP in AM.         I spent 60 minutes in the professional and overall care of this patient.      Martina Fang MD

## 2024-03-19 NOTE — CARE PLAN
Problem: Pain  Goal: My pain/discomfort is manageable  Outcome: Progressing     Problem: Safety  Goal: Patient will be injury free during hospitalization  Outcome: Progressing  Goal: I will remain free of falls  Outcome: Progressing     Problem: Daily Care  Goal: Daily care needs are met  Outcome: Progressing     Problem: Psychosocial Needs  Goal: Demonstrates ability to cope with hospitalization/illness  Outcome: Progressing  Goal: Collaborate with me, my family, and caregiver to identify my specific goals  Outcome: Progressing  Flowsheets (Taken 3/19/2024 5878)  Cultural Requests During Hospitalization: pt denies  Spiritual Requests During Hospitalization: pt denies     Problem: Discharge Barriers  Goal: My discharge needs are met  Outcome: Progressing   The patient's goals for the shift include      The clinical goals for the shift include comfort, pain control, iv abx    Over the shift, the patient did not make progress toward the following goals. Barriers to progression include infection. Recommendations to address these barriers include iv abx.

## 2024-03-20 ENCOUNTER — APPOINTMENT (OUTPATIENT)
Dept: PREADMISSION TESTING | Facility: HOSPITAL | Age: 64
End: 2024-03-20
Payer: MEDICARE

## 2024-03-20 ENCOUNTER — APPOINTMENT (OUTPATIENT)
Dept: RADIOLOGY | Facility: HOSPITAL | Age: 64
DRG: 300 | End: 2024-03-20
Payer: MEDICARE

## 2024-03-20 LAB
ANION GAP SERPL CALC-SCNC: 13 MMOL/L
BASOPHILS # BLD AUTO: 0.05 X10*3/UL (ref 0–0.1)
BASOPHILS NFR BLD AUTO: 0.9 %
BUN SERPL-MCNC: 48 MG/DL (ref 8–25)
CALCIUM SERPL-MCNC: 9.1 MG/DL (ref 8.5–10.4)
CHLORIDE SERPL-SCNC: 106 MMOL/L (ref 97–107)
CO2 SERPL-SCNC: 20 MMOL/L (ref 24–31)
CREAT SERPL-MCNC: 1.3 MG/DL (ref 0.4–1.6)
EGFRCR SERPLBLD CKD-EPI 2021: 63 ML/MIN/1.73M*2
EOSINOPHIL # BLD AUTO: 0.53 X10*3/UL (ref 0–0.7)
EOSINOPHIL NFR BLD AUTO: 9.2 %
ERYTHROCYTE [DISTWIDTH] IN BLOOD BY AUTOMATED COUNT: 13.1 % (ref 11.5–14.5)
GLUCOSE BLD MANUAL STRIP-MCNC: 101 MG/DL (ref 74–99)
GLUCOSE BLD MANUAL STRIP-MCNC: 86 MG/DL (ref 74–99)
GLUCOSE SERPL-MCNC: 88 MG/DL (ref 65–99)
HCT VFR BLD AUTO: 31.1 % (ref 41–52)
HGB BLD-MCNC: 10.7 G/DL (ref 13.5–17.5)
IMM GRANULOCYTES # BLD AUTO: 0.03 X10*3/UL (ref 0–0.7)
IMM GRANULOCYTES NFR BLD AUTO: 0.5 % (ref 0–0.9)
LYMPHOCYTES # BLD AUTO: 1.27 X10*3/UL (ref 1.2–4.8)
LYMPHOCYTES NFR BLD AUTO: 21.9 %
MCH RBC QN AUTO: 29.8 PG (ref 26–34)
MCHC RBC AUTO-ENTMCNC: 34.4 G/DL (ref 32–36)
MCV RBC AUTO: 87 FL (ref 80–100)
MONOCYTES # BLD AUTO: 0.57 X10*3/UL (ref 0.1–1)
MONOCYTES NFR BLD AUTO: 9.8 %
NEUTROPHILS # BLD AUTO: 3.34 X10*3/UL (ref 1.2–7.7)
NEUTROPHILS NFR BLD AUTO: 57.7 %
NRBC BLD-RTO: 0 /100 WBCS (ref 0–0)
PLATELET # BLD AUTO: 137 X10*3/UL (ref 150–450)
POTASSIUM SERPL-SCNC: 4.3 MMOL/L (ref 3.4–5.1)
RBC # BLD AUTO: 3.59 X10*6/UL (ref 4.5–5.9)
SODIUM SERPL-SCNC: 139 MMOL/L (ref 133–145)
VANCOMYCIN SERPL-MCNC: 20.2 UG/ML (ref 10–20)
WBC # BLD AUTO: 5.8 X10*3/UL (ref 4.4–11.3)

## 2024-03-20 PROCEDURE — 2500000001 HC RX 250 WO HCPCS SELF ADMINISTERED DRUGS (ALT 637 FOR MEDICARE OP): Performed by: INTERNAL MEDICINE

## 2024-03-20 PROCEDURE — 36415 COLL VENOUS BLD VENIPUNCTURE: CPT | Performed by: NURSE PRACTITIONER

## 2024-03-20 PROCEDURE — 1200000002 HC GENERAL ROOM WITH TELEMETRY DAILY

## 2024-03-20 PROCEDURE — 80202 ASSAY OF VANCOMYCIN: CPT | Performed by: INTERNAL MEDICINE

## 2024-03-20 PROCEDURE — 2500000004 HC RX 250 GENERAL PHARMACY W/ HCPCS (ALT 636 FOR OP/ED): Mod: JZ | Performed by: INTERNAL MEDICINE

## 2024-03-20 PROCEDURE — 82374 ASSAY BLOOD CARBON DIOXIDE: CPT | Performed by: INTERNAL MEDICINE

## 2024-03-20 PROCEDURE — 87040 BLOOD CULTURE FOR BACTERIA: CPT | Mod: TRILAB | Performed by: NURSE PRACTITIONER

## 2024-03-20 PROCEDURE — 71045 X-RAY EXAM CHEST 1 VIEW: CPT

## 2024-03-20 PROCEDURE — 2500000002 HC RX 250 W HCPCS SELF ADMINISTERED DRUGS (ALT 637 FOR MEDICARE OP, ALT 636 FOR OP/ED): Mod: MUE | Performed by: INTERNAL MEDICINE

## 2024-03-20 PROCEDURE — 85025 COMPLETE CBC W/AUTO DIFF WBC: CPT | Performed by: INTERNAL MEDICINE

## 2024-03-20 PROCEDURE — 82947 ASSAY GLUCOSE BLOOD QUANT: CPT

## 2024-03-20 PROCEDURE — 2500000002 HC RX 250 W HCPCS SELF ADMINISTERED DRUGS (ALT 637 FOR MEDICARE OP, ALT 636 FOR OP/ED): Performed by: INTERNAL MEDICINE

## 2024-03-20 RX ORDER — VANCOMYCIN 750 MG/150ML
750 INJECTION, SOLUTION INTRAVENOUS EVERY 12 HOURS
Status: DISCONTINUED | OUTPATIENT
Start: 2024-03-20 | End: 2024-03-25

## 2024-03-20 RX ADMIN — Medication 3 MG: at 22:18

## 2024-03-20 RX ADMIN — VANCOMYCIN 750 MG: 750 INJECTION, SOLUTION INTRAVENOUS at 09:50

## 2024-03-20 RX ADMIN — POLYETHYLENE GLYCOL 3350 17 G: 17 POWDER, FOR SOLUTION ORAL at 09:37

## 2024-03-20 RX ADMIN — PIPERACILLIN SODIUM AND TAZOBACTAM SODIUM 4.5 G: 4; .5 INJECTION, SOLUTION INTRAVENOUS at 13:02

## 2024-03-20 RX ADMIN — HEPARIN SODIUM 5000 UNITS: 5000 INJECTION, SOLUTION INTRAVENOUS; SUBCUTANEOUS at 09:50

## 2024-03-20 RX ADMIN — FINASTERIDE 5 MG: 5 TABLET, FILM COATED ORAL at 09:38

## 2024-03-20 RX ADMIN — CARVEDILOL 12.5 MG: 12.5 TABLET, FILM COATED ORAL at 17:57

## 2024-03-20 RX ADMIN — CARVEDILOL 12.5 MG: 12.5 TABLET, FILM COATED ORAL at 09:38

## 2024-03-20 RX ADMIN — PIPERACILLIN SODIUM AND TAZOBACTAM SODIUM 4.5 G: 4; .5 INJECTION, SOLUTION INTRAVENOUS at 17:57

## 2024-03-20 RX ADMIN — OLANZAPINE 20 MG: 10 TABLET, FILM COATED ORAL at 22:18

## 2024-03-20 RX ADMIN — DOCUSATE SODIUM 100 MG: 100 CAPSULE, LIQUID FILLED ORAL at 09:37

## 2024-03-20 RX ADMIN — VANCOMYCIN 750 MG: 750 INJECTION, SOLUTION INTRAVENOUS at 23:08

## 2024-03-20 RX ADMIN — HEPARIN SODIUM 5000 UNITS: 5000 INJECTION, SOLUTION INTRAVENOUS; SUBCUTANEOUS at 22:19

## 2024-03-20 RX ADMIN — GEMFIBROZIL 600 MG: 600 TABLET ORAL at 17:57

## 2024-03-20 RX ADMIN — PIPERACILLIN SODIUM AND TAZOBACTAM SODIUM 4.5 G: 4; .5 INJECTION, SOLUTION INTRAVENOUS at 05:05

## 2024-03-20 RX ADMIN — ATORVASTATIN CALCIUM 40 MG: 40 TABLET, FILM COATED ORAL at 09:37

## 2024-03-20 RX ADMIN — ASPIRIN 81 MG: 81 TABLET, COATED ORAL at 09:38

## 2024-03-20 RX ADMIN — GEMFIBROZIL 600 MG: 600 TABLET ORAL at 09:38

## 2024-03-20 ASSESSMENT — COGNITIVE AND FUNCTIONAL STATUS - GENERAL
MOVING TO AND FROM BED TO CHAIR: A LITTLE
HELP NEEDED FOR BATHING: A LITTLE
DAILY ACTIVITIY SCORE: 22
DAILY ACTIVITIY SCORE: 22
WALKING IN HOSPITAL ROOM: A LITTLE
TOILETING: A LITTLE
STANDING UP FROM CHAIR USING ARMS: A LITTLE
HELP NEEDED FOR BATHING: A LITTLE
TOILETING: A LITTLE
WALKING IN HOSPITAL ROOM: A LITTLE
MOVING TO AND FROM BED TO CHAIR: A LITTLE
MOBILITY SCORE: 19
CLIMB 3 TO 5 STEPS WITH RAILING: A LOT
MOBILITY SCORE: 19
STANDING UP FROM CHAIR USING ARMS: A LITTLE
CLIMB 3 TO 5 STEPS WITH RAILING: A LOT

## 2024-03-20 ASSESSMENT — ENCOUNTER SYMPTOMS
CARDIOVASCULAR NEGATIVE: 1
NEUROLOGICAL NEGATIVE: 1
MUSCULOSKELETAL NEGATIVE: 1
CONSTITUTIONAL NEGATIVE: 1
RESPIRATORY NEGATIVE: 1
WOUND: 1
PSYCHIATRIC NEGATIVE: 1
GASTROINTESTINAL NEGATIVE: 1
EYES NEGATIVE: 1
ENDOCRINE NEGATIVE: 1

## 2024-03-20 ASSESSMENT — PAIN SCALES - GENERAL
PAINLEVEL_OUTOF10: 0 - NO PAIN
PAINLEVEL_OUTOF10: 0 - NO PAIN

## 2024-03-20 ASSESSMENT — PAIN - FUNCTIONAL ASSESSMENT: PAIN_FUNCTIONAL_ASSESSMENT: 0-10

## 2024-03-20 NOTE — CARE PLAN
The patient's goals for the shift include pain control, and IV antibiotics     The clinical goals for the shift include IV antibiotics,comfort      Problem: Pain  Goal: My pain/discomfort is manageable  Outcome: Progressing     Problem: Safety  Goal: Patient will be injury free during hospitalization  Outcome: Progressing  Goal: I will remain free of falls  Outcome: Progressing     Problem: Daily Care  Goal: Daily care needs are met  Outcome: Progressing     Problem: Psychosocial Needs  Goal: Demonstrates ability to cope with hospitalization/illness  Outcome: Progressing  Goal: Collaborate with me, my family, and caregiver to identify my specific goals  Outcome: Progressing     Problem: Discharge Barriers  Goal: My discharge needs are met  Outcome: Progressing

## 2024-03-20 NOTE — PROGRESS NOTES
03/20/24 1408   Discharge Planning   Living Arrangements Alone   Support Systems Family members   Assistance Needed independent with walker   Type of Residence Private residence   Number of Stairs to Enter Residence 0   Number of Stairs Within Residence 0   Do you have animals or pets at home? No   Who is requesting discharge planning? Provider   Home or Post Acute Services Post acute facilities (Rehab/SNF/etc)   Type of Post Acute Facility Services Skilled nursing   Patient expects to be discharged to: Return to [facility]   Does the patient need discharge transport arranged? Yes   RoundTrip coordination needed? Yes   Has discharge transport been arranged? No         Spoke with patient bedside.  Discussed discharge planning.    Patient came from rehab at [facility] and he would like to return there when medically clear.   Podiatry on consult, IV antibiotic ordered.    Referral for return sent to [facility] to place in [facility].

## 2024-03-20 NOTE — CARE PLAN
The patient's goals for the shift include  IV antibiotics    The clinical goals for the shift include IV antibiotics,comfort

## 2024-03-20 NOTE — PROGRESS NOTES
Claude Coley is a 63 y.o. male on day 1 of admission presenting with Cellulitis of foot, right.      Subjective   Patient has been afebrile through the night.  He denies chest pain or shortness of breath.         Objective     Last Recorded Vitals  /60 (BP Location: Left arm, Patient Position: Lying)   Pulse 68   Temp 36.8 °C (98.2 °F) (Oral)   Resp 15   Wt 113 kg (248 lb 3.8 oz)   SpO2 100%   Intake/Output last 3 Shifts:    Intake/Output Summary (Last 24 hours) at 3/20/2024 0953  Last data filed at 3/20/2024 0535  Gross per 24 hour   Intake 500 ml   Output 1700 ml   Net -1200 ml       Admission Weight  Weight: 113 kg (250 lb 3.6 oz) (03/19/24 1202)    Daily Weight  03/20/24 : 113 kg (248 lb 3.8 oz)    Image Results  Lower extremity venous duplex right  Narrative: Interpreted By:  Favian Castellano,   STUDY:  [facility] US LOWER EXTREMITY VENOUS DUPLEX RIGHT; 3/19/2024 3:22 pm      INDICATION:  Signs/Symptoms:leg swelling + d dimer..      COMPARISON:  None.      ACCESSION NUMBER(S):  [id number]      ORDERING CLINICIAN:  CHARO FRIED      TECHNIQUE:  Vascular ultrasound of the right lower extremity was performed. Real  time compression views as well as Gray scale, color Doppler and  spectral Doppler waveform analysis was performed.      FINDINGS:  Evaluation of the visualized portions of the right common femoral  vein, proximal, mid, and distal femoral vein, and popliteal vein were  performed.  Evaluation of the visualized portions of the posterior  tibial and peroneal veins were also performed.  In addition,  evaluation of the contralateral common femoral vein was performed.      Limitations: None      The evaluated veins demonstrate normal compressibility. There is  intact venous flow demonstrating normal respiratory variability and  normal augmentation of flow with calf compression. Therefore, there  is no ultrasonographic evidence for deep vein thrombosis within the  evaluated veins. No evidence of  thrombus is seen within the  contralateral common femoral vein.      Impression: No sonographic evidence for deep vein thrombosis within the evaluated  veins of the right lower extremity.      MACRO:  None      Signed by: Favian Castellano 3/19/2024 3:22 PM  Dictation workstation:   [id number]  XR foot right 3+ views  Narrative: Interpreted By:  Lux Hernandez,   STUDY:  XR FOOT RIGHT 3+ VIEWS      INDICATION:  Signs/Symptoms:r foot wound.      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      Suspected skin wounds at the surgical site. No aggressive bony  destructive changes or erosions.      Marked soft tissue swelling dorsally.      Impression: Suspected skin wounds of the 5th amputation site with no osseous  acute abnormality.      Marked dorsal soft tissue swelling, potentially cellulitis.      Signed by: Lux Hernandez 3/19/2024 2:09 PM  Dictation workstation:   [id number]      Physical Exam    General:  cooperating during physical exam.  HEENT: Pupils are equal and reactive to light and commendation , oral mucosa moist, no JVD oral mucosa is moist.  Cardiovascular: Normal sinus rhythm, no MRG.  Lungs: Clear to auscultation bilaterally, no wheezing, no crackles, no dullness to percussion.  Abdomen: No hepatosplenomegaly appreciated, soft , not tender, positive bowel sounds, positive bowel movement.  Neuro: Alert and oriented x3, strength in upper and lower extremities , sensation intact.  Psych: Patient had great insight was going on  Musculoskeletal: Right lower extremity swelling, cellulitis of right foot   Vascular: Pulses are intact in upper and lower extremities  Skin: Right foot cellulitis, dressing in place.      Assessment/Plan        Diabetic foot infection  Right foot cellulitis  X-ray of right foot no evidence of osteomyelitis  Cover  aggressively with antibiotics  Patient was started on Zosyn and vancomycin  Consult Dr. Saravia from ID services  Consult Dr. Perry podiatry on the case  Wound culture  May need debridement.      Diabetes mellitus type 2.  Cover with insulin sliding scale  Hemoglobin A1c  Diabetic diet     Hypertension fairly controlled  Patient is on lisinopril  Monitor close     Hyperlipidemia     Right lower extremity swelling  Ultrasound of right lower extremity     DVT prophylaxis  GI prophylaxis  Morbid obesity  Advise regarding diet     Waiting for consultant to see him today.    Check CBC and BMP in a.m.     Acute kidney injury.  Hold lisinopril today.          Principal Problem:    Cellulitis of foot, right                  Martina Fang MD

## 2024-03-20 NOTE — NURSING NOTE
Patient with Rt arm single lumen picc, dressing dated 3/14, no chg present, dressing change done using sterile technique, insertion site with no redness, drainage or swelling, ext catheter at 5 cm (was 1 cm when inserted in 1/26/24), blue cap changed, flushes easily and with positive blood return, curos cap applied. Order obtained for CXR to verify tip placement, JEANNA osullivan.

## 2024-03-20 NOTE — CONSULTS
PODIATRY CONSULT NOTE    SERVICE DATE: 3/20/2024   SERVICE TIME:  5:51PM    REASON FOR CONSULT: Right foot wound with cellulitis  REQUESTING PHYSICIAN: Martina Fang MD   PRIMARY CARE PHYSICIAN: Darryl Jenkins MD    Subjective   HPI:  Mr. Coley is a 63 y.o. male who presented from Dr. Perry's office on 3/19 with concern of cellulitis and infection of right foot wound and recommendation for IV abx. Podiatry was consulted for management of right foot wound. Since presentation, patient's wound has significantly improved on IV antibiotics. Patient has a recent history of long-term IV Unasyn via PICC managed by Dr. Saravia, and had been off of IV abx for about 2 weeks prior to this incident. 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 He had been doing well and has outpatient procedure planned for 03/27/24 involving peroneal muscle flap with split thickness skin graft and application of external fixator. Patient anticipates this procedure. Patient denies any fever, chills, headache, lightheadedness, chest pain, dyspnea, abdominal pain, nausea, vomiting, diarrhea, or dysuria at this time.     ROS: 10-point review of systems was performed and is otherwise negative except as noted in HPI.  PMH: Reviewed/documented below.  PSH:  Noncontributory except per HPI   FH: Reviewed and noncontributory   SOCIAL:  Reviewed/documented below.  ALLERGIES: Reviewed/documented below.  MEDS: Reviewed/documented below.  VS: Reviewed/documented below.    Past Medical History:   Diagnosis Date    Diabetes mellitus (CMS/[facility])      History reviewed. No pertinent surgical history.  No family history on file.  Social History     Tobacco Use    Smoking status: Never    Smokeless tobacco: Never      Medications Prior to Admission   Medication Sig Dispense Refill Last Dose     acetaminophen (Tylenol) 325 mg tablet Take 2 tablets (650 mg) by mouth every 4 hours if needed for mild pain (1 - 3). 30 tablet 0     aspirin 81 mg EC tablet Take 1 tablet (81 mg) by mouth once daily.       atorvastatin (Lipitor) 40 mg tablet Take 1 tablet (40 mg) by mouth once daily.       carvedilol (Coreg) 12.5 mg tablet Take 1 tablet (12.5 mg) by mouth 2 times a day with meals.       finasteride (Proscar) 5 mg tablet Take 1 tablet (5 mg) by mouth once daily. Do not crush, chew, or split.       furosemide (Lasix) 20 mg tablet Take 1 tablet (20 mg) by mouth once daily.       gemfibrozil (Lopid) 600 mg tablet Take 1 tablet (600 mg) by mouth 2 times a day before meals.       heparin sodium,porcine (heparin, porcine,) 5,000 unit/mL injection Inject 1 mL (5,000 Units) under the skin every 8 hours. 100 mL 0     lisinopril 20 mg tablet Take 1 tablet (20 mg) by mouth once daily.       metFORMIN, OSM, (Fortamet) 500 mg 24 hr tablet Take 1 tablet (500 mg) by mouth once daily in the evening. Take with meals. Do not crush, chew, or split.       OLANZapine (ZyPREXA) 20 mg tablet Take 1 tablet (20 mg) by mouth once daily at bedtime.       spironolactone (Aldactone) 25 mg tablet Take 1 tablet (25 mg) by mouth once daily.           Medications:  Scheduled Meds: aspirin, 81 mg, oral, Daily  atorvastatin, 40 mg, oral, Daily  carvedilol, 12.5 mg, oral, BID with meals  docusate sodium, 100 mg, oral, Daily  finasteride, 5 mg, oral, Daily  gemfibrozil, 600 mg, oral, BID AC  heparin (porcine), 5,000 Units, subcutaneous, q12h  insulin lispro, 0-10 Units, subcutaneous, TID with meals  melatonin, 3 mg, oral, Nightly  OLANZapine, 20 mg, oral, Nightly  piperacillin-tazobactam, 4.5 g, intravenous, q6h  polyethylene glycol, 17 g, oral, Daily  vancomycin-diluent combo no.1, 750 mg, intravenous, q12h      Continuous Infusions:    PRN Meds: PRN medications: acetaminophen **OR** acetaminophen **OR** acetaminophen, dextrose, dextrose, glucagon,  ondansetron ODT **OR** ondansetron, vancomycin    Allergies as of 03/19/2024 - Reviewed 03/19/2024   Allergen Reaction Noted    Bee sting kit Anaphylaxis 12/07/2019    Tramadol Rash 01/07/2020            Objective   PHYSICAL EXAM:  Physical Exam Performed:  Vitals:    03/20/24 1633   BP: 121/61   Pulse: 56   Resp: 17   Temp: 35.6 °C (96.1 °F)   SpO2: 98%     Body mass index is 31.87 kg/m².    Patient is AOx3 and in no acute distress. Patient is alert and cooperative. Sitting comfortably in bed with dressing clean, dry and intact. H    Vascular: Palpable DP/PT pulses B/L. Severe pitting edema noted to RLE. Hair growth absent B/L. CFT<5 to B/L hallux. Temperature is warm to cool from tibial tuberosity to distal digits B/L. No lymphatic streaking noted B/L.    Musculoskeletal: s/p right partial 5th ray resection. Gross active and passive ROM intact to age and activity level. Moves all extremities spontaneously. No pain to palpation at feet B/L.     Neurological: Absent light touch sensation B/L. Pain stimuli absent B/L.     Dermatologic: Nails 1-4 are thickened, elongated, discolored with subungual debris B/L. Skin appears diffusely xerotic B/L. Web spaces 1-3 B/L are clean, dry and intact. No rashes or nodules noted B/L. No hyperkeratotic tissue noted B/L.     Wound: Right plantar lateral foot  Images:       Measurements: 12cm x 8cm x 2.5cm deep at proximal lateral curved portion that extends an additional 9cm  Mixed wound base of 80% granular tissue with 20% various areas of fibrosis/necrosis.  Mild serosanguinous drainage with fibrotic slough.   No basil-wound maceration.   Moderate basil-wound erythema.   Minimal evidence of ascending cellulitis or lymphangitis.  No palpable fluctuance. Mild malodor. Mild increased warmth.   No probe to bone. Proximal lateral portion probes deep 2.5cm to deep fascia and/or periosteum without exposed bone.   Minimal tunneling or tracking.   No undermining. Skin edges irregular but  "intact.    LABS:   Results for orders placed or performed during the hospital encounter of 03/19/24 (from the past 24 hour(s))   POCT GLUCOSE   Result Value Ref Range    POCT Glucose 110 (H) 74 - 99 mg/dL   CBC and Auto Differential   Result Value Ref Range    WBC 5.8 4.4 - 11.3 x10*3/uL    nRBC 0.0 0.0 - 0.0 /100 WBCs    RBC 3.59 (L) 4.50 - 5.90 x10*6/uL    Hemoglobin 10.7 (L) 13.5 - 17.5 g/dL    Hematocrit 31.1 (L) 41.0 - 52.0 %    MCV 87 80 - 100 fL    MCH 29.8 26.0 - 34.0 pg    MCHC 34.4 32.0 - 36.0 g/dL    RDW 13.1 11.5 - 14.5 %    Platelets 137 (L) 150 - 450 x10*3/uL    Neutrophils % 57.7 40.0 - 80.0 %    Immature Granulocytes %, Automated 0.5 0.0 - 0.9 %    Lymphocytes % 21.9 13.0 - 44.0 %    Monocytes % 9.8 2.0 - 10.0 %    Eosinophils % 9.2 0.0 - 6.0 %    Basophils % 0.9 0.0 - 2.0 %    Neutrophils Absolute 3.34 1.20 - 7.70 x10*3/uL    Immature Granulocytes Absolute, Automated 0.03 0.00 - 0.70 x10*3/uL    Lymphocytes Absolute 1.27 1.20 - 4.80 x10*3/uL    Monocytes Absolute 0.57 0.10 - 1.00 x10*3/uL    Eosinophils Absolute 0.53 0.00 - 0.70 x10*3/uL    Basophils Absolute 0.05 0.00 - 0.10 x10*3/uL   Basic metabolic panel   Result Value Ref Range    Glucose 88 65 - 99 mg/dL    Sodium 139 133 - 145 mmol/L    Potassium 4.3 3.4 - 5.1 mmol/L    Chloride 106 97 - 107 mmol/L    Bicarbonate 20 (L) 24 - 31 mmol/L    Urea Nitrogen 48 (H) 8 - 25 mg/dL    Creatinine 1.30 0.40 - 1.60 mg/dL    eGFR 63 >60 mL/min/1.73m*2    Calcium 9.1 8.5 - 10.4 mg/dL    Anion Gap 13 <=19 mmol/L   Vancomycin   Result Value Ref Range    Vancomycin 20.2 (H) 10.0 - 20.0 ug/mL   POCT GLUCOSE   Result Value Ref Range    POCT Glucose 101 (H) 74 - 99 mg/dL      Lab Results   Component Value Date    HGBA1C 4.4 03/19/2024      No results found for: \"CRP\"   Lab Results   Component Value Date    SEDRATE 47 (H) 03/19/2024        Results from last 7 days   Lab Units 03/20/24  0506   WBC AUTO x10*3/uL 5.8   RBC AUTO x10*6/uL 3.59*   HEMOGLOBIN g/dL 10.7* "   HEMATOCRIT % 31.1*     Results from last 7 days   Lab Units 03/20/24  0506 03/19/24  1246 03/14/24  0538   SODIUM mmol/L 139   < > 141   POTASSIUM mmol/L 4.3   < > 4.1   CHLORIDE mmol/L 106   < > 105   CO2 mmol/L 20*   < > 24   BUN mg/dL 48*   < > 37*   CREATININE mg/dL 1.30   < > 1.20   CALCIUM mg/dL 9.1   < > 9.2   BILIRUBIN TOTAL mg/dL  --   --  0.3   ALT U/L  --   --  16   AST U/L  --   --  17    < > = values in this interval not displayed.           IMAGING REVIEW:  XR chest 1 view    Result Date: 3/20/2024  Interpreted By:  Nael Moore, STUDY: XR CHEST 1 VIEW; 3/20/2024 3:57 pm   INDICATION: Signs/Symptoms:PICC placement.   COMPARISON: None   ACCESSION NUMBER(S): [id number]   ORDERING CLINICIAN: ANGELITA YOUSSEF   TECHNIQUE: 1 view of the chest was performed.   FINDINGS: Right-sided PICC line catheter with tip at the mid SVC. The lungs are adequately inflated. No acute consolidation. No pleural effusion. No pneumothorax.  The cardiomediastinal silhouette is within normal limits.       Right-sided PICC line catheter with tip at the mid SVC. No acute cardiopulmonary disease.   Signed by: Nael Moore 3/20/2024 4:14 PM Dictation workstation:   [id number]    Lower extremity venous duplex right    Result Date: 3/19/2024  Interpreted By:  Favian Castellano, STUDY: [facility] US LOWER EXTREMITY VENOUS DUPLEX RIGHT; 3/19/2024 3:22 pm   INDICATION: Signs/Symptoms:leg swelling + d dimer..   COMPARISON: None.   ACCESSION NUMBER(S): [id number]   ORDERING CLINICIAN: CHARO FRIED   TECHNIQUE: Vascular ultrasound of the right lower extremity was performed. Real time compression views as well as Gray scale, color Doppler and spectral Doppler waveform analysis was performed.   FINDINGS: Evaluation of the visualized portions of the right common femoral vein, proximal, mid, and distal femoral vein, and popliteal vein were performed.  Evaluation of the visualized portions of the posterior tibial and peroneal veins were  also performed.  In addition, evaluation of the contralateral common femoral vein was performed.   Limitations: None   The evaluated veins demonstrate normal compressibility. There is intact venous flow demonstrating normal respiratory variability and normal augmentation of flow with calf compression. Therefore, there is no ultrasonographic evidence for deep vein thrombosis within the evaluated veins. No evidence of thrombus is seen within the contralateral common femoral vein.       No sonographic evidence for deep vein thrombosis within the evaluated veins of the right lower extremity.   MACRO: None   Signed by: Favian Castellano 3/19/2024 3:22 PM Dictation workstation:   [id number]    XR foot right 3+ views    Result Date: 3/19/2024  Interpreted By:  Lux Hernandez, STUDY: XR FOOT RIGHT 3+ VIEWS   INDICATION: Signs/Symptoms:r foot wound.   COMPARISON: January 26, 2024   ACCESSION NUMBER(S): [id number]   ORDERING CLINICIAN: CHARO FRIED   FINDINGS: Postsurgical changes status post 5th ray amputation to the level of the mid metatarsal.   Suspected skin wounds at the surgical site. No aggressive bony destructive changes or erosions.   Marked soft tissue swelling dorsally.       Suspected skin wounds of the 5th amputation site with no osseous acute abnormality.   Marked dorsal soft tissue swelling, potentially cellulitis.   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  - Charts, labs, vitals and imaging all reviewed.   - Imaging: Plain films of right foot with no osseous erosion or soft  tissue gas noted. There is increased soft tissue volume and density throughout the dorsal foot and plantar hindfoot  - Labs: ESR 47, WBC 5.8, HGB 10.7  - Wound culture: 3/19 growing 2+ staph aureus, pending sensitivities  - Blood culture: collected and pending    Plan:  - Wound has significantly improved since restarting of IV abx. Dressing changed.  Recommend continued IV abx outpatient, pending wound culture.   - Will proceed with outpatient podiatric surgery next week as planned on 3/27/24.  - Abx: Vancomycin + Zosyn per primary/ID  - Pain and Bowel Regimens: per primary  - Dressings: Betadine-soaked 4x4s, dry 4x4s, ABDs, kerlix, ACE. To be changed daily.  - Nursing staff is able to change/reinforce dressing if & as necessary until next dressing change. Thank you.  - Podiatry will continue to follow while in house.  - Discussed with Dr. Bolaños.    DVT ppx: heparin subcutaneous, per primary  Weightbearing: Heel-only weight-bearing to RLE in surgical shoe only.  Discharge: Patient to follow up 1 week after discharge with Dr. Perry.    Case to be discussed with attending, A&P above reflects a tentative plan. Please await for the final signature from the attending physician on service.    This patient will be followed by the Podiatry service. Please Epic Chat the corresponding resident below with questions or concerns.      Husam Lopez DPM PGY-2  Podiatric Medicine and Surgery   Epic Chat          SIGNATURE: Husam Lopez DPM PATIENT NAME: Claude Coley   DATE: March 20, 2024 MRN: [id number]   TIME: 5:52 PM CONTACT: Haiku message

## 2024-03-20 NOTE — PROGRESS NOTES
"Vancomycin Dosing by Pharmacy- FOLLOW UP    Claude Coley is a 63 y.o. year old male who Pharmacy has been consulted for vancomycin dosing for cellulitis, skin and soft tissue. Based on the patient's indication and renal status this patient is being dosed based on a goal AUC of 400-600.     Renal function is currently declining.    Current vancomycin dose: 1000 mg given every 12 hours    Estimated vancomycin AUC on current dose: 631 mg/L.hr     Visit Vitals  /60 (BP Location: Left arm, Patient Position: Lying)   Pulse 68   Temp 36.8 °C (98.2 °F) (Oral)   Resp 15        Lab Results   Component Value Date    CREATININE 1.30 03/20/2024    CREATININE 1.20 03/19/2024    CREATININE 1.20 03/14/2024    CREATININE 1.00 03/07/2024        Patient weight is 113 Kg.    No results found for: \"CULTURE\"     I/O last 3 completed shifts:  In: 500 (4.4 mL/kg) [IV Piggyback:500]  Out: 1700 (15.1 mL/kg) [Urine:1700 (0.4 mL/kg/hr)]  Weight: 112.6 kg   [unfilled]    No results found for: \"PATIENTTEMP\"     Assessment/Plan    Above goal AUC. Orders placed for new vancomcyin regimen of 750 every 12 hours to begin at 3/20/24 at 1000hrs.    This dosing regimen is predicted by [employer] to result in the following pharmacokinetic parameters:    Loading dose: N/A  Regimen: 750 mg IV every 12 hours.  Start time: 10:19 on 03/20/2024  Exposure target: AUC24 (range)400-600 mg/L.hr   AUC24,ss: 478 mg/L.hr  Probability of AUC24 > 400: 81 %  Ctrough,ss: 15.5 mg/L  Probability of Ctrough,ss > 20: 17 %  Probability of nephrotoxicity (Lodise ADRYAN 2009): 11 %    The next level will be obtained on 3/21/24 at AM labs. May be obtained sooner if clinically indicated.   Will continue to monitor renal function daily while on vancomycin and order serum creatinine at least every 48 hours if not already ordered.  Follow for continued vancomycin needs, clinical response, and signs/symptoms of toxicity.       Nikolai Scott, PharmD           "

## 2024-03-20 NOTE — CONSULTS
Inpatient consult to Infectious Diseases  Consult performed by: Bere Jacobo, APRN-CNP  Consult ordered by: Martina Fang MD  Reason for consult: right foot cellulitis          Primary MD: Darryl Jenkins MD      History Of Present Illness  Claude Coley is a 63 y.o. male with past medical history of diabetes mellitus, right foot osteomyelitis polymicrobial Streptococcus anginosus and Bacteroides fragilis.  He was treated with IV Unasyn till March 6, 2023.  Reports he had follow-up with Dr. Vicky downs.  He was was found to have right foot infection and directed to come to the emergency room for further evaluation and management.  He is afebrile, denies chills or sweats.  He is on room air with normal oxygenation.  He denies shortness of breath cough or chest pain.  He reports of right foot pain.  He reported increased drainage and malodor.  Right foot wound with large amount of granulation tissue, purulent drainage, necrotic tissue malodor.  Labs with  no leukocytosis.  Wound Cultures pending Staph aureus.  Right foot x-ray with no osseous acute abnormality.  He has retained right PICC line.  He is on IV vancomycin, Zosyn.     Past Medical History  He has a past medical history of Diabetes mellitus (CMS/[facility]).    Surgical History  He has no past surgical history on file.     Social History     Occupational History    Not on file   Tobacco Use    Smoking status: Never    Smokeless tobacco: Never   Substance and Sexual Activity    Alcohol use: Not on file    Drug use: Not on file    Sexual activity: Not on file     Travel History   Travel since 02/20/24    No documented travel since 02/20/24                Family History  No family history on file.  Allergies  Bee sting kit and Tramadol     Immunization History   Administered Date(s) Administered    Pfizer COVID-19 vaccine, Fall 2023, 12 years and older, (30mcg/0.3mL) 10/26/2023     Medications  Home medications:  Medications Prior to Admission    Medication Sig Dispense Refill Last Dose    acetaminophen (Tylenol) 325 mg tablet Take 2 tablets (650 mg) by mouth every 4 hours if needed for mild pain (1 - 3). 30 tablet 0     aspirin 81 mg EC tablet Take 1 tablet (81 mg) by mouth once daily.       atorvastatin (Lipitor) 40 mg tablet Take 1 tablet (40 mg) by mouth once daily.       carvedilol (Coreg) 12.5 mg tablet Take 1 tablet (12.5 mg) by mouth 2 times a day with meals.       finasteride (Proscar) 5 mg tablet Take 1 tablet (5 mg) by mouth once daily. Do not crush, chew, or split.       furosemide (Lasix) 20 mg tablet Take 1 tablet (20 mg) by mouth once daily.       gemfibrozil (Lopid) 600 mg tablet Take 1 tablet (600 mg) by mouth 2 times a day before meals.       heparin sodium,porcine (heparin, porcine,) 5,000 unit/mL injection Inject 1 mL (5,000 Units) under the skin every 8 hours. 100 mL 0     lisinopril 20 mg tablet Take 1 tablet (20 mg) by mouth once daily.       metFORMIN, OSM, (Fortamet) 500 mg 24 hr tablet Take 1 tablet (500 mg) by mouth once daily in the evening. Take with meals. Do not crush, chew, or split.       OLANZapine (ZyPREXA) 20 mg tablet Take 1 tablet (20 mg) by mouth once daily at bedtime.       spironolactone (Aldactone) 25 mg tablet Take 1 tablet (25 mg) by mouth once daily.        Current medications:  Scheduled medications  aspirin, 81 mg, oral, Daily  atorvastatin, 40 mg, oral, Daily  carvedilol, 12.5 mg, oral, BID with meals  docusate sodium, 100 mg, oral, Daily  finasteride, 5 mg, oral, Daily  gemfibrozil, 600 mg, oral, BID AC  heparin (porcine), 5,000 Units, subcutaneous, q12h  insulin lispro, 0-10 Units, subcutaneous, TID with meals  melatonin, 3 mg, oral, Nightly  OLANZapine, 20 mg, oral, Nightly  piperacillin-tazobactam, 4.5 g, intravenous, q6h  polyethylene glycol, 17 g, oral, Daily  vancomycin-diluent combo no.1, 750 mg, intravenous, q12h      Continuous medications     PRN medications  PRN medications: acetaminophen **OR**  acetaminophen **OR** acetaminophen, dextrose, dextrose, glucagon, ondansetron ODT **OR** ondansetron, vancomycin    Review of Systems   Constitutional: Negative.    HENT: Negative.     Eyes: Negative.    Respiratory: Negative.     Cardiovascular: Negative.    Gastrointestinal: Negative.    Endocrine: Negative.    Genitourinary: Negative.    Musculoskeletal: Negative.         Right foot pain   Skin:  Positive for rash and wound.   Neurological: Negative.    Psychiatric/Behavioral: Negative.          Objective  Range of Vitals (last 24 hours)  Heart Rate:  [58-68]   Temp:  [35.6 °C (96.1 °F)-36.9 °C (98.4 °F)]   Resp:  [15-18]   BP: ()/(45-77)   Weight:  [113 kg (248 lb 3.8 oz)]   SpO2:  [91 %-100 %]   Daily Weight  03/20/24 : 113 kg (248 lb 3.8 oz)    Body mass index is 31.87 kg/m².     Physical Exam  Constitutional:       Appearance: Normal appearance.   HENT:      Head: Normocephalic and atraumatic.      Nose: Nose normal.      Mouth/Throat:      Mouth: Mucous membranes are moist.      Pharynx: Oropharynx is clear.   Eyes:      Conjunctiva/sclera: Conjunctivae normal.   Cardiovascular:      Rate and Rhythm: Normal rate and regular rhythm.   Pulmonary:      Effort: Pulmonary effort is normal.      Breath sounds: Normal breath sounds.   Abdominal:      General: Bowel sounds are normal.      Palpations: Abdomen is soft.   Musculoskeletal:         General: Normal range of motion.      Cervical back: Normal range of motion and neck supple.   Skin:     Comments: Right foot wound with large amount of granulation tissue, purulent drainage, necrotic tissue malodor.     Neurological:      Mental Status: He is alert.      Comments: Awake, alert   Psychiatric:         Mood and Affect: Mood normal.         Behavior: Behavior normal.          Relevant Results  Outside Hospital Results  No  Labs  Results from last 72 hours   Lab Units 03/20/24  0506 03/19/24  1246   WBC AUTO x10*3/uL 5.8 6.6   HEMOGLOBIN g/dL 10.7* 11.4*  "  HEMATOCRIT % 31.1* 32.8*   PLATELETS AUTO x10*3/uL 137* 153   NEUTROS PCT AUTO % 57.7 64.0   LYMPHS PCT AUTO % 21.9 18.3   MONOS PCT AUTO % 9.8 9.6   EOS PCT AUTO % 9.2 7.0     Results from last 72 hours   Lab Units 03/20/24  0506 03/19/24  1246   SODIUM mmol/L 139 135   POTASSIUM mmol/L 4.3 4.9   CHLORIDE mmol/L 106 100   CO2 mmol/L 20* 21*   BUN mg/dL 48* 49*   CREATININE mg/dL 1.30 1.20   GLUCOSE mg/dL 88 120*   CALCIUM mg/dL 9.1 9.9   ANION GAP mmol/L 13 14   EGFR mL/min/1.73m*2 63 69         Estimated Creatinine Clearance: 79.8 mL/min (by C-G formula based on SCr of 1.3 mg/dL).  Sedimentation Rate   Date Value Ref Range Status   03/19/2024 47 (H) 0 - 20 mm/h Final     No results found for: \"HIV1X2\", \"HIVCONF\", \"HVSCWO8BE\"  No results found for: \"HEPCABINIT\", \"HEPCAB\", \"HCVPCRQUANT\"  Microbiology  Susceptibility data from last 90 days.  Collected Specimen Info Organism Ampicillin Ampicillin/Sulbactam Ceftriaxone Clindamycin Meropenem Penicillin Piperacillin/Tazobactam Tetracycline Vancomycin   03/19/24 Tissue/Biopsy from Wound/Tissue Staphylococcus aureus            01/26/24 Tissue from BONE RESECTION Bacteroides fragilis R S S S S  S       Streptococcus anginosus group   S   R  S S   01/24/24 Swab from DIGIT, ACCESSORY RIGHT FOOT Mixed Aerobic and Anaerobic Bacteria             01/23/24 Tissue/Biopsy from Wound/Tissue Mixed Gram-Positive and Gram-Negative Bacteria                  Imaging  XR chest 1 view    Result Date: 3/20/2024  Interpreted By:  Nael Moore, STUDY: XR CHEST 1 VIEW; 3/20/2024 3:57 pm   INDICATION: Signs/Symptoms:PICC placement.   COMPARISON: None   ACCESSION NUMBER(S): [id number]   ORDERING CLINICIAN: ANGELITA YOUSSEF   TECHNIQUE: 1 view of the chest was performed.   FINDINGS: Right-sided PICC line catheter with tip at the mid SVC. The lungs are adequately inflated. No acute consolidation. No pleural effusion. No pneumothorax.  The cardiomediastinal silhouette is within normal " limits.       Right-sided PICC line catheter with tip at the mid SVC. No acute cardiopulmonary disease.   Signed by: Nael Moore 3/20/2024 4:14 PM Dictation workstation:   [id number]    Lower extremity venous duplex right    Result Date: 3/19/2024  Interpreted By:  Favian Castellano, STUDY: [facility] LOWER EXTREMITY VENOUS DUPLEX RIGHT; 3/19/2024 3:22 pm   INDICATION: Signs/Symptoms:leg swelling + d dimer..   COMPARISON: None.   ACCESSION NUMBER(S): [id number]   ORDERING CLINICIAN: CHARO FRIED   TECHNIQUE: Vascular ultrasound of the right lower extremity was performed. Real time compression views as well as Gray scale, color Doppler and spectral Doppler waveform analysis was performed.   FINDINGS: Evaluation of the visualized portions of the right common femoral vein, proximal, mid, and distal femoral vein, and popliteal vein were performed.  Evaluation of the visualized portions of the posterior tibial and peroneal veins were also performed.  In addition, evaluation of the contralateral common femoral vein was performed.   Limitations: None   The evaluated veins demonstrate normal compressibility. There is intact venous flow demonstrating normal respiratory variability and normal augmentation of flow with calf compression. Therefore, there is no ultrasonographic evidence for deep vein thrombosis within the evaluated veins. No evidence of thrombus is seen within the contralateral common femoral vein.       No sonographic evidence for deep vein thrombosis within the evaluated veins of the right lower extremity.   MACRO: None   Signed by: Favian Castellano 3/19/2024 3:22 PM Dictation workstation:   [id number]    XR foot right 3+ views    Result Date: 3/19/2024  Interpreted By:  Lux Hernandez, STUDY: XR FOOT RIGHT 3+ VIEWS   INDICATION: Signs/Symptoms:r foot wound.   COMPARISON: January 26, 2024   ACCESSION NUMBER(S): [id number]   ORDERING CLINICIAN: CHARO FRIED   FINDINGS: Postsurgical changes status post 5th ray  amputation to the level of the mid metatarsal.   Suspected skin wounds at the surgical site. No aggressive bony destructive changes or erosions.   Marked soft tissue swelling dorsally.       Suspected skin wounds of the 5th amputation site with no osseous acute abnormality.   Marked dorsal soft tissue swelling, potentially cellulitis.   Signed by: Lux Hernandez 3/19/2024 2:09 PM Dictation workstation:   [id number]      Assessment/Plan   Infected right diabetic foot ulcer-culture growing Staph aureus  Has retained right upper extremity PICC  History of strep anginosus and Bacteroides fragilis    IV vancomycin  IV Zosyn  Supportive care  Follow up Wound culture  Blood cultures x 2  Local care  Offloading  Monitor temperature and WBC  Podiatry  consult  Further Recommendations based on workup      Bere Jacobo, APRN-CNP

## 2024-03-21 ENCOUNTER — APPOINTMENT (OUTPATIENT)
Dept: PREADMISSION TESTING | Facility: HOSPITAL | Age: 64
End: 2024-03-21
Payer: MEDICARE

## 2024-03-21 LAB
ANION GAP SERPL CALC-SCNC: 12 MMOL/L
BACTERIA SPEC CULT: ABNORMAL
BUN SERPL-MCNC: 44 MG/DL (ref 8–25)
CALCIUM SERPL-MCNC: 8.9 MG/DL (ref 8.5–10.4)
CHLORIDE SERPL-SCNC: 102 MMOL/L (ref 97–107)
CO2 SERPL-SCNC: 21 MMOL/L (ref 24–31)
CREAT SERPL-MCNC: 1.4 MG/DL (ref 0.4–1.6)
EGFRCR SERPLBLD CKD-EPI 2021: 57 ML/MIN/1.73M*2
ERYTHROCYTE [DISTWIDTH] IN BLOOD BY AUTOMATED COUNT: 13.2 % (ref 11.5–14.5)
FERRITIN SERPL-MCNC: 368 NG/ML (ref 30–400)
GLUCOSE BLD MANUAL STRIP-MCNC: 109 MG/DL (ref 74–99)
GLUCOSE BLD MANUAL STRIP-MCNC: 120 MG/DL (ref 74–99)
GLUCOSE BLD MANUAL STRIP-MCNC: 93 MG/DL (ref 74–99)
GLUCOSE BLD MANUAL STRIP-MCNC: 95 MG/DL (ref 74–99)
GLUCOSE SERPL-MCNC: 90 MG/DL (ref 65–99)
GRAM STN SPEC: ABNORMAL
GRAM STN SPEC: ABNORMAL
HCT VFR BLD AUTO: 29.7 % (ref 41–52)
HGB BLD-MCNC: 10.1 G/DL (ref 13.5–17.5)
IRON SATN MFR SERPL: 41 % (ref 12–50)
IRON SERPL-MCNC: 107 UG/DL (ref 45–160)
MCH RBC QN AUTO: 29.5 PG (ref 26–34)
MCHC RBC AUTO-ENTMCNC: 34 G/DL (ref 32–36)
MCV RBC AUTO: 87 FL (ref 80–100)
NRBC BLD-RTO: 0 /100 WBCS (ref 0–0)
PLATELET # BLD AUTO: 136 X10*3/UL (ref 150–450)
POTASSIUM SERPL-SCNC: 3.9 MMOL/L (ref 3.4–5.1)
RBC # BLD AUTO: 3.42 X10*6/UL (ref 4.5–5.9)
SODIUM SERPL-SCNC: 135 MMOL/L (ref 133–145)
TIBC SERPL-MCNC: 259 UG/DL (ref 228–428)
UIBC SERPL-MCNC: 152 UG/DL (ref 110–370)
VANCOMYCIN SERPL-MCNC: 22.9 UG/ML (ref 10–20)
WBC # BLD AUTO: 5.9 X10*3/UL (ref 4.4–11.3)

## 2024-03-21 PROCEDURE — 82728 ASSAY OF FERRITIN: CPT | Performed by: NURSE PRACTITIONER

## 2024-03-21 PROCEDURE — 83540 ASSAY OF IRON: CPT | Performed by: NURSE PRACTITIONER

## 2024-03-21 PROCEDURE — 80048 BASIC METABOLIC PNL TOTAL CA: CPT | Performed by: INTERNAL MEDICINE

## 2024-03-21 PROCEDURE — 2500000001 HC RX 250 WO HCPCS SELF ADMINISTERED DRUGS (ALT 637 FOR MEDICARE OP): Performed by: INTERNAL MEDICINE

## 2024-03-21 PROCEDURE — 97165 OT EVAL LOW COMPLEX 30 MIN: CPT | Mod: GO

## 2024-03-21 PROCEDURE — 2500000002 HC RX 250 W HCPCS SELF ADMINISTERED DRUGS (ALT 637 FOR MEDICARE OP, ALT 636 FOR OP/ED): Performed by: INTERNAL MEDICINE

## 2024-03-21 PROCEDURE — 1200000002 HC GENERAL ROOM WITH TELEMETRY DAILY

## 2024-03-21 PROCEDURE — 36415 COLL VENOUS BLD VENIPUNCTURE: CPT | Performed by: INTERNAL MEDICINE

## 2024-03-21 PROCEDURE — 2500000004 HC RX 250 GENERAL PHARMACY W/ HCPCS (ALT 636 FOR OP/ED): Mod: JZ | Performed by: INTERNAL MEDICINE

## 2024-03-21 PROCEDURE — 85027 COMPLETE CBC AUTOMATED: CPT | Performed by: INTERNAL MEDICINE

## 2024-03-21 PROCEDURE — 2500000002 HC RX 250 W HCPCS SELF ADMINISTERED DRUGS (ALT 637 FOR MEDICARE OP, ALT 636 FOR OP/ED): Mod: MUE | Performed by: INTERNAL MEDICINE

## 2024-03-21 PROCEDURE — 82947 ASSAY GLUCOSE BLOOD QUANT: CPT

## 2024-03-21 PROCEDURE — 97161 PT EVAL LOW COMPLEX 20 MIN: CPT | Mod: GP

## 2024-03-21 PROCEDURE — 80202 ASSAY OF VANCOMYCIN: CPT | Performed by: INTERNAL MEDICINE

## 2024-03-21 RX ADMIN — OLANZAPINE 20 MG: 10 TABLET, FILM COATED ORAL at 22:26

## 2024-03-21 RX ADMIN — HEPARIN SODIUM 5000 UNITS: 5000 INJECTION, SOLUTION INTRAVENOUS; SUBCUTANEOUS at 22:26

## 2024-03-21 RX ADMIN — HEPARIN SODIUM 5000 UNITS: 5000 INJECTION, SOLUTION INTRAVENOUS; SUBCUTANEOUS at 10:43

## 2024-03-21 RX ADMIN — FINASTERIDE 5 MG: 5 TABLET, FILM COATED ORAL at 10:43

## 2024-03-21 RX ADMIN — CARVEDILOL 12.5 MG: 12.5 TABLET, FILM COATED ORAL at 10:55

## 2024-03-21 RX ADMIN — ASPIRIN 81 MG: 81 TABLET, COATED ORAL at 10:43

## 2024-03-21 RX ADMIN — CARVEDILOL 12.5 MG: 12.5 TABLET, FILM COATED ORAL at 17:01

## 2024-03-21 RX ADMIN — DOCUSATE SODIUM 100 MG: 100 CAPSULE, LIQUID FILLED ORAL at 10:43

## 2024-03-21 RX ADMIN — PIPERACILLIN SODIUM AND TAZOBACTAM SODIUM 4.5 G: 4; .5 INJECTION, SOLUTION INTRAVENOUS at 05:52

## 2024-03-21 RX ADMIN — GEMFIBROZIL 600 MG: 600 TABLET ORAL at 10:55

## 2024-03-21 RX ADMIN — VANCOMYCIN 750 MG: 750 INJECTION, SOLUTION INTRAVENOUS at 22:26

## 2024-03-21 RX ADMIN — VANCOMYCIN 750 MG: 750 INJECTION, SOLUTION INTRAVENOUS at 10:44

## 2024-03-21 RX ADMIN — GEMFIBROZIL 600 MG: 600 TABLET ORAL at 17:01

## 2024-03-21 RX ADMIN — POLYETHYLENE GLYCOL 3350 17 G: 17 POWDER, FOR SOLUTION ORAL at 10:43

## 2024-03-21 RX ADMIN — PIPERACILLIN SODIUM AND TAZOBACTAM SODIUM 4.5 G: 4; .5 INJECTION, SOLUTION INTRAVENOUS at 00:17

## 2024-03-21 RX ADMIN — PIPERACILLIN SODIUM AND TAZOBACTAM SODIUM 4.5 G: 4; .5 INJECTION, SOLUTION INTRAVENOUS at 12:34

## 2024-03-21 RX ADMIN — ATORVASTATIN CALCIUM 40 MG: 40 TABLET, FILM COATED ORAL at 10:43

## 2024-03-21 RX ADMIN — Medication 3 MG: at 22:26

## 2024-03-21 ASSESSMENT — COGNITIVE AND FUNCTIONAL STATUS - GENERAL
TOILETING: A LITTLE
MOVING TO AND FROM BED TO CHAIR: A LITTLE
TOILETING: A LITTLE
PERSONAL GROOMING: A LITTLE
HELP NEEDED FOR BATHING: A LITTLE
HELP NEEDED FOR BATHING: A LITTLE
CLIMB 3 TO 5 STEPS WITH RAILING: A LOT
CLIMB 3 TO 5 STEPS WITH RAILING: A LOT
DAILY ACTIVITIY SCORE: 19
MOBILITY SCORE: 19
STANDING UP FROM CHAIR USING ARMS: A LITTLE
DRESSING REGULAR UPPER BODY CLOTHING: A LITTLE
MOBILITY SCORE: 19
HELP NEEDED FOR BATHING: A LITTLE
TOILETING: A LITTLE
DAILY ACTIVITIY SCORE: 22
WALKING IN HOSPITAL ROOM: A LITTLE
DRESSING REGULAR UPPER BODY CLOTHING: A LITTLE
DRESSING REGULAR LOWER BODY CLOTHING: A LITTLE
DRESSING REGULAR LOWER BODY CLOTHING: A LITTLE
WALKING IN HOSPITAL ROOM: A LITTLE
CLIMB 3 TO 5 STEPS WITH RAILING: A LOT
WALKING IN HOSPITAL ROOM: A LITTLE
DAILY ACTIVITIY SCORE: 19
STANDING UP FROM CHAIR USING ARMS: A LITTLE
STANDING UP FROM CHAIR USING ARMS: A LITTLE
PERSONAL GROOMING: A LITTLE
MOBILITY SCORE: 19

## 2024-03-21 ASSESSMENT — ACTIVITIES OF DAILY LIVING (ADL)
BATHING_ASSISTANCE: SPONGE BATHING
ADL_ASSISTANCE: INDEPENDENT
ADL_ASSISTANCE: INDEPENDENT

## 2024-03-21 ASSESSMENT — PAIN SCALES - GENERAL
PAINLEVEL_OUTOF10: 4
PAINLEVEL_OUTOF10: 0 - NO PAIN
PAINLEVEL_OUTOF10: 0 - NO PAIN
PAINLEVEL_OUTOF10: 4

## 2024-03-21 ASSESSMENT — PAIN - FUNCTIONAL ASSESSMENT
PAIN_FUNCTIONAL_ASSESSMENT: 0-10

## 2024-03-21 NOTE — PROGRESS NOTES
"Vancomycin Dosing by Pharmacy- FOLLOW UP    Claude Coley is a 63 y.o. year old male who Pharmacy has been consulted for vancomycin dosing for cellulitis, skin and soft tissue. Based on the patient's indication and renal status this patient is being dosed based on a goal AUC of 400-600.     Renal function is currently declining.    Current vancomycin dose: 750 mg given every 12 hours    Estimated vancomycin AUC on current dose: 547 mg/L.hr     Visit Vitals  /61 (BP Location: Left arm, Patient Position: Lying)   Pulse 57   Temp 36.5 °C (97.7 °F) (Temporal)   Resp 14        Lab Results   Component Value Date    CREATININE 1.40 03/21/2024    CREATININE 1.30 03/20/2024    CREATININE 1.20 03/19/2024    CREATININE 1.20 03/14/2024        Patient weight is 113Kg.    No results found for: \"CULTURE\"     I/O last 3 completed shifts:  In: 1350 (12 mL/kg) [P.O.:600; IV Piggyback:750]  Out: 1750 (15.5 mL/kg) [Urine:1750 (0.4 mL/kg/hr)]  Weight: 112.6 kg   [unfilled]    No results found for: \"PATIENTTEMP\"     Assessment/Plan    Within goal AUC range. Continue current vancomycin regimen.    This dosing regimen is predicted by [employer] to result in the following pharmacokinetic parameters:    Loading dose: N/A  Regimen: 750 mg IV every 12 hours.  Start time: 11:08 on 03/21/2024  Exposure target: AUC24 (range)400-600 mg/L.hr   AUC24,ss: 547 mg/L.hr  Probability of AUC24 > 400: 98 %  Ctrough,ss: 18.3 mg/L  Probability of Ctrough,ss > 20: 34 %  Probability of nephrotoxicity (Lodise ADRYAN 2009): 15 %    The next level will be obtained on 3/28/24 at AM labs. May be obtained sooner if clinically indicated.   Will continue to monitor renal function daily while on vancomycin and order serum creatinine at least every 48 hours if not already ordered.  Follow for continued vancomycin needs, clinical response, and signs/symptoms of toxicity.       Nikolai Scott, PharmD           "

## 2024-03-21 NOTE — CARE PLAN
Problem: Pain  Goal: My pain/discomfort is manageable  Outcome: Progressing     Problem: Safety  Goal: Patient will be injury free during hospitalization  Outcome: Progressing  Goal: I will remain free of falls  Outcome: Progressing   The patient's goals for the shift include      The clinical goals for the shift include IV antibiotics; maintain pt safety; monitor labs and vitals

## 2024-03-21 NOTE — CARE PLAN
Problem: Pain  Goal: My pain/discomfort is manageable  Outcome: Progressing     Problem: Safety  Goal: Patient will be injury free during hospitalization  Outcome: Progressing  Goal: I will remain free of falls  Outcome: Progressing     Problem: Safety  Goal: Patient will be injury free during hospitalization  Outcome: Progressing  Goal: I will remain free of falls  Outcome: Progressing     Problem: Daily Care  Goal: Daily care needs are met  Outcome: Progressing     Problem: Psychosocial Needs  Goal: Demonstrates ability to cope with hospitalization/illness  Outcome: Progressing  Goal: Collaborate with me, my family, and caregiver to identify my specific goals  Outcome: Progressing     Problem: Discharge Barriers  Goal: My discharge needs are met  Outcome: Progressing     Problem: Diabetes  Goal: Achieve decreasing blood glucose levels by end of shift  Outcome: Progressing  Goal: Increase stability of blood glucose readings by end of shift  Outcome: Progressing  Goal: Decrease in ketones present in urine by end of shift  Outcome: Progressing  Goal: Maintain electrolyte levels within acceptable range throughout shift  Outcome: Progressing  Goal: Maintain glucose levels >70mg/dl to <250mg/dl throughout shift  Outcome: Progressing  Goal: No changes in neurological exam by end of shift  Outcome: Progressing  Goal: Learn about and adhere to nutrition recommendations by end of shift  Outcome: Progressing  Goal: Vital signs within normal range for age by end of shift  Outcome: Progressing  Goal: Increase self care and/or family involovement by end of shift  Outcome: Progressing  Goal: Receive DSME education by end of shift  Outcome: Progressing     Problem: Discharge Planning  Goal: Discharge to home or other facility with appropriate resources  Outcome: Progressing     Problem: Skin  Goal: Decreased wound size/increased tissue granulation at next dressing change  Outcome: Progressing  Goal: Participates in  plan/prevention/treatment measures  Outcome: Progressing  Goal: Prevent/manage excess moisture  Outcome: Progressing  Goal: Prevent/minimize sheer/friction injuries  Outcome: Progressing  Goal: Promote/optimize nutrition  Outcome: Progressing  Goal: Promote skin healing  Outcome: Progressing     Problem: Fall/Injury  Goal: Not fall by end of shift  Outcome: Progressing  Goal: Be free from injury by end of the shift  Outcome: Progressing  Goal: Verbalize understanding of personal risk factors for fall in the hospital  Outcome: Progressing  Goal: Verbalize understanding of risk factor reduction measures to prevent injury from fall in the home  Outcome: Progressing  Goal: Use assistive devices by end of the shift  Outcome: Progressing  Goal: Pace activities to prevent fatigue by end of the shift  Outcome: Progressing   The patient's goals for the shift include      The clinical goals for the shift include IV antibiotics; maintain pt safety; monitor labs and vitals

## 2024-03-21 NOTE — PROGRESS NOTES
Occupational Therapy    Evaluation    Patient Name: Claude Coley  MRN: [id number]  Today's Date: 3/21/2024  Time Calculation  Start Time: 0954  Stop Time: 1006  Time Calculation (min): 12 min    Assessment  IP OT Assessment  OT Assessment: Pt is 62 y/o male admitted for cellulitis of Rt foot. Pt presents w/ decresed ADL skills/ funcitonal mobility, decresedativity tolerance. REcommend OT services toa ddress above deficits.  Prognosis: Good  Barriers to Discharge: None  End of Session Communication: Bedside nurse  End of Session Patient Position: Bed, 3 rail up, Alarm off, not on at start of session  Plan:  Treatment Interventions: ADL retraining, Functional transfer training, Endurance training, Patient/family training  OT Frequency: 2 times per week  OT Discharge Recommendations: Moderate intensity level of continued care  Equipment Recommended upon Discharge: Wheeled walker  OT - OK to Discharge: Yes    Subjective   Current Problem:  1. Cellulitis of foot, right          General:  General  Reason for Referral: decreased ADL's  Referred By: Dr. 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, gout Rt knee  Family/Caregiver Present: No  Prior to Session Communication: Bedside nurse  Patient Position Received: Bed, 2 rail up, Alarm off, not on at start of session (seated EOB on arrival)  Preferred Learning Style: verbal  General Comment: Cleared to see for eval, pt agreeable to therapy  Precautions:  Hearing/Visual Limitations: glasses  LE Weight Bearing Status: Heel Weight Bearing in Post-Op Shoe (Rt foot)  Medical Precautions: Fall precautions  Post-Surgical Precautions: Other (comment) (Rt heel wgt bearing w/ surgical shoe)  Prosthesis/Orthosis Used: Other (comment) (post op surgical shoe)  Vital Signs:     Pain:  Pain Assessment  Pain Assessment: 0-10  Pain Score: 4  Pain Type: Surgical pain  Pain Location: Foot  Pain Orientation:  Right    Objective   Cognition:  Overall Cognitive Status: Within Functional Limits           Home Living:  Type of Home: Apartment  Lives With: Alone  Home Adaptive Equipment: Walker rolling or standard, Cane  Home Layout: One level  Home Access: Stairs to enter with rails, Level entry (stairs if going through garage, pt gets to elevator)  Entrance Stairs-Rails: Both  Entrance Stairs-Number of Steps: 12  Bathroom Shower/Tub: Tub/shower unit  Bathroom Toilet: Standard  Bathroom Equipment: None   Prior Function:  Level of [place]: Independent with ADLs and functional transfers, Independent with homemaking with ambulation  Receives Help From: Family  ADL Assistance: Independent  Homemaking Assistance: Independent  Ambulatory Assistance: Independent  Vocational: On disability  Hand Dominance: Right  Prior Function Comments: Pt does drive however hasd not driven since sx 1/24 d/t surgical shoe.  IADL History:     ADL:  Eating Assistance: Stand by  Eating Deficit: Setup  Grooming Assistance: Stand by  Grooming Deficit: Setup (to wash face)  Bathing Assistance: Sponge bathing  Bathing Deficit: Supervision/safety (for UB bathing, periarea, bottom in stance , sinkside)  UE Dressing Assistance: Minimal  UE Dressing Deficit: Thread RUE, Thread LUE, Pull around back (d/t telemetry)  LE Dressing Assistance: Minimal  LE Dressing Deficit: Don/doff R shoe, Don/doff L shoe, Don/doff L sock, Don/doff R sock (per clinical judgement)  Toileting Assistance with Device: Not performed  Functional Assistance: Not performed  Activity Tolerance:  Activity Tolerance Comments: limited ambulation in room,  Bed Mobility/Transfers: Bed Mobility 1  Bed Mobility 1: Sitting to supine  Level of Assistance 1: Independent    Transfer 1  Transfer From 1: Bed to  Transfer to 1: Stand  Technique 1: Sit to stand, Stand to sit  Transfer Device 1: Walker  Transfer Level of Assistance 1: Distant supervision  Trials/Comments 1: to/ from  EOB      Functional Mobility:  Functional Mobility  Functional Mobility Performed: Yes  Functional Mobility 1  Surface 1: Level tile  Device 1: Rolling walker  Assistance 1: Distant supervision  Comments 1: Pt ambulated from bed to bathroom and back to bed w/ distant supervison and FWW.  Modalities:     IADL's:      Vision: Vision - Basic Assessment  Current Vision: Wears glasses all the time  Sensation:  Light Touch: No apparent deficits  Strength:  Strength Comments: 4/5 (BUE)  Perception:     Coordination:  Movements are Fluid and Coordinated: Yes   Hand Function:  Hand Function  Gross Grasp: Functional  Coordination: Functional  Extremities: RUE   RUE : Within Functional Limits and LUE   LUE: Within Functional Limits      Outcome Measures: [facility] Daily Activity  Putting on and taking off regular lower body clothing: A little  Bathing (including washing, rinsing, drying): A little  Putting on and taking off regular upper body clothing: A little  Toileting, which includes using toilet, bedpan or urinal: A little  Taking care of personal grooming such as brushing teeth: A little  Eating Meals: None  Daily Activity - Total Score: 19      Education Documentation  ADL Training, taught by Shahana Lee OT at 3/21/2024 11:46 AM.  Learner: Patient  Readiness: Acceptance  Method: Explanation  Response: Needs Reinforcement, Demonstrated Understanding    Education Comments  No comments found.      Goals:   Encounter Problems       Encounter Problems (Active)       OT Goals       ADL's (Progressing)       Start:  03/21/24    Expected End:  04/04/24       Pt will complete bathing, dressing tasks w/ mod I w/ AE/ compensatory tech as needed for safety and and increased independence in self care tasks.         Functional transfers (Progressing)       Start:  03/21/24    Expected End:  04/04/24       Pt will demon functional transfers w/ mod I w/ LRD for safety and increased independence in functional transfers          Functional mobility (Progressing)       Start:  03/21/24    Expected End:  04/04/24       Pt will demon item retrieval to/ from household distance w/ mod I  for safety and increased independence in daily tasks.            OT Goals       Functional endurance (Progressing)       Start:  03/21/24    Expected End:  04/04/24       Pt will tolerate 20 minutes of functional activity to improve functional endurance for daily tasks and functional mobility.

## 2024-03-21 NOTE — PROGRESS NOTES
03/21/24 0838   Discharge Planning   Patient expects to be discharged to: Return to [facility]   Does the patient need discharge transport arranged? Yes   RoundTrip coordination needed? Yes   Has discharge transport been arranged? No         Reportedly, doing better with the antibiotics.  Waiting on wound culture.  OUTPATIENT surgery to be done on 3/27 by podiatry.    Patient will return to [facility] when cultures back and medically cleared.    Patient needs a precert to return.  Our  precert team starting that now for [facility]

## 2024-03-21 NOTE — PROGRESS NOTES
Physical Therapy    Physical Therapy Evaluation    Patient Name: Claude Coley  MRN: [id number]  Today's Date: 3/21/2024   Time Calculation  Start Time: 0854  Stop Time: 0915  Time Calculation (min): 21 min    Assessment/Plan   PT Assessment  PT Assessment Results: Decreased strength, Decreased range of motion, Impaired balance, Decreased endurance, Decreased mobility, Impaired sensation, Decreased skin integrity, Orthopedic restrictions, Pain  Rehab Prognosis: Good  Barriers to Discharge: none  Evaluation/Treatment Tolerance: Patient tolerated treatment well  Medical Staff Made Aware: Yes  Strengths: Ability to acquire knowledge, Attitude of self  Barriers to Participation: Comorbidities  End of Session Communication: Bedside nurse  Assessment Comment: Pt is a 63 y.o. male adm for  cellulitis RT foot. Pt has been at rehab since foot sx  1/24, is still anticipating possible additional surgery. Pt was IND prior to surgery in 1/24, currently moving at supervised/MOD I level for limited amb w/ heel weightbearing in post op shoe per activity orders. Pt would benefit from continued skilled PT services to promote safe functional activity, strength and endurance w/ activity limitations.  End of Session Patient Position: Bed, 2 rail up, Alarm off, not on at start of session  IP OR SWING BED PT PLAN  Inpatient or Swing Bed: Inpatient  PT Plan  Treatment/Interventions: Transfer training, Gait training, Strengthening, Endurance training, Therapeutic exercise, Therapeutic activity  PT Plan: Skilled PT  PT Frequency: 3 times per week  PT Discharge Recommendations: Moderate intensity level of continued care  Equipment Recommended upon Discharge: Wheeled walker  PT Recommended Transfer Status: Stand by assist, Assistive device  PT - OK to Discharge: Yes      Subjective   General Visit Information:  General  Reason for Referral: impaired mobility  Referred By: Dr Fang  Past Medical History Relevant to Rehab: DM, HTN, MO, Rt  foot infection, 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  Home Living:  Home Living  Type of Home: Apartment (5th floor)  Lives With: Alone  Home Adaptive Equipment: Walker rolling or standard, Cane  Home Layout: One level  Home Access: Stairs to enter with rails, Level entry (stairs if going through garage, gets pt to elevator)  Entrance Stairs-Rails: Both  Entrance Stairs-Number of Steps: 12  Bathroom Shower/Tub: Tub/shower unit  Bathroom Toilet: Standard  Bathroom Equipment: None  Prior Level of Function:  Prior Function Per Pt/Caregiver Report  Level of [place]: Independent with ADLs and functional transfers, Independent with homemaking with ambulation  ADL Assistance: Independent  Homemaking Assistance: Independent (at baseline, per staff at facility)  Ambulatory Assistance: Independent (walker at rehab, heel weightbearing)  Prior Function Comments: Pt drives at baseline, denies falls; has been at rehab since foot sx 1/24  Precautions:  Precautions  Hearing/Visual Limitations: glasses  LE Weight Bearing Status: Heel Weight Bearing in Post-Op Shoe (Rt foot)  Medical Precautions: Fall precautions  Post-Surgical Precautions: Other (comment) (Rt foot heel only weightbearing w/ post op shoe)  Prosthesis/Orthosis Used: Other (comment) (post op shoe Rt)    Objective   Pain:  Pain Assessment  Pain Assessment: 0-10  Pain Score: 4  Pain Location: Foot  Pain Orientation: Right  Pain Interventions:  (pt denied need for pain med)  Cognition:  Cognition  Overall Cognitive Status: Within Functional Limits    General Assessments:  Activity Tolerance  Activity Tolerance Comments: fair, limited amb in room    Sensation  Sensation Comment: numbness bilat  feet    Strength  Strength Comments: BLEs grossly 3/5 or >  Postural Control  Posture Comment: heel weightbearing Rt    Dynamic Standing Balance  Dynamic Standing-Balance Support: Bilateral upper extremity supported (RW)  Dynamic Standing-Balance:  (amb, turns)  Dynamic Standing-Comments: Fair  Functional Assessments:  Bed Mobility  Bed Mobility: Yes  Bed Mobility 1  Bed Mobility 1: Supine to sitting, Sitting to supine  Level of Assistance 1: Independent  Bed Mobility Comments 1: to Lt EOB    Transfers  Transfer: Yes  Transfer 1  Technique 1: Sit to stand, Stand to sit  Transfer Device 1: Walker  Transfer Level of Assistance 1: Distant supervision, Modified independent  Trials/Comments 1: performed from/to EOB, to/from commode, does well off lower surfaces w/ tall stature.    Ambulation/Gait Training  Ambulation/Gait Training Performed: Yes  Ambulation/Gait Training 1  Surface 1: Level tile  Device 1: Rolling walker  Assistance 1: Distant supervision, Modified independent  Comments/Distance (ft) 1: Pt amb ~10' x 2 to/from bathroom w/ post op shoe Rt and heel WBing only w/ fair performance.  Extremity/Trunk Assessments:  RLE   RLE : Within Functional Limits (pt notes LE feels a little heavy and stiff at knee)  LLE   LLE : Within Functional Limits (pt notes LE feels a little heavy and stiff at knees)  Outcome Measures:  [facility] Basic Mobility  Turning from your back to your side while in a flat bed without using bedrails: None  Moving from lying on your back to sitting on the side of a flat bed without using bedrails: None  Moving to and from bed to chair (including a wheelchair): A little  Standing up from a chair using your arms (e.g. wheelchair or bedside chair): A little  To walk in hospital room: A little  Climbing 3-5 steps with railing: A lot  Basic Mobility - Total Score: 19    Encounter Problems       Encounter Problems (Active)       Balance       LTG - Patient will maintain balance to allow for safe mobility  (Progressing)       Start:  03/21/24    Expected End:  03/29/24               Mobility       STG - Patient will ambulate 50' w/ supervision and RW while maintaining heel weightbearing Rt (Progressing)       Start:  03/21/24    Expected End:  03/29/24            Pt participated in BLE exercises to promote functional strength and endurance (Progressing)       Start:  03/21/24    Expected End:  03/29/24               PT Transfers       STG - Patient will transfer sit to and from stand MOD I, heel weightbearing Rt (Progressing)       Start:  03/21/24    Expected End:  03/29/24                   Education Documentation  Precautions, taught by Lolita Linda, PT at 3/21/2024  9:54 AM.  Learner: Patient  Readiness: Acceptance  Method: Explanation, Demonstration  Response: Verbalizes Understanding, Needs Reinforcement    Mobility Training, taught by Lolita Linda, PT at 3/21/2024  9:54 AM.  Learner: Patient  Readiness: Acceptance  Method: Explanation, Demonstration  Response: Verbalizes Understanding, Needs Reinforcement    Education Comments  No comments found.

## 2024-03-21 NOTE — PROGRESS NOTES
PODIATRY SERVICE CONSULT PROGRESS NOTE    SERVICE DATE: 3/21/2024   SERVICE TIME:  3:45PM    Subjective   INTERVAL HPI:   Patient was seen at bedside.  Pain well controlled.  Patient denies any constitutional symptoms.   No other pedal complaints.   Anticipates discharge back to [facility].   Missed his PAT appointment today.    Medications:  Scheduled Meds: aspirin, 81 mg, oral, Daily  atorvastatin, 40 mg, oral, Daily  carvedilol, 12.5 mg, oral, BID with meals  docusate sodium, 100 mg, oral, Daily  finasteride, 5 mg, oral, Daily  gemfibrozil, 600 mg, oral, BID AC  heparin (porcine), 5,000 Units, subcutaneous, q12h  insulin lispro, 0-10 Units, subcutaneous, TID with meals  melatonin, 3 mg, oral, Nightly  OLANZapine, 20 mg, oral, Nightly  polyethylene glycol, 17 g, oral, Daily  vancomycin-diluent combo no.1, 750 mg, intravenous, q12h      Continuous Infusions:    PRN Meds: PRN medications: acetaminophen **OR** acetaminophen **OR** acetaminophen, dextrose, dextrose, glucagon, ondansetron ODT **OR** ondansetron, vancomycin         Objective   PHYSICAL EXAM:  Physical Exam Performed:  Vitals:    03/21/24 1511   BP: 119/60   Pulse: 54   Resp: 16   Temp: 36 °C (96.8 °F)   SpO2: 99%     Body mass index is 31.87 kg/m².    Patient is AOx3 and in no acute distress. Patient is alert and cooperative. Sitting comfortably in bed with dressing intact with some plantar strikethrough noted.     Vascular: Palpable DP/PT pulses B/L. Severe pitting edema noted to RLE. Hair growth absent B/L. CFT<5 to B/L hallux. Temperature is warm to cool from tibial tuberosity to distal digits B/L. No lymphatic streaking noted B/L.     Musculoskeletal: s/p right partial 5th ray resection. Gross active and passive ROM intact to age and activity level. Moves all extremities spontaneously. No pain to palpation at feet B/L.      Neurological: Absent light touch sensation B/L. Pain stimuli absent B/L.      Dermatologic: Nails 1-4 are thickened,  elongated, discolored with subungual debris B/L. Skin appears diffusely xerotic B/L. Web spaces 1-3 B/L are clean, dry and intact. No rashes or nodules noted B/L. No hyperkeratotic tissue noted B/L.      Wound: Right plantar lateral foot  Images:        Measurements: 12cm x 8cm x 2.5cm deep at proximal lateral curved portion that extends an additional 9cm  Mixed wound base of 80% granular tissue with 20% various areas of fibrosis/necrosis.  Mild serosanguinous drainage with fibrotic slough.   Minimal basil-wound maceration.   Mild basil-wound erythema.   No evidence of ascending cellulitis or lymphangitis.  No palpable fluctuance. Mild malodor. No increased warmth.   No probe to bone. Proximal lateral portion probes deep 2.5cm to deep fascia and/or capsule.  Minimal tunneling or tracking.   No undermining. Skin edges irregular but intact.      LABS:   Results for orders placed or performed during the hospital encounter of 03/19/24 (from the past 24 hour(s))   POCT GLUCOSE   Result Value Ref Range    POCT Glucose 101 (H) 74 - 99 mg/dL   POCT GLUCOSE   Result Value Ref Range    POCT Glucose 86 74 - 99 mg/dL   Vancomycin   Result Value Ref Range    Vancomycin 22.9 (H) 10.0 - 20.0 ug/mL   CBC   Result Value Ref Range    WBC 5.9 4.4 - 11.3 x10*3/uL    nRBC 0.0 0.0 - 0.0 /100 WBCs    RBC 3.42 (L) 4.50 - 5.90 x10*6/uL    Hemoglobin 10.1 (L) 13.5 - 17.5 g/dL    Hematocrit 29.7 (L) 41.0 - 52.0 %    MCV 87 80 - 100 fL    MCH 29.5 26.0 - 34.0 pg    MCHC 34.0 32.0 - 36.0 g/dL    RDW 13.2 11.5 - 14.5 %    Platelets 136 (L) 150 - 450 x10*3/uL   Basic metabolic panel   Result Value Ref Range    Glucose 90 65 - 99 mg/dL    Sodium 135 133 - 145 mmol/L    Potassium 3.9 3.4 - 5.1 mmol/L    Chloride 102 97 - 107 mmol/L    Bicarbonate 21 (L) 24 - 31 mmol/L    Urea Nitrogen 44 (H) 8 - 25 mg/dL    Creatinine 1.40 0.40 - 1.60 mg/dL    eGFR 57 (L) >60 mL/min/1.73m*2    Calcium 8.9 8.5 - 10.4 mg/dL    Anion Gap 12 <=19 mmol/L   Ferritin  "  Result Value Ref Range    Ferritin 368 30 - 400 ng/mL   Iron and TIBC   Result Value Ref Range    Iron 107 45 - 160 ug/dL    UIBC 152 110 - 370 ug/dL    TIBC 259 228 - 428 ug/dL    % Saturation 41 12 - 50 %   POCT GLUCOSE   Result Value Ref Range    POCT Glucose 93 74 - 99 mg/dL   POCT GLUCOSE   Result Value Ref Range    POCT Glucose 95 74 - 99 mg/dL   POCT GLUCOSE   Result Value Ref Range    POCT Glucose 120 (H) 74 - 99 mg/dL      Lab Results   Component Value Date    HGBA1C 4.4 03/19/2024      No results found for: \"CRP\"   Lab Results   Component Value Date    SEDRATE 47 (H) 03/19/2024        Results from last 7 days   Lab Units 03/21/24  0354   WBC AUTO x10*3/uL 5.9   RBC AUTO x10*6/uL 3.42*   HEMOGLOBIN g/dL 10.1*   HEMATOCRIT % 29.7*     Results from last 7 days   Lab Units 03/21/24  0354   SODIUM mmol/L 135   POTASSIUM mmol/L 3.9   CHLORIDE mmol/L 102   CO2 mmol/L 21*   BUN mg/dL 44*   CREATININE mg/dL 1.40   CALCIUM mg/dL 8.9           IMAGING REVIEW:  XR chest 1 view    Result Date: 3/20/2024  Interpreted By:  Nael Moore, STUDY: XR CHEST 1 VIEW; 3/20/2024 3:57 pm   INDICATION: Signs/Symptoms:PICC placement.   COMPARISON: None   ACCESSION NUMBER(S): [id number]   ORDERING CLINICIAN: ANGELITA YOUSSEF   TECHNIQUE: 1 view of the chest was performed.   FINDINGS: Right-sided PICC line catheter with tip at the mid SVC. The lungs are adequately inflated. No acute consolidation. No pleural effusion. No pneumothorax.  The cardiomediastinal silhouette is within normal limits.       Right-sided PICC line catheter with tip at the mid SVC. No acute cardiopulmonary disease.   Signed by: Nael Moore 3/20/2024 4:14 PM Dictation workstation:   [id number]    Lower extremity venous duplex right    Result Date: 3/19/2024  Interpreted By:  Favian Castellano, [facility]; 3/19/2024 3:22 pm   INDICATION: Signs/Symptoms:leg swelling + d dimer..   COMPARISON: None.   ACCESSION " NUMBER(S): [id number]   ORDERING CLINICIAN: CHARO FRIED   TECHNIQUE: Vascular ultrasound of the right lower extremity was performed. Real time compression views as well as Gray scale, color Doppler and spectral Doppler waveform analysis was performed.   FINDINGS: Evaluation of the visualized portions of the right common femoral vein, proximal, mid, and distal femoral vein, and popliteal vein were performed.  Evaluation of the visualized portions of the posterior tibial and peroneal veins were also performed.  In addition, evaluation of the contralateral common femoral vein was performed.   Limitations: None   The evaluated veins demonstrate normal compressibility. There is intact venous flow demonstrating normal respiratory variability and normal augmentation of flow with calf compression. Therefore, there is no ultrasonographic evidence for deep vein thrombosis within the evaluated veins. No evidence of thrombus is seen within the contralateral common femoral vein.       No sonographic evidence for deep vein thrombosis within the evaluated veins of the right lower extremity.   MACRO: None   Signed by: Favian Castellano 3/19/2024 3:22 PM Dictation workstation:   [id number]    XR foot right 3+ views    Result Date: 3/19/2024  Interpreted By:  Lux Hernandez, STUDY: XR FOOT RIGHT 3+ VIEWS   INDICATION: Signs/Symptoms:r foot wound.   COMPARISON: January 26, 2024   ACCESSION NUMBER(S): [id number]   ORDERING CLINICIAN: CHARO FRIED   FINDINGS: Postsurgical changes status post 5th ray amputation to the level of the mid metatarsal.   Suspected skin wounds at the surgical site. No aggressive bony destructive changes or erosions.   Marked soft tissue swelling dorsally.       Suspected skin wounds of the 5th amputation site with no osseous acute abnormality.   Marked dorsal soft tissue swelling, potentially cellulitis.   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  - Charts, labs, vitals and imaging all reviewed.   - Imaging: Plain films of right foot with no osseous erosion or soft tissue gas noted. There is increased soft tissue volume and density throughout the dorsal foot and plantar hindfoot  - Labs: ESR 47, WBC 5.9, HGB 10.1  - Wound culture: 3/19 growing 2+ MRSA susceptible to tetracyclines, Bactrim, and vancomycin.  - Blood culture: collected and pending     Plan:  - Wound has significantly improved since restarting of IV abx. Dressing changed.  Recommend continued IV abx outpatient. Plan to return to [facility], pending precert.  - No podiatric surgical intervention is planned during this hospitalization, except in the case of the patient remaining inpatient until 3/27.  - Will proceed with outpatient podiatric surgery next week as planned on 3/27/24.  - Abx: Vancomycin per primary/ID  - Pain and Bowel Regimens: per primary  - Dressings: Betadine-soaked 4x4s, dry 4x4s, ABDs, kerlix, ACE. To be changed daily.  - Nursing staff is able to change/reinforce dressing if & as necessary until next dressing change. Thank you.  - Podiatry will continue to follow while in house.  - Discussed with Dr. Bolaños.     DVT ppx: heparin subcutaneous, per primary  Weightbearing: Heel-only weight-bearing to RLE in surgical shoe only.  Discharge: Patient to follow up 1 week after discharge with Dr. Perry.    Case to be discussed with attending, A&P above reflects a tentative plan. Please await for the final signature from the attending physician on service.    This patient will be followed by the Podiatry  service. Please Epic Chat the corresponding residents below with questions or concerns.      Husam Lopez DPM PGY-2  Podiatric Medicine and Surgery   Epic Chat          SIGNATURE: Husam Lopez DPM PATIENT NAME: Claude Coley   DATE: March 21, 2024 MRN: [id number]   TIME: 3:50 PM CONTACT: Haiku Message

## 2024-03-21 NOTE — PROGRESS NOTES
Claude Coley is a 63 y.o. male on day 2 of admission presenting with Cellulitis of foot, right.    Subjective   Interval History:   Interval improvement  Afebrile, denies chills  Denies shortness of breath cough or chest pain  Denies nausea vomiting or diarrhea            Objective   Range of Vitals (last 24 hours)  Heart Rate:  [54-59]   Temp:  [35.6 °C (96.1 °F)-36.5 °C (97.7 °F)]   Resp:  [14-17]   BP: (114-134)/(53-64)   SpO2:  [95 %-100 %]   Daily Weight  03/20/24 : 113 kg (248 lb 3.8 oz)    Body mass index is 31.87 kg/m².    Physical Exam  Constitutional:       Appearance: Normal appearance.   HENT:      Head: Normocephalic and atraumatic.      Nose: Nose normal.      Mouth/Throat:      Mouth: Mucous membranes are moist.      Pharynx: Oropharynx is clear.   Eyes:      Conjunctiva/sclera: Conjunctivae normal.   Cardiovascular:      Rate and Rhythm: Normal rate and regular rhythm.   Pulmonary:      Effort: Pulmonary effort is normal.      Breath sounds: Normal breath sounds.   Abdominal:      General: Bowel sounds are normal.      Palpations: Abdomen is soft.   Musculoskeletal:         General: Normal range of motion.      Cervical back: Normal range of motion and neck supple.   Skin:     Comments: Right foot wound with large amount of granulation tissue, purulent drainage, necrotic tissue malodor.     Neurological:      Mental Status: He is alert.      Comments: Awake, alert   Psychiatric:         Mood and Affect: Mood normal.         Behavior: Behavior normal.     Antibiotics  sodium chloride 0.9 % bolus 500 mL  cefTRIAXone (Rocephin) 2 g IV in dextrose 5% 50 mL  vancomycin 1.5 g in 300 mL (Xellia) IVPB 1.5 g  aspirin EC tablet 81 mg  atorvastatin (Lipitor) tablet 40 mg  carvedilol (Coreg) tablet 12.5 mg  finasteride (Proscar) tablet 5 mg  gemfibrozil (Lopid) tablet 600 mg  OLANZapine (ZyPREXA) tablet 20 mg  piperacillin-tazobactam-dextrose (Zosyn) IV 4.5 g  vancomycin (Vancocin) pharmacy to dose -  "pharmacy monitoring  dextrose 50 % injection 25 g  glucagon (Glucagen) injection 1 mg  dextrose 50 % injection 12.5 g  insulin lispro (HumaLOG) injection 0-10 Units  heparin (porcine) injection 5,000 Units  acetaminophen (Tylenol) tablet 650 mg  acetaminophen (Tylenol) oral liquid 650 mg  acetaminophen (Tylenol) suppository 650 mg  ondansetron ODT (Zofran-ODT) disintegrating tablet 4 mg  ondansetron (Zofran) injection 4 mg  melatonin tablet 3 mg  polyethylene glycol (Glycolax, Miralax) packet 17 g  vancomycin (Xellia) 1 g in 200 mL (Xellia) IVPB 1 g  docusate sodium (Colace) capsule 100 mg  vancomycin-diluent combo no.1 (Xellia) IVPB 750 mg      Relevant Results  Labs  Results from last 72 hours   Lab Units 03/21/24  0354 03/20/24  0506 03/19/24  1246   WBC AUTO x10*3/uL 5.9 5.8 6.6   HEMOGLOBIN g/dL 10.1* 10.7* 11.4*   HEMATOCRIT % 29.7* 31.1* 32.8*   PLATELETS AUTO x10*3/uL 136* 137* 153   NEUTROS PCT AUTO %  --  57.7 64.0   LYMPHS PCT AUTO %  --  21.9 18.3   MONOS PCT AUTO %  --  9.8 9.6   EOS PCT AUTO %  --  9.2 7.0     Results from last 72 hours   Lab Units 03/21/24  0354 03/20/24  0506 03/19/24  1246   SODIUM mmol/L 135 139 135   POTASSIUM mmol/L 3.9 4.3 4.9   CHLORIDE mmol/L 102 106 100   CO2 mmol/L 21* 20* 21*   BUN mg/dL 44* 48* 49*   CREATININE mg/dL 1.40 1.30 1.20   GLUCOSE mg/dL 90 88 120*   CALCIUM mg/dL 8.9 9.1 9.9   ANION GAP mmol/L 12 13 14   EGFR mL/min/1.73m*2 57* 63 69         Estimated Creatinine Clearance: 74.1 mL/min (by C-G formula based on SCr of 1.4 mg/dL).  No results found for: \"CRP\"  Microbiology  Susceptibility data from last 14 days.  Collected Specimen Info Organism Clindamycin Erythromycin Oxacillin Tetracycline Trimethoprim/Sulfamethoxazole Vancomycin   03/19/24 Tissue/Biopsy from Wound/Tissue Methicillin Resistant Staphylococcus aureus (MRSA) R R R S S S       Imaging  XR chest 1 view    Result Date: 3/20/2024  Interpreted By:  Nael Moore, STUDY: XR CHEST 1 VIEW; 3/20/2024 " 3:57 pm   INDICATION: Signs/Symptoms:PICC placement.   COMPARISON: None   ACCESSION NUMBER(S): [id number]   ORDERING CLINICIAN: ANGELITA YOUSSEF   TECHNIQUE: 1 view of the chest was performed.   FINDINGS: Right-sided PICC line catheter with tip at the mid SVC. The lungs are adequately inflated. No acute consolidation. No pleural effusion. No pneumothorax.  The cardiomediastinal silhouette is within normal limits.       Right-sided PICC line catheter with tip at the mid SVC. No acute cardiopulmonary disease.   Signed by: Nael Moore 3/20/2024 4:14 PM Dictation workstation:   [id number]    Lower extremity venous duplex right    Result Date: 3/19/2024  Interpreted By:  Favian Castellano, STUDY: VAS US LOWER EXTREMITY VENOUS DUPLEX RIGHT; 3/19/2024 3:22 pm   INDICATION: Signs/Symptoms:leg swelling + d dimer..   COMPARISON: None.   ACCESSION NUMBER(S): [id number]   ORDERING CLINICIAN: CHARO FRIED   TECHNIQUE: Vascular ultrasound of the right lower extremity was performed. Real time compression views as well as Gray scale, color Doppler and spectral Doppler waveform analysis was performed.   FINDINGS: Evaluation of the visualized portions of the right common femoral vein, proximal, mid, and distal femoral vein, and popliteal vein were performed.  Evaluation of the visualized portions of the posterior tibial and peroneal veins were also performed.  In addition, evaluation of the contralateral common femoral vein was performed.   Limitations: None   The evaluated veins demonstrate normal compressibility. There is intact venous flow demonstrating normal respiratory variability and normal augmentation of flow with calf compression. Therefore, there is no ultrasonographic evidence for deep vein thrombosis within the evaluated veins. No evidence of thrombus is seen within the contralateral common femoral vein.       No sonographic evidence for deep vein thrombosis within the evaluated veins of the right lower extremity.    MACRO: None   Signed by: Favian Castellano 3/19/2024 3:22 PM Dictation workstation:   [id number]    XR foot right 3+ views    Result Date: 3/19/2024  Interpreted By:  Lux Hernandez, STUDY: XR FOOT RIGHT 3+ VIEWS   INDICATION: Signs/Symptoms:r foot wound.   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   Suspected skin wounds at the surgical site. No aggressive bony destructive changes or erosions.   Marked soft tissue swelling dorsally.       Suspected skin wounds of the 5th amputation site with no osseous acute abnormality.   Marked dorsal soft tissue swelling, potentially cellulitis.   Signed by: Lux Hernandez 3/19/2024 2:09 PM Dictation workstation:   [id number]           Assessment/Plan   Infected right diabetic foot ulcer-MRSA  Has retained right upper extremity PICC  History of strep anginosus and Bacteroides fragilis       IV vancomycin  Monitor renal function  Discontinue IV Zosyn  Supportive care  Follow up Blood cultures   Local care  Offloading  Monitor temperature and WBC  Podiatry  follow up      Bere Jacobo, APRN-CNP

## 2024-03-21 NOTE — PROGRESS NOTES
Claude Coley is a 63 y.o. male on day 2 of admission presenting with Cellulitis of foot, right.      Subjective   Patient complain of intermittent right lower extremity pain.  Patient has been afebrile through the night.        Objective     Last Recorded Vitals  /61 (BP Location: Left arm, Patient Position: Lying)   Pulse 57   Temp 36.5 °C (97.7 °F) (Temporal)   Resp 14   Wt 113 kg (248 lb 3.8 oz)   SpO2 98%   Intake/Output last 3 Shifts:    Intake/Output Summary (Last 24 hours) at 3/21/2024 1001  Last data filed at 3/21/2024 0731  Gross per 24 hour   Intake 700 ml   Output 1300 ml   Net -600 ml         Admission Weight  Weight: 113 kg (250 lb 3.6 oz) (03/19/24 1202)    Daily Weight  03/20/24 : 113 kg (248 lb 3.8 oz)    Image Results  XR chest 1 view  Narrative: Interpreted By:  Nael Moore,   STUDY:  XR CHEST 1 VIEW; 3/20/2024 3:57 pm      INDICATION:  Signs/Symptoms:PICC placement.      COMPARISON:  None      ACCESSION NUMBER(S):  [id number]      ORDERING CLINICIAN:  ANGELITA YOUSSEF      TECHNIQUE:  1 view of the chest was performed.      FINDINGS:  Right-sided PICC line catheter with tip at the mid SVC. The lungs are  adequately inflated. No acute consolidation. No pleural effusion. No  pneumothorax.  The cardiomediastinal silhouette is within normal  limits.      Impression: Right-sided PICC line catheter with tip at the mid SVC. No acute  cardiopulmonary disease.      Signed by: Nael Moore 3/20/2024 4:14 PM  Dictation workstation:   [id number]      Physical Exam    General:  cooperating during physical exam.  HEENT: Pupils are equal and reactive to light and commendation , oral mucosa moist, no JVD oral mucosa is moist.  Cardiovascular: Normal sinus rhythm, no MRG.  Lungs: Clear to auscultation bilaterally, no wheezing, no crackles, no dullness to percussion.  Abdomen: No hepatosplenomegaly appreciated, soft , not tender, positive bowel sounds, positive bowel movement.  Neuro:  Alert and oriented x3, strength in upper and lower extremities , sensation intact.  Psych: Patient had great insight was going on  Musculoskeletal: Right lower extremity swelling, cellulitis of right foot   Vascular: Pulses are intact in upper and lower extremities  Skin: Right foot cellulitis, dressing in place.      Assessment/Plan        Diabetic foot infection  Right foot cellulitis  X-ray of right foot no evidence of osteomyelitis  Cover aggressively with antibiotics  Patient was started on Zosyn and vancomycin   Dr. Saravia from ID services  on the  case  Evaluated by podiatry on the case  Wound culture  May need debridement.     Diabetes mellitus type 2.  Cover with insulin sliding scale  Hemoglobin A1c  Diabetic diet     Hypertension fairly controlled  Patient is on lisinopril  Monitor close     Hyperlipidemia     Right lower extremity swelling  Ultrasound of right lower extremity     DVT prophylaxis  GI prophylaxis  Morbid obesity  Advise regarding diet     Waiting for consultant to see him today.       Acute kidney injury.  Improved    Elevated by physical therapy.    Patient will need placement to skilled nursing facility.        Principal Problem:    Cellulitis of foot, right                  Martina Fang MD

## 2024-03-21 NOTE — PROGRESS NOTES
Music Therapy Note    Claude Coley     Therapy Session  Referral Type: New referral this admission  Visit Type: New visit  Session Start Time: 1427  Session End Time: 1430  Intervention Delivery: In-person  Conflict of Service: Declined treatment  Family Present for Session: None               Treatment/Interventions       Post-assessment  Total Session Time (min): 3 minutes    Narrative  Assessment Detail: MT attempted session but pt declined services politely due to short stay (expecting to be discharged tomorrow). Pt made aware of options in music therapy but politely declined again. MT will sign off    Education Documentation  No documentation found.

## 2024-03-21 NOTE — NURSING NOTE
Upon rounding right upper arm single lumen PICC with current CHG dressing dry and intact. Brisk blood return and flushes easily, clamped (PICC not placed here) and Curos cap intact.

## 2024-03-22 LAB
ANION GAP SERPL CALC-SCNC: 11 MMOL/L
BUN SERPL-MCNC: 31 MG/DL (ref 8–25)
CALCIUM SERPL-MCNC: 9.3 MG/DL (ref 8.5–10.4)
CHLORIDE SERPL-SCNC: 108 MMOL/L (ref 97–107)
CO2 SERPL-SCNC: 20 MMOL/L (ref 24–31)
CREAT SERPL-MCNC: 1 MG/DL (ref 0.4–1.6)
EGFRCR SERPLBLD CKD-EPI 2021: 86 ML/MIN/1.73M*2
ERYTHROCYTE [DISTWIDTH] IN BLOOD BY AUTOMATED COUNT: 13.2 % (ref 11.5–14.5)
GLUCOSE BLD MANUAL STRIP-MCNC: 113 MG/DL (ref 74–99)
GLUCOSE BLD MANUAL STRIP-MCNC: 122 MG/DL (ref 74–99)
GLUCOSE BLD MANUAL STRIP-MCNC: 70 MG/DL (ref 74–99)
GLUCOSE BLD MANUAL STRIP-MCNC: 87 MG/DL (ref 74–99)
GLUCOSE SERPL-MCNC: 86 MG/DL (ref 65–99)
HCT VFR BLD AUTO: 29.9 % (ref 41–52)
HGB BLD-MCNC: 10.4 G/DL (ref 13.5–17.5)
MCH RBC QN AUTO: 29.5 PG (ref 26–34)
MCHC RBC AUTO-ENTMCNC: 34.8 G/DL (ref 32–36)
MCV RBC AUTO: 85 FL (ref 80–100)
NRBC BLD-RTO: 0 /100 WBCS (ref 0–0)
PLATELET # BLD AUTO: 135 X10*3/UL (ref 150–450)
POTASSIUM SERPL-SCNC: 4.1 MMOL/L (ref 3.4–5.1)
RBC # BLD AUTO: 3.52 X10*6/UL (ref 4.5–5.9)
SODIUM SERPL-SCNC: 139 MMOL/L (ref 133–145)
WBC # BLD AUTO: 5.3 X10*3/UL (ref 4.4–11.3)

## 2024-03-22 PROCEDURE — 2500000001 HC RX 250 WO HCPCS SELF ADMINISTERED DRUGS (ALT 637 FOR MEDICARE OP): Performed by: INTERNAL MEDICINE

## 2024-03-22 PROCEDURE — 85027 COMPLETE CBC AUTOMATED: CPT | Performed by: INTERNAL MEDICINE

## 2024-03-22 PROCEDURE — 2500000004 HC RX 250 GENERAL PHARMACY W/ HCPCS (ALT 636 FOR OP/ED): Performed by: INTERNAL MEDICINE

## 2024-03-22 PROCEDURE — 82947 ASSAY GLUCOSE BLOOD QUANT: CPT

## 2024-03-22 PROCEDURE — 97110 THERAPEUTIC EXERCISES: CPT | Mod: GO

## 2024-03-22 PROCEDURE — 1100000001 HC PRIVATE ROOM DAILY

## 2024-03-22 PROCEDURE — 2500000002 HC RX 250 W HCPCS SELF ADMINISTERED DRUGS (ALT 637 FOR MEDICARE OP, ALT 636 FOR OP/ED): Mod: MUE | Performed by: INTERNAL MEDICINE

## 2024-03-22 PROCEDURE — 97110 THERAPEUTIC EXERCISES: CPT | Mod: GP,CQ

## 2024-03-22 PROCEDURE — 80048 BASIC METABOLIC PNL TOTAL CA: CPT | Performed by: INTERNAL MEDICINE

## 2024-03-22 PROCEDURE — 2500000002 HC RX 250 W HCPCS SELF ADMINISTERED DRUGS (ALT 637 FOR MEDICARE OP, ALT 636 FOR OP/ED): Performed by: INTERNAL MEDICINE

## 2024-03-22 PROCEDURE — 97116 GAIT TRAINING THERAPY: CPT | Mod: GP,CQ

## 2024-03-22 PROCEDURE — 97530 THERAPEUTIC ACTIVITIES: CPT | Mod: GO

## 2024-03-22 RX ORDER — OXYCODONE HYDROCHLORIDE 5 MG/1
5 TABLET ORAL EVERY 8 HOURS PRN
Status: DISCONTINUED | OUTPATIENT
Start: 2024-03-22 | End: 2024-03-25 | Stop reason: HOSPADM

## 2024-03-22 RX ORDER — VANCOMYCIN HYDROCHLORIDE 1.25 G/25ML
750 INJECTION, POWDER, LYOPHILIZED, FOR SOLUTION INTRAVENOUS EVERY 12 HOURS
Qty: 18 G | Refills: 0 | Status: SHIPPED | OUTPATIENT
Start: 2024-03-22 | End: 2024-04-01 | Stop reason: HOSPADM

## 2024-03-22 RX ADMIN — HEPARIN SODIUM 5000 UNITS: 5000 INJECTION, SOLUTION INTRAVENOUS; SUBCUTANEOUS at 09:40

## 2024-03-22 RX ADMIN — OLANZAPINE 20 MG: 10 TABLET, FILM COATED ORAL at 22:17

## 2024-03-22 RX ADMIN — GEMFIBROZIL 600 MG: 600 TABLET ORAL at 09:40

## 2024-03-22 RX ADMIN — FINASTERIDE 5 MG: 5 TABLET, FILM COATED ORAL at 09:40

## 2024-03-22 RX ADMIN — GEMFIBROZIL 600 MG: 600 TABLET ORAL at 17:12

## 2024-03-22 RX ADMIN — Medication 3 MG: at 22:17

## 2024-03-22 RX ADMIN — ASPIRIN 81 MG: 81 TABLET, COATED ORAL at 09:40

## 2024-03-22 RX ADMIN — ATORVASTATIN CALCIUM 40 MG: 40 TABLET, FILM COATED ORAL at 09:40

## 2024-03-22 RX ADMIN — CARVEDILOL 12.5 MG: 12.5 TABLET, FILM COATED ORAL at 09:40

## 2024-03-22 RX ADMIN — VANCOMYCIN 750 MG: 750 INJECTION, SOLUTION INTRAVENOUS at 09:40

## 2024-03-22 RX ADMIN — HEPARIN SODIUM 5000 UNITS: 5000 INJECTION, SOLUTION INTRAVENOUS; SUBCUTANEOUS at 22:17

## 2024-03-22 RX ADMIN — POLYETHYLENE GLYCOL 3350 17 G: 17 POWDER, FOR SOLUTION ORAL at 09:40

## 2024-03-22 RX ADMIN — CARVEDILOL 12.5 MG: 12.5 TABLET, FILM COATED ORAL at 17:12

## 2024-03-22 RX ADMIN — DOCUSATE SODIUM 100 MG: 100 CAPSULE, LIQUID FILLED ORAL at 09:40

## 2024-03-22 RX ADMIN — VANCOMYCIN 750 MG: 750 INJECTION, SOLUTION INTRAVENOUS at 22:16

## 2024-03-22 ASSESSMENT — PAIN SCALES - GENERAL
PAINLEVEL_OUTOF10: 0 - NO PAIN

## 2024-03-22 ASSESSMENT — COGNITIVE AND FUNCTIONAL STATUS - GENERAL
DAILY ACTIVITIY SCORE: 18
DRESSING REGULAR LOWER BODY CLOTHING: A LITTLE
MOBILITY SCORE: 19
DRESSING REGULAR LOWER BODY CLOTHING: A LITTLE
WALKING IN HOSPITAL ROOM: A LITTLE
HELP NEEDED FOR BATHING: A LITTLE
MOBILITY SCORE: 19
MOVING TO AND FROM BED TO CHAIR: A LITTLE
PERSONAL GROOMING: A LITTLE
MOVING TO AND FROM BED TO CHAIR: A LITTLE
EATING MEALS: A LITTLE
CLIMB 3 TO 5 STEPS WITH RAILING: A LOT
MOVING TO AND FROM BED TO CHAIR: A LITTLE
DAILY ACTIVITIY SCORE: 19
DRESSING REGULAR UPPER BODY CLOTHING: A LITTLE
PERSONAL GROOMING: A LITTLE
STANDING UP FROM CHAIR USING ARMS: A LITTLE
TOILETING: A LITTLE
TOILETING: A LITTLE
STANDING UP FROM CHAIR USING ARMS: A LITTLE
DRESSING REGULAR UPPER BODY CLOTHING: A LITTLE
HELP NEEDED FOR BATHING: A LITTLE
HELP NEEDED FOR BATHING: A LITTLE
WALKING IN HOSPITAL ROOM: A LITTLE
DAILY ACTIVITIY SCORE: 18
MOBILITY SCORE: 19
STANDING UP FROM CHAIR USING ARMS: A LITTLE
PERSONAL GROOMING: A LITTLE
CLIMB 3 TO 5 STEPS WITH RAILING: A LOT
DRESSING REGULAR UPPER BODY CLOTHING: A LITTLE
TOILETING: A LITTLE
CLIMB 3 TO 5 STEPS WITH RAILING: A LOT
DRESSING REGULAR LOWER BODY CLOTHING: A LITTLE
WALKING IN HOSPITAL ROOM: A LITTLE
EATING MEALS: A LITTLE

## 2024-03-22 ASSESSMENT — PAIN - FUNCTIONAL ASSESSMENT: PAIN_FUNCTIONAL_ASSESSMENT: 0-10

## 2024-03-22 NOTE — NURSING NOTE
Upon rounding right upper arm single lumen PICC with current CHG dressing dry and intact, no blood return but flushes easily. RN aware, suggest cath tay.

## 2024-03-22 NOTE — CARE PLAN
Problem: Pain  Goal: My pain/discomfort is manageable  Outcome: Progressing     Problem: Safety  Goal: Patient will be injury free during hospitalization  Outcome: Progressing  Goal: I will remain free of falls  Outcome: Progressing   The patient's goals for the shift include      The clinical goals for the shift include Maintain pt safety; IV antibiotics; wound care

## 2024-03-22 NOTE — CARE PLAN
Problem: Pain  Goal: My pain/discomfort is manageable  Outcome: Progressing     Problem: Safety  Goal: Patient will be injury free during hospitalization  Outcome: Progressing  Goal: I will remain free of falls  Outcome: Progressing     Problem: Daily Care  Goal: Daily care needs are met  Outcome: Progressing     Problem: Psychosocial Needs  Goal: Demonstrates ability to cope with hospitalization/illness  Outcome: Progressing  Goal: Collaborate with me, my family, and caregiver to identify my specific goals  Outcome: Progressing     Problem: Discharge Barriers  Goal: My discharge needs are met  Outcome: Progressing     Problem: Diabetes  Goal: Achieve decreasing blood glucose levels by end of shift  Outcome: Progressing  Goal: Increase stability of blood glucose readings by end of shift  Outcome: Progressing  Goal: Decrease in ketones present in urine by end of shift  Outcome: Progressing  Goal: Maintain electrolyte levels within acceptable range throughout shift  Outcome: Progressing  Goal: Maintain glucose levels >70mg/dl to <250mg/dl throughout shift  Outcome: Progressing  Goal: No changes in neurological exam by end of shift  Outcome: Progressing  Goal: Learn about and adhere to nutrition recommendations by end of shift  Outcome: Progressing  Goal: Vital signs within normal range for age by end of shift  Outcome: Progressing  Goal: Increase self care and/or family involovement by end of shift  Outcome: Progressing  Goal: Receive DSME education by end of shift  Outcome: Progressing     Problem: Discharge Planning  Goal: Discharge to home or other facility with appropriate resources  Outcome: Progressing     Problem: Skin  Goal: Decreased wound size/increased tissue granulation at next dressing change  Outcome: Progressing  Goal: Participates in plan/prevention/treatment measures  Outcome: Progressing  Goal: Prevent/manage excess moisture  Outcome: Progressing  Goal: Prevent/minimize sheer/friction  injuries  Outcome: Progressing  Goal: Promote/optimize nutrition  Outcome: Progressing  Goal: Promote skin healing  Outcome: Progressing     Problem: Fall/Injury  Goal: Not fall by end of shift  Outcome: Progressing  Goal: Be free from injury by end of the shift  Outcome: Progressing  Goal: Verbalize understanding of personal risk factors for fall in the hospital  Outcome: Progressing  Goal: Verbalize understanding of risk factor reduction measures to prevent injury from fall in the home  Outcome: Progressing  Goal: Use assistive devices by end of the shift  Outcome: Progressing  Goal: Pace activities to prevent fatigue by end of the shift  Outcome: Progressing   The patient's goals for the shift include      The clinical goals for the shift include Maintain pt safety; IV antibiotics; wound care

## 2024-03-22 NOTE — PROGRESS NOTES
Physical Therapy    Physical Therapy Treatment    Patient Name: Claude Coley  MRN: [id number]  Today's Date: 3/22/2024  Time Calculation  Start Time: 1352  Stop Time: 1417  Time Calculation (min): 25 min       Assessment/Plan   PT Assessment  PT Assessment Results: Decreased strength, Decreased range of motion, Impaired balance, Decreased endurance, Decreased mobility, Impaired sensation, Decreased skin integrity, Orthopedic restrictions, Pain  Rehab Prognosis: Good  End of Session Patient Position: Bed, 2 rail up, Alarm off, not on at start of session     PT Plan  Treatment/Interventions: Bed mobility, Transfer training, Gait training, Therapeutic exercise  PT Plan: Skilled PT  PT Frequency: 3 times per week  PT Discharge Recommendations: Moderate intensity level of continued care  Equipment Recommended upon Discharge: Wheeled walker  PT Recommended Transfer Status: Assistive device, Stand by assist  PT - OK to Discharge: Yes      General Visit Information:   PT  Visit  PT Received On: 03/22/24  General  Prior to Session Communication: Bedside nurse  Patient Position Received: Bed, 2 rail up, Alarm off, not on at start of session  Preferred Learning Style: verbal  General Comment: Pt agreeable to therapy    Subjective   Precautions:  Precautions  Hearing/Visual Limitations: glasses  LE Weight Bearing Status: Heel Weight Bearing in Post-Op Shoe  Medical Precautions: Fall precautions  Prosthesis/Orthosis Used:  (Right post op shoe on prior to visit.)  Vital Signs:       Objective   Pain:  Pain Assessment  Pain Score: 0 - No pain  Cognition:  Cognition  Overall Cognitive Status: Within Functional Limits  Postural Control:     Extremity/Trunk Assessments:    Activity Tolerance:     Treatments:  Therapeutic Exercise  Therapeutic Exercise Activity 1: Pt performed seated edge of bed ankle pumps/heel raises left only x20. Bilat glute sets, LAQ, hip flexion, carina hip adduction and resisted hip abduction x20 reps  each.    Bed Mobility 1  Bed Mobility 1: Supine to sitting  Level of Assistance 1: Independent  Bed Mobility 2  Bed Mobility  2: Sitting to supine  Level of Assistance 2: Independent    Ambulation/Gait Training 1  Surface 1: Level tile  Device 1: Rolling walker  Assistance 1: Distant supervision  Comments/Distance (ft) 1: Pt ambulated with RW ~50' x1 and 110' x1 distant supervision, right heel WBing in post op shoe. Slow haleigh.  Transfer 1  Transfer From 1: Bed to  Transfer to 1: Stand  Technique 1: Sit to stand  Transfer Device 1: Walker  Transfer Level of Assistance 1: Distant supervision  Transfers 2  Transfer From 2: Stand to  Transfer to 2: Sit, Bed  Technique 2: Stand to sit  Transfer Level of Assistance 2: Distant supervision    Outcome Measures:  [facility] Basic Mobility  Turning from your back to your side while in a flat bed without using bedrails: None  Moving from lying on your back to sitting on the side of a flat bed without using bedrails: None  Moving to and from bed to chair (including a wheelchair): A little  Standing up from a chair using your arms (e.g. wheelchair or bedside chair): A little  To walk in hospital room: A little  Climbing 3-5 steps with railing: A lot  Basic Mobility - Total Score: 19    Education Documentation  Handouts, taught by Roseanna Stearns PTA at 3/22/2024  2:26 PM.  Learner: Patient  Readiness: Acceptance  Method: Explanation  Response: Verbalizes Understanding    Precautions, taught by Roseanna Stearns PTA at 3/22/2024  2:26 PM.  Learner: Patient  Readiness: Acceptance  Method: Explanation  Response: Verbalizes Understanding    Body Mechanics, taught by Roseanna Stearns PTA at 3/22/2024  2:26 PM.  Learner: Patient  Readiness: Acceptance  Method: Explanation  Response: Verbalizes Understanding    Home Exercise Program, taught by Roseanna Stearns PTA at 3/22/2024  2:26 PM.  Learner: Patient  Readiness: Acceptance  Method: Explanation  Response: Verbalizes Understanding    Mobility  Training, taught by Roseanna Stearns PTA at 3/22/2024  2:26 PM.  Learner: Patient  Readiness: Acceptance  Method: Explanation  Response: Verbalizes Understanding    Education Comments  No comments found.        OP EDUCATION:       Encounter Problems       Encounter Problems (Active)       Balance       LTG - Patient will maintain balance to allow for safe mobility (Progressing)       Start:  03/21/24    Expected End:  03/29/24               Mobility       STG - Patient will ambulate 50' w/ supervision and RW while maintaining heel weightbearing Rt (Progressing)       Start:  03/21/24    Expected End:  03/29/24            Pt participated in BLE exercises to promote functional strength and endurance (Progressing)       Start:  03/21/24    Expected End:  03/29/24               PT Transfers       STG - Patient will transfer sit to and from stand MOD I, heel weightbearing Rt (Progressing)       Start:  03/21/24    Expected End:  03/29/24

## 2024-03-22 NOTE — PROGRESS NOTES
PODIATRY SERVICE CONSULT PROGRESS NOTE    SERVICE DATE: 3/22/2024   SERVICE TIME:  3:45PM    Subjective   INTERVAL HPI:   Patient was seen at bedside.  Pain well controlled.  Patient denies any constitutional symptoms.   No other pedal complaints.   Anticipates discharge back to [facility].   Admits some depression regarding his upcoming surgery.     Medications:  Scheduled Meds: aspirin, 81 mg, oral, Daily  atorvastatin, 40 mg, oral, Daily  carvedilol, 12.5 mg, oral, BID with meals  docusate sodium, 100 mg, oral, Daily  finasteride, 5 mg, oral, Daily  gemfibrozil, 600 mg, oral, BID AC  heparin (porcine), 5,000 Units, subcutaneous, q12h  insulin lispro, 0-10 Units, subcutaneous, TID with meals  melatonin, 3 mg, oral, Nightly  OLANZapine, 20 mg, oral, Nightly  polyethylene glycol, 17 g, oral, Daily  vancomycin-diluent combo no.1, 750 mg, intravenous, q12h      Continuous Infusions:    PRN Meds: PRN medications: acetaminophen **OR** acetaminophen **OR** acetaminophen, dextrose, dextrose, glucagon, ondansetron ODT **OR** ondansetron, oxyCODONE, vancomycin         Objective   PHYSICAL EXAM:  Physical Exam Performed:  Vitals:    03/22/24 0732   BP: 159/75   Pulse: 57   Resp: 18   Temp: 36.2 °C (97.2 °F)   SpO2: 100%     Body mass index is 31.87 kg/m².    Patient is AOx3 and in no acute distress. Patient is alert and cooperative. Sitting comfortably in bed with dressing intact with some plantar strikethrough noted.     Vascular: Palpable DP/PT pulses B/L. Severe pitting edema noted to RLE. Hair growth absent B/L. CFT<5 to B/L hallux. Temperature is warm to cool from tibial tuberosity to distal digits B/L. No lymphatic streaking noted B/L.     Musculoskeletal: s/p right partial 5th ray resection. Gross active and passive ROM intact to age and activity level. Moves all extremities spontaneously. No pain to palpation at feet B/L.      Neurological: Absent light touch sensation B/L. Pain stimuli absent B/L.       Dermatologic: Nails 1-4 are thickened, elongated, discolored with subungual debris B/L. Skin appears diffusely xerotic B/L. Web spaces 1-3 B/L are clean, dry and intact. No rashes or nodules noted B/L. No hyperkeratotic tissue noted B/L.      Wound: Right plantar lateral foot  Images:        Measurements: 12cm x 8cm x 2.5cm deep at proximal lateral curved portion that extends an additional 9cm  Mixed wound base of 80% granular tissue with 20% various areas of fibrosis/necrosis.  Mild serosanguinous drainage with fibrotic slough.   Moderate basil-wound maceration.   Mild basil-wound erythema.   No evidence of ascending cellulitis or lymphangitis.  No palpable fluctuance. Mild malodor. No increased warmth.   No probe to bone. Proximal lateral portion probes deep 2.5cm to deep fascia and/or capsule.  Minimal tunneling or tracking.   No undermining. Skin edges irregular but intact.      LABS:   Results for orders placed or performed during the hospital encounter of 03/19/24 (from the past 24 hour(s))   POCT GLUCOSE   Result Value Ref Range    POCT Glucose 95 74 - 99 mg/dL   POCT GLUCOSE   Result Value Ref Range    POCT Glucose 120 (H) 74 - 99 mg/dL   POCT GLUCOSE   Result Value Ref Range    POCT Glucose 109 (H) 74 - 99 mg/dL   Basic metabolic panel   Result Value Ref Range    Glucose 86 65 - 99 mg/dL    Sodium 139 133 - 145 mmol/L    Potassium 4.1 3.4 - 5.1 mmol/L    Chloride 108 (H) 97 - 107 mmol/L    Bicarbonate 20 (L) 24 - 31 mmol/L    Urea Nitrogen 31 (H) 8 - 25 mg/dL    Creatinine 1.00 0.40 - 1.60 mg/dL    eGFR 86 >60 mL/min/1.73m*2    Calcium 9.3 8.5 - 10.4 mg/dL    Anion Gap 11 <=19 mmol/L   CBC   Result Value Ref Range    WBC 5.3 4.4 - 11.3 x10*3/uL    nRBC 0.0 0.0 - 0.0 /100 WBCs    RBC 3.52 (L) 4.50 - 5.90 x10*6/uL    Hemoglobin 10.4 (L) 13.5 - 17.5 g/dL    Hematocrit 29.9 (L) 41.0 - 52.0 %    MCV 85 80 - 100 fL    MCH 29.5 26.0 - 34.0 pg    MCHC 34.8 32.0 - 36.0 g/dL    RDW 13.2 11.5 - 14.5 %    Platelets 135  "(L) 150 - 450 x10*3/uL   POCT GLUCOSE   Result Value Ref Range    POCT Glucose 87 74 - 99 mg/dL      Lab Results   Component Value Date    HGBA1C 4.4 03/19/2024      No results found for: \"CRP\"   Lab Results   Component Value Date    SEDRATE 47 (H) 03/19/2024        Results from last 7 days   Lab Units 03/22/24  0626   WBC AUTO x10*3/uL 5.3   RBC AUTO x10*6/uL 3.52*   HEMOGLOBIN g/dL 10.4*   HEMATOCRIT % 29.9*     Results from last 7 days   Lab Units 03/22/24  0626   SODIUM mmol/L 139   POTASSIUM mmol/L 4.1   CHLORIDE mmol/L 108*   CO2 mmol/L 20*   BUN mg/dL 31*   CREATININE mg/dL 1.00   CALCIUM mg/dL 9.3           IMAGING REVIEW:  XR chest 1 view    Result Date: 3/20/2024  Interpreted By:  Nael Moore, STUDY: XR CHEST 1 VIEW; 3/20/2024 3:57 pm   INDICATION: Signs/Symptoms:PICC placement.   COMPARISON: None   ACCESSION NUMBER(S): [id number]   ORDERING CLINICIAN: ANGELITA YOUSSEF   TECHNIQUE: 1 view of the chest was performed.   FINDINGS: Right-sided PICC line catheter with tip at the mid SVC. The lungs are adequately inflated. No acute consolidation. No pleural effusion. No pneumothorax.  The cardiomediastinal silhouette is within normal limits.       Right-sided PICC line catheter with tip at the mid SVC. No acute cardiopulmonary disease.   Signed by: Nael Moore 3/20/2024 4:14 PM Dictation workstation:   [id number]    Lower extremity venous duplex right    Result Date: 3/19/2024  Interpreted By:  Favian Castellano, STUDY: VAS US LOWER EXTREMITY VENOUS DUPLEX RIGHT; 3/19/2024 3:22 pm   INDICATION: Signs/Symptoms:leg swelling + d dimer..   COMPARISON: None.   ACCESSION NUMBER(S): [id number]   ORDERING CLINICIAN: CHARO FRIED   TECHNIQUE: Vascular ultrasound of the right lower extremity was performed. Real time compression views as well as Gray scale, color Doppler and spectral Doppler waveform analysis was performed.   FINDINGS: Evaluation of the visualized portions of the right common femoral vein, " proximal, mid, and distal femoral vein, and popliteal vein were performed.  Evaluation of the visualized portions of the posterior tibial and peroneal veins were also performed.  In addition, evaluation of the contralateral common femoral vein was performed.   Limitations: None   The evaluated veins demonstrate normal compressibility. There is intact venous flow demonstrating normal respiratory variability and normal augmentation of flow with calf compression. Therefore, there is no ultrasonographic evidence for deep vein thrombosis within the evaluated veins. No evidence of thrombus is seen within the contralateral common femoral vein.       No sonographic evidence for deep vein thrombosis within the evaluated veins of the right lower extremity.   MACRO: None   Signed by: Favian Castellano 3/19/2024 3:22 PM Dictation workstation:   [id number]    XR foot right 3+ views    Result Date: 3/19/2024  Interpreted By:  Lux Hernandez, STUDY: XR FOOT RIGHT 3+ VIEWS   INDICATION: Signs/Symptoms:r foot wound.   COMPARISON: January 26, 2024   ACCESSION NUMBER(S): [id number]   ORDERING CLINICIAN: CHARO FRIED   FINDINGS: Postsurgical changes status post 5th ray amputation to the level of the mid metatarsal.   Suspected skin wounds at the surgical site. No aggressive bony destructive changes or erosions.   Marked soft tissue swelling dorsally.       Suspected skin wounds of the 5th amputation site with no osseous acute abnormality.   Marked dorsal soft tissue swelling, potentially cellulitis.   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  - Charts, labs, vitals and imaging all reviewed.   - Imaging: Plain films of right foot with no osseous erosion or soft tissue gas noted. There is increased soft tissue volume and density throughout the dorsal foot and plantar hindfoot  - Labs: ESR 47, WBC 5.3, HGB 10.4  - Wound culture: 3/19 growing 2+ MRSA susceptible to tetracyclines, Bactrim, and vancomycin.  - Blood culture: collected and pending     Plan:  - Wound has significantly improved since restarting of IV abx. Dressing changed.  Recommend continued IV abx outpatient. Plan to return to [facility], pending precert.  - No podiatric surgical intervention is planned during this hospitalization, except in the case of the patient remaining inpatient until 3/27.  - Will proceed with outpatient podiatric surgery next week as planned on 3/27/24.  - Abx: Vancomycin per primary/ID  - Pain and Bowel Regimens: per primary  - Dressings: Betadine-soaked 4x4s, dry 4x4s, ABDs, kerlix, ACE. To be changed daily.  - Nursing staff is able to change/reinforce dressing if & as necessary until next dressing change. Thank you.  - Podiatry will continue to follow while in house.  - Discussed with Dr. Bolaños.     DVT ppx: heparin subcutaneous, per primary  Weightbearing: Heel-only weight-bearing to RLE in surgical shoe only.  Discharge: Patient to follow up 1 week after discharge with Dr. Perry.    Case to be discussed with attending, A&P above reflects a tentative plan. Please await for the final signature from the attending physician on service.    This patient will be followed by the Podiatry service. Please Epic Chat the corresponding residents below with questions or concerns.      Hannah Delgado DPM PGY-2  Podiatric Medicine and Surgery   Epic Chat          SIGNATURE: Hannah Delgado DPM PATIENT NAME: Claude Coley   DATE: March 22, 2024 MRN: [id number]   TIME: 10:33 AM CONTACT: [facility] Message

## 2024-03-22 NOTE — PROGRESS NOTES
Occupational Therapy    Occupational Therapy Treatment    Name: Claude Coley  MRN: [id number]  : 1960  Date: 24  Time Calculation  Start Time: 0850  Stop Time: 914  Time Calculation (min): 24 min    Assessment:  OT Assessment: Patient is progressing throughout POC. Would continue to benefit from skilled OT in order to increase patient's safety and independence with daily tasks.  Prognosis: Good  Barriers to Discharge: None  Evaluation/Treatment Tolerance: Patient tolerated treatment well  End of Session Communication: Bedside nurse  End of Session Patient Position: Bed, 2 rail up, Alarm off, not on at start of session  Plan:  Treatment Interventions: ADL retraining, Functional transfer training, UE strengthening/ROM, Cognitive reorientation, Compensatory technique education, Endurance training, Patient/family training  OT Frequency: 2 times per week  OT Discharge Recommendations: Moderate intensity level of continued care  Equipment Recommended upon Discharge: Wheeled walker  OT Recommended Transfer Status: Stand by assist  OT - OK to Discharge: Yes    Subjective   Previous Visit Info:  OT Last Visit  OT Received On: 24  General:  General  Reason for Referral: f/u decline in ADLs  Past Medical History Relevant to Rehab: DMII, HTN, [place], Rt foot infections, Rt 5th partial ray amputation , gout Rt knee  Prior to Session Communication: Bedside nurse  Patient Position Received: Bed, 2 rail up, Alarm off, not on at start of session  Preferred Learning Style: verbal  General Comment: Patient is cleared by elysia for therapy. Patient in bed upon arrival and agreeable to therapy.  Precautions:  LE Weight Bearing Status: Heel Weight Bearing in Post-Op Shoe (R LE)  Medical Precautions: Fall precautions  Post-Surgical Precautions: Other (comment) (R heel weight bearing in Post OP shoe)  Pain Assessment:  Pain Assessment  Pain Score: 0 - No pain     Objective   Activities of Daily Living:  "Grooming  Grooming Comments: patient declines all ADLs this date. states \"i just did that yesterday, i do not need to do it again\"    UE Dressing  UE Dressing Level of Assistance: Setup  UE Dressing Where Assessed: Edge of bed  UE Dressing Comments: doff/don gown    Functional Standing Tolerance:  Functional Standing Tolerance  Time: ~5 minutes  Activity: Fair+ balance  Functional Standing Tolerance Comments: close supervision  Bed Mobility/Transfers: Bed Mobility  Bed Mobility: Yes  Bed Mobility 1  Bed Mobility 1: Sitting to supine  Level of Assistance 1: Independent  Bed Mobility 2  Bed Mobility  2: Sitting to supine  Level of Assistance 2: Independent    Transfers  Transfer: Yes  Transfer 1  Transfer From 1: Bed to  Transfer to 1: Stand  Technique 1: Sit to stand  Transfer Device 1: Walker  Transfer Level of Assistance 1: Distant supervision  Trials/Comments 1: x3 trials      Functional Mobility:  Functional Mobility  Functional Mobility Performed: Yes  Functional Mobility 1  Surface 1: Level tile  Device 1: Rolling walker  Assistance 1: Distant supervision  Comments 1: > household distances, cues for heel touch weight bearing, fair carryover  Sitting Balance:  Dynamic Sitting Balance  Dynamic Sitting-Balance Support: Feet supported  Dynamic Sitting-Comments: ther ex. good balance    Therapy/Activity: Therapeutic Exercise  Therapeutic Exercise Performed: Yes  Therapeutic Exercise Activity 1: seated EOB, patient completes 2x15 B UE 2# hand weight. exercises include bicep curls, chest press, shoulder flex, shoulder horizontal abduction/adduction, shoulder elevation/depression, scapular protraction/retraction, shoulder reverse rolls, forearm supination/pronation, and wrist flex/ext. fair+ form and tolerance noted. completed in order to increase patient's safety and independence with daily tasks    RUE   RUE : Within Functional Limits and LUE   LUE: Within Functional Limits    Outcome Measures:  [facility] Daily " Activity  Putting on and taking off regular lower body clothing: A little  Bathing (including washing, rinsing, drying): A little  Putting on and taking off regular upper body clothing: A little  Toileting, which includes using toilet, bedpan or urinal: A little  Taking care of personal grooming such as brushing teeth: A little  Eating Meals: A little  Daily Activity - Total Score: 18    Education Documentation  Body Mechanics, taught by Lizette Perdue OT at 3/22/2024 10:11 AM.  Learner: Patient  Readiness: Acceptance  Method: Explanation, Demonstration  Response: Needs Reinforcement, Verbalizes Understanding    Precautions, taught by Lizette Perdue OT at 3/22/2024 10:11 AM.  Learner: Patient  Readiness: Acceptance  Method: Explanation, Demonstration  Response: Needs Reinforcement, Verbalizes Understanding    Home Exercise Program, taught by Lizette Perdue OT at 3/22/2024 10:11 AM.  Learner: Patient  Readiness: Acceptance  Method: Explanation, Demonstration  Response: Needs Reinforcement, Verbalizes Understanding    Education Comments  No comments found.      Goals:  Encounter Problems       Encounter Problems (Active)       OT Goals       ADL's (Progressing)       Start:  03/21/24    Expected End:  04/04/24       Pt will complete bathing, dressing tasks w/ mod I w/ AE/ compensatory tech as needed for safety and and increased independence in self care tasks.         Functional transfers (Progressing)       Start:  03/21/24    Expected End:  04/04/24       Pt will demon functional transfers w/ mod I w/ LRD for safety and increased independence in functional transfers         Functional mobility (Progressing)       Start:  03/21/24    Expected End:  04/04/24       Pt will demon item retrieval to/ from household distance w/ mod I  for safety and increased independence in daily tasks.            OT Goals       Functional endurance (Progressing)       Start:  03/21/24    Expected End:  04/04/24       Pt will tolerate  20 minutes of functional activity to improve functional endurance for daily tasks and functional mobility.

## 2024-03-22 NOTE — PROGRESS NOTES
03/22/24 0845   Discharge Planning   Patient expects to be discharged to: Return to [facility] pending precert   Does the patient need discharge transport arranged? Yes   RoundTrip coordination needed? Yes   Has discharge transport been arranged? No       Pending precert to return to [facility].  Escalated it today to the  team.

## 2024-03-22 NOTE — PROGRESS NOTES
Claude Coley is a 63 y.o. male on day 3 of admission presenting with Cellulitis of foot, right.      Subjective   Patient has been afebrile overnight.  Patient complain of intermittent pain  on right lower extremity.        Objective     Last Recorded Vitals  /75 (BP Location: Left arm, Patient Position: Lying)   Pulse 57   Temp 36.2 °C (97.2 °F) (Temporal)   Resp 18   Wt 113 kg (248 lb 3.8 oz)   SpO2 100%   Intake/Output last 3 Shifts:    Intake/Output Summary (Last 24 hours) at 3/22/2024 1010  Last data filed at 3/21/2024 2311  Gross per 24 hour   Intake 370 ml   Output 1500 ml   Net -1130 ml         Admission Weight  Weight: 113 kg (250 lb 3.6 oz) (03/19/24 1202)    Daily Weight  03/20/24 : 113 kg (248 lb 3.8 oz)    Image Results  XR chest 1 view  Narrative: Interpreted By:  Nael Moore,   STUDY:  XR CHEST 1 VIEW; 3/20/2024 3:57 pm      INDICATION:  Signs/Symptoms:PICC placement.      COMPARISON:  None      ACCESSION NUMBER(S):  [id number]      ORDERING CLINICIAN:  ANGELITA YOUSSEF      TECHNIQUE:  1 view of the chest was performed.      FINDINGS:  Right-sided PICC line catheter with tip at the mid SVC. The lungs are  adequately inflated. No acute consolidation. No pleural effusion. No  pneumothorax.  The cardiomediastinal silhouette is within normal  limits.      Impression: Right-sided PICC line catheter with tip at the mid SVC. No acute  cardiopulmonary disease.      Signed by: Nael Moore 3/20/2024 4:14 PM  Dictation workstation:   [id number]      Physical Exam    General:  cooperating during physical exam.  HEENT: Pupils are equal and reactive to light and commendation , oral mucosa moist, no JVD oral mucosa is moist.  Cardiovascular: Normal sinus rhythm, no MRG.  Lungs: Clear to auscultation bilaterally, no wheezing, no crackles, no dullness to percussion.  Abdomen: No hepatosplenomegaly appreciated, soft , not tender, positive bowel sounds, positive bowel movement.  Neuro:  Alert and oriented x3, strength in upper and lower extremities , sensation intact.  Psych: Patient had great insight was going on  Musculoskeletal: Right lower extremity swelling, cellulitis of right foot   Vascular: Pulses are intact in upper and lower extremities  Skin: Right foot cellulitis, dressing in place.      Assessment/Plan        Diabetic foot infection  Right foot cellulitis  X-ray of right foot no evidence of osteomyelitis  Cover aggressively with antibiotics  Patient was started on Zosyn and vancomycin   Dr. Saravia from ID services  on the  case  Evaluated by podiatry on the case  Plan for surgical intervention on 3/27  Right lower extremity pain.  Add low-dose of oxycodone    Diabetes mellitus type 2.  Cover with insulin sliding scale  Hemoglobin A1c  Diabetic diet     Hypertension fairly controlled  Patient is on lisinopril  Monitor close     Hyperlipidemia     Right lower extremity swelling  Ultrasound of right lower extremity     DVT prophylaxis  GI prophylaxis  Morbid obesity  Advise regarding diet     Waiting for consultant to see him today.       Acute kidney injury.  Improved    Evaluated by physical therapy.    Patient will need placement to skilled nursing facility.  Continue with current antibiotics.          Principal Problem:    Cellulitis of foot, right                  Martina Fang MD

## 2024-03-22 NOTE — PROGRESS NOTES
Claude Coley is a 63 y.o. male on day 3 of admission presenting with Cellulitis of foot, right.    Subjective   Interval History:   Interval improvement  Afebrile, denies chills  Denies shortness of breath cough or chest pain  Denies nausea vomiting or diarrhea            Objective   Range of Vitals (last 24 hours)  Heart Rate:  [53-75]   Temp:  [35.7 °C (96.3 °F)-36.4 °C (97.5 °F)]   Resp:  [15-20]   BP: (116-159)/(57-75)   SpO2:  [96 %-100 %]   Daily Weight  03/20/24 : 113 kg (248 lb 3.8 oz)    Body mass index is 31.87 kg/m².    Physical Exam  Constitutional:       Appearance: Normal appearance.   HENT:      Head: Normocephalic and atraumatic.      Nose: Nose normal.      Mouth/Throat:      Mouth: Mucous membranes are moist.      Pharynx: Oropharynx is clear.   Eyes:      Conjunctiva/sclera: Conjunctivae normal.   Cardiovascular:      Rate and Rhythm: Normal rate and regular rhythm.   Pulmonary:      Effort: Pulmonary effort is normal.      Breath sounds: Normal breath sounds.   Abdominal:      General: Bowel sounds are normal.      Palpations: Abdomen is soft.   Musculoskeletal:         General: Normal range of motion.      Cervical back: Normal range of motion and neck supple.   Skin:     Comments: Right foot dressing   Neurological:      Mental Status: He is alert.      Comments: Awake, alert   Psychiatric:         Mood and Affect: Mood normal.         Behavior: Behavior normal.     Antibiotics  sodium chloride 0.9 % bolus 500 mL  cefTRIAXone (Rocephin) 2 g IV in dextrose 5% 50 mL  vancomycin 1.5 g in 300 mL (Xellia) IVPB 1.5 g  aspirin EC tablet 81 mg  atorvastatin (Lipitor) tablet 40 mg  carvedilol (Coreg) tablet 12.5 mg  finasteride (Proscar) tablet 5 mg  gemfibrozil (Lopid) tablet 600 mg  OLANZapine (ZyPREXA) tablet 20 mg  piperacillin-tazobactam-dextrose (Zosyn) IV 4.5 g  vancomycin (Vancocin) pharmacy to dose - pharmacy monitoring  dextrose 50 % injection 25 g  glucagon (Glucagen) injection 1  "mg  dextrose 50 % injection 12.5 g  insulin lispro (HumaLOG) injection 0-10 Units  heparin (porcine) injection 5,000 Units  acetaminophen (Tylenol) tablet 650 mg  acetaminophen (Tylenol) oral liquid 650 mg  acetaminophen (Tylenol) suppository 650 mg  ondansetron ODT (Zofran-ODT) disintegrating tablet 4 mg  ondansetron (Zofran) injection 4 mg  melatonin tablet 3 mg  polyethylene glycol (Glycolax, Miralax) packet 17 g  vancomycin (Xellia) 1 g in 200 mL (Xellia) IVPB 1 g  docusate sodium (Colace) capsule 100 mg  vancomycin-diluent combo no.1 (Xellia) IVPB 750 mg      Relevant Results  Labs  Results from last 72 hours   Lab Units 03/22/24  0626 03/21/24  0354 03/20/24  0506 03/19/24  1246   WBC AUTO x10*3/uL 5.3 5.9 5.8 6.6   HEMOGLOBIN g/dL 10.4* 10.1* 10.7* 11.4*   HEMATOCRIT % 29.9* 29.7* 31.1* 32.8*   PLATELETS AUTO x10*3/uL 135* 136* 137* 153   NEUTROS PCT AUTO %  --   --  57.7 64.0   LYMPHS PCT AUTO %  --   --  21.9 18.3   MONOS PCT AUTO %  --   --  9.8 9.6   EOS PCT AUTO %  --   --  9.2 7.0       Results from last 72 hours   Lab Units 03/22/24  0626 03/21/24  0354 03/20/24  0506   SODIUM mmol/L 139 135 139   POTASSIUM mmol/L 4.1 3.9 4.3   CHLORIDE mmol/L 108* 102 106   CO2 mmol/L 20* 21* 20*   BUN mg/dL 31* 44* 48*   CREATININE mg/dL 1.00 1.40 1.30   GLUCOSE mg/dL 86 90 88   CALCIUM mg/dL 9.3 8.9 9.1   ANION GAP mmol/L 11 12 13   EGFR mL/min/1.73m*2 86 57* 63           Estimated Creatinine Clearance: 103.7 mL/min (by C-G formula based on SCr of 1 mg/dL).  No results found for: \"CRP\"  Microbiology  Susceptibility data from last 14 days.  Collected Specimen Info Organism Clindamycin Erythromycin Oxacillin Tetracycline Trimethoprim/Sulfamethoxazole Vancomycin   03/19/24 Tissue/Biopsy from Wound/Tissue Methicillin Resistant Staphylococcus aureus (MRSA) R R R S S S         Imaging  XR chest 1 view    Result Date: 3/20/2024  Interpreted By:  Nael Moore, STUDY: XR CHEST 1 VIEW; 3/20/2024 3:57 pm   INDICATION: " Signs/Symptoms:PICC placement.   COMPARISON: None   ACCESSION NUMBER(S): [id number]   ORDERING CLINICIAN: ANGELITA YOUSSEF   TECHNIQUE: 1 view of the chest was performed.   FINDINGS: Right-sided PICC line catheter with tip at the mid SVC. The lungs are adequately inflated. No acute consolidation. No pleural effusion. No pneumothorax.  The cardiomediastinal silhouette is within normal limits.       Right-sided PICC line catheter with tip at the mid SVC. No acute cardiopulmonary disease.   Signed by: Nael Moore 3/20/2024 4:14 PM Dictation workstation:   [id number]    Lower extremity venous duplex right    Result Date: 3/19/2024  Interpreted By:  Favian Castellano, STUDY: VASC US LOWER EXTREMITY VENOUS DUPLEX RIGHT; 3/19/2024 3:22 pm   INDICATION: Signs/Symptoms:leg swelling + d dimer..   COMPARISON: None.   ACCESSION NUMBER(S): [id number]   ORDERING CLINICIAN: CHARO FRIED   TECHNIQUE: Vascular ultrasound of the right lower extremity was performed. Real time compression views as well as Gray scale, color Doppler and spectral Doppler waveform analysis was performed.   FINDINGS: Evaluation of the visualized portions of the right common femoral vein, proximal, mid, and distal femoral vein, and popliteal vein were performed.  Evaluation of the visualized portions of the posterior tibial and peroneal veins were also performed.  In addition, evaluation of the contralateral common femoral vein was performed.   Limitations: None   The evaluated veins demonstrate normal compressibility. There is intact venous flow demonstrating normal respiratory variability and normal augmentation of flow with calf compression. Therefore, there is no ultrasonographic evidence for deep vein thrombosis within the evaluated veins. No evidence of thrombus is seen within the contralateral common femoral vein.       No sonographic evidence for deep vein thrombosis within the evaluated veins of the right lower extremity.   MACRO: None    Signed by: Favian Castellano 3/19/2024 3:22 PM Dictation workstation:   [id number]    XR foot right 3+ views    Result Date: 3/19/2024  Interpreted By:  Lux Hernandez, STUDY: XR FOOT RIGHT 3+ VIEWS   INDICATION: Signs/Symptoms:r foot wound.   COMPARISON: January 26, 2024   ACCESSION NUMBER(S): [id number]   ORDERING CLINICIAN: CHARO FRIED   FINDINGS: Postsurgical changes status post 5th ray amputation to the level of the mid metatarsal.   Suspected skin wounds at the surgical site. No aggressive bony destructive changes or erosions.   Marked soft tissue swelling dorsally.       Suspected skin wounds of the 5th amputation site with no osseous acute abnormality.   Marked dorsal soft tissue swelling, potentially cellulitis.   Signed by: Lux Hernandez 3/19/2024 2:09 PM Dictation workstation:   [id number]           Assessment/Plan   Infected right diabetic foot ulcer-MRSA  Has retained right upper extremity PICC  History of strep anginosus and Bacteroides fragilis       IV vancomycin-avoiding zyvox -potential interaction with zyprexa   Monitor renal function  Supportive care  Follow up Blood cultures   Local care  Offloading  Monitor temperature and WBC  Podiatry  follow up-surgery planned outpatient on 3/27/2024    Long term plan IV vancomycin 750 mg every 12 hours for 14 days till 4/3/2024  Weekly CBC with diff, Biweekly CMP, vancomycin trough      ALISE Penny-CNP

## 2024-03-23 LAB
ANION GAP SERPL CALC-SCNC: 9 MMOL/L
BUN SERPL-MCNC: 26 MG/DL (ref 8–25)
CALCIUM SERPL-MCNC: 9.1 MG/DL (ref 8.5–10.4)
CHLORIDE SERPL-SCNC: 107 MMOL/L (ref 97–107)
CO2 SERPL-SCNC: 21 MMOL/L (ref 24–31)
CREAT SERPL-MCNC: 0.9 MG/DL (ref 0.4–1.6)
EGFRCR SERPLBLD CKD-EPI 2021: >90 ML/MIN/1.73M*2
ERYTHROCYTE [DISTWIDTH] IN BLOOD BY AUTOMATED COUNT: 13 % (ref 11.5–14.5)
GLUCOSE BLD MANUAL STRIP-MCNC: 106 MG/DL (ref 74–99)
GLUCOSE BLD MANUAL STRIP-MCNC: 124 MG/DL (ref 74–99)
GLUCOSE BLD MANUAL STRIP-MCNC: 78 MG/DL (ref 74–99)
GLUCOSE BLD MANUAL STRIP-MCNC: 91 MG/DL (ref 74–99)
GLUCOSE SERPL-MCNC: 90 MG/DL (ref 65–99)
HCT VFR BLD AUTO: 29.4 % (ref 41–52)
HGB BLD-MCNC: 10.3 G/DL (ref 13.5–17.5)
MCH RBC QN AUTO: 29.8 PG (ref 26–34)
MCHC RBC AUTO-ENTMCNC: 35 G/DL (ref 32–36)
MCV RBC AUTO: 85 FL (ref 80–100)
NRBC BLD-RTO: 0 /100 WBCS (ref 0–0)
PLATELET # BLD AUTO: 143 X10*3/UL (ref 150–450)
POTASSIUM SERPL-SCNC: 4.5 MMOL/L (ref 3.4–5.1)
RBC # BLD AUTO: 3.46 X10*6/UL (ref 4.5–5.9)
SODIUM SERPL-SCNC: 137 MMOL/L (ref 133–145)
WBC # BLD AUTO: 5 X10*3/UL (ref 4.4–11.3)

## 2024-03-23 PROCEDURE — 80048 BASIC METABOLIC PNL TOTAL CA: CPT | Performed by: INTERNAL MEDICINE

## 2024-03-23 PROCEDURE — 2500000002 HC RX 250 W HCPCS SELF ADMINISTERED DRUGS (ALT 637 FOR MEDICARE OP, ALT 636 FOR OP/ED): Performed by: INTERNAL MEDICINE

## 2024-03-23 PROCEDURE — 85027 COMPLETE CBC AUTOMATED: CPT | Performed by: INTERNAL MEDICINE

## 2024-03-23 PROCEDURE — 2500000001 HC RX 250 WO HCPCS SELF ADMINISTERED DRUGS (ALT 637 FOR MEDICARE OP): Performed by: INTERNAL MEDICINE

## 2024-03-23 PROCEDURE — 2500000002 HC RX 250 W HCPCS SELF ADMINISTERED DRUGS (ALT 637 FOR MEDICARE OP, ALT 636 FOR OP/ED): Mod: MUE | Performed by: INTERNAL MEDICINE

## 2024-03-23 PROCEDURE — 2500000004 HC RX 250 GENERAL PHARMACY W/ HCPCS (ALT 636 FOR OP/ED): Mod: JZ | Performed by: INTERNAL MEDICINE

## 2024-03-23 PROCEDURE — 1100000001 HC PRIVATE ROOM DAILY

## 2024-03-23 PROCEDURE — 82947 ASSAY GLUCOSE BLOOD QUANT: CPT

## 2024-03-23 RX ORDER — INSULIN LISPRO 100 [IU]/ML
0-5 INJECTION, SOLUTION INTRAVENOUS; SUBCUTANEOUS
Status: DISCONTINUED | OUTPATIENT
Start: 2024-03-23 | End: 2024-03-25 | Stop reason: HOSPADM

## 2024-03-23 RX ADMIN — CARVEDILOL 12.5 MG: 12.5 TABLET, FILM COATED ORAL at 17:35

## 2024-03-23 RX ADMIN — OLANZAPINE 20 MG: 10 TABLET, FILM COATED ORAL at 21:32

## 2024-03-23 RX ADMIN — GEMFIBROZIL 600 MG: 600 TABLET ORAL at 15:55

## 2024-03-23 RX ADMIN — HEPARIN SODIUM 5000 UNITS: 5000 INJECTION, SOLUTION INTRAVENOUS; SUBCUTANEOUS at 10:45

## 2024-03-23 RX ADMIN — POLYETHYLENE GLYCOL 3350 17 G: 17 POWDER, FOR SOLUTION ORAL at 09:14

## 2024-03-23 RX ADMIN — VANCOMYCIN 750 MG: 750 INJECTION, SOLUTION INTRAVENOUS at 10:45

## 2024-03-23 RX ADMIN — Medication 3 MG: at 21:31

## 2024-03-23 RX ADMIN — HEPARIN SODIUM 5000 UNITS: 5000 INJECTION, SOLUTION INTRAVENOUS; SUBCUTANEOUS at 21:31

## 2024-03-23 RX ADMIN — ATORVASTATIN CALCIUM 40 MG: 40 TABLET, FILM COATED ORAL at 09:14

## 2024-03-23 RX ADMIN — GEMFIBROZIL 600 MG: 600 TABLET ORAL at 09:14

## 2024-03-23 RX ADMIN — FINASTERIDE 5 MG: 5 TABLET, FILM COATED ORAL at 09:14

## 2024-03-23 RX ADMIN — ASPIRIN 81 MG: 81 TABLET, COATED ORAL at 09:14

## 2024-03-23 RX ADMIN — CARVEDILOL 12.5 MG: 12.5 TABLET, FILM COATED ORAL at 09:15

## 2024-03-23 RX ADMIN — VANCOMYCIN 750 MG: 750 INJECTION, SOLUTION INTRAVENOUS at 21:32

## 2024-03-23 RX ADMIN — DOCUSATE SODIUM 100 MG: 100 CAPSULE, LIQUID FILLED ORAL at 09:15

## 2024-03-23 ASSESSMENT — COGNITIVE AND FUNCTIONAL STATUS - GENERAL
DAILY ACTIVITIY SCORE: 24
MOBILITY SCORE: 23
CLIMB 3 TO 5 STEPS WITH RAILING: A LITTLE

## 2024-03-23 ASSESSMENT — PAIN SCALES - GENERAL
PAINLEVEL_OUTOF10: 0 - NO PAIN

## 2024-03-23 ASSESSMENT — PAIN - FUNCTIONAL ASSESSMENT
PAIN_FUNCTIONAL_ASSESSMENT: 0-10
PAIN_FUNCTIONAL_ASSESSMENT: 0-10

## 2024-03-23 NOTE — PROGRESS NOTES
PODIATRY SERVICE CONSULT PROGRESS NOTE    SERVICE DATE: 3/23/2024   SERVICE TIME:  3:45PM    Subjective   INTERVAL HPI:   Patient was seen at bedside.  Pain well controlled.  Patient denies any constitutional symptoms.   No other pedal complaints.   Anticipates discharge back to [facility].   Still pending pre-cert.   Admits some depression regarding his upcoming surgery.     Medications:  Scheduled Meds: aspirin, 81 mg, oral, Daily  atorvastatin, 40 mg, oral, Daily  carvedilol, 12.5 mg, oral, BID with meals  docusate sodium, 100 mg, oral, Daily  finasteride, 5 mg, oral, Daily  gemfibrozil, 600 mg, oral, BID AC  heparin (porcine), 5,000 Units, subcutaneous, q12h  insulin lispro, 0-5 Units, subcutaneous, TID with meals  melatonin, 3 mg, oral, Nightly  OLANZapine, 20 mg, oral, Nightly  polyethylene glycol, 17 g, oral, Daily  vancomycin-diluent combo no.1, 750 mg, intravenous, q12h      Continuous Infusions:    PRN Meds: PRN medications: acetaminophen **OR** acetaminophen **OR** acetaminophen, dextrose, dextrose, glucagon, ondansetron ODT **OR** ondansetron, oxyCODONE, vancomycin         Objective   PHYSICAL EXAM:  Physical Exam Performed:  Vitals:    03/23/24 0723   BP: 154/74   Pulse: 54   Resp: 17   Temp: 36.3 °C (97.3 °F)   SpO2: 97%     Body mass index is 31.87 kg/m².    Patient is AOx3 and in no acute distress. Patient is alert and cooperative. Sitting comfortably in bed with dressing intact with some plantar strikethrough noted.     Vascular: Palpable DP/PT pulses B/L. Severe pitting edema noted to RLE. Hair growth absent B/L. CFT<5 to B/L hallux. Temperature is warm to cool from tibial tuberosity to distal digits B/L. No lymphatic streaking noted B/L.     Musculoskeletal: s/p right partial 5th ray resection. Gross active and passive ROM intact to age and activity level. Moves all extremities spontaneously. No pain to palpation at feet B/L.      Neurological: Absent light touch sensation B/L. Pain stimuli  absent B/L.      Dermatologic: Nails 1-4 are thickened, elongated, discolored with subungual debris B/L. Skin appears diffusely xerotic B/L. Web spaces 1-3 B/L are clean, dry and intact. No rashes or nodules noted B/L. No hyperkeratotic tissue noted B/L.      Wound: Right plantar lateral foot  Images:        Measurements: 12cm x 8cm x 2.5cm deep at proximal lateral curved portion that extends an additional 9cm  Mixed wound base of 80% granular tissue with 20% various areas of fibrosis/necrosis.  Mild serosanguinous drainage with fibrotic slough.   Moderate basil-wound maceration.   Mild basil-wound erythema.   No evidence of ascending cellulitis or lymphangitis.  No palpable fluctuance. Mild malodor. No increased warmth.   No probe to bone. Proximal lateral portion probes deep 2.5cm to deep fascia and/or capsule.  Minimal tunneling or tracking.   No undermining. Skin edges irregular but intact.      LABS:   Results for orders placed or performed during the hospital encounter of 03/19/24 (from the past 24 hour(s))   POCT GLUCOSE   Result Value Ref Range    POCT Glucose 113 (H) 74 - 99 mg/dL   POCT GLUCOSE   Result Value Ref Range    POCT Glucose 70 (L) 74 - 99 mg/dL   POCT GLUCOSE   Result Value Ref Range    POCT Glucose 122 (H) 74 - 99 mg/dL   CBC   Result Value Ref Range    WBC 5.0 4.4 - 11.3 x10*3/uL    nRBC 0.0 0.0 - 0.0 /100 WBCs    RBC 3.46 (L) 4.50 - 5.90 x10*6/uL    Hemoglobin 10.3 (L) 13.5 - 17.5 g/dL    Hematocrit 29.4 (L) 41.0 - 52.0 %    MCV 85 80 - 100 fL    MCH 29.8 26.0 - 34.0 pg    MCHC 35.0 32.0 - 36.0 g/dL    RDW 13.0 11.5 - 14.5 %    Platelets 143 (L) 150 - 450 x10*3/uL   Basic metabolic panel   Result Value Ref Range    Glucose 90 65 - 99 mg/dL    Sodium 137 133 - 145 mmol/L    Potassium 4.5 3.4 - 5.1 mmol/L    Chloride 107 97 - 107 mmol/L    Bicarbonate 21 (L) 24 - 31 mmol/L    Urea Nitrogen 26 (H) 8 - 25 mg/dL    Creatinine 0.90 0.40 - 1.60 mg/dL    eGFR >90 >60 mL/min/1.73m*2    Calcium 9.1 8.5 -  "10.4 mg/dL    Anion Gap 9 <=19 mmol/L   POCT GLUCOSE   Result Value Ref Range    POCT Glucose 78 74 - 99 mg/dL      Lab Results   Component Value Date    HGBA1C 4.4 03/19/2024      No results found for: \"CRP\"   Lab Results   Component Value Date    SEDRATE 47 (H) 03/19/2024        Results from last 7 days   Lab Units 03/23/24  0615   WBC AUTO x10*3/uL 5.0   RBC AUTO x10*6/uL 3.46*   HEMOGLOBIN g/dL 10.3*   HEMATOCRIT % 29.4*     Results from last 7 days   Lab Units 03/23/24  0615   SODIUM mmol/L 137   POTASSIUM mmol/L 4.5   CHLORIDE mmol/L 107   CO2 mmol/L 21*   BUN mg/dL 26*   CREATININE mg/dL 0.90   CALCIUM mg/dL 9.1           IMAGING REVIEW:  XR chest 1 view    Result Date: 3/20/2024  Interpreted By:  Nael Moore, STUDY: XR CHEST 1 VIEW; 3/20/2024 3:57 pm   INDICATION: Signs/Symptoms:PICC placement.   COMPARISON: None   ACCESSION NUMBER(S): [id number]   ORDERING CLINICIAN: ANGELITA YOUSSEF   TECHNIQUE: 1 view of the chest was performed.   FINDINGS: Right-sided PICC line catheter with tip at the mid SVC. The lungs are adequately inflated. No acute consolidation. No pleural effusion. No pneumothorax.  The cardiomediastinal silhouette is within normal limits.       Right-sided PICC line catheter with tip at the mid SVC. No acute cardiopulmonary disease.   Signed by: Nael Moore 3/20/2024 4:14 PM Dictation workstation:   [id number]    Lower extremity venous duplex right    Result Date: 3/19/2024  Interpreted By:  Favian Castellano, STUDY: VAS US LOWER EXTREMITY VENOUS DUPLEX RIGHT; 3/19/2024 3:22 pm   INDICATION: Signs/Symptoms:leg swelling + d dimer..   COMPARISON: None.   ACCESSION NUMBER(S): [id number]   ORDERING CLINICIAN: CHARO FRIED   TECHNIQUE: Vascular ultrasound of the right lower extremity was performed. Real time compression views as well as Gray scale, color Doppler and spectral Doppler waveform analysis was performed.   FINDINGS: Evaluation of the visualized portions of the right common " femoral vein, proximal, mid, and distal femoral vein, and popliteal vein were performed.  Evaluation of the visualized portions of the posterior tibial and peroneal veins were also performed.  In addition, evaluation of the contralateral common femoral vein was performed.   Limitations: None   The evaluated veins demonstrate normal compressibility. There is intact venous flow demonstrating normal respiratory variability and normal augmentation of flow with calf compression. Therefore, there is no ultrasonographic evidence for deep vein thrombosis within the evaluated veins. No evidence of thrombus is seen within the contralateral common femoral vein.       No sonographic evidence for deep vein thrombosis within the evaluated veins of the right lower extremity.   MACRO: None   Signed by: Favian Castellano 3/19/2024 3:22 PM Dictation workstation:   [id number]    XR foot right 3+ views    Result Date: 3/19/2024  Interpreted By:  Lux Hernandez, STUDY: XR FOOT RIGHT 3+ VIEWS   INDICATION: Signs/Symptoms:r foot wound.   COMPARISON: January 26, 2024   ACCESSION NUMBER(S): [id number]   ORDERING CLINICIAN: CHARO FRIED   FINDINGS: Postsurgical changes status post 5th ray amputation to the level of the mid metatarsal.   Suspected skin wounds at the surgical site. No aggressive bony destructive changes or erosions.   Marked soft tissue swelling dorsally.       Suspected skin wounds of the 5th amputation site with no osseous acute abnormality.   Marked dorsal soft tissue swelling, potentially cellulitis.   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  - Charts, labs, vitals and imaging all reviewed.   - Imaging: Plain films of right foot with no osseous erosion or soft tissue gas noted. There is increased soft tissue volume and density throughout the dorsal foot and plantar hindfoot  - Labs: ESR 47, WBC 5.0, HGB 10.3  - Wound culture: 3/19 growing 2+ MRSA susceptible to tetracyclines, Bactrim, and vancomycin.  - Blood culture: collected and pending     Plan:  - Wound has significantly improved since restarting of IV abx. Dressing changed.  Recommend continued IV abx outpatient. Plan to return to [facility], pending precert.  - No podiatric surgical intervention is planned during this hospitalization, except in the case of the patient remaining inpatient until 3/27.  - Will proceed with outpatient podiatric surgery next week as planned on 3/27/24.  - Abx: Vancomycin per primary/ID. Planned course until 4/3/24, per ID  - Pain and Bowel Regimens: per primary  - Dressings: Betadine-soaked 4x4s, dry 4x4s, ABDs, kerlix, ACE. To be changed daily.  - Nursing staff is able to change/reinforce dressing if & as necessary until next dressing change. Thank you.  - Podiatry will continue to follow while in house.  - Discussed with Dr. Bolaños.     DVT ppx: heparin subcutaneous, per primary  Weightbearing: Heel-only weight-bearing to RLE in surgical shoe only.  Discharge: Patient to follow up 1 week after discharge with Dr. Perry.    Case to be discussed with attending, A&P above reflects a tentative plan. Please await for the final signature from the attending physician on service.    This patient will be followed by the Podiatry service. Please Epic Chat the corresponding residents below with questions or concerns.      Hannah Delgado DPM PGY-2  Podiatric Medicine and Surgery   Epic Chat          SIGNATURE: Hannah Delgado DPM PATIENT NAME: Claude Coley   DATE: March 23, 2024 MRN: [id number]   TIME: 11:11 AM  CONTACT: Haiku Message

## 2024-03-23 NOTE — CARE PLAN
Problem: Pain  Goal: My pain/discomfort is manageable  Outcome: Progressing     Problem: Safety  Goal: Patient will be injury free during hospitalization  Outcome: Progressing  Goal: I will remain free of falls  Outcome: Progressing   The patient's goals for the shift include      The clinical goals for the shift include Maintain patient safety; IV antibiotics; pain management

## 2024-03-23 NOTE — PROGRESS NOTES
Claude Coley is a 63 y.o. male on day 4 of admission presenting with Cellulitis of foot, right.    Subjective   Interval History:   Afebrile, denies chills  Denies shortness of breath cough or chest pain  Denies nausea vomiting or diarrhea      Pain controlled       Objective   Range of Vitals (last 24 hours)  Heart Rate:  [54-63]   Temp:  [35.8 °C (96.4 °F)-36.7 °C (98.1 °F)]   Resp:  [16-18]   BP: (132-154)/(65-74)   SpO2:  [96 %-100 %]   Daily Weight  03/20/24 : 113 kg (248 lb 3.8 oz)    Body mass index is 31.87 kg/m².    Physical Exam  Constitutional:       Appearance: Normal appearance.   HENT:      Head: Normocephalic and atraumatic.      Nose: Nose normal.      Mouth/Throat:      Mouth: Mucous membranes are moist.      Pharynx: Oropharynx is clear.   Eyes:      Conjunctiva/sclera: Conjunctivae normal.   Cardiovascular:      Rate and Rhythm: Normal rate and regular rhythm.   Pulmonary:      Effort: Pulmonary effort is normal.      Breath sounds: Normal breath sounds.   Abdominal:      General: Bowel sounds are normal.      Palpations: Abdomen is soft.   Musculoskeletal:         General: Normal range of motion.      Cervical back: Normal range of motion and neck supple.   Skin:     Comments: Right foot dressing   Neurological:      Mental Status: He is alert.      Comments: Awake, alert   Psychiatric:         Mood and Affect: Mood normal.         Behavior: Behavior normal.     Antibiotics  sodium chloride 0.9 % bolus 500 mL  cefTRIAXone (Rocephin) 2 g IV in dextrose 5% 50 mL  vancomycin 1.5 g in 300 mL (Xellia) IVPB 1.5 g  aspirin EC tablet 81 mg  atorvastatin (Lipitor) tablet 40 mg  carvedilol (Coreg) tablet 12.5 mg  finasteride (Proscar) tablet 5 mg  gemfibrozil (Lopid) tablet 600 mg  OLANZapine (ZyPREXA) tablet 20 mg  piperacillin-tazobactam-dextrose (Zosyn) IV 4.5 g  vancomycin (Vancocin) pharmacy to dose - pharmacy monitoring  dextrose 50 % injection 25 g  glucagon (Glucagen) injection 1 mg  dextrose  "50 % injection 12.5 g  insulin lispro (HumaLOG) injection 0-10 Units  heparin (porcine) injection 5,000 Units  acetaminophen (Tylenol) tablet 650 mg  acetaminophen (Tylenol) oral liquid 650 mg  acetaminophen (Tylenol) suppository 650 mg  ondansetron ODT (Zofran-ODT) disintegrating tablet 4 mg  ondansetron (Zofran) injection 4 mg  melatonin tablet 3 mg  polyethylene glycol (Glycolax, Miralax) packet 17 g  vancomycin (Xellia) 1 g in 200 mL (Xellia) IVPB 1 g  docusate sodium (Colace) capsule 100 mg  vancomycin-diluent combo no.1 (Xellia) IVPB 750 mg      Relevant Results  Labs  Results from last 72 hours   Lab Units 03/23/24  0615 03/22/24  0626 03/21/24  0354   WBC AUTO x10*3/uL 5.0 5.3 5.9   HEMOGLOBIN g/dL 10.3* 10.4* 10.1*   HEMATOCRIT % 29.4* 29.9* 29.7*   PLATELETS AUTO x10*3/uL 143* 135* 136*       Results from last 72 hours   Lab Units 03/23/24  0615 03/22/24  0626 03/21/24  0354   SODIUM mmol/L 137 139 135   POTASSIUM mmol/L 4.5 4.1 3.9   CHLORIDE mmol/L 107 108* 102   CO2 mmol/L 21* 20* 21*   BUN mg/dL 26* 31* 44*   CREATININE mg/dL 0.90 1.00 1.40   GLUCOSE mg/dL 90 86 90   CALCIUM mg/dL 9.1 9.3 8.9   ANION GAP mmol/L 9 11 12   EGFR mL/min/1.73m*2 >90 86 57*           Estimated Creatinine Clearance: 115.2 mL/min (by C-G formula based on SCr of 0.9 mg/dL).  No results found for: \"CRP\"  Microbiology  Susceptibility data from last 14 days.  Collected Specimen Info Organism Clindamycin Erythromycin Oxacillin Tetracycline Trimethoprim/Sulfamethoxazole Vancomycin   03/19/24 Tissue/Biopsy from Wound/Tissue Methicillin Resistant Staphylococcus aureus (MRSA) R R R S S S         Imaging  XR chest 1 view    Result Date: 3/20/2024  Interpreted By:  Nael Moore, STUDY: XR CHEST 1 VIEW; 3/20/2024 3:57 pm   INDICATION: Signs/Symptoms:PICC placement.   COMPARISON: None   ACCESSION NUMBER(S): [id number]   ORDERING CLINICIAN: ANGELITA YOUSSEF   TECHNIQUE: 1 view of the chest was performed.   FINDINGS: Right-sided PICC " line catheter with tip at the mid SVC. The lungs are adequately inflated. No acute consolidation. No pleural effusion. No pneumothorax.  The cardiomediastinal silhouette is within normal limits.       Right-sided PICC line catheter with tip at the mid SVC. No acute cardiopulmonary disease.   Signed by: Nael Moore 3/20/2024 4:14 PM Dictation workstation:   [id number]    Lower extremity venous duplex right    Result Date: 3/19/2024  Interpreted By:  Favian Castellano, STUDY: VASC US LOWER EXTREMITY VENOUS DUPLEX RIGHT; 3/19/2024 3:22 pm   INDICATION: Signs/Symptoms:leg swelling + d dimer..   COMPARISON: None.   ACCESSION NUMBER(S): [id number]   ORDERING CLINICIAN: CHARO FRIED   TECHNIQUE: Vascular ultrasound of the right lower extremity was performed. Real time compression views as well as Gray scale, color Doppler and spectral Doppler waveform analysis was performed.   FINDINGS: Evaluation of the visualized portions of the right common femoral vein, proximal, mid, and distal femoral vein, and popliteal vein were performed.  Evaluation of the visualized portions of the posterior tibial and peroneal veins were also performed.  In addition, evaluation of the contralateral common femoral vein was performed.   Limitations: None   The evaluated veins demonstrate normal compressibility. There is intact venous flow demonstrating normal respiratory variability and normal augmentation of flow with calf compression. Therefore, there is no ultrasonographic evidence for deep vein thrombosis within the evaluated veins. No evidence of thrombus is seen within the contralateral common femoral vein.       No sonographic evidence for deep vein thrombosis within the evaluated veins of the right lower extremity.   MACRO: None   Signed by: Favian Castellano 3/19/2024 3:22 PM Dictation workstation:   [id number]    XR foot right 3+ views    Result Date: 3/19/2024  Interpreted By:  Lux Hernandez, STUDY: XR FOOT RIGHT 3+ VIEWS    INDICATION: Signs/Symptoms:r foot wound.   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   Suspected skin wounds at the surgical site. No aggressive bony destructive changes or erosions.   Marked soft tissue swelling dorsally.       Suspected skin wounds of the 5th amputation site with no osseous acute abnormality.   Marked dorsal soft tissue swelling, potentially cellulitis.   Signed by: Lux Hernandez 3/19/2024 2:09 PM Dictation workstation:   [id number]           Assessment/Plan   Infected right diabetic foot ulcer-MRSA  Has retained right upper extremity PICC  History of strep anginosus and Bacteroides fragilis       IV vancomycin-avoiding zyvox -potential interaction with zyprexa   Monitor renal function  Supportive care  Follow up Blood cultures   Local care  Offloading  Monitor temperature and WBC  Podiatry  follow up-surgery planned outpatient on 3/27/2024    Long term plan IV vancomycin 750 mg every 12 hours for 14 days till 4/3/2024  Weekly CBC with diff, Biweekly CMP, vancomycin trough  Precert pending     Bere Jacobo, APRN-CNP

## 2024-03-23 NOTE — CARE PLAN
The patient's goals for the shift include  wound care, PICC care, comfort and safety, rest.    The clinical goals for the shift include Manage pain, wound care, IV antibiotics, monitor labs and VS, maintain patient safety, promote rest. PICC care too.    No barriers to meeting these goals. Plan of care ongoing.       Problem: Pain  Goal: My pain/discomfort is manageable  Outcome: Progressing     Problem: Safety  Goal: Patient will be injury free during hospitalization  Outcome: Progressing  Goal: I will remain free of falls  Outcome: Progressing     Problem: Daily Care  Goal: Daily care needs are met  Outcome: Progressing     Problem: Psychosocial Needs  Goal: Demonstrates ability to cope with hospitalization/illness  Outcome: Progressing  Goal: Collaborate with me, my family, and caregiver to identify my specific goals  Outcome: Progressing     Problem: Discharge Barriers  Goal: My discharge needs are met  Outcome: Progressing     Problem: Diabetes  Goal: Achieve decreasing blood glucose levels by end of shift  Outcome: Progressing  Goal: Increase stability of blood glucose readings by end of shift  Outcome: Progressing  Goal: Decrease in ketones present in urine by end of shift  Outcome: Progressing  Goal: Maintain electrolyte levels within acceptable range throughout shift  Outcome: Progressing  Goal: Maintain glucose levels >70mg/dl to <250mg/dl throughout shift  Outcome: Progressing  Goal: No changes in neurological exam by end of shift  Outcome: Progressing  Goal: Learn about and adhere to nutrition recommendations by end of shift  Outcome: Progressing  Goal: Vital signs within normal range for age by end of shift  Outcome: Progressing  Goal: Increase self care and/or family involovement by end of shift  Outcome: Progressing  Goal: Receive DSME education by end of shift  Outcome: Progressing     Problem: Discharge Planning  Goal: Discharge to home or other facility with appropriate resources  Outcome:  Progressing     Problem: Skin  Goal: Decreased wound size/increased tissue granulation at next dressing change  Outcome: Progressing  Goal: Participates in plan/prevention/treatment measures  Outcome: Progressing  Goal: Prevent/manage excess moisture  Outcome: Progressing  Goal: Prevent/minimize sheer/friction injuries  Outcome: Progressing  Goal: Promote/optimize nutrition  Outcome: Progressing  Goal: Promote skin healing  Outcome: Progressing     Problem: Fall/Injury  Goal: Not fall by end of shift  Outcome: Progressing  Goal: Be free from injury by end of the shift  Outcome: Progressing  Goal: Verbalize understanding of personal risk factors for fall in the hospital  Outcome: Progressing  Goal: Verbalize understanding of risk factor reduction measures to prevent injury from fall in the home  Outcome: Progressing  Goal: Use assistive devices by end of the shift  Outcome: Progressing  Goal: Pace activities to prevent fatigue by end of the shift  Outcome: Progressing

## 2024-03-23 NOTE — PROGRESS NOTES
Claude Coley is a 63 y.o. male on day 4 of admission presenting with Cellulitis of foot, right.      Subjective     Patient denied fever or chills.  He denied chest pain or shortness of breath.          Objective     Last Recorded Vitals  /74 (BP Location: Left arm, Patient Position: Lying)   Pulse 54   Temp 36.3 °C (97.3 °F) (Temporal)   Resp 17   Wt 113 kg (248 lb 3.8 oz)   SpO2 97%   Intake/Output last 3 Shifts:    Intake/Output Summary (Last 24 hours) at 3/23/2024 0937  Last data filed at 3/22/2024 2324  Gross per 24 hour   Intake 940 ml   Output 1350 ml   Net -410 ml         Admission Weight  Weight: 113 kg (250 lb 3.6 oz) (03/19/24 1202)    Daily Weight  03/20/24 : 113 kg (248 lb 3.8 oz)    Image Results  XR chest 1 view  Narrative: Interpreted By:  Nael Moore,   STUDY:  XR CHEST 1 VIEW; 3/20/2024 3:57 pm      INDICATION:  Signs/Symptoms:PICC placement.      COMPARISON:  None      ACCESSION NUMBER(S):  [id number]      ORDERING CLINICIAN:  ANGELITA YOUSSEF      TECHNIQUE:  1 view of the chest was performed.      FINDINGS:  Right-sided PICC line catheter with tip at the mid SVC. The lungs are  adequately inflated. No acute consolidation. No pleural effusion. No  pneumothorax.  The cardiomediastinal silhouette is within normal  limits.      Impression: Right-sided PICC line catheter with tip at the mid SVC. No acute  cardiopulmonary disease.      Signed by: Nael Moore 3/20/2024 4:14 PM  Dictation workstation:   [id number]      Physical Exam    General:  cooperating during physical exam, pleasant ,resting comfortable in the bed.  HEENT: Pupils are equal and reactive to light and commendation , oral mucosa moist, no JVD oral mucosa is moist.  Cardiovascular: Normal sinus rhythm, no MRG.  Lungs: Clear to auscultation bilaterally, no wheezing, no crackles, no dullness to percussion.  Abdomen: No hepatosplenomegaly appreciated, soft , not tender, positive bowel sounds, positive bowel  movement.  Neuro: Alert and oriented x3, strength in upper and lower extremities , sensation intact.  Psych: Patient had great insight was going on  Musculoskeletal: Right lower extremity swelling, cellulitis of right foot   Vascular: Pulses are intact in upper and lower extremities  Skin: Right foot cellulitis, dressing in place.      Assessment/Plan        Diabetic foot infection  Right foot cellulitis  X-ray of right foot no evidence of osteomyelitis  Cover aggressively with antibiotics  Wound culture grew MRSA  Continue with vancomycin   Dr. Saravia from ID services  on the  case  Evaluated by podiatry on the case  Plan for surgical intervention on 3/27  Right lower extremity pain.  Add low-dose of oxycodone    Diabetes mellitus type 2.  Cover with insulin sliding scale  Diabetic diet     Hypertension fairly controlled  Patient is on lisinopril  Monitor close     Hyperlipidemia     Right lower extremity swelling  Ultrasound of right lower extremity     DVT prophylaxis  GI prophylaxis  Morbid obesity  Advise regarding diet     Waiting for consultant to see him today.       Acute kidney injury.  Improved    Evaluated by physical therapy.    Patient will need placement to skilled nursing facility.  Continue with current antibiotics.  Pre-CERT pending for [facility].              Principal Problem:    Cellulitis of foot, right                  Martina Fang MD

## 2024-03-24 LAB
ANION GAP SERPL CALC-SCNC: 11 MMOL/L
BACTERIA BLD CULT: NORMAL
BACTERIA BLD CULT: NORMAL
BUN SERPL-MCNC: 24 MG/DL (ref 8–25)
CALCIUM SERPL-MCNC: 9.6 MG/DL (ref 8.5–10.4)
CHLORIDE SERPL-SCNC: 106 MMOL/L (ref 97–107)
CO2 SERPL-SCNC: 22 MMOL/L (ref 24–31)
CREAT SERPL-MCNC: 1 MG/DL (ref 0.4–1.6)
EGFRCR SERPLBLD CKD-EPI 2021: 86 ML/MIN/1.73M*2
ERYTHROCYTE [DISTWIDTH] IN BLOOD BY AUTOMATED COUNT: 13 % (ref 11.5–14.5)
GLUCOSE BLD MANUAL STRIP-MCNC: 125 MG/DL (ref 74–99)
GLUCOSE BLD MANUAL STRIP-MCNC: 72 MG/DL (ref 74–99)
GLUCOSE BLD MANUAL STRIP-MCNC: 92 MG/DL (ref 74–99)
GLUCOSE BLD MANUAL STRIP-MCNC: 93 MG/DL (ref 74–99)
GLUCOSE SERPL-MCNC: 104 MG/DL (ref 65–99)
HCT VFR BLD AUTO: 32.2 % (ref 41–52)
HGB BLD-MCNC: 11 G/DL (ref 13.5–17.5)
MCH RBC QN AUTO: 29.3 PG (ref 26–34)
MCHC RBC AUTO-ENTMCNC: 34.2 G/DL (ref 32–36)
MCV RBC AUTO: 86 FL (ref 80–100)
NRBC BLD-RTO: 0 /100 WBCS (ref 0–0)
PLATELET # BLD AUTO: 146 X10*3/UL (ref 150–450)
POTASSIUM SERPL-SCNC: 4.3 MMOL/L (ref 3.4–5.1)
RBC # BLD AUTO: 3.75 X10*6/UL (ref 4.5–5.9)
SODIUM SERPL-SCNC: 139 MMOL/L (ref 133–145)
WBC # BLD AUTO: 6.5 X10*3/UL (ref 4.4–11.3)

## 2024-03-24 PROCEDURE — 82947 ASSAY GLUCOSE BLOOD QUANT: CPT

## 2024-03-24 PROCEDURE — 2500000002 HC RX 250 W HCPCS SELF ADMINISTERED DRUGS (ALT 637 FOR MEDICARE OP, ALT 636 FOR OP/ED): Performed by: INTERNAL MEDICINE

## 2024-03-24 PROCEDURE — 85027 COMPLETE CBC AUTOMATED: CPT | Performed by: INTERNAL MEDICINE

## 2024-03-24 PROCEDURE — 2500000001 HC RX 250 WO HCPCS SELF ADMINISTERED DRUGS (ALT 637 FOR MEDICARE OP): Performed by: INTERNAL MEDICINE

## 2024-03-24 PROCEDURE — 2500000004 HC RX 250 GENERAL PHARMACY W/ HCPCS (ALT 636 FOR OP/ED): Performed by: INTERNAL MEDICINE

## 2024-03-24 PROCEDURE — 2500000002 HC RX 250 W HCPCS SELF ADMINISTERED DRUGS (ALT 637 FOR MEDICARE OP, ALT 636 FOR OP/ED): Mod: MUE | Performed by: INTERNAL MEDICINE

## 2024-03-24 PROCEDURE — 80048 BASIC METABOLIC PNL TOTAL CA: CPT | Performed by: INTERNAL MEDICINE

## 2024-03-24 PROCEDURE — 1100000001 HC PRIVATE ROOM DAILY

## 2024-03-24 RX ADMIN — POLYETHYLENE GLYCOL 3350 17 G: 17 POWDER, FOR SOLUTION ORAL at 09:35

## 2024-03-24 RX ADMIN — DOCUSATE SODIUM 100 MG: 100 CAPSULE, LIQUID FILLED ORAL at 09:36

## 2024-03-24 RX ADMIN — Medication 3 MG: at 20:59

## 2024-03-24 RX ADMIN — FINASTERIDE 5 MG: 5 TABLET, FILM COATED ORAL at 09:35

## 2024-03-24 RX ADMIN — GEMFIBROZIL 600 MG: 600 TABLET ORAL at 09:35

## 2024-03-24 RX ADMIN — OLANZAPINE 20 MG: 10 TABLET, FILM COATED ORAL at 20:59

## 2024-03-24 RX ADMIN — VANCOMYCIN 750 MG: 750 INJECTION, SOLUTION INTRAVENOUS at 09:36

## 2024-03-24 RX ADMIN — ACETAMINOPHEN 650 MG: 325 TABLET ORAL at 09:35

## 2024-03-24 RX ADMIN — HEPARIN SODIUM 5000 UNITS: 5000 INJECTION, SOLUTION INTRAVENOUS; SUBCUTANEOUS at 21:00

## 2024-03-24 RX ADMIN — CARVEDILOL 12.5 MG: 12.5 TABLET, FILM COATED ORAL at 09:36

## 2024-03-24 RX ADMIN — VANCOMYCIN 750 MG: 750 INJECTION, SOLUTION INTRAVENOUS at 21:00

## 2024-03-24 RX ADMIN — ATORVASTATIN CALCIUM 40 MG: 40 TABLET, FILM COATED ORAL at 09:36

## 2024-03-24 RX ADMIN — GEMFIBROZIL 600 MG: 600 TABLET ORAL at 16:18

## 2024-03-24 RX ADMIN — ASPIRIN 81 MG: 81 TABLET, COATED ORAL at 09:35

## 2024-03-24 RX ADMIN — CARVEDILOL 12.5 MG: 12.5 TABLET, FILM COATED ORAL at 16:18

## 2024-03-24 RX ADMIN — HEPARIN SODIUM 5000 UNITS: 5000 INJECTION, SOLUTION INTRAVENOUS; SUBCUTANEOUS at 09:36

## 2024-03-24 ASSESSMENT — COGNITIVE AND FUNCTIONAL STATUS - GENERAL
DAILY ACTIVITIY SCORE: 24
DAILY ACTIVITIY SCORE: 24
CLIMB 3 TO 5 STEPS WITH RAILING: A LITTLE
CLIMB 3 TO 5 STEPS WITH RAILING: A LITTLE
MOBILITY SCORE: 23
MOBILITY SCORE: 23

## 2024-03-24 ASSESSMENT — PAIN - FUNCTIONAL ASSESSMENT
PAIN_FUNCTIONAL_ASSESSMENT: 0-10

## 2024-03-24 ASSESSMENT — PAIN SCALES - GENERAL
PAINLEVEL_OUTOF10: 0 - NO PAIN
PAINLEVEL_OUTOF10: 0 - NO PAIN
PAINLEVEL_OUTOF10: 2
PAINLEVEL_OUTOF10: 0 - NO PAIN

## 2024-03-24 NOTE — PROGRESS NOTES
Claude Coley is a 63 y.o. male on day 5 of admission presenting with Cellulitis of foot, right.      Subjective   Patient with no significant complaints today.       Objective     Last Recorded Vitals  /64 (BP Location: Left arm, Patient Position: Sitting)   Pulse 68   Temp 36.6 °C (97.9 °F) (Temporal)   Resp 16   Wt 113 kg (248 lb 3.8 oz)   SpO2 96%   Intake/Output last 3 Shifts:    Intake/Output Summary (Last 24 hours) at 3/24/2024 1637  Last data filed at 3/24/2024 1500  Gross per 24 hour   Intake 1850 ml   Output 2200 ml   Net -350 ml       Admission Weight  Weight: 113 kg (250 lb 3.6 oz) (03/19/24 1202)    Daily Weight  03/20/24 : 113 kg (248 lb 3.8 oz)    Image Results  XR chest 1 view  Narrative: Interpreted By:  Nael Moore,   STUDY:  XR CHEST 1 VIEW; 3/20/2024 3:57 pm      INDICATION:  Signs/Symptoms:PICC placement.      COMPARISON:  None      ACCESSION NUMBER(S):  [id number]      ORDERING CLINICIAN:  ANGELITA YOUSSEF      TECHNIQUE:  1 view of the chest was performed.      FINDINGS:  Right-sided PICC line catheter with tip at the mid SVC. The lungs are  adequately inflated. No acute consolidation. No pleural effusion. No  pneumothorax.  The cardiomediastinal silhouette is within normal  limits.      Impression: Right-sided PICC line catheter with tip at the mid SVC. No acute  cardiopulmonary disease.      Signed by: Nael Moore 3/20/2024 4:14 PM  Dictation workstation:   [id number]      Physical Exam    No acute distress  HEENT PERRL EOMI  Cardiovascular S1-S2 regular rate rhythm  Lungs clear to auscultation bilaterally  Abdomen nontender nondistended bowel sounds present  Extremities right lower extremity wound is wrapped, images from podiatry reviewed  Relevant Results             Assessment/Plan        This patient has a central line   Reason for the central line remaining today? Parenteral medication            Principal Problem:    Cellulitis of foot, right    -Patient  with MRSA positive wound infection of the right foot.  Continue with vancomycin.  Plan is for operative procedure 3/27/2024.  This can be done as an outpatient, pending pre-CERT back to [facility].  Continue with antibiotics, appreciate infectious disease input.  -Continue with blood sugar management to optimize wound healing  -Continue care for other chronic medical conditions which are stable at this time.            Cortez Hanna MD

## 2024-03-24 NOTE — CARE PLAN
The patient's goals for the shift include  wound care, IV antibiotics, rest.     The clinical goals for the shift include Manage pain, wound care, IV antibiotics, monitor labs and VS, maintain patient safety, promote rest.    No barriers to meeting these goals.      Problem: Pain  Goal: My pain/discomfort is manageable  Outcome: Progressing     Problem: Safety  Goal: Patient will be injury free during hospitalization  Outcome: Progressing  Goal: I will remain free of falls  Outcome: Progressing     Problem: Daily Care  Goal: Daily care needs are met  Outcome: Progressing     Problem: Psychosocial Needs  Goal: Demonstrates ability to cope with hospitalization/illness  Outcome: Progressing  Goal: Collaborate with me, my family, and caregiver to identify my specific goals  Outcome: Progressing     Problem: Discharge Barriers  Goal: My discharge needs are met  Outcome: Progressing     Problem: Diabetes  Goal: Achieve decreasing blood glucose levels by end of shift  Outcome: Progressing  Goal: Increase stability of blood glucose readings by end of shift  Outcome: Progressing  Goal: Decrease in ketones present in urine by end of shift  Outcome: Progressing  Goal: Maintain electrolyte levels within acceptable range throughout shift  Outcome: Progressing  Goal: Maintain glucose levels >70mg/dl to <250mg/dl throughout shift  Outcome: Progressing  Goal: No changes in neurological exam by end of shift  Outcome: Progressing  Goal: Learn about and adhere to nutrition recommendations by end of shift  Outcome: Progressing  Goal: Vital signs within normal range for age by end of shift  Outcome: Progressing  Goal: Increase self care and/or family involovement by end of shift  Outcome: Progressing  Goal: Receive DSME education by end of shift  Outcome: Progressing     Problem: Discharge Planning  Goal: Discharge to home or other facility with appropriate resources  Outcome: Progressing     Problem: Skin  Goal: Decreased wound  size/increased tissue granulation at next dressing change  Outcome: Progressing  Goal: Participates in plan/prevention/treatment measures  Outcome: Progressing  Goal: Prevent/manage excess moisture  Outcome: Progressing  Goal: Prevent/minimize sheer/friction injuries  Outcome: Progressing  Goal: Promote/optimize nutrition  Outcome: Progressing  Goal: Promote skin healing  Outcome: Progressing     Problem: Fall/Injury  Goal: Not fall by end of shift  Outcome: Progressing  Goal: Be free from injury by end of the shift  Outcome: Progressing  Goal: Verbalize understanding of personal risk factors for fall in the hospital  Outcome: Progressing  Goal: Verbalize understanding of risk factor reduction measures to prevent injury from fall in the home  Outcome: Progressing  Goal: Use assistive devices by end of the shift  Outcome: Progressing  Goal: Pace activities to prevent fatigue by end of the shift  Outcome: Progressing

## 2024-03-24 NOTE — CARE PLAN
The patient's goals for the shift include  res    The clinical goals for the shift include Manage pain, wound care, IV antibiotics, monitor labs and VS, maintain patient safety, promote rest.      03/24/24 at 7:26 PM - Aliyah Tomlin RN

## 2024-03-24 NOTE — PROGRESS NOTES
"Vancomycin Dosing by Pharmacy- FOLLOW UP    Claude Coley is a 63 y.o. year old male who Pharmacy has been consulted for vancomycin dosing for cellulitis, skin and soft tissue. Based on the patient's indication and renal status this patient is being dosed based on a goal AUC of 400-600.     Renal function is currently stable.    Current vancomycin dose: 750 mg given every 12 hours    Estimated vancomycin AUC on current dose: 547 mg/L.hr     Visit Vitals  /65 (BP Location: Left arm, Patient Position: Sitting)   Pulse 60   Temp 36.2 °C (97.2 °F) (Temporal)   Resp 18        Lab Results   Component Value Date    CREATININE 1.00 03/24/2024    CREATININE 0.90 03/23/2024    CREATININE 1.00 03/22/2024    CREATININE 1.40 03/21/2024        Patient weight is 113 kg    No results found for: \"CULTURE\"     I/O last 3 completed shifts:  In: 1400 (12.4 mL/kg) [P.O.:1250; IV Piggyback:150]  Out: 2600 (23.1 mL/kg) [Urine:2600 (0.6 mL/kg/hr)]  Weight: 112.6 kg   [unfilled]    No results found for: \"PATIENTTEMP\"     Assessment/Plan    No changes since last level drawn, renal function stable.  The next level will be obtained on 3/28 at 0500. May be obtained sooner if clinically indicated.   Will continue to monitor renal function daily while on vancomycin and order serum creatinine at least every 48 hours if not already ordered.  Follow for continued vancomycin needs, clinical response, and signs/symptoms of toxicity.       DEANA OVALLE, PharmD           "

## 2024-03-24 NOTE — PROGRESS NOTES
PODIATRY SERVICE CONSULT PROGRESS NOTE    SERVICE DATE: 3/24/2024   SERVICE TIME:  3:45PM    Subjective   INTERVAL HPI:   Patient was seen at bedside.  Pain well controlled.  Patient denies any constitutional symptoms.   No other pedal complaints.   Anticipates discharge back to [facility].   Still pending pre-cert.       Medications:  Scheduled Meds: aspirin, 81 mg, oral, Daily  atorvastatin, 40 mg, oral, Daily  carvedilol, 12.5 mg, oral, BID with meals  docusate sodium, 100 mg, oral, Daily  finasteride, 5 mg, oral, Daily  gemfibrozil, 600 mg, oral, BID AC  heparin (porcine), 5,000 Units, subcutaneous, q12h  insulin lispro, 0-5 Units, subcutaneous, TID with meals  melatonin, 3 mg, oral, Nightly  OLANZapine, 20 mg, oral, Nightly  polyethylene glycol, 17 g, oral, Daily  vancomycin-diluent combo no.1, 750 mg, intravenous, q12h      Continuous Infusions:    PRN Meds: PRN medications: acetaminophen **OR** acetaminophen **OR** acetaminophen, dextrose, dextrose, glucagon, ondansetron ODT **OR** ondansetron, oxyCODONE, vancomycin         Objective   PHYSICAL EXAM:  Physical Exam Performed:  Vitals:    03/24/24 0723   BP: 138/65   Pulse: 60   Resp: 18   Temp: 36.2 °C (97.2 °F)   SpO2: 100%     Body mass index is 31.87 kg/m².    Patient is AOx3 and in no acute distress. Patient is alert and cooperative. Sitting comfortably in bed with dressing intact with some plantar strikethrough noted.     Vascular: Palpable DP/PT pulses B/L. Severe pitting edema noted to RLE. Hair growth absent B/L. CFT<5 to B/L hallux. Temperature is warm to cool from tibial tuberosity to distal digits B/L. No lymphatic streaking noted B/L.     Musculoskeletal: s/p right partial 5th ray resection. Gross active and passive ROM intact to age and activity level. Moves all extremities spontaneously. No pain to palpation at feet B/L.      Neurological: Absent light touch sensation B/L. Pain stimuli absent B/L.      Dermatologic: Nails 1-4 are thickened,  elongated, discolored with subungual debris B/L. Skin appears diffusely xerotic B/L. Web spaces 1-3 B/L are clean, dry and intact. No rashes or nodules noted B/L. No hyperkeratotic tissue noted B/L.      Wound: Right plantar lateral foot  Images:        Measurements: 12cm x 8cm x 2.5cm deep at proximal lateral curved portion that extends an additional 9cm  Mixed wound base of 80% granular tissue with 20% various areas of fibrosis/necrosis.  Mild serosanguinous drainage with fibrotic slough.   Moderate basil-wound maceration.   Mild basil-wound erythema.   No evidence of ascending cellulitis or lymphangitis.  No palpable fluctuance. Mild malodor. No increased warmth.   No probe to bone. Proximal lateral portion probes deep 2.5cm to deep fascia and/or capsule.  Minimal tunneling or tracking.   No undermining. Skin edges irregular but intact.      LABS:   Results for orders placed or performed during the hospital encounter of 03/19/24 (from the past 24 hour(s))   POCT GLUCOSE   Result Value Ref Range    POCT Glucose 124 (H) 74 - 99 mg/dL   POCT GLUCOSE   Result Value Ref Range    POCT Glucose 91 74 - 99 mg/dL   POCT GLUCOSE   Result Value Ref Range    POCT Glucose 106 (H) 74 - 99 mg/dL   CBC   Result Value Ref Range    WBC 6.5 4.4 - 11.3 x10*3/uL    nRBC 0.0 0.0 - 0.0 /100 WBCs    RBC 3.75 (L) 4.50 - 5.90 x10*6/uL    Hemoglobin 11.0 (L) 13.5 - 17.5 g/dL    Hematocrit 32.2 (L) 41.0 - 52.0 %    MCV 86 80 - 100 fL    MCH 29.3 26.0 - 34.0 pg    MCHC 34.2 32.0 - 36.0 g/dL    RDW 13.0 11.5 - 14.5 %    Platelets 146 (L) 150 - 450 x10*3/uL   Basic metabolic panel   Result Value Ref Range    Glucose 104 (H) 65 - 99 mg/dL    Sodium 139 133 - 145 mmol/L    Potassium 4.3 3.4 - 5.1 mmol/L    Chloride 106 97 - 107 mmol/L    Bicarbonate 22 (L) 24 - 31 mmol/L    Urea Nitrogen 24 8 - 25 mg/dL    Creatinine 1.00 0.40 - 1.60 mg/dL    eGFR 86 >60 mL/min/1.73m*2    Calcium 9.6 8.5 - 10.4 mg/dL    Anion Gap 11 <=19 mmol/L   POCT GLUCOSE  "  Result Value Ref Range    POCT Glucose 92 74 - 99 mg/dL      Lab Results   Component Value Date    HGBA1C 4.4 03/19/2024      No results found for: \"CRP\"   Lab Results   Component Value Date    SEDRATE 47 (H) 03/19/2024        Results from last 7 days   Lab Units 03/24/24  0449   WBC AUTO x10*3/uL 6.5   RBC AUTO x10*6/uL 3.75*   HEMOGLOBIN g/dL 11.0*   HEMATOCRIT % 32.2*     Results from last 7 days   Lab Units 03/24/24  0449   SODIUM mmol/L 139   POTASSIUM mmol/L 4.3   CHLORIDE mmol/L 106   CO2 mmol/L 22*   BUN mg/dL 24   CREATININE mg/dL 1.00   CALCIUM mg/dL 9.6           IMAGING REVIEW:  XR chest 1 view    Result Date: 3/20/2024  Interpreted By:  Nael Moore, STUDY: XR CHEST 1 VIEW; 3/20/2024 3:57 pm   INDICATION: Signs/Symptoms:PICC placement.   COMPARISON: None   ACCESSION NUMBER(S): [id number]   ORDERING CLINICIAN: ANGELITA YOUSSEF   TECHNIQUE: 1 view of the chest was performed.   FINDINGS: Right-sided PICC line catheter with tip at the mid SVC. The lungs are adequately inflated. No acute consolidation. No pleural effusion. No pneumothorax.  The cardiomediastinal silhouette is within normal limits.       Right-sided PICC line catheter with tip at the mid SVC. No acute cardiopulmonary disease.   Signed by: Nael Moore 3/20/2024 4:14 PM Dictation workstation:   [id number]    Lower extremity venous duplex right    Result Date: 3/19/2024  Interpreted By:  Favian Castellano, STUDY: [facility] LOWER EXTREMITY VENOUS DUPLEX RIGHT; 3/19/2024 3:22 pm   INDICATION: Signs/Symptoms:leg swelling + d dimer..   COMPARISON: None.   ACCESSION NUMBER(S): [id number]   ORDERING CLINICIAN: CHARO FRIED   TECHNIQUE: Vascular ultrasound of the right lower extremity was performed. Real time compression views as well as Gray scale, color Doppler and spectral Doppler waveform analysis was performed.   FINDINGS: Evaluation of the visualized portions of the right common femoral vein, proximal, mid, and distal femoral vein, and " popliteal vein were performed.  Evaluation of the visualized portions of the posterior tibial and peroneal veins were also performed.  In addition, evaluation of the contralateral common femoral vein was performed.   Limitations: None   The evaluated veins demonstrate normal compressibility. There is intact venous flow demonstrating normal respiratory variability and normal augmentation of flow with calf compression. Therefore, there is no ultrasonographic evidence for deep vein thrombosis within the evaluated veins. No evidence of thrombus is seen within the contralateral common femoral vein.       No sonographic evidence for deep vein thrombosis within the evaluated veins of the right lower extremity.   MACRO: None   Signed by: Favian Castellano 3/19/2024 3:22 PM Dictation workstation:   [id number]    XR foot right 3+ views    Result Date: 3/19/2024  Interpreted By:  Lux Hernandez, STUDY: XR FOOT RIGHT 3+ VIEWS   INDICATION: Signs/Symptoms:r foot wound.   COMPARISON: January 26, 2024   ACCESSION NUMBER(S): [id number]   ORDERING CLINICIAN: CHARO FRIED   FINDINGS: Postsurgical changes status post 5th ray amputation to the level of the mid metatarsal.   Suspected skin wounds at the surgical site. No aggressive bony destructive changes or erosions.   Marked soft tissue swelling dorsally.       Suspected skin wounds of the 5th amputation site with no osseous acute abnormality.   Marked dorsal soft tissue swelling, potentially cellulitis.   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  -  Charts, labs, vitals and imaging all reviewed.   - Imaging: Plain films of right foot with no osseous erosion or soft tissue gas noted. There is increased soft tissue volume and density throughout the dorsal foot and plantar hindfoot  - Labs: ESR 47, WBC 6.5, HGB 11.0  - Wound culture: 3/19 growing 2+ MRSA susceptible to tetracyclines, Bactrim, and vancomycin.  - Blood culture: collected and pending     Plan:  - Wound has significantly improved since restarting of IV abx. Dressing changed.  Recommend continued IV abx outpatient. Plan to return to [facility], pending precert.  - No podiatric surgical intervention is planned during this hospitalization, except in the case of the patient remaining inpatient until 3/27.  - Will proceed with outpatient podiatric surgery next week as planned on 3/27/24.  - Abx: Vancomycin per primary/ID. Planned course until 4/3/24, per ID  - Pain and Bowel Regimens: per primary  - Dressings: Betadine-soaked 4x4s, dry 4x4s, ABDs, kerlix, ACE. To be changed daily.  - Nursing staff is able to change/reinforce dressing if & as necessary until next dressing change. Thank you.  - Podiatry will continue to follow while in house.  - Discussed with Dr. Bolaños.     DVT ppx: heparin subcutaneous, per primary  Weightbearing: Heel-only weight-bearing to RLE in surgical shoe only.  Discharge: Patient to follow up 1 week after discharge with Dr. Perry.    Case to be discussed with attending, A&P above reflects a tentative plan. Please await for the final signature from the attending physician on service.    This patient will be followed by the Podiatry service. Please Epic Chat the corresponding residents below with questions or concerns.      Hannah Delgado DPM PGY-2  Podiatric Medicine and Surgery   Epic Chat          SIGNATURE: Hannah Delgado DPM PATIENT NAME: Claude Coley   DATE: March 24, 2024 MRN: [id number]   TIME: 11:15 AM CONTACT: [employer] Message

## 2024-03-24 NOTE — PROGRESS NOTES
Claude Coley is a 63 y.o. male on day 5 of admission presenting with Cellulitis of foot, right.    Subjective   Interval History: Afebrile, no chills  No foot pain  Podiatry note reviewed-surgery planned on 3/27/2024  No chest pain or shortness of breath  No nausea vomiting      Review of Systems   All other systems reviewed and are negative.      Objective   Range of Vitals (last 24 hours)  Heart Rate:  [57-60]   Temp:  [36.2 °C (97.2 °F)-36.6 °C (97.9 °F)]   Resp:  [16-18]   BP: (131-145)/(63-65)   SpO2:  [97 %-100 %]   Daily Weight  03/20/24 : 113 kg (248 lb 3.8 oz)    Body mass index is 31.87 kg/m².    Physical Exam  Constitutional:       Appearance: Normal appearance.   HENT:      Head: Normocephalic and atraumatic.      Nose: Nose normal.      Mouth/Throat:      Mouth: Mucous membranes are moist.      Pharynx: Oropharynx is clear.   Eyes:      Conjunctiva/sclera: Conjunctivae normal.   Cardiovascular:      Rate and Rhythm: Normal rate and regular rhythm.   Pulmonary:      Effort: Pulmonary effort is normal.      Breath sounds: Normal breath sounds.   Abdominal:      General: Bowel sounds are normal.      Palpations: Abdomen is soft.   Musculoskeletal:         General: Normal range of motion.      Cervical back: Normal range of motion and neck supple.   Skin:     Comments: Right foot dressing   Neurological:      Mental Status: He is alert.      Comments: Awake, alert   Psychiatric:         Mood and Affect: Mood normal.         Behavior: Behavior normal.     Antibiotics  sodium chloride 0.9 % bolus 500 mL  cefTRIAXone (Rocephin) 2 g IV in dextrose 5% 50 mL  vancomycin 1.5 g in 300 mL (Xellia) IVPB 1.5 g  aspirin EC tablet 81 mg  atorvastatin (Lipitor) tablet 40 mg  carvedilol (Coreg) tablet 12.5 mg  finasteride (Proscar) tablet 5 mg  gemfibrozil (Lopid) tablet 600 mg  OLANZapine (ZyPREXA) tablet 20 mg  piperacillin-tazobactam-dextrose (Zosyn) IV 4.5 g  vancomycin (Vancocin) pharmacy to dose - pharmacy  "monitoring  dextrose 50 % injection 25 g  glucagon (Glucagen) injection 1 mg  dextrose 50 % injection 12.5 g  insulin lispro (HumaLOG) injection 0-10 Units  heparin (porcine) injection 5,000 Units  acetaminophen (Tylenol) tablet 650 mg  acetaminophen (Tylenol) oral liquid 650 mg  acetaminophen (Tylenol) suppository 650 mg  ondansetron ODT (Zofran-ODT) disintegrating tablet 4 mg  ondansetron (Zofran) injection 4 mg  melatonin tablet 3 mg  polyethylene glycol (Glycolax, Miralax) packet 17 g  vancomycin (Xellia) 1 g in 200 mL (Xellia) IVPB 1 g  docusate sodium (Colace) capsule 100 mg  vancomycin-diluent combo no.1 (Xellia) IVPB 750 mg  oxyCODONE (Roxicodone) immediate release tablet 5 mg  vancomycin vial for injection 1.25 g  insulin lispro (HumaLOG) injection 0-5 Units      Relevant Results  Labs  Results from last 72 hours   Lab Units 03/24/24  0449 03/23/24  0615 03/22/24  0626   WBC AUTO x10*3/uL 6.5 5.0 5.3   HEMOGLOBIN g/dL 11.0* 10.3* 10.4*   HEMATOCRIT % 32.2* 29.4* 29.9*   PLATELETS AUTO x10*3/uL 146* 143* 135*     Results from last 72 hours   Lab Units 03/24/24  0449 03/23/24  0615 03/22/24  0626   SODIUM mmol/L 139 137 139   POTASSIUM mmol/L 4.3 4.5 4.1   CHLORIDE mmol/L 106 107 108*   CO2 mmol/L 22* 21* 20*   BUN mg/dL 24 26* 31*   CREATININE mg/dL 1.00 0.90 1.00   GLUCOSE mg/dL 104* 90 86   CALCIUM mg/dL 9.6 9.1 9.3   ANION GAP mmol/L 11 9 11   EGFR mL/min/1.73m*2 86 >90 86         Estimated Creatinine Clearance: 103.7 mL/min (by C-G formula based on SCr of 1 mg/dL).  No results found for: \"CRP\"  Microbiology  Susceptibility data from last 14 days.  Collected Specimen Info Organism Clindamycin Erythromycin Oxacillin Tetracycline Trimethoprim/Sulfamethoxazole Vancomycin   03/19/24 Tissue/Biopsy from Wound/Tissue Methicillin Resistant Staphylococcus aureus (MRSA) R R R S S S   Reviewed  Imaging  XR chest 1 view    Result Date: 3/20/2024  Interpreted By:  Nael Moore, STUDY: XR CHEST 1 VIEW; 3/20/2024 " 3:57 pm   INDICATION: Signs/Symptoms:PICC placement.   COMPARISON: None   ACCESSION NUMBER(S): [id number]   ORDERING CLINICIAN: ANGELITA YOUSSEF   TECHNIQUE: 1 view of the chest was performed.   FINDINGS: Right-sided PICC line catheter with tip at the mid SVC. The lungs are adequately inflated. No acute consolidation. No pleural effusion. No pneumothorax.  The cardiomediastinal silhouette is within normal limits.       Right-sided PICC line catheter with tip at the mid SVC. No acute cardiopulmonary disease.   Signed by: Nael Moore 3/20/2024 4:14 PM Dictation workstation:   [id number]    Lower extremity venous duplex right    Result Date: 3/19/2024  Interpreted By:  Favian Castellano, STUDY: VAS US LOWER EXTREMITY VENOUS DUPLEX RIGHT; 3/19/2024 3:22 pm   INDICATION: Signs/Symptoms:leg swelling + d dimer..   COMPARISON: None.   ACCESSION NUMBER(S): [id number]   ORDERING CLINICIAN: CHARO FRIED   TECHNIQUE: Vascular ultrasound of the right lower extremity was performed. Real time compression views as well as Gray scale, color Doppler and spectral Doppler waveform analysis was performed.   FINDINGS: Evaluation of the visualized portions of the right common femoral vein, proximal, mid, and distal femoral vein, and popliteal vein were performed.  Evaluation of the visualized portions of the posterior tibial and peroneal veins were also performed.  In addition, evaluation of the contralateral common femoral vein was performed.   Limitations: None   The evaluated veins demonstrate normal compressibility. There is intact venous flow demonstrating normal respiratory variability and normal augmentation of flow with calf compression. Therefore, there is no ultrasonographic evidence for deep vein thrombosis within the evaluated veins. No evidence of thrombus is seen within the contralateral common femoral vein.       No sonographic evidence for deep vein thrombosis within the evaluated veins of the right lower extremity.    MACRO: None   Signed by: Favian Castellano 3/19/2024 3:22 PM Dictation workstation:   [id number]    XR foot right 3+ views    Result Date: 3/19/2024  Interpreted By:  Lux Hernandez, STUDY: XR FOOT RIGHT 3+ VIEWS   INDICATION: Signs/Symptoms:r foot wound.   COMPARISON: January 26, 2024   ACCESSION NUMBER(S): [id number]   ORDERING CLINICIAN: CHARO FRIED   FINDINGS: Postsurgical changes status post 5th ray amputation to the level of the mid metatarsal.   Suspected skin wounds at the surgical site. No aggressive bony destructive changes or erosions.   Marked soft tissue swelling dorsally.       Suspected skin wounds of the 5th amputation site with no osseous acute abnormality.   Marked dorsal soft tissue swelling, potentially cellulitis.   Signed by: Lux Hernandez 3/19/2024 2:09 PM Dictation workstation:   [id number]     Assessment/Plan   Infected right diabetic foot ulcer-MRSA  Has retained right upper extremity PICC  History of strep anginosus and Bacteroides fragilis        IV vancomycin-avoiding zyvox -potential interaction with zyprexa   Monitor renal function  Supportive care  Local care  Offloading  Monitor temperature and WBC  Podiatry  follow up-surgery planned outpatient on 3/27/2024   Long term plan IV vancomycin 750 mg every 12 hours for 14 days till 4/3/2024-may extend after surgery on 3/27/2024  Weekly CBC with diff, Biweekly CMP, vancomycin trough  Precert pending       Srikanth Saravia MD

## 2024-03-24 NOTE — CARE PLAN
The patient's goals for the shift include  rest    The clinical goals for the shift include Manage pain, wound care, IV antibiotics, monitor labs and VS, maintain patient safety, promote rest.      03/23/24 at 10:42 PM - Aliyah Tomlin RN

## 2024-03-25 ENCOUNTER — APPOINTMENT (OUTPATIENT)
Dept: CARDIOLOGY | Facility: HOSPITAL | Age: 64
DRG: 300 | End: 2024-03-25
Payer: MEDICARE

## 2024-03-25 VITALS
HEIGHT: 74 IN | BODY MASS INDEX: 31.86 KG/M2 | WEIGHT: 248.24 LBS | TEMPERATURE: 98.2 F | SYSTOLIC BLOOD PRESSURE: 123 MMHG | RESPIRATION RATE: 18 BRPM | DIASTOLIC BLOOD PRESSURE: 57 MMHG | HEART RATE: 62 BPM | OXYGEN SATURATION: 98 %

## 2024-03-25 LAB
ANION GAP SERPL CALC-SCNC: 11 MMOL/L
BUN SERPL-MCNC: 21 MG/DL (ref 8–25)
CALCIUM SERPL-MCNC: 9.5 MG/DL (ref 8.5–10.4)
CHLORIDE SERPL-SCNC: 107 MMOL/L (ref 97–107)
CO2 SERPL-SCNC: 21 MMOL/L (ref 24–31)
CREAT SERPL-MCNC: 0.9 MG/DL (ref 0.4–1.6)
EGFRCR SERPLBLD CKD-EPI 2021: >90 ML/MIN/1.73M*2
ERYTHROCYTE [DISTWIDTH] IN BLOOD BY AUTOMATED COUNT: 12.8 % (ref 11.5–14.5)
GLUCOSE BLD MANUAL STRIP-MCNC: 96 MG/DL (ref 74–99)
GLUCOSE BLD MANUAL STRIP-MCNC: 98 MG/DL (ref 74–99)
GLUCOSE SERPL-MCNC: 98 MG/DL (ref 65–99)
HCT VFR BLD AUTO: 29.4 % (ref 41–52)
HGB BLD-MCNC: 10.1 G/DL (ref 13.5–17.5)
MCH RBC QN AUTO: 29.3 PG (ref 26–34)
MCHC RBC AUTO-ENTMCNC: 34.4 G/DL (ref 32–36)
MCV RBC AUTO: 85 FL (ref 80–100)
NRBC BLD-RTO: 0 /100 WBCS (ref 0–0)
PLATELET # BLD AUTO: 143 X10*3/UL (ref 150–450)
POTASSIUM SERPL-SCNC: 4.1 MMOL/L (ref 3.4–5.1)
RBC # BLD AUTO: 3.45 X10*6/UL (ref 4.5–5.9)
SODIUM SERPL-SCNC: 139 MMOL/L (ref 133–145)
WBC # BLD AUTO: 6 X10*3/UL (ref 4.4–11.3)

## 2024-03-25 PROCEDURE — 82374 ASSAY BLOOD CARBON DIOXIDE: CPT | Performed by: INTERNAL MEDICINE

## 2024-03-25 PROCEDURE — 97110 THERAPEUTIC EXERCISES: CPT | Mod: GP,CQ

## 2024-03-25 PROCEDURE — 85027 COMPLETE CBC AUTOMATED: CPT | Performed by: INTERNAL MEDICINE

## 2024-03-25 PROCEDURE — 36569 INSJ PICC 5 YR+ W/O IMAGING: CPT

## 2024-03-25 PROCEDURE — 82947 ASSAY GLUCOSE BLOOD QUANT: CPT

## 2024-03-25 PROCEDURE — 2500000004 HC RX 250 GENERAL PHARMACY W/ HCPCS (ALT 636 FOR OP/ED): Performed by: INTERNAL MEDICINE

## 2024-03-25 PROCEDURE — 2500000004 HC RX 250 GENERAL PHARMACY W/ HCPCS (ALT 636 FOR OP/ED): Mod: JZ

## 2024-03-25 PROCEDURE — 2500000001 HC RX 250 WO HCPCS SELF ADMINISTERED DRUGS (ALT 637 FOR MEDICARE OP): Performed by: INTERNAL MEDICINE

## 2024-03-25 PROCEDURE — 97116 GAIT TRAINING THERAPY: CPT | Mod: GP,CQ

## 2024-03-25 RX ORDER — LIDOCAINE HYDROCHLORIDE 10 MG/ML
5 INJECTION INFILTRATION; PERINEURAL ONCE
Status: DISCONTINUED | OUTPATIENT
Start: 2024-03-25 | End: 2024-03-25 | Stop reason: HOSPADM

## 2024-03-25 RX ORDER — VANCOMYCIN 1 G/200ML
1000 INJECTION, SOLUTION INTRAVENOUS EVERY 12 HOURS
Status: DISCONTINUED | OUTPATIENT
Start: 2024-03-25 | End: 2024-03-25 | Stop reason: HOSPADM

## 2024-03-25 RX ADMIN — CARVEDILOL 12.5 MG: 12.5 TABLET, FILM COATED ORAL at 08:13

## 2024-03-25 RX ADMIN — VANCOMYCIN 1000 MG: 1 INJECTION, SOLUTION INTRAVENOUS at 08:13

## 2024-03-25 RX ADMIN — GEMFIBROZIL 600 MG: 600 TABLET ORAL at 08:13

## 2024-03-25 RX ADMIN — POLYETHYLENE GLYCOL 3350 17 G: 17 POWDER, FOR SOLUTION ORAL at 08:13

## 2024-03-25 RX ADMIN — FINASTERIDE 5 MG: 5 TABLET, FILM COATED ORAL at 08:12

## 2024-03-25 RX ADMIN — DOCUSATE SODIUM 100 MG: 100 CAPSULE, LIQUID FILLED ORAL at 08:12

## 2024-03-25 RX ADMIN — ATORVASTATIN CALCIUM 40 MG: 40 TABLET, FILM COATED ORAL at 08:12

## 2024-03-25 RX ADMIN — HEPARIN SODIUM 5000 UNITS: 5000 INJECTION, SOLUTION INTRAVENOUS; SUBCUTANEOUS at 08:13

## 2024-03-25 RX ADMIN — ASPIRIN 81 MG: 81 TABLET, COATED ORAL at 08:13

## 2024-03-25 ASSESSMENT — COGNITIVE AND FUNCTIONAL STATUS - GENERAL
WALKING IN HOSPITAL ROOM: A LITTLE
MOVING TO AND FROM BED TO CHAIR: A LITTLE
STANDING UP FROM CHAIR USING ARMS: A LITTLE
MOBILITY SCORE: 20
CLIMB 3 TO 5 STEPS WITH RAILING: A LITTLE

## 2024-03-25 ASSESSMENT — PAIN - FUNCTIONAL ASSESSMENT: PAIN_FUNCTIONAL_ASSESSMENT: 0-10

## 2024-03-25 ASSESSMENT — PAIN SCALES - GENERAL: PAINLEVEL_OUTOF10: 0 - NO PAIN

## 2024-03-25 NOTE — PROGRESS NOTES
Physical Therapy    Physical Therapy Treatment    Patient Name: Claude Coley  MRN: [id number]  Today's Date: 3/25/2024  Time Calculation  Start Time: 0809  Stop Time: 0833  Time Calculation (min): 24 min       Assessment/Plan   PT Assessment  Rehab Prognosis: Good  Evaluation/Treatment Tolerance: Patient tolerated treatment well  Medical Staff Made Aware: Yes  End of Session Communication: Bedside nurse  End of Session Patient Position: Bed, 2 rail up, Alarm off, not on at start of session     PT Plan  Treatment/Interventions: Bed mobility, Transfer training, Gait training, Therapeutic exercise  PT Plan: Skilled PT  PT Frequency: 3 times per week  PT Discharge Recommendations: Moderate intensity level of continued care  Equipment Recommended upon Discharge: Wheeled walker  PT Recommended Transfer Status: Assistive device, Stand by assist  PT - OK to Discharge: Yes      General Visit Information:   PT  Visit  PT Received On: 03/25/24  General  Prior to Session Communication: Bedside nurse  Patient Position Received: Up in bathroom  Preferred Learning Style: verbal  General Comment: Pt agreeable to therapy    Subjective   Precautions:  Precautions  Hearing/Visual Limitations: glasses  LE Weight Bearing Status: Heel Weight Bearing in Post-Op Shoe  Medical Precautions: Fall precautions  Post-Surgical Precautions:  (Right heel WBing in post op shoe.)  Vital Signs:       Objective   Pain:  Pain Assessment  Pain Score: 0 - No pain  Cognition:  Cognition  Overall Cognitive Status: Within Functional Limits  Postural Control:     Extremity/Trunk Assessments:    Activity Tolerance:     Treatments:  Therapeutic Exercise  Therapeutic Exercise Activity 1: Pt performed seated edge of bed ankle pumps/heel raises left only x20. Bilat glute sets, LAQ, hip flexion, carina hip adduction and resisted hip abduction x20 reps each.    Bed Mobility 1  Bed Mobility 1: Sitting to supine  Level of Assistance 1: Independent    Ambulation/Gait  Training 1  Surface 1: Level tile  Device 1: Rolling walker  Assistance 1: Distant supervision  Comments/Distance (ft) 1: Pt ambulated with RW ~10' x1, 75' x1 and 110' x1 distant supervision.  Transfer 1  Transfer From 1: Bed to  Transfer to 1: Stand  Technique 1: Stand to sit, Sit to stand  Transfer Device 1: Walker  Transfer Level of Assistance 1: Distant supervision  Transfers 2  Transfer From 2: Stand to  Transfer to 2: Sit, Bed  Technique 2: Stand to sit  Transfer Level of Assistance 2: Distant supervision    Outcome Measures:  [facility] Basic Mobility  Turning from your back to your side while in a flat bed without using bedrails: None  Moving from lying on your back to sitting on the side of a flat bed without using bedrails: None  Moving to and from bed to chair (including a wheelchair): A little  Standing up from a chair using your arms (e.g. wheelchair or bedside chair): A little  To walk in hospital room: A little  Climbing 3-5 steps with railing: A little  Basic Mobility - Total Score: 20    Education Documentation  Handouts, taught by Roseanna Stearns PTA at 3/25/2024  9:26 AM.  Learner: Patient  Readiness: Acceptance  Method: Explanation  Response: Verbalizes Understanding    Precautions, taught by Roseanna Stearns PTA at 3/25/2024  9:26 AM.  Learner: Patient  Readiness: Acceptance  Method: Explanation  Response: Verbalizes Understanding    Body Mechanics, taught by Roseanna Stearns PTA at 3/25/2024  9:26 AM.  Learner: Patient  Readiness: Acceptance  Method: Explanation  Response: Verbalizes Understanding    Home Exercise Program, taught by Roseanna Stearns PTA at 3/25/2024  9:26 AM.  Learner: Patient  Readiness: Acceptance  Method: Explanation  Response: Verbalizes Understanding    Mobility Training, taught by Roseanna Stearns PTA at 3/25/2024  9:26 AM.  Learner: Patient  Readiness: Acceptance  Method: Explanation  Response: Verbalizes Understanding    Education Comments  No comments found.        OP EDUCATION:        Encounter Problems       Encounter Problems (Active)       Balance       LTG - Patient will maintain balance to allow for safe mobility (Progressing)       Start:  03/21/24    Expected End:  03/25/24               Mobility       STG - Patient will ambulate 50' w/ supervision and RW while maintaining heel weightbearing Rt (Progressing)       Start:  03/21/24    Expected End:  03/25/24            Pt participated in BLE exercises to promote functional strength and endurance (Progressing)       Start:  03/21/24    Expected End:  03/25/24               PT Transfers       STG - Patient will transfer sit to and from stand MOD I, heel weightbearing Rt (Progressing)       Start:  03/21/24    Expected End:  03/25/24

## 2024-03-25 NOTE — PROGRESS NOTES
"Vancomycin Dosing by Pharmacy- FOLLOW UP    Claude Coley is a 63 y.o. year old male who Pharmacy has been consulted for vancomycin dosing for cellulitis, skin and soft tissue. Based on the patient's indication and renal status this patient is being dosed based on a goal AUC of 400-600.     Renal function is currently stable.    Current vancomycin dose: 750 mg given every 12 hours    Estimated vancomycin AUC on current dose: 388 mg/L.hr     Visit Vitals  /57 (BP Location: Left arm, Patient Position: Sitting)   Pulse 62   Temp 36.8 °C (98.2 °F) (Temporal)   Resp 18        Lab Results   Component Value Date    CREATININE 0.90 03/25/2024    CREATININE 1.00 03/24/2024    CREATININE 0.90 03/23/2024    CREATININE 1.00 03/22/2024        Patient weight is No results found for: \"PTWEIGHT\"    No results found for: \"CULTURE\"     I/O last 3 completed shifts:  In: 1900 (16.9 mL/kg) [P.O.:1600; IV Piggyback:300]  Out: 3100 (27.5 mL/kg) [Urine:3100 (0.8 mL/kg/hr)]  Weight: 112.6 kg   [unfilled]    No results found for: \"PATIENTTEMP\"     Assessment/Plan    Below goal AUC. Orders placed for new vancomcyin regimen of 1000 every 12 hours to begin at 1000.     This dosing regimen is predicted by [employer] to result in the following pharmacokinetic parameters:  <<<<<[employer] DATA>>>>>  Loading dose: N/A  Regimen: 1000 mg IV every 12 hours.  Start time: 09:00 on 03/25/2024  Exposure target: AUC24 (range)400-600 mg/L.hr   AUC24,ss: 514 mg/L.hr  Probability of AUC24 > 400: 94 %  Ctrough,ss: 15.7 mg/L  Probability of Ctrough,ss > 20: 16 %  Probability of nephrotoxicity (Lodise ADRYAN 2009): 11 %    The next level will be obtained on 03/26 at 0500. May be obtained sooner if clinically indicated.   Will continue to monitor renal function daily while on vancomycin and order serum creatinine at least every 48 hours if not already ordered.  Follow for continued vancomycin needs, clinical response, and signs/symptoms of toxicity. "       HAWK FLORES, PharmD

## 2024-03-25 NOTE — PROGRESS NOTES
PODIATRY SERVICE CONSULT PROGRESS NOTE    SERVICE DATE: 3/25/2024   SERVICE TIME:  3:45PM    Subjective   INTERVAL HPI:   Patient was seen at bedside.  Pain well controlled.  Patient denies any constitutional symptoms.   No other pedal complaints.   Anticipates discharge back to [facility].   Still pending pre-cert.   Planned OR intervention Wednesday 3/27      Medications:  Scheduled Meds: aspirin, 81 mg, oral, Daily  atorvastatin, 40 mg, oral, Daily  carvedilol, 12.5 mg, oral, BID with meals  docusate sodium, 100 mg, oral, Daily  finasteride, 5 mg, oral, Daily  gemfibrozil, 600 mg, oral, BID AC  heparin (porcine), 5,000 Units, subcutaneous, q12h  insulin lispro, 0-5 Units, subcutaneous, TID with meals  lidocaine, 5 mL, infiltration, Once  melatonin, 3 mg, oral, Nightly  OLANZapine, 20 mg, oral, Nightly  polyethylene glycol, 17 g, oral, Daily  vancomycin, 1,000 mg, intravenous, q12h      Continuous Infusions:    PRN Meds: PRN medications: acetaminophen **OR** acetaminophen **OR** acetaminophen, dextrose, dextrose, glucagon, ondansetron ODT **OR** ondansetron, oxyCODONE, vancomycin         Objective   PHYSICAL EXAM:  Physical Exam Performed:  Vitals:    03/25/24 0728   BP: 123/57   Pulse: 62   Resp: 18   Temp: 36.8 °C (98.2 °F)   SpO2: 98%     Body mass index is 31.87 kg/m².    Patient is AOx3 and in no acute distress. Patient is alert and cooperative. Sitting comfortably in bed with dressing intact with some plantar strikethrough noted.     Vascular: Palpable DP/PT pulses B/L. Severe pitting edema noted to RLE. Hair growth absent B/L. CFT<5 to B/L hallux. Temperature is warm to cool from tibial tuberosity to distal digits B/L. No lymphatic streaking noted B/L.     Musculoskeletal: s/p right partial 5th ray resection. Gross active and passive ROM intact to age and activity level. Moves all extremities spontaneously. No pain to palpation at feet B/L.      Neurological: Absent light touch sensation B/L. Pain  stimuli absent B/L.      Dermatologic: Nails 1-4 are thickened, elongated, discolored with subungual debris B/L. Skin appears diffusely xerotic B/L. Web spaces 1-3 B/L are clean, dry and intact. No rashes or nodules noted B/L. No hyperkeratotic tissue noted B/L.      Wound: Right plantar lateral foot  Images:        Measurements: 12cm x 8cm x 2.5cm deep at proximal lateral curved portion that extends an additional 9cm  Mixed wound base of 80% granular tissue with 20% various areas of fibrosis/necrosis.  Mild serosanguinous drainage with fibrotic slough.   Moderate basil-wound maceration.   Mild basil-wound erythema.   No evidence of ascending cellulitis or lymphangitis.  No palpable fluctuance. Mild malodor. No increased warmth.   No probe to bone. Proximal lateral portion probes deep 2.5cm to deep fascia and/or capsule.  Minimal tunneling or tracking.   No undermining. Skin edges irregular but intact.      LABS:   Results for orders placed or performed during the hospital encounter of 03/19/24 (from the past 24 hour(s))   POCT GLUCOSE   Result Value Ref Range    POCT Glucose 125 (H) 74 - 99 mg/dL   POCT GLUCOSE   Result Value Ref Range    POCT Glucose 72 (L) 74 - 99 mg/dL   POCT GLUCOSE   Result Value Ref Range    POCT Glucose 93 74 - 99 mg/dL   CBC   Result Value Ref Range    WBC 6.0 4.4 - 11.3 x10*3/uL    nRBC 0.0 0.0 - 0.0 /100 WBCs    RBC 3.45 (L) 4.50 - 5.90 x10*6/uL    Hemoglobin 10.1 (L) 13.5 - 17.5 g/dL    Hematocrit 29.4 (L) 41.0 - 52.0 %    MCV 85 80 - 100 fL    MCH 29.3 26.0 - 34.0 pg    MCHC 34.4 32.0 - 36.0 g/dL    RDW 12.8 11.5 - 14.5 %    Platelets 143 (L) 150 - 450 x10*3/uL   Basic metabolic panel   Result Value Ref Range    Glucose 98 65 - 99 mg/dL    Sodium 139 133 - 145 mmol/L    Potassium 4.1 3.4 - 5.1 mmol/L    Chloride 107 97 - 107 mmol/L    Bicarbonate 21 (L) 24 - 31 mmol/L    Urea Nitrogen 21 8 - 25 mg/dL    Creatinine 0.90 0.40 - 1.60 mg/dL    eGFR >90 >60 mL/min/1.73m*2    Calcium 9.5 8.5 -  "10.4 mg/dL    Anion Gap 11 <=19 mmol/L   POCT GLUCOSE   Result Value Ref Range    POCT Glucose 96 74 - 99 mg/dL      Lab Results   Component Value Date    HGBA1C 4.4 03/19/2024      No results found for: \"CRP\"   Lab Results   Component Value Date    SEDRATE 47 (H) 03/19/2024        Results from last 7 days   Lab Units 03/25/24  0528   WBC AUTO x10*3/uL 6.0   RBC AUTO x10*6/uL 3.45*   HEMOGLOBIN g/dL 10.1*   HEMATOCRIT % 29.4*     Results from last 7 days   Lab Units 03/25/24  0528   SODIUM mmol/L 139   POTASSIUM mmol/L 4.1   CHLORIDE mmol/L 107   CO2 mmol/L 21*   BUN mg/dL 21   CREATININE mg/dL 0.90   CALCIUM mg/dL 9.5           IMAGING REVIEW:  XR chest 1 view    Result Date: 3/20/2024  Interpreted By:  Nael Moore, STUDY: XR CHEST 1 VIEW; 3/20/2024 3:57 pm   INDICATION: Signs/Symptoms:PICC placement.   COMPARISON: None   ACCESSION NUMBER(S): [id number]   ORDERING CLINICIAN: ANGELITA YOUSSEF   TECHNIQUE: 1 view of the chest was performed.   FINDINGS: Right-sided PICC line catheter with tip at the mid SVC. The lungs are adequately inflated. No acute consolidation. No pleural effusion. No pneumothorax.  The cardiomediastinal silhouette is within normal limits.       Right-sided PICC line catheter with tip at the mid SVC. No acute cardiopulmonary disease.   Signed by: Nael Moore 3/20/2024 4:14 PM Dictation workstation:   [id number]    Lower extremity venous duplex right    Result Date: 3/19/2024  Interpreted By:  Favian Castellano, STUDY: VAS US LOWER EXTREMITY VENOUS DUPLEX RIGHT; 3/19/2024 3:22 pm   INDICATION: Signs/Symptoms:leg swelling + d dimer..   COMPARISON: None.   ACCESSION NUMBER(S): [id number]   ORDERING CLINICIAN: CHARO FRIED   TECHNIQUE: Vascular ultrasound of the right lower extremity was performed. Real time compression views as well as Gray scale, color Doppler and spectral Doppler waveform analysis was performed.   FINDINGS: Evaluation of the visualized portions of the right common " femoral vein, proximal, mid, and distal femoral vein, and popliteal vein were performed.  Evaluation of the visualized portions of the posterior tibial and peroneal veins were also performed.  In addition, evaluation of the contralateral common femoral vein was performed.   Limitations: None   The evaluated veins demonstrate normal compressibility. There is intact venous flow demonstrating normal respiratory variability and normal augmentation of flow with calf compression. Therefore, there is no ultrasonographic evidence for deep vein thrombosis within the evaluated veins. No evidence of thrombus is seen within the contralateral common femoral vein.       No sonographic evidence for deep vein thrombosis within the evaluated veins of the right lower extremity.   MACRO: None   Signed by: Favian Castellano 3/19/2024 3:22 PM Dictation workstation:   [id number]    XR foot right 3+ views    Result Date: 3/19/2024  Interpreted By:  Lux Hernandez, STUDY: XR FOOT RIGHT 3+ VIEWS   INDICATION: Signs/Symptoms:r foot wound.   COMPARISON: January 26, 2024   ACCESSION NUMBER(S): [id number]   ORDERING CLINICIAN: CHARO FRIED   FINDINGS: Postsurgical changes status post 5th ray amputation to the level of the mid metatarsal.   Suspected skin wounds at the surgical site. No aggressive bony destructive changes or erosions.   Marked soft tissue swelling dorsally.       Suspected skin wounds of the 5th amputation site with no osseous acute abnormality.   Marked dorsal soft tissue swelling, potentially cellulitis.   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  - Charts, labs, vitals and imaging all reviewed.   - Imaging: Plain films of right foot with no osseous erosion or soft tissue gas noted. There is increased soft tissue volume and density throughout the dorsal foot and plantar hindfoot  - Labs: ESR 47, WBC 6.0, HGB 10.1  - Wound culture: 3/19 growing 2+ MRSA susceptible to tetracyclines, Bactrim, and vancomycin.  - Blood culture: collected and pending     Plan:  - Wound has significantly improved since restarting of IV abx. Dressing changed.  Recommend continued IV abx outpatient. Plan to return to [facility], pending precert.  - No podiatric surgical intervention is planned during this hospitalization, except in the case of the patient remaining inpatient until 3/27.  - Will proceed with outpatient podiatric surgery as planned on 3/27/24.  - Abx: Vancomycin per primary/ID. Planned course until 4/3/24, per ID  - Pain and Bowel Regimens: per primary  - Dressings: Betadine-soaked 4x4s, dry 4x4s, ABDs, kerlix, ACE. To be changed daily.  - Nursing staff is able to change/reinforce dressing if & as necessary until next dressing change. Thank you.  - Podiatry will continue to follow while in house.  - Discussed with Dr. Bolaños.     DVT ppx: heparin subcutaneous, per primary  Weightbearing: Heel-only weight-bearing to RLE in surgical shoe only.  Discharge: Patient to follow up 1 week after discharge with Dr. Perry.    Case to be discussed with attending, A&P above reflects a tentative plan. Please await for the final signature from the attending physician on service.    This patient will be followed by the Podiatry service. Please Epic Chat the corresponding residents below with questions or concerns.      Hannah Delgado DPM PGY-2  Podiatric Medicine and Surgery   Epic Chat          SIGNATURE: Hannah Delgado DPM PATIENT NAME: Claude Coley   DATE: March 25, 2024 MRN: [id number]   TIME: 11:00 AM CONTACT:  Haiku Message

## 2024-03-25 NOTE — PROGRESS NOTES
Claude Coley is a 63 y.o. male on day 6 of admission presenting with Cellulitis of foot, right.    Subjective   Interval History:   Afebrile, no chills  Denies right  foot pain  Denies chest pain or shortness of breath  Denies nausea vomiting or diarrhea         Objective   Range of Vitals (last 24 hours)  Heart Rate:  [58-68]   Temp:  [36.3 °C (97.3 °F)-36.8 °C (98.2 °F)]   Resp:  [16-18]   BP: (123-142)/(57-64)   SpO2:  [96 %-100 %]   Daily Weight  03/20/24 : 113 kg (248 lb 3.8 oz)    Body mass index is 31.87 kg/m².    Physical Exam  Constitutional:       Appearance: Normal appearance.   HENT:      Head: Normocephalic and atraumatic.      Nose: Nose normal.      Mouth/Throat:      Mouth: Mucous membranes are moist.      Pharynx: Oropharynx is clear.   Eyes:      Conjunctiva/sclera: Conjunctivae normal.   Cardiovascular:      Rate and Rhythm: Normal rate and regular rhythm.   Pulmonary:      Effort: Pulmonary effort is normal.      Breath sounds: Normal breath sounds.   Abdominal:      General: Bowel sounds are normal.      Palpations: Abdomen is soft.   Musculoskeletal:         General: Normal range of motion.      Cervical back: Normal range of motion and neck supple.   Skin:     Comments: Right foot dressing   Neurological:      Mental Status: He is alert.      Comments: Awake, alert   Psychiatric:         Mood and Affect: Mood normal.         Behavior: Behavior normal.     Antibiotics  sodium chloride 0.9 % bolus 500 mL  cefTRIAXone (Rocephin) 2 g IV in dextrose 5% 50 mL  vancomycin 1.5 g in 300 mL (Xellia) IVPB 1.5 g  aspirin EC tablet 81 mg  atorvastatin (Lipitor) tablet 40 mg  carvedilol (Coreg) tablet 12.5 mg  finasteride (Proscar) tablet 5 mg  gemfibrozil (Lopid) tablet 600 mg  OLANZapine (ZyPREXA) tablet 20 mg  piperacillin-tazobactam-dextrose (Zosyn) IV 4.5 g  vancomycin (Vancocin) pharmacy to dose - pharmacy monitoring  dextrose 50 % injection 25 g  glucagon (Glucagen) injection 1 mg  dextrose 50 %  "injection 12.5 g  insulin lispro (HumaLOG) injection 0-10 Units  heparin (porcine) injection 5,000 Units  acetaminophen (Tylenol) tablet 650 mg  acetaminophen (Tylenol) oral liquid 650 mg  acetaminophen (Tylenol) suppository 650 mg  ondansetron ODT (Zofran-ODT) disintegrating tablet 4 mg  ondansetron (Zofran) injection 4 mg  melatonin tablet 3 mg  polyethylene glycol (Glycolax, Miralax) packet 17 g  vancomycin (Xellia) 1 g in 200 mL (Xellia) IVPB 1 g  docusate sodium (Colace) capsule 100 mg  vancomycin-diluent combo no.1 (Xellia) IVPB 750 mg  oxyCODONE (Roxicodone) immediate release tablet 5 mg  vancomycin vial for injection 1.25 g  insulin lispro (HumaLOG) injection 0-5 Units      Relevant Results  Labs  Results from last 72 hours   Lab Units 03/25/24  0528 03/24/24  0449 03/23/24  0615   WBC AUTO x10*3/uL 6.0 6.5 5.0   HEMOGLOBIN g/dL 10.1* 11.0* 10.3*   HEMATOCRIT % 29.4* 32.2* 29.4*   PLATELETS AUTO x10*3/uL 143* 146* 143*       Results from last 72 hours   Lab Units 03/25/24  0528 03/24/24  0449 03/23/24  0615   SODIUM mmol/L 139 139 137   POTASSIUM mmol/L 4.1 4.3 4.5   CHLORIDE mmol/L 107 106 107   CO2 mmol/L 21* 22* 21*   BUN mg/dL 21 24 26*   CREATININE mg/dL 0.90 1.00 0.90   GLUCOSE mg/dL 98 104* 90   CALCIUM mg/dL 9.5 9.6 9.1   ANION GAP mmol/L 11 11 9   EGFR mL/min/1.73m*2 >90 86 >90           Estimated Creatinine Clearance: 115.2 mL/min (by C-G formula based on SCr of 0.9 mg/dL).  No results found for: \"CRP\"  Microbiology  Susceptibility data from last 14 days.  Collected Specimen Info Organism Clindamycin Erythromycin Oxacillin Tetracycline Trimethoprim/Sulfamethoxazole Vancomycin   03/19/24 Tissue/Biopsy from Wound/Tissue Methicillin Resistant Staphylococcus aureus (MRSA) R R R S S S     Reviewed  Imaging  XR chest 1 view    Result Date: 3/20/2024  Interpreted By:  Nael Moore, STUDY: XR CHEST 1 VIEW; 3/20/2024 3:57 pm   INDICATION: Signs/Symptoms:PICC placement.   COMPARISON: None   ACCESSION " NUMBER(S): [id number]   ORDERING CLINICIAN: ANGELITA YOUSSEF   TECHNIQUE: 1 view of the chest was performed.   FINDINGS: Right-sided PICC line catheter with tip at the mid SVC. The lungs are adequately inflated. No acute consolidation. No pleural effusion. No pneumothorax.  The cardiomediastinal silhouette is within normal limits.       Right-sided PICC line catheter with tip at the mid SVC. No acute cardiopulmonary disease.   Signed by: Nael Moore 3/20/2024 4:14 PM Dictation workstation:   [id number]    Lower extremity venous duplex right    Result Date: 3/19/2024  Interpreted By:  Favian Castellano, STUDY: [facility] US LOWER EXTREMITY VENOUS DUPLEX RIGHT; 3/19/2024 3:22 pm   INDICATION: Signs/Symptoms:leg swelling + d dimer..   COMPARISON: None.   ACCESSION NUMBER(S): [id number]   ORDERING CLINICIAN: CHARO FRIED   TECHNIQUE: Vascular ultrasound of the right lower extremity was performed. Real time compression views as well as Gray scale, color Doppler and spectral Doppler waveform analysis was performed.   FINDINGS: Evaluation of the visualized portions of the right common femoral vein, proximal, mid, and distal femoral vein, and popliteal vein were performed.  Evaluation of the visualized portions of the posterior tibial and peroneal veins were also performed.  In addition, evaluation of the contralateral common femoral vein was performed.   Limitations: None   The evaluated veins demonstrate normal compressibility. There is intact venous flow demonstrating normal respiratory variability and normal augmentation of flow with calf compression. Therefore, there is no ultrasonographic evidence for deep vein thrombosis within the evaluated veins. No evidence of thrombus is seen within the contralateral common femoral vein.       No sonographic evidence for deep vein thrombosis within the evaluated veins of the right lower extremity.   MACRO: None   Signed by: Favian Castellano 3/19/2024 3:22 PM Dictation workstation:    [id number]    XR foot right 3+ views    Result Date: 3/19/2024  Interpreted By:  Lux Hernandez, STUDY: XR FOOT RIGHT 3+ VIEWS   INDICATION: Signs/Symptoms:r foot wound.   COMPARISON: January 26, 2024   ACCESSION NUMBER(S): [id number]   ORDERING CLINICIAN: CHARO FRIED   FINDINGS: Postsurgical changes status post 5th ray amputation to the level of the mid metatarsal.   Suspected skin wounds at the surgical site. No aggressive bony destructive changes or erosions.   Marked soft tissue swelling dorsally.       Suspected skin wounds of the 5th amputation site with no osseous acute abnormality.   Marked dorsal soft tissue swelling, potentially cellulitis.   Signed by: Lux Hernandez 3/19/2024 2:09 PM Dictation workstation:   [id number]     Assessment/Plan   Infected right diabetic foot ulcer-MRSA  Has retained right upper extremity PICC  History of strep anginosus and Bacteroides fragilis        IV vancomycin-avoiding zyvox -potential interaction with zyprexa   Monitor renal function  Supportive care  Local care  Offloading  Monitor temperature and WBC  Podiatry  follow up-surgery planned outpatient on 3/27/2024   Long term plan IV vancomycin 750 mg every 12 hours for 14 days till 4/3/2024-may extend after surgery on 3/27/2024  Weekly CBC with diff, Biweekly CMP, vancomycin trough  Precert pending       ALISE Penny-CNP

## 2024-03-25 NOTE — DISCHARGE SUMMARY
Discharge Diagnosis  Cellulitis of foot, right    Issues Requiring Follow-Up  Podiatry for right foot surgery 3/27/2024    Discharge Meds     Your medication list        START taking these medications        Instructions Last Dose Given Next Dose Due   vancomycin 1.25 gram recon soln      Infuse 0.75 g into a venous catheter every 12 hours for 12 days.              CONTINUE taking these medications        Instructions Last Dose Given Next Dose Due   acetaminophen 325 mg tablet  Commonly known as: Tylenol      Take 2 tablets (650 mg) by mouth every 4 hours if needed for mild pain (1 - 3).       aspirin 81 mg EC tablet           atorvastatin 40 mg tablet  Commonly known as: Lipitor           carvedilol 12.5 mg tablet  Commonly known as: Coreg           finasteride 5 mg tablet  Commonly known as: Proscar           furosemide 20 mg tablet  Commonly known as: Lasix           gemfibrozil 600 mg tablet  Commonly known as: Lopid           heparin (porcine) 5,000 unit/mL injection      Inject 1 mL (5,000 Units) under the skin every 8 hours.       lisinopril 20 mg tablet           metFORMIN (OSM) 500 mg 24 hr tablet  Commonly known as: Fortamet           OLANZapine 20 mg tablet  Commonly known as: ZyPREXA           spironolactone 25 mg tablet  Commonly known as: Aldactone                     Where to Get Your Medications        These medications were sent to [facility]  [address], [address], [facility] [address]      Hours: 9 AM to 6 PM Mon-Fri, 9 AM to 1 PM Sat Phone: [phone]   vancomycin 1.25 gram recon soln         Test Results Pending At Discharge  Pending Labs       No current pending labs.            Hospital Course   Patient admitted to the hospital worsening wound infection.  Podiatry has been following this patient regarding worsening wound infection of the right lower extremity secondary to 2 diabetes of peripheral vascular disease.  Patient did have MRSA growing from the wound and blood  cultures were negative.  ID recommended prolonged antibiotics, vancomycin.  Patient was discharged back to his rehab facility with IV vancomycin to continue for 12 more days.  Podiatry will be doing a procedure on the foot on 3/27/2024.    Otherwise patient is clinically stable for discharge.    Pertinent Physical Exam At Time of Discharge  Physical Exam  Generally no acute distress  HEENT PERRLA rate  Cardiovascular Stendal S2 heard regular rhythm  Lungs clear  Extremities right lower extremity bandaged, pictures of wound reviewed in chart  Outpatient Follow-Up  No future appointments.  Total time spent on discharge was 40 minutes    Cortez Hanna MD

## 2024-03-25 NOTE — PROCEDURES
Vascular Access Team Procedure Note     Visit Date: 3/25/2024      Patient Name: Claude Coley         MRN: [id number]             Procedure:  patient with Rt arm single lumen picc, dressing D&I, ext catheter at 5 cm (was 1 cm when inserted) CXR already done on 3/20 that verifies tip in mid svc, flushes easily and with positive blood return. Dr. Hanna and RN April made aware current line ok to remain and functioning properly. Not necessary to replace.                          Melony Barney RN  3/25/2024  10:22 AM

## 2024-03-25 NOTE — DISCHARGE INSTRUCTIONS
PODIATRIC SURGERY POST-OP INSTRUCTIONS    YOU HAD ANESTHESIA:  You must have a responsible adult drive you home and stay with you for the first 24 hours.    Do not drive a car or drink any alcohol for 24 hours after surgery.  Do not do any strenuous work or activity for the next 24 hours.  You may have mild nausea, a sore throat or raspy voice for a few days.    {Podiatry Post-Op Block:59296}    POST-OP INSTRUCTIONS:  Keep dressing clean, dry and intact until your first post-operative appointment.  Do not remove surgical dressing. You may need to loosen if necessary.   Do not shower unless using a cast protector to protect dressing.  If dressing gets wet, please call office immediately as this can lead to increased risk of infection or wound dehiscence.   Elevate surgical extremity as much as possible to help with pain and swelling.  Place ice pack behind knee of surgical extremity (20 minutes on/20 minutes off while awake). After 24 hours use ice behind the knee as needed for comfort.  Weight Bearing Status: Please remain non-weight bearing to the surgical extremity with external fixator in place utilizing crutches, walker, or knee scooter.  Please resume all home medications.   Post-Operative Medications: You were prescribed Percocet for pain; please take as directed on the label. You may take Ibuprofen for pain; please take as directed on bottle. You may take Tylenol for pain; please take as directed on bottle. Alternate Ibuprofen and Tylenol for pain; please take as directed on the bottles. You were prescribed Zofran (Ondansetron) 8 mg for post-operative nausea; please take as needed and directed on the label.  You were prescribed Xarelto (Rivaroxaban) 10 mg for post-operative blood clot prevention; please take as directed on the label. You were prescribed Flexeril (Cyclobenzaprine) 10 mg for muscle spasm control; please take as directed on the label.  For your pain medication, take as needed; wean off as soon  as tolerated. In the event you have constipation, you may take over the counter stool softeners and laxatives as needed.  Post-operative appointment with Dr. Perry for Tuesday 4/02/24 at 10:30am in [place].   If you have any problems or notice any unusual symptoms such as bleeding, excessive pain, fever, redness, swelling and/or discharge please call your Dr. Perry's office at [phone].    WHEN TO CALL YOUR DOCTOR:  Fever (>100.4°F or 38.0°C) or chills.  Incision problems such as redness, warmth, swelling, or foul smelling drainage.  Severe nausea or persistent vomiting.  Pain and swelling in your legs, especially if it is only on one side and not the other.  Pain with urination, cloudy urine, or foul smelling urine.  Or if you have any other problems or questions.  CALL 911 or go to the ED if you have any shortness of breath, difficulty breathing, or chest pain.

## 2024-03-25 NOTE — CARE PLAN
The patient's goals for the shift include      The clinical goals for the shift include Iv antibiotics, possible discharge      Problem: Pain  Goal: My pain/discomfort is manageable  Outcome: Adequate for Discharge     Problem: Safety  Goal: Patient will be injury free during hospitalization  Outcome: Adequate for Discharge  Goal: I will remain free of falls  Outcome: Adequate for Discharge     Problem: Daily Care  Goal: Daily care needs are met  Outcome: Adequate for Discharge     Problem: Psychosocial Needs  Goal: Demonstrates ability to cope with hospitalization/illness  Outcome: Adequate for Discharge  Goal: Collaborate with me, my family, and caregiver to identify my specific goals  Outcome: Adequate for Discharge

## 2024-03-25 NOTE — PROGRESS NOTES
03/25/24 1157   Discharge Planning   Patient expects to be discharged to: [facility]   Does the patient need discharge transport arranged? Yes   RoundTrip coordination needed? Yes   Has discharge transport been arranged? No   What day is the transport expected? 03/25/24   What time is the transport expected? 0200       Authorization is in.  DC is written.  [facility] pm    1:30 pm

## 2024-03-27 ENCOUNTER — HOSPITAL ENCOUNTER (INPATIENT)
Facility: HOSPITAL | Age: 64
LOS: 4 days | Discharge: NURSING, REHAB OR LONG-TERM CARE | DRG: 623 | End: 2024-04-01
Attending: PODIATRIST | Admitting: PODIATRIST
Payer: MEDICARE

## 2024-03-27 ENCOUNTER — APPOINTMENT (OUTPATIENT)
Dept: RADIOLOGY | Facility: HOSPITAL | Age: 64
DRG: 623 | End: 2024-03-27
Payer: MEDICARE

## 2024-03-27 ENCOUNTER — ANESTHESIA (OUTPATIENT)
Dept: OPERATING ROOM | Facility: HOSPITAL | Age: 64
DRG: 623 | End: 2024-03-27
Payer: MEDICARE

## 2024-03-27 ENCOUNTER — ANESTHESIA EVENT (OUTPATIENT)
Dept: OPERATING ROOM | Facility: HOSPITAL | Age: 64
DRG: 623 | End: 2024-03-27
Payer: MEDICARE

## 2024-03-27 DIAGNOSIS — A48.0 GAS GANGRENE OF FOOT (MULTI): ICD-10-CM

## 2024-03-27 DIAGNOSIS — L03.115 CELLULITIS OF FOOT, RIGHT: ICD-10-CM

## 2024-03-27 DIAGNOSIS — A49.02 MRSA INFECTION: ICD-10-CM

## 2024-03-27 DIAGNOSIS — Z98.890 POST-OPERATIVE STATE: ICD-10-CM

## 2024-03-27 DIAGNOSIS — L97.519 TYPE 2 DIABETES MELLITUS WITH RIGHT DIABETIC FOOT ULCER (MULTI): Primary | ICD-10-CM

## 2024-03-27 DIAGNOSIS — E11.621 TYPE 2 DIABETES MELLITUS WITH RIGHT DIABETIC FOOT ULCER (MULTI): Primary | ICD-10-CM

## 2024-03-27 LAB
GLUCOSE BLD MANUAL STRIP-MCNC: 107 MG/DL (ref 74–99)
GLUCOSE BLD MANUAL STRIP-MCNC: 124 MG/DL (ref 74–99)
GLUCOSE BLD MANUAL STRIP-MCNC: 126 MG/DL (ref 74–99)
GLUCOSE BLD MANUAL STRIP-MCNC: 92 MG/DL (ref 74–99)

## 2024-03-27 PROCEDURE — 0KXS0ZZ TRANSFER RIGHT LOWER LEG MUSCLE, OPEN APPROACH: ICD-10-PCS | Performed by: PODIATRIST

## 2024-03-27 PROCEDURE — 2720000007 HC OR 272 NO HCPCS: Performed by: PODIATRIST

## 2024-03-27 PROCEDURE — 2500000004 HC RX 250 GENERAL PHARMACY W/ HCPCS (ALT 636 FOR OP/ED): Performed by: ANESTHESIOLOGIST ASSISTANT

## 2024-03-27 PROCEDURE — 3600000004 HC OR TIME - INITIAL BASE CHARGE - PROCEDURE LEVEL FOUR: Performed by: PODIATRIST

## 2024-03-27 PROCEDURE — 2500000001 HC RX 250 WO HCPCS SELF ADMINISTERED DRUGS (ALT 637 FOR MEDICARE OP)

## 2024-03-27 PROCEDURE — 0QHL35Z INSERTION OF EXTERNAL FIXATION DEVICE INTO RIGHT TARSAL, PERCUTANEOUS APPROACH: ICD-10-PCS | Performed by: PODIATRIST

## 2024-03-27 PROCEDURE — 2780000003 HC OR 278 NO HCPCS: Performed by: PODIATRIST

## 2024-03-27 PROCEDURE — G0378 HOSPITAL OBSERVATION PER HR: HCPCS

## 2024-03-27 PROCEDURE — 0QHG3CZ INSERTION OF RING EXTERNAL FIXATION DEVICE INTO RIGHT TIBIA, PERCUTANEOUS APPROACH: ICD-10-PCS | Performed by: PODIATRIST

## 2024-03-27 PROCEDURE — 7100000002 HC RECOVERY ROOM TIME - EACH INCREMENTAL 1 MINUTE: Performed by: PODIATRIST

## 2024-03-27 PROCEDURE — 2500000002 HC RX 250 W HCPCS SELF ADMINISTERED DRUGS (ALT 637 FOR MEDICARE OP, ALT 636 FOR OP/ED)

## 2024-03-27 PROCEDURE — 0QHN35Z INSERTION OF EXTERNAL FIXATION DEVICE INTO RIGHT METATARSAL, PERCUTANEOUS APPROACH: ICD-10-PCS | Performed by: PODIATRIST

## 2024-03-27 PROCEDURE — 2500000004 HC RX 250 GENERAL PHARMACY W/ HCPCS (ALT 636 FOR OP/ED)

## 2024-03-27 PROCEDURE — C1762 CONN TISS, HUMAN(INC FASCIA): HCPCS | Performed by: PODIATRIST

## 2024-03-27 PROCEDURE — 0JBQ0ZZ EXCISION OF RIGHT FOOT SUBCUTANEOUS TISSUE AND FASCIA, OPEN APPROACH: ICD-10-PCS | Performed by: PODIATRIST

## 2024-03-27 PROCEDURE — A11042 PR DEBRIDEMENT, SKIN, SUB-Q TISSUE,=<20 SQ CM: Performed by: ANESTHESIOLOGIST ASSISTANT

## 2024-03-27 PROCEDURE — 3600000009 HC OR TIME - EACH INCREMENTAL 1 MINUTE - PROCEDURE LEVEL FOUR: Performed by: PODIATRIST

## 2024-03-27 PROCEDURE — 2500000004 HC RX 250 GENERAL PHARMACY W/ HCPCS (ALT 636 FOR OP/ED): Mod: JZ

## 2024-03-27 PROCEDURE — 2500000004 HC RX 250 GENERAL PHARMACY W/ HCPCS (ALT 636 FOR OP/ED): Performed by: ANESTHESIOLOGY

## 2024-03-27 PROCEDURE — 3E0T3GC INTRODUCTION OF OTHER THERAPEUTIC SUBSTANCE INTO PERIPHERAL NERVES AND PLEXI, PERCUTANEOUS APPROACH: ICD-10-PCS | Performed by: PODIATRIST

## 2024-03-27 PROCEDURE — A11042 PR DEBRIDEMENT, SKIN, SUB-Q TISSUE,=<20 SQ CM: Performed by: ANESTHESIOLOGY

## 2024-03-27 PROCEDURE — 97166 OT EVAL MOD COMPLEX 45 MIN: CPT | Mod: GO

## 2024-03-27 PROCEDURE — 2500000005 HC RX 250 GENERAL PHARMACY W/O HCPCS: Performed by: PODIATRIST

## 2024-03-27 PROCEDURE — 82947 ASSAY GLUCOSE BLOOD QUANT: CPT

## 2024-03-27 PROCEDURE — 71045 X-RAY EXAM CHEST 1 VIEW: CPT | Performed by: RADIOLOGY

## 2024-03-27 PROCEDURE — 0HRMXK3 REPLACEMENT OF RIGHT FOOT SKIN WITH NONAUTOLOGOUS TISSUE SUBSTITUTE, FULL THICKNESS, EXTERNAL APPROACH: ICD-10-PCS | Performed by: PODIATRIST

## 2024-03-27 PROCEDURE — 01NH0ZZ RELEASE PERONEAL NERVE, OPEN APPROACH: ICD-10-PCS | Performed by: PODIATRIST

## 2024-03-27 PROCEDURE — 71045 X-RAY EXAM CHEST 1 VIEW: CPT

## 2024-03-27 PROCEDURE — C9363 INTEGRA MESHED BIL WOUND MAT: HCPCS | Performed by: PODIATRIST

## 2024-03-27 PROCEDURE — 3700000001 HC GENERAL ANESTHESIA TIME - INITIAL BASE CHARGE: Performed by: PODIATRIST

## 2024-03-27 PROCEDURE — 7100000001 HC RECOVERY ROOM TIME - INITIAL BASE CHARGE: Performed by: PODIATRIST

## 2024-03-27 PROCEDURE — 3700000002 HC GENERAL ANESTHESIA TIME - EACH INCREMENTAL 1 MINUTE: Performed by: PODIATRIST

## 2024-03-27 PROCEDURE — 97162 PT EVAL MOD COMPLEX 30 MIN: CPT | Mod: GP

## 2024-03-27 DEVICE — IMPLANTABLE DEVICE: Type: IMPLANTABLE DEVICE | Site: LEG | Status: FUNCTIONAL

## 2024-03-27 DEVICE — INTEGRA® MESHED BILAYER WOUND MATRIX 4 IN*10 IN (10 CM*25 CM)
Type: IMPLANTABLE DEVICE | Site: LEG | Status: FUNCTIONAL
Brand: INTEGRA®

## 2024-03-27 RX ORDER — FINASTERIDE 5 MG/1
5 TABLET, FILM COATED ORAL DAILY
Status: DISCONTINUED | OUTPATIENT
Start: 2024-03-27 | End: 2024-04-01 | Stop reason: HOSPADM

## 2024-03-27 RX ORDER — CEFAZOLIN SODIUM 2 G/100ML
2 INJECTION, SOLUTION INTRAVENOUS ONCE
Status: COMPLETED | OUTPATIENT
Start: 2024-03-27 | End: 2024-03-27

## 2024-03-27 RX ORDER — FUROSEMIDE 20 MG/1
20 TABLET ORAL DAILY
Status: DISCONTINUED | OUTPATIENT
Start: 2024-03-27 | End: 2024-04-01 | Stop reason: HOSPADM

## 2024-03-27 RX ORDER — HYDRALAZINE HYDROCHLORIDE 20 MG/ML
5 INJECTION INTRAMUSCULAR; INTRAVENOUS EVERY 30 MIN PRN
Status: DISCONTINUED | OUTPATIENT
Start: 2024-03-27 | End: 2024-03-27 | Stop reason: HOSPADM

## 2024-03-27 RX ORDER — ASPIRIN 81 MG/1
81 TABLET ORAL DAILY
Status: DISCONTINUED | OUTPATIENT
Start: 2024-03-27 | End: 2024-04-01 | Stop reason: HOSPADM

## 2024-03-27 RX ORDER — VANCOMYCIN 750 MG/150ML
750 INJECTION, SOLUTION INTRAVENOUS EVERY 12 HOURS
Status: DISCONTINUED | OUTPATIENT
Start: 2024-03-27 | End: 2024-03-30

## 2024-03-27 RX ORDER — PROMETHAZINE HYDROCHLORIDE 25 MG/1
25 TABLET ORAL EVERY 6 HOURS PRN
Status: DISCONTINUED | OUTPATIENT
Start: 2024-03-27 | End: 2024-04-01 | Stop reason: HOSPADM

## 2024-03-27 RX ORDER — MEPERIDINE HYDROCHLORIDE 25 MG/ML
12.5 INJECTION INTRAMUSCULAR; INTRAVENOUS; SUBCUTANEOUS EVERY 10 MIN PRN
Status: DISCONTINUED | OUTPATIENT
Start: 2024-03-27 | End: 2024-03-27 | Stop reason: HOSPADM

## 2024-03-27 RX ORDER — ETOMIDATE 2 MG/ML
INJECTION INTRAVENOUS AS NEEDED
Status: DISCONTINUED | OUTPATIENT
Start: 2024-03-27 | End: 2024-03-27

## 2024-03-27 RX ORDER — ONDANSETRON HYDROCHLORIDE 2 MG/ML
4 INJECTION, SOLUTION INTRAVENOUS ONCE AS NEEDED
Status: COMPLETED | OUTPATIENT
Start: 2024-03-27 | End: 2024-03-27

## 2024-03-27 RX ORDER — ACETAMINOPHEN 325 MG/1
650 TABLET ORAL EVERY 4 HOURS PRN
Status: DISCONTINUED | OUTPATIENT
Start: 2024-03-27 | End: 2024-04-01 | Stop reason: HOSPADM

## 2024-03-27 RX ORDER — SPIRONOLACTONE 25 MG/1
25 TABLET ORAL DAILY
Status: DISCONTINUED | OUTPATIENT
Start: 2024-03-27 | End: 2024-04-01 | Stop reason: HOSPADM

## 2024-03-27 RX ORDER — FENTANYL CITRATE 50 UG/ML
50 INJECTION, SOLUTION INTRAMUSCULAR; INTRAVENOUS EVERY 5 MIN PRN
Status: DISCONTINUED | OUTPATIENT
Start: 2024-03-27 | End: 2024-03-27 | Stop reason: HOSPADM

## 2024-03-27 RX ORDER — ONDANSETRON HYDROCHLORIDE 2 MG/ML
4 INJECTION, SOLUTION INTRAVENOUS EVERY 8 HOURS PRN
Status: DISCONTINUED | OUTPATIENT
Start: 2024-03-27 | End: 2024-04-01 | Stop reason: HOSPADM

## 2024-03-27 RX ORDER — LIDOCAINE HYDROCHLORIDE 10 MG/ML
0.1 INJECTION INFILTRATION; PERINEURAL ONCE
Status: DISCONTINUED | OUTPATIENT
Start: 2024-03-27 | End: 2024-03-27 | Stop reason: HOSPADM

## 2024-03-27 RX ORDER — SODIUM CHLORIDE, SODIUM LACTATE, POTASSIUM CHLORIDE, CALCIUM CHLORIDE 600; 310; 30; 20 MG/100ML; MG/100ML; MG/100ML; MG/100ML
50 INJECTION, SOLUTION INTRAVENOUS CONTINUOUS
Status: DISCONTINUED | OUTPATIENT
Start: 2024-03-27 | End: 2024-03-27

## 2024-03-27 RX ORDER — METFORMIN HYDROCHLORIDE 500 MG/1
500 TABLET ORAL
Status: DISCONTINUED | OUTPATIENT
Start: 2024-03-27 | End: 2024-04-01 | Stop reason: HOSPADM

## 2024-03-27 RX ORDER — MIDAZOLAM HYDROCHLORIDE 1 MG/ML
1 INJECTION, SOLUTION INTRAMUSCULAR; INTRAVENOUS ONCE AS NEEDED
Status: DISCONTINUED | OUTPATIENT
Start: 2024-03-27 | End: 2024-03-27 | Stop reason: HOSPADM

## 2024-03-27 RX ORDER — HEPARIN SODIUM 5000 [USP'U]/ML
5000 INJECTION, SOLUTION INTRAVENOUS; SUBCUTANEOUS EVERY 8 HOURS SCHEDULED
Status: DISCONTINUED | OUTPATIENT
Start: 2024-03-28 | End: 2024-03-27

## 2024-03-27 RX ORDER — SODIUM CHLORIDE, SODIUM LACTATE, POTASSIUM CHLORIDE, CALCIUM CHLORIDE 600; 310; 30; 20 MG/100ML; MG/100ML; MG/100ML; MG/100ML
100 INJECTION, SOLUTION INTRAVENOUS CONTINUOUS
Status: DISCONTINUED | OUTPATIENT
Start: 2024-03-27 | End: 2024-03-27 | Stop reason: HOSPADM

## 2024-03-27 RX ORDER — NALOXONE HYDROCHLORIDE 0.4 MG/ML
0.2 INJECTION, SOLUTION INTRAMUSCULAR; INTRAVENOUS; SUBCUTANEOUS EVERY 5 MIN PRN
Status: DISCONTINUED | OUTPATIENT
Start: 2024-03-27 | End: 2024-04-01 | Stop reason: HOSPADM

## 2024-03-27 RX ORDER — MORPHINE SULFATE 2 MG/ML
2 INJECTION, SOLUTION INTRAMUSCULAR; INTRAVENOUS EVERY 2 HOUR PRN
Status: DISCONTINUED | OUTPATIENT
Start: 2024-03-27 | End: 2024-04-01 | Stop reason: HOSPADM

## 2024-03-27 RX ORDER — OLANZAPINE 10 MG/1
20 TABLET ORAL NIGHTLY
Status: DISCONTINUED | OUTPATIENT
Start: 2024-03-27 | End: 2024-04-01 | Stop reason: HOSPADM

## 2024-03-27 RX ORDER — DEXTROSE 50 % IN WATER (D50W) INTRAVENOUS SYRINGE
12.5
Status: DISCONTINUED | OUTPATIENT
Start: 2024-03-27 | End: 2024-04-01 | Stop reason: HOSPADM

## 2024-03-27 RX ORDER — DEXTROSE 50 % IN WATER (D50W) INTRAVENOUS SYRINGE
25
Status: DISCONTINUED | OUTPATIENT
Start: 2024-03-27 | End: 2024-04-01 | Stop reason: HOSPADM

## 2024-03-27 RX ORDER — ATORVASTATIN CALCIUM 40 MG/1
40 TABLET, FILM COATED ORAL DAILY
Status: DISCONTINUED | OUTPATIENT
Start: 2024-03-27 | End: 2024-04-01 | Stop reason: HOSPADM

## 2024-03-27 RX ORDER — POLYETHYLENE GLYCOL 3350 17 G/17G
17 POWDER, FOR SOLUTION ORAL DAILY
Status: DISCONTINUED | OUTPATIENT
Start: 2024-03-27 | End: 2024-04-01 | Stop reason: HOSPADM

## 2024-03-27 RX ORDER — LISINOPRIL 20 MG/1
20 TABLET ORAL DAILY
Status: DISCONTINUED | OUTPATIENT
Start: 2024-03-27 | End: 2024-04-01 | Stop reason: HOSPADM

## 2024-03-27 RX ORDER — HYDROMORPHONE HYDROCHLORIDE 2 MG/ML
INJECTION, SOLUTION INTRAMUSCULAR; INTRAVENOUS; SUBCUTANEOUS AS NEEDED
Status: DISCONTINUED | OUTPATIENT
Start: 2024-03-27 | End: 2024-03-27

## 2024-03-27 RX ORDER — ALBUTEROL SULFATE 0.83 MG/ML
2.5 SOLUTION RESPIRATORY (INHALATION) ONCE AS NEEDED
Status: DISCONTINUED | OUTPATIENT
Start: 2024-03-27 | End: 2024-03-27 | Stop reason: HOSPADM

## 2024-03-27 RX ORDER — FENTANYL CITRATE 50 UG/ML
INJECTION, SOLUTION INTRAMUSCULAR; INTRAVENOUS AS NEEDED
Status: DISCONTINUED | OUTPATIENT
Start: 2024-03-27 | End: 2024-03-27

## 2024-03-27 RX ORDER — OXYCODONE HYDROCHLORIDE 5 MG/1
5 TABLET ORAL EVERY 6 HOURS PRN
Status: DISCONTINUED | OUTPATIENT
Start: 2024-03-27 | End: 2024-04-01 | Stop reason: HOSPADM

## 2024-03-27 RX ORDER — DIPHENHYDRAMINE HYDROCHLORIDE 50 MG/ML
12.5 INJECTION INTRAMUSCULAR; INTRAVENOUS EVERY 6 HOURS PRN
Status: DISCONTINUED | OUTPATIENT
Start: 2024-03-27 | End: 2024-04-01 | Stop reason: HOSPADM

## 2024-03-27 RX ORDER — CYCLOBENZAPRINE HCL 10 MG
10 TABLET ORAL 3 TIMES DAILY PRN
Status: DISCONTINUED | OUTPATIENT
Start: 2024-03-27 | End: 2024-04-01 | Stop reason: HOSPADM

## 2024-03-27 RX ORDER — CEFAZOLIN 1 G/1
INJECTION, POWDER, FOR SOLUTION INTRAVENOUS AS NEEDED
Status: DISCONTINUED | OUTPATIENT
Start: 2024-03-27 | End: 2024-03-27

## 2024-03-27 RX ORDER — ONDANSETRON HYDROCHLORIDE 2 MG/ML
INJECTION, SOLUTION INTRAVENOUS AS NEEDED
Status: DISCONTINUED | OUTPATIENT
Start: 2024-03-27 | End: 2024-03-27

## 2024-03-27 RX ORDER — HEPARIN SODIUM 5000 [USP'U]/ML
5000 INJECTION, SOLUTION INTRAVENOUS; SUBCUTANEOUS EVERY 8 HOURS SCHEDULED
Status: DISCONTINUED | OUTPATIENT
Start: 2024-03-28 | End: 2024-04-01 | Stop reason: HOSPADM

## 2024-03-27 RX ORDER — ONDANSETRON 4 MG/1
4 TABLET, ORALLY DISINTEGRATING ORAL EVERY 8 HOURS PRN
Status: DISCONTINUED | OUTPATIENT
Start: 2024-03-27 | End: 2024-04-01 | Stop reason: HOSPADM

## 2024-03-27 RX ORDER — ACETAMINOPHEN 325 MG/1
650 TABLET ORAL EVERY 4 HOURS PRN
Status: DISCONTINUED | OUTPATIENT
Start: 2024-03-27 | End: 2024-03-27

## 2024-03-27 RX ORDER — PHENYLEPHRINE HYDROCHLORIDE 10 MG/ML
INJECTION INTRAVENOUS AS NEEDED
Status: DISCONTINUED | OUTPATIENT
Start: 2024-03-27 | End: 2024-03-27

## 2024-03-27 RX ORDER — INSULIN LISPRO 100 [IU]/ML
0-5 INJECTION, SOLUTION INTRAVENOUS; SUBCUTANEOUS
Status: DISCONTINUED | OUTPATIENT
Start: 2024-03-27 | End: 2024-04-01 | Stop reason: HOSPADM

## 2024-03-27 RX ORDER — PROPOFOL 10 MG/ML
INJECTION, EMULSION INTRAVENOUS AS NEEDED
Status: DISCONTINUED | OUTPATIENT
Start: 2024-03-27 | End: 2024-03-27

## 2024-03-27 RX ORDER — PROMETHAZINE HYDROCHLORIDE 25 MG/1
25 SUPPOSITORY RECTAL EVERY 12 HOURS PRN
Status: DISCONTINUED | OUTPATIENT
Start: 2024-03-27 | End: 2024-04-01 | Stop reason: HOSPADM

## 2024-03-27 RX ORDER — CARVEDILOL 12.5 MG/1
12.5 TABLET ORAL
Status: DISCONTINUED | OUTPATIENT
Start: 2024-03-27 | End: 2024-04-01 | Stop reason: HOSPADM

## 2024-03-27 RX ADMIN — ONDANSETRON HYDROCHLORIDE 4 MG: 2 INJECTION INTRAMUSCULAR; INTRAVENOUS at 11:59

## 2024-03-27 RX ADMIN — CYCLOBENZAPRINE 10 MG: 10 TABLET, FILM COATED ORAL at 20:31

## 2024-03-27 RX ADMIN — SPIRONOLACTONE 25 MG: 25 TABLET ORAL at 15:02

## 2024-03-27 RX ADMIN — SODIUM CHLORIDE, POTASSIUM CHLORIDE, SODIUM LACTATE AND CALCIUM CHLORIDE: 600; 310; 30; 20 INJECTION, SOLUTION INTRAVENOUS at 08:00

## 2024-03-27 RX ADMIN — HYDROMORPHONE HYDROCHLORIDE 0.5 MG: 2 INJECTION INTRAMUSCULAR; INTRAVENOUS; SUBCUTANEOUS at 10:31

## 2024-03-27 RX ADMIN — FINASTERIDE 5 MG: 5 TABLET, FILM COATED ORAL at 15:02

## 2024-03-27 RX ADMIN — HYDROMORPHONE HYDROCHLORIDE 0.5 MG: 2 INJECTION INTRAMUSCULAR; INTRAVENOUS; SUBCUTANEOUS at 09:50

## 2024-03-27 RX ADMIN — OXYCODONE 5 MG: 5 TABLET ORAL at 15:02

## 2024-03-27 RX ADMIN — ATORVASTATIN CALCIUM 40 MG: 40 TABLET, FILM COATED ORAL at 15:02

## 2024-03-27 RX ADMIN — FENTANYL CITRATE 25 MCG: 0.05 INJECTION, SOLUTION INTRAMUSCULAR; INTRAVENOUS at 08:08

## 2024-03-27 RX ADMIN — CEFAZOLIN SODIUM 2 G: 2 INJECTION, SOLUTION INTRAVENOUS at 08:00

## 2024-03-27 RX ADMIN — PHENYLEPHRINE HYDROCHLORIDE 50 MCG: 10 INJECTION INTRAVENOUS at 08:24

## 2024-03-27 RX ADMIN — FENTANYL CITRATE 50 MCG: 0.05 INJECTION, SOLUTION INTRAMUSCULAR; INTRAVENOUS at 08:43

## 2024-03-27 RX ADMIN — VANCOMYCIN 750 MG: 750 INJECTION, SOLUTION INTRAVENOUS at 20:26

## 2024-03-27 RX ADMIN — FUROSEMIDE 20 MG: 20 TABLET ORAL at 15:02

## 2024-03-27 RX ADMIN — FENTANYL CITRATE 25 MCG: 0.05 INJECTION, SOLUTION INTRAMUSCULAR; INTRAVENOUS at 08:12

## 2024-03-27 RX ADMIN — ONDANSETRON 4 MG: 2 INJECTION INTRAMUSCULAR; INTRAVENOUS at 12:33

## 2024-03-27 RX ADMIN — FENTANYL CITRATE 50 MCG: 0.05 INJECTION, SOLUTION INTRAMUSCULAR; INTRAVENOUS at 09:25

## 2024-03-27 RX ADMIN — PROPOFOL 140 MG: 10 INJECTION, EMULSION INTRAVENOUS at 08:08

## 2024-03-27 RX ADMIN — LISINOPRIL 20 MG: 20 TABLET ORAL at 15:02

## 2024-03-27 RX ADMIN — CARVEDILOL 12.5 MG: 12.5 TABLET, FILM COATED ORAL at 16:40

## 2024-03-27 RX ADMIN — POLYETHYLENE GLYCOL 3350 17 G: 17 POWDER, FOR SOLUTION ORAL at 15:02

## 2024-03-27 RX ADMIN — PHENYLEPHRINE HYDROCHLORIDE 50 MCG: 10 INJECTION INTRAVENOUS at 11:37

## 2024-03-27 RX ADMIN — HYDROMORPHONE HYDROCHLORIDE 0.5 MG: 2 INJECTION INTRAMUSCULAR; INTRAVENOUS; SUBCUTANEOUS at 10:15

## 2024-03-27 RX ADMIN — ASPIRIN 81 MG: 81 TABLET, COATED ORAL at 15:02

## 2024-03-27 RX ADMIN — PHENYLEPHRINE HYDROCHLORIDE 50 MCG: 10 INJECTION INTRAVENOUS at 11:28

## 2024-03-27 RX ADMIN — OXYCODONE 5 MG: 5 TABLET ORAL at 21:43

## 2024-03-27 RX ADMIN — FENTANYL CITRATE 25 MCG: 0.05 INJECTION, SOLUTION INTRAMUSCULAR; INTRAVENOUS at 08:16

## 2024-03-27 RX ADMIN — CEFAZOLIN 2 G: 330 INJECTION, POWDER, FOR SOLUTION INTRAMUSCULAR; INTRAVENOUS at 11:21

## 2024-03-27 RX ADMIN — HYDROMORPHONE HYDROCHLORIDE 0.5 MG: 2 INJECTION INTRAMUSCULAR; INTRAVENOUS; SUBCUTANEOUS at 10:51

## 2024-03-27 RX ADMIN — FENTANYL CITRATE 25 MCG: 0.05 INJECTION, SOLUTION INTRAMUSCULAR; INTRAVENOUS at 08:20

## 2024-03-27 RX ADMIN — OLANZAPINE 20 MG: 10 TABLET, FILM COATED ORAL at 20:09

## 2024-03-27 RX ADMIN — METFORMIN HYDROCHLORIDE 500 MG: 500 TABLET, FILM COATED ORAL at 16:40

## 2024-03-27 RX ADMIN — PROPOFOL 60 MG: 10 INJECTION, EMULSION INTRAVENOUS at 10:17

## 2024-03-27 SDOH — SOCIAL STABILITY: SOCIAL INSECURITY: DO YOU FEEL UNSAFE GOING BACK TO THE PLACE WHERE YOU ARE LIVING?: NO

## 2024-03-27 SDOH — SOCIAL STABILITY: SOCIAL INSECURITY: WERE YOU ABLE TO COMPLETE ALL THE BEHAVIORAL HEALTH SCREENINGS?: YES

## 2024-03-27 SDOH — SOCIAL STABILITY: SOCIAL INSECURITY: HAVE YOU HAD THOUGHTS OF HARMING ANYONE ELSE?: NO

## 2024-03-27 SDOH — SOCIAL STABILITY: SOCIAL INSECURITY: ABUSE: ADULT

## 2024-03-27 SDOH — SOCIAL STABILITY: SOCIAL INSECURITY: DO YOU FEEL ANYONE HAS EXPLOITED OR TAKEN ADVANTAGE OF YOU FINANCIALLY OR OF YOUR PERSONAL PROPERTY?: NO

## 2024-03-27 SDOH — SOCIAL STABILITY: SOCIAL INSECURITY: ARE YOU OR HAVE YOU BEEN THREATENED OR ABUSED PHYSICALLY, EMOTIONALLY, OR SEXUALLY BY ANYONE?: NO

## 2024-03-27 SDOH — SOCIAL STABILITY: SOCIAL INSECURITY: DOES ANYONE TRY TO KEEP YOU FROM HAVING/CONTACTING OTHER FRIENDS OR DOING THINGS OUTSIDE YOUR HOME?: NO

## 2024-03-27 SDOH — SOCIAL STABILITY: SOCIAL INSECURITY: HAS ANYONE EVER THREATENED TO HURT YOUR FAMILY OR YOUR PETS?: NO

## 2024-03-27 SDOH — HEALTH STABILITY: MENTAL HEALTH: CURRENT SMOKER: 0

## 2024-03-27 SDOH — SOCIAL STABILITY: SOCIAL INSECURITY: ARE THERE ANY APPARENT SIGNS OF INJURIES/BEHAVIORS THAT COULD BE RELATED TO ABUSE/NEGLECT?: NO

## 2024-03-27 ASSESSMENT — COGNITIVE AND FUNCTIONAL STATUS - GENERAL
MOVING TO AND FROM BED TO CHAIR: TOTAL
TURNING FROM BACK TO SIDE WHILE IN FLAT BAD: A LITTLE
DRESSING REGULAR LOWER BODY CLOTHING: A LOT
DRESSING REGULAR UPPER BODY CLOTHING: A LITTLE
TOILETING: A LOT
CLIMB 3 TO 5 STEPS WITH RAILING: TOTAL
MOBILITY SCORE: 11
MOBILITY SCORE: 24
PERSONAL GROOMING: A LITTLE
DAILY ACTIVITIY SCORE: 23
DRESSING REGULAR LOWER BODY CLOTHING: A LITTLE
MOVING FROM LYING ON BACK TO SITTING ON SIDE OF FLAT BED WITH BEDRAILS: A LITTLE
PATIENT BASELINE BEDBOUND: NO
DAILY ACTIVITIY SCORE: 17
HELP NEEDED FOR BATHING: A LITTLE
STANDING UP FROM CHAIR USING ARMS: A LOT
WALKING IN HOSPITAL ROOM: TOTAL

## 2024-03-27 ASSESSMENT — PAIN - FUNCTIONAL ASSESSMENT
PAIN_FUNCTIONAL_ASSESSMENT: 0-10

## 2024-03-27 ASSESSMENT — ACTIVITIES OF DAILY LIVING (ADL)
HEARING - RIGHT EAR: FUNCTIONAL
BATHING_ASSISTANCE: MINIMAL
HEARING - LEFT EAR: FUNCTIONAL
FEEDING YOURSELF: INDEPENDENT
DRESSING YOURSELF: INDEPENDENT
WALKS IN HOME: INDEPENDENT
TOILETING: NEEDS ASSISTANCE
ADEQUATE_TO_COMPLETE_ADL: YES
JUDGMENT_ADEQUATE_SAFELY_COMPLETE_DAILY_ACTIVITIES: YES
ADL_ASSISTANCE: INDEPENDENT
GROOMING: INDEPENDENT
ADL_ASSISTANCE: INDEPENDENT
BATHING: INDEPENDENT
PATIENT'S MEMORY ADEQUATE TO SAFELY COMPLETE DAILY ACTIVITIES?: YES
LACK_OF_TRANSPORTATION: NO
HOME_MANAGEMENT_TIME_ENTRY: 5

## 2024-03-27 ASSESSMENT — COLUMBIA-SUICIDE SEVERITY RATING SCALE - C-SSRS
1. IN THE PAST MONTH, HAVE YOU WISHED YOU WERE DEAD OR WISHED YOU COULD GO TO SLEEP AND NOT WAKE UP?: NO
6. HAVE YOU EVER DONE ANYTHING, STARTED TO DO ANYTHING, OR PREPARED TO DO ANYTHING TO END YOUR LIFE?: NO
2. HAVE YOU ACTUALLY HAD ANY THOUGHTS OF KILLING YOURSELF?: NO

## 2024-03-27 ASSESSMENT — PAIN SCALES - GENERAL
PAIN_LEVEL: 2
PAINLEVEL_OUTOF10: 7
PAINLEVEL_OUTOF10: 4
PAINLEVEL_OUTOF10: 5 - MODERATE PAIN
PAINLEVEL_OUTOF10: 3
PAINLEVEL_OUTOF10: 4
PAINLEVEL_OUTOF10: 6
PAINLEVEL_OUTOF10: 6
PAINLEVEL_OUTOF10: 0 - NO PAIN
PAINLEVEL_OUTOF10: 3
PAINLEVEL_OUTOF10: 0 - NO PAIN
PAINLEVEL_OUTOF10: 7
PAINLEVEL_OUTOF10: 5 - MODERATE PAIN

## 2024-03-27 ASSESSMENT — PAIN DESCRIPTION - DESCRIPTORS
DESCRIPTORS: ACHING
DESCRIPTORS: OTHER (COMMENT)
DESCRIPTORS: THROBBING

## 2024-03-27 ASSESSMENT — LIFESTYLE VARIABLES
HOW MANY STANDARD DRINKS CONTAINING ALCOHOL DO YOU HAVE ON A TYPICAL DAY: 1 OR 2
SKIP TO QUESTIONS 9-10: 1
AUDIT-C TOTAL SCORE: 1
AUDIT-C TOTAL SCORE: 1
HOW OFTEN DO YOU HAVE A DRINK CONTAINING ALCOHOL: MONTHLY OR LESS
HOW OFTEN DO YOU HAVE 6 OR MORE DRINKS ON ONE OCCASION: NEVER

## 2024-03-27 ASSESSMENT — PAIN DESCRIPTION - LOCATION: LOCATION: ANKLE

## 2024-03-27 NOTE — NURSING NOTE
Pt has a single lumen picc line, drsg dry and intact dated 3/20, due for drsg change. Drsg has been changed using sterile technique, site without any redness, swelling or drainage, external length of picc is approx 4 1/2-5cm out (external length on insertion was 1cm), pt did have a chest xray and was out the same external length last week and tip was in the mid SVC, will get another chest xray to verify tip placement. Blue cap has been changed, brisk blood return noted and flushes easily with NS, curos cap applied.

## 2024-03-27 NOTE — H&P
History and Physical    SERVICE DATE: 3/27/2024     PRIMARY CARE PHYSICIAN: Darryl Jenkins MD    Subjective   HPI:  Mr. Coley is a 63 y.o. male who presents for outpatient surgery on the right lower extremity. He underwent right peroneus brevis muscle flap with excisional debridement of wound, application of Bilayer skin substitute graft, application of external fixator, and application of NPWT. Patient had recent admission for cellulitis and has been staying at [facility]. He will need KCI wound vac placed prior to discharge and will need to go to a facility that will allow a KCI vac. Patient denies further complaints at this time.    ROS: 10-point review of systems was performed and is otherwise negative except as noted in HPI.  PMH: Reviewed/documented below.  PSH:  Noncontributory except per HPI   FH: Reviewed and noncontributory   SOCIAL:  Reviewed/documented below.  ALLERGIES: Reviewed/documented below.  MEDS: Reviewed/documented below.  VS: Reviewed/documented below.    Past Medical History:   Diagnosis Date    CHF (congestive heart failure) (CMS/[facility])     Diabetes mellitus (CMS/[facility])     Hypertension      History reviewed. No pertinent surgical history.  No family history on file.  Social History     Tobacco Use    Smoking status: Some Days     Packs/day: 1.00     Years: 20.00     Additional pack years: 0.00     Total pack years: 20.00     Types: Cigarettes    Smokeless tobacco: Never   Substance Use Topics    Alcohol use: Yes     Alcohol/week: 12.0 standard drinks of alcohol     Types: 12 Cans of beer per week    Drug use: Never      Medications Prior to Admission   Medication Sig Dispense Refill Last Dose    acetaminophen (Tylenol) 325 mg tablet Take 2 tablets (650 mg) by mouth every 4 hours if needed for mild pain (1 - 3). 30 tablet 0 3/26/2024    aspirin 81 mg EC tablet Take 1 tablet (81 mg) by mouth once daily.   3/26/2024    atorvastatin (Lipitor) 40 mg tablet Take 1 tablet (40 mg)  by mouth once daily.   3/26/2024    carvedilol (Coreg) 12.5 mg tablet Take 1 tablet (12.5 mg) by mouth 2 times a day with meals.   3/26/2024    finasteride (Proscar) 5 mg tablet Take 1 tablet (5 mg) by mouth once daily. Do not crush, chew, or split.   3/26/2024    furosemide (Lasix) 20 mg tablet Take 1 tablet (20 mg) by mouth once daily.   3/26/2024    gemfibrozil (Lopid) 600 mg tablet Take 1 tablet (600 mg) by mouth 2 times a day before meals.   3/26/2024    heparin sodium,porcine (heparin, porcine,) 5,000 unit/mL injection Inject 1 mL (5,000 Units) under the skin every 8 hours. 100 mL 0 3/26/2024    lisinopril 20 mg tablet Take 1 tablet (20 mg) by mouth once daily.   3/26/2024    metFORMIN, OSM, (Fortamet) 500 mg 24 hr tablet Take 1 tablet (500 mg) by mouth once daily in the evening. Take with meals. Do not crush, chew, or split.   3/26/2024    OLANZapine (ZyPREXA) 20 mg tablet Take 1 tablet (20 mg) by mouth once daily at bedtime.   3/26/2024    spironolactone (Aldactone) 25 mg tablet Take 1 tablet (25 mg) by mouth once daily.   3/26/2024    vancomycin 1.25 gram recon soln Infuse 0.75 g into a venous catheter every 12 hours for 12 days. 18 g 0 3/26/2024        Medications:  Scheduled Meds: lidocaine, 0.1 mL, subcutaneous, Once      Continuous Infusions: lactated Ringer's, 50 mL/hr  lactated Ringer's, 100 mL/hr      PRN Meds: PRN medications: albuterol, fentaNYL PF, hydrALAZINE, HYDROmorphone, meperidine, midazolam, promethazine (Phenergan) 6.25 mg in sodium chloride 0.9% 50 mL IV    Allergies as of 03/12/2024 - Reviewed 01/26/2024   Allergen Reaction Noted    Bee sting kit Anaphylaxis 12/07/2019    Tramadol Rash 01/07/2020            Objective   PHYSICAL EXAM:  Physical Exam Performed:  Vitals:    03/27/24 1241   BP:    Pulse: 64   Resp: 10   Temp:    SpO2: 100%     Body mass index is 33.4 kg/m².    Patient is AOx3 and in no acute distress. Patient is alert and cooperative. Sitting comfortably in bed with dressing  "clean, dry and intact.     HEENT: NC/AT, airway patent  Chest: RRR, no increased WOB  Abdomen: obese, soft, non-tender    Extremities as below    Vascular: Palpable DP/PT pulses B/L. Moderate pitting edema noted. Hair growth present B/L. CFT<5 to B/L hallux. Temperature is warm to cool from tibial tuberosity to distal digits B/L. No lymphatic streaking noted B/L.    Musculoskeletal: External fixator in place to right foot, ankle, and lower leg. Gross active and passive ROM intact to age and activity level. Moves all extremities spontaneously. No pain to palpation at feet B/L.     Neurological: Absent light touch sensation B/L. Pain stimuli diminished B/L. Denies any numbness, burning or tingling.    Dermatologic: Nails 1-4 are thickened, elongated, discolored with subungual debris B/L. Skin appears diffusely xerotic B/L. Web spaces 1-4 B/L are clean, dry and intact. No rashes or nodules noted B/L.   Surgical Dressing intact over right lower extremity and external fixator.   Wound vac functioning without any warnings.  SERA drain intact.    LABS:   Results for orders placed or performed during the hospital encounter of 03/27/24 (from the past 24 hour(s))   POCT GLUCOSE   Result Value Ref Range    POCT Glucose 92 74 - 99 mg/dL      Lab Results   Component Value Date    HGBA1C 4.4 03/19/2024      No results found for: \"CRP\"   Lab Results   Component Value Date    SEDRATE 47 (H) 03/19/2024        Results from last 7 days   Lab Units 03/25/24  0528   WBC AUTO x10*3/uL 6.0   RBC AUTO x10*6/uL 3.45*   HEMOGLOBIN g/dL 10.1*   HEMATOCRIT % 29.4*     Results from last 7 days   Lab Units 03/25/24  0528   SODIUM mmol/L 139   POTASSIUM mmol/L 4.1   CHLORIDE mmol/L 107   CO2 mmol/L 21*   BUN mg/dL 21   CREATININE mg/dL 0.90   CALCIUM mg/dL 9.5           IMAGING REVIEW:  FL fluoro images no charge    Result Date: 3/27/2024  These images are not reportable by radiology and will not be interpreted by  Radiologists.    Bedside PICC " Imaging    Result Date: 3/25/2024  These images are not reportable by radiology and will not be interpreted by  Radiologists.    XR chest 1 view    Result Date: 3/20/2024  Interpreted By:  Nael Moore, STUDY: XR CHEST 1 VIEW; 3/20/2024 3:57 pm   INDICATION: Signs/Symptoms:PICC placement.   COMPARISON: None   ACCESSION NUMBER(S): [id number]   ORDERING CLINICIAN: ANGELITA YOUSSEF   TECHNIQUE: 1 view of the chest was performed.   FINDINGS: Right-sided PICC line catheter with tip at the mid SVC. The lungs are adequately inflated. No acute consolidation. No pleural effusion. No pneumothorax.  The cardiomediastinal silhouette is within normal limits.       Right-sided PICC line catheter with tip at the mid SVC. No acute cardiopulmonary disease.   Signed by: Nael Moore 3/20/2024 4:14 PM Dictation workstation:   [id number]    Lower extremity venous duplex right    Result Date: 3/19/2024  Interpreted By:  Favian Castellano, STUDY: VASC US LOWER EXTREMITY VENOUS DUPLEX RIGHT; 3/19/2024 3:22 pm   INDICATION: Signs/Symptoms:leg swelling + d dimer..   COMPARISON: None.   ACCESSION NUMBER(S): [id number]   ORDERING CLINICIAN: CHARO FRIED   TECHNIQUE: Vascular ultrasound of the right lower extremity was performed. Real time compression views as well as Gray scale, color Doppler and spectral Doppler waveform analysis was performed.   FINDINGS: Evaluation of the visualized portions of the right common femoral vein, proximal, mid, and distal femoral vein, and popliteal vein were performed.  Evaluation of the visualized portions of the posterior tibial and peroneal veins were also performed.  In addition, evaluation of the contralateral common femoral vein was performed.   Limitations: None   The evaluated veins demonstrate normal compressibility. There is intact venous flow demonstrating normal respiratory variability and normal augmentation of flow with calf compression. Therefore, there is no ultrasonographic  evidence for deep vein thrombosis within the evaluated veins. No evidence of thrombus is seen within the contralateral common femoral vein.       No sonographic evidence for deep vein thrombosis within the evaluated veins of the right lower extremity.   MACRO: None   Signed by: Favian Castellano 3/19/2024 3:22 PM Dictation workstation:   [id number]    XR foot right 3+ views    Result Date: 3/19/2024  Interpreted By:  Lux Hernandez, STUDY: XR FOOT RIGHT 3+ VIEWS   INDICATION: Signs/Symptoms:r foot wound.   COMPARISON: January 26, 2024   ACCESSION NUMBER(S): [id number]   ORDERING CLINICIAN: CHARO FRIED   FINDINGS: Postsurgical changes status post 5th ray amputation to the level of the mid metatarsal.   Suspected skin wounds at the surgical site. No aggressive bony destructive changes or erosions.   Marked soft tissue swelling dorsally.       Suspected skin wounds of the 5th amputation site with no osseous acute abnormality.   Marked dorsal soft tissue swelling, potentially cellulitis.   Signed by: Lux Hernandez 3/19/2024 2:09 PM Dictation workstation:   [id number]            Assessment/Plan   ASSESSMENT & PLAN:    # S/P RLE peroneus brevis muscle flap, wound debridement, Integra bilayer, wound vac, external fixator.  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  - Charts, labs, vitals and imaging all reviewed.     Plan:  - Right foot surgical dressing is to remain clean, dry and intact unless changed by podiatry.  - Abx: Continue IV Vancomycin 0.75g q12h per ID  - Pain Regimen: Tylenol, Oxycodone, morphine for breakthrough  - Bowel Regimen: Polyethylene glycol  - Diet: advance to target liberal diabetic diet  -  Podiatry will continue to follow while in house.    DVT ppx: heparin subcutaneous 5000U to begin tomorrow morning  Weightbearing: NON-weight-bearing to RLE  Discharge: Patient to follow up 1 week after discharge with Dr. Perry    Case to be discussed with attending, A&P above reflects a tentative plan. Please await for the final signature from the attending physician on service.    This patient will be followed by the Podiatry service. Please Epic Chat the corresponding residents below with questions or concerns.      Husam Lopez DPM PGY-2  Podiatric Medicine and Surgery   Epic Chat            SIGNATURE: Husam Lopez DPM PATIENT NAME: Claude Coley   DATE: March 27, 2024 MRN: [id number]   TIME: 12:53 PM CONTACT: Haiku message

## 2024-03-27 NOTE — ANESTHESIA PROCEDURE NOTES
Airway  Date/Time: 3/27/2024 8:09 AM  Urgency: elective    Airway not difficult    Staffing  Performed: KAELA   Authorized by: Wilmer Hurst MD    Performed by: KAELA Beckett  Patient location during procedure: OR    Indications and Patient Condition  Indications for airway management: anesthesia  Spontaneous Ventilation: absent  Sedation level: deep  Preoxygenated: yes  Patient position: sniffing  Mask difficulty assessment: 0 - not attempted    Final Airway Details  Final airway type: supraglottic airway      Successful airway: Size 5     Number of attempts at approach: 1    Additional Comments  EASY, ATRAUMATIC LMA PLACEMENT TO GOOD SEAL  SOFT BITE BLOCK PLACED

## 2024-03-27 NOTE — ANESTHESIA POSTPROCEDURE EVALUATION
Patient: Claude Coley    Procedure Summary       Date: 03/27/24 Room / Location: [facility] OR 03 / [facility]    Anesthesia Start: 0800 Anesthesia Stop: 1220    Procedures:       Debridement of Wound (Right: Foot)      Peroneus Muscle Flap (Right: Foot)      Application External Fixation Ankle (Right: Foot) Diagnosis:       Diabetic polyneuropathy associated with type 2 diabetes mellitus (CMS/HCC)      Peripheral vascular disease, unspecified (CMS/HCC)      Non-pressure chronic ulcer of right lower leg, limited to breakdown of skin (CMS/HCC)      (Diabetes mellitus type 2 with neuropathy. Periphal vascular disease. Large open wound deficit, right.)    Surgeons: Darryl Perry DPM Responsible Provider: Wilmer Hurst MD    Anesthesia Type: general ASA Status: 2            Anesthesia Type: general    Vitals Value Taken Time   /61 03/27/24 1331   Temp 35.6 °C (96.1 °F) 03/27/24 1240   Pulse 59 03/27/24 1335   Resp 9 03/27/24 1335   SpO2 97 % 03/27/24 1335   Vitals shown include unvalidated device data.    Anesthesia Post Evaluation    Patient location during evaluation: PACU  Patient participation: complete - patient participated  Level of consciousness: sleepy but conscious  Pain score: 2  Pain management: adequate  Multimodal analgesia pain management approach  Airway patency: patent  Cardiovascular status: acceptable  Respiratory status: acceptable  Hydration status: acceptable  Postoperative Nausea and Vomiting: none        No notable events documented.

## 2024-03-27 NOTE — PROGRESS NOTES
Occupational Therapy    Evaluation    Patient Name: Claude Coley  MRN: [id number]  Today's Date: 3/27/2024  Time Calculation  Start Time: 1445  Stop Time: 1510  Time Calculation (min): 25 min      Assessment:  OT Assessment: 64 yo male recently hospitalized for cellulitis and discharged to SNF returned for grafting, external fixation, and application of a wound vac on right LE. Pt presents below baseline functional status d/t pain and weight-bearing retriction impeding mobility as well as impaired activity tolerance and generalized weakness postop.  Pt requires OT services to provide ADL retraining utilizing compensatory techniques and progressive strengthening in order to maximize functional capacity and safety with mobility.  Prognosis: Good  Evaluation/Treatment Tolerance: Patient limited by pain  Medical Staff Made Aware: Yes  End of Session Communication: Bedside nurse  End of Session Patient Position: Bed, 3 rail up, Alarm on  OT Assessment Results: Decreased ADL status, Decreased endurance, Decreased functional mobility, Decreased IADLs  Prognosis: Good  Evaluation/Treatment Tolerance: Patient limited by pain  Medical Staff Made Aware: Yes  Strengths: Ability to acquire knowledge, Housing layout, Rehab experience  Barriers to Participation: Comorbidities    Plan:  Treatment Interventions: ADL retraining, Functional transfer training, UE strengthening/ROM, Endurance training, Patient/family training, Equipment evaluation/education, Compensatory technique education  OT Frequency: 4 times per week  OT Discharge Recommendations: Moderate intensity level of continued care  Equipment Recommended upon Discharge: Wheeled walker  OT Recommended Transfer Status: Assist of 2  OT - OK to Discharge: Yes  Treatment Interventions: ADL retraining, Functional transfer training, UE strengthening/ROM, Endurance training, Patient/family training, Equipment evaluation/education, Compensatory technique education    Subjective      Current Problem:  No diagnosis found.    General:  General  Reason for Referral: Decline in ADLs s/p surgery  Referred By: Dr Lopez  Past Medical History Relevant to Rehab: DM, cellulitis, HTN, HLD, CHF, MO  Family/Caregiver Present: No  Co-Treatment: PT  Co-Treatment Reason: partial for safe mobility  Prior to Session Communication: Bedside nurse  Patient Position Received: Bed, 3 rail up, Alarm on  Preferred Learning Style: verbal, visual  General Comment: Pt cleared by nursing after returning from PACU, patient agreeable after receiving pain meds.    Precautions:  LE Weight Bearing Status: Right Non-Weight Bearing  Medical Precautions: Fall precautions  Precautions Comment: +wound vac    Pain:  Pain Assessment  Pain Assessment: 0-10  Pain Score: 7  Pain Type: Surgical pain  Pain Location: Foot  Pain Orientation: Right  Pain Descriptors: Throbbing  Pain Interventions: Other (Comment) (Notified nurse of request for pain meds, deferred mobility until same)    Objective     Cognition:  Overall Cognitive Status: Within Functional Limits  Orientation Level: Oriented X4  Impulsive: Mildly    Home Living:  Type of Home: Apartment ((5th floor with elevator))  Lives With: Alone  Home Adaptive Equipment: Walker rolling or standard, Cane  Home Layout: One level  Home Access: Level entry  Bathroom Shower/Tub: Tub/shower unit  Bathroom Toilet: Standard  Bathroom Equipment: Other (Comment) (plans on purchasing a tub transfer bench)  Home Living Comments: Has not been home since January (admits from [facility])    Prior Function:  Level of [place]: Independent with ADLs and functional transfers, Independent with homemaking with ambulation  ADL Assistance: Independent  Homemaking Assistance: Independent  Ambulatory Assistance: Independent  Vocational: On disability  Leisure: enjoys listening to the radio  Hand Dominance: Right  Prior Function Comments: Pt is independent with all at baseline.  Reports ambulating  "without an A.D. \"with some assist\" at SNF.    ADL:  Eating Assistance: Independent  Grooming Assistance: Stand by  Grooming Deficit: Setup  Bathing Assistance: Minimal  Bathing Deficit: Buttocks, Left lower leg including foot  UE Dressing Assistance: Stand by  UE Dressing Deficit: Setup  LE Dressing Assistance: Maximal  LE Dressing Deficit: Don/doff L sock, Don/doff L shoe, Thread RLE into pants, Pull up over hips  Toileting Assistance with Device: Maximal  ADL Comments: all per clinical judgement    Activity Tolerance:  Endurance: Decreased tolerance for upright activites    Bed Mobility/Transfers: Bed Mobility  Bed Mobility: Yes  Bed Mobility 1  Bed Mobility 1: Supine to sitting  Level of Assistance 1: Minimum assistance  Bed Mobility Comments 1: toward the right with head of bed elevated ~40 degrees for assist with right LE  Bed Mobility 2  Bed Mobility  2: Sitting to supine  Level of Assistance 2: Minimum assistance  Bed Mobility Comments 2: assist with right LE    Transfers  Transfer: Yes  Transfer 1  Transfer From 1: Bed to  Transfer to 1: Stand  Technique 1: Sit to stand, Stand to sit  Transfer Device 1: Walker  Transfer Level of Assistance 1: Minimum assistance, +2  Trials/Comments 1: cues for hand/right LE placement to maintain NWB status     Sensation:  Light Touch: No apparent deficits  Sensation Comment: Denies tingling/numbness    Strength:  Strength Comments: BUEs =4/5 grossly    Hand Function:  Gross Grasp: Functional  Coordination: Functional    Extremities: RUE   RUE : Within Functional Limits and LUE   LUE: Within Functional Limits    Outcome Measures:[facility] Daily Activity  Putting on and taking off regular lower body clothing: A lot  Bathing (including washing, rinsing, drying): A little  Putting on and taking off regular upper body clothing: A little  Toileting, which includes using toilet, bedpan or urinal: A lot  Taking care of personal grooming such as brushing teeth: A little  Eating Meals: " None  Daily Activity - Total Score: 17    OP EDUCATION:  Education  Individual(s) Educated: Patient  Education Provided: Fall precautons, Risk and benefits of OT discussed with patient or other, POC discussed and agreed upon  Risk and Benefits Discussed with Patient/Caregiver/Other: yes  Patient/Caregiver Demonstrated Understanding: yes  Plan of Care Discussed and Agreed Upon: yes  Patient Response to Education: Patient/Caregiver Verbalized Understanding of Information    Goals:  Encounter Problems       Encounter Problems (Active)       OT Goals       ADLs (Progressing)       Start:  03/27/24    Expected End:  04/10/24       Pt will complete bathing, dressing, and toileting tasks with setup assist using adaptive aides as needed.         Functional transfers (Progressing)       Start:  03/27/24    Expected End:  04/10/24       Pt will complete toilet, bed, and chair transfers independently from elevated seat heights and using bilateral arm supports as needed.           UE strength (Progressing)       Start:  03/27/24    Expected End:  04/10/24       Pt will increase UE strength from 4 to 4+/5 in order to maintain NWB status of right LE for transfers/mobility.

## 2024-03-27 NOTE — PROGRESS NOTES
Physical Therapy    Physical Therapy Evaluation    Patient Name: Claude Coley  MRN: [id number]  Today's Date: 3/27/2024   Time Calculation  Start Time: 1520  Stop Time: 1540  Time Calculation (min): 20 min    Assessment/Plan   PT Assessment  PT Assessment Results: Decreased strength, Decreased endurance, Impaired balance, Decreased mobility, Decreased coordination, Decreased safety awareness, Impaired judgement, Decreased skin integrity, Orthopedic restrictions, Pain  Rehab Prognosis: Good  Evaluation/Treatment Tolerance: Patient limited by fatigue, Patient limited by pain  Medical Staff Made Aware: Yes  Strengths: Ability to acquire knowledge, Housing layout  Barriers to Participation: Comorbidities  End of Session Communication: Bedside nurse  Assessment Comment: Pt is a 63 y.o. male adm for  elective Rt ankle sx. Pt adm from rehab, has been in/out of hospital/rehab, not home  since 1/24. Pt required min assist of 2 along w/ much cueing for safe transfer technique and NWB RLE. Pt would benefit from continued skilled PT services to progress safe post op mobility.  End of Session Patient Position: Bed, 3 rail up, Alarm on  IP OR SWING BED PT PLAN  Inpatient or Swing Bed: Inpatient  PT Plan  Treatment/Interventions: Bed mobility, Transfer training, Gait training, Balance training, Strengthening, Endurance training, Range of motion, Therapeutic exercise, Therapeutic activity, Home exercise program  PT Plan: Skilled PT  PT Frequency: 5 times per week  PT Discharge Recommendations: Moderate intensity level of continued care  Equipment Recommended upon Discharge: Wheeled walker  PT Recommended Transfer Status: Assist x2, Assistive device  PT - OK to Discharge: Yes      Subjective   General Visit Information:  General  Reason for Referral: recent surgery  Referred By: Dr Lopez  Past Medical History Relevant to Rehab: DM, cellulitis, HTN, HLD, CHF, MO  Family/Caregiver Present: No  Co-Treatment: OT  Co-Treatment  "Reason: partial for safe mobility  Prior to Session Communication: Bedside nurse  Patient Position Received: Bed, 3 rail up, Alarm on  Preferred Learning Style: verbal, visual  General Comment: Pt agreeable to PT assessment.  Home Living:  Home Living  Type of Home: Apartment (5th floor w/ elevator)  Lives With: Alone  Home Adaptive Equipment: Walker rolling or standard, Cane  Home Layout: One level  Home Access: Level entry  Bathroom Shower/Tub: Tub/shower unit  Bathroom Toilet: Standard  Bathroom Equipment: Other (Comment) (plans on purchasing a tub transfer bench)  Home Living Comments: Has not been home since January (admits from [facility])  Prior Level of Function:  Prior Function Per Pt/Caregiver Report  Level of [place]: Independent with ADLs and functional transfers, Independent with homemaking with ambulation  ADL Assistance: Independent  Homemaking Assistance: Independent  Ambulatory Assistance: Independent  Vocational: On disability  Leisure: enjoys listening to the radio  Hand Dominance: Right  Prior Function Comments: Pt is independent with all at baseline.  Reports ambulating without an A.D. \"with some assist\" at SNF.  Precautions:  Precautions  LE Weight Bearing Status: Right Non-Weight Bearing  Medical Precautions: Fall precautions  Post-Surgical Precautions: Other (comment) (s/p Debridement of Wound,      Peroneus Muscle Flap,    Application External Fixation Ankle)  Precautions Comment: +wound vac    Objective   Pain:  Pain Assessment  Pain Assessment: 0-10  Pain Score: 7  Pain Type: Surgical pain  Pain Location: Ankle  Pain Orientation: Right  Pain Descriptors: Other (Comment) (\"gnawing\")  Pain Interventions:  (recently had pain med)  Response to Interventions: TBD  Cognition:  Cognition  Overall Cognitive Status: Within Functional Limits  Safety/Judgement:  (decreased)  Insight: Moderate  Impulsive: Mildly    General Assessments:  Activity Tolerance  Endurance: Decreased tolerance for " upright activites  Activity Tolerance Comments: Fair- (limited activity POD 0)    Sensation  Sensation Comment: Denies tingling/numbness    Strength  Strength Comments: LLE 4- to 4/5, Rt hip, knee 3/5 or >, ankle NT  Static Standing Balance  Static Standing-Balance Support: Bilateral upper extremity supported (RW)  Static Standing-Level of Assistance: Minimum assistance  Static Standing-Comment/Number of Minutes: pt tolerated ~ 30 seconds, cues for NWB RLE  Functional Assessments:  Bed Mobility 1  Bed Mobility 1: Supine to sitting, Sitting to supine  Level of Assistance 1: Minimum assistance  Bed Mobility Comments 1: to Rt EOB, HOB elevated part way, assist for RLE off/on bed    Transfer 1  Technique 1: Sit to stand, Stand to sit  Transfer Device 1: Walker  Transfer Level of Assistance 1: Minimum assistance, +2  Trials/Comments 1: Bed height elevated, cues for hand and RLE placement as well as NWB RLE during transfer. Pt able to maintain standing ~ 20 seconds or so w/ NWB RLE. Pt anxious to sit back down again.    Ambulation/Gait Training  Ambulation/Gait Training Performed: No  Extremity/Trunk Assessments:  RLE   RLE :  (hip, knee functional, ankle NA~ in fixator.)  LLE   LLE : Within Functional Limits  Outcome Measures:  [facility] Basic Mobility  Turning from your back to your side while in a flat bed without using bedrails: A little  Moving from lying on your back to sitting on the side of a flat bed without using bedrails: A little  Moving to and from bed to chair (including a wheelchair): Total  Standing up from a chair using your arms (e.g. wheelchair or bedside chair): A lot  To walk in hospital room: Total  Climbing 3-5 steps with railing: Total  Basic Mobility - Total Score: 11    Encounter Problems       Encounter Problems (Active)       Balance       LTG - Patient will maintain balance to allow for safe mobility (Progressing)       Start:  03/27/24    Expected End:  04/09/24               Mobility       STG -  Patient will ambulate 5' w/ RW and min assist, NWB RLE (Progressing)       Start:  03/27/24    Expected End:  04/09/24            Pt will participate in LE exercises to promote functional strength and endurance (Progressing)       Start:  03/27/24    Expected End:  04/09/24               PT Transfers       STG - Patient to transfer to and from sit to supine w/ supervision  (Progressing)       Start:  03/27/24    Expected End:  04/09/24            STG - Patient will transfer sit to and from stand w/ GCA (Progressing)       Start:  03/27/24    Expected End:  04/09/24               Safety       Pt will consistently comply w/ NWB RLE w/ all activities (Progressing)       Start:  03/27/24    Expected End:  04/09/24                   Education Documentation  Precautions, taught by Lolita Linda, PT at 3/27/2024  4:55 PM.  Learner: Patient  Readiness: Acceptance  Method: Explanation, Demonstration  Response: Needs Reinforcement  Comment: safe transfers and NWB RLE    Mobility Training, taught by Lolita Linda, PT at 3/27/2024  4:55 PM.  Learner: Patient  Readiness: Acceptance  Method: Explanation, Demonstration  Response: Needs Reinforcement  Comment: safe transfers and NWB RLE    Education Comments  No comments found.

## 2024-03-27 NOTE — H&P (VIEW-ONLY)
History and Physical    SERVICE DATE: 3/27/2024     PRIMARY CARE PHYSICIAN: Darryl Jenkins MD    Subjective   HPI:  Mr. Coley is a 63 y.o. male who presents for outpatient surgery on the right lower extremity. He underwent right peroneus brevis muscle flap with excisional debridement of wound, application of Bilayer skin substitute graft, application of external fixator, and application of NPWT. Patient had recent admission for cellulitis and has been staying at [facility]. He will need KCI wound vac placed prior to discharge and will need to go to a facility that will allow a KCI vac. Patient denies further complaints at this time.    ROS: 10-point review of systems was performed and is otherwise negative except as noted in HPI.  PMH: Reviewed/documented below.  PSH:  Noncontributory except per HPI   FH: Reviewed and noncontributory   SOCIAL:  Reviewed/documented below.  ALLERGIES: Reviewed/documented below.  MEDS: Reviewed/documented below.  VS: Reviewed/documented below.    Past Medical History:   Diagnosis Date    CHF (congestive heart failure) (CMS/[facility])     Diabetes mellitus (CMS/[facility])     Hypertension      History reviewed. No pertinent surgical history.  No family history on file.  Social History     Tobacco Use    Smoking status: Some Days     Packs/day: 1.00     Years: 20.00     Additional pack years: 0.00     Total pack years: 20.00     Types: Cigarettes    Smokeless tobacco: Never   Substance Use Topics    Alcohol use: Yes     Alcohol/week: 12.0 standard drinks of alcohol     Types: 12 Cans of beer per week    Drug use: Never      Medications Prior to Admission   Medication Sig Dispense Refill Last Dose    acetaminophen (Tylenol) 325 mg tablet Take 2 tablets (650 mg) by mouth every 4 hours if needed for mild pain (1 - 3). 30 tablet 0 3/26/2024    aspirin 81 mg EC tablet Take 1 tablet (81 mg) by mouth once daily.   3/26/2024    atorvastatin (Lipitor) 40 mg tablet Take 1 tablet (40 mg)  by mouth once daily.   3/26/2024    carvedilol (Coreg) 12.5 mg tablet Take 1 tablet (12.5 mg) by mouth 2 times a day with meals.   3/26/2024    finasteride (Proscar) 5 mg tablet Take 1 tablet (5 mg) by mouth once daily. Do not crush, chew, or split.   3/26/2024    furosemide (Lasix) 20 mg tablet Take 1 tablet (20 mg) by mouth once daily.   3/26/2024    gemfibrozil (Lopid) 600 mg tablet Take 1 tablet (600 mg) by mouth 2 times a day before meals.   3/26/2024    heparin sodium,porcine (heparin, porcine,) 5,000 unit/mL injection Inject 1 mL (5,000 Units) under the skin every 8 hours. 100 mL 0 3/26/2024    lisinopril 20 mg tablet Take 1 tablet (20 mg) by mouth once daily.   3/26/2024    metFORMIN, OSM, (Fortamet) 500 mg 24 hr tablet Take 1 tablet (500 mg) by mouth once daily in the evening. Take with meals. Do not crush, chew, or split.   3/26/2024    OLANZapine (ZyPREXA) 20 mg tablet Take 1 tablet (20 mg) by mouth once daily at bedtime.   3/26/2024    spironolactone (Aldactone) 25 mg tablet Take 1 tablet (25 mg) by mouth once daily.   3/26/2024    vancomycin 1.25 gram recon soln Infuse 0.75 g into a venous catheter every 12 hours for 12 days. 18 g 0 3/26/2024        Medications:  Scheduled Meds: lidocaine, 0.1 mL, subcutaneous, Once      Continuous Infusions: lactated Ringer's, 50 mL/hr  lactated Ringer's, 100 mL/hr      PRN Meds: PRN medications: albuterol, fentaNYL PF, hydrALAZINE, HYDROmorphone, meperidine, midazolam, promethazine (Phenergan) 6.25 mg in sodium chloride 0.9% 50 mL IV    Allergies as of 03/12/2024 - Reviewed 01/26/2024   Allergen Reaction Noted    Bee sting kit Anaphylaxis 12/07/2019    Tramadol Rash 01/07/2020            Objective   PHYSICAL EXAM:  Physical Exam Performed:  Vitals:    03/27/24 1241   BP:    Pulse: 64   Resp: 10   Temp:    SpO2: 100%     Body mass index is 33.4 kg/m².    Patient is AOx3 and in no acute distress. Patient is alert and cooperative. Sitting comfortably in bed with dressing  "clean, dry and intact.     HEENT: NC/AT, airway patent  Chest: RRR, no increased WOB  Abdomen: obese, soft, non-tender    Extremities as below    Vascular: Palpable DP/PT pulses B/L. Moderate pitting edema noted. Hair growth present B/L. CFT<5 to B/L hallux. Temperature is warm to cool from tibial tuberosity to distal digits B/L. No lymphatic streaking noted B/L.    Musculoskeletal: External fixator in place to right foot, ankle, and lower leg. Gross active and passive ROM intact to age and activity level. Moves all extremities spontaneously. No pain to palpation at feet B/L.     Neurological: Absent light touch sensation B/L. Pain stimuli diminished B/L. Denies any numbness, burning or tingling.    Dermatologic: Nails 1-4 are thickened, elongated, discolored with subungual debris B/L. Skin appears diffusely xerotic B/L. Web spaces 1-4 B/L are clean, dry and intact. No rashes or nodules noted B/L.   Surgical Dressing intact over right lower extremity and external fixator.   Wound vac functioning without any warnings.  SERA drain intact.    LABS:   Results for orders placed or performed during the hospital encounter of 03/27/24 (from the past 24 hour(s))   POCT GLUCOSE   Result Value Ref Range    POCT Glucose 92 74 - 99 mg/dL      Lab Results   Component Value Date    HGBA1C 4.4 03/19/2024      No results found for: \"CRP\"   Lab Results   Component Value Date    SEDRATE 47 (H) 03/19/2024        Results from last 7 days   Lab Units 03/25/24  0528   WBC AUTO x10*3/uL 6.0   RBC AUTO x10*6/uL 3.45*   HEMOGLOBIN g/dL 10.1*   HEMATOCRIT % 29.4*     Results from last 7 days   Lab Units 03/25/24  0528   SODIUM mmol/L 139   POTASSIUM mmol/L 4.1   CHLORIDE mmol/L 107   CO2 mmol/L 21*   BUN mg/dL 21   CREATININE mg/dL 0.90   CALCIUM mg/dL 9.5           IMAGING REVIEW:  FL fluoro images no charge    Result Date: 3/27/2024  These images are not reportable by radiology and will not be interpreted by  Radiologists.    Bedside PICC " Imaging    Result Date: 3/25/2024  These images are not reportable by radiology and will not be interpreted by  Radiologists.    XR chest 1 view    Result Date: 3/20/2024  Interpreted By:  Nael Moore, STUDY: XR CHEST 1 VIEW; 3/20/2024 3:57 pm   INDICATION: Signs/Symptoms:PICC placement.   COMPARISON: None   ACCESSION NUMBER(S): [id number]   ORDERING CLINICIAN: ANGELITA YOUSSEF   TECHNIQUE: 1 view of the chest was performed.   FINDINGS: Right-sided PICC line catheter with tip at the mid SVC. The lungs are adequately inflated. No acute consolidation. No pleural effusion. No pneumothorax.  The cardiomediastinal silhouette is within normal limits.       Right-sided PICC line catheter with tip at the mid SVC. No acute cardiopulmonary disease.   Signed by: Nael Moore 3/20/2024 4:14 PM Dictation workstation:   [id number]    Lower extremity venous duplex right    Result Date: 3/19/2024  Interpreted By:  Favian Castellano, STUDY: VASC US LOWER EXTREMITY VENOUS DUPLEX RIGHT; 3/19/2024 3:22 pm   INDICATION: Signs/Symptoms:leg swelling + d dimer..   COMPARISON: None.   ACCESSION NUMBER(S): [id number]   ORDERING CLINICIAN: CHARO FRIED   TECHNIQUE: Vascular ultrasound of the right lower extremity was performed. Real time compression views as well as Gray scale, color Doppler and spectral Doppler waveform analysis was performed.   FINDINGS: Evaluation of the visualized portions of the right common femoral vein, proximal, mid, and distal femoral vein, and popliteal vein were performed.  Evaluation of the visualized portions of the posterior tibial and peroneal veins were also performed.  In addition, evaluation of the contralateral common femoral vein was performed.   Limitations: None   The evaluated veins demonstrate normal compressibility. There is intact venous flow demonstrating normal respiratory variability and normal augmentation of flow with calf compression. Therefore, there is no ultrasonographic  evidence for deep vein thrombosis within the evaluated veins. No evidence of thrombus is seen within the contralateral common femoral vein.       No sonographic evidence for deep vein thrombosis within the evaluated veins of the right lower extremity.   MACRO: None   Signed by: Favian Castellano 3/19/2024 3:22 PM Dictation workstation:   [id number]    XR foot right 3+ views    Result Date: 3/19/2024  Interpreted By:  Lux Hernandez, STUDY: XR FOOT RIGHT 3+ VIEWS   INDICATION: Signs/Symptoms:r foot wound.   COMPARISON: January 26, 2024   ACCESSION NUMBER(S): [id number]   ORDERING CLINICIAN: CHARO FRIED   FINDINGS: Postsurgical changes status post 5th ray amputation to the level of the mid metatarsal.   Suspected skin wounds at the surgical site. No aggressive bony destructive changes or erosions.   Marked soft tissue swelling dorsally.       Suspected skin wounds of the 5th amputation site with no osseous acute abnormality.   Marked dorsal soft tissue swelling, potentially cellulitis.   Signed by: Lux Hernandez 3/19/2024 2:09 PM Dictation workstation:   [id number]            Assessment/Plan   ASSESSMENT & PLAN:    # S/P RLE peroneus brevis muscle flap, wound debridement, Integra bilayer, wound vac, external fixator.  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  - Charts, labs, vitals and imaging all reviewed.     Plan:  - Right foot surgical dressing is to remain clean, dry and intact unless changed by podiatry.  - Abx: Continue IV Vancomycin 0.75g q12h per ID  - Pain Regimen: Tylenol, Oxycodone, morphine for breakthrough  - Bowel Regimen: Polyethylene glycol  - Diet: advance to target liberal diabetic diet  -  Podiatry will continue to follow while in house.    DVT ppx: heparin subcutaneous 5000U to begin tomorrow morning  Weightbearing: NON-weight-bearing to RLE  Discharge: Patient to follow up 1 week after discharge with Dr. Perry    Case to be discussed with attending, A&P above reflects a tentative plan. Please await for the final signature from the attending physician on service.    This patient will be followed by the Podiatry service. Please Epic Chat the corresponding residents below with questions or concerns.      Husam Lopez DPM PGY-2  Podiatric Medicine and Surgery   Epic Chat            SIGNATURE: Husam Lopez DPM PATIENT NAME: Claude Coley   DATE: March 27, 2024 MRN: [id number]   TIME: 12:53 PM CONTACT: Haiku message

## 2024-03-27 NOTE — BRIEF OP NOTE
PODIATRIC BRIEF OP REPORT    LOG ID: [id number]  SURGERY/PROCEDURE DATE: 03/27/24    SURGEON(S)/PROCEDURALIST(S) AND ASSISTANT(S):     * Darryl Perry - Primary  Surgeon(s) and Role:     * Sonido Bolaños DPM - Fellow  Husam Lopez, DYLAN PGY-2 - Resident  Erik Delgado DPM PGY-2 - Resident    SURGERY/PROCEDURE(S):  Debridement of Wound  61475 - HI DEBRIDEMENT MUSCLE &/FASCIA 1ST 20 SQ CM/<    Peroneus Muscle Flap  53513 - HI MUSC MYOCUTANEOUS/FASCIOCUTANEOUS FLAP LXTR    Application External Fixation Ankle  20692 - HI APPLICATION MULTIPLANE EXTERNAL FIXATION SYSTEM        ANESTHESIA:   Consult II  Anesthesia Staff: Anesthesiologist: Wilmer Hurst MD  C-AA: KAELA Beckett    PRE-OP/PRE-PROCEDURE DIAGNOSIS(S): Post-op Diagnosis     * Diabetic polyneuropathy associated with type 2 diabetes mellitus (CMS/HCC) [E11.42]     * Peripheral vascular disease, unspecified (CMS/HCC) [I73.9]     * Non-pressure chronic ulcer of right lower leg, limited to breakdown of skin (CMS/HCC) [L97.911]     POST-OP/POST-PROCEDURE DIAGNOSIS(S): Same as Pre-op    FINDINGS: Intraoperative findings consistent with clinical and radiographic findings.    ESTIMATED BLOOD LOSS: 50cc    SPECIMENS: None    IMPLANTABLE DEVICES: None    Implant Name Type Inv. Item Serial No.  Lot No. LRB No. Used Action   WOUND MATRIX, INTEGRA, MESHED BILAYER, 4 X 10IN, 1 SHEET - [id number] Graft WOUND MATRIX, INTEGRA, MESHED BILAYER, 4 X 10IN, 1 SHEET  INTEGRA LIFESCIENCE:NEUROSCI [id number] Right 1 Implanted   CTM THIN CONNECTIVE TISSUE IMPLANT   [id number] Other  Right 1 Implanted   CTM FLOW CONNECTIVE TISSUE MATRIX    Other  Right 1 Implanted       DRAINS: SERA drain in right lateral leg compartment, emerges at lateral ankle    COMPLICATIONS: None    POST-OP CONDITION: Patient returned to post operative recovery area with all vital signs stable and intact. Normal capillary perfusion noted to most distal aspect of surgical  extremity.    POST-OPERATIVE INSTRUCTIONS:   Plan:  Weightbearing status: NWB to surgical extremity with external fixator in place with assistance of a walker or knee scooter  Precautions: Fall Risk  Periop Abx: 2g Ancef  DVT Ppx: mechanical, please hold chemoppx for 24 hours  Dressing: NPWT dressing covered by kerlix and ACE over frame  Diet: Return to diabetic diet    This patient will be followed by the Podiatry service. Please Epic Chat or page the corresponding resident below with questions or concerns.      Husam Lopez DPM PGY-2  Podiatric Medicine and Surgery   Epic Chat preferred      SIGNATURE: Husam Lopez DPM PATIENT NAME: Claude Coley   DATE: March 27, 2024 MRN: [id number]   TIME: 12:21 PM Contact: Haiku Message

## 2024-03-27 NOTE — CARE PLAN
The patient's goals for the shift include Assist with pain control    The clinical goals for the shift include Assist with pain control, monitor labs and vital signs       Problem: Pain  Goal: My pain/discomfort is manageable  Outcome: Progressing     Problem: Safety  Goal: Patient will be injury free during hospitalization  Outcome: Progressing  Goal: I will remain free of falls  Outcome: Progressing     Problem: Daily Care  Goal: Daily care needs are met  Outcome: Progressing     Problem: Psychosocial Needs  Goal: Demonstrates ability to cope with hospitalization/illness  Outcome: Progressing  Goal: Collaborate with me, my family, and caregiver to identify my specific goals  Outcome: Progressing  Flowsheets (Taken 3/27/2024 1419)  Cultural Requests During Hospitalization: none  Spiritual Requests During Hospitalization: none     Problem: Discharge Barriers  Goal: My discharge needs are met  Outcome: Progressing     Problem: Fall/Injury  Goal: Not fall by end of shift  Outcome: Progressing  Goal: Be free from injury by end of the shift  Outcome: Progressing  Goal: Verbalize understanding of personal risk factors for fall in the hospital  Outcome: Progressing  Goal: Verbalize understanding of risk factor reduction measures to prevent injury from fall in the home  Outcome: Progressing  Goal: Use assistive devices by end of the shift  Outcome: Progressing  Goal: Pace activities to prevent fatigue by end of the shift  Outcome: Progressing

## 2024-03-27 NOTE — ANESTHESIA PREPROCEDURE EVALUATION
Patient: Claude Coley    Procedure Information       Date/Time: 03/27/24 0730    Procedures:       Debridement of Wound (Right: Foot)      Peroneus Muscle Flap (Right: Foot)      Excision Split Thickness Skin Graft Lower Extremity, Application Skin Graft Lower Extremity (Right: Foot)      Application External Fixation Ankle (Right: Foot) - C-ARM, SUPINE, AMDT: SIXFIX, DERMAGRAFT AND MESHER    [facility]    Surgeons: Darryl Perry DPM            Relevant Problems   Cardiac   (+) CHF (congestive heart failure) (CMS/HCC)   (+) HTN (hypertension)   (+) Hyperlipidemia      /Renal   (+) BPH (benign prostatic hyperplasia)      Endocrine   (+) Diabetes mellitus, type 2 (CMS/HCC)      Hematology   (+) Anemia      ID   (+) Gas gangrene of foot (CMS/HCC)       Clinical information reviewed:   Tobacco  Allergies  Meds   Med Hx  Surg Hx   Fam Hx  Soc Hx        NPO Detail:  NPO/Void Status  Date of Last Liquid: 03/26/24  Date of Last Solid: 03/26/24  Time of Last Void: 0600         Physical Exam    Airway  Mallampati: II  TM distance: >3 FB  Neck ROM: full     Cardiovascular - normal exam     Dental - normal exam     Pulmonary - normal exam     Abdominal - normal exam             Anesthesia Plan    History of general anesthesia?: yes  History of complications of general anesthesia?: no    ASA 2     general     The patient is not a current smoker.  Patient was not previously instructed to abstain from smoking on day of procedure.  Patient did not smoke on day of procedure.  Education provided regarding risk of obstructive sleep apnea.  intravenous induction   Postoperative administration of opioids is intended.  Trial extubation is planned.  Anesthetic plan and risks discussed with patient.  Use of blood products discussed with patient who consented to blood products.    Plan discussed with CAA, attending and CRNA.

## 2024-03-27 NOTE — INTERVAL H&P NOTE
H&P reviewed. The patient was examined and there are no changes to the H&P.  Resolution of cellulitis since admission.

## 2024-03-28 LAB
ANION GAP SERPL CALC-SCNC: 13 MMOL/L
BUN SERPL-MCNC: 25 MG/DL (ref 8–25)
CALCIUM SERPL-MCNC: 8.8 MG/DL (ref 8.5–10.4)
CHLORIDE SERPL-SCNC: 104 MMOL/L (ref 97–107)
CO2 SERPL-SCNC: 19 MMOL/L (ref 24–31)
CREAT SERPL-MCNC: 1.1 MG/DL (ref 0.4–1.6)
EGFRCR SERPLBLD CKD-EPI 2021: 76 ML/MIN/1.73M*2
ERYTHROCYTE [DISTWIDTH] IN BLOOD BY AUTOMATED COUNT: 13 % (ref 11.5–14.5)
GLUCOSE BLD MANUAL STRIP-MCNC: 114 MG/DL (ref 74–99)
GLUCOSE BLD MANUAL STRIP-MCNC: 87 MG/DL (ref 74–99)
GLUCOSE BLD MANUAL STRIP-MCNC: 87 MG/DL (ref 74–99)
GLUCOSE BLD MANUAL STRIP-MCNC: 94 MG/DL (ref 74–99)
GLUCOSE SERPL-MCNC: 90 MG/DL (ref 65–99)
HCT VFR BLD AUTO: 26.9 % (ref 41–52)
HGB BLD-MCNC: 9.4 G/DL (ref 13.5–17.5)
MCH RBC QN AUTO: 29.7 PG (ref 26–34)
MCHC RBC AUTO-ENTMCNC: 34.9 G/DL (ref 32–36)
MCV RBC AUTO: 85 FL (ref 80–100)
NRBC BLD-RTO: 0 /100 WBCS (ref 0–0)
PLATELET # BLD AUTO: 137 X10*3/UL (ref 150–450)
POTASSIUM SERPL-SCNC: 3.8 MMOL/L (ref 3.4–5.1)
RBC # BLD AUTO: 3.16 X10*6/UL (ref 4.5–5.9)
SODIUM SERPL-SCNC: 136 MMOL/L (ref 133–145)
WBC # BLD AUTO: 9.6 X10*3/UL (ref 4.4–11.3)

## 2024-03-28 PROCEDURE — 2500000002 HC RX 250 W HCPCS SELF ADMINISTERED DRUGS (ALT 637 FOR MEDICARE OP, ALT 636 FOR OP/ED)

## 2024-03-28 PROCEDURE — 85027 COMPLETE CBC AUTOMATED: CPT

## 2024-03-28 PROCEDURE — 96372 THER/PROPH/DIAG INJ SC/IM: CPT

## 2024-03-28 PROCEDURE — 2500000001 HC RX 250 WO HCPCS SELF ADMINISTERED DRUGS (ALT 637 FOR MEDICARE OP)

## 2024-03-28 PROCEDURE — 1100000001 HC PRIVATE ROOM DAILY

## 2024-03-28 PROCEDURE — 80048 BASIC METABOLIC PNL TOTAL CA: CPT

## 2024-03-28 PROCEDURE — 82947 ASSAY GLUCOSE BLOOD QUANT: CPT

## 2024-03-28 PROCEDURE — 2500000004 HC RX 250 GENERAL PHARMACY W/ HCPCS (ALT 636 FOR OP/ED)

## 2024-03-28 PROCEDURE — 97116 GAIT TRAINING THERAPY: CPT | Mod: GP,CQ

## 2024-03-28 PROCEDURE — 97110 THERAPEUTIC EXERCISES: CPT | Mod: GO,CO

## 2024-03-28 PROCEDURE — 97110 THERAPEUTIC EXERCISES: CPT | Mod: GP,CQ

## 2024-03-28 RX ORDER — DOCUSATE SODIUM 100 MG/1
100 CAPSULE, LIQUID FILLED ORAL 2 TIMES DAILY
Status: DISCONTINUED | OUTPATIENT
Start: 2024-03-28 | End: 2024-04-01 | Stop reason: HOSPADM

## 2024-03-28 RX ADMIN — CYCLOBENZAPRINE 10 MG: 10 TABLET, FILM COATED ORAL at 17:30

## 2024-03-28 RX ADMIN — OXYCODONE 5 MG: 5 TABLET ORAL at 15:06

## 2024-03-28 RX ADMIN — OXYCODONE 5 MG: 5 TABLET ORAL at 09:12

## 2024-03-28 RX ADMIN — FINASTERIDE 5 MG: 5 TABLET, FILM COATED ORAL at 09:12

## 2024-03-28 RX ADMIN — METFORMIN HYDROCHLORIDE 500 MG: 500 TABLET, FILM COATED ORAL at 09:12

## 2024-03-28 RX ADMIN — ASPIRIN 81 MG: 81 TABLET, COATED ORAL at 09:11

## 2024-03-28 RX ADMIN — CYCLOBENZAPRINE 10 MG: 10 TABLET, FILM COATED ORAL at 21:13

## 2024-03-28 RX ADMIN — OLANZAPINE 20 MG: 10 TABLET, FILM COATED ORAL at 21:13

## 2024-03-28 RX ADMIN — OXYCODONE 5 MG: 5 TABLET ORAL at 04:08

## 2024-03-28 RX ADMIN — MORPHINE SULFATE 2 MG: 2 INJECTION, SOLUTION INTRAMUSCULAR; INTRAVENOUS at 03:05

## 2024-03-28 RX ADMIN — POLYETHYLENE GLYCOL 3350 17 G: 17 POWDER, FOR SOLUTION ORAL at 09:11

## 2024-03-28 RX ADMIN — VANCOMYCIN 750 MG: 750 INJECTION, SOLUTION INTRAVENOUS at 09:16

## 2024-03-28 RX ADMIN — DOCUSATE SODIUM 100 MG: 100 CAPSULE, LIQUID FILLED ORAL at 21:13

## 2024-03-28 RX ADMIN — HEPARIN SODIUM 5000 UNITS: 5000 INJECTION, SOLUTION INTRAVENOUS; SUBCUTANEOUS at 17:30

## 2024-03-28 RX ADMIN — HEPARIN SODIUM 5000 UNITS: 5000 INJECTION, SOLUTION INTRAVENOUS; SUBCUTANEOUS at 08:00

## 2024-03-28 RX ADMIN — DOCUSATE SODIUM 100 MG: 100 CAPSULE, LIQUID FILLED ORAL at 11:49

## 2024-03-28 RX ADMIN — SPIRONOLACTONE 25 MG: 25 TABLET ORAL at 10:10

## 2024-03-28 RX ADMIN — VANCOMYCIN 750 MG: 750 INJECTION, SOLUTION INTRAVENOUS at 20:22

## 2024-03-28 RX ADMIN — ATORVASTATIN CALCIUM 40 MG: 40 TABLET, FILM COATED ORAL at 09:12

## 2024-03-28 RX ADMIN — CARVEDILOL 12.5 MG: 12.5 TABLET, FILM COATED ORAL at 10:10

## 2024-03-28 RX ADMIN — CARVEDILOL 12.5 MG: 12.5 TABLET, FILM COATED ORAL at 17:30

## 2024-03-28 ASSESSMENT — COGNITIVE AND FUNCTIONAL STATUS - GENERAL
DAILY ACTIVITIY SCORE: 17
WALKING IN HOSPITAL ROOM: A LOT
DAILY ACTIVITIY SCORE: 16
CLIMB 3 TO 5 STEPS WITH RAILING: TOTAL
WALKING IN HOSPITAL ROOM: TOTAL
TOILETING: A LOT
DRESSING REGULAR LOWER BODY CLOTHING: A LOT
HELP NEEDED FOR BATHING: A LOT
PERSONAL GROOMING: A LITTLE
STANDING UP FROM CHAIR USING ARMS: TOTAL
MOBILITY SCORE: 11
DRESSING REGULAR LOWER BODY CLOTHING: A LOT
MOVING FROM LYING ON BACK TO SITTING ON SIDE OF FLAT BED WITH BEDRAILS: A LITTLE
STANDING UP FROM CHAIR USING ARMS: TOTAL
TOILETING: A LOT
DAILY ACTIVITIY SCORE: 17
MOBILITY SCORE: 11
MOVING TO AND FROM BED TO CHAIR: A LOT
MOVING FROM LYING ON BACK TO SITTING ON SIDE OF FLAT BED WITH BEDRAILS: A LITTLE
WALKING IN HOSPITAL ROOM: TOTAL
DRESSING REGULAR UPPER BODY CLOTHING: A LITTLE
MOBILITY SCORE: 13
TOILETING: A LOT
HELP NEEDED FOR BATHING: A LOT
HELP NEEDED FOR BATHING: A LOT
DRESSING REGULAR LOWER BODY CLOTHING: A LOT
DRESSING REGULAR UPPER BODY CLOTHING: A LITTLE
MOVING TO AND FROM BED TO CHAIR: A LOT
TURNING FROM BACK TO SIDE WHILE IN FLAT BAD: A LITTLE
DRESSING REGULAR UPPER BODY CLOTHING: A LITTLE
CLIMB 3 TO 5 STEPS WITH RAILING: TOTAL
STANDING UP FROM CHAIR USING ARMS: A LOT
MOVING FROM LYING ON BACK TO SITTING ON SIDE OF FLAT BED WITH BEDRAILS: A LITTLE
TURNING FROM BACK TO SIDE WHILE IN FLAT BAD: A LITTLE
MOVING TO AND FROM BED TO CHAIR: A LOT
CLIMB 3 TO 5 STEPS WITH RAILING: TOTAL
TURNING FROM BACK TO SIDE WHILE IN FLAT BAD: A LITTLE

## 2024-03-28 ASSESSMENT — PAIN DESCRIPTION - LOCATION
LOCATION: FOOT

## 2024-03-28 ASSESSMENT — PAIN SCALES - GENERAL
PAINLEVEL_OUTOF10: 3
PAINLEVEL_OUTOF10: 7
PAINLEVEL_OUTOF10: 7
PAINLEVEL_OUTOF10: 5 - MODERATE PAIN
PAINLEVEL_OUTOF10: 9
PAINLEVEL_OUTOF10: 4
PAINLEVEL_OUTOF10: 4
PAINLEVEL_OUTOF10: 9
PAINLEVEL_OUTOF10: 9
PAINLEVEL_OUTOF10: 6

## 2024-03-28 ASSESSMENT — PAIN DESCRIPTION - ORIENTATION
ORIENTATION: RIGHT
ORIENTATION: RIGHT

## 2024-03-28 ASSESSMENT — PAIN - FUNCTIONAL ASSESSMENT
PAIN_FUNCTIONAL_ASSESSMENT: 0-10

## 2024-03-28 NOTE — PROGRESS NOTES
PODIATRY SERVICE CONSULT PROGRESS NOTE    SERVICE DATE: 3/28/2024   SERVICE TIME:  1:20 PM    Subjective   INTERVAL HPI:   Patient was seen at bedside.  Pain well controlled on current regimen.  Patient reported some lightheadedness with PT earlier, so some of his antihypertensives were held.  Otherwise no acute events overnight.  Patient denies any constitutional symptoms at this time.   No other pedal complaints.       Medications:  Scheduled Meds: aspirin, 81 mg, oral, Daily  atorvastatin, 40 mg, oral, Daily  carvedilol, 12.5 mg, oral, BID with meals  docusate sodium, 100 mg, oral, BID  finasteride, 5 mg, oral, Daily  furosemide, 20 mg, oral, Daily  heparin (porcine), 5,000 Units, subcutaneous, q8h LARRY  insulin lispro, 0-5 Units, subcutaneous, TID with meals  lisinopril, 20 mg, oral, Daily  metFORMIN, 500 mg, oral, Daily with breakfast  OLANZapine, 20 mg, oral, Nightly  polyethylene glycol, 17 g, oral, Daily  spironolactone, 25 mg, oral, Daily  vancomycin-diluent combo no.1, 750 mg, intravenous, q12h      Continuous Infusions:    PRN Meds: PRN medications: acetaminophen, cyclobenzaprine, dextrose, dextrose, diphenhydrAMINE, glucagon, morphine, naloxone, ondansetron ODT **OR** ondansetron, oxyCODONE, promethazine **OR** promethazine         Objective   PHYSICAL EXAM:  Physical Exam Performed:  Vitals:    03/28/24 1100   BP: 116/56   Pulse: 61   Resp:    Temp: 36.6 °C (97.9 °F)   SpO2: 97%     Body mass index is 33.4 kg/m².    Patient is AOx3 and in no acute distress. Patient is alert and cooperative. Sitting comfortably in bed with dressing clean, dry and intact.      Vascular: Palpable DP/PT pulses B/L. Moderate pitting edema noted. Hair growth present B/L. CFT<5 to B/L hallux. Temperature is warm to cool from tibial tuberosity to distal digits B/L. No lymphatic streaking noted B/L.     Musculoskeletal: External fixator in place to right foot, ankle, and lower leg. Gross active and passive ROM intact to age and  "activity level. Moves all extremities spontaneously. No pain to palpation at feet B/L.      Neurological: Absent light touch sensation B/L. Pain stimuli diminished B/L. Denies any numbness, burning or tingling.     Dermatologic: Nails 1-5 are thickened, elongated, discolored with subungual debris B/L. Skin appears diffusely xerotic B/L. Web spaces 1-4 B/L are clean, dry and intact. No rashes or nodules noted B/L.   Surgical Dressing intact over right lower extremity and external fixator.   Wound vac functioning without any warnings, canister nearly full with over 300cc of serosanguinous drainage.  SERA drain intact.      LABS:   Results for orders placed or performed during the hospital encounter of 03/27/24 (from the past 24 hour(s))   POCT GLUCOSE   Result Value Ref Range    POCT Glucose 124 (H) 74 - 99 mg/dL   POCT GLUCOSE   Result Value Ref Range    POCT Glucose 107 (H) 74 - 99 mg/dL   CBC   Result Value Ref Range    WBC 9.6 4.4 - 11.3 x10*3/uL    nRBC 0.0 0.0 - 0.0 /100 WBCs    RBC 3.16 (L) 4.50 - 5.90 x10*6/uL    Hemoglobin 9.4 (L) 13.5 - 17.5 g/dL    Hematocrit 26.9 (L) 41.0 - 52.0 %    MCV 85 80 - 100 fL    MCH 29.7 26.0 - 34.0 pg    MCHC 34.9 32.0 - 36.0 g/dL    RDW 13.0 11.5 - 14.5 %    Platelets 137 (L) 150 - 450 x10*3/uL   Basic metabolic panel   Result Value Ref Range    Glucose 90 65 - 99 mg/dL    Sodium 136 133 - 145 mmol/L    Potassium 3.8 3.4 - 5.1 mmol/L    Chloride 104 97 - 107 mmol/L    Bicarbonate 19 (L) 24 - 31 mmol/L    Urea Nitrogen 25 8 - 25 mg/dL    Creatinine 1.10 0.40 - 1.60 mg/dL    eGFR 76 >60 mL/min/1.73m*2    Calcium 8.8 8.5 - 10.4 mg/dL    Anion Gap 13 <=19 mmol/L   POCT GLUCOSE   Result Value Ref Range    POCT Glucose 87 74 - 99 mg/dL   POCT GLUCOSE   Result Value Ref Range    POCT Glucose 94 74 - 99 mg/dL      Lab Results   Component Value Date    HGBA1C 4.4 03/19/2024      No results found for: \"CRP\"   Lab Results   Component Value Date    SEDRATE 47 (H) 03/19/2024        Results " from last 7 days   Lab Units 03/28/24  0506   WBC AUTO x10*3/uL 9.6   RBC AUTO x10*6/uL 3.16*   HEMOGLOBIN g/dL 9.4*   HEMATOCRIT % 26.9*     Results from last 7 days   Lab Units 03/28/24  0506   SODIUM mmol/L 136   POTASSIUM mmol/L 3.8   CHLORIDE mmol/L 104   CO2 mmol/L 19*   BUN mg/dL 25   CREATININE mg/dL 1.10   CALCIUM mg/dL 8.8           IMAGING REVIEW:  XR chest 1 view    Result Date: 3/27/2024  Interpreted By:  Kirstie Moy, STUDY: XR CHEST 1 VIEW 3/27/2024 4:36 pm   INDICATION: Signs/Symptoms:PICC placement   COMPARISON: 03/20/2024   ACCESSION NUMBER(S): [id number]   ORDERING CLINICIAN: SANIYA HOWARD   TECHNIQUE: AP erect view of the chest   FINDINGS: A right arm PICC line terminates in the SVC. There is no pneumothorax. Lungs are clear without pleural abnormality.   The cardiac size is normal. No mediastinal or hilar abnormality is seen.       Right arm PICC line terminating in the SVC.   No pneumothorax.   Signed by: Kirstie Moy 3/27/2024 4:50 PM Dictation workstation:   [id number]    FL fluoro images no charge    Result Date: 3/27/2024  These images are not reportable by radiology and will not be interpreted by  Radiologists.    Bedside PICC Imaging    Result Date: 3/25/2024  These images are not reportable by radiology and will not be interpreted by  Radiologists.    XR chest 1 view    Result Date: 3/20/2024  Interpreted By:  Nael Moore, STUDY: XR CHEST 1 VIEW; 3/20/2024 3:57 pm   INDICATION: Signs/Symptoms:PICC placement.   COMPARISON: None   ACCESSION NUMBER(S): [id number]   ORDERING CLINICIAN: ANGELITA YOUSSEF   TECHNIQUE: 1 view of the chest was performed.   FINDINGS: Right-sided PICC line catheter with tip at the mid SVC. The lungs are adequately inflated. No acute consolidation. No pleural effusion. No pneumothorax.  The cardiomediastinal silhouette is within normal limits.       Right-sided PICC line catheter with tip at the mid SVC. No acute cardiopulmonary disease.   Signed by:  Nael Moore 3/20/2024 4:14 PM Dictation workstation:   [id number]    Lower extremity venous duplex right    Result Date: 3/19/2024  Interpreted By:  Favian Castellano, STUDY: [facility] LOWER EXTREMITY VENOUS DUPLEX RIGHT; 3/19/2024 3:22 pm   INDICATION: Signs/Symptoms:leg swelling + d dimer..   COMPARISON: None.   ACCESSION NUMBER(S): [id number]   ORDERING CLINICIAN: CHARO FRIED   TECHNIQUE: Vascular ultrasound of the right lower extremity was performed. Real time compression views as well as Gray scale, color Doppler and spectral Doppler waveform analysis was performed.   FINDINGS: Evaluation of the visualized portions of the right common femoral vein, proximal, mid, and distal femoral vein, and popliteal vein were performed.  Evaluation of the visualized portions of the posterior tibial and peroneal veins were also performed.  In addition, evaluation of the contralateral common femoral vein was performed.   Limitations: None   The evaluated veins demonstrate normal compressibility. There is intact venous flow demonstrating normal respiratory variability and normal augmentation of flow with calf compression. Therefore, there is no ultrasonographic evidence for deep vein thrombosis within the evaluated veins. No evidence of thrombus is seen within the contralateral common femoral vein.       No sonographic evidence for deep vein thrombosis within the evaluated veins of the right lower extremity.   MACRO: None   Signed by: Favian Castellano 3/19/2024 3:22 PM Dictation workstation:   [id number]    XR foot right 3+ views    Result Date: 3/19/2024  Interpreted By:  Lux Hernandez, STUDY: XR FOOT RIGHT 3+ VIEWS   INDICATION: Signs/Symptoms:r foot wound.   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   Suspected skin wounds at the surgical site. No aggressive bony  destructive changes or erosions.   Marked soft tissue swelling dorsally.       Suspected skin wounds of the 5th amputation site with no osseous acute abnormality.   Marked dorsal soft tissue swelling, potentially cellulitis.   Signed by: Lux Hernandez 3/19/2024 2:09 PM Dictation workstation:   [id number]            Assessment/Plan   ASSESSMENT & PLAN:    # S/P RLE peroneus brevis muscle flap, wound debridement, Integra bilayer, wound vac, external fixator.  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  - Charts, labs, vitals and imaging all reviewed.   - Vitals stable overnight. Pain well-controlled.  - WBC 9.6, HGB 9.4, BGL 90     Plan:  - Right foot surgical dressing is to remain clean, dry and intact unless changed by podiatry.  - Abx: Continue IV Vancomycin 0.75g q12h per ID  - Pain Regimen: Tylenol, Oxycodone, morphine for breakthrough  - Bowel Regimen: Polyethylene glycol, docusate sodium  - Lisinopril, spironolactone, and furosemide may be held if SBP<100 or lightheadedness present.  - Diet: diabetic diet  - Podiatry will continue to follow.     DVT ppx: heparin subcutaneous 5000U q8h  Weightbearing: NON-weight-bearing to RLE  Discharge: Patient to follow up 1 week after discharge with Dr. Perry. Plan for discharge to SNF with wound vac.     Case to be discussed with attending, A&P above reflects a tentative plan. Please await for the final signature from the attending physician on service.    This patient will be followed by the Podiatry service. Please Epic Chat the corresponding residents below with questions or concerns.      Husam Lopez DPM PGY-2  Podiatric Medicine and Surgery   Epic Chat             SIGNATURE: Husam Lopez DPM PATIENT NAME: Claude Coley   DATE: March 28, 2024 MRN: [id number]   TIME: 1:20 PM CONTACT: [employer]

## 2024-03-28 NOTE — PROGRESS NOTES
03/28/24 1353   Discharge Planning   Patient expects to be discharged to: Skilled Rehab   Does the patient need discharge transport arranged? Yes   RoundTrip coordination needed? Yes   Has discharge transport been arranged? No       Podiatry insists that [facility] wound vac be used.  [facility] can only use their wound vacs.  Received another choice from patient.  Info sent to [facility] to review.  They state they would not need a precert if they accept.  Obtaining more information for them.

## 2024-03-28 NOTE — CARE PLAN
The patient's goals for the shift include Assist with pain control, safety, therapy, rest.    The clinical goals for the shift include manage pain, increase mobility, monitor labs and VS, safety awareness, no falls, work with PT/OT, promote rest.    No barriers to meeting these goals.       Problem: Pain  Goal: My pain/discomfort is manageable  Outcome: Progressing     Problem: Safety  Goal: Patient will be injury free during hospitalization  Outcome: Progressing  Goal: I will remain free of falls  Outcome: Progressing     Problem: Daily Care  Goal: Daily care needs are met  Outcome: Progressing     Problem: Psychosocial Needs  Goal: Demonstrates ability to cope with hospitalization/illness  Outcome: Progressing  Goal: Collaborate with me, my family, and caregiver to identify my specific goals  Outcome: Progressing     Problem: Discharge Barriers  Goal: My discharge needs are met  Outcome: Progressing     Problem: Fall/Injury  Goal: Not fall by end of shift  Outcome: Progressing  Goal: Be free from injury by end of the shift  Outcome: Progressing  Goal: Verbalize understanding of personal risk factors for fall in the hospital  Outcome: Progressing  Goal: Verbalize understanding of risk factor reduction measures to prevent injury from fall in the home  Outcome: Progressing  Goal: Use assistive devices by end of the shift  Outcome: Progressing  Goal: Pace activities to prevent fatigue by end of the shift  Outcome: Progressing     Problem: Diabetes  Goal: Achieve decreasing blood glucose levels by end of shift  Outcome: Progressing  Goal: Increase stability of blood glucose readings by end of shift  Outcome: Progressing  Goal: Maintain electrolyte levels within acceptable range throughout shift  Outcome: Progressing  Goal: Maintain glucose levels >70mg/dl to <250mg/dl throughout shift  Outcome: Progressing  Goal: No changes in neurological exam by end of shift  Outcome: Progressing  Goal: Learn about and adhere to  nutrition recommendations by end of shift  Outcome: Progressing  Goal: Vital signs within normal range for age by end of shift  Outcome: Progressing  Goal: Increase self care and/or family involovement by end of shift  Outcome: Progressing     Problem: Pain  Goal: Takes deep breaths with improved pain control throughout the shift  Outcome: Progressing  Goal: Turns in bed with improved pain control throughout the shift  Outcome: Progressing  Goal: Performs ADL's with improved pain control throughout shift  Outcome: Progressing  Goal: Participates in PT with improved pain control throughout the shift  Outcome: Progressing  Goal: Free from opioid side effects throughout the shift  Outcome: Progressing  Goal: Free from acute confusion related to pain meds throughout the shift  Outcome: Progressing     Problem: Safety - Adult  Goal: Free from fall injury  Outcome: Progressing     Problem: Discharge Planning  Goal: Discharge to home or other facility with appropriate resources  Outcome: Progressing     Problem: Chronic Conditions and Co-morbidities  Goal: Patient's chronic conditions and co-morbidity symptoms are monitored and maintained or improved  Outcome: Progressing

## 2024-03-28 NOTE — PROGRESS NOTES
Physical Therapy    Physical Therapy Treatment    Patient Name: Claude Coley  MRN: [id number]  Today's Date: 3/28/2024  Time Calculation  Start Time: 0830  Stop Time: 0858  Time Calculation (min): 28 min       Assessment/Plan   PT Assessment  Rehab Prognosis: Good  Evaluation/Treatment Tolerance: Patient limited by fatigue, Patient limited by pain  Medical Staff Made Aware: Yes  End of Session Communication: Bedside nurse  Assessment Comment: Stood x 2 trias for 30 second x ! trial 40 seconds c/o light headedness with increased anxiety needing to sit NWB R LE with Min A second person for safety  End of Session Patient Position: Bed, 3 rail up, Alarm on     PT Plan  Treatment/Interventions: Bed mobility, Transfer training, Gait training, Balance training, Strengthening, Endurance training, Range of motion, Therapeutic exercise, Therapeutic activity, Home exercise program  PT Plan: Skilled PT  PT Frequency: 5 times per week  PT Discharge Recommendations: Moderate intensity level of continued care  Equipment Recommended upon Discharge: Wheeled walker  PT Recommended Transfer Status: Assist x2, Assistive device (Asisst 2 for safety / NWB r LE)  PT - OK to Discharge: Yes      General Visit Information:   PT  Visit  PT Received On: 03/28/24  General  Reason for Referral: recent surgery  Referred By: Dr Lopez  Past Medical History Relevant to Rehab: DM, cellulitis, HTN, HLD, CHF, MO  Prior to Session Communication: Bedside nurse  Patient Position Received: Bed, 3 rail up, Alarm on  Preferred Learning Style: verbal, visual  General Comment: Patient agreeable to therapy    Subjective   Precautions:  Precautions  LE Weight Bearing Status: Right Non-Weight Bearing  Medical Precautions: Fall precautions  Post-Surgical Precautions:  (R external fixature NWB)  Precautions Comment: +wound vac  Vital Signs:       Objective   Pain:  Pain Assessment  Pain Assessment: 0-10  Pain Score: 5 - Moderate pain  Pain Location:  "Foot  Pain Orientation: Right  Pain Descriptors:  (gnawing pain)  Pain Frequency: Intermittent (with mo bility)  Pain Onset: Ongoing  Pain Interventions: Elevated  Cognition:  Cognition  Overall Cognitive Status: Within Functional Limits  Orientation Level: Oriented X4  Insight: Moderate  Impulsive: Mildly  Postural Control:  Postural Control  Posture Comment: NWB RLE  Static Standing Balance  Static Standing-Balance Support: Bilateral upper extremity supported  Static Standing-Level of Assistance: Minimum assistance  Static Standing-Comment/Number of Minutes: Stood x 30 seconds with NWB R LE Min A x 2 for safety  Extremity/Trunk Assessments:    Activity Tolerance:  Activity Tolerance  Endurance: Decreased tolerance for upright activites  Treatments:  Therapeutic Exercise  Therapeutic Exercise Performed: Yes  Therapeutic Exercise Activity 1: L LE supine ther ex: ankle pumps, quad/ gluteal sets, heelsides, SAQs, hip abduction/adduction x 20  R LE SAQs x 20    Bed Mobility  Bed Mobility: Yes  Bed Mobility 1  Bed Mobility 1: Supine to sitting, Sitting to supine  Level of Assistance 1: Minimum assistance  Bed Mobility Comments 1: To right EOB HOB partially elevated Assist R LE off/ on bed  Bed Mobility 2  Bed Mobility  2: Sitting to supine  Level of Assistance 2: Minimum assistance  Bed Mobility Comments 2: Assist needed R LE    Ambulation/Gait Training  Ambulation/Gait Training Performed: No  Transfers  Transfer: Yes  Transfer 1  Transfer From 1: Bed to  Transfer to 1: Stand  Technique 1: Sit to stand, Stand to sit  Transfer Device 1: Walker  Transfer Level of Assistance 1: Minimum assistance (Second person for safety)  Trials/Comments 1: x 3 trials 2 x 30 seonds before c/o light headedness-\" I need to sit\" Third trial x 40 seconds Increased anxiety due to light headednesss\" I need to sit\"    Outcome Measures:  [facility] Basic Mobility  Turning from your back to your side while in a flat bed without using bedrails: A " little  Moving from lying on your back to sitting on the side of a flat bed without using bedrails: A little  Moving to and from bed to chair (including a wheelchair): A lot  Standing up from a chair using your arms (e.g. wheelchair or bedside chair): Total  To walk in hospital room: Total  Climbing 3-5 steps with railing: Total  Basic Mobility - Total Score: 11    Education Documentation  Precautions, taught by Ivana Severino PTA at 3/28/2024  9:11 AM.  Learner: Patient  Readiness: Acceptance  Method: Explanation, Teach-back  Response: Verbalizes Understanding, Needs Reinforcement    Mobility Training, taught by Ivana Severino PTA at 3/28/2024  9:11 AM.  Learner: Patient  Readiness: Acceptance  Method: Explanation, Teach-back  Response: Verbalizes Understanding, Needs Reinforcement    Education Comments  No comments found.        OP EDUCATION:       Encounter Problems       Encounter Problems (Active)       Balance       LTG - Patient will maintain balance to allow for safe mobility (Progressing)       Start:  03/27/24    Expected End:  04/09/24               Mobility       STG - Patient will ambulate 5' w/ RW and min assist, NWB RLE (Progressing)       Start:  03/27/24    Expected End:  04/09/24            Pt will participate in LE exercises to promote functional strength and endurance (Progressing)       Start:  03/27/24    Expected End:  04/09/24               PT Transfers       STG - Patient to transfer to and from sit to supine w/ supervision  (Progressing)       Start:  03/27/24    Expected End:  04/09/24            STG - Patient will transfer sit to and from stand w/ GCA (Progressing)       Start:  03/27/24    Expected End:  04/09/24               Safety       Pt will consistently comply w/ NWB RLE w/ all activities (Progressing)       Start:  03/27/24    Expected End:  04/09/24

## 2024-03-28 NOTE — PROGRESS NOTES
"Occupational Therapy    OT Treatment    Patient Name: Claude Coley  MRN: [id number]  Today's Date: 3/28/2024  Time Calculation  Start Time: 1325  Stop Time: 1343  Time Calculation (min): 18 min         Assessment:  OT Assessment: Assessment limited to pt declining out of bed, agrees to attempt chair next date, \" If Im still here\". Pt did give good effort with therex.  Evaluation/Treatment Tolerance: Patient tolerated treatment well  End of Session Patient Position: Bed, 3 rail up  OT Assessment Results: Decreased ADL status, Decreased endurance, Decreased functional mobility, Decreased IADLs  Evaluation/Treatment Tolerance: Patient tolerated treatment well  Plan:  Treatment Interventions: UE strengthening/ROM  OT Frequency: 4 times per week  OT Discharge Recommendations: Moderate intensity level of continued care  Equipment Recommended upon Discharge: Wheeled walker  OT Recommended Transfer Status: Assist of 2  OT - OK to Discharge: Yes  Treatment Interventions: UE strengthening/ROM    Subjective   Previous Visit Info:  OT Last Visit  OT Received On: 03/28/24  General:  General  Reason for Referral: recent surgery  Referred By: Dr Lopez  Past Medical History Relevant to Rehab: DM, cellulitis, HTN, HLD, CHF, MO  Prior to Session Communication: Bedside nurse  Patient Position Received: Bed, 3 rail up  General Comment: Pt declining sitting edge of bed or chair transfer attempt despite encouragment. Agrees to therex.  Precautions:  LE Weight Bearing Status: Right Non-Weight Bearing  Medical Precautions: Fall precautions  Precautions Comment: external fixator, wound vac  Pain:  Pain Assessment  Pain Assessment: 0-10  Pain Score: 7  Pain Location: Foot  Pain Orientation: Right    Objective    Cognition:  Cognition  Overall Cognitive Status: Within Functional Limits  Orientation Level: Oriented X4  Insight: Moderate  Therapy/Activity: Therapeutic Exercise  Therapeutic Exercise Activity 1: scap " protraction/retraction  Therapeutic Exercise Activity 2: shoulder flexion  Therapeutic Exercise Activity 3: shoulder abd/add  Therapeutic Exercise Activity 4: bicep curls  Therapeutic Exercise Activity 5: tricep presses  1 set x 15 reps, 1 lb free wt, moderate resistance from therapist. Pt completes therex supine in bed, presents with good form once therapist demos correct joint alignment.    Outcome Measures:[facility] Daily Activity  Putting on and taking off regular lower body clothing: A lot  Bathing (including washing, rinsing, drying): A lot  Putting on and taking off regular upper body clothing: A little  Toileting, which includes using toilet, bedpan or urinal: A lot  Taking care of personal grooming such as brushing teeth: None  Eating Meals: None  Daily Activity - Total Score: 17        Education Documentation  Home Exercise Program, taught by GUNNER Wilson at 3/28/2024  1:59 PM.  Learner: Patient  Readiness: Acceptance  Method: Explanation, Demonstration  Response: Verbalizes Understanding, Demonstrated Understanding    Education Comments  No comments found.    IP EDUCATION:  Education  Individual(s) Educated: Patient  Home Program: Strengthening  Patient Response to Education: Patient/Caregiver Verbalized Understanding of Information, Patient/Caregiver Performed Return Demonstration of Exercises/Activities    Goals:  Encounter Problems       Encounter Problems (Active)       OT Goals       ADLs (Progressing)       Start:  03/27/24    Expected End:  04/10/24       Pt will complete bathing, dressing, and toileting tasks with setup assist using adaptive aides as needed.         Functional transfers (Progressing)       Start:  03/27/24    Expected End:  04/10/24       Pt will complete toilet, bed, and chair transfers independently from elevated seat heights and using bilateral arm supports as needed.           UE strength (Progressing)       Start:  03/27/24    Expected End:  04/10/24       Pt will increase  UE strength from 4 to 4+/5 in order to maintain NWB status of right LE for transfers/mobility.

## 2024-03-28 NOTE — OP NOTE
PODIATRIC OPERATIVE REPORT    LOG ID: [id number]  SURGERY/PROCEDURE DATE: 3/27/2024  OR LOCATION: [facility]    SURGEON(S)/PROCEDURALIST(S) AND ASSISTANT(S):  Primary: Darryl Perry DPM  Fellow: Sonido Bolaños DPM  Assistants:   Erik Delgado, DYLAN PGY-2  Husam Lopez DPM PGY-2    OTHER OR STAFF:  Circulator: Khang Bertrand RN  Scrub Person: Rajiv Bone RN    SURGERY/PROCEDURE(S):  Debridement of ulceration down to the level of fascia, right total of 243 cm2 (92145 +  11046 x 12)  Peroneus brevis muscle flap, right ( 86112)  External neurolysis superficial peroneal nerve, right ( 23276)  Nerve wrap on superficial peroneal nerve, right (62686)  Integra bilayer application, right total of 243 cm2  ( 59531 + 15276 x 2)  Application of external fixation with computer-assisted adjustment, right (26861)  Application of wound VAC, right ( 06791)  Use of intraoperative fluoroscopy ( 24699)      PRE-OP/PRE-PROCEDURE DIAGNOSIS:   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 ( L97.514)  Non pressure ulcer of right foot with necrosis of bone ( L97.513)   Soft tissue deficit, right foot ( M 70.971)  Right lower leg pain ( M 79.661)  Injury of 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 leg level, right ( S8 4.11 XS)  Dehiscence of amputation stump, 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)  Other acquired deformity of foot, right ( M21.6 X 1)  Diabetes with peripheral neuropathy and open ulceration, right ( E11.621)   Chronic osteomyelitis, right ( M 86.671)    POST-OP/POST-PROCEDURE DIAGNOSIS: Same as Pre-Op    ANESTHESIA:   Anesthesia: Consult  ASA: II  Anesthesia Staff: Anesthesiologist: Wilmer Hurst MD  C-AA: KAELA Beckett  Intra-op Medications:   Administrations occurring from 0730 to 1000 on 03/27/24:   Medication Name Total Dose   ceFAZolin in dextrose (iso-os) (Ancef) IVPB 2 g 2 g   lactated Ringer's infusion Cannot be calculated       ESTIMATED BLOOD LOSS: less than 100 mL    SPECIMENS: No specimens collected during this  procedure.    IMPLANTABLE DEVICES:  Implant Name Type Inv. Item Serial No.  Lot No. LRB No. Used Action   WOUND MATRIX, INTEGRA, MESHED BILAYER, 4 X 10IN, 1 SHEET - [id number] Graft WOUND MATRIX, INTEGRA, MESHED BILAYER, 4 X 10IN, 1 SHEET  INTE[employer]RA LIFESCIENCE:NEUROSCI [id number] Right 1 Implanted   CTM THIN CONNECTIVE TISSUE IMPLANT   ABS-[id number] Other  Right 1 Implanted   CTM FLOW CONNECTIVE TISSUE MATRIX    Other  Right 1 Implanted   HALF PIN [id number]    Other  Right 2 Implanted   AMDT HALF PIN [id number]    Other  Right 3 Implanted       FINDINGS: Intraoperative findings consistent with clinical and radiographic findings.    DRAINS: 3/8  flat SERA drain with wound VAC, right    TOURNIQUET TIMES:   Total Tourniquet Time Documented:  Thigh (Right) - 152 minutes  Total: Thigh (Right) - 152 minutes      COMPLICATIONS: None; patient tolerated the procedure well.       SURGERY/PROCEDURE DETAILS:  INDICATIONS FOR PROCEDURE:    Patient was admitted [facility] for the 2nd part of a multi staged procedure.  Patient was found to have had a significant infection and underwent a subsequent partial 5th ray resection with large debridement.  He has had residual infection since that time.  He has been on IV antibiotics since his 1st procedure.  At this time patient was left with a large plantar foot deficit and was determined to need a wound debridement with muscle flap and application of external fixation.  Patient also complains of significant shooting pain down the lateral aspect of the right leg due to nerve entrapment and neuropathy.  Patient had a full surgical consultation and informed consent was obtained.  It was then determined to move forward with surgery.  No promises or guarantees were given at this time.    PRE-PROCEDURE INFORMATION:  In pre-op holding area, the   Right extremity to be operated on was clearly marked and the patient verified correct laterality of the marking.  The  patient was brought to the operating room and placed on the operating table in the  supine position.  A timeout was performed in which identification of the correct patient, procedure, location, and materials was done. A pneumatic  thigh tourniquet was placed on the patient. Following  general IV sedation, the  right foot was then scrubbed, prepped and draped in the usual aseptic manner. An Esmarch bandage was then utilized to exsanguinate the patient's  right foot and the tourniquet was inflated to  300 mmHg.    DESCRIPTION OF PROCEDURE:    Procedure 1:   Attention was directed to the plantar aspect of the patient's right foot he was noted to have a large plantar ulceration with deficit measuring 26.0 by 8.8 by 1.1cm.   The wound VAC was with a mixture of granular and fibrotic tissue.  A sharp division was then performed to remove all nonviable tissue to the area.  Upon completion of the debridement it was noted there was a full healthy granular base with notable bleeding present.  This area was now able to accept a muscle flap for continued care.  Prior to proceeding with surgery 3 L normal saline was used to flush the wound via pulse lavage.  Post debridement measurements were 27.0 x 9.0 x 1.3 cm.  This leads for a total debridement area of 243  cm2.    Procedure 2:   Attention was then directed to the patient's right lateral leg.  The borders of the fibula were palpated and marked along the skin edges.  A sterile Doppler was then used to locate and jeanine the location of the peroneal perforating arteries.  Next a 10. Blade was then used to create a skin incision starting from just distal to the patient's fibular head and extending all the way to the ankle joint it was then carried down to the level of muscle via combination of sharp and blunt dissection.  Once muscle was exposed skin staples were then used to reapproximate the fascia to the skin to prevent any retraction during the procedure.  Next the peroneus  longus muscle and tendon were identified as well as the brevis  muscle belly.  Blunt dissection was then performed to separate the peroneus longus muscle and tendon from the peroneus brevis.  Upon completion of this the superficial peroneal nerve was noted and identified.  It was significantly entrapped within surrounding tissue.  Dissection continued to remove the peroneus brevis muscle belly from the borders of the fibula.  It was determined that a  27 cm flap was needed to allow for maximal coverage of the deficit as well as to allow for continued vascularization via the most distal .  Once dissection was complete the peroneus brevis muscle was transected proximally 27 cm from the base of the ankle which was 22 cm proximal to the most distal .  The muscle flap was then rotated and transitioned distally to cover the lateral aspect of the patient's right foot and ankle.  A pass was made to allow for complete coverage.  The muscle belly of the peroneus brevis was then tacked to the ulceration using 3-0 Monocryl.  Total area covered with the flap was approximately 243 cm2.   During closure it was noted that there was a potential for significant drainage from this incision site a 3/8 SERA drain was then placed within the incision to allow for drainage and monitoring throughout the recovery course.  The muscle flap was then injected with 4 mL of CMT flowable amniotic to assist in flap adhesion and overall success of the grafted area.    Procedure 3:   During dissection of the peroneus brevis muscle belly it was noted that there was significant entrapment noted to the superficial peroneal nerve.  The superficial peroneal nerve was fully dissected out of the scar tissue and it was noted that it could now glide freely within the nerve space.  All other areas of possible entrapment were released to allow for full mobilization of the nerve and reduce the likelihood of recurrence of this entrapment.  From  the remainder of the case the nerve was protected and retracted anteriorly to avoid any further damage or compression.    Procedure 4:   There was damage noted to the nerve during removal of the entrapment.  At this time was determined that the patient would benefit greatly from a nerve wrap jacket to allow for repair of the nerve and prevention of re adherence.  A 4 cm x 7 cm CTM thin graft was then applied over the nerve in its entirety and wrapped around.  The entire graft was then used to allow for complete coverage of the area of entrapment and damage to the superficial peroneal nerve.  Upon completion of wrapping with the graft there was no more deficits present and the likelihood of recurrence entrapment were significantly reduced at this time.    Procedure 5:   Upon completion of the muscle flap and securing of the muscle flap to the large soft tissue deficit a 10  by 4 Integra bilayer was applied over the entirety of the muscle flap and the residual soft tissue deficit that was present of the patient's foot.  It was cut to fully cover the wound and any residual graft was then placed underneath the silicone layer to ensure that he complete graft was used at this time.  The graft was secured using skin staples.  Upon completion of applying the graft it was noted that there was full coverage of the 243  cm squared wound.    Procedure 6:   At this time a  AMDT 6fix external fixator was passed from the back table and fitted over the patient's right lower extremity.  External fixator was constructed using 195 cm rings and a large footplate.  It was noted that there was adequate spacing to allow for access to the patient's ulceration as well as stability when up with application.  The foot and leg were then centered within the external fixator to ensure there was no areas of pressure points and space was given to allow for edema.  To begin a half pin was placed in the patient's  medial calcaneus and fixated to the  frame using a half pin fixation bolt.  Next a 2nd half pin was then placed into the patient's lateral calcaneus and secured to the frame using half pin fixation bolt.  Attention was then directed more distally of the patient's foot and an 3rd half pin was then placed into the patient's 1st metatarsal and secured to the frame via half pin fixation bolt.  Next attention was then shifted proximally on the frame and a 5 hole post was dropped from the most proximal ring and 2 5 mm half pins were then placed into the patient's proximal tibia and secured to the 5 hole posterior half pin fixation bolts.  At this time intraoperative fluoroscopy was then used to identify and locate all areas were pins were placed and to determine that all pins were bicortical in nature.  Upon completion of the frame application the foot was noticed to be in rectus alignment with lower leg and foot centered within the frame.    Prior to closure all staple retraction was removed in its entirety. Next the large lateral incision was then closed in a layered fashion.  Fascial layer was closed using  2-0 Vicryl, subcutaneous tissue was closed using 3-0 Monocryl, and skin was closed using skin staples    Procedure 7:   At this time it was determined that the patient would benefit greatly from the application of a negative pressure wound therapy.  The black foam was then cut to ensure complete coverage of all soft tissue deficits and extended up in the patient's entire incisional area.  Prior to application of the foam Mepilex borders were placed over the grafted areas to ensure there is no continued adherence.  The black foam was then placed over these areas and wound draping was then placed over all foam areas.  Next a window was cut on the distal aspect of the VAC area and the   Any pad suction tubing was then placed over the area.  The VAC was then connected to the VAC machine with the settings of 125 mmHg of compressed constant compression with  medium intensity was placed.  Wound  wound seal was checked and there were no leaks found.    Procedure 8:  Flouroscopy was used throughout the entire case; initially to identify landmark locations and throughout the case to verify positioning/hardware placement. All imaging was interpreted at the time of obtianing the image. All surgical staff was exposed to extra radiation through the case becasue of the flouroscopy used.     The frame was then packed with several rolls of Kerlix and covered with a double six-inch Ace bandage.  Patient will then be transferred to PACU for admission for observation after surgery.  He will remain nonweightbearing to the right lower extremity and return to a skilled nursing facility upon discharge.    This is Sonido Bolaños DPM dictating the operative note for Dr. Vicky DPM.      SIGNATURE: Sonido Bolaños DPM PATIENT NAME: Claude Coley   DATE: March 28, 2024 MRN: [id number]   TIME: 7:55 AM

## 2024-03-28 NOTE — CARE PLAN
The patient's goals for the shift include Assist with pain control    The clinical goals for the shift include Assist with pain control      Problem: Pain  Goal: My pain/discomfort is manageable  Outcome: Progressing     Problem: Safety  Goal: Patient will be injury free during hospitalization  Outcome: Progressing  Goal: I will remain free of falls  Outcome: Progressing     Problem: Daily Care  Goal: Daily care needs are met  Outcome: Progressing     Problem: Psychosocial Needs  Goal: Demonstrates ability to cope with hospitalization/illness  Outcome: Progressing  Goal: Collaborate with me, my family, and caregiver to identify my specific goals  Outcome: Progressing

## 2024-03-29 LAB
GLUCOSE BLD MANUAL STRIP-MCNC: 115 MG/DL (ref 74–99)
GLUCOSE BLD MANUAL STRIP-MCNC: 116 MG/DL (ref 74–99)
GLUCOSE BLD MANUAL STRIP-MCNC: 128 MG/DL (ref 74–99)
GLUCOSE BLD MANUAL STRIP-MCNC: 129 MG/DL (ref 74–99)
GLUCOSE BLD MANUAL STRIP-MCNC: 178 MG/DL (ref 74–99)

## 2024-03-29 PROCEDURE — 97110 THERAPEUTIC EXERCISES: CPT | Mod: GO,CO

## 2024-03-29 PROCEDURE — 82947 ASSAY GLUCOSE BLOOD QUANT: CPT

## 2024-03-29 PROCEDURE — 2500000004 HC RX 250 GENERAL PHARMACY W/ HCPCS (ALT 636 FOR OP/ED): Mod: JW | Performed by: PODIATRIST

## 2024-03-29 PROCEDURE — 1100000001 HC PRIVATE ROOM DAILY

## 2024-03-29 PROCEDURE — 2500000001 HC RX 250 WO HCPCS SELF ADMINISTERED DRUGS (ALT 637 FOR MEDICARE OP)

## 2024-03-29 PROCEDURE — 97110 THERAPEUTIC EXERCISES: CPT | Mod: GP,CQ

## 2024-03-29 PROCEDURE — 2500000002 HC RX 250 W HCPCS SELF ADMINISTERED DRUGS (ALT 637 FOR MEDICARE OP, ALT 636 FOR OP/ED)

## 2024-03-29 PROCEDURE — 2500000001 HC RX 250 WO HCPCS SELF ADMINISTERED DRUGS (ALT 637 FOR MEDICARE OP): Performed by: NURSE PRACTITIONER

## 2024-03-29 PROCEDURE — 97530 THERAPEUTIC ACTIVITIES: CPT | Mod: GP,CQ

## 2024-03-29 PROCEDURE — 97535 SELF CARE MNGMENT TRAINING: CPT | Mod: GO,CO

## 2024-03-29 PROCEDURE — 2500000004 HC RX 250 GENERAL PHARMACY W/ HCPCS (ALT 636 FOR OP/ED)

## 2024-03-29 RX ORDER — PANTOPRAZOLE SODIUM 40 MG/1
40 TABLET, DELAYED RELEASE ORAL
Status: DISCONTINUED | OUTPATIENT
Start: 2024-03-30 | End: 2024-04-01 | Stop reason: HOSPADM

## 2024-03-29 RX ADMIN — METFORMIN HYDROCHLORIDE 500 MG: 500 TABLET, FILM COATED ORAL at 08:56

## 2024-03-29 RX ADMIN — OXYCODONE 5 MG: 5 TABLET ORAL at 23:30

## 2024-03-29 RX ADMIN — ASPIRIN 81 MG: 81 TABLET, COATED ORAL at 08:55

## 2024-03-29 RX ADMIN — HEPARIN SODIUM 5000 UNITS: 5000 INJECTION, SOLUTION INTRAVENOUS; SUBCUTANEOUS at 06:07

## 2024-03-29 RX ADMIN — INSULIN LISPRO 1 UNITS: 100 INJECTION, SOLUTION INTRAVENOUS; SUBCUTANEOUS at 12:01

## 2024-03-29 RX ADMIN — CARVEDILOL 12.5 MG: 12.5 TABLET, FILM COATED ORAL at 08:55

## 2024-03-29 RX ADMIN — DOCUSATE SODIUM 100 MG: 100 CAPSULE, LIQUID FILLED ORAL at 20:37

## 2024-03-29 RX ADMIN — ATORVASTATIN CALCIUM 40 MG: 40 TABLET, FILM COATED ORAL at 08:56

## 2024-03-29 RX ADMIN — HEPARIN SODIUM 5000 UNITS: 5000 INJECTION, SOLUTION INTRAVENOUS; SUBCUTANEOUS at 21:35

## 2024-03-29 RX ADMIN — FINASTERIDE 5 MG: 5 TABLET, FILM COATED ORAL at 08:56

## 2024-03-29 RX ADMIN — POLYETHYLENE GLYCOL 3350 17 G: 17 POWDER, FOR SOLUTION ORAL at 08:56

## 2024-03-29 RX ADMIN — CARVEDILOL 12.5 MG: 12.5 TABLET, FILM COATED ORAL at 16:58

## 2024-03-29 RX ADMIN — VANCOMYCIN 750 MG: 750 INJECTION, SOLUTION INTRAVENOUS at 20:37

## 2024-03-29 RX ADMIN — OXYCODONE 5 MG: 5 TABLET ORAL at 02:59

## 2024-03-29 RX ADMIN — SODIUM CHLORIDE 500 ML: 900 INJECTION, SOLUTION INTRAVENOUS at 08:52

## 2024-03-29 RX ADMIN — VANCOMYCIN 750 MG: 750 INJECTION, SOLUTION INTRAVENOUS at 08:55

## 2024-03-29 RX ADMIN — OXYCODONE 5 MG: 5 TABLET ORAL at 15:52

## 2024-03-29 RX ADMIN — DOCUSATE SODIUM 100 MG: 100 CAPSULE, LIQUID FILLED ORAL at 09:00

## 2024-03-29 RX ADMIN — MORPHINE SULFATE 2 MG: 2 INJECTION, SOLUTION INTRAMUSCULAR; INTRAVENOUS at 03:41

## 2024-03-29 RX ADMIN — CYCLOBENZAPRINE 10 MG: 10 TABLET, FILM COATED ORAL at 15:52

## 2024-03-29 RX ADMIN — OLANZAPINE 20 MG: 10 TABLET, FILM COATED ORAL at 20:37

## 2024-03-29 RX ADMIN — ALTEPLASE 1 MG: 2.2 INJECTION, POWDER, LYOPHILIZED, FOR SOLUTION INTRAVENOUS at 11:20

## 2024-03-29 RX ADMIN — HEPARIN SODIUM 5000 UNITS: 5000 INJECTION, SOLUTION INTRAVENOUS; SUBCUTANEOUS at 15:48

## 2024-03-29 ASSESSMENT — COGNITIVE AND FUNCTIONAL STATUS - GENERAL
WALKING IN HOSPITAL ROOM: TOTAL
TOILETING: A LITTLE
STANDING UP FROM CHAIR USING ARMS: TOTAL
DRESSING REGULAR UPPER BODY CLOTHING: A LITTLE
STANDING UP FROM CHAIR USING ARMS: A LITTLE
MOBILITY SCORE: 14
TURNING FROM BACK TO SIDE WHILE IN FLAT BAD: A LITTLE
MOVING TO AND FROM BED TO CHAIR: A LOT
WALKING IN HOSPITAL ROOM: TOTAL
MOVING TO AND FROM BED TO CHAIR: A LITTLE
CLIMB 3 TO 5 STEPS WITH RAILING: TOTAL
HELP NEEDED FOR BATHING: A LOT
MOVING FROM LYING ON BACK TO SITTING ON SIDE OF FLAT BED WITH BEDRAILS: A LITTLE
MOVING FROM LYING ON BACK TO SITTING ON SIDE OF FLAT BED WITH BEDRAILS: A LITTLE
DAILY ACTIVITIY SCORE: 18
TURNING FROM BACK TO SIDE WHILE IN FLAT BAD: A LITTLE
DRESSING REGULAR LOWER BODY CLOTHING: A LOT
CLIMB 3 TO 5 STEPS WITH RAILING: TOTAL
MOBILITY SCORE: 11

## 2024-03-29 ASSESSMENT — ENCOUNTER SYMPTOMS
CONSTIPATION: 0
MUSCULOSKELETAL NEGATIVE: 1
HEMATURIA: 0
FEVER: 0
DIFFICULTY URINATING: 0
LIGHT-HEADEDNESS: 0
VOMITING: 0
APPETITE CHANGE: 0
DIZZINESS: 0
CHOKING: 0
CONSTITUTIONAL NEGATIVE: 1
NUMBNESS: 0
SEIZURES: 0
ENDOCRINE NEGATIVE: 1
RESPIRATORY NEGATIVE: 1
ABDOMINAL PAIN: 0
ARTHRALGIAS: 1
HEADACHES: 0
CARDIOVASCULAR NEGATIVE: 1
PSYCHIATRIC NEGATIVE: 1
SHORTNESS OF BREATH: 0
CHILLS: 0
DIARRHEA: 0
BACK PAIN: 0
FACIAL ASYMMETRY: 0
WOUND: 1
NEUROLOGICAL NEGATIVE: 1
APNEA: 0
COUGH: 0
CHEST TIGHTNESS: 0
FATIGUE: 0
AGITATION: 0
ACTIVITY CHANGE: 1
WOUND: 0
GASTROINTESTINAL NEGATIVE: 1
EYES NEGATIVE: 1
FREQUENCY: 0
NAUSEA: 0

## 2024-03-29 ASSESSMENT — PAIN SCALES - GENERAL
PAINLEVEL_OUTOF10: 8
PAINLEVEL_OUTOF10: 6
PAINLEVEL_OUTOF10: 4
PAINLEVEL_OUTOF10: 3
PAINLEVEL_OUTOF10: 5 - MODERATE PAIN
PAINLEVEL_OUTOF10: 5 - MODERATE PAIN
PAINLEVEL_OUTOF10: 8
PAINLEVEL_OUTOF10: 0 - NO PAIN
PAINLEVEL_OUTOF10: 6
PAINLEVEL_OUTOF10: 5 - MODERATE PAIN
PAINLEVEL_OUTOF10: 7

## 2024-03-29 ASSESSMENT — PAIN DESCRIPTION - LOCATION
LOCATION: FOOT

## 2024-03-29 ASSESSMENT — PAIN DESCRIPTION - ORIENTATION
ORIENTATION: RIGHT

## 2024-03-29 ASSESSMENT — PAIN - FUNCTIONAL ASSESSMENT
PAIN_FUNCTIONAL_ASSESSMENT: WONG-BAKER FACES
PAIN_FUNCTIONAL_ASSESSMENT: 0-10
PAIN_FUNCTIONAL_ASSESSMENT: WONG-BAKER FACES
PAIN_FUNCTIONAL_ASSESSMENT: 0-10
PAIN_FUNCTIONAL_ASSESSMENT: 0-10

## 2024-03-29 ASSESSMENT — PAIN SCALES - WONG BAKER: WONGBAKER_NUMERICALRESPONSE: NO HURT

## 2024-03-29 NOTE — NURSING NOTE
Dr. Perry and his resident Husam Lopez informed of patient's BP 80/42 and manual 84/50 and patient stating he feels a little light headed. Awaiting for recommendations.

## 2024-03-29 NOTE — CARE PLAN
The patient's goals for the shift include Assist with pain control    The clinical goals for the shift include pain control, safety

## 2024-03-29 NOTE — CONSULTS
Inpatient consult to Infectious Diseases  Consult performed by: Bere Jacobo, ALISE-CNP  Consult ordered by: Darryl Perry DPM  Reason for consult: Antibioitc management          Primary MD: Darryl Jenkins MD      History Of Present Illness  Claude Coley is a 63 y.o. male prescented to the hospital for outpatient surgery of right lower extremity.  He underwent right peroneus previous muscle flap with excisional debridement of wound and application of graft, external fixator and wound VAC.  He was previously discharged on IV vancomycin due to MRSA positive culture till 4/3/2024.  We were consulted for continuation of antibiotic management.  Labs with no leukocytosis.  Imaging and operative note reviewed.  He reports some discomfort in mid shaft of right leg.  He denies fever chills nausea vomiting or diarrhea.  He is on IV vancomycin.       Past Medical History  He has a past medical history of CHF (congestive heart failure) (CMS/[facility]), Diabetes mellitus (CMS/[facility]), and Hypertension.    Surgical History  He has no past surgical history on file.     Social History     Occupational History    Not on file   Tobacco Use    Smoking status: Some Days     Packs/day: 1.00     Years: 20.00     Additional pack years: 0.00     Total pack years: 20.00     Types: Cigarettes    Smokeless tobacco: Never   Substance and Sexual Activity    Alcohol use: Yes     Alcohol/week: 12.0 standard drinks of alcohol     Types: 12 Cans of beer per week    Drug use: Never    Sexual activity: Not on file     Travel History   Travel since 02/29/24    No documented travel since 02/29/24            Family History  No family history on file.  Allergies  Bee sting kit and Tramadol     Immunization History   Administered Date(s) Administered    Pfizer COVID-19 vaccine, Fall 2023, 12 years and older, (30mcg/0.3mL) 10/26/2023     Medications  Home medications:  Medications Prior to Admission   Medication Sig Dispense Refill Last Dose     acetaminophen (Tylenol) 325 mg tablet Take 2 tablets (650 mg) by mouth every 4 hours if needed for mild pain (1 - 3). 30 tablet 0 3/26/2024    aspirin 81 mg EC tablet Take 1 tablet (81 mg) by mouth once daily.   3/26/2024    atorvastatin (Lipitor) 40 mg tablet Take 1 tablet (40 mg) by mouth once daily.   3/26/2024    carvedilol (Coreg) 12.5 mg tablet Take 1 tablet (12.5 mg) by mouth 2 times a day with meals.   3/26/2024    finasteride (Proscar) 5 mg tablet Take 1 tablet (5 mg) by mouth once daily. Do not crush, chew, or split.   3/26/2024    furosemide (Lasix) 20 mg tablet Take 1 tablet (20 mg) by mouth once daily.   3/26/2024    gemfibrozil (Lopid) 600 mg tablet Take 1 tablet (600 mg) by mouth 2 times a day before meals.   3/26/2024    heparin sodium,porcine (heparin, porcine,) 5,000 unit/mL injection Inject 1 mL (5,000 Units) under the skin every 8 hours. 100 mL 0 3/26/2024    lisinopril 20 mg tablet Take 1 tablet (20 mg) by mouth once daily.   3/26/2024    metFORMIN, OSM, (Fortamet) 500 mg 24 hr tablet Take 1 tablet (500 mg) by mouth once daily in the evening. Take with meals. Do not crush, chew, or split.   3/26/2024    OLANZapine (ZyPREXA) 20 mg tablet Take 1 tablet (20 mg) by mouth once daily at bedtime.   3/26/2024    spironolactone (Aldactone) 25 mg tablet Take 1 tablet (25 mg) by mouth once daily.   3/26/2024    vancomycin 1.25 gram recon soln Infuse 0.75 g into a venous catheter every 12 hours for 12 days. 18 g 0 3/26/2024     Current medications:  Scheduled medications  aspirin, 81 mg, oral, Daily  atorvastatin, 40 mg, oral, Daily  carvedilol, 12.5 mg, oral, BID with meals  docusate sodium, 100 mg, oral, BID  finasteride, 5 mg, oral, Daily  furosemide, 20 mg, oral, Daily  heparin (porcine), 5,000 Units, subcutaneous, q8h LARRY  insulin lispro, 0-5 Units, subcutaneous, TID with meals  lisinopril, 20 mg, oral, Daily  metFORMIN, 500 mg, oral, Daily with breakfast  OLANZapine, 20 mg, oral,  Nightly  polyethylene glycol, 17 g, oral, Daily  spironolactone, 25 mg, oral, Daily  vancomycin-diluent combo no.1, 750 mg, intravenous, q12h      Continuous medications     PRN medications  PRN medications: acetaminophen, alteplase, cyclobenzaprine, dextrose, dextrose, diphenhydrAMINE, glucagon, morphine, naloxone, ondansetron ODT **OR** ondansetron, oxyCODONE, promethazine **OR** promethazine    Review of Systems   Constitutional: Negative.    HENT: Negative.     Eyes: Negative.    Respiratory: Negative.     Cardiovascular: Negative.    Gastrointestinal: Negative.    Endocrine: Negative.    Genitourinary: Negative.    Musculoskeletal: Negative.         Right leg pain, extremal fixator, wound vac   Skin:  Positive for wound.   Neurological: Negative.    Psychiatric/Behavioral: Negative.          Objective  Range of Vitals (last 24 hours)  Heart Rate:  [61-82]   Temp:  [37 °C (98.6 °F)-37.9 °C (100.2 °F)]   Resp:  [15-17]   BP: ()/(42-61)   SpO2:  [94 %-97 %]   Daily Weight  03/27/24 : 118 kg (260 lb 2.3 oz)    Body mass index is 33.4 kg/m².     Physical Exam  Constitutional:       Appearance: Normal appearance.   HENT:      Head: Normocephalic and atraumatic.      Nose: Nose normal.      Mouth/Throat:      Mouth: Mucous membranes are moist.      Pharynx: Oropharynx is clear.   Eyes:      Extraocular Movements: Extraocular movements intact.      Conjunctiva/sclera: Conjunctivae normal.   Cardiovascular:      Rate and Rhythm: Normal rate and regular rhythm.   Pulmonary:      Effort: Pulmonary effort is normal.      Breath sounds: Normal breath sounds.   Abdominal:      General: Bowel sounds are normal.      Palpations: Abdomen is soft.   Musculoskeletal:      Cervical back: Normal range of motion and neck supple.      Comments: Right leg external fixator    Skin:     Comments: Right foot wound vac dressing intact, external fixator   Neurological:      General: No focal deficit present.      Mental Status: He is  "alert and oriented to person, place, and time. Mental status is at baseline.   Psychiatric:         Mood and Affect: Mood normal.         Behavior: Behavior normal.          Relevant Results  Outside Hospital Results  No  Labs  Results from last 72 hours   Lab Units 03/28/24  0506   WBC AUTO x10*3/uL 9.6   HEMOGLOBIN g/dL 9.4*   HEMATOCRIT % 26.9*   PLATELETS AUTO x10*3/uL 137*     Results from last 72 hours   Lab Units 03/28/24  0506   SODIUM mmol/L 136   POTASSIUM mmol/L 3.8   CHLORIDE mmol/L 104   CO2 mmol/L 19*   BUN mg/dL 25   CREATININE mg/dL 1.10   GLUCOSE mg/dL 90   CALCIUM mg/dL 8.8   ANION GAP mmol/L 13   EGFR mL/min/1.73m*2 76         Estimated Creatinine Clearance: 96.3 mL/min (by C-G formula based on SCr of 1.1 mg/dL).  Sedimentation Rate   Date Value Ref Range Status   03/19/2024 47 (H) 0 - 20 mm/h Final     No results found for: \"HIV1X2\", \"HIVCONF\", \"ZXHYCH8IB\"  No results found for: \"HEPCABINIT\", \"HEPCAB\", \"HCVPCRQUANT\"  Microbiology  Susceptibility data from last 90 days.  Collected Specimen Info Organism Ampicillin Ampicillin/Sulbactam Ceftriaxone Clindamycin Erythromycin Meropenem Oxacillin Penicillin Piperacillin/Tazobactam Tetracycline Trimethoprim/Sulfamethoxazole Vancomycin   03/19/24 Tissue/Biopsy from Wound/Tissue Methicillin Resistant Staphylococcus aureus (MRSA)    R R  R   S S S   01/26/24 Tissue from BONE RESECTION Bacteroides fragilis R S S S  S   S        Streptococcus anginosus group   S     R  S  S   01/24/24 Swab from DIGIT, ACCESSORY RIGHT FOOT Mixed Aerobic and Anaerobic Bacteria                01/23/24 Tissue/Biopsy from Wound/Tissue Mixed Gram-Positive and Gram-Negative Bacteria                     Imaging  XR chest 1 view    Result Date: 3/27/2024  Interpreted By:  Kirstie Moy, STUDY: XR CHEST 1 VIEW 3/27/2024 4:36 pm   INDICATION: Signs/Symptoms:PICC placement   COMPARISON: 03/20/2024   ACCESSION NUMBER(S): [id number]   ORDERING CLINICIAN: SANIYA HOWARD   TECHNIQUE: AP " erect view of the chest   FINDINGS: A right arm PICC line terminates in the SVC. There is no pneumothorax. Lungs are clear without pleural abnormality.   The cardiac size is normal. No mediastinal or hilar abnormality is seen.       Right arm PICC line terminating in the SVC.   No pneumothorax.   Signed by: Kirstie Moy 3/27/2024 4:50 PM Dictation workstation:   [id number]    FL fluoro images no charge    Result Date: 3/27/2024  These images are not reportable by radiology and will not be interpreted by  Radiologists.    Bedside PICC Imaging    Result Date: 3/25/2024  These images are not reportable by radiology and will not be interpreted by  Radiologists.    XR chest 1 view    Result Date: 3/20/2024  Interpreted By:  Nael Moore, STUDY: XR CHEST 1 VIEW; 3/20/2024 3:57 pm   INDICATION: Signs/Symptoms:PICC placement.   COMPARISON: None   ACCESSION NUMBER(S): [id number]   ORDERING CLINICIAN: ANGELITA YOUSSEF   TECHNIQUE: 1 view of the chest was performed.   FINDINGS: Right-sided PICC line catheter with tip at the mid SVC. The lungs are adequately inflated. No acute consolidation. No pleural effusion. No pneumothorax.  The cardiomediastinal silhouette is within normal limits.       Right-sided PICC line catheter with tip at the mid SVC. No acute cardiopulmonary disease.   Signed by: Nael Moore 3/20/2024 4:14 PM Dictation workstation:   [id number]    Lower extremity venous duplex right    Result Date: 3/19/2024  Interpreted By:  Favian Castellano, STUDY: [facility] US LOWER EXTREMITY VENOUS DUPLEX RIGHT; 3/19/2024 3:22 pm   INDICATION: Signs/Symptoms:leg swelling + d dimer..   COMPARISON: None.   ACCESSION NUMBER(S): [id number]   ORDERING CLINICIAN: CHARO FRIED   TECHNIQUE: Vascular ultrasound of the right lower extremity was performed. Real time compression views as well as Gray scale, color Doppler and spectral Doppler waveform analysis was performed.   FINDINGS: Evaluation of the visualized portions of  the right common femoral vein, proximal, mid, and distal femoral vein, and popliteal vein were performed.  Evaluation of the visualized portions of the posterior tibial and peroneal veins were also performed.  In addition, evaluation of the contralateral common femoral vein was performed.   Limitations: None   The evaluated veins demonstrate normal compressibility. There is intact venous flow demonstrating normal respiratory variability and normal augmentation of flow with calf compression. Therefore, there is no ultrasonographic evidence for deep vein thrombosis within the evaluated veins. No evidence of thrombus is seen within the contralateral common femoral vein.       No sonographic evidence for deep vein thrombosis within the evaluated veins of the right lower extremity.   MACRO: None   Signed by: Favian Castellano 3/19/2024 3:22 PM Dictation workstation:   [id number]    XR foot right 3+ views    Result Date: 3/19/2024  Interpreted By:  Lux Hernandez, STUDY: XR FOOT RIGHT 3+ VIEWS   INDICATION: Signs/Symptoms:r foot wound.   COMPARISON: January 26, 2024   ACCESSION NUMBER(S): [id number]   ORDERING CLINICIAN: CHARO FRIED   FINDINGS: Postsurgical changes status post 5th ray amputation to the level of the mid metatarsal.   Suspected skin wounds at the surgical site. No aggressive bony destructive changes or erosions.   Marked soft tissue swelling dorsally.       Suspected skin wounds of the 5th amputation site with no osseous acute abnormality.   Marked dorsal soft tissue swelling, potentially cellulitis.   Signed by: Lux Hernandez 3/19/2024 2:09 PM Dictation workstation:   [id number]      Assessment/Plan   Infected right diabetic foot ulcer-MRSA  Status post right peroneus brevis muscle flap, wound debridement, Integra bilayer, wound vac, external fixator on 3/27/2024         IV vancomycin-avoiding zyvox -potential interaction with zyprexa   Monitor renal function  Supportive care  Local care  Offloading  Monitor  temperature and WBC  Podiatry  follow up    Original Long term plan IV vancomycin  till 4/3/2024-will discuss with podiatry may need to extend  Weekly CBC with diff, Biweekly CMP, vancomycin trough      ALISE Penny-CNP

## 2024-03-29 NOTE — NURSING NOTE
Called to bedside for nonfunctioning PICC. RN states PICC is difficult to flush and has no blood return. Blue cap changed, dressing dated 3/27 with no obvious kinks. Line flushes easily but no blood return. Cathflo 2mg given per protocol, will reassess in 1 hour..

## 2024-03-29 NOTE — NURSING NOTE
Agree with previous assessment. Patient awake resting in bed watching TV. Voices no needs or concerns at this time. Call light within reach. Will continue plan of care.

## 2024-03-29 NOTE — PROGRESS NOTES
Physical Therapy    Physical Therapy Treatment    Patient Name: Claude Coley  MRN: [id number]  Today's Date: 3/29/2024  Time Calculation  Start Time: 0705  Stop Time: 0740  Time Calculation (min): 35 min       Assessment/Plan   PT Assessment  Rehab Prognosis: Good  End of Session Communication: Bedside nurse (Limited standing due to unable tro maintain NWB R LE)  Assessment Comment: Up to chair. Unable to maintain NWB with few side steps to bedside chair  End of Session Patient Position: Up in chair, Alarm on     PT Plan  Treatment/Interventions: Bed mobility, Transfer training, Gait training, Balance training, Strengthening, Endurance training, Range of motion, Therapeutic exercise, Therapeutic activity, Home exercise program  PT Plan: Skilled PT  PT Frequency: 5 times per week  PT Discharge Recommendations: Moderate intensity level of continued care  Equipment Recommended upon Discharge: Wheeled walker  PT Recommended Transfer Status: Assist x2, Assistive device  PT - OK to Discharge: Yes      General Visit Information:   PT  Visit  PT Received On: 03/29/24  General  Reason for Referral: recent surgery  Referred By: Dr Lopez  Past Medical History Relevant to Rehab: DM, cellulitis, HTN, HLD, CHF, MO  Co-Treatment: OT  Co-Treatment Reason: partial co treat due to pt with very limited engagement in therapy, needs Max encouragement to participate, also  Prior to Session Communication: Bedside nurse  Patient Position Received: Bed, 3 rail up, Alarm on  Preferred Learning Style: verbal, visual  General Comment: Agreeable to up to chair for breakfast    Subjective   Precautions:  Precautions  LE Weight Bearing Status: Right Non-Weight Bearing  Medical Precautions: Fall precautions  Precautions Comment: external fixator, wound vac  Vital Signs:       Objective   Pain:  Pain Assessment  Pain Assessment: Collins-Baker FACES  Pain Score: 5 - Moderate pain  Pain Type: Acute pain, Surgical pain  Pain Location: Foot  Pain  Orientation: Right  Pain Frequency: Intermittent  Pain Onset: Ongoing  Clinical Progression: Not changed  Cognition:  Cognition  Overall Cognitive Status: Within Functional Limits  Orientation Level: Oriented X4  Insight: Moderate  Impulsive: Mildly  Postural Control:  Static Standing Balance  Static Standing-Balance Support: Bilateral upper extremity supported  Static Standing-Level of Assistance: Minimum assistance  Static Standing-Comment/Number of Minutes: Stood  Extremity/Trunk Assessments:    Activity Tolerance:  Activity Tolerance  Endurance: Decreased tolerance for upright activites  Activity Tolerance Comments: Fair- ([place]ale to maintain NWB R LE)  Treatments:  Therapeutic Exercise  Therapeutic Exercise Performed: Yes  Therapeutic Exercise Activity 1: L LEsitting ther ex:heel and toe raises, LAQs, hip flexion,carina hip adduction x 20    Bed Mobility  Bed Mobility: Yes  Bed Mobility 1  Bed Mobility 1: Supine to sitting  Level of Assistance 1: Contact guard  Bed Mobility Comments 1: HOB elevated    Ambulation/Gait Training  Ambulation/Gait Training Performed: Yes  Ambulation/Gait Training 1  Surface 1: Level tile  Device 1: Rolling walker  Assistance 1: Minimum assistance (x 2)  Comments/Distance (ft) 1: 4-5 steps x 2 to/ from [facility] with Min A x 2 for safety with icreased cueing/ difficulty to maintain NWB R LE  Transfers  Transfer: Yes  Transfer 1  Transfer From 1: Bed to  Transfer to 1: Stand  Technique 1: Sit to stand, Stand to sit  Transfer Device 1: Walker  Transfer Level of Assistance 1: Contact guard  Trials/Comments 1: x 2 trials contact guard unable to maintain NWB R LE  Transfers 2  Transfer From 2: Stand to  Transfer to 2: Sit  Technique 2: Sit to stand, Stand to sit  Transfer Device 2: Walker  Transfer Level of Assistance 2: Contact guard  Trials/Comments 2: x 2 trials  contact guard assist Unable to maintain NWB Rc LE    Outcome Measures:  [facility] Basic Mobility  Turning from your back to your side  while in a flat bed without using bedrails: A little  Moving from lying on your back to sitting on the side of a flat bed without using bedrails: A little  Moving to and from bed to chair (including a wheelchair): A lot  Standing up from a chair using your arms (e.g. wheelchair or bedside chair): Total  To walk in hospital room: Total  Climbing 3-5 steps with railing: Total  Basic Mobility - Total Score: 11    Education Documentation  Precautions, taught by Ivana Severino PTA at 3/29/2024  8:41 AM.  Learner: Patient  Readiness: Acceptance  Method: Explanation  Response: Verbalizes Understanding, Needs Reinforcement    Mobility Training, taught by Ivana Severino PTA at 3/29/2024  8:41 AM.  Learner: Patient  Readiness: Acceptance  Method: Explanation  Response: Verbalizes Understanding, Needs Reinforcement    Education Comments  No comments found.        OP EDUCATION:       Encounter Problems       Encounter Problems (Active)       Balance       LTG - Patient will maintain balance to allow for safe mobility (Progressing)       Start:  03/27/24    Expected End:  04/09/24               Mobility       STG - Patient will ambulate 5' w/ RW and min assist, NWB RLE (Progressing)       Start:  03/27/24    Expected End:  04/09/24            Pt will participate in LE exercises to promote functional strength and endurance (Progressing)       Start:  03/27/24    Expected End:  04/09/24               PT Transfers       STG - Patient to transfer to and from sit to supine w/ supervision  (Progressing)       Start:  03/27/24    Expected End:  04/09/24            STG - Patient will transfer sit to and from stand w/ GCA (Progressing)       Start:  03/27/24    Expected End:  04/09/24               Safety       Pt will consistently comply w/ NWB RLE w/ all activities (Progressing)       Start:  03/27/24    Expected End:  04/09/24

## 2024-03-29 NOTE — PROGRESS NOTES
03/29/24 1121   Discharge Planning   Patient expects to be discharged to: skilled rehab   Does the patient need discharge transport arranged? Yes   RoundTrip coordination needed? Yes   Has discharge transport been arranged? No       Podiatry requires a facility that uses [facility] wound vac's.  [facility] does not.  Referrals sent to [facility] and [facility].  Pending acceptance.  Working with patient and sister, Elli [phone] and [facility] rep Yvonne.  Patient does need a precert when accepted.  Anticipate patient here through weekend and possibly Monday and Tues.4/1.    1528 - Spoke with patient and sister, Elli.  [facility] [place] accepted.  Both agreed with Ирина.  Checking with [facility] regarding wound vac.  Long term antibiotic - Vanco Q12 750 mg x 14 days.

## 2024-03-29 NOTE — CONSULTS
Inpatient consult to Medicine  Consult performed by: ALISE Varner-CNP  Consult ordered by: Darryl Perry DPM  Reason for consult: management of blood pressure, diabetes and CHF.          Reason For Consult    management of blood pressure, diabetes and CHF.   History Of Present Illness  Claude Coley is a 63 y.o. male presented [place] [facility] [place] on 3/27/2024 for surgery on his right foot; debridement of muscle and fascia, flap and application of external fixation with podiatry, Dr. Lopez, Dr. Bolaños and Dr. Perry. Patient has a past medical history of diabetes with a Hbg A1c of 4.4 on 3/19, hypertension and CHF taking coreg, lasix, lisinopril and spirolactone. On review patient has had blood pressure with systolic in the one teens and overnight her BP fell to 80's over 40's patient was asymptomatic and his next BP was 118/53. Patient plans to go to SNF for rehab at discharge.      Past Medical History  He has a past medical history of CHF (congestive heart failure) (CMS/[facility]), Diabetes mellitus (CMS/[facility]), and Hypertension.    Surgical History  He has no past surgical history on file.     Social History  He reports that he has been smoking cigarettes. He has a 20.00 pack-year smoking history. He has never used smokeless tobacco. He reports current alcohol use of about 12.0 standard drinks of alcohol per week. He reports that he does not use drugs.    Family History  No family history on file.     Allergies  Bee sting kit and Tramadol    Review of Systems  Review of Systems   Constitutional:  Positive for activity change. Negative for appetite change, chills, fatigue and fever.   Respiratory:  Negative for apnea, cough, choking, chest tightness and shortness of breath.    Gastrointestinal:  Negative for abdominal pain, constipation, diarrhea, nausea and vomiting.   Genitourinary:  Negative for difficulty urinating, frequency, hematuria and urgency.   Musculoskeletal:  Positive for  arthralgias. Negative for back pain and gait problem.   Skin:  Negative for pallor, rash and wound.   Neurological:  Negative for dizziness, seizures, facial asymmetry, light-headedness, numbness and headaches.   Psychiatric/Behavioral:  Negative for agitation and behavioral problems.           Physical Exam  Physical Exam  Constitutional:       Appearance: Normal appearance.   Cardiovascular:      Rate and Rhythm: Normal rate and regular rhythm.      Pulses: Normal pulses.      Heart sounds: Normal heart sounds.   Pulmonary:      Effort: Pulmonary effort is normal.      Breath sounds: Normal breath sounds.   Abdominal:      General: Bowel sounds are normal.      Palpations: Abdomen is soft.   Skin:     General: Skin is warm and dry.   Neurological:      Mental Status: He is alert and oriented to person, place, and time.              Last Recorded Vitals  /59 (BP Location: Left arm, Patient Position: Lying)   Pulse 68   Temp 37.1 °C (98.8 °F) (Temporal)   Resp 15   Wt 118 kg (260 lb 2.3 oz)   SpO2 96%     Relevant Results  Results for orders placed or performed during the hospital encounter of 03/27/24 (from the past 24 hour(s))   POCT GLUCOSE   Result Value Ref Range    POCT Glucose 87 74 - 99 mg/dL   POCT GLUCOSE   Result Value Ref Range    POCT Glucose 114 (H) 74 - 99 mg/dL   POCT GLUCOSE   Result Value Ref Range    POCT Glucose 115 (H) 74 - 99 mg/dL   POCT GLUCOSE   Result Value Ref Range    POCT Glucose 178 (H) 74 - 99 mg/dL   POCT GLUCOSE   Result Value Ref Range    POCT Glucose 128 (H) 74 - 99 mg/dL          Assessment/Plan     Infected Right Foot  ID following    Hypertension with a period of Hypotension  Continue coreg, and spirolactone and add administration parameters  Continue to hold lisinopril and lasix.    DVT/GI prophylaxis  Heparin sub cutaneous  Adding protonix    Plan of Care  Home Medication reviewed  Advanced directives discussed with       ALISE Varner-CNP

## 2024-03-29 NOTE — PROGRESS NOTES
"Vancomycin Dosing by Pharmacy- INITIAL    Claude Coley is a 63 y.o. year old male who Pharmacy has been consulted for vancomycin dosing for other (diabetic foot infection) . Based on the patient's indication and renal status this patient will be dosed based on a goal AUC of 400-600.     Renal function is currently stable.    Visit Vitals  BP 84/50 (BP Location: Left arm, Patient Position: Sitting)   Pulse 82   Temp 37 °C (98.6 °F) (Temporal)   Resp 15        Lab Results   Component Value Date    CREATININE 1.10 03/28/2024    CREATININE 0.90 03/25/2024    CREATININE 1.00 03/24/2024    CREATININE 0.90 03/23/2024        Patient weight is No results found for: \"PTWEIGHT\"    No results found for: \"CULTURE\"     I/O last 3 completed shifts:  In: 1200 (10.2 mL/kg) [P.O.:900; IV Piggyback:300]  Out: 3060 (25.9 mL/kg) [Urine:2400 (0.6 mL/kg/hr); Drains:660]  Weight: 118 kg   [unfilled]    No results found for: \"PATIENTTEMP\"       Assessment/Plan     Patient will not be given a loading dose.  Will initiate vancomycin maintenance,  750 mg every 12 hours.    This dosing regimen is predicted by [employer] to result in the following pharmacokinetic parameters:    Loading dose: N/A  Regimen: 750 mg IV every 12 hours.  Start time: 08:22 on 03/29/2024  Exposure target: AUC24 (range)400-600 mg/L.hr   AUC24,ss: 437 mg/L.hr  Probability of AUC24 > 400: 71 %  Ctrough,ss: 13.9 mg/L  Probability of Ctrough,ss > 20: 5 %  Probability of nephrotoxicity (Lodise ADRYAN 2009): 9 %      Follow-up level will be ordered on 3/30/29 at AM labs unless clinically indicated sooner.  Will continue to monitor renal function daily while on vancomycin and order serum creatinine at least every 48 hours if not already ordered.  Follow for continued vancomycin needs, clinical response, and signs/symptoms of toxicity.       Nikolai Scott, PharmD       "

## 2024-03-29 NOTE — PROGRESS NOTES
PODIATRY SERVICE CONSULT PROGRESS NOTE    SERVICE DATE: 3/29/2024   SERVICE TIME:  4:32 PM    Subjective   INTERVAL HPI:   Patient was seen at bedside.  Patient had episode of hypotension this morning at 80/42, relieved by 500mL bolus.   Pain well controlled on current regimen.  Patient denies any constitutional symptoms at this time.   No other pedal complaints.   Likely to stay through weekend pending precert for [facility].      Medications:  Scheduled Meds: aspirin, 81 mg, oral, Daily  atorvastatin, 40 mg, oral, Daily  carvedilol, 12.5 mg, oral, BID with meals  docusate sodium, 100 mg, oral, BID  finasteride, 5 mg, oral, Daily  [Held by provider] furosemide, 20 mg, oral, Daily  heparin (porcine), 5,000 Units, subcutaneous, q8h LARRY  insulin lispro, 0-5 Units, subcutaneous, TID with meals  [Held by provider] lisinopril, 20 mg, oral, Daily  metFORMIN, 500 mg, oral, Daily with breakfast  OLANZapine, 20 mg, oral, Nightly  [START ON 3/30/2024] pantoprazole, 40 mg, oral, Daily before breakfast  polyethylene glycol, 17 g, oral, Daily  spironolactone, 25 mg, oral, Daily  vancomycin-diluent combo no.1, 750 mg, intravenous, q12h      Continuous Infusions:    PRN Meds: PRN medications: acetaminophen, alteplase, cyclobenzaprine, dextrose, dextrose, diphenhydrAMINE, glucagon, morphine, naloxone, ondansetron ODT **OR** ondansetron, oxyCODONE, promethazine **OR** promethazine         Objective   PHYSICAL EXAM:  Physical Exam Performed:  Vitals:    03/29/24 1549   BP: 141/60   Pulse: 70   Resp: 17   Temp: 37.4 °C (99.3 °F)   SpO2: 96%     Body mass index is 33.4 kg/m².    Patient is AOx3 and in no acute distress. Patient is alert and cooperative. Sitting comfortably in bed with dressing clean, dry and intact.      Vascular: Palpable DP/PT pulses B/L. Moderate pitting edema noted. Hair growth present B/L. CFT<5 to B/L hallux. Temperature is warm to cool from tibial tuberosity to distal digits B/L. No lymphatic  "streaking noted B/L.     Musculoskeletal: External fixator in place to right foot, ankle, and lower leg. Gross active and passive ROM intact to age and activity level. Moves all extremities spontaneously. No pain to palpation at feet B/L.      Neurological: Absent light touch sensation B/L. Pain stimuli diminished B/L. Denies any numbness, burning or tingling.     Dermatologic: Nails 1-5 are thickened, elongated, discolored with subungual debris B/L. Skin appears diffusely xerotic B/L. Web spaces 1-4 B/L are clean, dry and intact. No rashes or nodules noted B/L.   Surgical Dressing intact over right lower extremity and external fixator.   Wound vac functioning without any warnings, canister nearly full with over 300cc of serosanguinous drainage.  SERA drain intact.      LABS:   Results for orders placed or performed during the hospital encounter of 03/27/24 (from the past 24 hour(s))   POCT GLUCOSE   Result Value Ref Range    POCT Glucose 114 (H) 74 - 99 mg/dL   POCT GLUCOSE   Result Value Ref Range    POCT Glucose 115 (H) 74 - 99 mg/dL   POCT GLUCOSE   Result Value Ref Range    POCT Glucose 178 (H) 74 - 99 mg/dL   POCT GLUCOSE   Result Value Ref Range    POCT Glucose 128 (H) 74 - 99 mg/dL   POCT GLUCOSE   Result Value Ref Range    POCT Glucose 116 (H) 74 - 99 mg/dL      Lab Results   Component Value Date    HGBA1C 4.4 03/19/2024      No results found for: \"CRP\"   Lab Results   Component Value Date    SEDRATE 47 (H) 03/19/2024        Results from last 7 days   Lab Units 03/28/24  0506   WBC AUTO x10*3/uL 9.6   RBC AUTO x10*6/uL 3.16*   HEMOGLOBIN g/dL 9.4*   HEMATOCRIT % 26.9*     Results from last 7 days   Lab Units 03/28/24  0506   SODIUM mmol/L 136   POTASSIUM mmol/L 3.8   CHLORIDE mmol/L 104   CO2 mmol/L 19*   BUN mg/dL 25   CREATININE mg/dL 1.10   CALCIUM mg/dL 8.8           IMAGING REVIEW:  XR chest 1 view    Result Date: 3/27/2024  Interpreted By:  Kirstie Moy, STUDY: XR CHEST 1 VIEW 3/27/2024 4:36 pm   " INDICATION: Signs/Symptoms:PICC placement   COMPARISON: 03/20/2024   ACCESSION NUMBER(S): [id number]   ORDERING CLINICIAN: SANIYA HOWARD   TECHNIQUE: AP erect view of the chest   FINDINGS: A right arm PICC line terminates in the SVC. There is no pneumothorax. Lungs are clear without pleural abnormality.   The cardiac size is normal. No mediastinal or hilar abnormality is seen.       Right arm PICC line terminating in the SVC.   No pneumothorax.   Signed by: Kirstie Moy 3/27/2024 4:50 PM Dictation workstation:   [id number]    FL fluoro images no charge    Result Date: 3/27/2024  These images are not reportable by radiology and will not be interpreted by  Radiologists.    Bedside PICC Imaging    Result Date: 3/25/2024  These images are not reportable by radiology and will not be interpreted by  Radiologists.    XR chest 1 view    Result Date: 3/20/2024  Interpreted By:  Neal Moore, STUDY: XR CHEST 1 VIEW; 3/20/2024 3:57 pm   INDICATION: Signs/Symptoms:PICC placement.   COMPARISON: None   ACCESSION NUMBER(S): [id number]   ORDERING CLINICIAN: ANGELITA YOUSSEF   TECHNIQUE: 1 view of the chest was performed.   FINDINGS: Right-sided PICC line catheter with tip at the mid SVC. The lungs are adequately inflated. No acute consolidation. No pleural effusion. No pneumothorax.  The cardiomediastinal silhouette is within normal limits.       Right-sided PICC line catheter with tip at the mid SVC. No acute cardiopulmonary disease.   Signed by: Nael Moore 3/20/2024 4:14 PM Dictation workstation:   [id number]    Lower extremity venous duplex right    Result Date: 3/19/2024  Interpreted By:  Favian Castellano, STUDY: VASC US LOWER EXTREMITY VENOUS DUPLEX RIGHT; 3/19/2024 3:22 pm   INDICATION: Signs/Symptoms:leg swelling + d dimer..   COMPARISON: None.   ACCESSION NUMBER(S): [id number]   ORDERING CLINICIAN: CHARO FRIED   TECHNIQUE: Vascular ultrasound of the right lower extremity was performed. Real time  compression views as well as Gray scale, color Doppler and spectral Doppler waveform analysis was performed.   FINDINGS: Evaluation of the visualized portions of the right common femoral vein, proximal, mid, and distal femoral vein, and popliteal vein were performed.  Evaluation of the visualized portions of the posterior tibial and peroneal veins were also performed.  In addition, evaluation of the contralateral common femoral vein was performed.   Limitations: None   The evaluated veins demonstrate normal compressibility. There is intact venous flow demonstrating normal respiratory variability and normal augmentation of flow with calf compression. Therefore, there is no ultrasonographic evidence for deep vein thrombosis within the evaluated veins. No evidence of thrombus is seen within the contralateral common femoral vein.       No sonographic evidence for deep vein thrombosis within the evaluated veins of the right lower extremity.   MACRO: None   Signed by: Favian Castellano 3/19/2024 3:22 PM Dictation workstation:   [id number]    XR foot right 3+ views    Result Date: 3/19/2024  Interpreted By:  Lux Hernandez, STUDY: XR FOOT RIGHT 3+ VIEWS   INDICATION: Signs/Symptoms:r foot wound.   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   Suspected skin wounds at the surgical site. No aggressive bony destructive changes or erosions.   Marked soft tissue swelling dorsally.       Suspected skin wounds of the 5th amputation site with no osseous acute abnormality.   Marked dorsal soft tissue swelling, potentially cellulitis.   Signed by: Lux Hernandez 3/19/2024 2:09 PM Dictation workstation:   [id number]            Assessment/Plan   ASSESSMENT & PLAN:    # S/P RLE peroneus brevis muscle flap, wound debridement, Integra bilayer, wound vac, external fixator.  # Chronic  non-pressure ulceration of right foot with necrosis of muscle  # Cellulitis of right foot  # Type 2 diabetes mellitus with peripheral polyneuropathy  # Peripheral vascular disease   # Localized edema  # Difficulty walking     - Patient was seen and evaluated; all findings were discussed and all questions were answered to patient's satisfaction.  - Charts, labs, vitals and imaging all reviewed.   - Patient had episode of hypotension this morning at 80/42, relieved by 500mL bolus. Pain well-controlled.  - WBC 9.6, HGB 9.4,      Plan:  - Right foot surgical dressing is to remain clean, dry and intact until changed by Podiatry, likely Tuesday 4/02 if still inpatient.   - Prior to discharge to SNF, patient's vac will need to be removed with placement of Adaptic and gauze dressing, and KCI vac re-applied at facility.  - Medicine consulted for management, appreciate recs!  - ID consulted for management of long-term abx, appreciate recs!  - Abx: Continue IV Vancomycin 0.75g q12h per ID  - Pain Regimen: Tylenol, oxycodone, morphine for breakthrough  - Bowel Regimen: Miralax, Colace  - Lisinopril and furosemide held   - Carvedilol and spironolactone may be held if systolic BP<100.  - Diet: diabetic diet  - Podiatry will continue to follow.     DVT ppx: heparin subcutaneous 5000U q8h  GI ppx: protonix 40mg qd  Weightbearing: NON-weight-bearing to RLE  Discharge: Patient to follow up with Dr. Perry Tuesday 4/02/24 at 10:30am unless still inpatient. Plan for discharge to [facility], pending precert.    Case to be discussed with attending, A&P above reflects a tentative plan. Please await for the final signature from the attending physician on service.    This patient will be followed by the Podiatry service. Please Epic Chat the corresponding residents below with questions or concerns.      Husam Lopez DPM PGY-2  Podiatric Medicine and Surgery   Epic Chat            SIGNATURE: Husam Lopez DPM  PATIENT NAME: Claude Coley   DATE: March 29, 2024 MRN: [id number]   TIME: 4:32 PM CONTACT: Ha[facility] Message

## 2024-03-29 NOTE — CARE PLAN
The patient's goals for the shift include know discharge plan.     The clinical goals for the shift include antibiotics, pain management, OOB to chair for meals, PICC line care, and monitor wound VAC/labs/VS.    Problem: Pain  Goal: My pain/discomfort is manageable  Outcome: Progressing     Problem: Safety  Goal: Patient will be injury free during hospitalization  Outcome: Progressing  Goal: I will remain free of falls  Outcome: Progressing     Problem: Daily Care  Goal: Daily care needs are met  Outcome: Progressing     Problem: Psychosocial Needs  Goal: Demonstrates ability to cope with hospitalization/illness  Outcome: Progressing  Goal: Collaborate with me, my family, and caregiver to identify my specific goals  Outcome: Progressing     Problem: Fall/Injury  Goal: Not fall by end of shift  Outcome: Progressing  Goal: Be free from injury by end of the shift  Outcome: Progressing  Goal: Verbalize understanding of personal risk factors for fall in the hospital  Outcome: Progressing  Goal: Verbalize understanding of risk factor reduction measures to prevent injury from fall in the home  Outcome: Progressing  Goal: Use assistive devices by end of the shift  Outcome: Progressing  Goal: Pace activities to prevent fatigue by end of the shift  Outcome: Progressing     Problem: Diabetes  Goal: Achieve decreasing blood glucose levels by end of shift  Outcome: Progressing     Problem: Diabetes  Goal: No changes in neurological exam by end of shift  Outcome: Progressing     Problem: Diabetes  Goal: Vital signs within normal range for age by end of shift  Outcome: Progressing     Problem: Diabetes  Goal: Increase self care and/or family involovement by end of shift  Outcome: Progressing     Problem: Chronic Conditions and Co-morbidities  Goal: Patient's chronic conditions and co-morbidity symptoms are monitored and maintained or improved  Outcome: Progressing     Problem: Discharge Planning  Goal: Discharge to home or other  facility with appropriate resources  Outcome: Progressing

## 2024-03-29 NOTE — PROGRESS NOTES
"Occupational Therapy    OT Treatment    Patient Name: Claude Coley  MRN: [id number]  Today's Date: 3/29/2024  Time Calculation  Start Time: 0715  Stop Time: 0740  Time Calculation (min): 25 min         Assessment:  OT Assessment: Assessment limited to pt declining out of bed, agrees to attempt chair next date, \" If Im still here\". Pt did give good effort with therex.  Evaluation/Treatment Tolerance: Patient tolerated treatment well  End of Session Patient Position: Up in chair (All needs in reach)  OT Assessment Results: Decreased ADL status, Decreased endurance, Decreased functional mobility, Decreased IADLs  Evaluation/Treatment Tolerance: Patient tolerated treatment well  Plan:  Treatment Interventions: ADL retraining, Functional transfer training, UE strengthening/ROM  OT Frequency: 4 times per week  OT Discharge Recommendations: Moderate intensity level of continued care  Equipment Recommended upon Discharge: Wheeled walker  OT Recommended Transfer Status: Assist of 2  OT - OK to Discharge: Yes  Treatment Interventions: ADL retraining, Functional transfer training, UE strengthening/ROM    Subjective   Previous Visit Info:  OT Last Visit  OT Received On: 03/29/24  General:  General  Reason for Referral: recent surgery  Referred By: Dr Lopez  Past Medical History Relevant to Rehab: DM, cellulitis, HTN, HLD, CHF, MO  Co-Treatment: PT  Co-Treatment Reason: partial co treat due to pt with very limited engagement in therapy, needs Max encouragement to participate, also  Prior to Session Communication: Bedside nurse  Patient Position Received: Bed, 3 rail up  General Comment: Agreeable to treatment with encouragement to sit in chair  Precautions:  LE Weight Bearing Status: Right Non-Weight Bearing  Medical Precautions: Fall precautions  Precautions Comment: external fixator, wound vac  Vital Signs:     Pain:  Pain Assessment  Pain Assessment: 0-10  Pain Score: 5 - Moderate pain  Pain Type: Surgical pain  Pain " Location: Foot  Pain Orientation: Right  Pain Interventions: Elevated    Objective    Cognition:  Cognition  Overall Cognitive Status: Within Functional Limits  Orientation Level: Oriented X4  Insight: Moderate  Impulsive: Mildly  Activities of Daily Living: Grooming  Grooming Comments: declines    UE Bathing  UE Bathing Comments: declines       Toileting  Toileting Level of Assistance: Distant supervision  Where Assessed: Bedside commode  Toileting Comments: hygiene only, seated  Functional Standing Tolerance:  Time: 2minutes  Activity: self care  Functional Standing Tolerance Comments: fair balance, however, needs multiple cues for NWB RLE  Bed Mobility/Transfers: Bed Mobility 1  Bed Mobility 1: Supine to sitting, Sitting to supine  Level of Assistance 1: Close supervision    Transfer 1  Transfer From 1: Bed to  Transfer to 1:  (BSC)  Technique 1: To right  Transfer Device 1: Walker  Transfer Level of Assistance 1: Minimum assistance  Trials/Comments 1: Pt takes ~ 4 steps bed>BSC, cues to push through walker to assist with NWB  Transfers 2  Transfer From 2:  (BSC)  Transfer to 2: Chair with arms  Technique 2: To right  Transfer Device 2: Walker  Transfer Level of Assistance 2: Minimum assistance, +2  Trials/Comments 2: cues for NWB RLE       Therapy/Activity: Therapeutic Exercise  Therapeutic Exercise Activity 1: scap protraction/retraction  Therapeutic Exercise Activity 2: shoulder elevation  Therapeutic Exercise Activity 3: shoulder flexion  Therapeutic Exercise Activity 4: shoulder abd/add  Therapeutic Exercise Activity 5: 1 set x 15 reps, 1 lb free wt. Pt demonstrates good form, fair activity tolerance.    Outcome Measures:[facility] Daily Activity  Putting on and taking off regular lower body clothing: A lot  Bathing (including washing, rinsing, drying): A lot  Putting on and taking off regular upper body clothing: A little  Toileting, which includes using toilet, bedpan or urinal: A little  Taking care of  personal grooming such as brushing teeth: None  Eating Meals: None  Daily Activity - Total Score: 18        Education Documentation  Precautions, taught by GUNNER Wilson at 3/29/2024  9:12 AM.  Learner: Patient  Readiness: Acceptance  Method: Explanation, Demonstration  Response: Verbalizes Understanding, Needs Reinforcement    Home Exercise Program, taught by GUNNER Wilson at 3/29/2024  9:12 AM.  Learner: Patient  Readiness: Acceptance  Method: Explanation, Demonstration  Response: Verbalizes Understanding, Needs Reinforcement    ADL Training, taught by GUNNER Wilson at 3/29/2024  9:12 AM.  Learner: Patient  Readiness: Acceptance  Method: Explanation, Demonstration  Response: Verbalizes Understanding, Needs Reinforcement    Home Exercise Program, taught by GUNNER Wilson at 3/28/2024  1:59 PM.  Learner: Patient  Readiness: Acceptance  Method: Explanation, Demonstration  Response: Verbalizes Understanding, Demonstrated Understanding    Education Comments  No comments found.      IP EDUCATION:  Education  Individual(s) Educated: Patient  Education Provided: Fall precautons, Other (NWB technique, risks of prolonged bedrest)  Patient Response to Education: Patient/Caregiver Verbalized Understanding of Information    Goals:  Encounter Problems       Encounter Problems (Active)       OT Goals       ADLs (Progressing)       Start:  03/27/24    Expected End:  04/10/24       Pt will complete bathing, dressing, and toileting tasks with setup assist using adaptive aides as needed.         Functional transfers (Progressing)       Start:  03/27/24    Expected End:  04/10/24       Pt will complete toilet, bed, and chair transfers independently from elevated seat heights and using bilateral arm supports as needed.           UE strength (Progressing)       Start:  03/27/24    Expected End:  04/10/24       Pt will increase UE strength from 4 to 4+/5 in order to maintain NWB status of right LE for transfers/mobility.

## 2024-03-30 LAB
ALBUMIN SERPL-MCNC: 3 G/DL (ref 3.5–5)
ALP BLD-CCNC: 81 U/L (ref 35–125)
ALT SERPL-CCNC: 16 U/L (ref 5–40)
ANION GAP SERPL CALC-SCNC: 9 MMOL/L
AST SERPL-CCNC: 15 U/L (ref 5–40)
BILIRUB SERPL-MCNC: 0.2 MG/DL (ref 0.1–1.2)
BUN SERPL-MCNC: 22 MG/DL (ref 8–25)
CALCIUM SERPL-MCNC: 8.6 MG/DL (ref 8.5–10.4)
CHLORIDE SERPL-SCNC: 104 MMOL/L (ref 97–107)
CO2 SERPL-SCNC: 20 MMOL/L (ref 24–31)
CREAT SERPL-MCNC: 0.9 MG/DL (ref 0.4–1.6)
EGFRCR SERPLBLD CKD-EPI 2021: >90 ML/MIN/1.73M*2
ERYTHROCYTE [DISTWIDTH] IN BLOOD BY AUTOMATED COUNT: 13.3 % (ref 11.5–14.5)
GLUCOSE BLD MANUAL STRIP-MCNC: 117 MG/DL (ref 74–99)
GLUCOSE BLD MANUAL STRIP-MCNC: 117 MG/DL (ref 74–99)
GLUCOSE BLD MANUAL STRIP-MCNC: 118 MG/DL (ref 74–99)
GLUCOSE BLD MANUAL STRIP-MCNC: 133 MG/DL (ref 74–99)
GLUCOSE SERPL-MCNC: 105 MG/DL (ref 65–99)
HCT VFR BLD AUTO: 22 % (ref 41–52)
HGB BLD-MCNC: 7.8 G/DL (ref 13.5–17.5)
MAGNESIUM SERPL-MCNC: 1.7 MG/DL (ref 1.6–3.1)
MCH RBC QN AUTO: 29.9 PG (ref 26–34)
MCHC RBC AUTO-ENTMCNC: 35.5 G/DL (ref 32–36)
MCV RBC AUTO: 84 FL (ref 80–100)
NRBC BLD-RTO: 0 /100 WBCS (ref 0–0)
PLATELET # BLD AUTO: 118 X10*3/UL (ref 150–450)
POTASSIUM SERPL-SCNC: 4.1 MMOL/L (ref 3.4–5.1)
PROT SERPL-MCNC: 5.6 G/DL (ref 5.9–7.9)
RBC # BLD AUTO: 2.61 X10*6/UL (ref 4.5–5.9)
SODIUM SERPL-SCNC: 133 MMOL/L (ref 133–145)
VANCOMYCIN SERPL-MCNC: 16.6 UG/ML (ref 10–20)
WBC # BLD AUTO: 7.8 X10*3/UL (ref 4.4–11.3)

## 2024-03-30 PROCEDURE — 85027 COMPLETE CBC AUTOMATED: CPT

## 2024-03-30 PROCEDURE — 80053 COMPREHEN METABOLIC PANEL: CPT

## 2024-03-30 PROCEDURE — 97110 THERAPEUTIC EXERCISES: CPT | Mod: GO

## 2024-03-30 PROCEDURE — 2500000001 HC RX 250 WO HCPCS SELF ADMINISTERED DRUGS (ALT 637 FOR MEDICARE OP): Performed by: NURSE PRACTITIONER

## 2024-03-30 PROCEDURE — 2500000002 HC RX 250 W HCPCS SELF ADMINISTERED DRUGS (ALT 637 FOR MEDICARE OP, ALT 636 FOR OP/ED)

## 2024-03-30 PROCEDURE — 2500000001 HC RX 250 WO HCPCS SELF ADMINISTERED DRUGS (ALT 637 FOR MEDICARE OP)

## 2024-03-30 PROCEDURE — 1100000001 HC PRIVATE ROOM DAILY

## 2024-03-30 PROCEDURE — 97116 GAIT TRAINING THERAPY: CPT | Mod: GP,CQ

## 2024-03-30 PROCEDURE — 2500000004 HC RX 250 GENERAL PHARMACY W/ HCPCS (ALT 636 FOR OP/ED)

## 2024-03-30 PROCEDURE — 82947 ASSAY GLUCOSE BLOOD QUANT: CPT

## 2024-03-30 PROCEDURE — 80202 ASSAY OF VANCOMYCIN: CPT

## 2024-03-30 PROCEDURE — 83735 ASSAY OF MAGNESIUM: CPT

## 2024-03-30 PROCEDURE — 97110 THERAPEUTIC EXERCISES: CPT | Mod: GP,CQ

## 2024-03-30 RX ORDER — VANCOMYCIN 1 G/200ML
1 INJECTION, SOLUTION INTRAVENOUS EVERY 12 HOURS
Status: DISCONTINUED | OUTPATIENT
Start: 2024-03-30 | End: 2024-04-01 | Stop reason: HOSPADM

## 2024-03-30 RX ADMIN — ATORVASTATIN CALCIUM 40 MG: 40 TABLET, FILM COATED ORAL at 10:11

## 2024-03-30 RX ADMIN — VANCOMYCIN 750 MG: 750 INJECTION, SOLUTION INTRAVENOUS at 10:29

## 2024-03-30 RX ADMIN — CYCLOBENZAPRINE 10 MG: 10 TABLET, FILM COATED ORAL at 01:06

## 2024-03-30 RX ADMIN — OXYCODONE 5 MG: 5 TABLET ORAL at 12:13

## 2024-03-30 RX ADMIN — METFORMIN HYDROCHLORIDE 500 MG: 500 TABLET, FILM COATED ORAL at 10:29

## 2024-03-30 RX ADMIN — PANTOPRAZOLE SODIUM 40 MG: 40 TABLET, DELAYED RELEASE ORAL at 05:53

## 2024-03-30 RX ADMIN — HEPARIN SODIUM 5000 UNITS: 5000 INJECTION, SOLUTION INTRAVENOUS; SUBCUTANEOUS at 05:53

## 2024-03-30 RX ADMIN — CARVEDILOL 12.5 MG: 12.5 TABLET, FILM COATED ORAL at 10:29

## 2024-03-30 RX ADMIN — HEPARIN SODIUM 5000 UNITS: 5000 INJECTION, SOLUTION INTRAVENOUS; SUBCUTANEOUS at 21:19

## 2024-03-30 RX ADMIN — VANCOMYCIN 1 G: 1 INJECTION, SOLUTION INTRAVENOUS at 21:16

## 2024-03-30 RX ADMIN — CARVEDILOL 12.5 MG: 12.5 TABLET, FILM COATED ORAL at 18:15

## 2024-03-30 RX ADMIN — HEPARIN SODIUM 5000 UNITS: 5000 INJECTION, SOLUTION INTRAVENOUS; SUBCUTANEOUS at 14:29

## 2024-03-30 RX ADMIN — CYCLOBENZAPRINE 10 MG: 10 TABLET, FILM COATED ORAL at 21:23

## 2024-03-30 RX ADMIN — FINASTERIDE 5 MG: 5 TABLET, FILM COATED ORAL at 10:11

## 2024-03-30 RX ADMIN — OXYCODONE 5 MG: 5 TABLET ORAL at 21:23

## 2024-03-30 RX ADMIN — OLANZAPINE 20 MG: 10 TABLET, FILM COATED ORAL at 21:18

## 2024-03-30 RX ADMIN — CYCLOBENZAPRINE 10 MG: 10 TABLET, FILM COATED ORAL at 12:13

## 2024-03-30 RX ADMIN — SPIRONOLACTONE 25 MG: 25 TABLET ORAL at 10:11

## 2024-03-30 RX ADMIN — DOCUSATE SODIUM 100 MG: 100 CAPSULE, LIQUID FILLED ORAL at 21:18

## 2024-03-30 RX ADMIN — ASPIRIN 81 MG: 81 TABLET, COATED ORAL at 10:11

## 2024-03-30 RX ADMIN — POLYETHYLENE GLYCOL 3350 17 G: 17 POWDER, FOR SOLUTION ORAL at 10:12

## 2024-03-30 ASSESSMENT — COGNITIVE AND FUNCTIONAL STATUS - GENERAL
CLIMB 3 TO 5 STEPS WITH RAILING: TOTAL
WALKING IN HOSPITAL ROOM: TOTAL
MOVING TO AND FROM BED TO CHAIR: A LITTLE
DRESSING REGULAR LOWER BODY CLOTHING: TOTAL
HELP NEEDED FOR BATHING: A LOT
MOVING FROM LYING ON BACK TO SITTING ON SIDE OF FLAT BED WITH BEDRAILS: A LITTLE
DRESSING REGULAR UPPER BODY CLOTHING: A LITTLE
HELP NEEDED FOR BATHING: A LOT
TURNING FROM BACK TO SIDE WHILE IN FLAT BAD: A LITTLE
DRESSING REGULAR UPPER BODY CLOTHING: A LITTLE
CLIMB 3 TO 5 STEPS WITH RAILING: TOTAL
MOVING TO AND FROM BED TO CHAIR: A LITTLE
WALKING IN HOSPITAL ROOM: TOTAL
STANDING UP FROM CHAIR USING ARMS: A LITTLE
MOBILITY SCORE: 14
TOILETING: A LOT
TOILETING: A LOT
DAILY ACTIVITIY SCORE: 15
PERSONAL GROOMING: A LITTLE
MOVING FROM LYING ON BACK TO SITTING ON SIDE OF FLAT BED WITH BEDRAILS: A LITTLE
MOBILITY SCORE: 14
DAILY ACTIVITIY SCORE: 15
PERSONAL GROOMING: A LITTLE
TURNING FROM BACK TO SIDE WHILE IN FLAT BAD: A LITTLE
DRESSING REGULAR LOWER BODY CLOTHING: TOTAL
STANDING UP FROM CHAIR USING ARMS: A LITTLE

## 2024-03-30 ASSESSMENT — PAIN - FUNCTIONAL ASSESSMENT
PAIN_FUNCTIONAL_ASSESSMENT: WONG-BAKER FACES
PAIN_FUNCTIONAL_ASSESSMENT: 0-10

## 2024-03-30 ASSESSMENT — PAIN SCALES - GENERAL
PAINLEVEL_OUTOF10: 6
PAINLEVEL_OUTOF10: 5 - MODERATE PAIN
PAINLEVEL_OUTOF10: 7
PAINLEVEL_OUTOF10: 0 - NO PAIN
PAINLEVEL_OUTOF10: 6
PAINLEVEL_OUTOF10: 7
PAINLEVEL_OUTOF10: 6
PAINLEVEL_OUTOF10: 7

## 2024-03-30 ASSESSMENT — PAIN SCALES - WONG BAKER: WONGBAKER_NUMERICALRESPONSE: HURTS WHOLE LOT

## 2024-03-30 ASSESSMENT — PAIN DESCRIPTION - ORIENTATION: ORIENTATION: RIGHT

## 2024-03-30 NOTE — CARE PLAN
Problem: Pain  Goal: My pain/discomfort is manageable  Outcome: Progressing     Problem: Safety  Goal: Patient will be injury free during hospitalization  Outcome: Progressing  Goal: I will remain free of falls  Outcome: Progressing     Problem: Daily Care  Goal: Daily care needs are met  Outcome: Progressing     Problem: Psychosocial Needs  Goal: Demonstrates ability to cope with hospitalization/illness  Outcome: Progressing  Goal: Collaborate with me, my family, and caregiver to identify my specific goals  Outcome: Progressing     Problem: Discharge Barriers  Goal: My discharge needs are met  Outcome: Progressing     Problem: Fall/Injury  Goal: Not fall by end of shift  Outcome: Progressing  Goal: Be free from injury by end of the shift  Outcome: Progressing  Goal: Verbalize understanding of personal risk factors for fall in the hospital  Outcome: Progressing  Goal: Verbalize understanding of risk factor reduction measures to prevent injury from fall in the home  Outcome: Progressing  Goal: Use assistive devices by end of the shift  Outcome: Progressing  Goal: Pace activities to prevent fatigue by end of the shift  Outcome: Progressing     Problem: Diabetes  Goal: Achieve decreasing blood glucose levels by end of shift  Outcome: Progressing  Goal: Increase stability of blood glucose readings by end of shift  Outcome: Progressing  Goal: Maintain electrolyte levels within acceptable range throughout shift  Outcome: Progressing  Goal: Maintain glucose levels >70mg/dl to <250mg/dl throughout shift  Outcome: Progressing  Goal: No changes in neurological exam by end of shift  Outcome: Progressing  Goal: Learn about and adhere to nutrition recommendations by end of shift  Outcome: Progressing  Goal: Vital signs within normal range for age by end of shift  Outcome: Progressing  Goal: Increase self care and/or family involovement by end of shift  Outcome: Progressing     Problem: Pain  Goal: Takes deep breaths with  improved pain control throughout the shift  Outcome: Progressing  Goal: Turns in bed with improved pain control throughout the shift  Outcome: Progressing  Goal: Performs ADL's with improved pain control throughout shift  Outcome: Progressing  Goal: Participates in PT with improved pain control throughout the shift  Outcome: Progressing  Goal: Free from opioid side effects throughout the shift  Outcome: Progressing  Goal: Free from acute confusion related to pain meds throughout the shift  Outcome: Progressing     Problem: Safety - Adult  Goal: Free from fall injury  Outcome: Progressing     Problem: Discharge Planning  Goal: Discharge to home or other facility with appropriate resources  Outcome: Progressing     Problem: Chronic Conditions and Co-morbidities  Goal: Patient's chronic conditions and co-morbidity symptoms are monitored and maintained or improved  Outcome: Progressing   The patient's goals for the shift include Assist with pain control    The clinical goals for the shift include pain management    Over the shift, the patient did not make progress toward the following goals. Barriers to progression include na. Recommendations to address these barriers include na.

## 2024-03-30 NOTE — PROGRESS NOTES
"Vancomycin Dosing by Pharmacy- FOLLOW UP    Claude Coley is a 63 y.o. year old male who Pharmacy has been consulted for vancomycin dosing for other (diabetic foot infection) . Based on the patient's indication and renal status this patient is being dosed based on a goal AUC of 400-600.     Renal function is currently stable.    Current vancomycin dose: 750 mg given every 12 hours    Estimated vancomycin AUC on current dose: 390 mg/L.hr     Visit Vitals  /60 (BP Location: Left arm, Patient Position: Lying)   Pulse 64   Temp 35.8 °C (96.4 °F) (Temporal)   Resp 18        Lab Results   Component Value Date    CREATININE 0.90 03/30/2024    CREATININE 1.10 03/28/2024    CREATININE 0.90 03/25/2024    CREATININE 1.00 03/24/2024        Patient weight is 118 Kg.    No results found for: \"CULTURE\"     I/O last 3 completed shifts:  In: 1300 (11 mL/kg) [P.O.:650; IV Piggyback:650]  Out: 970 (8.2 mL/kg) [Urine:850 (0.2 mL/kg/hr); Drains:120]  Weight: 118 kg   [unfilled]    No results found for: \"PATIENTTEMP\"     Assessment/Plan    Below goal AUC. Orders placed for new vancomcyin regimen of 1000 every 12 hours to begin at 3/30/24 at 2200hrs..     This dosing regimen is predicted by [employer]ight[employer] to result in the following pharmacokinetic parameters:    Loading dose: N/A  Regimen: 1000 mg IV every 12 hours.  Start time: 22:29 on 03/30/2024  Exposure target: AUC24 (range)400-600 mg/L.hr   AUC24,ss: 513 mg/L.hr  Probability of AUC24 > 400: 97 %  Ctrough,ss: 17.3 mg/L  Probability of Ctrough,ss > 20: 21 %  Probability of nephrotoxicity (Lodise ADRYAN 2009): 13 %      The next level will be obtained on 3/31/24 at AM labs. May be obtained sooner if clinically indicated.   Will continue to monitor renal function daily while on vancomycin and order serum creatinine at least every 48 hours if not already ordered.  Follow for continued vancomycin needs, clinical response, and signs/symptoms of toxicity.       Nikolai Scott, PharmD "

## 2024-03-30 NOTE — PROGRESS NOTES
Claude Coley is a 63 y.o. male on day 2 of admission presenting with Type 2 diabetes mellitus with right diabetic foot ulcer (CMS/HCC).      Subjective   Patient resting in bed denies any pain or sob.        Objective     Last Recorded Vitals  /59 (BP Location: Left arm, Patient Position: Lying)   Pulse 66   Temp 36.7 °C (98.1 °F) (Temporal)   Resp 18   Wt 118 kg (260 lb 2.3 oz)   SpO2 96%   Intake/Output last 3 Shifts:    Intake/Output Summary (Last 24 hours) at 3/30/2024 1305  Last data filed at 3/30/2024 0900  Gross per 24 hour   Intake 400 ml   Output 410 ml   Net -10 ml       Admission Weight  Weight: 118 kg (260 lb 2.3 oz) (03/27/24 0600)    Daily Weight  03/27/24 : 118 kg (260 lb 2.3 oz)    Image Results  XR chest 1 view  Narrative: Interpreted By:  Kirstie Moy,   STUDY:  XR CHEST 1 VIEW 3/27/2024 4:36 pm      INDICATION:  Signs/Symptoms:PICC placement      COMPARISON:  03/20/2024      ACCESSION NUMBER(S):  [id number]      ORDERING CLINICIAN:  SANIYA HOWARD      TECHNIQUE:  AP erect view of the chest      FINDINGS:  A right arm PICC line terminates in the SVC. There is no  pneumothorax. Lungs are clear without pleural abnormality.      The cardiac size is normal. No mediastinal or hilar abnormality is  seen.      Impression: Right arm PICC line terminating in the SVC.      No pneumothorax.      Signed by: Kirstie Moy 3/27/2024 4:50 PM  Dictation workstation:   [id number]  FL fluoro images no charge  These images are not reportable by radiology and will not be interpreted   by  Radiologists.      Physical Exam  Constitutional:       Appearance: Normal appearance.   Cardiovascular:      Rate and Rhythm: Normal rate and regular rhythm.      Pulses: Normal pulses.      Heart sounds: Normal heart sounds.   Pulmonary:      Effort: Pulmonary effort is normal.      Breath sounds: Normal breath sounds.   Abdominal:      General: Bowel sounds are normal.      Palpations: Abdomen is soft.    Musculoskeletal:         General: Normal range of motion.   Skin:     General: Skin is warm and dry.   Neurological:      Mental Status: He is alert and oriented to person, place, and time.       Relevant Results             Results for orders placed or performed during the hospital encounter of 03/27/24 (from the past 24 hour(s))   POCT GLUCOSE   Result Value Ref Range    POCT Glucose 128 (H) 74 - 99 mg/dL   POCT GLUCOSE   Result Value Ref Range    POCT Glucose 116 (H) 74 - 99 mg/dL   POCT GLUCOSE   Result Value Ref Range    POCT Glucose 129 (H) 74 - 99 mg/dL   Vancomycin   Result Value Ref Range    Vancomycin 16.6 10.0 - 20.0 ug/mL   Comprehensive metabolic panel   Result Value Ref Range    Glucose 105 (H) 65 - 99 mg/dL    Sodium 133 133 - 145 mmol/L    Potassium 4.1 3.4 - 5.1 mmol/L    Chloride 104 97 - 107 mmol/L    Bicarbonate 20 (L) 24 - 31 mmol/L    Urea Nitrogen 22 8 - 25 mg/dL    Creatinine 0.90 0.40 - 1.60 mg/dL    eGFR >90 >60 mL/min/1.73m*2    Calcium 8.6 8.5 - 10.4 mg/dL    Albumin 3.0 (L) 3.5 - 5.0 g/dL    Alkaline Phosphatase 81 35 - 125 U/L    Total Protein 5.6 (L) 5.9 - 7.9 g/dL    AST 15 5 - 40 U/L    Bilirubin, Total 0.2 0.1 - 1.2 mg/dL    ALT 16 5 - 40 U/L    Anion Gap 9 <=19 mmol/L   Magnesium   Result Value Ref Range    Magnesium 1.70 1.60 - 3.10 mg/dL   CBC   Result Value Ref Range    WBC 7.8 4.4 - 11.3 x10*3/uL    nRBC 0.0 0.0 - 0.0 /100 WBCs    RBC 2.61 (L) 4.50 - 5.90 x10*6/uL    Hemoglobin 7.8 (L) 13.5 - 17.5 g/dL    Hematocrit 22.0 (L) 41.0 - 52.0 %    MCV 84 80 - 100 fL    MCH 29.9 26.0 - 34.0 pg    MCHC 35.5 32.0 - 36.0 g/dL    RDW 13.3 11.5 - 14.5 %    Platelets 118 (L) 150 - 450 x10*3/uL   POCT GLUCOSE   Result Value Ref Range    POCT Glucose 117 (H) 74 - 99 mg/dL   POCT GLUCOSE   Result Value Ref Range    POCT Glucose 118 (H) 74 - 99 mg/dL           Assessment/Plan        This patient has a central line   Reason for the central line remaining today? Parenteral  medication            Principal Problem:    Type 2 diabetes mellitus with right diabetic foot ulcer (CMS/HCC)    Infected Right Foot  ID following  Vancomycin     Hypertension with a period of Hypotension  Continue coreg, and spirolactone and add administration parameters  Continue to hold lisinopril and lasix.  Blood pressure was stable overnight.       DVT/GI prophylaxis  Heparin sub cutaneous  Adding protonix     Plan of Care  Home Medication reviewed  Plan of care discussed with patient and collaborating physician.            Cynthia Daugherty, APRN-CNP

## 2024-03-30 NOTE — PROGRESS NOTES
PODIATRY SERVICE CONSULT PROGRESS NOTE    SERVICE DATE: 3/30/2024   SERVICE TIME:  10:32 AM    Subjective   INTERVAL HPI:   Patient was seen at bedside.  Hypotension improved.   Pain well controlled on current regimen.  Patient denies any constitutional symptoms at this time.   No other pedal complaints.   Likely to stay through weekend pending precert for [facility].      Medications:  Scheduled Meds: aspirin, 81 mg, oral, Daily  atorvastatin, 40 mg, oral, Daily  carvedilol, 12.5 mg, oral, BID with meals  docusate sodium, 100 mg, oral, BID  finasteride, 5 mg, oral, Daily  [Held by provider] furosemide, 20 mg, oral, Daily  heparin (porcine), 5,000 Units, subcutaneous, q8h LARRY  insulin lispro, 0-5 Units, subcutaneous, TID with meals  [Held by provider] lisinopril, 20 mg, oral, Daily  metFORMIN, 500 mg, oral, Daily with breakfast  OLANZapine, 20 mg, oral, Nightly  pantoprazole, 40 mg, oral, Daily before breakfast  polyethylene glycol, 17 g, oral, Daily  spironolactone, 25 mg, oral, Daily  vancomycin-diluent combo no.1, 750 mg, intravenous, q12h      Continuous Infusions:    PRN Meds: PRN medications: acetaminophen, alteplase, cyclobenzaprine, dextrose, dextrose, diphenhydrAMINE, glucagon, morphine, naloxone, ondansetron ODT **OR** ondansetron, oxyCODONE, promethazine **OR** promethazine         Objective   PHYSICAL EXAM:  Physical Exam Performed:  Vitals:    03/30/24 0822   BP: 133/60   Pulse: 64   Resp: 18   Temp: 35.8 °C (96.4 °F)   SpO2: 96%     Body mass index is 33.4 kg/m².    Patient is AOx3 and in no acute distress. Patient is alert and cooperative. Sitting comfortably in bed with dressing clean, dry and intact.      Vascular: Palpable DP/PT pulses B/L. Moderate pitting edema noted. Hair growth present B/L. CFT<5 to B/L hallux. Temperature is warm to cool from tibial tuberosity to distal digits B/L. No lymphatic streaking noted B/L.     Musculoskeletal: External fixator in place to right foot,  ankle, and lower leg. Gross active and passive ROM intact to age and activity level. Moves all extremities spontaneously. No pain to palpation at feet B/L.      Neurological: Absent light touch sensation B/L. Pain stimuli diminished B/L. Denies any numbness, burning or tingling.     Dermatologic: Nails 1-5 are thickened, elongated, discolored with subungual debris B/L. Skin appears diffusely xerotic B/L. Web spaces 1-4 B/L are clean, dry and intact. No rashes or nodules noted B/L.   Surgical Dressing intact over right lower extremity and external fixator.   Wound vac functioning without any warnings, serosanguinous drainage, 100 mL.  SERA drain intact, serosanguinous drainage, 50 mL      LABS:   Results for orders placed or performed during the hospital encounter of 03/27/24 (from the past 24 hour(s))   POCT GLUCOSE   Result Value Ref Range    POCT Glucose 178 (H) 74 - 99 mg/dL   POCT GLUCOSE   Result Value Ref Range    POCT Glucose 128 (H) 74 - 99 mg/dL   POCT GLUCOSE   Result Value Ref Range    POCT Glucose 116 (H) 74 - 99 mg/dL   POCT GLUCOSE   Result Value Ref Range    POCT Glucose 129 (H) 74 - 99 mg/dL   Vancomycin   Result Value Ref Range    Vancomycin 16.6 10.0 - 20.0 ug/mL   Comprehensive metabolic panel   Result Value Ref Range    Glucose 105 (H) 65 - 99 mg/dL    Sodium 133 133 - 145 mmol/L    Potassium 4.1 3.4 - 5.1 mmol/L    Chloride 104 97 - 107 mmol/L    Bicarbonate 20 (L) 24 - 31 mmol/L    Urea Nitrogen 22 8 - 25 mg/dL    Creatinine 0.90 0.40 - 1.60 mg/dL    eGFR >90 >60 mL/min/1.73m*2    Calcium 8.6 8.5 - 10.4 mg/dL    Albumin 3.0 (L) 3.5 - 5.0 g/dL    Alkaline Phosphatase 81 35 - 125 U/L    Total Protein 5.6 (L) 5.9 - 7.9 g/dL    AST 15 5 - 40 U/L    Bilirubin, Total 0.2 0.1 - 1.2 mg/dL    ALT 16 5 - 40 U/L    Anion Gap 9 <=19 mmol/L   Magnesium   Result Value Ref Range    Magnesium 1.70 1.60 - 3.10 mg/dL   CBC   Result Value Ref Range    WBC 7.8 4.4 - 11.3 x10*3/uL    nRBC 0.0 0.0 - 0.0 /100 WBCs     "RBC 2.61 (L) 4.50 - 5.90 x10*6/uL    Hemoglobin 7.8 (L) 13.5 - 17.5 g/dL    Hematocrit 22.0 (L) 41.0 - 52.0 %    MCV 84 80 - 100 fL    MCH 29.9 26.0 - 34.0 pg    MCHC 35.5 32.0 - 36.0 g/dL    RDW 13.3 11.5 - 14.5 %    Platelets 118 (L) 150 - 450 x10*3/uL   POCT GLUCOSE   Result Value Ref Range    POCT Glucose 117 (H) 74 - 99 mg/dL      Lab Results   Component Value Date    HGBA1C 4.4 03/19/2024      No results found for: \"CRP\"   Lab Results   Component Value Date    SEDRATE 47 (H) 03/19/2024        Results from last 7 days   Lab Units 03/30/24  0456   WBC AUTO x10*3/uL 7.8   RBC AUTO x10*6/uL 2.61*   HEMOGLOBIN g/dL 7.8*   HEMATOCRIT % 22.0*     Results from last 7 days   Lab Units 03/30/24  0454   SODIUM mmol/L 133   POTASSIUM mmol/L 4.1   CHLORIDE mmol/L 104   CO2 mmol/L 20*   BUN mg/dL 22   CREATININE mg/dL 0.90   CALCIUM mg/dL 8.6   MAGNESIUM mg/dL 1.70   BILIRUBIN TOTAL mg/dL 0.2   ALT U/L 16   AST U/L 15           IMAGING REVIEW:  XR chest 1 view    Result Date: 3/27/2024  Interpreted By:  Kirstie Moy, STUDY: XR CHEST 1 VIEW 3/27/2024 4:36 pm   INDICATION: Signs/Symptoms:PICC placement   COMPARISON: 03/20/2024   ACCESSION NUMBER(S): [id number]   ORDERING CLINICIAN: SANIYA HOWARD   TECHNIQUE: AP erect view of the chest   FINDINGS: A right arm PICC line terminates in the SVC. There is no pneumothorax. Lungs are clear without pleural abnormality.   The cardiac size is normal. No mediastinal or hilar abnormality is seen.       Right arm PICC line terminating in the SVC.   No pneumothorax.   Signed by: Kirstie Moy 3/27/2024 4:50 PM Dictation workstation:   [id number]    FL fluoro images no charge    Result Date: 3/27/2024  These images are not reportable by radiology and will not be interpreted by  Radiologists.    Bedside PICC Imaging    Result Date: 3/25/2024  These images are not reportable by radiology and will not be interpreted by  Radiologists.    XR chest 1 view    Result Date: " 3/20/2024  Interpreted By:  Nael Moore, STUDY: XR CHEST 1 VIEW; 3/20/2024 3:57 pm   INDICATION: Signs/Symptoms:PICC placement.   COMPARISON: None   ACCESSION NUMBER(S): [id number]   ORDERING CLINICIAN: ANGELITA YOUSSEF   TECHNIQUE: 1 view of the chest was performed.   FINDINGS: Right-sided PICC line catheter with tip at the mid SVC. The lungs are adequately inflated. No acute consolidation. No pleural effusion. No pneumothorax.  The cardiomediastinal silhouette is within normal limits.       Right-sided PICC line catheter with tip at the mid SVC. No acute cardiopulmonary disease.   Signed by: Nael Moore 3/20/2024 4:14 PM Dictation workstation:   [id number]    Lower extremity venous duplex right    Result Date: 3/19/2024  Interpreted By:  Favian Castellano, STUDY: VASC US LOWER EXTREMITY VENOUS DUPLEX RIGHT; 3/19/2024 3:22 pm   INDICATION: Signs/Symptoms:leg swelling + d dimer..   COMPARISON: None.   ACCESSION NUMBER(S): [id number]   ORDERING CLINICIAN: CHARO FRIED   TECHNIQUE: Vascular ultrasound of the right lower extremity was performed. Real time compression views as well as Gray scale, color Doppler and spectral Doppler waveform analysis was performed.   FINDINGS: Evaluation of the visualized portions of the right common femoral vein, proximal, mid, and distal femoral vein, and popliteal vein were performed.  Evaluation of the visualized portions of the posterior tibial and peroneal veins were also performed.  In addition, evaluation of the contralateral common femoral vein was performed.   Limitations: None   The evaluated veins demonstrate normal compressibility. There is intact venous flow demonstrating normal respiratory variability and normal augmentation of flow with calf compression. Therefore, there is no ultrasonographic evidence for deep vein thrombosis within the evaluated veins. No evidence of thrombus is seen within the contralateral common femoral vein.       No sonographic evidence  for deep vein thrombosis within the evaluated veins of the right lower extremity.   MACRO: None   Signed by: Favian Castellano 3/19/2024 3:22 PM Dictation workstation:   [id number]    XR foot right 3+ views    Result Date: 3/19/2024  Interpreted By:  Lux Hernandez, STUDY: XR FOOT RIGHT 3+ VIEWS   INDICATION: Signs/Symptoms:r foot wound.   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   Suspected skin wounds at the surgical site. No aggressive bony destructive changes or erosions.   Marked soft tissue swelling dorsally.       Suspected skin wounds of the 5th amputation site with no osseous acute abnormality.   Marked dorsal soft tissue swelling, potentially cellulitis.   Signed by: Lux Hernandez 3/19/2024 2:09 PM Dictation workstation:   [id number]            Assessment/Plan   ASSESSMENT & PLAN:    # S/P RLE peroneus brevis muscle flap, wound debridement, Integra bilayer, wound vac, external fixator.  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  - Charts, labs, vitals and imaging all reviewed.   - Hypotension improved, 133/60 today  - WBC 7.8, HGB 7.8,      Plan:  - Right foot surgical dressing is to remain clean, dry and intact until changed by Podiatry, likely Tuesday 4/02 if still inpatient.   - Prior to discharge to SNF, patient's vac will need to be removed with placement of Adaptic and gauze dressing, and KCI vac re-applied at facility.  - Medicine consulted for management, appreciate recs!  - ID consulted for management of long-term abx, appreciate  recs!  - Abx: Continue IV Vancomycin 0.75g q12h per ID  - Pain Regimen: Tylenol, oxycodone, morphine for breakthrough  - Bowel Regimen: Miralax, Colace  - Lisinopril and furosemide held   - Carvedilol and spironolactone may be held if systolic BP<100.  - Diet: diabetic diet  - Podiatry will continue to follow.     DVT ppx: heparin subcutaneous 5000U q8h  GI ppx: protonix 40mg qd  Weightbearing: NON-weight-bearing to RLE  Discharge: Patient to follow up with Dr. Vicky Winn 4/02/24 at 10:30am unless still inpatient. Plan for discharge to [facility], pending precert.    Case to be discussed with attending, A&P above reflects a tentative plan. Please await for the final signature from the attending physician on service.    This patient will be followed by the Podiatry service. Please Epic Chat the corresponding residents below with questions or concerns.      Hannah Delgado DPM PGY-2  Podiatric Medicine and Surgery   Epic Chat            SIGNATURE: Hannah Delgado DPM PATIENT NAME: Claude Coley   DATE: March 30, 2024 MRN: [id number]   TIME: 10:32 AM CONTACT: [facility]

## 2024-03-30 NOTE — PROGRESS NOTES
Physical Therapy    Physical Therapy Treatment    Patient Name: Claude Coley  MRN: [id number]  Today's Date: 3/30/2024  Time Calculation  Start Time: 0900  Stop Time: 0930  Time Calculation (min): 30 min       Assessment/Plan   PT Assessment  End of Session Communication: Bedside nurse  Assessment Comment: Stood longer able to maintain NWB R LE  End of Session Patient Position: Alarm on (On side of bed. Alarm on)     PT Plan  Treatment/Interventions: Bed mobility, Transfer training, Gait training, Balance training, Strengthening, Endurance training, Range of motion, Therapeutic exercise, Therapeutic activity, Home exercise program  PT Plan: Skilled PT  PT Frequency: 5 times per week  PT Discharge Recommendations: Moderate intensity level of continued care  Equipment Recommended upon Discharge: Wheeled walker  PT Recommended Transfer Status: Assist x1, Assistive device  PT - OK to Discharge: Yes      General Visit Information:   PT  Visit  PT Received On: 03/30/24  General  Reason for Referral: recent surgery  Referred By: Dr Lopez  Past Medical History Relevant to Rehab: DM, cellulitis, HTN, HLD, CHF, MO  Prior to Session Communication: Bedside nurse  Patient Position Received: Alarm off, not on at start of session (Sitting on EOB finished breakfast)  Preferred Learning Style: verbal, visual  General Comment: Agreeable to therapy    Subjective   Precautions:  Precautions  LE Weight Bearing Status: Right Non-Weight Bearing  Medical Precautions: Fall precautions  Precautions Comment: external fixator, wound vac  Vital Signs:       Objective   Pain:  Pain Assessment  Pain Assessment: 0-10  Pain Score: 7  Pain Type: Surgical pain  Pain Location: Foot  Pain Orientation: Right  Pain Frequency: Intermittent  Pain Onset: Ongoing  Clinical Progression: Not changed  Cognition:  Cognition  Overall Cognitive Status: Within Functional Limits  Orientation Level: Oriented X4  Insight: Moderate  Impulsive: Mildly  Postural  Control:  Postural Control  Posture Comment: NWB RLE  Static Standing Balance  Static Standing-Balance Support: Right upper extremity supported (NWB Rc LE)  Static Standing-Level of Assistance: Contact guard  Static Standing-Comment/Number of Minutes: Stood with RW NWB R LE  Extremity/Trunk Assessments:    Activity Tolerance:     Treatments:  Therapeutic Exercise  Therapeutic Exercise Performed: Yes  Therapeutic Exercise Activity 1: B LE seated ther ex:LAQs, hip flexion, carina hip adduction with pllow R foot off surface,Resisitive hip abduction with R foot off surface x 20 Heel and toe raises x 20 L foot    Ambulation/Gait Training  Ambulation/Gait Training Performed: Yes  Ambulation/Gait Training 1  Surface 1: Level tile  Device 1: Rolling walker  Assistance 1: Contact guard  Quality of Gait 1: Narrow base of support  Comments/Distance (ft) 1: Stood x 3 trials First time for 60 seconds second time for 70 second third time for 80 seconds with contact guard assist once standing with NWB R LE No gait unable to maintain NWB  Transfers  Transfer: Yes  Transfer 1  Transfer From 1: Bed to  Transfer to 1: Stand  Technique 1: Sit to stand, Stand to sit  Transfer Device 1: Walker  Transfer Level of Assistance 1: Contact guard  Trials/Comments 1: x 3 trails with patients R LE off surface to maintain NWB with STS transfers.  Transfers 2  Transfer From 2: Stand to  Transfer to 2: Sit  Technique 2: Stand to sit, Sit to stand  Transfer Device 2: Walker  Transfer Level of Assistance 2: Contact guard  Trials/Comments 2: x 3 trailswitrh contact guard assist to keep R LE off surface with STS    Outcome Measures:  [facility] Basic Mobility  Turning from your back to your side while in a flat bed without using bedrails: A little  Moving from lying on your back to sitting on the side of a flat bed without using bedrails: A little  Moving to and from bed to chair (including a wheelchair): A little  Standing up from a chair using your arms  (e.g. wheelchair or bedside chair): A little  To walk in hospital room: Total  Climbing 3-5 steps with railing: Total  Basic Mobility - Total Score: 14    Education Documentation  Precautions, taught by Ivana Severino PTA at 3/30/2024  9:40 AM.  Learner: Patient  Readiness: Acceptance  Method: Explanation, Teach-back  Response: Verbalizes Understanding, Needs Reinforcement    Mobility Training, taught by Ivana Severino PTA at 3/30/2024  9:40 AM.  Learner: Patient  Readiness: Acceptance  Method: Explanation, Teach-back  Response: Verbalizes Understanding, Needs Reinforcement    Education Comments  No comments found.        OP EDUCATION:       Encounter Problems       Encounter Problems (Active)       Balance       LTG - Patient will maintain balance to allow for safe mobility (Progressing)       Start:  03/27/24    Expected End:  04/09/24               Mobility       STG - Patient will ambulate 5' w/ RW and min assist, NWB RLE (Progressing)       Start:  03/27/24    Expected End:  04/09/24            Pt will participate in LE exercises to promote functional strength and endurance (Progressing)       Start:  03/27/24    Expected End:  04/09/24               PT Transfers       STG - Patient to transfer to and from sit to supine w/ supervision  (Progressing)       Start:  03/27/24    Expected End:  04/09/24            STG - Patient will transfer sit to and from stand w/ GCA (Progressing)       Start:  03/27/24    Expected End:  04/09/24               Safety       Pt will consistently comply w/ NWB RLE w/ all activities (Progressing)       Start:  03/27/24    Expected End:  04/09/24

## 2024-03-30 NOTE — PROGRESS NOTES
Occupational Therapy    OT Treatment    Patient Name: Claude Coley  MRN: [id number]  Today's Date: 3/30/2024  Time Calculation  Start Time: 1120  Stop Time: 1136  Time Calculation (min): 16 min       Assessment:  OT Assessment: Pt demonstrated progress toward established goals and was receptive to therapy and instructions throughout.  OT Assessment Results: Decreased ADL status, Decreased endurance, Decreased functional mobility, Decreased IADLs    Plan:  Treatment Interventions: UE strengthening/ROM, Endurance training, Patient/family training  OT Frequency: 4 times per week  OT Discharge Recommendations: Moderate intensity level of continued care  Equipment Recommended upon Discharge: Wheeled walker  OT Recommended Transfer Status: Assist of 2 (for mobility, otherwise assist x 1)  OT - OK to Discharge: Yes  Treatment Interventions: UE strengthening/ROM, Endurance training, Patient/family training    Subjective     Previous Visit Info:  OT Last Visit  OT Received On: 03/30/24    General:  General  Reason for Referral: recent surgery  Past Medical History Relevant to Rehab: DM, cellulitis, HTN, HLD, CHF, MO  Prior to Session Communication: Bedside nurse  Patient Position Received: Bed, 3 rail up, Alarm off, not on at start of session  Preferred Learning Style: verbal, visual  General Comment: Pt cleared by nursing and agreeable for therapy.    Precautions:  LE Weight Bearing Status: Right Non-Weight Bearing  Medical Precautions: Fall precautions  Precautions Comment: external fixator, wound vac    Pain:  Pain Assessment  Pain Assessment: 0-10  Pain Score: 7  Pain Type: Surgical pain  Pain Location: Foot  Pain Orientation: Right  Pain Onset: Ongoing    Objective    Cognition:  Cognition  Overall Cognitive Status: Within Functional Limits  Orientation Level: Oriented X4    Therapy/Activity: Therapeutic Exercise  Therapeutic Exercise Performed:  (Instructed in/performed 3 sets of 5 exercises with 2# weights in order  to increase UE strength needed for functional transfers and activity tolerance for I/ADLs.   Intermittent cues required throughout for technique.)    Outcome Measures:[facility] Daily Activity  Putting on and taking off regular lower body clothing: Total  Bathing (including washing, rinsing, drying): A lot  Putting on and taking off regular upper body clothing: A little  Toileting, which includes using toilet, bedpan or urinal: A lot  Taking care of personal grooming such as brushing teeth: A little  Eating Meals: None  Daily Activity - Total Score: 15    OP EDUCATION:  Education  Individual(s) Educated: Patient  Education Provided: Other (Progressive strengthening)  Patient Response to Education: Patient/Caregiver Verbalized Understanding of Information    Goals:  Encounter Problems       Encounter Problems (Active)       OT Goals       ADLs (Progressing)       Start:  03/27/24    Expected End:  04/10/24       Pt will complete bathing, dressing, and toileting tasks with setup assist using adaptive aides as needed.         Functional transfers (Progressing)       Start:  03/27/24    Expected End:  04/10/24       Pt will complete toilet, bed, and chair transfers independently from elevated seat heights and using bilateral arm supports as needed.           UE strength (Progressing)       Start:  03/27/24    Expected End:  04/10/24       Pt will increase UE strength from 4 to 4+/5 in order to maintain NWB status of right LE for transfers/mobility.

## 2024-03-31 LAB
ALBUMIN SERPL-MCNC: 3 G/DL (ref 3.5–5)
ALP BLD-CCNC: 83 U/L (ref 35–125)
ALT SERPL-CCNC: 18 U/L (ref 5–40)
ANION GAP SERPL CALC-SCNC: 7 MMOL/L
AST SERPL-CCNC: 16 U/L (ref 5–40)
BILIRUB SERPL-MCNC: 0.2 MG/DL (ref 0.1–1.2)
BUN SERPL-MCNC: 19 MG/DL (ref 8–25)
CALCIUM SERPL-MCNC: 8.9 MG/DL (ref 8.5–10.4)
CHLORIDE SERPL-SCNC: 104 MMOL/L (ref 97–107)
CO2 SERPL-SCNC: 22 MMOL/L (ref 24–31)
CREAT SERPL-MCNC: 0.9 MG/DL (ref 0.4–1.6)
EGFRCR SERPLBLD CKD-EPI 2021: >90 ML/MIN/1.73M*2
ERYTHROCYTE [DISTWIDTH] IN BLOOD BY AUTOMATED COUNT: 13.1 % (ref 11.5–14.5)
GLUCOSE BLD MANUAL STRIP-MCNC: 104 MG/DL (ref 74–99)
GLUCOSE BLD MANUAL STRIP-MCNC: 90 MG/DL (ref 74–99)
GLUCOSE BLD MANUAL STRIP-MCNC: 92 MG/DL (ref 74–99)
GLUCOSE SERPL-MCNC: 92 MG/DL (ref 65–99)
HCT VFR BLD AUTO: 21.8 % (ref 41–52)
HGB BLD-MCNC: 7.6 G/DL (ref 13.5–17.5)
IRON SATN MFR SERPL: 13 % (ref 12–50)
IRON SERPL-MCNC: 21 UG/DL (ref 45–160)
MCH RBC QN AUTO: 29.1 PG (ref 26–34)
MCHC RBC AUTO-ENTMCNC: 34.9 G/DL (ref 32–36)
MCV RBC AUTO: 84 FL (ref 80–100)
NRBC BLD-RTO: 0 /100 WBCS (ref 0–0)
PLATELET # BLD AUTO: 133 X10*3/UL (ref 150–450)
POTASSIUM SERPL-SCNC: 4.3 MMOL/L (ref 3.4–5.1)
PROT SERPL-MCNC: 5.8 G/DL (ref 5.9–7.9)
RBC # BLD AUTO: 2.61 X10*6/UL (ref 4.5–5.9)
SODIUM SERPL-SCNC: 133 MMOL/L (ref 133–145)
TIBC SERPL-MCNC: 165 UG/DL (ref 228–428)
UIBC SERPL-MCNC: 144 UG/DL (ref 110–370)
VANCOMYCIN SERPL-MCNC: 17.6 UG/ML (ref 10–20)
WBC # BLD AUTO: 7.3 X10*3/UL (ref 4.4–11.3)

## 2024-03-31 PROCEDURE — 2500000001 HC RX 250 WO HCPCS SELF ADMINISTERED DRUGS (ALT 637 FOR MEDICARE OP)

## 2024-03-31 PROCEDURE — 85027 COMPLETE CBC AUTOMATED: CPT

## 2024-03-31 PROCEDURE — 80202 ASSAY OF VANCOMYCIN: CPT

## 2024-03-31 PROCEDURE — 82947 ASSAY GLUCOSE BLOOD QUANT: CPT

## 2024-03-31 PROCEDURE — 83540 ASSAY OF IRON: CPT | Performed by: NURSE PRACTITIONER

## 2024-03-31 PROCEDURE — 2500000002 HC RX 250 W HCPCS SELF ADMINISTERED DRUGS (ALT 637 FOR MEDICARE OP, ALT 636 FOR OP/ED)

## 2024-03-31 PROCEDURE — 84075 ASSAY ALKALINE PHOSPHATASE: CPT

## 2024-03-31 PROCEDURE — 2500000004 HC RX 250 GENERAL PHARMACY W/ HCPCS (ALT 636 FOR OP/ED)

## 2024-03-31 PROCEDURE — 1100000001 HC PRIVATE ROOM DAILY

## 2024-03-31 PROCEDURE — 2500000001 HC RX 250 WO HCPCS SELF ADMINISTERED DRUGS (ALT 637 FOR MEDICARE OP): Performed by: NURSE PRACTITIONER

## 2024-03-31 PROCEDURE — 2500000004 HC RX 250 GENERAL PHARMACY W/ HCPCS (ALT 636 FOR OP/ED): Performed by: NURSE PRACTITIONER

## 2024-03-31 RX ADMIN — DOCUSATE SODIUM 100 MG: 100 CAPSULE, LIQUID FILLED ORAL at 10:00

## 2024-03-31 RX ADMIN — OXYCODONE 5 MG: 5 TABLET ORAL at 10:07

## 2024-03-31 RX ADMIN — HEPARIN SODIUM 5000 UNITS: 5000 INJECTION, SOLUTION INTRAVENOUS; SUBCUTANEOUS at 05:20

## 2024-03-31 RX ADMIN — CARVEDILOL 12.5 MG: 12.5 TABLET, FILM COATED ORAL at 09:59

## 2024-03-31 RX ADMIN — HEPARIN SODIUM 5000 UNITS: 5000 INJECTION, SOLUTION INTRAVENOUS; SUBCUTANEOUS at 21:02

## 2024-03-31 RX ADMIN — ATORVASTATIN CALCIUM 40 MG: 40 TABLET, FILM COATED ORAL at 10:00

## 2024-03-31 RX ADMIN — OXYCODONE 5 MG: 5 TABLET ORAL at 04:20

## 2024-03-31 RX ADMIN — IRON SUCROSE 200 MG: 20 INJECTION, SOLUTION INTRAVENOUS at 16:55

## 2024-03-31 RX ADMIN — HEPARIN SODIUM 5000 UNITS: 5000 INJECTION, SOLUTION INTRAVENOUS; SUBCUTANEOUS at 14:17

## 2024-03-31 RX ADMIN — FINASTERIDE 5 MG: 5 TABLET, FILM COATED ORAL at 09:59

## 2024-03-31 RX ADMIN — OXYCODONE 5 MG: 5 TABLET ORAL at 20:30

## 2024-03-31 RX ADMIN — SPIRONOLACTONE 25 MG: 25 TABLET ORAL at 10:00

## 2024-03-31 RX ADMIN — OLANZAPINE 20 MG: 10 TABLET, FILM COATED ORAL at 20:29

## 2024-03-31 RX ADMIN — POLYETHYLENE GLYCOL 3350 17 G: 17 POWDER, FOR SOLUTION ORAL at 10:01

## 2024-03-31 RX ADMIN — PANTOPRAZOLE SODIUM 40 MG: 40 TABLET, DELAYED RELEASE ORAL at 05:20

## 2024-03-31 RX ADMIN — CYCLOBENZAPRINE 10 MG: 10 TABLET, FILM COATED ORAL at 10:07

## 2024-03-31 RX ADMIN — ASPIRIN 81 MG: 81 TABLET, COATED ORAL at 09:59

## 2024-03-31 RX ADMIN — DOCUSATE SODIUM 100 MG: 100 CAPSULE, LIQUID FILLED ORAL at 20:29

## 2024-03-31 RX ADMIN — VANCOMYCIN 1 G: 1 INJECTION, SOLUTION INTRAVENOUS at 10:01

## 2024-03-31 RX ADMIN — METFORMIN HYDROCHLORIDE 500 MG: 500 TABLET, FILM COATED ORAL at 10:00

## 2024-03-31 RX ADMIN — CARVEDILOL 12.5 MG: 12.5 TABLET, FILM COATED ORAL at 16:55

## 2024-03-31 RX ADMIN — CYCLOBENZAPRINE 10 MG: 10 TABLET, FILM COATED ORAL at 20:30

## 2024-03-31 RX ADMIN — VANCOMYCIN 1 G: 1 INJECTION, SOLUTION INTRAVENOUS at 21:02

## 2024-03-31 ASSESSMENT — COGNITIVE AND FUNCTIONAL STATUS - GENERAL
TOILETING: A LOT
TURNING FROM BACK TO SIDE WHILE IN FLAT BAD: A LITTLE
MOVING TO AND FROM BED TO CHAIR: A LITTLE
WALKING IN HOSPITAL ROOM: TOTAL
MOVING FROM LYING ON BACK TO SITTING ON SIDE OF FLAT BED WITH BEDRAILS: A LITTLE
HELP NEEDED FOR BATHING: A LOT
DAILY ACTIVITIY SCORE: 15
STANDING UP FROM CHAIR USING ARMS: A LITTLE
CLIMB 3 TO 5 STEPS WITH RAILING: TOTAL
PERSONAL GROOMING: A LITTLE
MOBILITY SCORE: 14
HELP NEEDED FOR BATHING: A LOT
DRESSING REGULAR UPPER BODY CLOTHING: A LITTLE
PERSONAL GROOMING: A LITTLE
DRESSING REGULAR LOWER BODY CLOTHING: TOTAL
TOILETING: A LOT
DAILY ACTIVITIY SCORE: 15
DRESSING REGULAR UPPER BODY CLOTHING: A LITTLE
DRESSING REGULAR LOWER BODY CLOTHING: TOTAL

## 2024-03-31 ASSESSMENT — PAIN - FUNCTIONAL ASSESSMENT
PAIN_FUNCTIONAL_ASSESSMENT: 0-10

## 2024-03-31 ASSESSMENT — PAIN SCALES - GENERAL
PAINLEVEL_OUTOF10: 6
PAINLEVEL_OUTOF10: 6
PAINLEVEL_OUTOF10: 4
PAINLEVEL_OUTOF10: 6
PAINLEVEL_OUTOF10: 2
PAINLEVEL_OUTOF10: 4

## 2024-03-31 ASSESSMENT — PAIN SCALES - WONG BAKER: WONGBAKER_NUMERICALRESPONSE: HURTS WHOLE LOT

## 2024-03-31 ASSESSMENT — PAIN DESCRIPTION - ORIENTATION: ORIENTATION: RIGHT

## 2024-03-31 ASSESSMENT — PAIN DESCRIPTION - LOCATION: LOCATION: LEG

## 2024-03-31 NOTE — PROGRESS NOTES
Claude Coley is a 63 y.o. male on day 3 of admission presenting with Type 2 diabetes mellitus with right diabetic foot ulcer (CMS/HCC).    Subjective   Interval History: Afebrile, no chills  No foot pain  No chest pain or shortness of breath.  No nausea vomiting or diarrhea        Review of Systems   All other systems reviewed and are negative.      Objective   Range of Vitals (last 24 hours)  Heart Rate:  [61-66]   Temp:  [36.8 °C (98.2 °F)-37.1 °C (98.8 °F)]   Resp:  [17-19]   BP: (121-146)/(57-63)   SpO2:  [93 %-97 %]   Daily Weight  03/27/24 : 118 kg (260 lb 2.3 oz)    Body mass index is 33.4 kg/m².    Physical Exam  Constitutional:       Appearance: Normal appearance.   HENT:      Head: Normocephalic and atraumatic.      Nose: Nose normal.      Mouth/Throat:      Mouth: Mucous membranes are moist.      Pharynx: Oropharynx is clear.   Eyes:      Extraocular Movements: Extraocular movements intact.      Conjunctiva/sclera: Conjunctivae normal.   Cardiovascular:      Rate and Rhythm: Normal rate and regular rhythm.   Pulmonary:      Effort: Pulmonary effort is normal.      Breath sounds: Normal breath sounds.   Abdominal:      General: Bowel sounds are normal.      Palpations: Abdomen is soft.   Musculoskeletal:      Cervical back: Normal range of motion and neck supple.      Comments: Right leg external fixator    Skin:     Comments: Right foot wound vac dressing intact, external fixator   Neurological:      General: No focal deficit present.      Mental Status: He is alert and oriented to person, place, and time. Mental status is at baseline.   Psychiatric:         Mood and Affect: Mood normal.         Behavior: Behavior normal.           Antibiotics  lactated Ringer's infusion  ceFAZolin in dextrose (iso-os) (Ancef) IVPB 2 g  ceFAZolin in dextrose (iso-os) (Ancef) IVPB  - Omnicell Override Pull  mineral oil, light topical  - Omnicell Override Pull  bupivacaine PF (Marcaine) injection  - Omnicell Override  Pull  fentaNYL PF (Sublimaze) injection  - Omnicell Override Pull  midazolam (Versed) injection  - Omnicell Override Pull  lidocaine (Xylocaine) 10 mg/mL (1 %) injection 1 mg  lactated Ringer's infusion  fentaNYL PF (Sublimaze) injection 50 mcg  HYDROmorphone (Dilaudid) injection 0.4 mg  midazolam (Versed) injection 1 mg  ondansetron (Zofran) injection 4 mg  promethazine (Phenergan) 6.25 mg in sodium chloride 0.9% 50 mL IV  hydrALAZINE (Apresoline) injection 5 mg  meperidine PF (Demerol) injection 12.5 mg  albuterol 2.5 mg /3 mL (0.083 %) nebulizer solution 2.5 mg  thrombin (recombinant) (Recothrom) topical solution  - Omnicell Override Pull  thrombin solution  ondansetron (Zofran) injection  - Omnicell Override Pull  lisinopril tablet 20 mg  furosemide (Lasix) tablet 20 mg  finasteride (Proscar) tablet 5 mg  carvedilol (Coreg) tablet 12.5 mg  atorvastatin (Lipitor) tablet 40 mg  aspirin EC tablet 81 mg  acetaminophen (Tylenol) tablet 650 mg  OLANZapine (ZyPREXA) tablet 20 mg  spironolactone (Aldactone) tablet 25 mg  vancomycin-diluent combo no.1 (Xellia) IVPB 750 mg  metFORMIN (Glucophage) tablet 500 mg  heparin (porcine) injection 5,000 Units  acetaminophen (Tylenol) tablet 650 mg  naloxone (Narcan) injection 0.2 mg  oxyCODONE (Roxicodone) immediate release tablet 5 mg  morphine injection 2 mg  cyclobenzaprine (Flexeril) tablet 10 mg  ondansetron ODT (Zofran-ODT) disintegrating tablet 4 mg  ondansetron (Zofran) injection 4 mg  promethazine (Phenergan) tablet 25 mg  promethazine (Phenergan) suppository 25 mg  polyethylene glycol (Glycolax, Miralax) packet 17 g  diphenhydrAMINE (BENADryl) injection 12.5 mg  heparin (porcine) injection 5,000 Units  glucagon (Glucagen) injection 1 mg  dextrose 50 % injection 25 g  glucagon (Glucagen) injection 1 mg  dextrose 50 % injection 12.5 g  insulin lispro (HumaLOG) injection 0-5 Units  docusate sodium (Colace) capsule 100 mg  sodium chloride 0.9 % bolus 500 mL  alteplase  "(Cathflo Activase) injection 1 mg  pantoprazole (ProtoNix) EC tablet 40 mg  vancomycin (Xellia) 1 g in 200 mL (Xellia) IVPB 1 g      Relevant Results  Labs  Results from last 72 hours   Lab Units 03/31/24  0634 03/30/24  0456   WBC AUTO x10*3/uL 7.3 7.8   HEMOGLOBIN g/dL 7.6* 7.8*   HEMATOCRIT % 21.8* 22.0*   PLATELETS AUTO x10*3/uL 133* 118*     Results from last 72 hours   Lab Units 03/31/24  0634 03/30/24  0454   SODIUM mmol/L 133 133   POTASSIUM mmol/L 4.3 4.1   CHLORIDE mmol/L 104 104   CO2 mmol/L 22* 20*   BUN mg/dL 19 22   CREATININE mg/dL 0.90 0.90   GLUCOSE mg/dL 92 105*   CALCIUM mg/dL 8.9 8.6   ANION GAP mmol/L 7 9   EGFR mL/min/1.73m*2 >90 >90     Results from last 72 hours   Lab Units 03/31/24  0634 03/30/24  0454   ALK PHOS U/L 83 81   BILIRUBIN TOTAL mg/dL 0.2 0.2   PROTEIN TOTAL g/dL 5.8* 5.6*   ALT U/L 18 16   AST U/L 16 15   ALBUMIN g/dL 3.0* 3.0*     Estimated Creatinine Clearance: 117.6 mL/min (by C-G formula based on SCr of 0.9 mg/dL).  No results found for: \"CRP\"  Microbiology  Susceptibility data from last 14 days.  Collected Specimen Info Organism Clindamycin Erythromycin Oxacillin Tetracycline Trimethoprim/Sulfamethoxazole Vancomycin   03/19/24 Tissue/Biopsy from Wound/Tissue Methicillin Resistant Staphylococcus aureus (MRSA) R R R S S S   Reviewed  Imaging  XR chest 1 view    Result Date: 3/27/2024  Interpreted By:  Kirstie Moy, STUDY: XR CHEST 1 VIEW 3/27/2024 4:36 pm   INDICATION: Signs/Symptoms:PICC placement   COMPARISON: 03/20/2024   ACCESSION NUMBER(S): [id number]   ORDERING CLINICIAN: SANIYA HOWARD   TECHNIQUE: AP erect view of the chest   FINDINGS: A right arm PICC line terminates in the SVC. There is no pneumothorax. Lungs are clear without pleural abnormality.   The cardiac size is normal. No mediastinal or hilar abnormality is seen.       Right arm PICC line terminating in the SVC.   No pneumothorax.   Signed by: Kirstie Moy 3/27/2024 4:50 PM Dictation workstation:   " [id number]    FL fluoro images no charge    Result Date: 3/27/2024  These images are not reportable by radiology and will not be interpreted by  Radiologists.    Bedside PICC Imaging    Result Date: 3/25/2024  These images are not reportable by radiology and will not be interpreted by  Radiologists.    XR chest 1 view    Result Date: 3/20/2024  Interpreted By:  Nael Moore, STUDY: XR CHEST 1 VIEW; 3/20/2024 3:57 pm   INDICATION: Signs/Symptoms:PICC placement.   COMPARISON: None   ACCESSION NUMBER(S): [id number]   ORDERING CLINICIAN: ANGELITA YOUSSEF   TECHNIQUE: 1 view of the chest was performed.   FINDINGS: Right-sided PICC line catheter with tip at the mid SVC. The lungs are adequately inflated. No acute consolidation. No pleural effusion. No pneumothorax.  The cardiomediastinal silhouette is within normal limits.       Right-sided PICC line catheter with tip at the mid SVC. No acute cardiopulmonary disease.   Signed by: Nael Moore 3/20/2024 4:14 PM Dictation workstation:   [id number]    Lower extremity venous duplex right    Result Date: 3/19/2024  Interpreted By:  Favian Castellano, STUDY: VASC US LOWER EXTREMITY VENOUS DUPLEX RIGHT; 3/19/2024 3:22 pm   INDICATION: Signs/Symptoms:leg swelling + d dimer..   COMPARISON: None.   ACCESSION NUMBER(S): [id number]   ORDERING CLINICIAN: CHARO FRIED   TECHNIQUE: Vascular ultrasound of the right lower extremity was performed. Real time compression views as well as Gray scale, color Doppler and spectral Doppler waveform analysis was performed.   FINDINGS: Evaluation of the visualized portions of the right common femoral vein, proximal, mid, and distal femoral vein, and popliteal vein were performed.  Evaluation of the visualized portions of the posterior tibial and peroneal veins were also performed.  In addition, evaluation of the contralateral common femoral vein was performed.   Limitations: None   The evaluated veins demonstrate normal  compressibility. There is intact venous flow demonstrating normal respiratory variability and normal augmentation of flow with calf compression. Therefore, there is no ultrasonographic evidence for deep vein thrombosis within the evaluated veins. No evidence of thrombus is seen within the contralateral common femoral vein.       No sonographic evidence for deep vein thrombosis within the evaluated veins of the right lower extremity.   MACRO: None   Signed by: Favian Castellano 3/19/2024 3:22 PM Dictation workstation:   [id number]    XR foot right 3+ views    Result Date: 3/19/2024  Interpreted By:  Lux Hernandez, STUDY: XR FOOT RIGHT 3+ VIEWS   INDICATION: Signs/Symptoms:r foot wound.   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   Suspected skin wounds at the surgical site. No aggressive bony destructive changes or erosions.   Marked soft tissue swelling dorsally.       Suspected skin wounds of the 5th amputation site with no osseous acute abnormality.   Marked dorsal soft tissue swelling, potentially cellulitis.   Signed by: Lux Hernandez 3/19/2024 2:09 PM Dictation workstation:   [id number]     Assessment/Plan     Infected right diabetic foot ulcer-MRSA  Status post right peroneus brevis muscle flap, wound debridement, Integra bilayer, wound vac, external fixator on 3/27/2024         IV vancomycin-avoiding zyvox -potential interaction with zyprexa   Monitor renal function  Supportive care  Local care  Offloading  Monitor temperature and WBC  Podiatry  follow up     Original Long term plan IV vancomycin  till 4/3/2024-will discuss with podiatry may need to extend  Weekly CBC with diff, Biweekly CMP, vancomycin trough    Srikanth Saravia MD

## 2024-03-31 NOTE — PROGRESS NOTES
Claude Coley is a 63 y.o. male on day 3 of admission presenting with Type 2 diabetes mellitus with right diabetic foot ulcer (CMS/HCC).      Subjective   Resting in bed no complaints of pain or sob.        Objective     Last Recorded Vitals  /57 (BP Location: Left arm, Patient Position: Lying)   Pulse 61   Temp 36.8 °C (98.2 °F) (Temporal)   Resp 17   Wt 118 kg (260 lb 2.3 oz)   SpO2 97%   Intake/Output last 3 Shifts:    Intake/Output Summary (Last 24 hours) at 3/31/2024 1431  Last data filed at 3/31/2024 0350  Gross per 24 hour   Intake 850 ml   Output 1070 ml   Net -220 ml       Admission Weight  Weight: 118 kg (260 lb 2.3 oz) (03/27/24 0600)    Daily Weight  03/27/24 : 118 kg (260 lb 2.3 oz)    Image Results  XR chest 1 view  Narrative: Interpreted By:  Kirstie Moy,   STUDY:  XR CHEST 1 VIEW 3/27/2024 4:36 pm      INDICATION:  Signs/Symptoms:PICC placement      COMPARISON:  03/20/2024      ACCESSION NUMBER(S):  [id number]      ORDERING CLINICIAN:  SANIYA HOWARD      TECHNIQUE:  AP erect view of the chest      FINDINGS:  A right arm PICC line terminates in the SVC. There is no  pneumothorax. Lungs are clear without pleural abnormality.      The cardiac size is normal. No mediastinal or hilar abnormality is  seen.      Impression: Right arm PICC line terminating in the SVC.      No pneumothorax.      Signed by: Kirstie Moy 3/27/2024 4:50 PM  Dictation workstation:   [id number]  FL fluoro images no charge  These images are not reportable by radiology and will not be interpreted   by  Radiologists.      Physical Exam  Constitutional:       Appearance: Normal appearance.   Cardiovascular:      Rate and Rhythm: Normal rate and regular rhythm.      Pulses: Normal pulses.      Heart sounds: Normal heart sounds.   Pulmonary:      Effort: Pulmonary effort is normal.      Breath sounds: Normal breath sounds.   Abdominal:      General: Bowel sounds are normal.      Palpations: Abdomen is soft.    Musculoskeletal:         General: Normal range of motion.      Right lower leg: Edema present.      Left lower leg: Edema present.      Comments: Slight edema bilateral lower extremities   Skin:     General: Skin is warm and dry.   Neurological:      Mental Status: He is alert and oriented to person, place, and time.         Relevant Results             Results for orders placed or performed during the hospital encounter of 03/27/24 (from the past 24 hour(s))   POCT GLUCOSE   Result Value Ref Range    POCT Glucose 117 (H) 74 - 99 mg/dL   POCT GLUCOSE   Result Value Ref Range    POCT Glucose 133 (H) 74 - 99 mg/dL   CBC   Result Value Ref Range    WBC 7.3 4.4 - 11.3 x10*3/uL    nRBC 0.0 0.0 - 0.0 /100 WBCs    RBC 2.61 (L) 4.50 - 5.90 x10*6/uL    Hemoglobin 7.6 (L) 13.5 - 17.5 g/dL    Hematocrit 21.8 (L) 41.0 - 52.0 %    MCV 84 80 - 100 fL    MCH 29.1 26.0 - 34.0 pg    MCHC 34.9 32.0 - 36.0 g/dL    RDW 13.1 11.5 - 14.5 %    Platelets 133 (L) 150 - 450 x10*3/uL   Comprehensive metabolic panel   Result Value Ref Range    Glucose 92 65 - 99 mg/dL    Sodium 133 133 - 145 mmol/L    Potassium 4.3 3.4 - 5.1 mmol/L    Chloride 104 97 - 107 mmol/L    Bicarbonate 22 (L) 24 - 31 mmol/L    Urea Nitrogen 19 8 - 25 mg/dL    Creatinine 0.90 0.40 - 1.60 mg/dL    eGFR >90 >60 mL/min/1.73m*2    Calcium 8.9 8.5 - 10.4 mg/dL    Albumin 3.0 (L) 3.5 - 5.0 g/dL    Alkaline Phosphatase 83 35 - 125 U/L    Total Protein 5.8 (L) 5.9 - 7.9 g/dL    AST 16 5 - 40 U/L    Bilirubin, Total 0.2 0.1 - 1.2 mg/dL    ALT 18 5 - 40 U/L    Anion Gap 7 <=19 mmol/L   Vancomycin   Result Value Ref Range    Vancomycin 17.6 10.0 - 20.0 ug/mL   Iron and TIBC   Result Value Ref Range    Iron 21 (L) 45 - 160 ug/dL    UIBC 144 110 - 370 ug/dL    TIBC 165 (L) 228 - 428 ug/dL    % Saturation 13 12 - 50 %   POCT GLUCOSE   Result Value Ref Range    POCT Glucose 90 74 - 99 mg/dL   POCT GLUCOSE   Result Value Ref Range    POCT Glucose 104 (H) 74 - 99 mg/dL          Assessment/Plan                  Principal Problem:    Type 2 diabetes mellitus with right diabetic foot ulcer (CMS/HCC)    Infected Right Foot  ID following  Vancomycin     Hypertension with a period of Hypotension  Continue coreg, and spirolactone and add administration parameters  Continue to hold lisinopril.  Restart lasix today, slight edema to BLE  BP stable  Blood pressure was stable overnight.        DVT/GI prophylaxis  Heparin sub cutaneous  Adding protonix     Plan of Care  Home Medication reviewed  Plan of care discussed with patient and collaborating physician.                    Cynthia Daugherty, APRN-CNP

## 2024-03-31 NOTE — PROGRESS NOTES
"Vancomycin Dosing by Pharmacy- FOLLOW UP    Claude Coley is a 63 y.o. year old male who Pharmacy has been consulted for vancomycin dosing for other (diabetic foot infection) . Based on the patient's indication and renal status this patient is being dosed based on a goal AUC of 400-600.     Renal function is currently stable.    Current vancomycin dose: 1000 mg given every 12 hours    Estimated vancomycin AUC on current dose: 535 mg/L.hr     Visit Vitals  /57 (BP Location: Left arm, Patient Position: Lying)   Pulse 61   Temp 36.8 °C (98.2 °F) (Temporal)   Resp 17        Lab Results   Component Value Date    CREATININE 0.90 03/31/2024    CREATININE 0.90 03/30/2024    CREATININE 1.10 03/28/2024    CREATININE 0.90 03/25/2024        Patient weight is 118 Kg.    No results found for: \"CULTURE\"     I/O last 3 completed shifts:  In: 1250 (10.6 mL/kg) [P.O.:1050; IV Piggyback:200]  Out: 1100 (9.3 mL/kg) [Urine:1050 (0.2 mL/kg/hr); Drains:50]  Weight: 118 kg   [unfilled]    No results found for: \"PATIENTTEMP\"     Assessment/Plan    Within goal AUC range. Continue current vancomycin regimen.    This dosing regimen is predicted by [employer] to result in the following pharmacokinetic parameters:    Loading dose: N/A  Regimen: 1000 mg IV every 12 hours.  Start time: 09:16 on 03/31/2024  Exposure target: AUC24 (range)400-600 mg/L.hr   AUC24,ss: 535 mg/L.hr  Probability of AUC24 > 400: 100 %  Ctrough,ss: 18.3 mg/L  Probability of Ctrough,ss > 20: 26 %  Probability of nephrotoxicity (Lodise ADRYAN 2009): 15 %    The next level will be obtained on 4/7/24 at AM labs. May be obtained sooner if clinically indicated.   Will continue to monitor renal function daily while on vancomycin and order serum creatinine at least every 48 hours if not already ordered.  Follow for continued vancomycin needs, clinical response, and signs/symptoms of toxicity.       Nikolai Scott, PharmD           "

## 2024-03-31 NOTE — PROGRESS NOTES
PODIATRY SERVICE CONSULT PROGRESS NOTE    SERVICE DATE: 3/31/2024   SERVICE TIME:  11:18 AM    Subjective   INTERVAL HPI:   Patient was seen at bedside.  Hypotension improved.   Pain well controlled on current regimen.  Reports low appetite today.   Reports regular bowel and bladder habits.   Patient denies any constitutional symptoms at this time.   No other pedal complaints.   Likely to stay through weekend pending precert for [facility].      Medications:  Scheduled Meds: aspirin, 81 mg, oral, Daily  atorvastatin, 40 mg, oral, Daily  carvedilol, 12.5 mg, oral, BID with meals  docusate sodium, 100 mg, oral, BID  finasteride, 5 mg, oral, Daily  [Held by provider] furosemide, 20 mg, oral, Daily  heparin (porcine), 5,000 Units, subcutaneous, q8h LARRY  insulin lispro, 0-5 Units, subcutaneous, TID with meals  [Held by provider] lisinopril, 20 mg, oral, Daily  metFORMIN, 500 mg, oral, Daily with breakfast  OLANZapine, 20 mg, oral, Nightly  pantoprazole, 40 mg, oral, Daily before breakfast  polyethylene glycol, 17 g, oral, Daily  spironolactone, 25 mg, oral, Daily  vancomycin (Xellia) 1 g in 200 mL, 1 g, intravenous, q12h      Continuous Infusions:    PRN Meds: PRN medications: acetaminophen, alteplase, cyclobenzaprine, dextrose, dextrose, diphenhydrAMINE, glucagon, morphine, naloxone, ondansetron ODT **OR** ondansetron, oxyCODONE, promethazine **OR** promethazine         Objective   PHYSICAL EXAM:  Physical Exam Performed:  Vitals:    03/31/24 0701   BP: 121/57   Pulse: 61   Resp: 17   Temp: 36.8 °C (98.2 °F)   SpO2: 97%     Body mass index is 33.4 kg/m².    Patient is AOx3 and in no acute distress. Patient is alert and cooperative. Sitting comfortably in bed with dressing clean, dry and intact.      Vascular: Palpable DP/PT pulses B/L. Moderate pitting edema noted. Hair growth present B/L. CFT<5 to B/L hallux. Temperature is warm to cool from tibial tuberosity to distal digits B/L. No lymphatic streaking  noted B/L.     Musculoskeletal: External fixator in place to right foot, ankle, and lower leg. Gross active and passive ROM intact to age and activity level. Moves all extremities spontaneously. No pain to palpation at feet B/L.      Neurological: Absent light touch sensation B/L. Pain stimuli diminished B/L. Denies any numbness, burning or tingling.     Dermatologic: Nails 1-5 are thickened, elongated, discolored with subungual debris B/L. Skin appears diffusely xerotic B/L. Web spaces 1-4 B/L are clean, dry and intact. No rashes or nodules noted B/L.   Surgical Dressing intact over right lower extremity and external fixator.   Wound vac functioning without any warnings, serosanguinous drainage, 150 mL.  SERA drain intact, serosanguinous drainage, 15 mL      LABS:   Results for orders placed or performed during the hospital encounter of 03/27/24 (from the past 24 hour(s))   POCT GLUCOSE   Result Value Ref Range    POCT Glucose 118 (H) 74 - 99 mg/dL   POCT GLUCOSE   Result Value Ref Range    POCT Glucose 117 (H) 74 - 99 mg/dL   POCT GLUCOSE   Result Value Ref Range    POCT Glucose 133 (H) 74 - 99 mg/dL   CBC   Result Value Ref Range    WBC 7.3 4.4 - 11.3 x10*3/uL    nRBC 0.0 0.0 - 0.0 /100 WBCs    RBC 2.61 (L) 4.50 - 5.90 x10*6/uL    Hemoglobin 7.6 (L) 13.5 - 17.5 g/dL    Hematocrit 21.8 (L) 41.0 - 52.0 %    MCV 84 80 - 100 fL    MCH 29.1 26.0 - 34.0 pg    MCHC 34.9 32.0 - 36.0 g/dL    RDW 13.1 11.5 - 14.5 %    Platelets 133 (L) 150 - 450 x10*3/uL   Comprehensive metabolic panel   Result Value Ref Range    Glucose 92 65 - 99 mg/dL    Sodium 133 133 - 145 mmol/L    Potassium 4.3 3.4 - 5.1 mmol/L    Chloride 104 97 - 107 mmol/L    Bicarbonate 22 (L) 24 - 31 mmol/L    Urea Nitrogen 19 8 - 25 mg/dL    Creatinine 0.90 0.40 - 1.60 mg/dL    eGFR >90 >60 mL/min/1.73m*2    Calcium 8.9 8.5 - 10.4 mg/dL    Albumin 3.0 (L) 3.5 - 5.0 g/dL    Alkaline Phosphatase 83 35 - 125 U/L    Total Protein 5.8 (L) 5.9 - 7.9 g/dL    AST 16 5 -  "40 U/L    Bilirubin, Total 0.2 0.1 - 1.2 mg/dL    ALT 18 5 - 40 U/L    Anion Gap 7 <=19 mmol/L   Vancomycin   Result Value Ref Range    Vancomycin 17.6 10.0 - 20.0 ug/mL   POCT GLUCOSE   Result Value Ref Range    POCT Glucose 90 74 - 99 mg/dL      Lab Results   Component Value Date    HGBA1C 4.4 03/19/2024      No results found for: \"CRP\"   Lab Results   Component Value Date    SEDRATE 47 (H) 03/19/2024        Results from last 7 days   Lab Units 03/31/24  0634   WBC AUTO x10*3/uL 7.3   RBC AUTO x10*6/uL 2.61*   HEMOGLOBIN g/dL 7.6*   HEMATOCRIT % 21.8*     Results from last 7 days   Lab Units 03/31/24  0634 03/30/24  0454   SODIUM mmol/L 133 133   POTASSIUM mmol/L 4.3 4.1   CHLORIDE mmol/L 104 104   CO2 mmol/L 22* 20*   BUN mg/dL 19 22   CREATININE mg/dL 0.90 0.90   CALCIUM mg/dL 8.9 8.6   MAGNESIUM mg/dL  --  1.70   BILIRUBIN TOTAL mg/dL 0.2 0.2   ALT U/L 18 16   AST U/L 16 15           IMAGING REVIEW:  XR chest 1 view    Result Date: 3/27/2024  Interpreted By:  Kirstie Moy, STUDY: XR CHEST 1 VIEW 3/27/2024 4:36 pm   INDICATION: Signs/Symptoms:PICC placement   COMPARISON: 03/20/2024   ACCESSION NUMBER(S): [id number]   ORDERING CLINICIAN: SANIYA HOWARD   TECHNIQUE: AP erect view of the chest   FINDINGS: A right arm PICC line terminates in the SVC. There is no pneumothorax. Lungs are clear without pleural abnormality.   The cardiac size is normal. No mediastinal or hilar abnormality is seen.       Right arm PICC line terminating in the SVC.   No pneumothorax.   Signed by: Kirstie Moy 3/27/2024 4:50 PM Dictation workstation:   [id number]    FL fluoro images no charge    Result Date: 3/27/2024  These images are not reportable by radiology and will not be interpreted by  Radiologists.    Bedside PICC Imaging    Result Date: 3/25/2024  These images are not reportable by radiology and will not be interpreted by  Radiologists.    XR chest 1 view    Result Date: 3/20/2024  Interpreted By:  Nael Moore, STUDY: " XR CHEST 1 VIEW; 3/20/2024 3:57 pm   INDICATION: Signs/Symptoms:PICC placement.   COMPARISON: None   ACCESSION NUMBER(S): [id number]   ORDERING CLINICIAN: ANGELITA YOUSSEF   TECHNIQUE: 1 view of the chest was performed.   FINDINGS: Right-sided PICC line catheter with tip at the mid SVC. The lungs are adequately inflated. No acute consolidation. No pleural effusion. No pneumothorax.  The cardiomediastinal silhouette is within normal limits.       Right-sided PICC line catheter with tip at the mid SVC. No acute cardiopulmonary disease.   Signed by: Nael Moore 3/20/2024 4:14 PM Dictation workstation:   [id number]    Lower extremity venous duplex right    Result Date: 3/19/2024  Interpreted By:  Favian Castellano, STUDY: VAS US LOWER EXTREMITY VENOUS DUPLEX RIGHT; 3/19/2024 3:22 pm   INDICATION: Signs/Symptoms:leg swelling + d dimer..   COMPARISON: None.   ACCESSION NUMBER(S): [id number]   ORDERING CLINICIAN: CHARO FRIED   TECHNIQUE: Vascular ultrasound of the right lower extremity was performed. Real time compression views as well as Gray scale, color Doppler and spectral Doppler waveform analysis was performed.   FINDINGS: Evaluation of the visualized portions of the right common femoral vein, proximal, mid, and distal femoral vein, and popliteal vein were performed.  Evaluation of the visualized portions of the posterior tibial and peroneal veins were also performed.  In addition, evaluation of the contralateral common femoral vein was performed.   Limitations: None   The evaluated veins demonstrate normal compressibility. There is intact venous flow demonstrating normal respiratory variability and normal augmentation of flow with calf compression. Therefore, there is no ultrasonographic evidence for deep vein thrombosis within the evaluated veins. No evidence of thrombus is seen within the contralateral common femoral vein.       No sonographic evidence for deep vein thrombosis within the evaluated veins of  the right lower extremity.   MACRO: None   Signed by: Favian Castellano 3/19/2024 3:22 PM Dictation workstation:   [id number]    XR foot right 3+ views    Result Date: 3/19/2024  Interpreted By:  Lux Hernandez, STUDY: XR FOOT RIGHT 3+ VIEWS   INDICATION: Signs/Symptoms:r foot wound.   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   Suspected skin wounds at the surgical site. No aggressive bony destructive changes or erosions.   Marked soft tissue swelling dorsally.       Suspected skin wounds of the 5th amputation site with no osseous acute abnormality.   Marked dorsal soft tissue swelling, potentially cellulitis.   Signed by: Lux Hernandez 3/19/2024 2:09 PM Dictation workstation:   [id number]            Assessment/Plan   ASSESSMENT & PLAN:    # S/P RLE peroneus brevis muscle flap, wound debridement, Integra bilayer, wound vac, external fixator.  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  - Charts, labs, vitals and imaging all reviewed.   - Hypotension improved  - WBC 7.3, HGB 7.6, BGL 92     Plan:  - Right foot surgical dressing is to remain clean, dry and intact until changed by Podiatry, likely Tuesday 4/02 if still inpatient.   - Prior to discharge to SNF, patient's vac will need to be disconnected but dressing, foam, and track pad left in place. Patient to be reconnected to I vac at facility.  - Medicine consulted for management, appreciate recs!  - Consider PRBC transfusion if Hgb <7.0   - ID consulted for management of long-term abx, appreciate  recs!  - Abx: Continue IV Vancomycin 0.75g q12h per ID  - Pain Regimen: Tylenol, oxycodone, morphine for breakthrough  - Bowel Regimen: Miralax, Colace  - Lisinopril and furosemide held   - Carvedilol and spironolactone may be held if systolic BP<100.  - Diet: diabetic diet, protein drinks ordered with meals in setting of poor appetite.   - Podiatry will continue to follow.     DVT ppx: heparin subcutaneous 5000U q8h  GI ppx: protonix 40mg qd  Weightbearing: NON-weight-bearing to RLE  Discharge: Patient to follow up with Dr. Vicky Winn 4/02/24 at 10:30am unless still inpatient. Plan [facility] discharge to [facility], pending precert.    Case to be discussed with attending, A&P above reflects a tentative plan. Please await for the final signature from the attending physician on service.    This patient will be followed by the Podiatry service. Please Epic Chat the corresponding residents below with questions or concerns.      Hannah Delgado DPM PGY-2  Podiatric Medicine and Surgery   Epic Chat            SIGNATURE: Hannah Delgado DPM PATIENT NAME: Claude Coley   DATE: March 31, 2024 MRN: [id number]   TIME: 11:18 AM CONTACT: [employer] Message

## 2024-03-31 NOTE — CARE PLAN
The patient's goals for the shift include Assist with pain control    The clinical goals for the shift include IV antibiotic, manage pain

## 2024-03-31 NOTE — CARE PLAN
The patient's goals for the shift include Assist with pain control    The clinical goals for the shift include IV antibiotic, manage pain      Problem: Pain  Goal: My pain/discomfort is manageable  Outcome: Progressing     Problem: Safety  Goal: Patient will be injury free during hospitalization  Outcome: Progressing  Goal: I will remain free of falls  Outcome: Progressing     Problem: Daily Care  Goal: Daily care needs are met  Outcome: Progressing     Problem: Psychosocial Needs  Goal: Demonstrates ability to cope with hospitalization/illness  Outcome: Progressing  Goal: Collaborate with me, my family, and caregiver to identify my specific goals  Outcome: Progressing     Problem: Fall/Injury  Goal: Not fall by end of shift  Outcome: Progressing  Goal: Be free from injury by end of the shift  Outcome: Progressing  Goal: Verbalize understanding of personal risk factors for fall in the hospital  Outcome: Progressing  Goal: Verbalize understanding of risk factor reduction measures to prevent injury from fall in the home  Outcome: Progressing  Goal: Use assistive devices by end of the shift  Outcome: Progressing  Goal: Pace activities to prevent fatigue by end of the shift  Outcome: Progressing

## 2024-04-01 VITALS
OXYGEN SATURATION: 96 % | WEIGHT: 260.14 LBS | RESPIRATION RATE: 16 BRPM | TEMPERATURE: 98.1 F | HEIGHT: 74 IN | DIASTOLIC BLOOD PRESSURE: 59 MMHG | HEART RATE: 62 BPM | SYSTOLIC BLOOD PRESSURE: 132 MMHG | BODY MASS INDEX: 33.39 KG/M2

## 2024-04-01 PROBLEM — Z98.890 POST-OPERATIVE STATE: Status: ACTIVE | Noted: 2024-04-01

## 2024-04-01 LAB
ALBUMIN SERPL-MCNC: 3.1 G/DL (ref 3.5–5)
ALP BLD-CCNC: 88 U/L (ref 35–125)
ALT SERPL-CCNC: 19 U/L (ref 5–40)
ANION GAP SERPL CALC-SCNC: 10 MMOL/L
AST SERPL-CCNC: 17 U/L (ref 5–40)
BILIRUB SERPL-MCNC: 0.2 MG/DL (ref 0.1–1.2)
BUN SERPL-MCNC: 17 MG/DL (ref 8–25)
CALCIUM SERPL-MCNC: 9 MG/DL (ref 8.5–10.4)
CHLORIDE SERPL-SCNC: 100 MMOL/L (ref 97–107)
CO2 SERPL-SCNC: 23 MMOL/L (ref 24–31)
CREAT SERPL-MCNC: 1 MG/DL (ref 0.4–1.6)
EGFRCR SERPLBLD CKD-EPI 2021: 86 ML/MIN/1.73M*2
ERYTHROCYTE [DISTWIDTH] IN BLOOD BY AUTOMATED COUNT: 13 % (ref 11.5–14.5)
GLUCOSE BLD MANUAL STRIP-MCNC: 103 MG/DL (ref 74–99)
GLUCOSE BLD MANUAL STRIP-MCNC: 119 MG/DL (ref 74–99)
GLUCOSE BLD MANUAL STRIP-MCNC: 120 MG/DL (ref 74–99)
GLUCOSE SERPL-MCNC: 96 MG/DL (ref 65–99)
HCT VFR BLD AUTO: 23 % (ref 41–52)
HGB BLD-MCNC: 8 G/DL (ref 13.5–17.5)
MCH RBC QN AUTO: 29.3 PG (ref 26–34)
MCHC RBC AUTO-ENTMCNC: 34.8 G/DL (ref 32–36)
MCV RBC AUTO: 84 FL (ref 80–100)
NRBC BLD-RTO: 0 /100 WBCS (ref 0–0)
PLATELET # BLD AUTO: 161 X10*3/UL (ref 150–450)
POTASSIUM SERPL-SCNC: 4.3 MMOL/L (ref 3.4–5.1)
PROT SERPL-MCNC: 6 G/DL (ref 5.9–7.9)
RBC # BLD AUTO: 2.73 X10*6/UL (ref 4.5–5.9)
SODIUM SERPL-SCNC: 133 MMOL/L (ref 133–145)
WBC # BLD AUTO: 7.1 X10*3/UL (ref 4.4–11.3)

## 2024-04-01 PROCEDURE — 2500000002 HC RX 250 W HCPCS SELF ADMINISTERED DRUGS (ALT 637 FOR MEDICARE OP, ALT 636 FOR OP/ED)

## 2024-04-01 PROCEDURE — 97110 THERAPEUTIC EXERCISES: CPT | Mod: GP,CQ

## 2024-04-01 PROCEDURE — 2500000001 HC RX 250 WO HCPCS SELF ADMINISTERED DRUGS (ALT 637 FOR MEDICARE OP): Performed by: NURSE PRACTITIONER

## 2024-04-01 PROCEDURE — 2500000004 HC RX 250 GENERAL PHARMACY W/ HCPCS (ALT 636 FOR OP/ED)

## 2024-04-01 PROCEDURE — 82947 ASSAY GLUCOSE BLOOD QUANT: CPT

## 2024-04-01 PROCEDURE — 97110 THERAPEUTIC EXERCISES: CPT | Mod: GO,CO

## 2024-04-01 PROCEDURE — 97116 GAIT TRAINING THERAPY: CPT | Mod: GP,CQ

## 2024-04-01 PROCEDURE — 85027 COMPLETE CBC AUTOMATED: CPT

## 2024-04-01 PROCEDURE — 80053 COMPREHEN METABOLIC PANEL: CPT

## 2024-04-01 PROCEDURE — 2500000004 HC RX 250 GENERAL PHARMACY W/ HCPCS (ALT 636 FOR OP/ED): Performed by: NURSE PRACTITIONER

## 2024-04-01 PROCEDURE — 2500000001 HC RX 250 WO HCPCS SELF ADMINISTERED DRUGS (ALT 637 FOR MEDICARE OP)

## 2024-04-01 RX ORDER — PANTOPRAZOLE SODIUM 40 MG/1
40 TABLET, DELAYED RELEASE ORAL
Qty: 90 TABLET | Refills: 0 | Status: SHIPPED | OUTPATIENT
Start: 2024-04-02 | End: 2024-07-01

## 2024-04-01 RX ORDER — OXYCODONE AND ACETAMINOPHEN 5; 325 MG/1; MG/1
1 TABLET ORAL EVERY 6 HOURS PRN
Qty: 28 TABLET | Refills: 0 | Status: SHIPPED | OUTPATIENT
Start: 2024-04-01 | End: 2024-04-08

## 2024-04-01 RX ORDER — POLYETHYLENE GLYCOL 3350 17 G/17G
17 POWDER, FOR SOLUTION ORAL DAILY
Qty: 238 G | Refills: 0 | Status: SHIPPED | OUTPATIENT
Start: 2024-04-02

## 2024-04-01 RX ORDER — VANCOMYCIN 1 G/200ML
1 INJECTION, SOLUTION INTRAVENOUS EVERY 12 HOURS
Qty: 12000 ML | Refills: 1 | Status: SHIPPED | OUTPATIENT
Start: 2024-04-01 | End: 2024-05-13

## 2024-04-01 RX ORDER — DOCUSATE SODIUM 100 MG/1
100 CAPSULE, LIQUID FILLED ORAL 2 TIMES DAILY
Qty: 60 CAPSULE | Refills: 0 | Status: SHIPPED | OUTPATIENT
Start: 2024-04-01 | End: 2024-05-01

## 2024-04-01 RX ORDER — CYCLOBENZAPRINE HCL 10 MG
10 TABLET ORAL 3 TIMES DAILY PRN
Qty: 45 TABLET | Refills: 0 | Status: SHIPPED | OUTPATIENT
Start: 2024-04-01 | End: 2024-04-24

## 2024-04-01 RX ADMIN — VANCOMYCIN 1 G: 1 INJECTION, SOLUTION INTRAVENOUS at 09:27

## 2024-04-01 RX ADMIN — FINASTERIDE 5 MG: 5 TABLET, FILM COATED ORAL at 09:26

## 2024-04-01 RX ADMIN — CYCLOBENZAPRINE 10 MG: 10 TABLET, FILM COATED ORAL at 06:22

## 2024-04-01 RX ADMIN — SPIRONOLACTONE 25 MG: 25 TABLET ORAL at 09:25

## 2024-04-01 RX ADMIN — OXYCODONE 5 MG: 5 TABLET ORAL at 12:09

## 2024-04-01 RX ADMIN — DOCUSATE SODIUM 100 MG: 100 CAPSULE, LIQUID FILLED ORAL at 09:25

## 2024-04-01 RX ADMIN — CARVEDILOL 12.5 MG: 12.5 TABLET, FILM COATED ORAL at 09:26

## 2024-04-01 RX ADMIN — METFORMIN HYDROCHLORIDE 500 MG: 500 TABLET, FILM COATED ORAL at 09:26

## 2024-04-01 RX ADMIN — FUROSEMIDE 20 MG: 20 TABLET ORAL at 09:25

## 2024-04-01 RX ADMIN — OXYCODONE 5 MG: 5 TABLET ORAL at 06:22

## 2024-04-01 RX ADMIN — IRON SUCROSE 200 MG: 20 INJECTION, SOLUTION INTRAVENOUS at 09:25

## 2024-04-01 RX ADMIN — ATORVASTATIN CALCIUM 40 MG: 40 TABLET, FILM COATED ORAL at 09:25

## 2024-04-01 RX ADMIN — PANTOPRAZOLE SODIUM 40 MG: 40 TABLET, DELAYED RELEASE ORAL at 06:22

## 2024-04-01 RX ADMIN — HEPARIN SODIUM 5000 UNITS: 5000 INJECTION, SOLUTION INTRAVENOUS; SUBCUTANEOUS at 06:22

## 2024-04-01 RX ADMIN — CYCLOBENZAPRINE 10 MG: 10 TABLET, FILM COATED ORAL at 14:18

## 2024-04-01 RX ADMIN — ASPIRIN 81 MG: 81 TABLET, COATED ORAL at 09:25

## 2024-04-01 RX ADMIN — HEPARIN SODIUM 5000 UNITS: 5000 INJECTION, SOLUTION INTRAVENOUS; SUBCUTANEOUS at 14:18

## 2024-04-01 RX ADMIN — POLYETHYLENE GLYCOL 3350 17 G: 17 POWDER, FOR SOLUTION ORAL at 09:25

## 2024-04-01 ASSESSMENT — COGNITIVE AND FUNCTIONAL STATUS - GENERAL
MOVING TO AND FROM BED TO CHAIR: A LITTLE
TURNING FROM BACK TO SIDE WHILE IN FLAT BAD: A LITTLE
WALKING IN HOSPITAL ROOM: A LOT
CLIMB 3 TO 5 STEPS WITH RAILING: TOTAL
MOVING FROM LYING ON BACK TO SITTING ON SIDE OF FLAT BED WITH BEDRAILS: A LITTLE
MOBILITY SCORE: 15
TOILETING: A LOT
STANDING UP FROM CHAIR USING ARMS: A LITTLE
DAILY ACTIVITIY SCORE: 17
DRESSING REGULAR LOWER BODY CLOTHING: A LOT
DRESSING REGULAR UPPER BODY CLOTHING: A LITTLE
HELP NEEDED FOR BATHING: A LOT

## 2024-04-01 ASSESSMENT — PAIN SCALES - GENERAL
PAINLEVEL_OUTOF10: 4
PAINLEVEL_OUTOF10: 6
PAINLEVEL_OUTOF10: 2
PAINLEVEL_OUTOF10: 3
PAINLEVEL_OUTOF10: 6
PAINLEVEL_OUTOF10: 5 - MODERATE PAIN
PAINLEVEL_OUTOF10: 5 - MODERATE PAIN

## 2024-04-01 ASSESSMENT — PAIN - FUNCTIONAL ASSESSMENT
PAIN_FUNCTIONAL_ASSESSMENT: 0-10

## 2024-04-01 ASSESSMENT — PAIN DESCRIPTION - LOCATION: LOCATION: LEG

## 2024-04-01 ASSESSMENT — PAIN DESCRIPTION - ORIENTATION: ORIENTATION: RIGHT

## 2024-04-01 NOTE — NURSING NOTE
Report called and left with receiving nurse, Heriberto, at [facility] at this time. Callback number left should they have additional questions. No additional needs.

## 2024-04-01 NOTE — PROGRESS NOTES
Physical Therapy    Physical Therapy Treatment    Patient Name: Claude Coley  MRN: [id number]  Today's Date: 4/1/2024  Time Calculation  Start Time: 0808  Stop Time: 0832  Time Calculation (min): 24 min       Assessment/Plan   PT Assessment  Rehab Prognosis: Good  End of Session Patient Position: Up in chair, Alarm off, not on at start of session     PT Plan  Treatment/Interventions: Bed mobility, Transfer training, Gait training, Therapeutic exercise  PT Plan: Skilled PT  PT Frequency: 5 times per week  PT Discharge Recommendations: Moderate intensity level of continued care  Equipment Recommended upon Discharge: Wheeled walker  PT Recommended Transfer Status: Assist x1, Assistive device  PT - OK to Discharge: Yes      General Visit Information:      General  Prior to Session Communication: Bedside nurse  Patient Position Received: Bed, 3 rail up, Alarm off, not on at start of session  Preferred Learning Style: verbal  General Comment: Pt cleared by nursing and agreeable for therapy.    Subjective   Precautions:  Precautions  LE Weight Bearing Status: Right Non-Weight Bearing  Medical Precautions: Fall precautions  Post-Surgical Precautions:  (Wound vac, external fixator.)  Vital Signs:       Objective   Pain:  Pain Assessment  Pain Assessment: 0-10  Pain Score: 6  Pain Type: Surgical pain  Pain Location: Leg  Pain Orientation: Right  Cognition:  Cognition  Overall Cognitive Status: Within Functional Limits  Orientation Level: Oriented X4  Postural Control:     Extremity/Trunk Assessments:    Activity Tolerance:     Treatments:  Therapeutic Exercise  Therapeutic Exercise Activity 1: Pt peformed seated bilat LE ankle pumps left only x20. Bilat LE LAQ, hip flexion, carina hip adduction and resisted hip abduction x20 reps each.    Bed Mobility 1  Bed Mobility 1: Supine to sitting  Level of Assistance 1: Close supervision  Bed Mobility Comments 1: Head of bed slightly elevated    Ambulation/Gait Training 1  Surface 1:  Level tile  Device 1: Rolling walker  Assistance 1: Contact guard  Comments/Distance (ft) 1: Pt used RW to take 3-4 steps to chair, CGA and max verbal cues to adhere to NWBing right LE.  Transfer 1  Transfer From 1: Bed to  Transfer to 1: Stand  Technique 1: Sit to stand  Transfer Device 1: Walker  Transfer Level of Assistance 1: Minimum assistance  Transfers 2  Transfer From 2: Stand to  Transfer to 2: Sit, Chair with arms  Technique 2: Stand to sit  Transfer Level of Assistance 2: Minimum assistance    Outcome Measures:  [facility] Basic Mobility  Turning from your back to your side while in a flat bed without using bedrails: A little  Moving from lying on your back to sitting on the side of a flat bed without using bedrails: A little  Moving to and from bed to chair (including a wheelchair): A little  Standing up from a chair using your arms (e.g. wheelchair or bedside chair): A little  To walk in hospital room: A lot  Climbing 3-5 steps with railing: Total  Basic Mobility - Total Score: 15    Education Documentation  Handouts, taught by Roseanna Stearns PTA at 4/1/2024 10:50 AM.  Learner: Patient  Readiness: Acceptance  Method: Explanation  Response: Verbalizes Understanding, Needs Reinforcement    Precautions, taught by Roseanna Stearns PTA at 4/1/2024 10:50 AM.  Learner: Patient  Readiness: Acceptance  Method: Explanation  Response: Verbalizes Understanding, Needs Reinforcement    Body Mechanics, taught by Roseanna Stearns PTA at 4/1/2024 10:50 AM.  Learner: Patient  Readiness: Acceptance  Method: Explanation  Response: Verbalizes Understanding, Needs Reinforcement    Home Exercise Program, taught by Roseanna Stearns PTA at 4/1/2024 10:50 AM.  Learner: Patient  Readiness: Acceptance  Method: Explanation  Response: Verbalizes Understanding, Needs Reinforcement    Mobility Training, taught by Roseanna Stearns PTA at 4/1/2024 10:50 AM.  Learner: Patient  Readiness: Acceptance  Method: Explanation  Response: Verbalizes Understanding,  Needs Reinforcement    Education Comments  No comments found.        OP EDUCATION:       Encounter Problems       Encounter Problems (Active)       Balance       LTG - Patient will maintain balance to allow for safe mobility (Progressing)       Start:  03/27/24    Expected End:  04/09/24               Mobility       STG - Patient will ambulate 5' w/ RW and min assist, NWB RLE (Progressing)       Start:  03/27/24    Expected End:  04/09/24            Pt will participate in LE exercises to promote functional strength and endurance (Progressing)       Start:  03/27/24    Expected End:  04/09/24               PT Transfers       STG - Patient to transfer to and from sit to supine w/ supervision  (Progressing)       Start:  03/27/24    Expected End:  04/09/24            STG - Patient will transfer sit to and from stand w/ GCA (Progressing)       Start:  03/27/24    Expected End:  04/09/24               Safety       Pt will consistently comply w/ NWB RLE w/ all activities (Progressing)       Start:  03/27/24    Expected End:  04/09/24

## 2024-04-01 NOTE — PROGRESS NOTES
Claude Coley is a 63 y.o. male on day 4 of admission presenting with Type 2 diabetes mellitus with right diabetic foot ulcer (CMS/HCC).      Subjective   Patient seen and examined. Resting in bed. States he feels ok, has some mild pain. Had BM yesterday. Afebrile.        Objective     Last Recorded Vitals  /59 (BP Location: Left arm, Patient Position: Lying)   Pulse 64   Temp 36.7 °C (98.1 °F) (Temporal)   Resp 18   Wt 118 kg (260 lb 2.3 oz)   SpO2 96%   Intake/Output last 3 Shifts:    Intake/Output Summary (Last 24 hours) at 4/1/2024 1021  Last data filed at 4/1/2024 0925  Gross per 24 hour   Intake 1010 ml   Output 1185 ml   Net -175 ml       Admission Weight  Weight: 118 kg (260 lb 2.3 oz) (03/27/24 0600)    Daily Weight  03/27/24 : 118 kg (260 lb 2.3 oz)    Image Results    No new imaging to review.     Physical Exam    General: Alert and oriented x3, pleasant.   Cardiac: Regular rate and rhythm, S1/S2 , no murmur.   Pulmonary: Clear to auscultation on room air.   Abdomen: Soft, round, nontender. BS +x4.   Extremities: RLE external fixator in place, ACE bandage.   Skin: No rashes.    Relevant Results    Scheduled medications  aspirin, 81 mg, oral, Daily  atorvastatin, 40 mg, oral, Daily  carvedilol, 12.5 mg, oral, BID with meals  docusate sodium, 100 mg, oral, BID  finasteride, 5 mg, oral, Daily  furosemide, 20 mg, oral, Daily  heparin (porcine), 5,000 Units, subcutaneous, q8h LARRY  insulin lispro, 0-5 Units, subcutaneous, TID with meals  iron sucrose, 200 mg, intravenous, Daily  [Held by provider] lisinopril, 20 mg, oral, Daily  metFORMIN, 500 mg, oral, Daily with breakfast  OLANZapine, 20 mg, oral, Nightly  pantoprazole, 40 mg, oral, Daily before breakfast  polyethylene glycol, 17 g, oral, Daily  spironolactone, 25 mg, oral, Daily  vancomycin (Xellia) 1 g in 200 mL, 1 g, intravenous, q12h      Continuous medications     PRN medications  PRN medications: acetaminophen, alteplase, cyclobenzaprine,  dextrose, dextrose, diphenhydrAMINE, glucagon, morphine, naloxone, ondansetron ODT **OR** ondansetron, oxyCODONE, promethazine **OR** promethazine     Results for orders placed or performed during the hospital encounter of 03/27/24 (from the past 24 hour(s))   POCT GLUCOSE   Result Value Ref Range    POCT Glucose 104 (H) 74 - 99 mg/dL   POCT GLUCOSE   Result Value Ref Range    POCT Glucose 92 74 - 99 mg/dL   POCT GLUCOSE   Result Value Ref Range    POCT Glucose 120 (H) 74 - 99 mg/dL   CBC   Result Value Ref Range    WBC 7.1 4.4 - 11.3 x10*3/uL    nRBC 0.0 0.0 - 0.0 /100 WBCs    RBC 2.73 (L) 4.50 - 5.90 x10*6/uL    Hemoglobin 8.0 (L) 13.5 - 17.5 g/dL    Hematocrit 23.0 (L) 41.0 - 52.0 %    MCV 84 80 - 100 fL    MCH 29.3 26.0 - 34.0 pg    MCHC 34.8 32.0 - 36.0 g/dL    RDW 13.0 11.5 - 14.5 %    Platelets 161 150 - 450 x10*3/uL   Comprehensive metabolic panel   Result Value Ref Range    Glucose 96 65 - 99 mg/dL    Sodium 133 133 - 145 mmol/L    Potassium 4.3 3.4 - 5.1 mmol/L    Chloride 100 97 - 107 mmol/L    Bicarbonate 23 (L) 24 - 31 mmol/L    Urea Nitrogen 17 8 - 25 mg/dL    Creatinine 1.00 0.40 - 1.60 mg/dL    eGFR 86 >60 mL/min/1.73m*2    Calcium 9.0 8.5 - 10.4 mg/dL    Albumin 3.1 (L) 3.5 - 5.0 g/dL    Alkaline Phosphatase 88 35 - 125 U/L    Total Protein 6.0 5.9 - 7.9 g/dL    AST 17 5 - 40 U/L    Bilirubin, Total 0.2 0.1 - 1.2 mg/dL    ALT 19 5 - 40 U/L    Anion Gap 10 <=19 mmol/L   POCT GLUCOSE   Result Value Ref Range    POCT Glucose 103 (H) 74 - 99 mg/dL           Assessment/Plan      Infected R Diabetic Foot Ulcer  -S/P RLE peroneus brevis muscle flap, wound debridement, integra bilayer, wound vac, external fixator placement.   -Podiatry and ID following.   -IV vanco, culture with MRSA.   -Pain control.   -PT/OT, plan for SNF.   -Wound care per podiatry.     DM 2  -HgA1c 4.4 (3/19/24).  -Continue SSI coverage.   -Continue metformin.   -Monitor blood sugars, stable.     Anemia  -IV iron was ordered.   -Monitor  H&H, stable 8.0/23.0 this morning. Improving.     HTN  -Continue home meds.   -BP was low but now improved.     HLD  -Continue statin.     DVT Risk  -Continue heparin.     Plan  Podiatry and ID following.   Continue IV abx.   Wound care per podiatry.   Pain control.   Continue current DM meds and BP meds, stable.   IV iron.   Plan for SNF.         ALISE Lagos-CNP

## 2024-04-01 NOTE — CARE PLAN
The patient's goals for the shift include Assist with pain control, work with therapy, rest, discharge to rehab.    The clinical goals for the shift include Monitor bleeding, manage pain, IV antibx, work with therapy, monitor labs and VS, comfort and safety, promote rest.    No barriers, patient to be discharged to [facility] at 3pm. Patient made aware of pickup time. No additional needs.       Problem: Pain  Goal: My pain/discomfort is manageable  Outcome: Adequate for Discharge     Problem: Safety  Goal: Patient will be injury free during hospitalization  Outcome: Adequate for Discharge  Goal: I will remain free of falls  Outcome: Adequate for Discharge     Problem: Daily Care  Goal: Daily care needs are met  Outcome: Adequate for Discharge     Problem: Psychosocial Needs  Goal: Demonstrates ability to cope with hospitalization/illness  Outcome: Adequate for Discharge  Goal: Collaborate with me, my family, and caregiver to identify my specific goals  Outcome: Adequate for Discharge     Problem: Discharge Barriers  Goal: My discharge needs are met  Outcome: Adequate for Discharge     Problem: Fall/Injury  Goal: Not fall by end of shift  Outcome: Adequate for Discharge  Goal: Be free from injury by end of the shift  Outcome: Adequate for Discharge  Goal: Verbalize understanding of personal risk factors for fall in the hospital  Outcome: Adequate for Discharge  Goal: Verbalize understanding of risk factor reduction measures to prevent injury from fall in the home  Outcome: Adequate for Discharge  Goal: Use assistive devices by end of the shift  Outcome: Adequate for Discharge  Goal: Pace activities to prevent fatigue by end of the shift  Outcome: Adequate for Discharge     Problem: OT Goals  Goal: ADLs  Outcome: Adequate for Discharge  Goal: Functional transfers  Outcome: Adequate for Discharge  Goal: UE strength  Outcome: Adequate for Discharge     Problem: Balance  Goal: LTG - Patient will maintain  balance to allow for safe mobility  Outcome: Adequate for Discharge     Problem: Mobility  Goal: STG - Patient will ambulate 5' w/ RW and min assist, NWB RLE  Outcome: Adequate for Discharge  Goal: Pt will participate in LE exercises to promote functional strength and endurance  Outcome: Adequate for Discharge     Problem: Safety  Goal: Pt will consistently comply w/ NWB RLE w/ all activities  Outcome: Adequate for Discharge     Problem: PT Transfers  Goal: STG - Patient to transfer to and from sit to supine w/ supervision   Outcome: Adequate for Discharge  Goal: STG - Patient will transfer sit to and from stand w/ GCA  Outcome: Adequate for Discharge     Problem: Diabetes  Goal: Achieve decreasing blood glucose levels by end of shift  Outcome: Adequate for Discharge  Goal: Increase stability of blood glucose readings by end of shift  Outcome: Adequate for Discharge  Goal: Maintain electrolyte levels within acceptable range throughout shift  Outcome: Adequate for Discharge  Goal: Maintain glucose levels >70mg/dl to <250mg/dl throughout shift  Outcome: Adequate for Discharge  Goal: No changes in neurological exam by end of shift  Outcome: Adequate for Discharge  Goal: Learn about and adhere to nutrition recommendations by end of shift  Outcome: Adequate for Discharge  Goal: Vital signs within normal range for age by end of shift  Outcome: Adequate for Discharge  Goal: Increase self care and/or family involovement by end of shift  Outcome: Adequate for Discharge     Problem: Pain  Goal: Takes deep breaths with improved pain control throughout the shift  Outcome: Adequate for Discharge  Goal: Turns in bed with improved pain control throughout the shift  Outcome: Adequate for Discharge  Goal: Performs ADL's with improved pain control throughout shift  Outcome: Adequate for Discharge  Goal: Participates in PT with improved pain control throughout the shift  Outcome: Adequate for Discharge  Goal: Free from opioid side  effects throughout the shift  Outcome: Adequate for Discharge  Goal: Free from acute confusion related to pain meds throughout the shift  Outcome: Adequate for Discharge     Problem: Safety - Adult  Goal: Free from fall injury  Outcome: Adequate for Discharge     Problem: Discharge Planning  Goal: Discharge to home or other facility with appropriate resources  Outcome: Adequate for Discharge     Problem: Chronic Conditions and Co-morbidities  Goal: Patient's chronic conditions and co-morbidity symptoms are monitored and maintained or improved  Outcome: Adequate for Discharge

## 2024-04-01 NOTE — CARE PLAN
Problem: Pain  Goal: My pain/discomfort is manageable  Outcome: Progressing     Problem: Safety  Goal: Patient will be injury free during hospitalization  Outcome: Progressing  Goal: I will remain free of falls  Outcome: Progressing     Problem: Discharge Barriers  Goal: My discharge needs are met  Outcome: Progressing   The patient's goals for the shift include Assist with pain control    The clinical goals for the shift include Maintain pt safety; pain management; IV antibiotics; wound care

## 2024-04-01 NOTE — PROGRESS NOTES
04/01/24 0843   Discharge Planning   Patient expects to be discharged to: [facility]   Does the patient need discharge transport arranged? Yes   RoundTrip coordination needed? Yes   Has discharge transport been arranged? No     Pending precert for [facility] - started 3/29.    3 pm

## 2024-04-01 NOTE — SIGNIFICANT EVENT
Podiatry Event Note:    Clarification: Patient's vac dressing and tubing is to remain intact until outpatient follow-up.  For discharge, his vac pump should be disconnected via the tubing link outside of the dressing, for transport. The dressing is not to be disturbed.     This patient will be followed by the Podiatry service. Please Epic Chat or page the corresponding resident below with questions or concerns.      Husam Lopez DPM PGY-2  Podiatric Medicine and Surgery   Epic Chat preferred

## 2024-04-01 NOTE — NURSING NOTE
BSSR completed; assumed care of patient at this time. Resting comfortably in bed; external fixator and dressing intact, wound vac functioning properly.  No c/o pain or discomfort at this time. No distress noted. Bed locked and low, bed alarm engaged, call light and other commonly used items within reach. Safety maintained.

## 2024-04-01 NOTE — PROGRESS NOTES
Claude Coley is a 63 y.o. male on day 4 of admission presenting with Type 2 diabetes mellitus with right diabetic foot ulcer (CMS/HCC).    Subjective   Interval History:   Interval discussion with podiatry   Afebrile, no chills  No foot pain  Denies chest pain or shortness of breath.  Denies nausea vomiting or diarrhea            Objective   Range of Vitals (last 24 hours)  Heart Rate:  [62-64]   Temp:  [36.2 °C (97.2 °F)-36.8 °C (98.2 °F)]   Resp:  [18-19]   BP: (128-134)/(57-65)   SpO2:  [94 %-96 %]   Daily Weight  03/27/24 : 118 kg (260 lb 2.3 oz)    Body mass index is 33.4 kg/m².    Physical Exam  Constitutional:       Appearance: Normal appearance.   HENT:      Head: Normocephalic and atraumatic.      Nose: Nose normal.      Mouth/Throat:      Mouth: Mucous membranes are moist.      Pharynx: Oropharynx is clear.   Eyes:      Extraocular Movements: Extraocular movements intact.      Conjunctiva/sclera: Conjunctivae normal.   Cardiovascular:      Rate and Rhythm: Normal rate and regular rhythm.   Pulmonary:      Effort: Pulmonary effort is normal.      Breath sounds: Normal breath sounds.   Abdominal:      General: Bowel sounds are normal.      Palpations: Abdomen is soft.   Musculoskeletal:      Cervical back: Normal range of motion and neck supple.      Comments: Right leg external fixator    Skin:     Comments: Right foot wound vac dressing intact, external fixator   Neurological:      General: No focal deficit present.      Mental Status: He is alert and oriented to person, place, and time. Mental status is at baseline.   Psychiatric:         Mood and Affect: Mood normal.         Behavior: Behavior normal.           Antibiotics  lactated Ringer's infusion  ceFAZolin in dextrose (iso-os) (Ancef) IVPB 2 g  ceFAZolin in dextrose (iso-os) (Ancef) IVPB  - Omnicell Override Pull  mineral oil, light topical  - Omnicell Override Pull  bupivacaine PF (Marcaine) injection  - Omnicell Override Pull  fentaNYL PF  (Sublimaze) injection  - Omnicell Override Pull  midazolam (Versed) injection  - Omnicell Override Pull  lidocaine (Xylocaine) 10 mg/mL (1 %) injection 1 mg  lactated Ringer's infusion  fentaNYL PF (Sublimaze) injection 50 mcg  HYDROmorphone (Dilaudid) injection 0.4 mg  midazolam (Versed) injection 1 mg  ondansetron (Zofran) injection 4 mg  promethazine (Phenergan) 6.25 mg in sodium chloride 0.9% 50 mL IV  hydrALAZINE (Apresoline) injection 5 mg  meperidine PF (Demerol) injection 12.5 mg  albuterol 2.5 mg /3 mL (0.083 %) nebulizer solution 2.5 mg  thrombin (recombinant) (Recothrom) topical solution  - Omnicell Override Pull  thrombin solution  ondansetron (Zofran) injection  - Omnicell Override Pull  lisinopril tablet 20 mg  furosemide (Lasix) tablet 20 mg  finasteride (Proscar) tablet 5 mg  carvedilol (Coreg) tablet 12.5 mg  atorvastatin (Lipitor) tablet 40 mg  aspirin EC tablet 81 mg  acetaminophen (Tylenol) tablet 650 mg  OLANZapine (ZyPREXA) tablet 20 mg  spironolactone (Aldactone) tablet 25 mg  vancomycin-diluent combo no.1 (Xellia) IVPB 750 mg  metFORMIN (Glucophage) tablet 500 mg  heparin (porcine) injection 5,000 Units  acetaminophen (Tylenol) tablet 650 mg  naloxone (Narcan) injection 0.2 mg  oxyCODONE (Roxicodone) immediate release tablet 5 mg  morphine injection 2 mg  cyclobenzaprine (Flexeril) tablet 10 mg  ondansetron ODT (Zofran-ODT) disintegrating tablet 4 mg  ondansetron (Zofran) injection 4 mg  promethazine (Phenergan) tablet 25 mg  promethazine (Phenergan) suppository 25 mg  polyethylene glycol (Glycolax, Miralax) packet 17 g  diphenhydrAMINE (BENADryl) injection 12.5 mg  heparin (porcine) injection 5,000 Units  glucagon (Glucagen) injection 1 mg  dextrose 50 % injection 25 g  glucagon (Glucagen) injection 1 mg  dextrose 50 % injection 12.5 g  insulin lispro (HumaLOG) injection 0-5 Units  docusate sodium (Colace) capsule 100 mg  sodium chloride 0.9 % bolus 500 mL  alteplase (Cathflo Activase)  "injection 1 mg  pantoprazole (ProtoNix) EC tablet 40 mg  vancomycin (Xellia) 1 g in 200 mL (Xellia) IVPB 1 g      Relevant Results  Labs  Results from last 72 hours   Lab Units 04/01/24  0530 03/31/24  0634 03/30/24  0456   WBC AUTO x10*3/uL 7.1 7.3 7.8   HEMOGLOBIN g/dL 8.0* 7.6* 7.8*   HEMATOCRIT % 23.0* 21.8* 22.0*   PLATELETS AUTO x10*3/uL 161 133* 118*       Results from last 72 hours   Lab Units 04/01/24  0530 03/31/24  0634 03/30/24  0454   SODIUM mmol/L 133 133 133   POTASSIUM mmol/L 4.3 4.3 4.1   CHLORIDE mmol/L 100 104 104   CO2 mmol/L 23* 22* 20*   BUN mg/dL 17 19 22   CREATININE mg/dL 1.00 0.90 0.90   GLUCOSE mg/dL 96 92 105*   CALCIUM mg/dL 9.0 8.9 8.6   ANION GAP mmol/L 10 7 9   EGFR mL/min/1.73m*2 86 >90 >90       Results from last 72 hours   Lab Units 04/01/24  0530 03/31/24  0634 03/30/24  0454   ALK PHOS U/L 88 83 81   BILIRUBIN TOTAL mg/dL 0.2 0.2 0.2   PROTEIN TOTAL g/dL 6.0 5.8* 5.6*   ALT U/L 19 18 16   AST U/L 17 16 15   ALBUMIN g/dL 3.1* 3.0* 3.0*       Estimated Creatinine Clearance: 105.9 mL/min (by C-G formula based on SCr of 1 mg/dL).  No results found for: \"CRP\"  Microbiology  Susceptibility data from last 14 days.  Collected Specimen Info Organism Clindamycin Erythromycin Oxacillin Tetracycline Trimethoprim/Sulfamethoxazole Vancomycin   03/19/24 Tissue/Biopsy from Wound/Tissue Methicillin Resistant Staphylococcus aureus (MRSA) R RED MOON S S S     Reviewed  Imaging  XR chest 1 view    Result Date: 3/27/2024  Interpreted By:  Kirstie Moy, STUDY: XR CHEST 1 VIEW 3/27/2024 4:36 pm   INDICATION: Signs/Symptoms:PICC placement   COMPARISON: 03/20/2024   ACCESSION NUMBER(S): [id number]   ORDERING CLINICIAN: SANIYA HOWARD   TECHNIQUE: AP erect view of the chest   FINDINGS: A right arm PICC line terminates in the SVC. There is no pneumothorax. Lungs are clear without pleural abnormality.   The cardiac size is normal. No mediastinal or hilar abnormality is seen.       Right arm PICC line " terminating in the SVC.   No pneumothorax.   Signed by: Kirstie Moy 3/27/2024 4:50 PM Dictation workstation:   [id number]    FL fluoro images no charge    Result Date: 3/27/2024  These images are not reportable by radiology and will not be interpreted by  Radiologists.    Bedside PICC Imaging    Result Date: 3/25/2024  These images are not reportable by radiology and will not be interpreted by  Radiologists.    XR chest 1 view    Result Date: 3/20/2024  Interpreted By:  Nael Moore, STUDY: XR CHEST 1 VIEW; 3/20/2024 3:57 pm   INDICATION: Signs/Symptoms:PICC placement.   COMPARISON: None   ACCESSION NUMBER(S): [id number]   ORDERING CLINICIAN: ANGELITA YOUSSEF   TECHNIQUE: 1 view of the chest was performed.   FINDINGS: Right-sided PICC line catheter with tip at the mid SVC. The lungs are adequately inflated. No acute consolidation. No pleural effusion. No pneumothorax.  The cardiomediastinal silhouette is within normal limits.       Right-sided PICC line catheter with tip at the mid SVC. No acute cardiopulmonary disease.   Signed by: Nael Moore 3/20/2024 4:14 PM Dictation workstation:   [id number]    Lower extremity venous duplex right    Result Date: 3/19/2024  Interpreted By:  Favian Castellano, STUDY: VASC US LOWER EXTREMITY VENOUS DUPLEX RIGHT; 3/19/2024 3:22 pm   INDICATION: Signs/Symptoms:leg swelling + d dimer..   COMPARISON: None.   ACCESSION NUMBER(S): [id number]   ORDERING CLINICIAN: CHARO FRIED   TECHNIQUE: Vascular ultrasound of the right lower extremity was performed. Real time compression views as well as Gray scale, color Doppler and spectral Doppler waveform analysis was performed.   FINDINGS: Evaluation of the visualized portions of the right common femoral vein, proximal, mid, and distal femoral vein, and popliteal vein were performed.  Evaluation of the visualized portions of the posterior tibial and peroneal veins were also performed.  In addition, evaluation of the contralateral  common femoral vein was performed.   Limitations: None   The evaluated veins demonstrate normal compressibility. There is intact venous flow demonstrating normal respiratory variability and normal augmentation of flow with calf compression. Therefore, there is no ultrasonographic evidence for deep vein thrombosis within the evaluated veins. No evidence of thrombus is seen within the contralateral common femoral vein.       No sonographic evidence for deep vein thrombosis within the evaluated veins of the right lower extremity.   MACRO: None   Signed by: Favian Castellano 3/19/2024 3:22 PM Dictation workstation:   [id number]    XR foot right 3+ views    Result Date: 3/19/2024  Interpreted By:  Lux Hernandez, STUDY: XR FOOT RIGHT 3+ VIEWS   INDICATION: Signs/Symptoms:r foot wound.   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   Suspected skin wounds at the surgical site. No aggressive bony destructive changes or erosions.   Marked soft tissue swelling dorsally.       Suspected skin wounds of the 5th amputation site with no osseous acute abnormality.   Marked dorsal soft tissue swelling, potentially cellulitis.   Signed by: Lux Hernandez 3/19/2024 2:09 PM Dictation workstation:   [id number]     Assessment/Plan   Infected right diabetic foot ulcer-MRSA  Status post right peroneus brevis muscle flap, wound debridement, Integra bilayer, wound vac, external fixator on 3/27/2024         IV vancomycin-avoiding zyvox -potential interaction with zyprexa   Monitor renal function  Supportive care  Local care  Offloading  Monitor temperature and WBC  Podiatry  follow up     Long term plan IV vancomycin 1 gram every 12 hours till 5/13/2024-per interval discussion with podiatry  Weekly CBC with diff,  CMP, vancomycin trough-see discharge rec   Follow up with Dr. Saravia outpatient      Bere Jacobo, APRN-CNP

## 2024-04-01 NOTE — PROGRESS NOTES
"Occupational Therapy    OT Treatment    Patient Name: Claude Coley  MRN: [id number]  Today's Date: 4/1/2024  Time Calculation  Start Time: 0830  Stop Time: 0845  Time Calculation (min): 15 min         Assessment:  OT Assessment: Pt presents with fair balance, fair activity tolerance, limited with difficulty NWB RLE  Evaluation/Treatment Tolerance: Patient tolerated treatment well  End of Session Patient Position: On cart, Alarm on (all needs in reach)  OT Assessment Results: Decreased ADL status, Decreased endurance, Decreased functional mobility, Decreased IADLs  Evaluation/Treatment Tolerance: Patient tolerated treatment well  Plan:  Treatment Interventions: UE strengthening/ROM  OT Frequency: 4 times per week  OT Discharge Recommendations: Moderate intensity level of continued care  Equipment Recommended upon Discharge: Wheeled walker  OT Recommended Transfer Status: Assist of 1  OT - OK to Discharge: Yes  Treatment Interventions: UE strengthening/ROM    Subjective   Previous Visit Info:  OT Last Visit  OT Received On: 04/01/24  General:  General  Reason for Referral: recent surgery  Referred By: Dr Lopez  Past Medical History Relevant to Rehab: DM, cellulitis, HTN, HLD, CHF, MO  Prior to Session Communication: Bedside nurse  Patient Position Received:  (Pt sitting edge of bed.)  General Comment: Agreeable to treatment, continues to decline ADL assist. \" I do that at night\"  Precautions:  LE Weight Bearing Status: Right Non-Weight Bearing  Medical Precautions: Fall precautions  Post-Surgical Precautions: Other (comment) (wound vac, external fixator)     Pain:  Pain Assessment  Pain Assessment: 0-10  Pain Score: 5 - Moderate pain (5 with standing, 3 when sitting)  Pain Type: Surgical pain  Pain Location: Leg  Pain Orientation: Right    Objective    Cognition:  Cognition  Overall Cognitive Status: Within Functional Limits  Orientation Level: Oriented X4  Insight: Moderate  Bed Mobility/Transfers:  "     Transfer 1  Transfer From 1: Bed to  Transfer to 1: Chair with arms  Technique 1: To right  Transfer Device 1: Walker  Transfer Level of Assistance 1: Contact guard  Trials/Comments 1: Pt takes ~ 6 steps from bed>chair, able to demonstrate NWB RLE in stance; however, therapist concern pt placing weight on RLE with steps, demonstrates safe tranfer technique.     Therapy/Activity: Therapeutic Exercise  Therapeutic Exercise Activity 1: scap protraction/retraction  Therapeutic Exercise Activity 2: shoulder elevation  Therapeutic Exercise Activity 3: wrist flexion/extension  Therapeutic Exercise Activity 4: 1 set x 15 reps, 2lb free wt. Pt completes with fair form with cues for correct joint alignment, completes in chair.      Outcome Measures:[facility] Daily Activity  Putting on and taking off regular lower body clothing: A lot  Bathing (including washing, rinsing, drying): A lot  Putting on and taking off regular upper body clothing: A little  Toileting, which includes using toilet, bedpan or urinal: A lot  Taking care of personal grooming such as brushing teeth: None  Eating Meals: None  Daily Activity - Total Score: 17      Education Documentation  Home Exercise Program, taught by GUNNER Wilson at 4/1/2024  9:02 AM.  Learner: Patient  Readiness: Acceptance  Method: Explanation  Response: Verbalizes Understanding, Demonstrated Understanding, Needs Reinforcement    ADL Training, taught by GUNNER Wilson at 4/1/2024  9:02 AM.  Learner: Patient  Readiness: Acceptance  Method: Explanation  Response: Verbalizes Understanding, Demonstrated Understanding, Needs Reinforcement    Education Comments  No comments found.    IP EDUCATION:  Education  Individual(s) Educated: Patient  Education Provided: Other (safe transfer technique)  Home Program: Strengthening  Patient Response to Education: Patient/Caregiver Verbalized Understanding of Information, Patient/Caregiver Performed Return Demonstration of  Exercises/Activities    Goals:  Encounter Problems       Encounter Problems (Active)       OT Goals       ADLs (Not Progressing)       Start:  03/27/24    Expected End:  04/10/24       Pt will complete bathing, dressing, and toileting tasks with setup assist using adaptive aides as needed.      Goal Note       Pt continues to decline ADL completion.              Functional transfers (Progressing)       Start:  03/27/24    Expected End:  04/10/24       Pt will complete toilet, bed, and chair transfers independently from elevated seat heights and using bilateral arm supports as needed.           UE strength (Progressing)       Start:  03/27/24    Expected End:  04/10/24       Pt will increase UE strength from 4 to 4+/5 in order to maintain NWB status of right LE for transfers/mobility.

## 2024-04-01 NOTE — DISCHARGE SUMMARY
Discharge Diagnosis  Type 2 diabetes mellitus with right diabetic foot ulcer (CMS/[facility])    Issues Requiring Follow-Up  # S/P RLE peroneus brevis muscle flap, wound debridement, Integra bilayer, wound vac, external fixator. (DOS 3/27/24)  # Chronic non-pressure ulceration of right foot with necrosis of muscle  # Cellulitis of right foot  # Type 2 diabetes mellitus with peripheral polyneuropathy  # Peripheral vascular disease   # Localized edema  # Difficulty walking    Test Results Pending At Discharge  Pending Labs       No current pending labs.            Hospital Course   Mr. Coley is a 63 y.o. male who presented for outpatient surgery on the right lower extremity. He underwent right peroneus brevis muscle flap with excisional debridement of wound, application of Bilayer skin substitute graft, application of external fixator, and application of NPWT. DOS 3/27/24. Patient had recent admission for cellulitis and has been staying at [facility]. He will need KCI wound vac placed prior to discharge and will need to go to a facility that will allow a KCI vac. Patient denies further complaints at this time. Patient experienced post op hypotension that resolved with fluid bolus and holding of furosemide and spironolactone. Hgb dropped post operatively but did not require transfusion. Patient to be discharged to [facility]. Arrangements have been made to restart KCI wound vac therapy. Pateint to be discharge with wound vac dressing, foam, and track pad in place and to be reconnected to facility KCI vac upon arrival. Dressing to stay in place and only removed by podiatry team.     Patient has follow up appointment with Dr. Perry 4/2/24 at 10:30am.     Pertinent Physical Exam At Time of Discharge  Patient is AOx3 and in no acute distress. Patient is alert and cooperative. Sitting comfortably in bed with dressing clean, dry and intact.      Vascular: Palpable DP/PT pulses B/L. Moderate pitting edema  noted. Hair growth present B/L. CFT<5 to B/L hallux. Temperature is warm to cool from tibial tuberosity to distal digits B/L. No lymphatic streaking noted B/L.     Musculoskeletal: External fixator in place to right foot, ankle, and lower leg. Gross active and passive ROM intact to age and activity level. Moves all extremities spontaneously. No pain to palpation at feet B/L.      Neurological: Absent light touch sensation B/L. Pain stimuli diminished B/L. Denies any numbness, burning or tingling.     Dermatologic: Nails 1-5 are thickened, elongated, discolored with subungual debris B/L. Skin appears diffusely xerotic B/L. Web spaces 1-4 B/L are clean, dry and intact. No rashes or nodules noted B/L.   Surgical Dressing intact over right lower extremity and external fixator.   Wound vac functioning without any warnings, serosanguinous drainage, 150 mL.  SERA drain intact, serosanguinous drainage, 15 mL    Home Medications     Medication List      ASK your doctor about these medications     acetaminophen 325 mg tablet; Commonly known as: Tylenol; Take 2 tablets   (650 mg) by mouth every 4 hours if needed for mild pain (1 - 3).   aspirin 81 mg EC tablet   atorvastatin 40 mg tablet; Commonly known as: Lipitor   carvedilol 12.5 mg tablet; Commonly known as: Coreg   finasteride 5 mg tablet; Commonly known as: Proscar   furosemide 20 mg tablet; Commonly known as: Lasix   gemfibrozil 600 mg tablet; Commonly known as: Lopid   heparin (porcine) 5,000 unit/mL injection; Inject 1 mL (5,000 Units)   under the skin every 8 hours.   lisinopril 20 mg tablet   metFORMIN (OSM) 500 mg 24 hr tablet; Commonly known as: Fortamet   OLANZapine 20 mg tablet; Commonly known as: ZyPREXA   spironolactone 25 mg tablet; Commonly known as: Aldactone   vancomycin 1.25 gram recon soln; Infuse 0.75 g into a venous catheter   every 12 hours for 12 days.       Outpatient Follow-Up  Dr. Perry 4/2/24 at 10:30am     Hannah Delgado DPM

## 2024-04-04 ENCOUNTER — NURSING HOME VISIT (OUTPATIENT)
Dept: POST ACUTE CARE | Facility: EXTERNAL LOCATION | Age: 64
End: 2024-04-04
Payer: MEDICARE

## 2024-04-04 DIAGNOSIS — K21.9 GASTROESOPHAGEAL REFLUX DISEASE, UNSPECIFIED WHETHER ESOPHAGITIS PRESENT: ICD-10-CM

## 2024-04-04 DIAGNOSIS — Z94.5 S/P SKIN GRAFT USING INTEGRA: ICD-10-CM

## 2024-04-04 DIAGNOSIS — I10 BENIGN ESSENTIAL HTN: ICD-10-CM

## 2024-04-04 DIAGNOSIS — E78.5 HYPERLIPIDEMIA, UNSPECIFIED HYPERLIPIDEMIA TYPE: ICD-10-CM

## 2024-04-04 DIAGNOSIS — R60.0 LOCALIZED EDEMA: ICD-10-CM

## 2024-04-04 DIAGNOSIS — Z98.890 S/P DEBRIDEMENT: ICD-10-CM

## 2024-04-04 DIAGNOSIS — I50.9 CONGESTIVE HEART FAILURE, UNSPECIFIED HF CHRONICITY, UNSPECIFIED HEART FAILURE TYPE (MULTI): ICD-10-CM

## 2024-04-04 DIAGNOSIS — K59.00 CONSTIPATION, UNSPECIFIED CONSTIPATION TYPE: ICD-10-CM

## 2024-04-04 DIAGNOSIS — E11.9 TYPE 2 DIABETES MELLITUS WITHOUT COMPLICATION, WITHOUT LONG-TERM CURRENT USE OF INSULIN (MULTI): ICD-10-CM

## 2024-04-04 DIAGNOSIS — B95.62 MRSA CELLULITIS OF RIGHT FOOT: ICD-10-CM

## 2024-04-04 DIAGNOSIS — L03.115 MRSA CELLULITIS OF RIGHT FOOT: ICD-10-CM

## 2024-04-04 DIAGNOSIS — Z46.89 ENCOUNTER FOR MANAGEMENT OF WOUND VAC: ICD-10-CM

## 2024-04-04 DIAGNOSIS — G62.9 PERIPHERAL POLYNEUROPATHY: ICD-10-CM

## 2024-04-04 DIAGNOSIS — M79.661 PAIN OF RIGHT LOWER LEG: ICD-10-CM

## 2024-04-04 DIAGNOSIS — M62.838 MUSCLE SPASM OF RIGHT LOWER EXTREMITY: ICD-10-CM

## 2024-04-04 DIAGNOSIS — N40.0 BENIGN PROSTATIC HYPERPLASIA, UNSPECIFIED WHETHER LOWER URINARY TRACT SYMPTOMS PRESENT: ICD-10-CM

## 2024-04-04 DIAGNOSIS — L97.513: Primary | ICD-10-CM

## 2024-04-04 DIAGNOSIS — R26.89 IMPAIRED GAIT AND MOBILITY: ICD-10-CM

## 2024-04-04 DIAGNOSIS — I73.9 PERIPHERAL VASCULAR DISEASE (CMS-HCC): ICD-10-CM

## 2024-04-04 PROCEDURE — 99310 SBSQ NF CARE HIGH MDM 45: CPT | Performed by: NURSE PRACTITIONER

## 2024-04-04 NOTE — LETTER
Patient: Claude Coley  : 1960    Encounter Date: 2024    Name: Claude Coley    YOB: 1960    Code Status: FULL CODE    Chief Complaint:  New patient; initial visit;    Chronic non-pressure ulceration of right foot with necrosis of muscle;   S/P RLE peroneus brevis muscle flap, wound debridement, Integra bilayer, wound vac, external fixator.       HPI   63 year old male with medical history of DM, HTN, HLD, CHF and cellulitis.    HOSPITAL COURSE:        Patient presented to [facility] on 3/27/2024 for surgery on his right foot,  debridement of muscle and fascia, flap and application of external fixation with podiatry, Dr. Lopez, Dr. Bolaños and Dr. Perry.   Patient has a past medical history of diabetes with a Hbg A1c of 4.4 on 3/19, hypertension and CHF taking coreg, lasix, lisinopril and spirolactone.    Patient previously was also admitted to the hospital for worsening wound infection.  Podiatry has been following this patient regarding worsening wound infection of the right lower extremity secondary to 2 diabetes of peripheral vascular disease.    Patient did have MRSA growing from the wound and blood cultures were negative.    ID recommended prolonged antibiotics, vancomycin.    Patient was discharged to his previous SNF with IV vancomycin and then he   Returned to [facility] for surgery.       He underwent right peroneus brevis muscle flap with excisional debridement of wound, application of Bilayer skin substitute graft, application of external fixator, and application of NPWT. DOS 3/27/24.   Patient had recent admission for cellulitis and was staying at [facility].   He will need KCI wound vac placed prior to discharge and will need to go to a facility that will allow a KCI vac.   Patient experienced post op hypotension that resolved with fluid bolus and holding of furosemide and spironolactone.   Hgb dropped post operatively but did not require  transfusion.   Arrangements have been made to restart KCI wound vac therapy.   Patient to be discharge with wound vac dressing, foam, and track pad in place and to be reconnected to facility KCI vac upon arrival.   Dressing to stay in place and only removed by podiatry team.   Patient evaluated by infectious disease.   Has PICC line.  On vancomycin IV.    Patient admitted to [facility] for skilled services on 4/1/24.     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 (DOS 3/27/24); RLE pain; localized edema of RLE:  Patient was admitted and had surgery at [facility].  Discharged on heparin subcutaneous, acetaminophen, and Percocet PRN.  On lasix and spironolactone for edema.  Patient has external fixator intact in right foot/ankle/lower leg.  He also has the wound vac in place.  Right lower extremity wrapped with ACE wrap.  Will need follow up with podiatrist.  Patient seen today he is sitting up on his bed.  Calm, cooperative.  Answers all questions appropriately.  No complaints of pain today.  No chest pain.  No headaches.  No dizziness.    MRSA; Cellulitis of right foot:   Evaluated by infectious disease in the hospital.  Discharged on vancomycin 1 gm IV Q 12 hours.  Has PICC line in place.  Monitoring vancomycin levels.  Labs to be faxed to ID doctor weekly.     Type 2 diabetes mellitus:  On metformin.  Not on insulin.     Peripheral vascular disease:  On aspirin, gemfibrozil and atorvastatin.    CHF;  Benign essential HTN:  On lisinopril, carvedilol, lasix and aldactone.  No complaints of headaches.  No chest pain.  No dizziness.     Muscle spasms:  On cyclobenzaprine PRN.  No complaints of muscle spasms today.    Peripheral polyneuropathy:  Not on any medications for neuropathy.    Hyperlipidemia:  On gemfibrozil and  atorvastatin.    GERD:  On omeprazole.  No complaints of N/V or reflux symptoms.     BPH:  On finasteride.  No complaints of urinary retention or dysuria.    Impaired gait and mobility:  Patient has difficulty ambulating.  Has external fixator in place on RLE.  At [facility] for skilled serviced PT/OT.  No recent falls reported.     Constipation:  On Colace and miralax.  Patient states he feels like he is constipated, he has not had a BM for days.  No abdominal pain.  No nausea or vomiting.                       Reviewed  ([facility]  hospital records from recent hospitalization.  Reviewed  EMR  Reviewed medical, social, surgical and family history.  Reviewed all current medications and performed medication reconciliation.  Performed prescription drug management.  Reviewed vital signs AND lab results  Reviewed [facility] Documentation  Discussed patient with nursing.   Spent greater than 50 minutes on visit.  Wrote RX for Percocet.                  ROS: 10 point ROS performed; Negative unless noted in HPI.    Medical History  CHF   Diabetes mellitus  Hypertension    Social History  Smoker-- 20.00 pack-year smoking history.   No smokeless tobacco use.  Alcohol use ---12.0 standard drinks of alcohol per week.   No illicit drug use.  Single  Lives alone.    Surgical History   No previous surgeries    Family History  Mother-DM and heart disease  Father-DM      Allergies  Bee Venom  Tramadol    BMP: Date: 4/2/2024 Na 135 K 4.4 Cr 0.97 BUN 18 glucose 96 Ca 8.9 GFR 89  CBC: Date: 4/2/2024 WBC 7.57 Hgb 8.3 hematocrit 23.8 platelets 185  4/2/2024 vancomycin random 24 H (range 10-20)    4/4/2024 vancomycin random 18.2 wnl             Lab Results   Component Value Date    WBC 7.1 04/01/2024    HGB 8.0 (L) 04/01/2024    HCT 23.0 (L) 04/01/2024     04/01/2024    CHOL 211 (H) 07/09/2018    TRIG 273 (H) 07/09/2018    HDL 54 07/09/2018    ALT 19 04/01/2024    AST 17 04/01/2024     04/01/2024    K 4.3 04/01/2024     " 04/01/2024    CREATININE 1.00 04/01/2024    BUN 17 04/01/2024    CO2 23 (L) 04/01/2024    INR 1.2 01/23/2024    HGBA1C 4.4 03/19/2024             /73   Pulse 68   Temp 36 °C (96.8 °F)   Resp 16   Ht 1.88 m (6' 2\")   Wt 118 kg (260 lb)   SpO2 99%   BMI 33.38 kg/m²      Physical Exam  Vitals and nursing note reviewed.   Constitutional:       General: He is awake.      Appearance: Normal appearance.   HENT:      Head: Normocephalic.      Right Ear: External ear normal.      Left Ear: External ear normal.      Nose: Nose normal.      Mouth/Throat:      Mouth: Mucous membranes are moist.      Pharynx: Oropharynx is clear.   Eyes:      Extraocular Movements: Extraocular movements intact.      Conjunctiva/sclera: Conjunctivae normal.      Pupils: Pupils are equal, round, and reactive to light.   Cardiovascular:      Rate and Rhythm: Normal rate and regular rhythm.      Pulses: Normal pulses.      Heart sounds: Normal heart sounds.   Pulmonary:      Effort: Pulmonary effort is normal.      Breath sounds: Normal breath sounds.   Abdominal:      General: Bowel sounds are normal.      Palpations: Abdomen is soft.   Musculoskeletal:         General: Normal range of motion.      Cervical back: Normal range of motion and neck supple.   Feet:      Comments: Right foot, ankle and lower leg wrapped with ACE wrap;  External fixator in place; wound vac in place.   Skin:     General: Skin is warm and dry.   Neurological:      General: No focal deficit present.      Mental Status: He is alert and oriented to person, place, and time. Mental status is at baseline.      Motor: Weakness present.      Gait: Gait abnormal.   Psychiatric:         Attention and Perception: Attention normal.         Mood and Affect: Mood normal.         Speech: Speech normal.         Behavior: Behavior normal. Behavior is cooperative.         Cognition and Memory: Cognition normal.         Judgment: Judgment normal.          Assessment/Plan "   Ordered BMP, CBC with diff., vancomycin random labs Q Monday    Chronic non-pressure ulceration of right foot with necrosis of muscle;   S/P RLE peroneus brevis muscle flap, wound debridement, Integra bilayer, wound vac, external fixator. (DOS 3/27/24); RLE pain; localized edema of RLE:  Patient was admitted and had surgery at [facility].  Continue heparin subcutaneous, acetaminophen, and Percocet PRN.  Continue lasix and spironolactone for edema.  Has external fixator intact in right foot/ankle/lower leg.  Maintain wound vac  Elevated right lower extremity.  Follow up with podiatrist.  Continue at [facility] for skilled services PT/OT.    MRSA; Cellulitis of right foot:   Evaluated by infectious disease in the hospital.  Continue vancomycin 1 gm IV Q 12 hours as ordered.  Maintain patent PICC line in place.  Monitoring vancomycin random levels Q week.  Labs to be faxed to ID doctor weekly.     Type 2 diabetes mellitus:  Continue metformin.    Peripheral vascular disease:  Continue aspirin, gemfibrozil and atorvastatin.    CHF;  Benign essential HTN:  Continue lisinopril, carvedilol, lasix and aldactone.    Muscle spasms:  Continue cyclobenzaprine PRN.    Peripheral polyneuropathy:  Not on any medications for neuropathy.    Hyperlipidemia:  On gemfibrozil and atorvastatin.    GERD:  Continue omeprazole.  Sit upright for 2-3 hours after meals.    BPH:  Continue finasteride.  Monitor for urinary retention or dysuria.    Impaired gait and mobility:  Patient has difficulty ambulating.  Has external fixator in place on RLE.  Continue at  [facility] for skilled serviced PT/OT.  Fall prevention strategies.    Constipation:  Continue Colace and miralax.  Start Lactulose 10 g per 15 ml: give 30 ml PO x 1 now.  Start Lactulose 10 g per 15 ml: give 30 ml PO Q 12 hours PRN for constipation.         Problem List Items Addressed This Visit          Cardiac and Vasculature    Hyperlipidemia    CHF (congestive heart failure) (CMS/[facility])        Endocrine/Metabolic    Diabetes mellitus, type 2 (CMS/HCC)       Genitourinary and Reproductive    BPH (benign prostatic hyperplasia)     Other Visit Diagnoses       Chronic ulcer of right foot with necrosis of muscle (CMS/HCC)    -  Primary    S/P debridement        S/P skin graft using Integra        Encounter for management of wound VAC        Pain of right lower leg        Localized edema        MRSA cellulitis of right foot        Peripheral vascular disease (CMS/HCC)        Benign essential HTN        Muscle spasm of right lower extremity        Peripheral polyneuropathy        Gastroesophageal reflux disease, unspecified whether esophagitis present        Impaired gait and mobility        Constipation, unspecified constipation type                PATO Morris       Electronically Signed By: PATO Morris   4/7/24 11:09 PM

## 2024-04-07 VITALS
RESPIRATION RATE: 16 BRPM | WEIGHT: 260 LBS | TEMPERATURE: 96.8 F | HEIGHT: 74 IN | OXYGEN SATURATION: 99 % | HEART RATE: 68 BPM | BODY MASS INDEX: 33.37 KG/M2 | DIASTOLIC BLOOD PRESSURE: 73 MMHG | SYSTOLIC BLOOD PRESSURE: 106 MMHG

## 2024-04-08 NOTE — PROGRESS NOTES
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       HPI   63 year old male with medical history of DM, HTN, HLD, CHF and cellulitis.    HOSPITAL COURSE:        Patient presented to [facility] [place] 2[place] on 3/27/2024 for surgery on his right foot,  debridement of muscle and fascia, flap and application of external fixation with podiatry, Dr. Lopez, Dr. Bolaños and Dr. Perry.   Patient has a past medical history of diabetes with a Hbg A1c of 4.4 on 3/19, hypertension and CHF taking coreg, lasix, lisinopril and spirolactone.    Patient previously was also admitted to the hospital for worsening wound infection.  Podiatry has been following this patient regarding worsening wound infection of the right lower extremity secondary to 2 diabetes of peripheral vascular disease.    Patient did have MRSA growing from the wound and blood cultures were negative.    ID recommended prolonged antibiotics, vancomycin.    Patient was discharged to his previous SNF with IV vancomycin and then he   Returned to [facility] for surgery.       He underwent right peroneus brevis muscle flap with excisional debridement of wound, application of Bilayer skin substitute graft, application of external fixator, and application of NPWT. DOS 3/27/24.   Patient had recent admission for cellulitis and was staying at [facility].   He will need KCI wound vac placed prior to discharge and will need to go to a facility that will allow a KCI vac.   Patient experienced post op hypotension that resolved with fluid bolus and holding of furosemide and spironolactone.   Hgb dropped post operatively but did not require transfusion.   Arrangements have been made to restart KCI wound vac therapy.    Patient to be discharge with wound vac dressing, foam, and track pad in place and to be reconnected to facility KCI vac upon arrival.   Dressing to stay in place and only removed by podiatry team.   Patient evaluated by infectious disease.   Has PICC line.  On vancomycin IV.    Patient admitted to [facility] for skilled services on 4/1/24.     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 (DOS 3/27/24); RLE pain; localized edema of RLE:  Patient was admitted and had surgery at [facility].  Discharged on heparin subcutaneous, acetaminophen, and Percocet PRN.  On lasix and spironolactone for edema.  Patient has external fixator intact in right foot/ankle/lower leg.  He also has the wound vac in place.  Right lower extremity wrapped with ACE wrap.  Will need follow up with podiatrist.  Patient seen today he is sitting up on his bed.  Calm, cooperative.  Answers all questions appropriately.  No complaints of pain today.  No chest pain.  No headaches.  No dizziness.    MRSA; Cellulitis of right foot:   Evaluated by infectious disease in the hospital.  Discharged on vancomycin 1 gm IV Q 12 hours.  Has PICC line in place.  Monitoring vancomycin levels.  Labs to be faxed to ID doctor weekly.     Type 2 diabetes mellitus:  On metformin.  Not on insulin.     Peripheral vascular disease:  On aspirin, gemfibrozil and atorvastatin.    CHF;  Benign essential HTN:  On lisinopril, carvedilol, lasix and aldactone.  No complaints of headaches.  No chest pain.  No dizziness.     Muscle spasms:  On cyclobenzaprine PRN.  No complaints of muscle spasms today.    Peripheral polyneuropathy:  Not on any medications for neuropathy.    Hyperlipidemia:  On gemfibrozil and atorvastatin.    GERD:  On omeprazole.  No complaints of N/V or reflux symptoms.     BPH:  On  finasteride.  No complaints of urinary retention or dysuria.    Impaired gait and mobility:  Patient has difficulty ambulating.  Has external fixator in place on RLE.  At [facility] for skilled serviced PT/OT.  No recent falls reported.     Constipation:  On Colace and miralax.  Patient states he feels like he is constipated, he has not had a BM for days.  No abdominal pain.  No nausea or vomiting.                       Reviewed  ([facility])  hospital records from recent hospitalization.  Reviewed  EMR  Reviewed medical, social, surgical and family history.  Reviewed all current medications and performed medication reconciliation.  Performed prescription drug management.  Reviewed vital signs AND lab results  Reviewed [facility] Documentation  Discussed patient with nursing.   Spent greater than 50 minutes on visit.  Wrote RX for Percocet.                  ROS: 10 point ROS performed; Negative unless noted in HPI.    Medical History  CHF   Diabetes mellitus  Hypertension    Social History  Smoker-- 20.00 pack-year smoking history.   No smokeless tobacco use.  Alcohol use ---12.0 standard drinks of alcohol per week.   No illicit drug use.  Single  Lives alone.    Surgical History   No previous surgeries    Family History  Mother-DM and heart disease  Father-DM      Allergies  Bee Venom  Tramadol    BMP: Date: 4/2/2024 Na 135 K 4.4 Cr 0.97 BUN 18 glucose 96 Ca 8.9 GFR 89  CBC: Date: 4/2/2024 WBC 7.57 Hgb 8.3 hematocrit 23.8 platelets 185  4/2/2024 vancomycin random 24 H (range 10-20)    4/4/2024 vancomycin random 18.2 wnl             Lab Results   Component Value Date    WBC 7.1 04/01/2024    HGB 8.0 (L) 04/01/2024    HCT 23.0 (L) 04/01/2024     04/01/2024    CHOL 211 (H) 07/09/2018    TRIG 273 (H) 07/09/2018    HDL 54 07/09/2018    ALT 19 04/01/2024    AST 17 04/01/2024     04/01/2024    K 4.3 04/01/2024     04/01/2024    CREATININE 1.00 04/01/2024    BUN 17 04/01/2024    CO2 23 (L) 04/01/2024  "   INR 1.2 01/23/2024    HGBA1C 4.4 03/19/2024             /73   Pulse 68   Temp 36 °C (96.8 °F)   Resp 16   Ht 1.88 m (6' 2\")   Wt 118 kg (260 lb)   SpO2 99%   BMI 33.38 kg/m²      Physical Exam  Vitals and nursing note reviewed.   Constitutional:       General: He is awake.      Appearance: Normal appearance.   HENT:      Head: Normocephalic.      Right Ear: External ear normal.      Left Ear: External ear normal.      Nose: Nose normal.      Mouth/Throat:      Mouth: Mucous membranes are moist.      Pharynx: Oropharynx is clear.   Eyes:      Extraocular Movements: Extraocular movements intact.      Conjunctiva/sclera: Conjunctivae normal.      Pupils: Pupils are equal, round, and reactive to light.   Cardiovascular:      Rate and Rhythm: Normal rate and regular rhythm.      Pulses: Normal pulses.      Heart sounds: Normal heart sounds.   Pulmonary:      Effort: Pulmonary effort is normal.      Breath sounds: Normal breath sounds.   Abdominal:      General: Bowel sounds are normal.      Palpations: Abdomen is soft.   Musculoskeletal:         General: Normal range of motion.      Cervical back: Normal range of motion and neck supple.   Feet:      Comments: Right foot, ankle and lower leg wrapped with ACE wrap;  External fixator in place; wound vac in place.   Skin:     General: Skin is warm and dry.   Neurological:      General: No focal deficit present.      Mental Status: He is alert and oriented to person, place, and time. Mental status is at baseline.      Motor: Weakness present.      Gait: Gait abnormal.   Psychiatric:         Attention and Perception: Attention normal.         Mood and Affect: Mood normal.         Speech: Speech normal.         Behavior: Behavior normal. Behavior is cooperative.         Cognition and Memory: Cognition normal.         Judgment: Judgment normal.          Assessment/Plan    Ordered BMP, CBC with diff., vancomycin random labs Q Monday    Chronic non-pressure " ulceration of right foot with necrosis of muscle;   S/P RLE peroneus brevis muscle flap, wound debridement, Integra bilayer, wound vac, external fixator. (DOS 3/27/24); RLE pain; localized edema of RLE:  Patient was admitted and had surgery at [facility].  Continue heparin subcutaneous, acetaminophen, and Percocet PRN.  Continue lasix and spironolactone for edema.  Has external fixator intact in right foot/ankle/lower leg.  Maintain wound vac  Elevated right lower extremity.  Follow up with podiatrist.  Continue at [facility] for skilled services PT/OT.    MRSA; Cellulitis of right foot:   Evaluated by infectious disease in the hospital.  Continue vancomycin 1 gm IV Q 12 hours as ordered.  Maintain patent PICC line in place.  Monitoring vancomycin random levels Q week.  Labs to be faxed to ID doctor weekly.     Type 2 diabetes mellitus:  Continue metformin.    Peripheral vascular disease:  Continue aspirin, gemfibrozil and atorvastatin.    CHF;  Benign essential HTN:  Continue lisinopril, carvedilol, lasix and aldactone.    Muscle spasms:  Continue cyclobenzaprine PRN.    Peripheral polyneuropathy:  Not on any medications for neuropathy.    Hyperlipidemia:  On gemfibrozil and atorvastatin.    GERD:  Continue omeprazole.  Sit upright for 2-3 hours after meals.    BPH:  Continue finasteride.  Monitor for urinary retention or dysuria.    Impaired gait and mobility:  Patient has difficulty ambulating.  Has external fixator in place on RLE.  Continue at  [facility] for skilled serviced PT/OT.  Fall prevention strategies.    Constipation:  Continue Colace and miralax.  Start Lactulose 10 g per 15 ml: give 30 ml PO x 1 now.  Start Lactulose 10 g per 15 ml: give 30 ml PO Q 12 hours PRN for constipation.         Problem List Items Addressed This Visit          Cardiac and Vasculature    Hyperlipidemia    CHF (congestive heart failure) (CMS/HCC)       Endocrine/Metabolic    Diabetes mellitus, type 2 (CMS/HCC)       Genitourinary and  Reproductive    BPH (benign prostatic hyperplasia)     Other Visit Diagnoses       Chronic ulcer of right foot with necrosis of muscle (CMS/HCC)    -  Primary    S/P debridement        S/P skin graft using Integra        Encounter for management of wound VAC        Pain of right lower leg        Localized edema        MRSA cellulitis of right foot        Peripheral vascular disease (CMS/HCC)        Benign essential HTN        Muscle spasm of right lower extremity        Peripheral polyneuropathy        Gastroesophageal reflux disease, unspecified whether esophagitis present        Impaired gait and mobility        Constipation, unspecified constipation type                Dari Cho, APRN-CNP

## 2024-04-18 ENCOUNTER — NURSING HOME VISIT (OUTPATIENT)
Dept: POST ACUTE CARE | Facility: EXTERNAL LOCATION | Age: 64
End: 2024-04-18
Payer: MEDICARE

## 2024-04-18 DIAGNOSIS — I50.9 CONGESTIVE HEART FAILURE, UNSPECIFIED HF CHRONICITY, UNSPECIFIED HEART FAILURE TYPE (MULTI): ICD-10-CM

## 2024-04-18 DIAGNOSIS — G62.9 PERIPHERAL POLYNEUROPATHY: ICD-10-CM

## 2024-04-18 DIAGNOSIS — N40.0 BENIGN PROSTATIC HYPERPLASIA, UNSPECIFIED WHETHER LOWER URINARY TRACT SYMPTOMS PRESENT: ICD-10-CM

## 2024-04-18 DIAGNOSIS — I73.9 PERIPHERAL VASCULAR DISEASE (CMS-HCC): ICD-10-CM

## 2024-04-18 DIAGNOSIS — L97.513: ICD-10-CM

## 2024-04-18 DIAGNOSIS — E11.9 TYPE 2 DIABETES MELLITUS WITHOUT COMPLICATION, WITHOUT LONG-TERM CURRENT USE OF INSULIN (MULTI): ICD-10-CM

## 2024-04-18 DIAGNOSIS — Z51.81 THERAPEUTIC DRUG MONITORING: ICD-10-CM

## 2024-04-18 DIAGNOSIS — M62.838 MUSCLE SPASM OF RIGHT LOWER EXTREMITY: ICD-10-CM

## 2024-04-18 DIAGNOSIS — B95.62 MRSA CELLULITIS OF RIGHT FOOT: Primary | ICD-10-CM

## 2024-04-18 DIAGNOSIS — I10 BENIGN ESSENTIAL HTN: ICD-10-CM

## 2024-04-18 DIAGNOSIS — L03.115 MRSA CELLULITIS OF RIGHT FOOT: Primary | ICD-10-CM

## 2024-04-18 DIAGNOSIS — R26.89 IMPAIRED GAIT AND MOBILITY: ICD-10-CM

## 2024-04-18 PROCEDURE — 99309 SBSQ NF CARE MODERATE MDM 30: CPT | Performed by: NURSE PRACTITIONER

## 2024-04-18 NOTE — LETTER
Patient: Claude Coley  : 1960    Encounter Date: 2024    Name: Claude Coley    YOB: 1960    Code Status: FULL CODE    Chief Complaint:  Follow up on MRSA; cellulitis; etc.....        HPI   63 year old male with medical history of DM, HTN, HLD, CHF and cellulitis.    Patient admitted to [facility] services on 24.     Diagnoses as follows:    MRSA; Cellulitis of right foot; therapeutic drug monitoring:   Evaluated by infectious disease in the hospital.  Discharged on vancomycin 1 gm IV Q 12 hours.  Recent dose of vancomycin 750 mg IV Q 12 hours.  Has PICC line in place.  Monitoring vancomycin levels.  Recent vancomycin levels:   4/15/2024 vancomycin random level 27.7 H  2024 vancomycin random level 25.9 H (range 10-20)  Vancomycin dose has been held due to elevated level.  ID doctor is Dr. Saravia fax # [phone]  Labs to be faxed to ID doctor weekly.   Patient seen today.  He is sitting up on the side of his bed.   Calm, cooperative, talkative.  Answers all questions appropriately.  No complaints of pain today.  No chest pain.  No headaches.  No dizziness.    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 (DOS 3/27/24); RLE pain; localized edema of RLE:  Patient was admitted and had surgery at [facility].  Discharged on heparin subcutaneous, acetaminophen, and Percocet PRN.  On lasix and spironolactone for edema.  Patient had an external fixator intact in right foot/ankle/lower leg that was removed by Podiatry a couple days ago.   He also has the wound vac in place.  Right lower extremity wrapped with ACE wrap.  Will need follow up with podiatrist.  Patient seen today he is sitting up on his bed.    CHF;  Benign essential HTN:  On lisinopril, carvedilol, lasix and aldactone.  No complaints of headaches.  No chest pain.  No  dizziness.       Type 2 diabetes mellitus:  On metformin.  Not on insulin.     Peripheral vascular disease:  On aspirin, gemfibrozil and atorvastatin.    Muscle spasms:  On cyclobenzaprine PRN.  No complaints of muscle spasms today.    Peripheral polyneuropathy:  Not on any medications for neuropathy.    BPH:  On finasteride.  No complaints of urinary retention or dysuria.    Impaired gait and mobility:  Patient has difficulty ambulating.  Has external fixator in place on RLE.  At [facility] for skilled serviced PT/OT.  No recent falls reported.                           Reviewed  EMR  Reviewed medical, social, surgical and family history.  Reviewed all current medications and performed medication reconciliation.  Performed prescription drug management.  Reviewed vital signs AND lab results  Reviewed [facility] Documentation  Discussed patient with nursing.                     ROS: 10 point ROS performed; Negative unless noted in HPI.    Medical History  CHF   Diabetes mellitus  Hypertension    Social History  Smoker-- 20.00 pack-year smoking history.   No smokeless tobacco use.  Alcohol use ---12.0 standard drinks of alcohol per week.   No illicit drug use.  Single  Lives alone.    Surgical History   No previous surgeries    Family History  Mother-DM and heart disease  Father-DM      Allergies  Bee Venom  Tramadol    BMP: Date: 4/2/2024 Na 135 K 4.4 Cr 0.97 BUN 18 glucose 96 Ca 8.9 GFR 89  CBC: Date: 4/2/2024 WBC 7.57 Hgb 8.3 hematocrit 23.8 platelets 185  4/2/2024 vancomycin random 24 H (range 10-20)    4/4/2024 vancomycin random 18.2 wnl    BMP: Date: 4/15/2024 Na 138 K 4.1 Cr 1.12 BUN 22 glucose 78 Ca 8.9 GFR 75  CBC: Date: 4/15/2024 WBC 6.30 Hgb 9 hematocrit 26.2 platelets 167    4/15/2024 vancomycin random level 27.7 H  4/18/2024 vancomycin random level 25.9 H (range 10-20)               Lab Results   Component Value Date    WBC 7.1 04/01/2024    HGB 8.0 (L) 04/01/2024    HCT 23.0 (L) 04/01/2024      "04/01/2024    CHOL 211 (H) 07/09/2018    TRIG 273 (H) 07/09/2018    HDL 54 07/09/2018    ALT 19 04/01/2024    AST 17 04/01/2024     04/01/2024    K 4.3 04/01/2024     04/01/2024    CREATININE 1.00 04/01/2024    BUN 17 04/01/2024    CO2 23 (L) 04/01/2024    INR 1.2 01/23/2024    HGBA1C 4.4 03/19/2024             /84   Pulse 58   Temp 36.2 °C (97.2 °F)   Resp 18   Ht 1.88 m (6' 2\")   Wt 118 kg (260 lb)   SpO2 99%   BMI 33.38 kg/m²      Physical Exam  Vitals and nursing note reviewed.   Constitutional:       General: He is awake.      Appearance: Normal appearance.   HENT:      Head: Normocephalic.      Right Ear: External ear normal.      Left Ear: External ear normal.      Nose: Nose normal.      Mouth/Throat:      Mouth: Mucous membranes are moist.      Pharynx: Oropharynx is clear.   Eyes:      Extraocular Movements: Extraocular movements intact.      Conjunctiva/sclera: Conjunctivae normal.      Pupils: Pupils are equal, round, and reactive to light.   Cardiovascular:      Rate and Rhythm: Normal rate and regular rhythm.      Pulses: Normal pulses.      Heart sounds: Normal heart sounds.   Pulmonary:      Effort: Pulmonary effort is normal.      Breath sounds: Normal breath sounds.   Abdominal:      General: Bowel sounds are normal.      Palpations: Abdomen is soft.   Musculoskeletal:         General: Normal range of motion.      Cervical back: Normal range of motion and neck supple.   Feet:      Comments: Right foot, ankle and lower leg wrapped with ACE wrap;  wound vac in place.   Skin:     General: Skin is warm and dry.   Neurological:      General: No focal deficit present.      Mental Status: He is alert and oriented to person, place, and time. Mental status is at baseline.      Motor: Weakness present.      Gait: Gait abnormal.   Psychiatric:         Attention and Perception: Attention normal.         Mood and Affect: Mood normal.         Speech: Speech normal.         Behavior: " Behavior normal. Behavior is cooperative.         Cognition and Memory: Cognition normal.         Judgment: Judgment normal.          Assessment/Plan    BMP, CBC with diff., vancomycin random labs Q Monday    MRSA; Cellulitis of right foot:   Evaluated by infectious disease in the hospital.  Continue vancomycin 750 mg IV Q 12 hours as ordered.  Maintain patent PICC line in place.  Monitoring vancomycin random levels Q week.  Labs to be faxed to ID doctor weekly.     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 (DOS 3/27/24); RLE pain; localized edema of RLE:  Patient was admitted and had surgery at [facility].  Continue heparin subcutaneous, acetaminophen, and Percocet PRN.  Continue lasix and spironolactone for edema.  Maintain wound vac  Elevated right lower extremity.  Follow up with podiatrist.  Continue at [facility] for skilled services PT/OT.    CHF;  Benign essential HTN:  Continue lisinopril, carvedilol, lasix and aldactone.    Type 2 diabetes mellitus:  Continue metformin.    Peripheral vascular disease:  Continue aspirin, gemfibrozil and atorvastatin.    Muscle spasms:  Continue cyclobenzaprine PRN.    Peripheral polyneuropathy:  Not on any medications for neuropathy.    BPH:  Continue finasteride.  Monitor for urinary retention or dysuria.    Impaired gait and mobility:  Patient has difficulty ambulating.  Has external fixator in place on RLE.  Continue at  [facility] for skilled serviced PT/OT.  Fall prevention strategies.    Problem List Items Addressed This Visit          Cardiac and Vasculature    CHF (congestive heart failure) (Multi)       Endocrine/Metabolic    Diabetes mellitus, type 2 (Multi)       Genitourinary and Reproductive    BPH (benign prostatic hyperplasia)     Other Visit Diagnoses       MRSA cellulitis of right foot    -  Primary    Therapeutic drug monitoring         Chronic ulcer of right foot with necrosis of muscle (Multi)        Benign essential HTN        Peripheral vascular disease (CMS-HCC)        Muscle spasm of right lower extremity        Peripheral polyneuropathy        Impaired gait and mobility                  PATO Morris       Electronically Signed By: PATO Morris   4/22/24 12:46 AM

## 2024-04-21 VITALS
HEART RATE: 58 BPM | HEIGHT: 74 IN | WEIGHT: 260 LBS | TEMPERATURE: 97.2 F | RESPIRATION RATE: 18 BRPM | DIASTOLIC BLOOD PRESSURE: 84 MMHG | BODY MASS INDEX: 33.37 KG/M2 | SYSTOLIC BLOOD PRESSURE: 130 MMHG | OXYGEN SATURATION: 99 %

## 2024-04-22 NOTE — PROGRESS NOTES
Name: Claude Coley    YOB: 1960    Code Status: FULL CODE    Chief Complaint:  Follow up on MRSA; cellulitis; etc.....        HPI   63 year old male with medical history of DM, HTN, HLD, CHF and cellulitis.    Patient admitted to [facility] for skilled services on 4/1/24.     Diagnoses as follows:    MRSA; Cellulitis of right foot; therapeutic drug monitoring:   Evaluated by infectious disease in the hospital.  Discharged on vancomycin 1 gm IV Q 12 hours.  Recent dose of vancomycin 750 mg IV Q 12 hours.  Has PICC line in place.  Monitoring vancomycin levels.  Recent vancomycin levels:   4/15/2024 vancomycin random level 27.7 H  4/18/2024 vancomycin random level 25.9 H (range 10-20)  Vancomycin dose has been held due to elevated level.  ID doctor is Dr. Saravia fax # [phone]  Labs to be faxed to ID doctor weekly.   Patient seen today.  He is sitting up on the side of his bed.   Calm, cooperative, talkative.  Answers all questions appropriately.  No complaints of pain today.  No chest pain.  No headaches.  No dizziness.    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 (DOS 3/27/24); RLE pain; localized edema of RLE:  Patient was admitted and had surgery at [facility].  Discharged on heparin subcutaneous, acetaminophen, and Percocet PRN.  On lasix and spironolactone for edema.  Patient had an external fixator intact in right foot/ankle/lower leg that was removed by Podiatry a couple days ago.   He also has the wound vac in place.  Right lower extremity wrapped with ACE wrap.  Will need follow up with podiatrist.  Patient seen today he is sitting up on his bed.    CHF;  Benign essential HTN:  On lisinopril, carvedilol, lasix and aldactone.  No complaints of headaches.  No chest pain.  No dizziness.       Type 2 diabetes mellitus:  On metformin.  Not on insulin.      Peripheral vascular disease:  On aspirin, gemfibrozil and atorvastatin.    Muscle spasms:  On cyclobenzaprine PRN.  No complaints of muscle spasms today.    Peripheral polyneuropathy:  Not on any medications for neuropathy.    BPH:  On finasteride.  No complaints of urinary retention or dysuria.    Impaired gait and mobility:  Patient has difficulty ambulating.  Has external fixator in place on RLE.  At [facility] for skilled serviced PT/OT.  No recent falls reported.                           Reviewed  EMR  Reviewed medical, social, surgical and family history.  Reviewed all current medications and performed medication reconciliation.  Performed prescription drug management.  Reviewed vital signs AND lab results  Reviewed [facility] Documentation  Discussed patient with nursing.                     ROS: 10 point ROS performed; Negative unless noted in HPI.    Medical History  CHF   Diabetes mellitus  Hypertension    Social History  Smoker-- 20.00 pack-year smoking history.   No smokeless tobacco use.  Alcohol use ---12.0 standard drinks of alcohol per week.   No illicit drug use.  Single  Lives alone.    Surgical History   No previous surgeries    Family History  Mother-DM and heart disease  Father-DM      Allergies  Bee Venom  Tramadol    BMP: Date: 4/2/2024 Na 135 K 4.4 Cr 0.97 BUN 18 glucose 96 Ca 8.9 GFR 89  CBC: Date: 4/2/2024 WBC 7.57 Hgb 8.3 hematocrit 23.8 platelets 185  4/2/2024 vancomycin random 24 H (range 10-20)    4/4/2024 vancomycin random 18.2 wnl    BMP: Date: 4/15/2024 Na 138 K 4.1 Cr 1.12 BUN 22 glucose 78 Ca 8.9 GFR 75  CBC: Date: 4/15/2024 WBC 6.30 Hgb 9 hematocrit 26.2 platelets 167    4/15/2024 vancomycin random level 27.7 H  4/18/2024 vancomycin random level 25.9 H (range 10-20)               Lab Results   Component Value Date    WBC 7.1 04/01/2024    HGB 8.0 (L) 04/01/2024    HCT 23.0 (L) 04/01/2024     04/01/2024    CHOL 211 (H) 07/09/2018    TRIG 273 (H) 07/09/2018    HDL 54  "07/09/2018    ALT 19 04/01/2024    AST 17 04/01/2024     04/01/2024    K 4.3 04/01/2024     04/01/2024    CREATININE 1.00 04/01/2024    BUN 17 04/01/2024    CO2 23 (L) 04/01/2024    INR 1.2 01/23/2024    HGBA1C 4.4 03/19/2024             /84   Pulse 58   Temp 36.2 °C (97.2 °F)   Resp 18   Ht 1.88 m (6' 2\")   Wt 118 kg (260 lb)   SpO2 99%   BMI 33.38 kg/m²      Physical Exam  Vitals and nursing note reviewed.   Constitutional:       General: He is awake.      Appearance: Normal appearance.   HENT:      Head: Normocephalic.      Right Ear: External ear normal.      Left Ear: External ear normal.      Nose: Nose normal.      Mouth/Throat:      Mouth: Mucous membranes are moist.      Pharynx: Oropharynx is clear.   Eyes:      Extraocular Movements: Extraocular movements intact.      Conjunctiva/sclera: Conjunctivae normal.      Pupils: Pupils are equal, round, and reactive to light.   Cardiovascular:      Rate and Rhythm: Normal rate and regular rhythm.      Pulses: Normal pulses.      Heart sounds: Normal heart sounds.   Pulmonary:      Effort: Pulmonary effort is normal.      Breath sounds: Normal breath sounds.   Abdominal:      General: Bowel sounds are normal.      Palpations: Abdomen is soft.   Musculoskeletal:         General: Normal range of motion.      Cervical back: Normal range of motion and neck supple.   Feet:      Comments: Right foot, ankle and lower leg wrapped with ACE wrap;  wound vac in place.   Skin:     General: Skin is warm and dry.   Neurological:      General: No focal deficit present.      Mental Status: He is alert and oriented to person, place, and time. Mental status is at baseline.      Motor: Weakness present.      Gait: Gait abnormal.   Psychiatric:         Attention and Perception: Attention normal.         Mood and Affect: Mood normal.         Speech: Speech normal.         Behavior: Behavior normal. Behavior is cooperative.         Cognition and Memory: " Cognition normal.         Judgment: Judgment normal.          Assessment/Plan     BMP, CBC with diff., vancomycin random labs Q Monday    MRSA; Cellulitis of right foot:   Evaluated by infectious disease in the hospital.  Continue vancomycin 750 mg IV Q 12 hours as ordered.  Maintain patent PICC line in place.  Monitoring vancomycin random levels Q week.  Labs to be faxed to ID doctor weekly.     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 (DOS 3/27/24); RLE pain; localized edema of RLE:  Patient was admitted and had surgery at [facility].  Continue heparin subcutaneous, acetaminophen, and Percocet PRN.  Continue lasix and spironolactone for edema.  Maintain wound vac  Elevated right lower extremity.  Follow up with podiatrist.  Continue at [facility] for skilled services PT/OT.    CHF;  Benign essential HTN:  Continue lisinopril, carvedilol, lasix and aldactone.    Type 2 diabetes mellitus:  Continue metformin.    Peripheral vascular disease:  Continue aspirin, gemfibrozil and atorvastatin.    Muscle spasms:  Continue cyclobenzaprine PRN.    Peripheral polyneuropathy:  Not on any medications for neuropathy.    BPH:  Continue finasteride.  Monitor for urinary retention or dysuria.    Impaired gait and mobility:  Patient has difficulty ambulating.  Has external fixator in place on RLE.  Continue at  [facility] for skilled serviced PT/OT.  Fall prevention strategies.    Problem List Items Addressed This Visit          Cardiac and Vasculature    CHF (congestive heart failure) (Multi)       Endocrine/Metabolic    Diabetes mellitus, type 2 (Multi)       Genitourinary and Reproductive    BPH (benign prostatic hyperplasia)     Other Visit Diagnoses       MRSA cellulitis of right foot    -  Primary    Therapeutic drug monitoring        Chronic ulcer of right foot with necrosis of muscle (Multi)        Benign  essential HTN        Peripheral vascular disease (CMS-HCC)        Muscle spasm of right lower extremity        Peripheral polyneuropathy        Impaired gait and mobility                  Dari Cho, APRN-CNP

## 2024-04-24 ENCOUNTER — HOSPITAL ENCOUNTER (OUTPATIENT)
Facility: HOSPITAL | Age: 64
Setting detail: OUTPATIENT SURGERY
Discharge: NURSING, REHAB OR LONG-TERM CARE | End: 2024-04-24
Attending: PODIATRIST | Admitting: PODIATRIST
Payer: MEDICARE

## 2024-04-24 ENCOUNTER — APPOINTMENT (OUTPATIENT)
Dept: RADIOLOGY | Facility: HOSPITAL | Age: 64
End: 2024-04-24
Payer: MEDICARE

## 2024-04-24 ENCOUNTER — ANESTHESIA (OUTPATIENT)
Dept: OPERATING ROOM | Facility: HOSPITAL | Age: 64
End: 2024-04-24
Payer: MEDICARE

## 2024-04-24 ENCOUNTER — ANESTHESIA EVENT (OUTPATIENT)
Dept: OPERATING ROOM | Facility: HOSPITAL | Age: 64
End: 2024-04-24
Payer: MEDICARE

## 2024-04-24 VITALS
HEIGHT: 74 IN | DIASTOLIC BLOOD PRESSURE: 55 MMHG | HEART RATE: 66 BPM | SYSTOLIC BLOOD PRESSURE: 133 MMHG | WEIGHT: 249 LBS | BODY MASS INDEX: 31.95 KG/M2 | OXYGEN SATURATION: 100 % | RESPIRATION RATE: 17 BRPM | TEMPERATURE: 96.6 F

## 2024-04-24 LAB — GLUCOSE BLD MANUAL STRIP-MCNC: 105 MG/DL (ref 74–99)

## 2024-04-24 PROCEDURE — 2500000005 HC RX 250 GENERAL PHARMACY W/O HCPCS: Performed by: PODIATRIST

## 2024-04-24 PROCEDURE — 2500000001 HC RX 250 WO HCPCS SELF ADMINISTERED DRUGS (ALT 637 FOR MEDICARE OP): Performed by: PODIATRIST

## 2024-04-24 PROCEDURE — 7100000010 HC PHASE TWO TIME - EACH INCREMENTAL 1 MINUTE: Performed by: PODIATRIST

## 2024-04-24 PROCEDURE — 3600000003 HC OR TIME - INITIAL BASE CHARGE - PROCEDURE LEVEL THREE: Performed by: PODIATRIST

## 2024-04-24 PROCEDURE — 2500000004 HC RX 250 GENERAL PHARMACY W/ HCPCS (ALT 636 FOR OP/ED): Performed by: ANESTHESIOLOGY

## 2024-04-24 PROCEDURE — 71045 X-RAY EXAM CHEST 1 VIEW: CPT | Performed by: STUDENT IN AN ORGANIZED HEALTH CARE EDUCATION/TRAINING PROGRAM

## 2024-04-24 PROCEDURE — 7100000002 HC RECOVERY ROOM TIME - EACH INCREMENTAL 1 MINUTE: Performed by: PODIATRIST

## 2024-04-24 PROCEDURE — 7100000009 HC PHASE TWO TIME - INITIAL BASE CHARGE: Performed by: PODIATRIST

## 2024-04-24 PROCEDURE — 3700000002 HC GENERAL ANESTHESIA TIME - EACH INCREMENTAL 1 MINUTE: Performed by: PODIATRIST

## 2024-04-24 PROCEDURE — 82947 ASSAY GLUCOSE BLOOD QUANT: CPT

## 2024-04-24 PROCEDURE — 2780000003 HC OR 278 NO HCPCS: Performed by: PODIATRIST

## 2024-04-24 PROCEDURE — 3700000001 HC GENERAL ANESTHESIA TIME - INITIAL BASE CHARGE: Performed by: PODIATRIST

## 2024-04-24 PROCEDURE — 3600000008 HC OR TIME - EACH INCREMENTAL 1 MINUTE - PROCEDURE LEVEL THREE: Performed by: PODIATRIST

## 2024-04-24 PROCEDURE — 71045 X-RAY EXAM CHEST 1 VIEW: CPT

## 2024-04-24 PROCEDURE — C1889 IMPLANT/INSERT DEVICE, NOC: HCPCS | Performed by: PODIATRIST

## 2024-04-24 PROCEDURE — 7100000001 HC RECOVERY ROOM TIME - INITIAL BASE CHARGE: Performed by: PODIATRIST

## 2024-04-24 DEVICE — INTEGRA WOUND MATRIX AND INTEGRA WOUND MATRIX (THIN) ARE COLLAGEN-GLYCOSAMINOGLYCAN WOUND DRESSINGS THAT MAINTAIN AND SUPPORT A HEALING ENVIRONMENT FOR WOUND MANAGEMENT. INTEGRA WOUND MATRIX (THIN) HAS 50% LESS COLLAGEN COMPARED TO EACH OF THE CORRESPONDING SQ CM SIZES OF INTEGRA WOUND MATRIX.
Type: IMPLANTABLE DEVICE | Site: ANKLE | Status: FUNCTIONAL
Brand: INTEGRA WOUND MATRIX (THIN)

## 2024-04-24 RX ORDER — FENTANYL CITRATE 50 UG/ML
50 INJECTION, SOLUTION INTRAMUSCULAR; INTRAVENOUS EVERY 5 MIN PRN
Status: DISCONTINUED | OUTPATIENT
Start: 2024-04-24 | End: 2024-04-24 | Stop reason: HOSPADM

## 2024-04-24 RX ORDER — SODIUM CHLORIDE, SODIUM LACTATE, POTASSIUM CHLORIDE, CALCIUM CHLORIDE 600; 310; 30; 20 MG/100ML; MG/100ML; MG/100ML; MG/100ML
50 INJECTION, SOLUTION INTRAVENOUS CONTINUOUS
Status: DISCONTINUED | OUTPATIENT
Start: 2024-04-24 | End: 2024-04-24 | Stop reason: HOSPADM

## 2024-04-24 RX ORDER — SODIUM CHLORIDE, SODIUM LACTATE, POTASSIUM CHLORIDE, CALCIUM CHLORIDE 600; 310; 30; 20 MG/100ML; MG/100ML; MG/100ML; MG/100ML
100 INJECTION, SOLUTION INTRAVENOUS CONTINUOUS
Status: DISCONTINUED | OUTPATIENT
Start: 2024-04-24 | End: 2024-04-24 | Stop reason: HOSPADM

## 2024-04-24 RX ORDER — PROPOFOL 10 MG/ML
INJECTION, EMULSION INTRAVENOUS AS NEEDED
Status: DISCONTINUED | OUTPATIENT
Start: 2024-04-24 | End: 2024-04-24

## 2024-04-24 RX ORDER — LIDOCAINE HYDROCHLORIDE AND EPINEPHRINE 10; 10 MG/ML; UG/ML
INJECTION, SOLUTION INFILTRATION; PERINEURAL AS NEEDED
Status: DISCONTINUED | OUTPATIENT
Start: 2024-04-24 | End: 2024-04-24 | Stop reason: HOSPADM

## 2024-04-24 RX ORDER — CEFAZOLIN SODIUM 2 G/100ML
2 INJECTION, SOLUTION INTRAVENOUS ONCE
Status: DISCONTINUED | OUTPATIENT
Start: 2024-04-24 | End: 2024-04-24 | Stop reason: HOSPADM

## 2024-04-24 RX ORDER — ONDANSETRON HYDROCHLORIDE 2 MG/ML
INJECTION, SOLUTION INTRAVENOUS AS NEEDED
Status: DISCONTINUED | OUTPATIENT
Start: 2024-04-24 | End: 2024-04-24

## 2024-04-24 RX ORDER — CEFAZOLIN 1 G/1
INJECTION, POWDER, FOR SOLUTION INTRAVENOUS AS NEEDED
Status: DISCONTINUED | OUTPATIENT
Start: 2024-04-24 | End: 2024-04-24

## 2024-04-24 RX ORDER — MIDAZOLAM HYDROCHLORIDE 1 MG/ML
INJECTION, SOLUTION INTRAMUSCULAR; INTRAVENOUS AS NEEDED
Status: DISCONTINUED | OUTPATIENT
Start: 2024-04-24 | End: 2024-04-24

## 2024-04-24 RX ORDER — LABETALOL HYDROCHLORIDE 5 MG/ML
5 INJECTION, SOLUTION INTRAVENOUS ONCE AS NEEDED
Status: DISCONTINUED | OUTPATIENT
Start: 2024-04-24 | End: 2024-04-24 | Stop reason: HOSPADM

## 2024-04-24 RX ORDER — FENTANYL CITRATE 50 UG/ML
INJECTION, SOLUTION INTRAMUSCULAR; INTRAVENOUS AS NEEDED
Status: DISCONTINUED | OUTPATIENT
Start: 2024-04-24 | End: 2024-04-24

## 2024-04-24 RX ORDER — ONDANSETRON HYDROCHLORIDE 2 MG/ML
4 INJECTION, SOLUTION INTRAVENOUS ONCE AS NEEDED
Status: DISCONTINUED | OUTPATIENT
Start: 2024-04-24 | End: 2024-04-24 | Stop reason: HOSPADM

## 2024-04-24 RX ORDER — ALBUTEROL SULFATE 0.83 MG/ML
2.5 SOLUTION RESPIRATORY (INHALATION) ONCE AS NEEDED
Status: DISCONTINUED | OUTPATIENT
Start: 2024-04-24 | End: 2024-04-24 | Stop reason: HOSPADM

## 2024-04-24 RX ADMIN — CEFAZOLIN 2 G: 330 INJECTION, POWDER, FOR SOLUTION INTRAMUSCULAR; INTRAVENOUS at 10:20

## 2024-04-24 RX ADMIN — PROPOFOL 20 MG: 10 INJECTION, EMULSION INTRAVENOUS at 10:46

## 2024-04-24 RX ADMIN — SODIUM CHLORIDE, SODIUM LACTATE, POTASSIUM CHLORIDE, AND CALCIUM CHLORIDE: 600; 310; 30; 20 INJECTION, SOLUTION INTRAVENOUS at 10:12

## 2024-04-24 RX ADMIN — MIDAZOLAM HYDROCHLORIDE 2 MG: 1 INJECTION, SOLUTION INTRAMUSCULAR; INTRAVENOUS at 10:12

## 2024-04-24 RX ADMIN — ONDANSETRON HYDROCHLORIDE 4 MG: 2 INJECTION INTRAMUSCULAR; INTRAVENOUS at 11:17

## 2024-04-24 RX ADMIN — PROPOFOL 20 MG: 10 INJECTION, EMULSION INTRAVENOUS at 11:21

## 2024-04-24 RX ADMIN — FENTANYL CITRATE 50 MCG: 0.05 INJECTION, SOLUTION INTRAMUSCULAR; INTRAVENOUS at 10:22

## 2024-04-24 RX ADMIN — FENTANYL CITRATE 50 MCG: 0.05 INJECTION, SOLUTION INTRAMUSCULAR; INTRAVENOUS at 10:46

## 2024-04-24 RX ADMIN — PROPOFOL 160 MG: 10 INJECTION, EMULSION INTRAVENOUS at 10:22

## 2024-04-24 SDOH — HEALTH STABILITY: MENTAL HEALTH: CURRENT SMOKER: 0

## 2024-04-24 ASSESSMENT — PAIN - FUNCTIONAL ASSESSMENT
PAIN_FUNCTIONAL_ASSESSMENT: 0-10

## 2024-04-24 ASSESSMENT — PAIN SCALES - GENERAL
PAINLEVEL_OUTOF10: 0 - NO PAIN
PAIN_LEVEL: 3
PAINLEVEL_OUTOF10: 0 - NO PAIN

## 2024-04-24 ASSESSMENT — COLUMBIA-SUICIDE SEVERITY RATING SCALE - C-SSRS
6. HAVE YOU EVER DONE ANYTHING, STARTED TO DO ANYTHING, OR PREPARED TO DO ANYTHING TO END YOUR LIFE?: NO
2. HAVE YOU ACTUALLY HAD ANY THOUGHTS OF KILLING YOURSELF?: NO
1. IN THE PAST MONTH, HAVE YOU WISHED YOU WERE DEAD OR WISHED YOU COULD GO TO SLEEP AND NOT WAKE UP?: NO

## 2024-04-24 NOTE — ANESTHESIA PREPROCEDURE EVALUATION
Patient: Claude Coley    Procedure Information       Date/Time: 04/24/24 1015    Procedures:       Removal External Fixation Lower Extremity (Right: Foot)      Debridement Wound Lower Extremity (Right: Foot)      Preparation of Graft Site, Excision Split Thickness Skin Graft Lower Extremity, Wound Vac Application (Right: Foot) - DERMATOME/MESHER, INTEGRA THINSKIN, WOUND VAC    Location: [facility] 03 / [facility]    Surgeons: Darryl Perry DPM            Relevant Problems   Cardiac   (+) CHF (congestive heart failure) (Multi)   (+) HTN (hypertension)   (+) Hyperlipidemia      /Renal   (+) BPH (benign prostatic hyperplasia)      Endocrine   (+) Diabetes mellitus, type 2 (Multi)   (+) Type 2 diabetes mellitus with right diabetic foot ulcer (Multi)      Hematology   (+) Anemia      ID   (+) Gas gangrene of foot (Multi)       Clinical information reviewed:   Tobacco  Allergies  Meds   Med Hx  Surg Hx   Fam Hx  Soc Hx        NPO Detail:  NPO/Void Status  Date of Last Liquid: 04/23/24  Time of Last Liquid: 2359  Date of Last Solid: 04/23/24  Time of Last Solid: 1700         Physical Exam    Airway  Mallampati: II  TM distance: >3 FB  Neck ROM: full     Cardiovascular   Comments: deferred   Dental    Pulmonary   Comments: deferred   Abdominal     Comments: deferred           Anesthesia Plan    History of general anesthesia?: yes  History of complications of general anesthesia?: no    ASA 3     general     The patient is not a current smoker.  Patient was previously instructed to abstain from smoking on day of procedure.  Patient did not smoke on day of procedure.  Education provided regarding risk of obstructive sleep apnea.  intravenous induction   Postoperative administration of opioids is intended.  Anesthetic plan and risks discussed with patient.

## 2024-04-24 NOTE — ANESTHESIA POSTPROCEDURE EVALUATION
Patient: Claude Coley    Procedure Summary       Date: 04/24/24 Room / Location: [facility] / [facility]    Anesthesia Start: 1010 Anesthesia Stop: 1151    Procedures:       Removal External Fixation Lower Extremity (Right: Foot)      Debridement Wound Lower Extremity (Right: Foot)      Preparation of Graft Site, Excision Split Thickness Skin Graft Lower Extremity, Wound Vac Application (Right: Foot) Diagnosis:       Pain in prosthetic joint, initial encounter (CMS-[facility])      Ulcer of toe of right foot, with necrosis of muscle (Multi)      (Retained external fixator, right. Open wound right leg and foot.)    Surgeons: Darryl Perry DPM Responsible Provider: Claude Lazar MD    Anesthesia Type: general ASA Status: 3            Anesthesia Type: general    Vitals Value Taken Time   /57 04/24/24 1201   Temp 36.0 04/24/24 1204   Pulse 63 04/24/24 1204   Resp 15 04/24/24 1204   SpO2 99 % 04/24/24 1204   Vitals shown include unfiled device data.    Anesthesia Post Evaluation    Patient location during evaluation: PACU  Patient participation: complete - patient participated  Level of consciousness: awake and alert  Pain score: 3  Pain management: adequate  Multimodal analgesia pain management approach  Airway patency: patent  Two or more strategies used to mitigate risk of obstructive sleep apnea  Cardiovascular status: acceptable and blood pressure returned to baseline  Respiratory status: acceptable  Hydration status: acceptable  Postoperative Nausea and Vomiting: none        There were no known notable events for this encounter.

## 2024-04-24 NOTE — DISCHARGE INSTRUCTIONS
PODIATRIC SURGERY POST-OP INSTRUCTIONS    YOU HAD ANESTHESIA:  You must have a responsible adult drive you home and stay with you for the first 24 hours.    Do not drive a car or drink any alcohol for 24 hours after surgery.  Do not do any strenuous work or activity for the next 24 hours.  You may have mild nausea, a sore throat or raspy voice for a few days.    You received a local numbing block to the foot after your surgery. You should expect the surgical extremity to remain numb for the next 6-12 hours.    POST-OP INSTRUCTIONS:   Facility staff to not change dressing, if it has drainage, becomes wet, or loose please call office for staff to evaluate and redress.               Wound Vac as programed  Keep dressing clean, dry and intact until your first post-operative appointment.  Do not remove surgical dressing. You may need to loosen if necessary.   Do not shower unless using a cast protector to protect dressing.  If dressing gets wet, please call office immediately as this can lead to increased risk of infection or wound dehiscence.   Elevate surgical extremity as much as possible to help with pain and swelling.  Place ice pack behind knee of surgical extremity (20 minutes on/20 minutes off while awake). After 24 hours use ice behind the knee as needed for comfort.  Weight Bearing Status: Please remain non-weight bearing to the surgical extremity with CAM boot in place utilizing crutches, walker, or knee scooter.  Please resume all home medications.   Post-Operative Medications: You may take Ibuprofen for pain; please take as directed on bottle. You may take Tylenol for pain; please take as directed on bottle. Alternate Ibuprofen and Tylenol for pain; please take as directed on the bottles.  Post-operative appointment with Dr. Perry for Tuesday 4/30/24 at 10:30 am in [place].   If you have any problems or notice any unusual symptoms such as bleeding, excessive pain, fever, redness, swelling and/or discharge  please call your Dr. Perry's office at [phone].     WHEN TO CALL YOUR DOCTOR:  Fever (>100.4°F or 38.0°C) or chills.  Incision problems such as redness, warmth, swelling, or foul smelling drainage.  Severe nausea or persistent vomiting.  Pain and swelling in your legs, especially if it is only on one side and not the other.  Pain with urination, cloudy urine, or foul smelling urine.  Or if you have any other problems or questions.  CALL 911 or go to the ED if you have any shortness of breath, difficulty breathing, or chest pain.

## 2024-04-24 NOTE — BRIEF OP NOTE
Date: 2024  OR Location: [facility]    Name: Claude Coley, : 1960, Age: 63 y.o., MRN: [id number], Sex: male    Diagnosis  Pre-op Diagnosis     * Pain in prosthetic joint, initial encounter (CMS-HCC) [T84.84XA]     * Ulcer of toe of right foot, with necrosis of muscle (Multi) [L97.513] Post-op Diagnosis     * Pain in prosthetic joint, initial encounter (CMS-HCC) [T84.84XA]     * Ulcer of toe of right foot, with necrosis of muscle (Multi) [L97.513]     Procedures  Removal External Fixation Lower Extremity  83655 - ND REMOVAL EXTERNAL FIXATION SYSTEM UNDER ANES    Debridement Wound Lower Extremity  22818 - ND DEBRIDEMENT MUSCLE &/FASCIA 1ST 20 SQ CM/<    Preparation of Graft Site, Excision Split Thickness Skin Graft Lower Extremity, Wound Vac Application  57333 - ND SPLIT AGRFT T/A/L 1ST 100 CM/&/1% BDY INFT/CHLD    Preparation of Graft Site, Excision Split Thickness Skin Graft Lower Extremity, Wound Vac Application  53609 - ND PREP SITE TRUNK/ARM/LEG 1ST 100 SQ CM/1PCT    Preparation of Graft Site, Excision Split Thickness Skin Graft Lower Extremity, Wound Vac Application  89489 - ND NEGATIVE PRESSURE WOUND THERAPY DME <= 50 SQ CM      Surgeons      * Darryl Perry - Primary    Resident/Fellow/Other Assistant:  Surgeons and Role:     * Husam Lopez DPM - Resident - Assisting     * Hannah Delgado DPM - Resident - Assisting     * Sonido Bolaños DPM - Fellow    Procedure Summary  Anesthesia: Consult  ASA: III  Anesthesia Staff: Anesthesiologist: Claude Lazar MD  Estimated Blood Loss: 20mL  Intra-op Medications:   Administrations occurring from 1015 to 1145 on 24:   Medication Name Total Dose   lidocaine-epinephrine (Xylocaine W/EPI) 1 %-1:100,000 injection 20 mL   mineral oil, light topical 1 Application   thrombin (recombinant) (Recothrom) topical solution 5,000 Units              Anesthesia Record               Intraprocedure I/O Totals          Intake    Propofol Drip 0.00 mL    The  total shown is the total volume documented since Anesthesia Start was filed.    Total Intake 0 mL          Specimen: No specimens collected     Staff:   Circulator: Khang Bertrand RN  Scrub Person: Rajiv Bone RN          Findings: Consistent with clinical and radiographic findings. Please see operative report for details.      Complications:  None; patient tolerated the procedure well.     Disposition: PACU - hemodynamically stable.  Condition: stable  Specimens Collected: No specimens collected  Attending Attestation: I was present and scrubbed for the entire procedure.    Darryl Perry  Phone Number: [phone]

## 2024-04-24 NOTE — PERIOPERATIVE NURSING NOTE
1221- Arrived to [facility] 10 via cart with RN. Alert, denies pain and/or nausea. VSS. Right foot dressing dry and intact. Right toes exposed from dressing pink, warm, and with brisk cap refill, able to move toes. Wound vac functioning as programmed with serosang drainage. Right leg elevated. Call for transportation back to [facility] completed by PACu RN. Repositioned to  in bed, set up with drink, denies snack. Call light within reach. No new medication orders for discharge.    1300- Tolerating PO fluids, continues to deny snack. RLE dressing remains dry and intact, toes pink, warm, and brisk cap refill, wound vac on and functioning with serosang drainage. PICC line flushed and saline locked with cap. Pt states they do not use heparin flushes unless clotted. Pt needed minimal assist with dressing. Reviewed discharge instructions, copy given for extended care facility. Voided small amount. Awaiting discharge transportation.     1345- Pt [facility] has not arrived for transfer. Additional call placed for transfer.  Pt denies needs.Call light within reach    1425- Discharge ride called to floor. Pt discharged back to [facility]

## 2024-04-24 NOTE — ANESTHESIA PROCEDURE NOTES
Airway  Date/Time: 4/24/2024 10:24 AM  Urgency: elective    Airway not difficult    Staffing  Performed: [facility]   Authorized by: Claude Lazar MD    Performed by: Claude Lazar MD  Patient location during procedure: OR    Indications and Patient Condition  Indications for airway management: anesthesia and airway protection  Spontaneous ventilation: present  Sedation level: deep  Preoxygenated: yes  Patient position: sniffing  MILS maintained throughout  Mask difficulty assessment: 0 - not attempted  No planned trial extubation    Final Airway Details  Final airway type: supraglottic airway      Successful airway: classic  Size 4     Number of attempts at approach: 1  Ventilation between attempts: none  Number of other approaches attempted: 0

## 2024-04-25 ENCOUNTER — NURSING HOME VISIT (OUTPATIENT)
Dept: POST ACUTE CARE | Facility: EXTERNAL LOCATION | Age: 64
End: 2024-04-25
Payer: MEDICARE

## 2024-04-25 DIAGNOSIS — L97.513: Primary | ICD-10-CM

## 2024-04-25 DIAGNOSIS — Z51.81 THERAPEUTIC DRUG MONITORING: ICD-10-CM

## 2024-04-25 DIAGNOSIS — M62.838 MUSCLE SPASM OF RIGHT LOWER EXTREMITY: ICD-10-CM

## 2024-04-25 DIAGNOSIS — Z46.89 ENCOUNTER FOR MANAGEMENT OF WOUND VAC: ICD-10-CM

## 2024-04-25 DIAGNOSIS — R60.0 LOCALIZED EDEMA: ICD-10-CM

## 2024-04-25 DIAGNOSIS — B95.62 MRSA CELLULITIS OF RIGHT FOOT: ICD-10-CM

## 2024-04-25 DIAGNOSIS — E11.9 TYPE 2 DIABETES MELLITUS WITHOUT COMPLICATION, WITHOUT LONG-TERM CURRENT USE OF INSULIN (MULTI): ICD-10-CM

## 2024-04-25 DIAGNOSIS — R26.89 IMPAIRED GAIT AND MOBILITY: ICD-10-CM

## 2024-04-25 DIAGNOSIS — M79.661 PAIN OF RIGHT LOWER LEG: ICD-10-CM

## 2024-04-25 DIAGNOSIS — I50.9 CONGESTIVE HEART FAILURE, UNSPECIFIED HF CHRONICITY, UNSPECIFIED HEART FAILURE TYPE (MULTI): ICD-10-CM

## 2024-04-25 DIAGNOSIS — I73.9 PERIPHERAL VASCULAR DISEASE (CMS-HCC): ICD-10-CM

## 2024-04-25 DIAGNOSIS — Z98.890 S/P DEBRIDEMENT: ICD-10-CM

## 2024-04-25 DIAGNOSIS — L03.115 MRSA CELLULITIS OF RIGHT FOOT: ICD-10-CM

## 2024-04-25 DIAGNOSIS — D64.9 ANEMIA, UNSPECIFIED TYPE: ICD-10-CM

## 2024-04-25 DIAGNOSIS — I10 BENIGN ESSENTIAL HTN: ICD-10-CM

## 2024-04-25 DIAGNOSIS — Z94.5 S/P SKIN GRAFT USING INTEGRA: ICD-10-CM

## 2024-04-25 PROCEDURE — 99309 SBSQ NF CARE MODERATE MDM 30: CPT | Performed by: NURSE PRACTITIONER

## 2024-04-25 NOTE — OP NOTE
PODIATRIC OPERATIVE REPORT    LOG ID: [id number]  SURGERY/PROCEDURE DATE: 4/24/2024  OR LOCATION: [facility]    SURGEON(S)/PROCEDURALIST(S) AND ASSISTANT(S):  Primary: Darryl Perry DPM  Resident - Assisting: Hannah Delgado DPM; Husam Lopez DPM  Fellow: Sonido Bolaños DPM    OTHER OR STAFF:  Circulator: Khang Bretrand RN  Scrub Person: Rajiv Bone RN    SURGERY/PROCEDURE(S):  Removal of external fixator, right ( 06127)  Delayed primary closure, right ( 81040)  Debridement of ulceration to level of subcutaneous tissue, right ( 80876)  Debridement of ulceration to the level of muscle, right total debrided area 234  cm squared ( 46326 + 11046 x 11 )  Debridement of ulceration down to level of bone, right ( 66333)  Preparation of skin graft,  right  ( 32835)  Peak and application of the split-thickness skin graft, right (60475)  Application of wound VAC, right ( 97837)    PRE-OP/PRE-PROCEDURE DIAGNOSIS:   Painful retained hardware, right leg ( T 84.84XS)  External disruption of surgical incision, right leg ( T81.31)  Chronic ulceration down to level of subcutaneous tissue, right  foot ( L9 7.512)  Chronic ulceration down to level of muscle, right  foot ( L9 7.513)  Chronic ulceration down to level of bone, right foot (L97.514)   Chronic soft tissue deformity of right foot and ankle ( M 70.971)  Chronic dehiscence of amputation stump, 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)  Right foot and leg pain ( M 79.661)  Other chronic deformities of foot and ankle, right (M21.61X1)    POST-OP/POST-PROCEDURE DIAGNOSIS: Same as Pre-Op    ANESTHESIA:   Anesthesia: Consult  ASA: III  Anesthesia Staff: Anesthesiologist: Claude Lazar MD  Intra-op Medications:   Administrations occurring from 1015 to 1145 on 04/24/24:   Medication Name Total Dose   lidocaine-epinephrine (Xylocaine W/EPI) 1 %-1:100,000 injection 20 mL   mineral oil, light topical 1 Application   thrombin (recombinant) (Recothrom) topical solution 5,000 Units        ESTIMATED BLOOD LOSS: less than 50 mL    SPECIMENS: No specimens collected during this procedure.    IMPLANTABLE DEVICES:  Implant Name Type Inv. Item Serial No.  Lot No. LRB No. Used Action   WOUND MATRIX, MESHED, 4 X 5 IN (10 CM X 12.5CM) - [id number] Surgical Mesh Sling Implant WOUND MATRIX, MESHED, 4 X 5 IN (10 CM X 12.5CM)  [employer]:NEUROSCI [id number] Right 1 Implanted   WOUND MATRIX, MESHED, 4 X 5 IN (10 CM X 12.5CM) - [id number] Surgical Mesh Sling Implant WOUND MATRIX, MESHED, 4 X 5 IN (10 CM X 12.5CM)  [employer]:NEUROSCI [id number] Right 1 Implanted       FINDINGS: Intraoperative findings consistent with clinical and radiographic findings.    TOURNIQUET TIMES:   no tourniquet used    COMPLICATIONS: None; patient tolerated the procedure well.       SURGERY/PROCEDURE DETAILS:  INDICATIONS FOR PROCEDURE:    Patient was admitted to [facility] for surgery consisting of removal external fixator with delayed primary closure and debridement of separate ulcerations with harvest and application of split-thickness skin graft and wound VAC due to chronic ulceration and deformity noted to the patient's right lower extremity.  Patient has gone through several staged procedures and is progressing through with the next planned procedure.  Patient underwent full preoperative consultation in the office and informed consent was obtained.  Patient has opted for move forward with surgery.  Patient will likely need to undergo 1-2 more additional surgeries moving forward.  Patient has wear this and wants to continue with this plan.    PRE-PROCEDURE INFORMATION:  In pre-op holding area, the   Right extremity to be operated on was clearly marked and the patient verified correct laterality of the marking.  The patient was brought to the operating room and placed on the operating table in the  supine position.  A timeout was performed in which identification of the correct patient,  procedure, location, and materials was done.  No tourniquet was placed on the patient. Following  general IV sedation, local anesthesia was obtained utilizing  20cc of  1% lidocaine with epinephrine. The  right foot was then scrubbed, prepped and draped in the usual aseptic manner.     DESCRIPTION OF PROCEDURE:    Procedure 1:   Attention was directed to the external fixation device located in the patient's right lower extremity.  Utilizing 10 mm box punches the half pin fixation bolts were all loosened and a T-handle was then used to remove all of the half pins present within the patient's foot and tibia.  Upon removal of the half pins the entirety of the frame was then removed from the patient's leg and pain to the back table.  Upon removal of the frame patient's position was well maintained without any recurrence of previous deformity.  It was noted that patient did have significant loosening of his distal pin sites due to likely noncompliance with ambulation onto his frame.    Procedure 2:   Attention was then directed to the previous incision site in the patient's peroneus brevis muscle flap.  There was noted to have a distal wound dehiscence in the location of his former SERA drain that is currently open.  This area was then freshened up and debrided.  Post debridement measurements of this area measured approximately 3 cm by 1.1 cm.  Upon completion of the debridement incision site was then re approximated and delayed primary closure was performed using 2-0 Prolene in a horizontal mattress fashion.  Upon completion of the closure it was noted that the entire incision site was now closed with no areas of dehiscence or open at this time.    Procedure 3:   Attention was then directed to the patient's anterior ankle he was noted to have a full-thickness ulceration down to level of subcutaneous tissue present to the anterior ankle area.  This area had previously been grafted with Integra bilayer.  The wound base had  a mixed fibrotic and granular tissue present.  Pre measurements were 2.2 by 1.8 x 0.2 cm.  A curette was then used to debride down to level of healthy granular tissue.  Upon completion of the debridement there was no longer any fibrotic tissue present within the wound site.  Posterior measurements were 2. 4 x 2.0 x 0.2 cm.    Procedure 4:   Attention was then directed to the location of the patient's previous peroneus brevis muscle flap.  There was a former area of Integra bilayer placed over the graft.  This area was noted to have some residual fibrotic tissue.  Utilizing a curette and 15 blade the muscle flap site was then debrided down to the level of muscle.  There was noted to be healthy granular tissue with healthy bleeding present upon the completion of the debridement.  Debridement measurements were 26 cm x 9 cm for a total debrided area of 234 squared cm.    Procedure 5:   Attention was then directed the patient's distal tibia.  Patient had 2 areas of ulceration that were carried down to the level of bone.  Both ulcerations measured approximately 0.5 x 0.5 x 0.5 cm.  Utilizing a curette these wounds were then debrided down to and including level of bone.  Final debridement measurements were 0.6 x 0.6 x 0.6 cm.  Upon completion of the debridement was noted to have healthy bleeding tissue with no residual fibrotic tissue present.  Skin staples were then applied to reapproximate the skin edges to allow for secondary healing.    Procedure 6:   Attention was then redirected to the skin graft  application site.  Utilizing an iris scissor the tissue was then remodeled fully to allow for better adherence of the patient's skin graft.  Upon completion of the remodeling it was noted that there was a completely flush area with no deficits noted or present.  This is to allow for full apposition and adherence of the patient's skin graft without possibility for seroma at formation after application.    Procedure 7:   At  this time an area measuring approximately 20 in x 3 in was marked and measured on the patient's right lower extremity.  The area was then injected with 20 ml of  1% lidocaine with epinephrine.  The dermatome was then prepared and sent to a depth of 0 0.016 mm and the guide of 3 in was applied.  Mineral oil was then applied to the dermatome as well as the patient's leg.  Next the skin graft was then harvested from the patient's right lower extremity.  Topical thrombin was then applied over the harvest site to perform hemostasis.  Next the entire graft was then placed onto a 1 to  1.5 mesher.  Next the graft was then placed over the  pre prepared application site.  It was secured in place with skin staples.  [place] site was then covered with 2 4 x 5 in Integra thin skin and all grafted areas were covered with  mepatel.    Procedure 8:   Next the entire area of skin graft was draped with wound VAC dressing.  The  granular foam dressing was then cut to size and placed over the entirety of the skin graft site measuring 26 x 9 cm.  The remainder of the wound VAC dressing was then placed over top of the granular foam to ensure there was full seal.  Next a small area was cut and the little pad was placed over.  Seal was checked intra-op suction adequate seal was noted.  Dressing was then applied to the wound VAC machine itself and seal was maintained.  Settings were set to continuous pressure of 125 mmHg with intermediate intensity.  Next the area was then dressed with a compression dressing consisting of Coban and a double six-inch Ace bandage.      Patient was then transferred to PACU with vascular status intact all digits and vital signs stable.  Patient will be monitored further in PACU prior to discharge to his nursing facility.  Patient remain nonweightbearing to the right lower extremity until his follow up clinic visit.  Patient will be giving medication for pain control.    POSTOPERATIVE INFORMATION: The patient  tolerated the above noted procedure and anesthesia well and was transferred to the PACU with vital signs stable, and vascular status intact with capillary refill intact to the most distal aspect of the operative extremity. Postoperative instructions reviewed in detail with the patient with written instructions provided. Patient will return to clinic in approximately 3-5 days for first postoperative visit. Patient has the number of the clinic and was instructed to call prior to that time should any problems, questions, or concerns arise.    This is Sonido Bolaños DPM dictating the operative note for Dr. Vicky DPM.        SIGNATURE: Sonido Bolaños DPM PATIENT NAME: Claude Coley   DATE: April 25, 2024 MRN: [id number]   TIME: 8:35 AM

## 2024-04-25 NOTE — LETTER
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        HPI   63 year old male with medical history of DM, HTN, HLD, CHF and cellulitis.    Patient admitted to [facility] for skilled services on 24.     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  Discharged on heparin subcutaneous, acetaminophen, Percocet PRN and cyclobenzaprine.  Also on lasix and spironolactone for edema.  Patient had an external fixator intact in right foot/ankle/lower a wound vac.  Patient went to [facility] yesterday (24) and had the following procedures performed:   1. Removal External Fixation Lower Extremity   2. Debridement Wound Lower Extremity   3. Preparation of Graft Site, Excision Split Thickness Skin Graft Lower Extremity, Wound Vac Application (bilayer skin substitute graft)   Patient reports the procedure went well yesterday.  Right lower extremity wrapped with ACE wrap.  Will need post-op follow up with podiatrist.  Patient seen today he is sitting up on the side of his bed.  Calm, cooperative, talkative.  Answers all questions appropriately.  No complaints of pain today.    Cellulitis of right foot; MRSA; therapeutic drug monitoring:   He was evaluated by infectious disease in the hospital.  On vancomycin 750 mg IV Q 12 hours.  Has PICC line in place.  Monitoring vancomycin levels.  Recent vancomycin levels:   4/15/2024 vancomycin random level 27.7 H  2024 vancomycin random level 25.9 H (range 10-20)  Vancomycin dose has been held  due to elevated level.  ID doctor is Dr. Saravia fax # [phone]  Labs to be faxed to ID doctor weekly.   No chest pain.  No headaches.  No dizziness.    Anemia:  On ferrous sulfate 325 mg PO BID  Recent H & H as follows:   4/22/24 H & H 9.6 & 28.3  4/15/24 Hgb 9 hematocrit 26.2   4/2/24 Hgb 8.3 hematocrit 23.8   No s/s of bleeding.       Benign essential HTN; CHF;  :  On lisinopril, carvedilol, lasix and aldactone.  Recent /66.  No complaints of headaches.  No chest pain.  No dizziness.     Type 2 diabetes mellitus:  On metformin.  Not on insulin.     Peripheral vascular disease:  On aspirin, gemfibrozil and atorvastatin.    Impaired gait and mobility:  Patient has difficulty ambulating.  At [facility] for skilled serviced PT/OT.  No recent falls reported.                         Reviewed  [facility] surgical records.  Reviewed  EMR  Reviewed medical, social, surgical and family history.  Reviewed all current medications and performed medication reconciliation.  Performed prescription drug management.  Reviewed vital signs AND lab results  Reviewed [facility] Documentation  Discussed patient with nursing.                     ROS: 10 point ROS performed; Negative unless noted in HPI.    Medical History  CHF   Diabetes mellitus  Hypertension    Social History  Smoker-- 20.00 pack-year smoking history.   No smokeless tobacco use.  Alcohol use ---12.0 standard drinks of alcohol per week.   No illicit drug use.  Single  Lives alone.    Surgical History   No previous surgeries    Family History  Mother-DM and heart disease  Father-DM      Allergies  Bee Venom  Tramadol    BMP: Date: 4/2/2024 Na 135 K 4.4 Cr 0.97 BUN 18 glucose 96 Ca 8.9 GFR 89  CBC: Date: 4/2/2024 WBC 7.57 Hgb 8.3 hematocrit 23.8 platelets 185  4/2/2024 vancomycin random 24 H (range 10-20)    4/4/2024 vancomycin random 18.2 wnl    BMP: Date: 4/15/2024 Na 138 K 4.1 Cr 1.12 BUN 22 glucose 78 Ca 8.9 GFR 75  CBC: Date: 4/15/2024 WBC 6.30 Hgb 9  "hematocrit 26.2 platelets 167    4/15/2024 vancomycin random level 27.7 H  4/18/2024 vancomycin random level 25.9 H (range 10-20)    BMP: Date: 4/22/2024 Na 142 K 4.1 Cr 1.01 BUN 19 glucose 78 Ca 9 GFR 85  CBC: Date: 4/22/2024 WBC 5.59 Hgb 9.6 hematocrit 28.3 platelets 135  Liver function: Date: 4/22/2024 albumin 3.5 protein 6.3    4/22/2024 vancomycin random level 22.5 H 20             Lab Results   Component Value Date    WBC 7.1 04/01/2024    HGB 8.0 (L) 04/01/2024    HCT 23.0 (L) 04/01/2024     04/01/2024    CHOL 211 (H) 07/09/2018    TRIG 273 (H) 07/09/2018    HDL 54 07/09/2018    ALT 19 04/01/2024    AST 17 04/01/2024     04/01/2024    K 4.3 04/01/2024     04/01/2024    CREATININE 1.00 04/01/2024    BUN 17 04/01/2024    CO2 23 (L) 04/01/2024    INR 1.2 01/23/2024    HGBA1C 4.4 03/19/2024             /66   Pulse 61   Temp 35.8 °C (96.5 °F)   Resp 18   Ht 1.88 m (6' 2\")   Wt 118 kg (260 lb)   SpO2 99%   BMI 33.38 kg/m²      Physical Exam  Vitals and nursing note reviewed.   Constitutional:       General: He is awake.      Appearance: Normal appearance.   HENT:      Head: Normocephalic.      Right Ear: External ear normal.      Left Ear: External ear normal.      Nose: Nose normal.      Mouth/Throat:      Mouth: Mucous membranes are moist.      Pharynx: Oropharynx is clear.   Eyes:      Extraocular Movements: Extraocular movements intact.      Conjunctiva/sclera: Conjunctivae normal.      Pupils: Pupils are equal, round, and reactive to light.   Cardiovascular:      Rate and Rhythm: Normal rate and regular rhythm.      Pulses: Normal pulses.      Heart sounds: Normal heart sounds.   Pulmonary:      Effort: Pulmonary effort is normal.      Breath sounds: Normal breath sounds.   Abdominal:      General: Bowel sounds are normal.      Palpations: Abdomen is soft.   Musculoskeletal:         General: Normal range of motion.      Cervical back: Normal range of motion and neck supple. "   Feet:      Comments: Right foot, ankle and lower leg wrapped with ACE wrap;  wound vac in place.   Skin:     General: Skin is warm and dry.   Neurological:      General: No focal deficit present.      Mental Status: He is alert and oriented to person, place, and time. Mental status is at baseline.      Motor: Weakness present.      Gait: Gait abnormal.   Psychiatric:         Attention and Perception: Attention normal.         Mood and Affect: Mood normal.         Speech: Speech normal.         Behavior: Behavior normal. Behavior is cooperative.         Cognition and Memory: Cognition normal.         Judgment: Judgment normal.          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  Continue heparin subcutaneous, acetaminophen, and Percocet PRN.  Continue lasix and spironolactone for edema.  Maintain wound vac  Elevated right lower extremity.  Follow up with podiatrist.  Continue at [facility] for skilled services PT/OT.    Anemia:  Continue ferrous sulfate 325 mg PO BID  Monitor H & H.    Cellulitis of right foot; MRSA; therapeutic drug monitoring:  Evaluated by infectious disease in the hospital.  Continue vancomycin 750 mg IV Q 12 hours as ordered.  Maintain patent PICC line in place.  Monitoring vancomycin random levels Q week.  Labs to be faxed to ID doctor weekly.     Benign essential HTN; CHF;   Continue lisinopril, carvedilol, lasix and aldactone.    Type 2 diabetes mellitus:  Continue metformin.    Peripheral vascular disease:  Continue aspirin, gemfibrozil and atorvastatin.    Impaired gait and mobility:  Patient has difficulty ambulating.  Has external fixator in place on RLE.  Continue at   [facility] for skilled serviced PT/OT.  Fall prevention strategies.    Problem List Items Addressed This Visit          Cardiac and Vasculature    CHF (congestive heart failure) (Multi)       Endocrine/Metabolic    Diabetes mellitus, type 2 (Multi)       Hematology and Neoplasia    Anemia     Other Visit Diagnoses       Chronic ulcer of right foot with necrosis of muscle (Multi)    -  Primary    S/P skin graft using Integra        S/P debridement        Encounter for management of wound VAC        Pain of right lower leg        Localized edema        Muscle spasm of right lower extremity        MRSA cellulitis of right foot        Therapeutic drug monitoring        Benign essential HTN        Peripheral vascular disease (CMS-HCC)        Impaired gait and mobility                    PATO Morris       Electronically Signed By: PATO Morris   4/27/24  3:33 PM

## 2024-04-27 VITALS
DIASTOLIC BLOOD PRESSURE: 66 MMHG | RESPIRATION RATE: 18 BRPM | WEIGHT: 260 LBS | HEIGHT: 74 IN | BODY MASS INDEX: 33.37 KG/M2 | HEART RATE: 61 BPM | SYSTOLIC BLOOD PRESSURE: 126 MMHG | OXYGEN SATURATION: 99 % | TEMPERATURE: 96.5 F

## 2024-04-27 NOTE — PROGRESS NOTES
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        HPI   63 year old male with medical history of DM, HTN, HLD, CHF and cellulitis.    Patient admitted to [facility] for skilled services on 4/1/24.     Diagnoses as follows:    Chronic non-pressure ulceration of right foot with necrosis of muscle;   S/P RLE peroneus brevis muscle flap with bilayer skin substitute graft; wound debridement, , wound vac; RLE pain; localized edema of RLE; muscle spasms:  Patient was  admitted and had surgery at [facility].  Discharged on heparin subcutaneous, acetaminophen, Percocet PRN and cyclobenzaprine.  Also on lasix and spironolactone for edema.  Patient had an external fixator intact in right foot/ankle/lower a wound vac.  Patient went to [facility] yesterday (4/24/24) and had the following procedures performed:   1. Removal External Fixation Lower Extremity   2. Debridement Wound Lower Extremity   3. Preparation of Graft Site, Excision Split Thickness Skin Graft Lower Extremity, Wound Vac Application (bilayer skin substitute graft)   Patient reports the procedure went well yesterday.  Right lower extremity wrapped with ACE wrap.  Will need post-op follow up with podiatrist.  Patient seen today he is sitting up on the side of his bed.  Calm, cooperative, talkative.  Answers all questions appropriately.  No complaints of pain today.    Cellulitis of right foot; MRSA; therapeutic drug monitoring:   He was evaluated by infectious disease in the hospital.  On vancomycin 750 mg IV Q 12 hours.  Has PICC line in place.  Monitoring vancomycin levels.  Recent vancomycin levels:   4/15/2024 vancomycin random level 27.7 H  4/18/2024 vancomycin random level 25.9 H (range 10-20)  Vancomycin dose has been held due to elevated level.  ID doctor is Dr. Saravia fax # [phone]  Labs  to be faxed to ID doctor weekly.   No chest pain.  No headaches.  No dizziness.    Anemia:  On ferrous sulfate 325 mg PO BID  Recent H & H as follows:   4/22/24 H & H 9.6 & 28.3  4/15/24 Hgb 9 hematocrit 26.2   4/2/24 Hgb 8.3 hematocrit 23.8   No s/s of bleeding.       Benign essential HTN; CHF;  :  On lisinopril, carvedilol, lasix and aldactone.  Recent /66.  No complaints of headaches.  No chest pain.  No dizziness.     Type 2 diabetes mellitus:  On metformin.  Not on insulin.     Peripheral vascular disease:  On aspirin, gemfibrozil and atorvastatin.    Impaired gait and mobility:  Patient has difficulty ambulating.  At [facility] for skilled serviced PT/OT.  No recent falls reported.                         Reviewed  [facility] surgical records.  Reviewed  EMR  Reviewed medical, social, surgical and family history.  Reviewed all current medications and performed medication reconciliation.  Performed prescription drug management.  Reviewed vital signs AND lab results  Reviewed [facility] Documentation  Discussed patient with nursing.                     ROS: 10 point ROS performed; Negative unless noted in HPI.    Medical History  CHF   Diabetes mellitus  Hypertension    Social History  Smoker-- 20.00 pack-year smoking history.   No smokeless tobacco use.  Alcohol use ---12.0 standard drinks of alcohol per week.   No illicit drug use.  Single  Lives alone.    Surgical History   No previous surgeries    Family History  Mother-DM and heart disease  Father-DM      Allergies  Bee Venom  Tramadol    BMP: Date: 4/2/2024 Na 135 K 4.4 Cr 0.97 BUN 18 glucose 96 Ca 8.9 GFR 89  CBC: Date: 4/2/2024 WBC 7.57 Hgb 8.3 hematocrit 23.8 platelets 185  4/2/2024 vancomycin random 24 H (range 10-20)    4/4/2024 vancomycin random 18.2 wnl    BMP: Date: 4/15/2024 Na 138 K 4.1 Cr 1.12 BUN 22 glucose 78 Ca 8.9 GFR 75  CBC: Date: 4/15/2024 WBC 6.30 Hgb 9 hematocrit 26.2 platelets 167    4/15/2024 vancomycin random level 27.7  "H  4/18/2024 vancomycin random level 25.9 H (range 10-20)    BMP: Date: 4/22/2024 Na 142 K 4.1 Cr 1.01 BUN 19 glucose 78 Ca 9 GFR 85  CBC: Date: 4/22/2024 WBC 5.59 Hgb 9.6 hematocrit 28.3 platelets 135  Liver function: Date: 4/22/2024 albumin 3.5 protein 6.3    4/22/2024 vancomycin random level 22.5 H 20             Lab Results   Component Value Date    WBC 7.1 04/01/2024    HGB 8.0 (L) 04/01/2024    HCT 23.0 (L) 04/01/2024     04/01/2024    CHOL 211 (H) 07/09/2018    TRIG 273 (H) 07/09/2018    HDL 54 07/09/2018    ALT 19 04/01/2024    AST 17 04/01/2024     04/01/2024    K 4.3 04/01/2024     04/01/2024    CREATININE 1.00 04/01/2024    BUN 17 04/01/2024    CO2 23 (L) 04/01/2024    INR 1.2 01/23/2024    HGBA1C 4.4 03/19/2024             /66   Pulse 61   Temp 35.8 °C (96.5 °F)   Resp 18   Ht 1.88 m (6' 2\")   Wt 118 kg (260 lb)   SpO2 99%   BMI 33.38 kg/m²      Physical Exam  Vitals and nursing note reviewed.   Constitutional:       General: He is awake.      Appearance: Normal appearance.   HENT:      Head: Normocephalic.      Right Ear: External ear normal.      Left Ear: External ear normal.      Nose: Nose normal.      Mouth/Throat:      Mouth: Mucous membranes are moist.      Pharynx: Oropharynx is clear.   Eyes:      Extraocular Movements: Extraocular movements intact.      Conjunctiva/sclera: Conjunctivae normal.      Pupils: Pupils are equal, round, and reactive to light.   Cardiovascular:      Rate and Rhythm: Normal rate and regular rhythm.      Pulses: Normal pulses.      Heart sounds: Normal heart sounds.   Pulmonary:      Effort: Pulmonary effort is normal.      Breath sounds: Normal breath sounds.   Abdominal:      General: Bowel sounds are normal.      Palpations: Abdomen is soft.   Musculoskeletal:         General: Normal range of motion.      Cervical back: Normal range of motion and neck supple.   Feet:      Comments: Right foot, ankle and lower leg wrapped with ACE " wrap;  wound vac in place.   Skin:     General: Skin is warm and dry.   Neurological:      General: No focal deficit present.      Mental Status: He is alert and oriented to person, place, and time. Mental status is at baseline.      Motor: Weakness present.      Gait: Gait abnormal.   Psychiatric:         Attention and Perception: Attention normal.         Mood and Affect: Mood normal.         Speech: Speech normal.         Behavior: Behavior normal. Behavior is cooperative.         Cognition and Memory: Cognition normal.         Judgment: Judgment normal.          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  Continue heparin subcutaneous, acetaminophen, and Percocet PRN.  Continue lasix and spironolactone for edema.  Maintain wound vac  Elevated right lower extremity.  Follow up with podiatrist.  Continue at [facility] for skilled services PT/OT.    Anemia:  Continue ferrous sulfate 325 mg PO BID  Monitor H & H.    Cellulitis of right foot; MRSA; therapeutic drug monitoring:  Evaluated by infectious disease in the hospital.  Continue vancomycin 750 mg IV Q 12 hours as ordered.  Maintain patent PICC line in place.  Monitoring vancomycin random levels Q week.  Labs to be faxed to ID doctor weekly.     Benign essential HTN; CHF;   Continue lisinopril, carvedilol, lasix and aldactone.    Type 2 diabetes mellitus:  Continue metformin.    Peripheral vascular disease:  Continue aspirin, gemfibrozil and atorvastatin.    Impaired gait and mobility:  Patient has difficulty ambulating.  Has external fixator in place on RLE.  Continue at  [facility] for skilled serviced PT/OT.  Fall prevention  strategies.    Problem List Items Addressed This Visit          Cardiac and Vasculature    CHF (congestive heart failure) (Multi)       Endocrine/Metabolic    Diabetes mellitus, type 2 (Multi)       Hematology and Neoplasia    Anemia     Other Visit Diagnoses       Chronic ulcer of right foot with necrosis of muscle (Multi)    -  Primary    S/P skin graft using Integra        S/P debridement        Encounter for management of wound VAC        Pain of right lower leg        Localized edema        Muscle spasm of right lower extremity        MRSA cellulitis of right foot        Therapeutic drug monitoring        Benign essential HTN        Peripheral vascular disease (CMS-HCC)        Impaired gait and mobility                    Dari Cho, APRN-CNP

## 2024-04-30 ENCOUNTER — NURSING HOME VISIT (OUTPATIENT)
Dept: POST ACUTE CARE | Facility: EXTERNAL LOCATION | Age: 64
End: 2024-04-30
Payer: MEDICARE

## 2024-04-30 DIAGNOSIS — L03.115 MRSA CELLULITIS OF RIGHT FOOT: ICD-10-CM

## 2024-04-30 DIAGNOSIS — Z94.5 S/P SKIN GRAFT USING INTEGRA: ICD-10-CM

## 2024-04-30 DIAGNOSIS — I73.9 PERIPHERAL VASCULAR DISEASE (CMS-HCC): ICD-10-CM

## 2024-04-30 DIAGNOSIS — E11.9 TYPE 2 DIABETES MELLITUS WITHOUT COMPLICATION, WITHOUT LONG-TERM CURRENT USE OF INSULIN (MULTI): ICD-10-CM

## 2024-04-30 DIAGNOSIS — B95.62 MRSA CELLULITIS OF RIGHT FOOT: ICD-10-CM

## 2024-04-30 DIAGNOSIS — D64.9 ANEMIA, UNSPECIFIED TYPE: Primary | ICD-10-CM

## 2024-04-30 DIAGNOSIS — I50.9 CONGESTIVE HEART FAILURE, UNSPECIFIED HF CHRONICITY, UNSPECIFIED HEART FAILURE TYPE (MULTI): ICD-10-CM

## 2024-04-30 DIAGNOSIS — L97.513: ICD-10-CM

## 2024-04-30 DIAGNOSIS — Z51.81 THERAPEUTIC DRUG MONITORING: ICD-10-CM

## 2024-04-30 DIAGNOSIS — M62.838 MUSCLE SPASM OF RIGHT LOWER EXTREMITY: ICD-10-CM

## 2024-04-30 DIAGNOSIS — Z98.890 S/P DEBRIDEMENT: ICD-10-CM

## 2024-04-30 DIAGNOSIS — M79.661 PAIN OF RIGHT LOWER LEG: ICD-10-CM

## 2024-04-30 DIAGNOSIS — I10 BENIGN ESSENTIAL HTN: ICD-10-CM

## 2024-04-30 DIAGNOSIS — R26.89 IMPAIRED GAIT AND MOBILITY: ICD-10-CM

## 2024-04-30 PROCEDURE — 99309 SBSQ NF CARE MODERATE MDM 30: CPT | Performed by: NURSE PRACTITIONER

## 2024-04-30 NOTE — LETTER
Patient: Claude Coley  : 1960    Encounter Date: 2024    Name: Claude Coley    YOB: 1960    Code Status: FULL CODE    Chief Complaint:  Follow up on Anemia; etc.....        HPI   63 year old male with medical history of DM, HTN, HLD, CHF and cellulitis.    Patient admitted to [facility] for skilled services on 24.     Diagnoses as follows:    Anemia:  On ferrous sulfate 325 mg PO BID  Recent H & H as follows:   24 H & H 8.1 & 24.1  24 H & H 9.6 & 28.3  4/15/24 Hgb 9 & hematocrit 26.2   24 Hgb 8.3 & hematocrit 23.8   No s/s of bleeding.   Patient seen today he is sitting up on the side of his bed.  Calm, cooperative, talkative.  Answers all questions appropriately.  No headaches or dizziness.    Cellulitis of right foot; MRSA; therapeutic drug monitoring:   He was evaluated by infectious disease in the hospital.  On vancomycin 750 mg IV Q 12 hours.  Has PICC line in place.  Monitoring vancomycin levels.  Recent vancomycin levels:   4/15/2024 vancomycin random level 27.7 H  2024 vancomycin random level 25.9 H (range 10-20)  2024 vancomycin random level 22 H (10-20)  ID doctor is Dr. Saravia fax # [phone]  Labs to be faxed to ID doctor weekly.   No chest pain.  No headaches.  No dizziness.      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  Discharged on heparin subcutaneous, acetaminophen, Percocet PRN and cyclobenzaprine.  Also on lasix and spironolactone for edema.  Patient had an external fixator intact in lower right foot/ankle and it was removed.  Patient still has a wound vac.  Patient went to [facility] yesterday (24) and had the following procedures performed:   1. Removal External Fixation  Lower Extremity   2. Debridement Wound Lower Extremity   3. Preparation of Graft Site, Excision Split Thickness Skin Graft Lower Extremity, Wound Vac Application (bilayer skin substitute graft)  Right lower extremity wrapped with ACE wrap.  Will need post-op follow up with surgeon/podiatrist.  No complaints of pain today.    Benign essential HTN; CHF;  :  On lisinopril, carvedilol, lasix and aldactone.  Recent /66.  No complaints of headaches.  No chest pain.  No dizziness.     Type 2 diabetes mellitus:  On metformin.  Not on insulin.     Peripheral vascular disease:  On aspirin, gemfibrozil and atorvastatin.    Impaired gait and mobility:  Patient has difficulty ambulating.  At [facility] for skilled serviced PT/OT.  No recent falls reported.                         Reviewed  EMR  Reviewed medical, social, surgical and family history.  Reviewed all current medications and performed medication reconciliation.  Performed prescription drug management.  Reviewed vital signs AND lab results  Reviewed [facility] Documentation  Discussed patient with nursing.                     ROS: 10 point ROS performed; Negative unless noted in HPI.    Medical History  CHF   Diabetes mellitus  Hypertension    Social History  Smoker-- 20.00 pack-year smoking history.   No smokeless tobacco use.  Alcohol use ---12.0 standard drinks of alcohol per week.   No illicit drug use.  Single  Lives alone.    Surgical History   No previous surgeries    Family History  Mother-DM and heart disease  Father-DM      Allergies  Bee Venom  Tramadol    BMP: Date: 4/2/2024 Na 135 K 4.4 Cr 0.97 BUN 18 glucose 96 Ca 8.9 GFR 89  CBC: Date: 4/2/2024 WBC 7.57 Hgb 8.3 hematocrit 23.8 platelets 185  4/2/2024 vancomycin random 24 H (range 10-20)    4/4/2024 vancomycin random 18.2 wnl    BMP: Date: 4/15/2024 Na 138 K 4.1 Cr 1.12 BUN 22 glucose 78 Ca 8.9 GFR 75  CBC: Date: 4/15/2024 WBC 6.30 Hgb 9 hematocrit 26.2 platelets 167    4/15/2024 vancomycin random  "level 27.7 H  4/18/2024 vancomycin random level 25.9 H (range 10-20)    BMP: Date: 4/22/2024 Na 142 K 4.1 Cr 1.01 BUN 19 glucose 78 Ca 9 GFR 85  CBC: Date: 4/22/2024 WBC 5.59 Hgb 9.6 hematocrit 28.3 platelets 135  Liver function: Date: 4/22/2024 albumin 3.5 protein 6.3    4/22/2024 vancomycin random level 22.5 H 20    BMP: Date: 4/29/2024 Na 139 K 4 Cr 0.93 BUN 17 glucose 79 Ca 9.2 GFR 93  CBC: Date: 4/29/2024 WBC 5.12 Hgb 8.1 hematocrit 24.1 platelets 173  Liver function: Date: 4/29/2024 ALT 8 AST 13 alkaline phos.  102 bilirubin 0.2 albumin 3.3 protein 6    4/29/2024 vancomycin random level 22 H (10-20)           Lab Results   Component Value Date    WBC 7.1 04/01/2024    HGB 8.0 (L) 04/01/2024    HCT 23.0 (L) 04/01/2024     04/01/2024    CHOL 211 (H) 07/09/2018    TRIG 273 (H) 07/09/2018    HDL 54 07/09/2018    ALT 19 04/01/2024    AST 17 04/01/2024     04/01/2024    K 4.3 04/01/2024     04/01/2024    CREATININE 1.00 04/01/2024    BUN 17 04/01/2024    CO2 23 (L) 04/01/2024    INR 1.2 01/23/2024    HGBA1C 4.4 03/19/2024             /66   Pulse 66   Temp 36.2 °C (97.1 °F)   Resp 16   Ht 1.88 m (6' 2\")   Wt 118 kg (260 lb)   SpO2 98%   BMI 33.38 kg/m²      Physical Exam  Vitals and nursing note reviewed.   Constitutional:       General: He is awake.      Appearance: Normal appearance.   HENT:      Head: Normocephalic.      Right Ear: External ear normal.      Left Ear: External ear normal.      Nose: Nose normal.      Mouth/Throat:      Mouth: Mucous membranes are moist.      Pharynx: Oropharynx is clear.   Eyes:      Extraocular Movements: Extraocular movements intact.      Conjunctiva/sclera: Conjunctivae normal.      Pupils: Pupils are equal, round, and reactive to light.   Cardiovascular:      Rate and Rhythm: Normal rate and regular rhythm.      Pulses: Normal pulses.      Heart sounds: Normal heart sounds.   Pulmonary:      Effort: Pulmonary effort is normal.      Breath sounds: " Normal breath sounds.   Abdominal:      General: Bowel sounds are normal.      Palpations: Abdomen is soft.   Musculoskeletal:         General: Normal range of motion.      Cervical back: Normal range of motion and neck supple.   Feet:      Comments: Right foot, ankle and lower leg wrapped with ACE wrap;  wound vac in place.   Skin:     General: Skin is warm and dry.   Neurological:      General: No focal deficit present.      Mental Status: He is alert and oriented to person, place, and time. Mental status is at baseline.      Motor: Weakness present.      Gait: Gait abnormal.   Psychiatric:         Attention and Perception: Attention normal.         Mood and Affect: Mood normal.         Speech: Speech normal.         Behavior: Behavior normal. Behavior is cooperative.         Cognition and Memory: Cognition normal.         Judgment: Judgment normal.          Assessment/Plan    BMP, CBC with diff., vancomycin random labs Q Monday    Anemia:  DC ferrous sulfate 325 mg PO BID  Start ferrous sulfate 325 mg PO TID.  Monitor for bleeding.  Monitor CBC Q week.    Cellulitis of right foot; MRSA; therapeutic drug monitoring:   He was evaluated by infectious disease in the hospital.  Continue vancomycin 750 mg IV Q 12 hours.  Maintain PICC line.  Monitor vancomycin levels Q week.     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.  Discharged on heparin subcutaneous, acetaminophen, Percocet PRN and cyclobenzaprine.  Continue lasix and spironolactone.  Maintain wound vac.  Patient went to [facility] yesterday (4/24/24) and had the following procedures performed:   1. Removal External Fixation Lower Extremity   2. Debridement Wound Lower Extremity   3. Preparation of Graft Site, Excision Split  Thickness Skin Graft Lower Extremity, Wound Vac Application (bilayer skin substitute graft)  Right lower extremity wrapped with ACE wrap.  Will need post-op follow up with surgeon/podiatrist.  No complaints of pain today.    Benign essential HTN; CHF:  Continue lisinopril, carvedilol, lasix and aldactone.  Monitor Bps.     Type 2 diabetes mellitus:  Continue metformin.  Not on insulin.     Peripheral vascular disease:  Continue aspirin, gemfibrozil and atorvastatin.    Impaired gait and mobility:  Patient has difficulty ambulating.  Continue at [facility] for skilled serviced PT/OT.  No recent falls reported.       Problem List Items Addressed This Visit          Cardiac and Vasculature    CHF (congestive heart failure) (Multi)       Endocrine/Metabolic    Diabetes mellitus, type 2 (Multi)       Hematology and Neoplasia    Anemia - Primary     Other Visit Diagnoses       MRSA cellulitis of right foot        Therapeutic drug monitoring        Chronic ulcer of right foot with necrosis of muscle (Multi)        S/P skin graft using Integra        S/P debridement        Pain of right lower leg        Muscle spasm of right lower extremity        Benign essential HTN        Peripheral vascular disease (CMS-HCC)        Impaired gait and mobility                      PATO Morris       Electronically Signed By: PATO Morris   5/4/24  5:19 PM

## 2024-05-02 ENCOUNTER — NURSING HOME VISIT (OUTPATIENT)
Dept: POST ACUTE CARE | Facility: EXTERNAL LOCATION | Age: 64
End: 2024-05-02
Payer: MEDICARE

## 2024-05-02 DIAGNOSIS — Z51.81 THERAPEUTIC DRUG MONITORING: ICD-10-CM

## 2024-05-02 DIAGNOSIS — M79.661 PAIN OF RIGHT LOWER LEG: ICD-10-CM

## 2024-05-02 DIAGNOSIS — Z94.5 S/P SKIN GRAFT USING INTEGRA: ICD-10-CM

## 2024-05-02 DIAGNOSIS — E11.9 TYPE 2 DIABETES MELLITUS WITHOUT COMPLICATION, WITHOUT LONG-TERM CURRENT USE OF INSULIN (MULTI): ICD-10-CM

## 2024-05-02 DIAGNOSIS — B95.62 MRSA CELLULITIS OF RIGHT FOOT: Primary | ICD-10-CM

## 2024-05-02 DIAGNOSIS — I73.9 PERIPHERAL VASCULAR DISEASE (CMS-HCC): ICD-10-CM

## 2024-05-02 DIAGNOSIS — M62.838 MUSCLE SPASM OF RIGHT LOWER EXTREMITY: ICD-10-CM

## 2024-05-02 DIAGNOSIS — I50.9 CONGESTIVE HEART FAILURE, UNSPECIFIED HF CHRONICITY, UNSPECIFIED HEART FAILURE TYPE (MULTI): ICD-10-CM

## 2024-05-02 DIAGNOSIS — D64.9 ANEMIA, UNSPECIFIED TYPE: ICD-10-CM

## 2024-05-02 DIAGNOSIS — I10 BENIGN ESSENTIAL HTN: ICD-10-CM

## 2024-05-02 DIAGNOSIS — L97.513: ICD-10-CM

## 2024-05-02 DIAGNOSIS — R26.89 IMPAIRED GAIT AND MOBILITY: ICD-10-CM

## 2024-05-02 DIAGNOSIS — L03.115 MRSA CELLULITIS OF RIGHT FOOT: Primary | ICD-10-CM

## 2024-05-02 PROCEDURE — 99309 SBSQ NF CARE MODERATE MDM 30: CPT | Performed by: NURSE PRACTITIONER

## 2024-05-02 NOTE — LETTER
Patient: Claude Coley  : 1960    Encounter Date: 2024    Name: Claude Coley    YOB: 1960    Code Status: FULL CODE    Chief Complaint:  Follow up on cellulitis; etc.....        HPI   63 year old male with medical history of DM, HTN, HLD, CHF and cellulitis.    Patient admitted to [facility] for skilled services on 24.     Diagnoses as follows:    Cellulitis of right foot; MRSA; therapeutic drug monitoring:   He was evaluated by infectious disease in the hospital.  On vancomycin 750 mg IV Q 12 hours.  Has PICC line in place.  Monitoring vancomycin levels.  Recent vancomycin levels:   4/15/2024 vancomycin random level 27.7 H  2024 vancomycin random level 25.9 H (range 10-20)  2024 vancomycin random level 22 H (10-20)  # ##24 vancomycin random level 23.9 H (10-20)  Nursing is faxing vanco levels to the pharmacy and the pharmacy is determining dosing.   ID doctor is Dr. Saravia fax # [phone]  Labs to be faxed to ID doctor weekly.   No chest pain.  No headaches.  No dizziness.  Patient seen today he is sitting up on the side of his bed.  Calm, cooperative, talkative.  Answers all questions appropriately.  No headaches or dizziness.    Chronic non-pressure ulceration of right foot with necrosis of muscle;   S/P RLE peroneus brevis muscle flap with bilayer skin substitute graft; wound debridement, wound vac; RLE pain;   localized edema of RLE; muscle spasms:  Patient was admitted and had surgery at [facility].  Discharged on heparin subcutaneous, acetaminophen, Percocet PRN and cyclobenzaprine.  Also on lasix and spironolactone for edema.  Patient had an external fixator intact in lower right foot/ankle and it was removed.  Patient still has a wound vac.  Patient went to [facility] on 24 and had the following procedures performed:   1. Removal External Fixation Lower Extremity   2. Debridement Wound Lower Extremity   3. Preparation of Graft  Site, Excision Split Thickness Skin Graft Lower Extremity, Wound Vac Application (bilayer skin substitute graft)  Right lower extremity wrapped with ACE wrap.  Had post-op follow up with surgeon/podiatrist earlier today.  He has a follow up appt. Scheduled with Dr. Darryl Perry on 5/9/24 at 10 am.   No complaints of pain today.    Anemia:  On ferrous sulfate 325 mg PO BID  Recent H & H as follows:   4/29/24 H & H 8.1 & 24.1  4/22/24 H & H 9.6 & 28.3  4/15/24 Hgb 9 & hematocrit 26.2   4/2/24 Hgb 8.3 & hematocrit 23.8   No s/s of bleeding.     Benign essential HTN; CHF :  On lisinopril, carvedilol, lasix and aldactone.  Recent /66.  No complaints of headaches.  No chest pain.  No dizziness.     Peripheral vascular disease:  On aspirin, gemfibrozil and atorvastatin.    Type 2 diabetes mellitus:  On metformin.  Not on insulin.     Impaired gait and mobility:  Patient has difficulty ambulating.  At [facility] for skilled serviced PT/OT.  No recent falls reported.                         Reviewed  EMR  Reviewed medical, social, surgical and family history.  Reviewed all current medications and performed medication reconciliation.  Performed prescription drug management.  Reviewed vital signs AND lab results  Reviewed [facility] Documentation  Discussed patient with nursing.                     ROS: 10 point ROS performed; Negative unless noted in HPI.    Medical History  CHF   Diabetes mellitus  Hypertension    Social History  Smoker-- 20.00 pack-year smoking history.   No smokeless tobacco use.  Alcohol use ---12.0 standard drinks of alcohol per week.   No illicit drug use.  Single  Lives alone.    Surgical History   No previous surgeries    Family History  Mother-DM and heart disease  Father-DM      Allergies  Bee Venom  Tramadol    BMP: Date: 4/2/2024 Na 135 K 4.4 Cr 0.97 BUN 18 glucose 96 Ca 8.9 GFR 89  CBC: Date: 4/2/2024 WBC 7.57 Hgb 8.3 hematocrit 23.8 platelets 185  4/2/2024 vancomycin random 24 H (range  "10-20)    4/4/2024 vancomycin random 18.2 wnl    BMP: Date: 4/15/2024 Na 138 K 4.1 Cr 1.12 BUN 22 glucose 78 Ca 8.9 GFR 75  CBC: Date: 4/15/2024 WBC 6.30 Hgb 9 hematocrit 26.2 platelets 167    4/15/2024 vancomycin random level 27.7 H  4/18/2024 vancomycin random level 25.9 H (range 10-20)    BMP: Date: 4/22/2024 Na 142 K 4.1 Cr 1.01 BUN 19 glucose 78 Ca 9 GFR 85  CBC: Date: 4/22/2024 WBC 5.59 Hgb 9.6 hematocrit 28.3 platelets 135  Liver function: Date: 4/22/2024 albumin 3.5 protein 6.3    4/22/2024 vancomycin random level 22.5 H 20    BMP: Date: 4/29/2024 Na 139 K 4 Cr 0.93 BUN 17 glucose 79 Ca 9.2 GFR 93  CBC: Date: 4/29/2024 WBC 5.12 Hgb 8.1 hematocrit 24.1 platelets 173  Liver function: Date: 4/29/2024 ALT 8 AST 13 alkaline phos.  102 bilirubin 0.2 albumin 3.3 protein 6    4/29/2024 vancomycin random level 22 H (10-20)    5/1/24 vancomycin random level 23.9 H (10-20)           Lab Results   Component Value Date    WBC 6.8 05/05/2024    HGB 9.0 (L) 05/05/2024    HCT 27.0 (L) 05/05/2024     05/05/2024    CHOL 211 (H) 07/09/2018    TRIG 273 (H) 07/09/2018    HDL 54 07/09/2018    ALT 14 05/05/2024    AST 15 05/05/2024     05/05/2024    K 4.3 05/05/2024     05/05/2024    CREATININE 1.00 05/05/2024    BUN 17 05/05/2024    CO2 21 (L) 05/05/2024    INR 1.2 01/23/2024    HGBA1C 4.4 03/19/2024             /66   Pulse 62   Temp 36.2 °C (97.2 °F)   Resp 18   Ht 1.88 m (6' 2\")   Wt 118 kg (260 lb)   SpO2 98%   BMI 33.38 kg/m²      Physical Exam  Vitals and nursing note reviewed.   Constitutional:       General: He is awake.      Appearance: Normal appearance.   HENT:      Head: Normocephalic.      Right Ear: External ear normal.      Left Ear: External ear normal.      Nose: Nose normal.      Mouth/Throat:      Mouth: Mucous membranes are moist.      Pharynx: Oropharynx is clear.   Eyes:      Extraocular Movements: Extraocular movements intact.      Conjunctiva/sclera: Conjunctivae normal.      " Pupils: Pupils are equal, round, and reactive to light.   Cardiovascular:      Rate and Rhythm: Normal rate and regular rhythm.      Pulses: Normal pulses.      Heart sounds: Normal heart sounds.   Pulmonary:      Effort: Pulmonary effort is normal.      Breath sounds: Normal breath sounds.   Abdominal:      General: Bowel sounds are normal.      Palpations: Abdomen is soft.   Musculoskeletal:         General: Normal range of motion.      Cervical back: Normal range of motion and neck supple.   Feet:      Comments: Right foot, ankle and lower leg wrapped with ACE wrap;  wound vac in place.   Skin:     General: Skin is warm and dry.   Neurological:      General: No focal deficit present.      Mental Status: He is alert and oriented to person, place, and time. Mental status is at baseline.      Motor: Weakness present.      Gait: Gait abnormal.   Psychiatric:         Attention and Perception: Attention normal.         Mood and Affect: Mood normal.         Speech: Speech normal.         Behavior: Behavior normal. Behavior is cooperative.         Cognition and Memory: Cognition normal.         Judgment: Judgment normal.          Assessment/Plan    BMP, CBC with diff., vancomycin random labs Q Monday    Cellulitis of right foot; MRSA; therapeutic drug monitoring:   He was evaluated by infectious disease in the hospital.  Continue  vancomycin 750 mg IV Q 12 hours-----HOLD for elevated vanco random levels.   Maintain PICC line.  Monitor vancomycin levels.  Nursing is faxing vanco levels to the pharmacy and the pharmacy is determining dosing and when to HOLD vanco.   ID doctor is Dr. Saravia fax # [phone]  Labs are also to be faxed to ID doctor weekly.     Chronic non-pressure ulceration of right foot with necrosis of muscle;   S/P RLE peroneus brevis muscle flap with bilayer skin substitute graft; wound debridement, wound vac; RLE pain;   localized edema of RLE; muscle spasms:  Patient was admitted and had surgery at   [facility].  Continue heparin subcutaneous, acetaminophen, Percocet PRN and cyclobenzaprine.  Continue lasix and spironolactone for edema.  Continue wound vac.  Right lower extremity wrapped with ACE wrap.  Follow up with surgeon/podiatrist earlier today.  Follow up appt.  with Dr. Darryl Perry on 5/9/24 at 10 am.     Anemia:  Continue ferrous sulfate 325 mg PO BID  Monitor H & H.    Benign essential HTN; CHF :  Continue lisinopril, carvedilol, lasix and aldactone.  Monitor Bps.     Peripheral vascular disease:  Continue aspirin, gemfibrozil and atorvastatin.    Type 2 diabetes mellitus:  Continue metformin.    Impaired gait and mobility:  Patient has difficulty ambulating.  Continue at  [facility] for skilled serviced PT/OT.  Fall prevention strategies.           Problem List Items Addressed This Visit          Cardiac and Vasculature    CHF (congestive heart failure) (Multi)       Endocrine/Metabolic    Diabetes mellitus, type 2 (Multi)       Hematology and Neoplasia    Anemia     Other Visit Diagnoses       MRSA cellulitis of right foot    -  Primary    Therapeutic drug monitoring        Chronic ulcer of right foot with necrosis of muscle (Multi)        S/P skin graft using Integra        Pain of right lower leg        Muscle spasm of right lower extremity        Benign essential HTN        Peripheral vascular disease (CMS-HCC)        Impaired gait and mobility                        PATO Morris       Electronically Signed By: PATO Morris   5/5/24  7:52 PM

## 2024-05-04 VITALS
OXYGEN SATURATION: 98 % | BODY MASS INDEX: 33.37 KG/M2 | HEIGHT: 74 IN | RESPIRATION RATE: 16 BRPM | HEART RATE: 66 BPM | DIASTOLIC BLOOD PRESSURE: 66 MMHG | WEIGHT: 260 LBS | SYSTOLIC BLOOD PRESSURE: 126 MMHG | TEMPERATURE: 97.1 F

## 2024-05-04 NOTE — PROGRESS NOTES
Name: Claude Coley    YOB: 1960    Code Status: FULL CODE    Chief Complaint:  Follow up on Anemia; etc.....        HPI   63 year old male with medical history of DM, HTN, HLD, CHF and cellulitis.    Patient admitted to [facility] for skilled services on 4/1/24.     Diagnoses as follows:    Anemia:  On ferrous sulfate 325 mg PO BID  Recent H & H as follows:   4/29/24 H & H 8.1 & 24.1  4/22/24 H & H 9.6 & 28.3  4/15/24 Hgb 9 & hematocrit 26.2   4/2/24 Hgb 8.3 & hematocrit 23.8   No s/s of bleeding.   Patient seen today he is sitting up on the side of his bed.  Calm, cooperative, talkative.  Answers all questions appropriately.  No headaches or dizziness.    Cellulitis of right foot; MRSA; therapeutic drug monitoring:   He was evaluated by infectious disease in the hospital.  On vancomycin 750 mg IV Q 12 hours.  Has PICC line in place.  Monitoring vancomycin levels.  Recent vancomycin levels:   4/15/2024 vancomycin random level 27.7 H  4/18/2024 vancomycin random level 25.9 H (range 10-20)  4/29/2024 vancomycin random level 22 H (10-20)  ID doctor is Dr. Saravia fax # [phone]  Labs to be faxed to ID doctor weekly.   No chest pain.  No headaches.  No dizziness.      Chronic non-pressure ulceration of right foot with necrosis of muscle;   S/P RLE peroneus brevis muscle flap with bilayer skin substitute graft; wound debridement, wound vac; RLE pain; localized edema of RLE; muscle spasms:  Patient was  admitted and had surgery at [facility].  Discharged on heparin subcutaneous, acetaminophen, Percocet PRN and cyclobenzaprine.  Also on lasix and spironolactone for edema.  Patient had an external fixator intact in lower right foot/ankle and it was removed.  Patient still has a wound vac.  Patient went to [facility] yesterday (4/24/24) and had the following procedures performed:   1. Removal External Fixation Lower Extremity   2. Debridement Wound Lower Extremity   3. Preparation of  Graft Site, Excision Split Thickness Skin Graft Lower Extremity, Wound Vac Application (bilayer skin substitute graft)  Right lower extremity wrapped with ACE wrap.  Will need post-op follow up with surgeon/podiatrist.  No complaints of pain today.    Benign essential HTN; CHF;  :  On lisinopril, carvedilol, lasix and aldactone.  Recent /66.  No complaints of headaches.  No chest pain.  No dizziness.     Type 2 diabetes mellitus:  On metformin.  Not on insulin.     Peripheral vascular disease:  On aspirin, gemfibrozil and atorvastatin.    Impaired gait and mobility:  Patient has difficulty ambulating.  At [facility] for skilled serviced PT/OT.  No recent falls reported.                         Reviewed  EMR  Reviewed medical, social, surgical and family history.  Reviewed all current medications and performed medication reconciliation.  Performed prescription drug management.  Reviewed vital signs AND lab results  Reviewed [facility] Documentation  Discussed patient with nursing.                     ROS: 10 point ROS performed; Negative unless noted in HPI.    Medical History  CHF   Diabetes mellitus  Hypertension    Social History  Smoker-- 20.00 pack-year smoking history.   No smokeless tobacco use.  Alcohol use ---12.0 standard drinks of alcohol per week.   No illicit drug use.  Single  Lives alone.    Surgical History   No previous surgeries    Family History  Mother-DM and heart disease  Father-DM      Allergies  Bee Venom  Tramadol    BMP: Date: 4/2/2024 Na 135 K 4.4 Cr 0.97 BUN 18 glucose 96 Ca 8.9 GFR 89  CBC: Date: 4/2/2024 WBC 7.57 Hgb 8.3 hematocrit 23.8 platelets 185  4/2/2024 vancomycin random 24 H (range 10-20)    4/4/2024 vancomycin random 18.2 wnl    BMP: Date: 4/15/2024 Na 138 K 4.1 Cr 1.12 BUN 22 glucose 78 Ca 8.9 GFR 75  CBC: Date: 4/15/2024 WBC 6.30 Hgb 9 hematocrit 26.2 platelets 167    4/15/2024 vancomycin random level 27.7 H  4/18/2024 vancomycin random level 25.9 H (range  "10-20)    BMP: Date: 4/22/2024 Na 142 K 4.1 Cr 1.01 BUN 19 glucose 78 Ca 9 GFR 85  CBC: Date: 4/22/2024 WBC 5.59 Hgb 9.6 hematocrit 28.3 platelets 135  Liver function: Date: 4/22/2024 albumin 3.5 protein 6.3    4/22/2024 vancomycin random level 22.5 H 20    BMP: Date: 4/29/2024 Na 139 K 4 Cr 0.93 BUN 17 glucose 79 Ca 9.2 GFR 93  CBC: Date: 4/29/2024 WBC 5.12 Hgb 8.1 hematocrit 24.1 platelets 173  Liver function: Date: 4/29/2024 ALT 8 AST 13 alkaline phos.  102 bilirubin 0.2 albumin 3.3 protein 6    4/29/2024 vancomycin random level 22 H (10-20)           Lab Results   Component Value Date    WBC 7.1 04/01/2024    HGB 8.0 (L) 04/01/2024    HCT 23.0 (L) 04/01/2024     04/01/2024    CHOL 211 (H) 07/09/2018    TRIG 273 (H) 07/09/2018    HDL 54 07/09/2018    ALT 19 04/01/2024    AST 17 04/01/2024     04/01/2024    K 4.3 04/01/2024     04/01/2024    CREATININE 1.00 04/01/2024    BUN 17 04/01/2024    CO2 23 (L) 04/01/2024    INR 1.2 01/23/2024    HGBA1C 4.4 03/19/2024             /66   Pulse 66   Temp 36.2 °C (97.1 °F)   Resp 16   Ht 1.88 m (6' 2\")   Wt 118 kg (260 lb)   SpO2 98%   BMI 33.38 kg/m²      Physical Exam  Vitals and nursing note reviewed.   Constitutional:       General: He is awake.      Appearance: Normal appearance.   HENT:      Head: Normocephalic.      Right Ear: External ear normal.      Left Ear: External ear normal.      Nose: Nose normal.      Mouth/Throat:      Mouth: Mucous membranes are moist.      Pharynx: Oropharynx is clear.   Eyes:      Extraocular Movements: Extraocular movements intact.      Conjunctiva/sclera: Conjunctivae normal.      Pupils: Pupils are equal, round, and reactive to light.   Cardiovascular:      Rate and Rhythm: Normal rate and regular rhythm.      Pulses: Normal pulses.      Heart sounds: Normal heart sounds.   Pulmonary:      Effort: Pulmonary effort is normal.      Breath sounds: Normal breath sounds.   Abdominal:      General: Bowel sounds " are normal.      Palpations: Abdomen is soft.   Musculoskeletal:         General: Normal range of motion.      Cervical back: Normal range of motion and neck supple.   Feet:      Comments: Right foot, ankle and lower leg wrapped with ACE wrap;  wound vac in place.   Skin:     General: Skin is warm and dry.   Neurological:      General: No focal deficit present.      Mental Status: He is alert and oriented to person, place, and time. Mental status is at baseline.      Motor: Weakness present.      Gait: Gait abnormal.   Psychiatric:         Attention and Perception: Attention normal.         Mood and Affect: Mood normal.         Speech: Speech normal.         Behavior: Behavior normal. Behavior is cooperative.         Cognition and Memory: Cognition normal.         Judgment: Judgment normal.          Assessment/Plan     BMP, CBC with diff., vancomycin random labs Q Monday    Anemia:  DC ferrous sulfate 325 mg PO BID  Start ferrous sulfate 325 mg PO TID.  Monitor for bleeding.  Monitor CBC Q week.    Cellulitis of right foot; MRSA; therapeutic drug monitoring:   He was evaluated by infectious disease in the hospital.  Continue vancomycin 750 mg IV Q 12 hours.  Maintain PICC line.  Monitor vancomycin levels Q week.     Chronic non-pressure ulceration of right foot with necrosis of muscle;   S/P RLE peroneus brevis muscle flap with bilayer skin substitute graft; wound debridement,   wound vac; RLE pain; localized edema of RLE; muscle spasms:  Patient was  admitted and had surgery at [facility].  Discharged on heparin subcutaneous, acetaminophen, Percocet PRN and cyclobenzaprine.  Continue lasix and spironolactone.  Maintain wound vac.  Patient went to [facility] yesterday (4/24/24) and had the following procedures performed:   1. Removal External Fixation Lower Extremity   2. Debridement Wound Lower Extremity   3. Preparation of Graft Site, Excision Split Thickness Skin Graft Lower Extremity, Wound Vac Application  (bilayer skin substitute graft)  Right lower extremity wrapped with ACE wrap.  Will need post-op follow up with surgeon/podiatrist.  No complaints of pain today.    Benign essential HTN; CHF:  Continue lisinopril, carvedilol, lasix and aldactone.  Monitor Bps.     Type 2 diabetes mellitus:  Continue metformin.  Not on insulin.     Peripheral vascular disease:  Continue aspirin, gemfibrozil and atorvastatin.    Impaired gait and mobility:  Patient has difficulty ambulating.  Continue at [facility] for skilled serviced PT/OT.  No recent falls reported.       Problem List Items Addressed This Visit          Cardiac and Vasculature    CHF (congestive heart failure) (Multi)       Endocrine/Metabolic    Diabetes mellitus, type 2 (Multi)       Hematology and Neoplasia    Anemia - Primary     Other Visit Diagnoses       MRSA cellulitis of right foot        Therapeutic drug monitoring        Chronic ulcer of right foot with necrosis of muscle (Multi)        S/P skin graft using Integra        S/P debridement        Pain of right lower leg        Muscle spasm of right lower extremity        Benign essential HTN        Peripheral vascular disease (CMS-HCC)        Impaired gait and mobility                      Dari Cho, APRN-CNP

## 2024-05-05 ENCOUNTER — HOSPITAL ENCOUNTER (EMERGENCY)
Facility: HOSPITAL | Age: 64
Discharge: HOME | End: 2024-05-05
Attending: STUDENT IN AN ORGANIZED HEALTH CARE EDUCATION/TRAINING PROGRAM
Payer: MEDICARE

## 2024-05-05 VITALS
HEART RATE: 62 BPM | RESPIRATION RATE: 18 BRPM | BODY MASS INDEX: 33.37 KG/M2 | OXYGEN SATURATION: 98 % | TEMPERATURE: 97.2 F | WEIGHT: 260 LBS | SYSTOLIC BLOOD PRESSURE: 137 MMHG | DIASTOLIC BLOOD PRESSURE: 66 MMHG | HEIGHT: 74 IN

## 2024-05-05 VITALS
HEIGHT: 74 IN | TEMPERATURE: 98.4 F | SYSTOLIC BLOOD PRESSURE: 164 MMHG | RESPIRATION RATE: 16 BRPM | OXYGEN SATURATION: 98 % | BODY MASS INDEX: 32.4 KG/M2 | WEIGHT: 252.5 LBS | HEART RATE: 75 BPM | DIASTOLIC BLOOD PRESSURE: 78 MMHG

## 2024-05-05 DIAGNOSIS — R22.41 LOCALIZED SWELLING OF RIGHT FOOT: ICD-10-CM

## 2024-05-05 DIAGNOSIS — S91.301A OPEN WOUND OF RIGHT FOOT WITH COMPLICATION, INITIAL ENCOUNTER: Primary | ICD-10-CM

## 2024-05-05 LAB
ALBUMIN SERPL-MCNC: 3.8 G/DL (ref 3.5–5)
ALP BLD-CCNC: 126 U/L (ref 35–125)
ALT SERPL-CCNC: 14 U/L (ref 5–40)
ANION GAP SERPL CALC-SCNC: 13 MMOL/L
AST SERPL-CCNC: 15 U/L (ref 5–40)
BASOPHILS # BLD AUTO: 0.04 X10*3/UL (ref 0–0.1)
BASOPHILS NFR BLD AUTO: 0.6 %
BILIRUB SERPL-MCNC: 0.3 MG/DL (ref 0.1–1.2)
BUN SERPL-MCNC: 17 MG/DL (ref 8–25)
CALCIUM SERPL-MCNC: 9.3 MG/DL (ref 8.5–10.4)
CHLORIDE SERPL-SCNC: 101 MMOL/L (ref 97–107)
CO2 SERPL-SCNC: 21 MMOL/L (ref 24–31)
CREAT SERPL-MCNC: 1 MG/DL (ref 0.4–1.6)
EGFRCR SERPLBLD CKD-EPI 2021: 86 ML/MIN/1.73M*2
EOSINOPHIL # BLD AUTO: 0.28 X10*3/UL (ref 0–0.7)
EOSINOPHIL NFR BLD AUTO: 4.1 %
ERYTHROCYTE [DISTWIDTH] IN BLOOD BY AUTOMATED COUNT: 13.7 % (ref 11.5–14.5)
GLUCOSE SERPL-MCNC: 111 MG/DL (ref 65–99)
HCT VFR BLD AUTO: 27 % (ref 41–52)
HGB BLD-MCNC: 9 G/DL (ref 13.5–17.5)
IMM GRANULOCYTES # BLD AUTO: 0.03 X10*3/UL (ref 0–0.7)
IMM GRANULOCYTES NFR BLD AUTO: 0.4 % (ref 0–0.9)
LACTATE BLDV-SCNC: 0.8 MMOL/L (ref 0.4–2)
LYMPHOCYTES # BLD AUTO: 1.16 X10*3/UL (ref 1.2–4.8)
LYMPHOCYTES NFR BLD AUTO: 17 %
MCH RBC QN AUTO: 28 PG (ref 26–34)
MCHC RBC AUTO-ENTMCNC: 33.3 G/DL (ref 32–36)
MCV RBC AUTO: 84 FL (ref 80–100)
MONOCYTES # BLD AUTO: 0.63 X10*3/UL (ref 0.1–1)
MONOCYTES NFR BLD AUTO: 9.3 %
NEUTROPHILS # BLD AUTO: 4.67 X10*3/UL (ref 1.2–7.7)
NEUTROPHILS NFR BLD AUTO: 68.6 %
NRBC BLD-RTO: 0 /100 WBCS (ref 0–0)
PLATELET # BLD AUTO: 163 X10*3/UL (ref 150–450)
POTASSIUM SERPL-SCNC: 4.3 MMOL/L (ref 3.4–5.1)
PROT SERPL-MCNC: 6.8 G/DL (ref 5.9–7.9)
RBC # BLD AUTO: 3.21 X10*6/UL (ref 4.5–5.9)
SODIUM SERPL-SCNC: 135 MMOL/L (ref 133–145)
WBC # BLD AUTO: 6.8 X10*3/UL (ref 4.4–11.3)

## 2024-05-05 PROCEDURE — 99283 EMERGENCY DEPT VISIT LOW MDM: CPT | Mod: 25

## 2024-05-05 PROCEDURE — 83605 ASSAY OF LACTIC ACID: CPT | Performed by: CLINICAL NURSE SPECIALIST

## 2024-05-05 PROCEDURE — 80053 COMPREHEN METABOLIC PANEL: CPT | Performed by: CLINICAL NURSE SPECIALIST

## 2024-05-05 PROCEDURE — 85025 COMPLETE CBC W/AUTO DIFF WBC: CPT | Performed by: CLINICAL NURSE SPECIALIST

## 2024-05-05 PROCEDURE — 36415 COLL VENOUS BLD VENIPUNCTURE: CPT | Performed by: CLINICAL NURSE SPECIALIST

## 2024-05-05 PROCEDURE — 2500000004 HC RX 250 GENERAL PHARMACY W/ HCPCS (ALT 636 FOR OP/ED): Performed by: CLINICAL NURSE SPECIALIST

## 2024-05-05 PROCEDURE — 87040 BLOOD CULTURE FOR BACTERIA: CPT | Mod: 91,TRILAB | Performed by: CLINICAL NURSE SPECIALIST

## 2024-05-05 RX ADMIN — SODIUM CHLORIDE 1000 ML: 900 INJECTION, SOLUTION INTRAVENOUS at 14:04

## 2024-05-05 ASSESSMENT — PAIN - FUNCTIONAL ASSESSMENT: PAIN_FUNCTIONAL_ASSESSMENT: 0-10

## 2024-05-05 ASSESSMENT — PAIN SCALES - GENERAL: PAINLEVEL_OUTOF10: 0 - NO PAIN

## 2024-05-05 NOTE — ED PROVIDER NOTES
Department of Emergency Medicine   ED  Provider Note  Admit Date/RoomTime: 5/5/2024  1:48 PM  ED Room: 18/[facility]        History of Present Illness:  Chief Complaint   Patient presents with    Wound Check     Pt has MRSA with a wound vac on right foot. Pt being treated at [facility] with IV vanco. Pt asked to be brought to ED today because he was concerned about right foot swelling that started yesterday.          Claude Coley is a 63 y.o. male history of congestive heart failure, diabetes, hypertension presenting to the ED for right leg swelling patient is status post removal of external fixator of the lower extremity with wound VAC by Dr. Perry podiatry on 4/24/2024 currently on vancomycin and followed by infectious disease Dr. Saravia. Currently in rehab presents to the emergency department with complaints of concerns of worsening infection in his right leg he noticed swelling to his foot and toes that started yesterday and increased redness.  He has a wound VAC in place continued on antibiotics.  Denies any fever chills nausea or vomiting.  He requested to be evaluated for concerns of infection.  He also has a skin graft to the right side of his leg.  He denies any calf pain swelling or redness.  He noticed some odor coming from the wound VAC itself.  Presents now for concern of infection  Review of Systems:   Pertinent positives and negatives are stated within HPI, all other systems reviewed and are negative.        --------------------------------------------- PAST HISTORY ---------------------------------------------  Past Medical History:  has a past medical history of CHF (congestive heart failure) (Multi), Diabetes mellitus (Multi), and Hypertension.  Past Surgical History:  has no past surgical history on file.  Social History:  reports that he has been smoking cigarettes. He has a 20 pack-year smoking history. He has never used smokeless tobacco. He reports current alcohol use of about 12.0 standard  drinks of alcohol per week. He reports that he does not use drugs.  Family History: family history is not on file.. Unless otherwise noted, family history is non contributory  The patient’s home medications have been reviewed.  Allergies: Bee sting kit and Tramadol        ---------------------------------------------------PHYSICAL EXAM--------------------------------------    GENERAL APPEARANCE: Awake and alert.   VITAL SIGNS: As per the nurses' triage record.  Blood pressure elevated  HEENT: Normocephalic, atraumatic. Extraocular muscles are intact. Conjunctiva are pink. Negative scleral icterus. Mucous membranes are moist. Tongue in the midline. Pharynx was without erythema or exudates, uvula midline  NECK: Soft Nontender and supple, full gross ROM, no meningeal signs.  CHEST: Nontender to palpation. Clear to auscultation bilaterally. No rales, rhonchi, or wheezing.   HEART: S1, S2. Regular rate and rhythm. No murmurs, gallops or rubs.  Strong and equal pulses in the extremities.   ABDOMEN: Soft, nontender, nondistended, positive bowel sounds, no palpable masses.  MUSCULCSKELETAL: Right lower extremity: Pedal pulse strong and intact.  Edema noted to the foot mild erythema to the toes and dorsum to the foot.  Blanchable.  Ulceration noted to the dorsum of the foot no purulent drainage no odor nontender to palpation.  Wound VAC intact to the heel draining a serosanguineous purulent drainage.  Dressing intact to the right lateral leg no erythema or pain with palpation no drainage no lymphangitic streaking.  No calf redness warmth or tenderness noted.  Able to bend and extend the knee no difficulty.  Moving all extremities no difficulty sensation intact light able to form a straight leg with no difficulty  NEUROLOGICAL: Awake, alert and oriented x 3.  IMMUNOLOGICAL: No lymphatic streaking noted   DERM: No petechiae,, or ecchymoses.          ------------------------- NURSING NOTES AND VITALS REVIEWED  "---------------------------  The nursing notes within the ED encounter and vital signs as below have been reviewed by myself  BP (!) 181/71 (BP Location: Left arm, Patient Position: Lying)   Pulse 73   Temp 36.9 °C (98.4 °F) (Oral)   Resp 15   Ht 1.88 m (6' 2\")   Wt 115 kg (252 lb 8 oz)   SpO2 99%   BMI 32.42 kg/m²     Oxygen Saturation Interpretation: 99% room air      The patient’s available past medical records and past encounters were reviewed.          -----------------------DIAGNOSTIC RESULTS------------------------  LABS:    Labs Reviewed   CBC WITH AUTO DIFFERENTIAL - Abnormal       Result Value    WBC 6.8      nRBC 0.0      RBC 3.21 (*)     Hemoglobin 9.0 (*)     Hematocrit 27.0 (*)     MCV 84      MCH 28.0      MCHC 33.3      RDW 13.7      Platelets 163      Neutrophils % 68.6      Immature Granulocytes %, Automated 0.4      Lymphocytes % 17.0      Monocytes % 9.3      Eosinophils % 4.1      Basophils % 0.6      Neutrophils Absolute 4.67      Immature Granulocytes Absolute, Automated 0.03      Lymphocytes Absolute 1.16 (*)     Monocytes Absolute 0.63      Eosinophils Absolute 0.28      Basophils Absolute 0.04     COMPREHENSIVE METABOLIC PANEL - Abnormal    Glucose 111 (*)     Sodium 135      Potassium 4.3      Chloride 101      Bicarbonate 21 (*)     Urea Nitrogen 17      Creatinine 1.00      eGFR 86      Calcium 9.3      Albumin 3.8      Alkaline Phosphatase 126 (*)     Total Protein 6.8      AST 15      Bilirubin, Total 0.3      ALT 14      Anion Gap 13     BLOOD GAS LACTIC ACID, VENOUS - Normal    POCT Lactate, Venous 0.8     BLOOD CULTURE   BLOOD CULTURE       As interpreted by me, the above displayed labs are abnormal. All other labs obtained during this visit were within normal range or not returned as of this dictation.      EKG Interpretation        No orders to display           No orders to display           ------------------------------ ED COURSE/MEDICAL DECISION " MAKING----------------------  Medical Decision Making:   Exam: A medically appropriate exam performed, outlined above, given the known history and presentation.    History obtained from: Review of medical record nursing notes patient patient transfer form from nursing home facility      Social Determinants of Health considered during this visit: Currently resides at rehab facility      PAST MEDICAL HISTORY/Chronic Conditions Affecting Care     has a past medical history of CHF (congestive heart failure) (Multi), Diabetes mellitus (Multi), and Hypertension.       CC/HPI Summary, Social Determinants of health, Records Reviewed, DDx, testing done/not done, ED Course, Reassessment, disposition considerations/shared decision making with patient, consults, disposition:   Presents with right foot swelling increased redness plan  Remove dressing  Normal saline  Blood cultures  Lactic acid  CBC  CMP    Medical Decision Making/Differential Diagnosis:  Differentials include but not limited to cellulitis versus abscess versus peripheral edema versus management function  Review  Glucose 111  Electrolytes within normal limits  BUN 17  Creatinine 1  Bicarb 21  Alkaline phos 126  Otherwise LFTs within normal limits  Lactic acid 0.8  Leukocytes 6.8  Patient denies any increase in pain.  Edema noted to the dorsum of the foot.  Wound VAC intact.  After loosening of the dressing edema did spread to the dorsum entirety of the foot.  Neurovascularly intact.  Peripheral pulses strong and intact.  Electrolytes within normal limits with electrolyte imbalance noted.  He is not hypoglycemic.  Blood sugar 111 LFTs within normal limits with alkaline phos 126 elevated.  Normal renal function no elevation of white blood cell count patient's lactic acid is normal hemoglobin 9 improved from baseline no signs of active bleeding discussed case with patient's primary team podiatry after review laboratory data with them clinical evaluation felt patient  was safe to return back to the rehab facility.  They will follow-up with the patient on Tuesday.  Patient agreeable to plan amenable to discharge  Impression open wound of the right foot with complications  Localized right foot swelling  Blood pressure noted be elevated at home and follow-up with primary care to continue to monitor his blood pressure.  CMT for discharge utilized for discharge planning  Patient seen and evaluated with attending physician Dr. Guadalupe   PROCEDURES  Unless otherwise noted below, none      CONSULTS:   None      ED Course as of 05/05/24 1519   Sun May 05, 2024   1446 Discussed case with Dr. Perkins who is on for podiatry however he is at a different office than patient's primary podiatrist.  Recommends talking with the fellow Sonido Bolaños at [phone] if unable to contact the fellow should be able to return back to the nursing home facility as long as he has close follow-up and continue with antibiotics [TB]   1451 Spoke with Dr. Bolaños fellow for patient's primary podiatrist.  Reviewed laboratory data with him.  Reports they will see the patient on Tuesday they follow-up with him weekly.  Felt he was safe to return back to the nursing home facility with close follow-up after review laboratory data .  [TB]      ED Course User Index  [TB] Yazmin STACY Jerald, APRN-CNP         Diagnoses as of 05/05/24 1519   Localized swelling of right foot   Open wound of right foot with complication, initial encounter         This patient has remained hemodynamically stable during their ED course.      Critical Care: none        Counseling:  The emergency provider has spoken with the patient and discussed today’s results, in addition to providing specific details for the plan of care and counseling regarding the diagnosis and prognosis.  Questions are answered at this time and they are agreeable with the plan.         --------------------------------- IMPRESSION AND DISPOSITION  ---------------------------------    IMPRESSION  1. Open wound of right foot with complication, initial encounter    2. Localized swelling of right foot        DISPOSITION  Disposition: Discharge to rehab facility  Patient condition is stable        NOTE: This report was transcribed using voice recognition software. Every effort was made to ensure accuracy; however, inadvertent computerized transcription errors may be present      PATO Rankin  05/05/24 1518       PATO Rankin  05/05/24 1519

## 2024-05-05 NOTE — DISCHARGE INSTRUCTIONS
You are seen the emergency department for foot swelling.  We spoke with the podiatry fellow and reviewed your studies with him today.  At this time we believe you are stable to return home to your rehab facility and continue with your antibiotics as prescribed.  Plan is for outpatient follow-up.  If you have any other concerns, you can return to emergency department for recheck.

## 2024-05-05 NOTE — PROGRESS NOTES
Name: Claude Coley    YOB: 1960    Code Status: FULL CODE    Chief Complaint:  Follow up on cellulitis; etc.....        HPI   63 year old male with medical history of DM, HTN, HLD, CHF and cellulitis.    Patient admitted to [facility] for skilled services on 4/1/24.     Diagnoses as follows:    Cellulitis of right foot; MRSA; therapeutic drug monitoring:   He was evaluated by infectious disease in the hospital.  On vancomycin 750 mg IV Q 12 hours.  Has PICC line in place.  Monitoring vancomycin levels.  Recent vancomycin levels:   4/15/2024 vancomycin random level 27.7 H  4/18/2024 vancomycin random level 25.9 H (range 10-20)  4/29/2024 vancomycin random level 22 H (10-20)  # ##5/1/24 vancomycin random level 23.9 H (10-20)  Nursing is faxing vanco levels to the pharmacy and the pharmacy is determining dosing.   ID doctor is Dr. Saravia fax # [phone]  Labs to be faxed to ID doctor weekly.   No chest pain.  No headaches.  No dizziness.  Patient seen today he is sitting up on the side of his bed.  Calm, cooperative, talkative.  Answers all questions appropriately.  No headaches or dizziness.    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  Discharged on heparin subcutaneous, acetaminophen, Percocet PRN and cyclobenzaprine.  Also on lasix and spironolactone for edema.  Patient had an external fixator intact in lower right foot/ankle and it was removed.  Patient still has a wound vac.  Patient went to [facility] on 4/24/24 and had the following procedures performed:   1. Removal External Fixation Lower Extremity   2. Debridement Wound Lower Extremity   3. Preparation of Graft Site, Excision Split Thickness Skin Graft Lower Extremity, Wound Vac  Application (bilayer skin substitute graft)  Right lower extremity wrapped with ACE wrap.  Had post-op follow up with surgeon/podiatrist earlier today.  He has a follow up appt. Scheduled with Dr. Darryl Perry on 5/9/24 at 10 am.   No complaints of pain today.    Anemia:  On ferrous sulfate 325 mg PO BID  Recent H & H as follows:   4/29/24 H & H 8.1 & 24.1  4/22/24 H & H 9.6 & 28.3  4/15/24 Hgb 9 & hematocrit 26.2   4/2/24 Hgb 8.3 & hematocrit 23.8   No s/s of bleeding.     Benign essential HTN; CHF :  On lisinopril, carvedilol, lasix and aldactone.  Recent /66.  No complaints of headaches.  No chest pain.  No dizziness.     Peripheral vascular disease:  On aspirin, gemfibrozil and atorvastatin.    Type 2 diabetes mellitus:  On metformin.  Not on insulin.     Impaired gait and mobility:  Patient has difficulty ambulating.  At [facility] for skilled serviced PT/OT.  No recent falls reported.                         Reviewed  EMR  Reviewed medical, social, surgical and family history.  Reviewed all current medications and performed medication reconciliation.  Performed prescription drug management.  Reviewed vital signs AND lab results  Reviewed [facility] Documentation  Discussed patient with nursing.                     ROS: 10 point ROS performed; Negative unless noted in HPI.    Medical History  CHF   Diabetes mellitus  Hypertension    Social History  Smoker-- 20.00 pack-year smoking history.   No smokeless tobacco use.  Alcohol use ---12.0 standard drinks of alcohol per week.   No illicit drug use.  Single  Lives alone.    Surgical History   No previous surgeries    Family History  Mother-DM and heart disease  Father-DM      Allergies  Bee Venom  Tramadol    BMP: Date: 4/2/2024 Na 135 K 4.4 Cr 0.97 BUN 18 glucose 96 Ca 8.9 GFR 89  CBC: Date: 4/2/2024 WBC 7.57 Hgb 8.3 hematocrit 23.8 platelets 185  4/2/2024 vancomycin random 24 H (range 10-20)    4/4/2024 vancomycin random 18.2 wnl    BMP: Date:  "4/15/2024 Na 138 K 4.1 Cr 1.12 BUN 22 glucose 78 Ca 8.9 GFR 75  CBC: Date: 4/15/2024 WBC 6.30 Hgb 9 hematocrit 26.2 platelets 167    4/15/2024 vancomycin random level 27.7 H  4/18/2024 vancomycin random level 25.9 H (range 10-20)    BMP: Date: 4/22/2024 Na 142 K 4.1 Cr 1.01 BUN 19 glucose 78 Ca 9 GFR 85  CBC: Date: 4/22/2024 WBC 5.59 Hgb 9.6 hematocrit 28.3 platelets 135  Liver function: Date: 4/22/2024 albumin 3.5 protein 6.3    4/22/2024 vancomycin random level 22.5 H 20    BMP: Date: 4/29/2024 Na 139 K 4 Cr 0.93 BUN 17 glucose 79 Ca 9.2 GFR 93  CBC: Date: 4/29/2024 WBC 5.12 Hgb 8.1 hematocrit 24.1 platelets 173  Liver function: Date: 4/29/2024 ALT 8 AST 13 alkaline phos.  102 bilirubin 0.2 albumin 3.3 protein 6    4/29/2024 vancomycin random level 22 H (10-20)    5/1/24 vancomycin random level 23.9 H (10-20)           Lab Results   Component Value Date    WBC 6.8 05/05/2024    HGB 9.0 (L) 05/05/2024    HCT 27.0 (L) 05/05/2024     05/05/2024    CHOL 211 (H) 07/09/2018    TRIG 273 (H) 07/09/2018    HDL 54 07/09/2018    ALT 14 05/05/2024    AST 15 05/05/2024     05/05/2024    K 4.3 05/05/2024     05/05/2024    CREATININE 1.00 05/05/2024    BUN 17 05/05/2024    CO2 21 (L) 05/05/2024    INR 1.2 01/23/2024    HGBA1C 4.4 03/19/2024             /66   Pulse 62   Temp 36.2 °C (97.2 °F)   Resp 18   Ht 1.88 m (6' 2\")   Wt 118 kg (260 lb)   SpO2 98%   BMI 33.38 kg/m²      Physical Exam  Vitals and nursing note reviewed.   Constitutional:       General: He is awake.      Appearance: Normal appearance.   HENT:      Head: Normocephalic.      Right Ear: External ear normal.      Left Ear: External ear normal.      Nose: Nose normal.      Mouth/Throat:      Mouth: Mucous membranes are moist.      Pharynx: Oropharynx is clear.   Eyes:      Extraocular Movements: Extraocular movements intact.      Conjunctiva/sclera: Conjunctivae normal.      Pupils: Pupils are equal, round, and reactive to light. "   Cardiovascular:      Rate and Rhythm: Normal rate and regular rhythm.      Pulses: Normal pulses.      Heart sounds: Normal heart sounds.   Pulmonary:      Effort: Pulmonary effort is normal.      Breath sounds: Normal breath sounds.   Abdominal:      General: Bowel sounds are normal.      Palpations: Abdomen is soft.   Musculoskeletal:         General: Normal range of motion.      Cervical back: Normal range of motion and neck supple.   Feet:      Comments: Right foot, ankle and lower leg wrapped with ACE wrap;  wound vac in place.   Skin:     General: Skin is warm and dry.   Neurological:      General: No focal deficit present.      Mental Status: He is alert and oriented to person, place, and time. Mental status is at baseline.      Motor: Weakness present.      Gait: Gait abnormal.   Psychiatric:         Attention and Perception: Attention normal.         Mood and Affect: Mood normal.         Speech: Speech normal.         Behavior: Behavior normal. Behavior is cooperative.         Cognition and Memory: Cognition normal.         Judgment: Judgment normal.          Assessment/Plan     BMP, CBC with diff., vancomycin random labs Q Monday    Cellulitis of right foot; MRSA; therapeutic drug monitoring:   He was evaluated by infectious disease in the hospital.  Continue  vancomycin 750 mg IV Q 12 hours-----HOLD for elevated vanco random levels.   Maintain PICC line.  Monitor vancomycin levels.  Nursing is faxing vanco levels to the pharmacy and the pharmacy is determining dosing and when to HOLD vanco.   ID doctor is Dr. Saravia fax # [phone]  Labs are also to be faxed to ID doctor weekly.     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  Continue heparin subcutaneous,  acetaminophen, Percocet PRN and cyclobenzaprine.  Continue lasix and spironolactone for edema.  Continue wound vac.  Right lower extremity wrapped with ACE wrap.  Follow up with surgeon/podiatrist earlier today.  Follow up appt.  with Dr. Darryl Perry on 5/9/24 at 10 am.     Anemia:  Continue ferrous sulfate 325 mg PO BID  Monitor H & H.    Benign essential HTN; CHF :  Continue lisinopril, carvedilol, lasix and aldactone.  Monitor Bps.     Peripheral vascular disease:  Continue aspirin, gemfibrozil and atorvastatin.    Type 2 diabetes mellitus:  Continue metformin.    Impaired gait and mobility:  Patient has difficulty ambulating.  Continue at  [facility] for skilled serviced PT/OT.  Fall prevention strategies.           Problem List Items Addressed This Visit          Cardiac and Vasculature    CHF (congestive heart failure) (Multi)       Endocrine/Metabolic    Diabetes mellitus, type 2 (Multi)       Hematology and Neoplasia    Anemia     Other Visit Diagnoses       MRSA cellulitis of right foot    -  Primary    Therapeutic drug monitoring        Chronic ulcer of right foot with necrosis of muscle (Multi)        S/P skin graft using Integra        Pain of right lower leg        Muscle spasm of right lower extremity        Benign essential HTN        Peripheral vascular disease (CMS-HCC)        Impaired gait and mobility                        Dari Cho, APRN-CNP

## 2024-05-07 ENCOUNTER — NURSING HOME VISIT (OUTPATIENT)
Dept: POST ACUTE CARE | Facility: EXTERNAL LOCATION | Age: 64
End: 2024-05-07
Payer: MEDICARE

## 2024-05-07 DIAGNOSIS — M62.838 MUSCLE SPASM OF RIGHT LOWER EXTREMITY: ICD-10-CM

## 2024-05-07 DIAGNOSIS — L97.513: ICD-10-CM

## 2024-05-07 DIAGNOSIS — Z51.81 THERAPEUTIC DRUG MONITORING: ICD-10-CM

## 2024-05-07 DIAGNOSIS — L03.115 MRSA CELLULITIS OF RIGHT FOOT: ICD-10-CM

## 2024-05-07 DIAGNOSIS — M79.661 PAIN OF RIGHT LOWER LEG: ICD-10-CM

## 2024-05-07 DIAGNOSIS — M25.561 PAIN AND SWELLING OF RIGHT KNEE: Primary | ICD-10-CM

## 2024-05-07 DIAGNOSIS — Z94.5 S/P SKIN GRAFT USING INTEGRA: ICD-10-CM

## 2024-05-07 DIAGNOSIS — K59.00 CONSTIPATION, UNSPECIFIED CONSTIPATION TYPE: ICD-10-CM

## 2024-05-07 DIAGNOSIS — D64.9 ANEMIA, UNSPECIFIED TYPE: ICD-10-CM

## 2024-05-07 DIAGNOSIS — M25.461 PAIN AND SWELLING OF RIGHT KNEE: Primary | ICD-10-CM

## 2024-05-07 DIAGNOSIS — R26.89 IMPAIRED GAIT AND MOBILITY: ICD-10-CM

## 2024-05-07 DIAGNOSIS — B95.62 MRSA CELLULITIS OF RIGHT FOOT: ICD-10-CM

## 2024-05-07 DIAGNOSIS — M10.9 GOUT OF RIGHT KNEE, UNSPECIFIED CAUSE, UNSPECIFIED CHRONICITY: ICD-10-CM

## 2024-05-07 PROCEDURE — 99309 SBSQ NF CARE MODERATE MDM 30: CPT | Performed by: NURSE PRACTITIONER

## 2024-05-07 NOTE — LETTER
I assume primary medical responsibility for this patient, I have reviewed the case history, findings, diagnosis and treatment plan with the resident and agree that the care is reasonable and necessary. This service has been performed by a resident without the presence of a teaching physician under the primary care exception  Stephanie Duarte  10/11/2019     Patient: Claude Coley  : 1960    Encounter Date: 2024    Name: Claude Coley    YOB: 1960    Code Status: FULL CODE    Chief Complaint:  Right knee pain and swelling; etc.....        HPI   63 year old male with medical history of DM, HTN, HLD, CHF and cellulitis.    Patient admitted to [facility] skilled services on 24.     Diagnoses as follows:    Right knee pain and swelling; gout:  Patient's right knee is swollen and warm to touch.  It is also painful.  Has Percocet ordered PRN.  Patient states he has had gout before, he is concerned it could be gout.  Patient seen today he is sitting up on the side of his bed.  Calm, cooperative, talkative.  Answers all questions appropriately.  No headaches or dizziness.    Anemia:  On ferrous sulfate 325 mg PO TID  Recent H & H as follows:    2024 Hgb 8.2  & hematocrit 24.9   24 H & H 8.1 & 24.1  24 H & H 9.6 & 28.3  4/15/24 Hgb 9 & hematocrit 26.2   24 Hgb 8.3 & hematocrit 23.8   No s/s of bleeding.     Cellulitis of right foot; MRSA; therapeutic drug monitoring:   He was evaluated by infectious disease in the hospital.  On vancomycin 750 mg IV Q 12 hours.  Has PICC line in place.  Monitoring vancomycin levels.  Recent vancomycin levels:   4/15/2024 vancomycin random level 27.7 H  2024 vancomycin random level 25.9 H (range 10-20)  2024 vancomycin random level 22 H (10-20)  24 vancomycin random level 23.9 H (10-20)  2024 vancomycin random 15.9 (range 10-20)  2024 vancomycin random 13.9 (range 10-20)  Nursing is faxing vanco levels to the pharmacy and the pharmacy is determining dosing.   ID doctor is Dr. Saravia fax # [phone]  Labs to be faxed to ID doctor weekly.   No chest pain.  No headaches.  No dizziness.    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  Discharged on heparin subcutaneous, acetaminophen, Percocet PRN and cyclobenzaprine.  Also on lasix and spironolactone for edema.  Patient had an external fixator intact in lower right foot/ankle and it was removed.  Patient still has a wound vac.  Patient went to [facility] on 4/24/24 and had the following procedures performed:   1. Removal External Fixation Lower Extremity   2. Debridement Wound Lower Extremity   3. Preparation of Graft Site, Excision Split Thickness Skin Graft Lower Extremity, Wound Vac Application (bilayer skin substitute graft)  Right lower extremity wrapped with ACE wrap.  He has a follow up appt. Scheduled with Dr. Darryl Perry on 5/9/24 at 10 am.   No complaints of pain today.    Impaired gait and mobility:  Patient has difficulty ambulating.  [facility]  No recent falls reported.     Constipation:  On bisacodyl 5 mg PO every day PRN, Colace and miralax.  Patient states he feels like he is constipated, he has not had a BM for days.  No abdominal pain.  No nausea or vomiting.                     Reviewed  EMR  Reviewed medical, social, surgical and family history.  Reviewed all current medications and performed medication reconciliation.  Performed prescription drug management.  Reviewed vital signs AND lab results  Reviewed [facility] Documentation  Discussed patient with nursing.                     ROS: 10 point ROS performed; Negative unless noted in HPI.    Medical History  CHF   Diabetes mellitus  Hypertension    Social History  Smoker-- 20.00 pack-year smoking history.   No smokeless tobacco use.  Alcohol use ---12.0 standard drinks of alcohol per week.   No illicit drug use.  Single  Lives alone.    Surgical History   No previous surgeries    Family History  Mother-DM and heart disease  Father-DM      Allergies  Bee  Venom  Tramadol    BMP: Date: 4/2/2024 Na 135 K 4.4 Cr 0.97 BUN 18 glucose 96 Ca 8.9 GFR 89  CBC: Date: 4/2/2024 WBC 7.57 Hgb 8.3 hematocrit 23.8 platelets 185  4/2/2024 vancomycin random 24 H (range 10-20)    4/4/2024 vancomycin random 18.2 wnl    BMP: Date: 4/15/2024 Na 138 K 4.1 Cr 1.12 BUN 22 glucose 78 Ca 8.9 GFR 75  CBC: Date: 4/15/2024 WBC 6.30 Hgb 9 hematocrit 26.2 platelets 167    4/15/2024 vancomycin random level 27.7 H  4/18/2024 vancomycin random level 25.9 H (range 10-20)    BMP: Date: 4/22/2024 Na 142 K 4.1 Cr 1.01 BUN 19 glucose 78 Ca 9 GFR 85  CBC: Date: 4/22/2024 WBC 5.59 Hgb 9.6 hematocrit 28.3 platelets 135  Liver function: Date: 4/22/2024 albumin 3.5 protein 6.3    4/22/2024 vancomycin random level 22.5 H 20    BMP: Date: 4/29/2024 Na 139 K 4 Cr 0.93 BUN 17 glucose 79 Ca 9.2 GFR 93  CBC: Date: 4/29/2024 WBC 5.12 Hgb 8.1 hematocrit 24.1 platelets 173  Liver function: Date: 4/29/2024 ALT 8 AST 13 alkaline phos.  102 bilirubin 0.2 albumin 3.3 protein 6    4/29/2024 vancomycin random level 22 H (10-20)    5/1/24 vancomycin random level 23.9 H (10-20)    BMP: Date: 5/6/2024 Na 139 K 4.3 Cr 1.15 BUN 20 glucose 72 Ca 9.3 GFR 72  CBC: Date: 5/6/2024 WBC 5.65 Hgb 8.2 hematocrit 24.9 platelets 158  Liver function: Date: 5/6/2024 ALT 14 AST 16 alkaline phos.  111 bilirubin 0.3 albumin 3.6 protein 6.6    5/6/2024 vancomycin random 15.9 (range 10-20)    5/7/2024 vancomycin random 13.9 (range 10-20)       Lab Results   Component Value Date    WBC 6.8 05/05/2024    HGB 9.0 (L) 05/05/2024    HCT 27.0 (L) 05/05/2024     05/05/2024    CHOL 211 (H) 07/09/2018    TRIG 273 (H) 07/09/2018    HDL 54 07/09/2018    ALT 14 05/05/2024    AST 15 05/05/2024     05/05/2024    K 4.3 05/05/2024     05/05/2024    CREATININE 1.00 05/05/2024    BUN 17 05/05/2024    CO2 21 (L) 05/05/2024    INR 1.2 01/23/2024    HGBA1C 4.4 03/19/2024             /59   Pulse 59   Temp 36.5 °C (97.7 °F)   Resp 18   Ht  "1.88 m (6' 2\")   Wt 118 kg (260 lb)   SpO2 98%   BMI 33.38 kg/m²      Physical Exam  Vitals and nursing note reviewed.   Constitutional:       General: He is awake.      Appearance: Normal appearance.   HENT:      Head: Normocephalic.      Right Ear: External ear normal.      Left Ear: External ear normal.      Nose: Nose normal.      Mouth/Throat:      Mouth: Mucous membranes are moist.      Pharynx: Oropharynx is clear.   Eyes:      Extraocular Movements: Extraocular movements intact.      Conjunctiva/sclera: Conjunctivae normal.      Pupils: Pupils are equal, round, and reactive to light.   Cardiovascular:      Rate and Rhythm: Normal rate and regular rhythm.      Pulses: Normal pulses.      Heart sounds: Normal heart sounds.   Pulmonary:      Effort: Pulmonary effort is normal.      Breath sounds: Normal breath sounds.   Abdominal:      General: Bowel sounds are normal.      Palpations: Abdomen is soft.   Musculoskeletal:         General: Normal range of motion.      Cervical back: Normal range of motion and neck supple.   Feet:      Comments: Right foot, ankle and lower leg wrapped with ACE wrap;  wound vac in place.   Skin:     General: Skin is warm and dry.   Neurological:      General: No focal deficit present.      Mental Status: He is alert and oriented to person, place, and time. Mental status is at baseline.      Motor: Weakness present.      Gait: Gait abnormal.   Psychiatric:         Attention and Perception: Attention normal.         Mood and Affect: Mood normal.         Speech: Speech normal.         Behavior: Behavior normal. Behavior is cooperative.         Cognition and Memory: Cognition normal.         Judgment: Judgment normal.          Assessment/Plan    BMP, CBC with diff., vancomycin random labs Q Monday    Right knee pain and swelling; gout:  Ordered x-ray right knee STAT for swelling and pain.  Start allopurinol 100 mg PO every day.  Give colchicine 1.2 mg Po x 1 now then give 0.6 mg x 1 " in one hour.  Check uric acid level tomorrow.  Continue Percocet PRN as ordered.    Anemia:  DC ferrous sulfate 325 mg PO TID  Start ferrous sulfate 325 mg PO QID.  Guaic stools x 3.    Cellulitis of right foot; MRSA; therapeutic drug monitoring:   He was evaluated by infectious disease in the hospital.  Continue vancomycin 750 mg IV Q 12 hours.  Has PICC line in place.  Monitoring vancomycin levels.  Nursing is faxing vanco levels to the pharmacy and the pharmacy is determining dosing.   ID doctor is Dr. Saravia fax # [phone]  Labs to be faxed to ID doctor weekly.     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  Continue heparin subcutaneous, acetaminophen, Percocet PRN and cyclobenzaprine.  Continue lasix and spironolactone for edema.  Patient had an external fixator intact in lower right foot/ankle and it was removed.  Patient still has a wound vac.  Patient went to [facility] on 4/24/24 and had the following procedures performed:   1. Removal External Fixation Lower Extremity   2. Debridement Wound Lower Extremity   3. Preparation of Graft Site, Excision Split Thickness Skin Graft Lower Extremity, Wound Vac Application (bilayer skin substitute graft)  Right lower extremity wrapped with ACE wrap  Follow up appt. with Dr. Darryl Perry on 5/9/24 at 10 am.     Impaired gait and mobility:  Continue at [facility] for skilled serviced PT/OT.  Fall prevention strategies.    Constipation:  DC bisacodyl 5 mg PO every day PRN.  Start bisacodyl 10 mg PO every day PRN.  Continue Colace and miralax.            Problem List Items Addressed This Visit          Hematology and Neoplasia    Anemia     Other Visit Diagnoses       Pain and swelling of right knee    -  Primary    Gout of right knee,  unspecified cause, unspecified chronicity        MRSA cellulitis of right foot        Therapeutic drug monitoring        Chronic ulcer of right foot with necrosis of muscle (Multi)        S/P skin graft using Integra        Pain of right lower leg        Muscle spasm of right lower extremity        Impaired gait and mobility        Constipation, unspecified constipation type                          PATO Morris       Electronically Signed By: PATO Morris   5/9/24  5:20 PM

## 2024-05-09 VITALS
DIASTOLIC BLOOD PRESSURE: 59 MMHG | HEART RATE: 59 BPM | BODY MASS INDEX: 33.37 KG/M2 | WEIGHT: 260 LBS | SYSTOLIC BLOOD PRESSURE: 110 MMHG | TEMPERATURE: 97.7 F | RESPIRATION RATE: 18 BRPM | OXYGEN SATURATION: 98 % | HEIGHT: 74 IN

## 2024-05-09 NOTE — PROGRESS NOTES
Name: Claude Coley    YOB: 1960    Code Status: FULL CODE    Chief Complaint:  Right knee pain and swelling; etc.....        HPI   63 year old male with medical history of DM, HTN, HLD, CHF and cellulitis.    Patient admitted to [facility] for skilled services on 4/1/24.     Diagnoses as follows:    Right knee pain and swelling; gout:  Patient's right knee is swollen and warm to touch.  It is also painful.  Has Percocet ordered PRN.  Patient states he has had gout before, he is concerned it could be gout.  Patient seen today he is sitting up on the side of his bed.  Calm, cooperative, talkative.  Answers all questions appropriately.  No headaches or dizziness.    Anemia:  On ferrous sulfate 325 mg PO TID  Recent H & H as follows:    5/6/2024 Hgb 8.2  & hematocrit 24.9   4/29/24 H & H 8.1 & 24.1  4/22/24 H & H 9.6 & 28.3  4/15/24 Hgb 9 & hematocrit 26.2   4/2/24 Hgb 8.3 & hematocrit 23.8   No s/s of bleeding.     Cellulitis of right foot; MRSA; therapeutic drug monitoring:   He was evaluated by infectious disease in the hospital.  On vancomycin 750 mg IV Q 12 hours.  Has PICC line in place.  Monitoring vancomycin levels.  Recent vancomycin levels:   4/15/2024 vancomycin random level 27.7 H  4/18/2024 vancomycin random level 25.9 H (range 10-20)  4/29/2024 vancomycin random level 22 H (10-20)  5/1/24 vancomycin random level 23.9 H (10-20)  5/6/2024 vancomycin random 15.9 (range 10-20)  5/7/2024 vancomycin random 13.9 (range 10-20)  Nursing is faxing vanco levels to the pharmacy and the pharmacy is determining dosing.   ID doctor is Dr. Saravia fax # [phone]  Labs to be faxed to ID doctor weekly.   No chest pain.  No headaches.  No dizziness.    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  Discharged on heparin subcutaneous, acetaminophen, Percocet PRN and cyclobenzaprine.  Also on lasix and spironolactone for edema.  Patient had an external fixator intact in lower right foot/ankle and it was removed.  Patient still has a wound vac.  Patient went to [facility] on 4/24/24 and had the following procedures performed:   1. Removal External Fixation Lower Extremity   2. Debridement Wound Lower Extremity   3. Preparation of Graft Site, Excision Split Thickness Skin Graft Lower Extremity, Wound Vac Application (bilayer skin substitute graft)  Right lower extremity wrapped with ACE wrap.  He has a follow up appt. Scheduled with Dr. Darryl Perry on 5/9/24 at 10 am.   No complaints of pain today.    Impaired gait and mobility:  Patient has difficulty ambulating.  At [facility] for skilled serviced PT/OT.  No recent falls reported.     Constipation:  On bisacodyl 5 mg PO every day PRN, Colace and miralax.  Patient states he feels like he is constipated, he has not had a BM for days.  No abdominal pain.  No nausea or vomiting.                     Reviewed  EMR  Reviewed medical, social, surgical and family history.  Reviewed all current medications and performed medication reconciliation.  Performed prescription drug management.  Reviewed vital signs AND lab results  Reviewed [facility] Documentation  Discussed patient with nursing.                     ROS: 10 point ROS performed; Negative unless noted in HPI.    Medical History  CHF   Diabetes mellitus  Hypertension    Social History  Smoker-- 20.00 pack-year smoking history.   No smokeless tobacco use.  Alcohol use ---12.0 standard drinks of alcohol per week.   No illicit drug use.  Single  Lives alone.    Surgical History   No previous surgeries    Family History  Mother-DM and heart disease  Father-DM      Allergies  Bee Venom  Tramadol    BMP: Date: 4/2/2024 Na 135 K 4.4 Cr 0.97 BUN 18 glucose 96 Ca 8.9 GFR  "89  CBC: Date: 4/2/2024 WBC 7.57 Hgb 8.3 hematocrit 23.8 platelets 185  4/2/2024 vancomycin random 24 H (range 10-20)    4/4/2024 vancomycin random 18.2 wnl    BMP: Date: 4/15/2024 Na 138 K 4.1 Cr 1.12 BUN 22 glucose 78 Ca 8.9 GFR 75  CBC: Date: 4/15/2024 WBC 6.30 Hgb 9 hematocrit 26.2 platelets 167    4/15/2024 vancomycin random level 27.7 H  4/18/2024 vancomycin random level 25.9 H (range 10-20)    BMP: Date: 4/22/2024 Na 142 K 4.1 Cr 1.01 BUN 19 glucose 78 Ca 9 GFR 85  CBC: Date: 4/22/2024 WBC 5.59 Hgb 9.6 hematocrit 28.3 platelets 135  Liver function: Date: 4/22/2024 albumin 3.5 protein 6.3    4/22/2024 vancomycin random level 22.5 H 20    BMP: Date: 4/29/2024 Na 139 K 4 Cr 0.93 BUN 17 glucose 79 Ca 9.2 GFR 93  CBC: Date: 4/29/2024 WBC 5.12 Hgb 8.1 hematocrit 24.1 platelets 173  Liver function: Date: 4/29/2024 ALT 8 AST 13 alkaline phos.  102 bilirubin 0.2 albumin 3.3 protein 6    4/29/2024 vancomycin random level 22 H (10-20)    5/1/24 vancomycin random level 23.9 H (10-20)    BMP: Date: 5/6/2024 Na 139 K 4.3 Cr 1.15 BUN 20 glucose 72 Ca 9.3 GFR 72  CBC: Date: 5/6/2024 WBC 5.65 Hgb 8.2 hematocrit 24.9 platelets 158  Liver function: Date: 5/6/2024 ALT 14 AST 16 alkaline phos.  111 bilirubin 0.3 albumin 3.6 protein 6.6    5/6/2024 vancomycin random 15.9 (range 10-20)    5/7/2024 vancomycin random 13.9 (range 10-20)       Lab Results   Component Value Date    WBC 6.8 05/05/2024    HGB 9.0 (L) 05/05/2024    HCT 27.0 (L) 05/05/2024     05/05/2024    CHOL 211 (H) 07/09/2018    TRIG 273 (H) 07/09/2018    HDL 54 07/09/2018    ALT 14 05/05/2024    AST 15 05/05/2024     05/05/2024    K 4.3 05/05/2024     05/05/2024    CREATININE 1.00 05/05/2024    BUN 17 05/05/2024    CO2 21 (L) 05/05/2024    INR 1.2 01/23/2024    HGBA1C 4.4 03/19/2024             /59   Pulse 59   Temp 36.5 °C (97.7 °F)   Resp 18   Ht 1.88 m (6' 2\")   Wt 118 kg (260 lb)   SpO2 98%   BMI 33.38 kg/m²      Physical " Exam  Vitals and nursing note reviewed.   Constitutional:       General: He is awake.      Appearance: Normal appearance.   HENT:      Head: Normocephalic.      Right Ear: External ear normal.      Left Ear: External ear normal.      Nose: Nose normal.      Mouth/Throat:      Mouth: Mucous membranes are moist.      Pharynx: Oropharynx is clear.   Eyes:      Extraocular Movements: Extraocular movements intact.      Conjunctiva/sclera: Conjunctivae normal.      Pupils: Pupils are equal, round, and reactive to light.   Cardiovascular:      Rate and Rhythm: Normal rate and regular rhythm.      Pulses: Normal pulses.      Heart sounds: Normal heart sounds.   Pulmonary:      Effort: Pulmonary effort is normal.      Breath sounds: Normal breath sounds.   Abdominal:      General: Bowel sounds are normal.      Palpations: Abdomen is soft.   Musculoskeletal:         General: Normal range of motion.      Cervical back: Normal range of motion and neck supple.   Feet:      Comments: Right foot, ankle and lower leg wrapped with ACE wrap;  wound vac in place.   Skin:     General: Skin is warm and dry.   Neurological:      General: No focal deficit present.      Mental Status: He is alert and oriented to person, place, and time. Mental status is at baseline.      Motor: Weakness present.      Gait: Gait abnormal.   Psychiatric:         Attention and Perception: Attention normal.         Mood and Affect: Mood normal.         Speech: Speech normal.         Behavior: Behavior normal. Behavior is cooperative.         Cognition and Memory: Cognition normal.         Judgment: Judgment normal.          Assessment/Plan     BMP, CBC with diff., vancomycin random labs Q Monday    Right knee pain and swelling; gout:  Ordered x-ray right knee STAT for swelling and pain.  Start allopurinol 100 mg PO every day.  Give colchicine 1.2 mg Po x 1 now then give 0.6 mg x 1 in one hour.  Check uric acid level tomorrow.  Continue Percocet PRN as  ordered.    Anemia:  DC ferrous sulfate 325 mg PO TID  Start ferrous sulfate 325 mg PO QID.  Guaic stools x 3.    Cellulitis of right foot; MRSA; therapeutic drug monitoring:   He was evaluated by infectious disease in the hospital.  Continue vancomycin 750 mg IV Q 12 hours.  Has PICC line in place.  Monitoring vancomycin levels.  Nursing is faxing vanco levels to the pharmacy and the pharmacy is determining dosing.   ID doctor is Dr. Saravia fax # [phone]  Labs to be faxed to ID doctor weekly.     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 ([place], [place]).  Continue heparin subcutaneous, acetaminophen, Percocet PRN and cyclobenzaprine.  Continue lasix and spironolactone for edema.  Patient had an external fixator intact in lower right foot/ankle and it was removed.  Patient still has a wound vac.  Patient went to [facility] ([place], [place]) on 4/24/24 and had the following procedures performed:   1. Removal External Fixation Lower Extremity   2. Debridement Wound Lower Extremity   3. Preparation of Graft Site, Excision Split Thickness Skin Graft Lower Extremity, Wound Vac Application (bilayer skin substitute graft)  Right lower extremity wrapped with ACE wrap  Follow up appt. with Dr. Darryl Perry on 5/9/24 at 10 am.     Impaired gait and mobility:  Continue at [facility] for skilled serviced PT/OT.  Fall prevention strategies.    Constipation:  DC bisacodyl 5 mg PO every day PRN.  Start bisacodyl 10 mg PO every day PRN.  Continue Colace and miralax.            Problem List Items Addressed This Visit          Hematology and Neoplasia    Anemia     Other Visit Diagnoses       Pain and swelling of right knee    -  Primary    Gout of right knee, unspecified cause, unspecified chronicity        MRSA cellulitis of  right foot        Therapeutic drug monitoring        Chronic ulcer of right foot with necrosis of muscle (Multi)        S/P skin graft using Integra        Pain of right lower leg        Muscle spasm of right lower extremity        Impaired gait and mobility        Constipation, unspecified constipation type                          Dari Cho, APRN-CNP

## 2024-05-10 LAB
BACTERIA BLD CULT: NORMAL
BACTERIA BLD CULT: NORMAL

## 2024-06-27 ASSESSMENT — PAIN SCALES - GENERAL: PAIN_LEVEL: 0

## 2024-06-27 NOTE — ANESTHESIA POSTPROCEDURE EVALUATION
Patient: Claude Coley    Procedure Summary       Date: 01/26/24 Room / Location: [facility] OR 03 / [facility]    Anesthesia Start: 1225 Anesthesia Stop: 1350    Procedure: Partial 5th Ray Resection; Debridement of Nonviable Soft Tissue; Incision of Bone Cortex; Application of Graft; Application of Wound Vac (Right: Foot) Diagnosis:       Gas gangrene of foot (Multi)      (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.0])    Surgeons: Sonido Bolaños DPM Responsible Provider: LUTHER Burger    Anesthesia Type: general ASA Status: 3            Anesthesia Type: general    Vitals Value Taken Time   /60 01/26/24 1355   Temp 36.2 °C (97.2 °F) 01/26/24 1350   Pulse 64 01/26/24 1355   Resp 21 01/26/24 1355   SpO2 98 % 01/26/24 1355       Anesthesia Post Evaluation    Patient location during evaluation: PACU  Patient participation: complete - patient participated  Level of consciousness: awake  Pain score: 0  Pain management: adequate  Multimodal analgesia pain management approach  Airway patency: patent  Two or more strategies used to mitigate risk of obstructive sleep apnea  Cardiovascular status: acceptable  Respiratory status: acceptable  Hydration status: acceptable  Postoperative Nausea and Vomiting: none    There were no known notable events for this encounter.

## 2024-07-11 ENCOUNTER — APPOINTMENT (OUTPATIENT)
Dept: RADIOLOGY | Facility: HOSPITAL | Age: 64
DRG: 464 | End: 2024-07-11
Payer: MEDICARE

## 2024-07-11 ENCOUNTER — HOSPITAL ENCOUNTER (INPATIENT)
Facility: HOSPITAL | Age: 64
DRG: 464 | End: 2024-07-11
Attending: INTERNAL MEDICINE | Admitting: INTERNAL MEDICINE
Payer: MEDICARE

## 2024-07-11 ENCOUNTER — APPOINTMENT (OUTPATIENT)
Dept: CARDIOLOGY | Facility: HOSPITAL | Age: 64
DRG: 464 | End: 2024-07-11
Payer: MEDICARE

## 2024-07-11 DIAGNOSIS — L97.519 TYPE 2 DIABETES MELLITUS WITH RIGHT DIABETIC FOOT ULCER (MULTI): ICD-10-CM

## 2024-07-11 DIAGNOSIS — L97.512 NON-PRESSURE CHRONIC ULCER OF OTHER PART OF RIGHT FOOT WITH FAT LAYER EXPOSED (MULTI): ICD-10-CM

## 2024-07-11 DIAGNOSIS — E11.621 TYPE 2 DIABETES MELLITUS WITH RIGHT DIABETIC FOOT ULCER (MULTI): ICD-10-CM

## 2024-07-11 DIAGNOSIS — M86.9: Primary | ICD-10-CM

## 2024-07-11 DIAGNOSIS — L03.115 CELLULITIS OF RIGHT FOOT: ICD-10-CM

## 2024-07-11 LAB
ALBUMIN SERPL-MCNC: 3.7 G/DL (ref 3.5–5)
ALP BLD-CCNC: 119 U/L (ref 35–125)
ALT SERPL-CCNC: 11 U/L (ref 5–40)
ANION GAP SERPL CALC-SCNC: 13 MMOL/L
APPEARANCE UR: CLEAR
AST SERPL-CCNC: 15 U/L (ref 5–40)
BASOPHILS # BLD AUTO: 0.05 X10*3/UL (ref 0–0.1)
BASOPHILS NFR BLD AUTO: 0.6 %
BILIRUB SERPL-MCNC: <0.2 MG/DL (ref 0.1–1.2)
BILIRUB UR STRIP.AUTO-MCNC: NEGATIVE MG/DL
BUN SERPL-MCNC: 51 MG/DL (ref 8–25)
CALCIUM SERPL-MCNC: 9.5 MG/DL (ref 8.5–10.4)
CHLORIDE SERPL-SCNC: 98 MMOL/L (ref 97–107)
CO2 SERPL-SCNC: 21 MMOL/L (ref 24–31)
COLOR UR: NORMAL
CREAT SERPL-MCNC: 1.5 MG/DL (ref 0.4–1.6)
EGFRCR SERPLBLD CKD-EPI 2021: 53 ML/MIN/1.73M*2
EOSINOPHIL # BLD AUTO: 0.45 X10*3/UL (ref 0–0.7)
EOSINOPHIL NFR BLD AUTO: 5.6 %
ERYTHROCYTE [DISTWIDTH] IN BLOOD BY AUTOMATED COUNT: 15.9 % (ref 11.5–14.5)
GLUCOSE BLD MANUAL STRIP-MCNC: 94 MG/DL (ref 74–99)
GLUCOSE SERPL-MCNC: 112 MG/DL (ref 65–99)
GLUCOSE UR STRIP.AUTO-MCNC: NORMAL MG/DL
HCT VFR BLD AUTO: 29.1 % (ref 41–52)
HGB BLD-MCNC: 9.8 G/DL (ref 13.5–17.5)
IMM GRANULOCYTES # BLD AUTO: 0.1 X10*3/UL (ref 0–0.7)
IMM GRANULOCYTES NFR BLD AUTO: 1.2 % (ref 0–0.9)
KETONES UR STRIP.AUTO-MCNC: NEGATIVE MG/DL
LACTATE BLDV-SCNC: 0.7 MMOL/L (ref 0.4–2)
LEUKOCYTE ESTERASE UR QL STRIP.AUTO: NEGATIVE
LYMPHOCYTES # BLD AUTO: 1.43 X10*3/UL (ref 1.2–4.8)
LYMPHOCYTES NFR BLD AUTO: 17.8 %
MCH RBC QN AUTO: 27.3 PG (ref 26–34)
MCHC RBC AUTO-ENTMCNC: 33.7 G/DL (ref 32–36)
MCV RBC AUTO: 81 FL (ref 80–100)
MONOCYTES # BLD AUTO: 0.6 X10*3/UL (ref 0.1–1)
MONOCYTES NFR BLD AUTO: 7.5 %
NEUTROPHILS # BLD AUTO: 5.4 X10*3/UL (ref 1.2–7.7)
NEUTROPHILS NFR BLD AUTO: 67.3 %
NITRITE UR QL STRIP.AUTO: NEGATIVE
NRBC BLD-RTO: 0 /100 WBCS (ref 0–0)
PH UR STRIP.AUTO: 5 [PH]
PLATELET # BLD AUTO: 203 X10*3/UL (ref 150–450)
POTASSIUM SERPL-SCNC: 5.1 MMOL/L (ref 3.4–5.1)
PROT SERPL-MCNC: 7.2 G/DL (ref 5.9–7.9)
PROT UR STRIP.AUTO-MCNC: NEGATIVE MG/DL
RBC # BLD AUTO: 3.59 X10*6/UL (ref 4.5–5.9)
RBC # UR STRIP.AUTO: NEGATIVE /UL
SODIUM SERPL-SCNC: 132 MMOL/L (ref 133–145)
SP GR UR STRIP.AUTO: 1.01
UROBILINOGEN UR STRIP.AUTO-MCNC: NORMAL MG/DL
WBC # BLD AUTO: 8 X10*3/UL (ref 4.4–11.3)

## 2024-07-11 PROCEDURE — 96365 THER/PROPH/DIAG IV INF INIT: CPT

## 2024-07-11 PROCEDURE — 71045 X-RAY EXAM CHEST 1 VIEW: CPT

## 2024-07-11 PROCEDURE — 85652 RBC SED RATE AUTOMATED: CPT | Performed by: EMERGENCY MEDICINE

## 2024-07-11 PROCEDURE — 83605 ASSAY OF LACTIC ACID: CPT | Performed by: NURSE PRACTITIONER

## 2024-07-11 PROCEDURE — 96367 TX/PROPH/DG ADDL SEQ IV INF: CPT

## 2024-07-11 PROCEDURE — 86140 C-REACTIVE PROTEIN: CPT | Performed by: EMERGENCY MEDICINE

## 2024-07-11 PROCEDURE — 81003 URINALYSIS AUTO W/O SCOPE: CPT | Performed by: NURSE PRACTITIONER

## 2024-07-11 PROCEDURE — 1200000002 HC GENERAL ROOM WITH TELEMETRY DAILY

## 2024-07-11 PROCEDURE — 2500000004 HC RX 250 GENERAL PHARMACY W/ HCPCS (ALT 636 FOR OP/ED): Performed by: NURSE PRACTITIONER

## 2024-07-11 PROCEDURE — 96366 THER/PROPH/DIAG IV INF ADDON: CPT

## 2024-07-11 PROCEDURE — 87040 BLOOD CULTURE FOR BACTERIA: CPT | Mod: TRILAB | Performed by: NURSE PRACTITIONER

## 2024-07-11 PROCEDURE — 84075 ASSAY ALKALINE PHOSPHATASE: CPT | Performed by: NURSE PRACTITIONER

## 2024-07-11 PROCEDURE — 71045 X-RAY EXAM CHEST 1 VIEW: CPT | Performed by: RADIOLOGY

## 2024-07-11 PROCEDURE — 2500000002 HC RX 250 W HCPCS SELF ADMINISTERED DRUGS (ALT 637 FOR MEDICARE OP, ALT 636 FOR OP/ED): Performed by: INTERNAL MEDICINE

## 2024-07-11 PROCEDURE — 36415 COLL VENOUS BLD VENIPUNCTURE: CPT | Performed by: NURSE PRACTITIONER

## 2024-07-11 PROCEDURE — 73630 X-RAY EXAM OF FOOT: CPT | Mod: RT

## 2024-07-11 PROCEDURE — 73630 X-RAY EXAM OF FOOT: CPT | Mod: RIGHT SIDE | Performed by: RADIOLOGY

## 2024-07-11 PROCEDURE — 73700 CT LOWER EXTREMITY W/O DYE: CPT | Mod: RT

## 2024-07-11 PROCEDURE — 2500000001 HC RX 250 WO HCPCS SELF ADMINISTERED DRUGS (ALT 637 FOR MEDICARE OP): Performed by: INTERNAL MEDICINE

## 2024-07-11 PROCEDURE — 83735 ASSAY OF MAGNESIUM: CPT | Performed by: INTERNAL MEDICINE

## 2024-07-11 PROCEDURE — 82947 ASSAY GLUCOSE BLOOD QUANT: CPT

## 2024-07-11 PROCEDURE — 99285 EMERGENCY DEPT VISIT HI MDM: CPT | Mod: 25

## 2024-07-11 PROCEDURE — 2500000004 HC RX 250 GENERAL PHARMACY W/ HCPCS (ALT 636 FOR OP/ED): Performed by: INTERNAL MEDICINE

## 2024-07-11 PROCEDURE — 85025 COMPLETE CBC W/AUTO DIFF WBC: CPT | Performed by: NURSE PRACTITIONER

## 2024-07-11 PROCEDURE — 93005 ELECTROCARDIOGRAM TRACING: CPT

## 2024-07-11 RX ORDER — POLYETHYLENE GLYCOL 3350 17 G/17G
17 POWDER, FOR SOLUTION ORAL DAILY
Status: DISCONTINUED | OUTPATIENT
Start: 2024-07-11 | End: 2024-07-16 | Stop reason: HOSPADM

## 2024-07-11 RX ORDER — VANCOMYCIN 2 G/400ML
2 INJECTION, SOLUTION INTRAVENOUS ONCE
Status: COMPLETED | OUTPATIENT
Start: 2024-07-11 | End: 2024-07-11

## 2024-07-11 RX ORDER — VANCOMYCIN 1.5 G/300ML
1500 INJECTION, SOLUTION INTRAVENOUS EVERY 24 HOURS
Status: DISCONTINUED | OUTPATIENT
Start: 2024-07-12 | End: 2024-07-12

## 2024-07-11 RX ORDER — FUROSEMIDE 20 MG/1
20 TABLET ORAL DAILY
Status: CANCELLED | OUTPATIENT
Start: 2024-07-11

## 2024-07-11 RX ORDER — DEXTROSE 50 % IN WATER (D50W) INTRAVENOUS SYRINGE
12.5
Status: DISCONTINUED | OUTPATIENT
Start: 2024-07-11 | End: 2024-07-16 | Stop reason: HOSPADM

## 2024-07-11 RX ORDER — ATORVASTATIN CALCIUM 40 MG/1
40 TABLET, FILM COATED ORAL NIGHTLY
Status: DISCONTINUED | OUTPATIENT
Start: 2024-07-11 | End: 2024-07-16 | Stop reason: HOSPADM

## 2024-07-11 RX ORDER — OLANZAPINE 10 MG/1
20 TABLET ORAL NIGHTLY
Status: DISCONTINUED | OUTPATIENT
Start: 2024-07-11 | End: 2024-07-16 | Stop reason: HOSPADM

## 2024-07-11 RX ORDER — FINASTERIDE 5 MG/1
5 TABLET, FILM COATED ORAL DAILY
Status: DISCONTINUED | OUTPATIENT
Start: 2024-07-11 | End: 2024-07-16 | Stop reason: HOSPADM

## 2024-07-11 RX ORDER — INSULIN LISPRO 100 [IU]/ML
0-10 INJECTION, SOLUTION INTRAVENOUS; SUBCUTANEOUS
Status: DISCONTINUED | OUTPATIENT
Start: 2024-07-11 | End: 2024-07-16 | Stop reason: HOSPADM

## 2024-07-11 RX ORDER — PANTOPRAZOLE SODIUM 40 MG/1
40 TABLET, DELAYED RELEASE ORAL
Status: DISCONTINUED | OUTPATIENT
Start: 2024-07-12 | End: 2024-07-16 | Stop reason: HOSPADM

## 2024-07-11 RX ORDER — CARVEDILOL 12.5 MG/1
12.5 TABLET ORAL
Status: DISCONTINUED | OUTPATIENT
Start: 2024-07-11 | End: 2024-07-16 | Stop reason: HOSPADM

## 2024-07-11 RX ORDER — VANCOMYCIN HYDROCHLORIDE 1 G/20ML
INJECTION, POWDER, LYOPHILIZED, FOR SOLUTION INTRAVENOUS DAILY PRN
Status: DISCONTINUED | OUTPATIENT
Start: 2024-07-11 | End: 2024-07-16 | Stop reason: HOSPADM

## 2024-07-11 RX ORDER — ACETAMINOPHEN 325 MG/1
650 TABLET ORAL EVERY 6 HOURS PRN
Status: DISCONTINUED | OUTPATIENT
Start: 2024-07-11 | End: 2024-07-16 | Stop reason: HOSPADM

## 2024-07-11 RX ORDER — POLYETHYLENE GLYCOL 3350 17 G/17G
17 POWDER, FOR SOLUTION ORAL DAILY
Status: DISCONTINUED | OUTPATIENT
Start: 2024-07-11 | End: 2024-07-11

## 2024-07-11 RX ORDER — GEMFIBROZIL 600 MG/1
600 TABLET, FILM COATED ORAL
Status: DISCONTINUED | OUTPATIENT
Start: 2024-07-12 | End: 2024-07-16 | Stop reason: HOSPADM

## 2024-07-11 RX ORDER — DEXTROSE 50 % IN WATER (D50W) INTRAVENOUS SYRINGE
25
Status: DISCONTINUED | OUTPATIENT
Start: 2024-07-11 | End: 2024-07-14

## 2024-07-11 SDOH — HEALTH STABILITY: MENTAL HEALTH: HOW MANY STANDARD DRINKS CONTAINING ALCOHOL DO YOU HAVE ON A TYPICAL DAY?: PATIENT DOES NOT DRINK

## 2024-07-11 SDOH — SOCIAL STABILITY: SOCIAL INSECURITY: HAVE YOU HAD ANY THOUGHTS OF HARMING ANYONE ELSE?: NO

## 2024-07-11 SDOH — SOCIAL STABILITY: SOCIAL INSECURITY: ARE THERE ANY APPARENT SIGNS OF INJURIES/BEHAVIORS THAT COULD BE RELATED TO ABUSE/NEGLECT?: NO

## 2024-07-11 SDOH — HEALTH STABILITY: MENTAL HEALTH: HOW OFTEN DO YOU HAVE 6 OR MORE DRINKS ON ONE OCCASION?: NEVER

## 2024-07-11 SDOH — SOCIAL STABILITY: SOCIAL INSECURITY: DO YOU FEEL ANYONE HAS EXPLOITED OR TAKEN ADVANTAGE OF YOU FINANCIALLY OR OF YOUR PERSONAL PROPERTY?: NO

## 2024-07-11 SDOH — SOCIAL STABILITY: SOCIAL INSECURITY: WERE YOU ABLE TO COMPLETE ALL THE BEHAVIORAL HEALTH SCREENINGS?: YES

## 2024-07-11 SDOH — SOCIAL STABILITY: SOCIAL INSECURITY: HAVE YOU HAD THOUGHTS OF HARMING ANYONE ELSE?: NO

## 2024-07-11 SDOH — SOCIAL STABILITY: SOCIAL INSECURITY: HAS ANYONE EVER THREATENED TO HURT YOUR FAMILY OR YOUR PETS?: NO

## 2024-07-11 SDOH — SOCIAL STABILITY: SOCIAL INSECURITY: DOES ANYONE TRY TO KEEP YOU FROM HAVING/CONTACTING OTHER FRIENDS OR DOING THINGS OUTSIDE YOUR HOME?: NO

## 2024-07-11 SDOH — HEALTH STABILITY: MENTAL HEALTH: HOW OFTEN DO YOU HAVE A DRINK CONTAINING ALCOHOL?: NEVER

## 2024-07-11 SDOH — SOCIAL STABILITY: SOCIAL INSECURITY: DO YOU FEEL UNSAFE GOING BACK TO THE PLACE WHERE YOU ARE LIVING?: NO

## 2024-07-11 SDOH — SOCIAL STABILITY: SOCIAL INSECURITY: ABUSE: ADULT

## 2024-07-11 SDOH — SOCIAL STABILITY: SOCIAL INSECURITY: ARE YOU OR HAVE YOU BEEN THREATENED OR ABUSED PHYSICALLY, EMOTIONALLY, OR SEXUALLY BY ANYONE?: NO

## 2024-07-11 ASSESSMENT — ACTIVITIES OF DAILY LIVING (ADL)
BATHING: INDEPENDENT
FEEDING YOURSELF: INDEPENDENT
JUDGMENT_ADEQUATE_SAFELY_COMPLETE_DAILY_ACTIVITIES: YES
WALKS IN HOME: INDEPENDENT
DRESSING YOURSELF: INDEPENDENT
LACK_OF_TRANSPORTATION: NO
GROOMING: INDEPENDENT
HEARING - LEFT EAR: FUNCTIONAL
HEARING - RIGHT EAR: FUNCTIONAL
ADEQUATE_TO_COMPLETE_ADL: YES
TOILETING: INDEPENDENT
PATIENT'S MEMORY ADEQUATE TO SAFELY COMPLETE DAILY ACTIVITIES?: YES

## 2024-07-11 ASSESSMENT — PATIENT HEALTH QUESTIONNAIRE - PHQ9
1. LITTLE INTEREST OR PLEASURE IN DOING THINGS: NOT AT ALL
2. FEELING DOWN, DEPRESSED OR HOPELESS: NOT AT ALL
SUM OF ALL RESPONSES TO PHQ9 QUESTIONS 1 & 2: 0

## 2024-07-11 ASSESSMENT — COGNITIVE AND FUNCTIONAL STATUS - GENERAL
MOBILITY SCORE: 22
CLIMB 3 TO 5 STEPS WITH RAILING: A LITTLE
PATIENT BASELINE BEDBOUND: NO
DAILY ACTIVITIY SCORE: 24
WALKING IN HOSPITAL ROOM: A LITTLE

## 2024-07-11 ASSESSMENT — PAIN SCALES - GENERAL
PAINLEVEL_OUTOF10: 2
PAINLEVEL_OUTOF10: 4

## 2024-07-11 ASSESSMENT — LIFESTYLE VARIABLES
SKIP TO QUESTIONS 9-10: 1
SKIP TO QUESTIONS 9-10: 1
AUDIT-C TOTAL SCORE: 0
HOW MANY STANDARD DRINKS CONTAINING ALCOHOL DO YOU HAVE ON A TYPICAL DAY: PATIENT DOES NOT DRINK
HOW OFTEN DO YOU HAVE A DRINK CONTAINING ALCOHOL: NEVER
HOW OFTEN DO YOU HAVE 6 OR MORE DRINKS ON ONE OCCASION: NEVER
AUDIT-C TOTAL SCORE: 0
AUDIT-C TOTAL SCORE: 0
SUBSTANCE_ABUSE_PAST_12_MONTHS: NO
PRESCIPTION_ABUSE_PAST_12_MONTHS: NO

## 2024-07-11 ASSESSMENT — PAIN DESCRIPTION - ORIENTATION: ORIENTATION: RIGHT

## 2024-07-11 ASSESSMENT — ENCOUNTER SYMPTOMS
PSYCHIATRIC NEGATIVE: 1
MUSCULOSKELETAL NEGATIVE: 1
ALLERGIC/IMMUNOLOGIC NEGATIVE: 1
CARDIOVASCULAR NEGATIVE: 1
GASTROINTESTINAL NEGATIVE: 1
CONSTITUTIONAL NEGATIVE: 1
NEUROLOGICAL NEGATIVE: 1
EYES NEGATIVE: 1
RESPIRATORY NEGATIVE: 1
ENDOCRINE NEGATIVE: 1
HEMATOLOGIC/LYMPHATIC NEGATIVE: 1

## 2024-07-11 ASSESSMENT — PAIN DESCRIPTION - PAIN TYPE: TYPE: ACUTE PAIN

## 2024-07-11 ASSESSMENT — COLUMBIA-SUICIDE SEVERITY RATING SCALE - C-SSRS
6. HAVE YOU EVER DONE ANYTHING, STARTED TO DO ANYTHING, OR PREPARED TO DO ANYTHING TO END YOUR LIFE?: NO
6. HAVE YOU EVER DONE ANYTHING, STARTED TO DO ANYTHING, OR PREPARED TO DO ANYTHING TO END YOUR LIFE?: NO
2. HAVE YOU ACTUALLY HAD ANY THOUGHTS OF KILLING YOURSELF?: NO
1. IN THE PAST MONTH, HAVE YOU WISHED YOU WERE DEAD OR WISHED YOU COULD GO TO SLEEP AND NOT WAKE UP?: NO
1. IN THE PAST MONTH, HAVE YOU WISHED YOU WERE DEAD OR WISHED YOU COULD GO TO SLEEP AND NOT WAKE UP?: NO
2. HAVE YOU ACTUALLY HAD ANY THOUGHTS OF KILLING YOURSELF?: NO

## 2024-07-11 ASSESSMENT — PAIN DESCRIPTION - LOCATION: LOCATION: FOOT

## 2024-07-11 ASSESSMENT — PAIN DESCRIPTION - ONSET: ONSET: SUDDEN

## 2024-07-11 ASSESSMENT — PAIN DESCRIPTION - FREQUENCY: FREQUENCY: INTERMITTENT

## 2024-07-11 ASSESSMENT — PAIN - FUNCTIONAL ASSESSMENT
PAIN_FUNCTIONAL_ASSESSMENT: 0-10
PAIN_FUNCTIONAL_ASSESSMENT: 0-10

## 2024-07-11 ASSESSMENT — PAIN DESCRIPTION - PROGRESSION: CLINICAL_PROGRESSION: NOT CHANGED

## 2024-07-11 ASSESSMENT — PAIN DESCRIPTION - DESCRIPTORS: DESCRIPTORS: BURNING

## 2024-07-11 NOTE — H&P
History Of Present Illness  Claude Coley is a 63 y.o. male presenting with right foot infection.  This patient has had longstanding history of Charcot foot in the right foot and has been followed on a basically weekly basis by podiatry Dr. Perry.  Wound was getting worse and for that reason he was instructed to come the hospital for evaluation.  Here x-ray showed osteomyelitis and upon taking down of his dressing he started to bleed from the wound which is now better although not completely stopped.  He admitted to generalized malaise but denied fever chills denied nausea vomiting abdominal pain shortness of breath or any other symptoms.  Admission was requested for worsening wound infection with osteomyelitis.     Past Medical History  He has a past medical history of CHF (congestive heart failure) (Multi), Diabetes mellitus (Multi), and Hypertension.  He also has history of schizophrenia for which he is on disability  Reviewed  Surgical History  He has no past surgical history on file.  Reviewed  Social History  He reports that he has been smoking cigarettes. He has a 20 pack-year smoking history. He has never used smokeless tobacco. He reports current alcohol use of about 12.0 standard drinks of alcohol per week. He reports that he does not use drugs.  Reviewed  Family History  No family history on file.     Allergies  Bee sting kit and Tramadol    ROS  Review of Systems   Constitutional: Negative.    HENT: Negative.     Eyes: Negative.    Respiratory: Negative.     Cardiovascular: Negative.    Gastrointestinal: Negative.    Endocrine: Negative.    Genitourinary: Negative.    Musculoskeletal: Negative.    Skin: Negative.    Allergic/Immunologic: Negative.    Neurological: Negative.    Hematological: Negative.    Psychiatric/Behavioral: Negative.     All other systems reviewed and are negative.       Last Recorded Vitals  /75 (BP Location: Left arm, Patient Position: Lying)   Pulse 73   Temp 36.9 °C  (98.4 °F) (Oral)   Resp 15   Wt 106 kg (233 lb 7.5 oz)   SpO2 99%     Physical Exam  I saw this patient emergency room bed #21.  This is a  male who is alert Marcus x 3 cooperative no distress  Normocephalic/atraumatic EOMI PERRLA  Chest clear  Heart regular distant heart sounds  Abdomen soft nontender  Extremities there is no peripheral edema there is a wound in his right foot on the lateral sides slightly oozing of blood.  There is a new dressing applied.  There is no crepitus there is very mild if any surrounding cellulitis  Neurologic exam is gross muscles are nonfocal  Psych he is not acutely psychotic    Relevant Results  BUN slight elevated potassium slightly evaded creatinine slightly elevated WBC count 8.0 hemoglobin 9.1 x-ray showing osteomyelitis with bone destruction no gas    ASSESSMENT/PLAN  Assessment/Plan   Principal Problem:    Osteomyelitis of fourth toe of right foot (Multi)    July 11    Wound infection of right foot with osteomyelitis   broad-spectrum antibiotics check CT of the foot ID consult podiatry consult I spoke with the podiatrist myself who is very familiar with the patient  Dehydration   he was given fluids in ER already we will hold off on his regular diuretics  History of congestive heart failure see above per podiatry request we will get cardiology clearance for potential surgery  Questionable sepsis    this point I do not feel that he is acutely septic  Diabetes mellitus type 2 diabetic diet sliding scale  Schizophrenia continue Zyprexa  DVT prophylaxis will hold off on heparin for now due to bleeding from wound and for that reason we will hold aspirin as well         Connie Neumann MD

## 2024-07-11 NOTE — PROGRESS NOTES
Vancomycin Dosing by Pharmacy- INITIAL    Claude Coley is a 63 y.o. year old male who Pharmacy has been consulted for vancomycin dosing for osteomyelitis/septic arthritis. Based on the patient's indication and renal status this patient will be dosed based on a goal AUC of 400-600.     Renal function is currently declining.    Visit Vitals  /75 (BP Location: Left arm, Patient Position: Lying)   Pulse 73   Temp 36.9 °C (98.4 °F) (Oral)   Resp 15        Lab Results   Component Value Date    CREATININE 1.50 2024    CREATININE 1.00 2024    CREATININE 1.00 2024    CREATININE 0.90 2024        Patient weight is as follows:   Vitals:    24 1516   Weight: 106 kg (233 lb 7.5 oz)       Cultures:  No results found for the encounter in last 14 days.        No intake/output data recorded.  I/O during current shift:  I/O this shift:  In: 1100 [IV Piggyback:1100]  Out: -     Temp (24hrs), Av.8 °C (98.3 °F), Min:36.8 °C (98.2 °F), Max:36.9 °C (98.4 °F)         Assessment/Plan     Patient has already been given a loading dose of 2000 mg in E.D. 24 @ 1535hrs.  Will initiate vancomycin maintenance,  1500 mg every 24 hours.    This dosing regimen is predicted by [employer] to result in the following pharmacokinetic parameters:    Loading dose: N/A  Regimen: 1500 mg IV every 24 hours.  Start time: 04:07 on 2024  Exposure target: AUC24 (range)400-600 mg/L.hr   AUC24,ss: 470 mg/L.hr  Probability of AUC24 > 400: 67 %  Ctrough,ss: 14 mg/L  Probability of Ctrough,ss > 20: 22 %  Probability of nephrotoxicity (Lodise ADRYAN ): 9 %      Follow-up level will be ordered on 24 at AM labs unless clinically indicated sooner.  Will continue to monitor renal function daily while on vancomycin and order serum creatinine at least every 48 hours if not already ordered.  Follow for continued vancomycin needs, clinical response, and signs/symptoms of toxicity.       Nikolai Scott, PharmD

## 2024-07-11 NOTE — ED PROVIDER NOTES
HPI   Chief Complaint   Patient presents with    foot infection     Today I took my bandage off today and it smells like ammonia and I have clear drainage I have been taking doxy and cipro for a week       HPI  See my MDM                  [place] Coma Scale Score: 15                     Patient History   Past Medical History:   Diagnosis Date    CHF (congestive heart failure) (Multi)     Diabetes mellitus (Multi)     Hypertension      History reviewed. No pertinent surgical history.  No family history on file.  Social History     Tobacco Use    Smoking status: Some Days     Current packs/day: 1.00     Average packs/day: 1 pack/day for 20.0 years (20.0 ttl pk-yrs)     Types: Cigarettes    Smokeless tobacco: Never   Substance Use Topics    Alcohol use: Yes     Alcohol/week: 12.0 standard drinks of alcohol     Types: 12 Cans of beer per week    Drug use: Never       Physical Exam   ED Triage Vitals [07/11/24 1516]   Temperature Heart Rate Respirations BP   36.8 °C (98.2 °F) 75 18 144/71      Pulse Ox Temp Source Heart Rate Source Patient Position   99 % Oral Monitor Sitting      BP Location FiO2 (%)     Left arm --       Physical Exam  CONSTITUTIONAL: Vital signs reviewed as charted, well-developed and in no distress  Eyes: Extraocular muscles are intact. Pupils equal round and reactive to light. Conjunctiva are pink.    ENT: Mucous membranes are moist. Tongue in the midline. Pharynx was without erythema or exudates, uvula midline  LUNGS: Breath sounds equal and clear to auscultation. Good air exchange, no wheezes rales or retractions, pulse oximetry is charted.  HEART: Regular rate and rhythm without murmur thrill or rub, strong tones, auscultation is normal.  ABDOMEN: Soft and nontender without guarding rebound rigidity or mass. Bowel sounds are present and normal in all quadrants. There is no palpable masses or aneurysms identified. No hepatosplenomegaly, normal abdominal exam.  Neuro: The patient is awake, alert  and oriented ×3. Moving all 4 extremities and answering questions appropriately.   MUSCULOSKELETAL: Exam of the right foot shows a large amount of serosanguineous drainage, not foul-smelling.  There are some edema and erythema present it is warm to the touch.  Motor sensation pulses are intact distally cap refill less than 2 seconds.  PSYCH: Awake alert oriented, normal mood and affect.  Skin:  Dry, normal color, warm to the touch, no rash present.      ED Course & MDM   Diagnoses as of 07/11/24 1729   Osteomyelitis of fourth toe of right foot (Multi)       Medical Decision Making  History obtained from: patient    Vital signs, nursing notes, current medications, past medical history, Surgical history, allergies, social history, family History were reviewed.         HPI:  Patient 63-year-old male present emergency room today for evaluation of a right foot infection.  He had multiple surgeries on his foot has been infected previously has been on outpatient antibiotics for the last 5 days. Change his bandage today noticed a large amount of drainage and how foul-smelling was called the surgeon told to come in to the ER.  Denies fever chills or night sweats.  Denies nausea vomiting diarrhea.  He is nontoxic well-appearing vital signs within normal limits.      10 point ROS was reviewed and negative except Noted above in HPI.  DDX: as listed above          MDM Summary/considerations:  EMERGENCY DEPARTMENT COURSE and DIFFERENTIAL DIAGNOSIS/MDM:    The patient presented with a chief complaint of right foot infection. The differential diagnosis associated with this patient's presentation includes cellulitis, osteomyelitis.     Vitals:    Vitals:    07/11/24 1516 07/11/24 1727   BP: 144/71 145/75   BP Location: Left arm Left arm   Patient Position: Sitting Lying   Pulse: 75 73   Resp: 18 15   Temp: 36.8 °C (98.2 °F) 36.9 °C (98.4 °F)   TempSrc: Oral Oral   SpO2: 99% 99%   Weight: 106 kg (233 lb 7.5 oz)    Height: 1.88 m (6'  "2\")        Diagnoses as of 07/11/24 1729   Osteomyelitis of fourth toe of right foot (Multi)       History Limited by:    None    Independent history obtained from:    None    External records reviewed:    None    Diagnostics interpreted by me:    Xray(s) right foot    Discussions with other clinicians:    Hospitalist/Admitting Team Thien    Chronic conditions impacting care:    Diabetes and Hypertension    Social determinants of health affecting care:    None    Diagnostic tests considered but not performed: none    ED Medications managed:    Medications   sodium chloride 0.9 % bolus 1,000 mL (1,000 mL intravenous New Bag 7/11/24 1607)   vancomycin (Xellia) 2 g in diluent combination  mL (2 g intravenous New Bag 7/11/24 1607)   piperacillin-tazobactam (Zosyn) 4.5 g in dextrose (iso)  mL (0 g intravenous Stopped 7/11/24 1652)       Prescription drugs considered:    None    Screenings:          Labs Reviewed   CBC WITH AUTO DIFFERENTIAL - Abnormal       Result Value    WBC 8.0      nRBC 0.0      RBC 3.59 (*)     Hemoglobin 9.8 (*)     Hematocrit 29.1 (*)     MCV 81      MCH 27.3      MCHC 33.7      RDW 15.9 (*)     Platelets 203      Neutrophils % 67.3      Immature Granulocytes %, Automated 1.2 (*)     Lymphocytes % 17.8      Monocytes % 7.5      Eosinophils % 5.6      Basophils % 0.6      Neutrophils Absolute 5.40      Immature Granulocytes Absolute, Automated 0.10      Lymphocytes Absolute 1.43      Monocytes Absolute 0.60      Eosinophils Absolute 0.45      Basophils Absolute 0.05     COMPREHENSIVE METABOLIC PANEL - Abnormal    Glucose 112 (*)     Sodium 132 (*)     Potassium 5.1      Chloride 98      Bicarbonate 21 (*)     Urea Nitrogen 51 (*)     Creatinine 1.50      eGFR 53 (*)     Calcium 9.5      Albumin 3.7      Alkaline Phosphatase 119      Total Protein 7.2      AST 15      Bilirubin, Total <0.2      ALT 11      Anion Gap 13     BLOOD GAS LACTIC ACID, VENOUS - Normal    POCT Lactate, " Venous 0.7     BLOOD CULTURE   BLOOD CULTURE   URINALYSIS WITH REFLEX CULTURE AND MICROSCOPIC    Narrative:     The following orders were created for panel order Urinalysis with Reflex Culture and Microscopic.  Procedure                               Abnormality         Status                     ---------                               -----------         ------                     Urinalysis with Reflex C...[[id number]]                                                 Extra Urine Gray Tube[[id number]]                                                         Please view results for these tests on the individual orders.   URINALYSIS WITH REFLEX CULTURE AND MICROSCOPIC   EXTRA URINE GRAY TUBE     XR foot right 3+ views   Final Result   1. Osseous erosions with osseous destruction of the 4th metatarsal   head severe periosteal reaction along the 4th metatarsal diaphysis.   Periosteal reaction and irregularity at the 4th proximal phalanx as   well. Findings compatible with osteomyelitis and potential septic   arthritis of 4th MTP joint.   2. Osseous irregularity and destruction of the 5th metatarsal stump   along with extensive osseous productive changes. Findings are also   consistent with osteomyelitis.   3. Periosteal reaction along the 3rd metatarsal diaphysis may be   reactive or represent additional focus of osteomyelitis.   4. MRI can be performed to evaluate extent of disease                  MACRO:   None        Signed by: Liu Ramirez 7/11/2024 5:12 PM   Dictation workstation:   [id number]        Medications   sodium chloride 0.9 % bolus 1,000 mL (1,000 mL intravenous New Bag 7/11/24 1607)   vancomycin (Xellia) 2 g in diluent combination  mL (2 g intravenous New Bag 7/11/24 1607)   piperacillin-tazobactam (Zosyn) 4.5 g in dextrose (iso)  mL (0 g intravenous Stopped 7/11/24 1652)     New Prescriptions    No medications on file     I spoke with Dr. Neumann. We thoroughly discussed the history,  physical exam, laboratory and imaging studies, as well as, emergency department course. Based upon that discussion, we've decided to admit for further observation and evaluation of their cellulitis.  As I have deemed necessary from their history, physical, and studies, I have considered and evaluated for the following diagnoses: DEEP SPACE INFECTIONS, DEEP VENOUS THROMBOSIS, CATINA'S GANGRENE, S EPSIS, and TOXIC SHOCK SYNDROME.       X-ray does show osteomyelitis, patient started on IV antibiotics broad-spectrum after cultures were drawn.  Patient's was sent in by his podiatrist Dr. Perry.  Will be admitted for further evaluation and care.    After reviewing patient's comorbidities, severity of history of presenting illness, labs and imaging if obtained in conjunction with physical exam and course in emergency department, deemed to have potential for deterioration/progression of symptoms that could lead to multiple morbidities or mortality, decision made that patient requires further observation/evaluation/treatment and patient admitted to appropriate service, patient/family understand and agree with plan.    Discussed H&P with supervising physician, aware of results and agrees with plan/ disposition.    Critical Care: Not warranted at this time        This chart was completed using voice recognition transcription software. Please excuse any errors of transcription including grammatical, punctuation, syntax and spelling errors.  Please contact me with any questions regarding this chart.      Procedure  Procedures     ALISE Shirley-CNP  07/11/24 5890

## 2024-07-12 ENCOUNTER — ANESTHESIA (OUTPATIENT)
Dept: OPERATING ROOM | Facility: HOSPITAL | Age: 64
End: 2024-07-12
Payer: MEDICARE

## 2024-07-12 ENCOUNTER — ANESTHESIA EVENT (OUTPATIENT)
Dept: OPERATING ROOM | Facility: HOSPITAL | Age: 64
End: 2024-07-12
Payer: MEDICARE

## 2024-07-12 LAB
ABO GROUP (TYPE) IN BLOOD: NORMAL
ALBUMIN SERPL-MCNC: 3.2 G/DL (ref 3.5–5)
ALP BLD-CCNC: 106 U/L (ref 35–125)
ALT SERPL-CCNC: 10 U/L (ref 5–40)
ANION GAP SERPL CALC-SCNC: 12 MMOL/L
ANTIBODY SCREEN: NORMAL
AST SERPL-CCNC: 14 U/L (ref 5–40)
BASOPHILS # BLD AUTO: 0.05 X10*3/UL (ref 0–0.1)
BASOPHILS NFR BLD AUTO: 0.9 %
BILIRUB SERPL-MCNC: 0.2 MG/DL (ref 0.1–1.2)
BUN SERPL-MCNC: 42 MG/DL (ref 8–25)
CALCIUM SERPL-MCNC: 9.1 MG/DL (ref 8.5–10.4)
CHLORIDE SERPL-SCNC: 102 MMOL/L (ref 97–107)
CK SERPL-CCNC: 29 U/L (ref 24–195)
CO2 SERPL-SCNC: 18 MMOL/L (ref 24–31)
CREAT SERPL-MCNC: 1.2 MG/DL (ref 0.4–1.6)
CRP SERPL-MCNC: 5.8 MG/DL (ref 0–2)
EGFRCR SERPLBLD CKD-EPI 2021: 69 ML/MIN/1.73M*2
EOSINOPHIL # BLD AUTO: 0.36 X10*3/UL (ref 0–0.7)
EOSINOPHIL NFR BLD AUTO: 6.3 %
ERYTHROCYTE [DISTWIDTH] IN BLOOD BY AUTOMATED COUNT: 15.8 % (ref 11.5–14.5)
ERYTHROCYTE [SEDIMENTATION RATE] IN BLOOD BY WESTERGREN METHOD: 69 MM/H (ref 0–20)
GLUCOSE BLD MANUAL STRIP-MCNC: 159 MG/DL (ref 74–99)
GLUCOSE BLD MANUAL STRIP-MCNC: 78 MG/DL (ref 74–99)
GLUCOSE BLD MANUAL STRIP-MCNC: 87 MG/DL (ref 74–99)
GLUCOSE BLD MANUAL STRIP-MCNC: 90 MG/DL (ref 74–99)
GLUCOSE SERPL-MCNC: 73 MG/DL (ref 65–99)
HCT VFR BLD AUTO: 27.2 % (ref 41–52)
HGB BLD-MCNC: 9 G/DL (ref 13.5–17.5)
IMM GRANULOCYTES # BLD AUTO: 0.06 X10*3/UL (ref 0–0.7)
IMM GRANULOCYTES NFR BLD AUTO: 1.1 % (ref 0–0.9)
INR PPP: 1.1 (ref 0.9–1.2)
LYMPHOCYTES # BLD AUTO: 1.15 X10*3/UL (ref 1.2–4.8)
LYMPHOCYTES NFR BLD AUTO: 20.2 %
MAGNESIUM SERPL-MCNC: 1.8 MG/DL (ref 1.6–3.1)
MCH RBC QN AUTO: 26.9 PG (ref 26–34)
MCHC RBC AUTO-ENTMCNC: 33.1 G/DL (ref 32–36)
MCV RBC AUTO: 81 FL (ref 80–100)
MONOCYTES # BLD AUTO: 0.47 X10*3/UL (ref 0.1–1)
MONOCYTES NFR BLD AUTO: 8.3 %
NEUTROPHILS # BLD AUTO: 3.6 X10*3/UL (ref 1.2–7.7)
NEUTROPHILS NFR BLD AUTO: 63.2 %
NRBC BLD-RTO: 0 /100 WBCS (ref 0–0)
NT-PROBNP SERPL-MCNC: 603 PG/ML (ref 0–177)
PLATELET # BLD AUTO: 172 X10*3/UL (ref 150–450)
POTASSIUM SERPL-SCNC: 4.3 MMOL/L (ref 3.4–5.1)
PROT SERPL-MCNC: 6.2 G/DL (ref 5.9–7.9)
PROTHROMBIN TIME: 11.3 SECONDS (ref 9.3–12.7)
RBC # BLD AUTO: 3.35 X10*6/UL (ref 4.5–5.9)
RH FACTOR (ANTIGEN D): NORMAL
SODIUM SERPL-SCNC: 132 MMOL/L (ref 133–145)
TROPONIN T SERPL-MCNC: 45 NG/L
TROPONIN T SERPL-MCNC: 50 NG/L
TROPONIN T SERPL-MCNC: 51 NG/L
TROPONIN T SERPL-MCNC: 51 NG/L
WBC # BLD AUTO: 5.7 X10*3/UL (ref 4.4–11.3)

## 2024-07-12 PROCEDURE — 86901 BLOOD TYPING SEROLOGIC RH(D): CPT | Performed by: INTERNAL MEDICINE

## 2024-07-12 PROCEDURE — 97162 PT EVAL MOD COMPLEX 30 MIN: CPT | Mod: GP

## 2024-07-12 PROCEDURE — 2720000007 HC OR 272 NO HCPCS: Performed by: PODIATRIST

## 2024-07-12 PROCEDURE — 2500000004 HC RX 250 GENERAL PHARMACY W/ HCPCS (ALT 636 FOR OP/ED): Mod: JZ | Performed by: INTERNAL MEDICINE

## 2024-07-12 PROCEDURE — 99221 1ST HOSP IP/OBS SF/LOW 40: CPT | Performed by: INTERNAL MEDICINE

## 2024-07-12 PROCEDURE — 2780000003 HC OR 278 NO HCPCS: Performed by: PODIATRIST

## 2024-07-12 PROCEDURE — 0JUQ0KZ SUPPLEMENT OF RIGHT FOOT SUBCUTANEOUS TISSUE AND FASCIA WITH NONAUTOLOGOUS TISSUE SUBSTITUTE, OPEN APPROACH: ICD-10-PCS | Performed by: PODIATRIST

## 2024-07-12 PROCEDURE — 85025 COMPLETE CBC W/AUTO DIFF WBC: CPT | Performed by: INTERNAL MEDICINE

## 2024-07-12 PROCEDURE — 0JBQ0ZZ EXCISION OF RIGHT FOOT SUBCUTANEOUS TISSUE AND FASCIA, OPEN APPROACH: ICD-10-PCS | Performed by: PODIATRIST

## 2024-07-12 PROCEDURE — 1200000002 HC GENERAL ROOM WITH TELEMETRY DAILY

## 2024-07-12 PROCEDURE — 82947 ASSAY GLUCOSE BLOOD QUANT: CPT

## 2024-07-12 PROCEDURE — 82550 ASSAY OF CK (CPK): CPT | Performed by: INTERNAL MEDICINE

## 2024-07-12 PROCEDURE — 7100000002 HC RECOVERY ROOM TIME - EACH INCREMENTAL 1 MINUTE: Performed by: PODIATRIST

## 2024-07-12 PROCEDURE — 36415 COLL VENOUS BLD VENIPUNCTURE: CPT | Performed by: INTERNAL MEDICINE

## 2024-07-12 PROCEDURE — 84484 ASSAY OF TROPONIN QUANT: CPT | Performed by: INTERNAL MEDICINE

## 2024-07-12 PROCEDURE — 2500000001 HC RX 250 WO HCPCS SELF ADMINISTERED DRUGS (ALT 637 FOR MEDICARE OP)

## 2024-07-12 PROCEDURE — 87070 CULTURE OTHR SPECIMN AEROBIC: CPT | Mod: TRILAB | Performed by: INTERNAL MEDICINE

## 2024-07-12 PROCEDURE — 80053 COMPREHEN METABOLIC PANEL: CPT | Performed by: INTERNAL MEDICINE

## 2024-07-12 PROCEDURE — 3700000002 HC GENERAL ANESTHESIA TIME - EACH INCREMENTAL 1 MINUTE: Performed by: PODIATRIST

## 2024-07-12 PROCEDURE — 85610 PROTHROMBIN TIME: CPT | Performed by: INTERNAL MEDICINE

## 2024-07-12 PROCEDURE — 3600000003 HC OR TIME - INITIAL BASE CHARGE - PROCEDURE LEVEL THREE: Performed by: PODIATRIST

## 2024-07-12 PROCEDURE — 83880 ASSAY OF NATRIURETIC PEPTIDE: CPT | Performed by: INTERNAL MEDICINE

## 2024-07-12 PROCEDURE — 2500000004 HC RX 250 GENERAL PHARMACY W/ HCPCS (ALT 636 FOR OP/ED)

## 2024-07-12 PROCEDURE — 2500000004 HC RX 250 GENERAL PHARMACY W/ HCPCS (ALT 636 FOR OP/ED): Performed by: ANESTHESIOLOGY

## 2024-07-12 PROCEDURE — 2500000004 HC RX 250 GENERAL PHARMACY W/ HCPCS (ALT 636 FOR OP/ED): Mod: JZ

## 2024-07-12 PROCEDURE — C1763 CONN TISS, NON-HUMAN: HCPCS | Performed by: PODIATRIST

## 2024-07-12 PROCEDURE — 2500000002 HC RX 250 W HCPCS SELF ADMINISTERED DRUGS (ALT 637 FOR MEDICARE OP, ALT 636 FOR OP/ED)

## 2024-07-12 PROCEDURE — 3600000008 HC OR TIME - EACH INCREMENTAL 1 MINUTE - PROCEDURE LEVEL THREE: Performed by: PODIATRIST

## 2024-07-12 PROCEDURE — 2500000001 HC RX 250 WO HCPCS SELF ADMINISTERED DRUGS (ALT 637 FOR MEDICARE OP): Performed by: INTERNAL MEDICINE

## 2024-07-12 PROCEDURE — 3700000001 HC GENERAL ANESTHESIA TIME - INITIAL BASE CHARGE: Performed by: PODIATRIST

## 2024-07-12 PROCEDURE — 7100000001 HC RECOVERY ROOM TIME - INITIAL BASE CHARGE: Performed by: PODIATRIST

## 2024-07-12 DEVICE — MYRIAD MATRIX IS INTENDED FOR APPLICATIONS IN PLASTIC AND RECONSTRUCTIVE SURGERY OR TO COVER, PROTECT AND PROVIDE A MOIST WOUND ENVIRONMENT. THE DEVICE MAY BE FIXED, VIA SUTURES, STAPLES OR TACKS TO SURROUND THE TISSUE IF DESIRED.
Type: IMPLANTABLE DEVICE | Site: FOOT | Status: FUNCTIONAL
Brand: MYRIAD MATRIX SOFT TISSUE BIOSCAFFOLD

## 2024-07-12 DEVICE — MYRIAD MORCELLS™ IS DERIVED FROM AN EXTRACELLULAR MATRIX PRIMARILY COMPOSED OF OVINE COLLAGEN AND IS SUPPLIED AS A STERILE PARTICULATE.
Type: IMPLANTABLE DEVICE | Site: FOOT | Status: FUNCTIONAL
Brand: MYRIAD MORCELLS™

## 2024-07-12 RX ORDER — FENTANYL CITRATE 50 UG/ML
INJECTION, SOLUTION INTRAMUSCULAR; INTRAVENOUS AS NEEDED
Status: DISCONTINUED | OUTPATIENT
Start: 2024-07-12 | End: 2024-07-12

## 2024-07-12 RX ORDER — ALBUTEROL SULFATE 0.83 MG/ML
2.5 SOLUTION RESPIRATORY (INHALATION) ONCE AS NEEDED
Status: DISCONTINUED | OUTPATIENT
Start: 2024-07-12 | End: 2024-07-12 | Stop reason: HOSPADM

## 2024-07-12 RX ORDER — LABETALOL HYDROCHLORIDE 5 MG/ML
10 INJECTION, SOLUTION INTRAVENOUS ONCE AS NEEDED
Status: DISCONTINUED | OUTPATIENT
Start: 2024-07-12 | End: 2024-07-12 | Stop reason: HOSPADM

## 2024-07-12 RX ORDER — HYDROMORPHONE HYDROCHLORIDE 0.2 MG/ML
0.2 INJECTION INTRAMUSCULAR; INTRAVENOUS; SUBCUTANEOUS EVERY 5 MIN PRN
Status: DISCONTINUED | OUTPATIENT
Start: 2024-07-12 | End: 2024-07-12 | Stop reason: HOSPADM

## 2024-07-12 RX ORDER — HYDRALAZINE HYDROCHLORIDE 20 MG/ML
10 INJECTION INTRAMUSCULAR; INTRAVENOUS EVERY 30 MIN PRN
Status: DISCONTINUED | OUTPATIENT
Start: 2024-07-12 | End: 2024-07-12 | Stop reason: HOSPADM

## 2024-07-12 RX ORDER — HEPARIN SODIUM 5000 [USP'U]/ML
5000 INJECTION, SOLUTION INTRAVENOUS; SUBCUTANEOUS EVERY 8 HOURS
Status: DISCONTINUED | OUTPATIENT
Start: 2024-07-12 | End: 2024-07-16 | Stop reason: HOSPADM

## 2024-07-12 RX ORDER — NORETHINDRONE AND ETHINYL ESTRADIOL 0.5-0.035
50 KIT ORAL ONCE
Status: DISCONTINUED | OUTPATIENT
Start: 2024-07-12 | End: 2024-07-12 | Stop reason: HOSPADM

## 2024-07-12 RX ORDER — KETAMINE HYDROCHLORIDE 50 MG/ML
INJECTION, SOLUTION INTRAMUSCULAR; INTRAVENOUS AS NEEDED
Status: DISCONTINUED | OUTPATIENT
Start: 2024-07-12 | End: 2024-07-12

## 2024-07-12 RX ORDER — DIPHENHYDRAMINE HYDROCHLORIDE 50 MG/ML
12.5 INJECTION INTRAMUSCULAR; INTRAVENOUS ONCE AS NEEDED
Status: DISCONTINUED | OUTPATIENT
Start: 2024-07-12 | End: 2024-07-12 | Stop reason: HOSPADM

## 2024-07-12 RX ORDER — ONDANSETRON HYDROCHLORIDE 2 MG/ML
4 INJECTION, SOLUTION INTRAVENOUS ONCE AS NEEDED
Status: DISCONTINUED | OUTPATIENT
Start: 2024-07-12 | End: 2024-07-12 | Stop reason: HOSPADM

## 2024-07-12 RX ORDER — MEPERIDINE HYDROCHLORIDE 25 MG/ML
12.5 INJECTION INTRAMUSCULAR; INTRAVENOUS; SUBCUTANEOUS EVERY 10 MIN PRN
Status: DISCONTINUED | OUTPATIENT
Start: 2024-07-12 | End: 2024-07-12 | Stop reason: HOSPADM

## 2024-07-12 RX ORDER — OXYCODONE HCL 10 MG/1
10 TABLET, FILM COATED, EXTENDED RELEASE ORAL ONCE AS NEEDED
Status: COMPLETED | OUTPATIENT
Start: 2024-07-12 | End: 2024-07-15

## 2024-07-12 RX ORDER — VANCOMYCIN 1 G/200ML
1 INJECTION, SOLUTION INTRAVENOUS EVERY 12 HOURS
Status: DISCONTINUED | OUTPATIENT
Start: 2024-07-12 | End: 2024-07-13

## 2024-07-12 RX ORDER — MIDAZOLAM HYDROCHLORIDE 1 MG/ML
INJECTION, SOLUTION INTRAMUSCULAR; INTRAVENOUS AS NEEDED
Status: DISCONTINUED | OUTPATIENT
Start: 2024-07-12 | End: 2024-07-12

## 2024-07-12 SDOH — SOCIAL STABILITY: SOCIAL INSECURITY
WITHIN THE LAST YEAR, HAVE YOU BEEN KICKED, HIT, SLAPPED, OR OTHERWISE PHYSICALLY HURT BY YOUR PARTNER OR EX-PARTNER?: NO

## 2024-07-12 SDOH — SOCIAL STABILITY: SOCIAL NETWORK: ARE YOU MARRIED, WIDOWED, DIVORCED, SEPARATED, NEVER MARRIED, OR LIVING WITH A PARTNER?: DIVORCED

## 2024-07-12 SDOH — SOCIAL STABILITY: SOCIAL NETWORK: HOW OFTEN DO YOU ATTENT MEETINGS OF THE CLUB OR ORGANIZATION YOU BELONG TO?: PATIENT DECLINED

## 2024-07-12 SDOH — SOCIAL STABILITY: SOCIAL INSECURITY: WITHIN THE LAST YEAR, HAVE YOU BEEN AFRAID OF YOUR PARTNER OR EX-PARTNER?: NO

## 2024-07-12 SDOH — SOCIAL STABILITY: SOCIAL NETWORK
DO YOU BELONG TO ANY CLUBS OR ORGANIZATIONS SUCH AS CHURCH GROUPS UNIONS, FRATERNAL OR ATHLETIC GROUPS, OR SCHOOL GROUPS?: PATIENT DECLINED

## 2024-07-12 SDOH — SOCIAL STABILITY: SOCIAL INSECURITY
WITHIN THE LAST YEAR, HAVE TO BEEN RAPED OR FORCED TO HAVE ANY KIND OF SEXUAL ACTIVITY BY YOUR PARTNER OR EX-PARTNER?: NO

## 2024-07-12 SDOH — ECONOMIC STABILITY: INCOME INSECURITY: IN THE PAST 12 MONTHS, HAS THE ELECTRIC, GAS, OIL, OR WATER COMPANY THREATENED TO SHUT OFF SERVICE IN YOUR HOME?: NO

## 2024-07-12 SDOH — HEALTH STABILITY: MENTAL HEALTH
HOW OFTEN DO YOU NEED TO HAVE SOMEONE HELP YOU WHEN YOU READ INSTRUCTIONS, PAMPHLETS, OR OTHER WRITTEN MATERIAL FROM YOUR DOCTOR OR PHARMACY?: NEVER

## 2024-07-12 SDOH — HEALTH STABILITY: MENTAL HEALTH: CURRENT SMOKER: 0

## 2024-07-12 SDOH — ECONOMIC STABILITY: FOOD INSECURITY: WITHIN THE PAST 12 MONTHS, THE FOOD YOU BOUGHT JUST DIDN'T LAST AND YOU DIDN'T HAVE MONEY TO GET MORE.: NEVER TRUE

## 2024-07-12 SDOH — SOCIAL STABILITY: SOCIAL INSECURITY: WITHIN THE LAST YEAR, HAVE YOU BEEN HUMILIATED OR EMOTIONALLY ABUSED IN OTHER WAYS BY YOUR PARTNER OR EX-PARTNER?: NO

## 2024-07-12 SDOH — ECONOMIC STABILITY: FOOD INSECURITY: WITHIN THE PAST 12 MONTHS, YOU WORRIED THAT YOUR FOOD WOULD RUN OUT BEFORE YOU GOT MONEY TO BUY MORE.: NEVER TRUE

## 2024-07-12 SDOH — SOCIAL STABILITY: SOCIAL NETWORK
IN A TYPICAL WEEK, HOW MANY TIMES DO YOU TALK ON THE PHONE WITH FAMILY, FRIENDS, OR NEIGHBORS?: MORE THAN THREE TIMES A WEEK

## 2024-07-12 SDOH — HEALTH STABILITY: PHYSICAL HEALTH: ON AVERAGE, HOW MANY DAYS PER WEEK DO YOU ENGAGE IN MODERATE TO STRENUOUS EXERCISE (LIKE A BRISK WALK)?: 0 DAYS

## 2024-07-12 SDOH — HEALTH STABILITY: PHYSICAL HEALTH: ON AVERAGE, HOW MANY MINUTES DO YOU ENGAGE IN EXERCISE AT THIS LEVEL?: 0 MIN

## 2024-07-12 SDOH — SOCIAL STABILITY: SOCIAL NETWORK: HOW OFTEN DO YOU ATTEND CHURCH OR RELIGIOUS SERVICES?: PATIENT DECLINED

## 2024-07-12 SDOH — SOCIAL STABILITY: SOCIAL NETWORK: HOW OFTEN DO YOU GET TOGETHER WITH FRIENDS OR RELATIVES?: MORE THAN THREE TIMES A WEEK

## 2024-07-12 ASSESSMENT — COGNITIVE AND FUNCTIONAL STATUS - GENERAL
MOVING TO AND FROM BED TO CHAIR: A LOT
DAILY ACTIVITIY SCORE: 24
TURNING FROM BACK TO SIDE WHILE IN FLAT BAD: A LITTLE
WALKING IN HOSPITAL ROOM: TOTAL
MOBILITY SCORE: 24
STANDING UP FROM CHAIR USING ARMS: A LITTLE
DAILY ACTIVITIY SCORE: 24
MOBILITY SCORE: 13
MOBILITY SCORE: 14
MOVING FROM LYING ON BACK TO SITTING ON SIDE OF FLAT BED WITH BEDRAILS: A LITTLE
CLIMB 3 TO 5 STEPS WITH RAILING: TOTAL
TURNING FROM BACK TO SIDE WHILE IN FLAT BAD: A LITTLE
STANDING UP FROM CHAIR USING ARMS: A LITTLE
WALKING IN HOSPITAL ROOM: TOTAL
CLIMB 3 TO 5 STEPS WITH RAILING: TOTAL
MOVING TO AND FROM BED TO CHAIR: A LOT

## 2024-07-12 ASSESSMENT — ENCOUNTER SYMPTOMS
PALPITATIONS: 0
WEAKNESS: 0
DIAPHORESIS: 0
EYES NEGATIVE: 1
ENDOCRINE NEGATIVE: 1
DIZZINESS: 0
WHEEZING: 0
ORTHOPNEA: 0
WOUND: 1
COUGH: 0
PND: 0
DYSPNEA ON EXERTION: 0
SHORTNESS OF BREATH: 0
MYALGIAS: 0
WEIGHT GAIN: 0
PSYCHIATRIC NEGATIVE: 1
WEIGHT LOSS: 0
IRREGULAR HEARTBEAT: 0
NEAR-SYNCOPE: 0
CLAUDICATION: 0
FEVER: 0
RESPIRATORY NEGATIVE: 1
CONSTITUTIONAL NEGATIVE: 1
CARDIOVASCULAR NEGATIVE: 1
GASTROINTESTINAL NEGATIVE: 1
MUSCULOSKELETAL NEGATIVE: 1
SYNCOPE: 0
NEUROLOGICAL NEGATIVE: 1

## 2024-07-12 ASSESSMENT — PAIN - FUNCTIONAL ASSESSMENT
PAIN_FUNCTIONAL_ASSESSMENT: 0-10

## 2024-07-12 ASSESSMENT — PAIN SCALES - GENERAL
PAINLEVEL_OUTOF10: 0 - NO PAIN
PAIN_LEVEL: 2
PAINLEVEL_OUTOF10: 0 - NO PAIN

## 2024-07-12 ASSESSMENT — ACTIVITIES OF DAILY LIVING (ADL): ADL_ASSISTANCE: INDEPENDENT

## 2024-07-12 NOTE — PROGRESS NOTES
Physical Therapy    Physical Therapy Evaluation    Patient Name: Claude Coley  MRN: [id number]  Today's Date: 7/12/2024   Time Calculation  Start Time: 1506  Stop Time: 1529  Time Calculation (min): 23 min    Documentation and services provided by WOODY Bronson under the supervision of Licensed Physical Therapist      Assessment/Plan   PT Assessment  PT Assessment Results: Decreased strength, Decreased endurance, Decreased range of motion, Impaired balance, Decreased mobility, Impaired judgement, Decreased safety awareness, Decreased skin integrity, Orthopedic restrictions, Decreased cognition, Pain  Rehab Prognosis: Good  Barriers to Discharge: Pt currently unable to ambulate short distances while maintaining NWB and lives alone  Evaluation/Treatment Tolerance: Patient limited by fatigue  Medical Staff Made Aware: Yes  End of Session Communication: Bedside nurse  Assessment Comment: 63 year old male admits with R foot osteomyelitis wound. Pt underwent I&D wound debridement and allograft performed by Dr. Bolaños 7/12/24 and is being seen s/p day 0. Upon eval, Pt demonstrates impaired mobility warranting in-house PT. Moderate intensity rehab recommended upon dc  End of Session Patient Position: Bed, 3 rail up, Alarm on  IP OR SWING BED PT PLAN  Inpatient or Swing Bed: Inpatient  PT Plan  Treatment/Interventions: Bed mobility, Transfer training, Gait training, Balance training, Strengthening, Endurance training, Therapeutic exercise, Therapeutic activity, Range of motion, Home exercise program  PT Frequency: 5 times per week  PT Discharge Recommendations: Moderate intensity level of continued care  Equipment Recommended upon Discharge: Wheeled walker  PT Recommended Transfer Status: Assist x1, Assistive device  PT - OK to Discharge: Yes      Subjective   General Visit Information:  General  Reason for Referral: recent surg  Referred By: Dr. Bolaños  Past Medical History Relevant to Rehab: Including but not limited  "to CHF, DM,  and schizophrenia  Family/Caregiver Present: No  Prior to Session Communication: Bedside nurse  Patient Position Received: Bed, 3 rail up, Alarm on  Preferred Learning Style: verbal, visual  General Comment: 63 year old male admits with R foot osteomyelitis wound. Pt underwent I&D wound debridement and allograft performed by Dr. Bolaños 7/12/24 and is being seen s/p day 0.  Home Living:  Home Living  Type of Home: Apartment  Lives With: Alone  Home Adaptive Equipment: Walker rolling or standard (Pt reports \"farhad 2WW\")  Home Layout: One level  Home Access: Elevator  Bathroom Shower/Tub: Tub/shower unit  Bathroom Toilet: Standard  Bathroom Equipment: None  Prior Level of Function:  Prior Function Per Pt/Caregiver Report  Level of [place]: Independent with ADLs and functional transfers, Independent with homemaking with ambulation  Receives Help From: Family  ADL Assistance: Independent  Homemaking Assistance: Independent  Ambulatory Assistance: Independent  Prior Function Comments: Has a Hx of NWB per MR  Precautions:  Precautions  LE Weight Bearing Status: Right Non-Weight Bearing  Medical Precautions: Fall precautions      Objective   Pain:  Pain Assessment  Pain Assessment: 0-10  0-10 (Numeric) Pain Score: 0 - No pain  Cognition:  Cognition  Overall Cognitive Status: Within Functional Limits  Insight: Mild  Impulsive: Mildly  Processing Speed: Within funtional limits    General Assessments:    Activity Tolerance  Endurance: Decreased tolerance for upright activites  Activity Tolerance Comments: Poor tolerance to gait. Can only NWB for a few steps before max fatigue    Sensation  Light Touch: No apparent deficits  Sensation Comment: But is unaware of foot placement during mobility increasing risk for accidental WB. Increased cues from therapist needed to adhere to NWB at all times    Functional Assessments:  Bed Mobility  Bed Mobility: Yes  Bed Mobility 1  Bed Mobility 1: Supine to sitting, Sitting " to supine  Level of Assistance 1: Close supervision  Bed Mobility Comments 1: CS for WB precautions. Cues provided on proper technique to keep RLE NWB    Transfers  Transfer: Yes  Transfer 1  Transfer From 1: Bed to  Transfer to 1: Stand  Technique 1: Sit to stand, Stand to sit  Transfer Device 1: Walker  Transfer Level of Assistance 1: Minimum assistance  Trials/Comments 1: Min A for Pt stability while adhering to WB precautions. Cues provided proper technique  Transfers 2  Transfer From 2: Bed to  Transfer to 2: Stand  Technique 2: Sit to stand, Stand to sit  Transfer Device 2: Walker  Transfer Level of Assistance 2: Minimum assistance  Trials/Comments 2: Min A provided for mild unsteadiness. Cues on proper technique provided    Ambulation/Gait Training  Ambulation/Gait Training Performed: Yes  Ambulation/Gait Training 1  Surface 1: Level tile  Device 1: Rolling walker  Assistance 1: Contact guard  Quality of Gait 1:  (3 point gait pattern limited by poor ability to swing non-operative leg. poor stride length. fatigues quickly. poor tolerance to ambulation while NWB)  Comments/Distance (ft) 1: 2'x2 attempts    Stairs  Stairs: No  Extremity/Trunk Assessments:  RLE   RLE : Exceptions to WFL  Strength RLE  RLE Overall Strength: Deficits  R Hip Flexion: 4/5  R Knee Flexion: 4/5  R Knee Extension: 4/5  LLE   LLE : Exceptions to WFL  Strength LLE  LLE Overall Strength: Deficits  L Hip Flexion: 4/5  L Knee Flexion: 4/5  L Knee Extension: 4/5  Outcome Measures:  [facility] Basic Mobility  Turning from your back to your side while in a flat bed without using bedrails: A little  Moving from lying on your back to sitting on the side of a flat bed without using bedrails: A little  Moving to and from bed to chair (including a wheelchair): A lot  Standing up from a chair using your arms (e.g. wheelchair or bedside chair): A little  To walk in hospital room: Total  Climbing 3-5 steps with railing: Total  Basic Mobility - Total Score:  13    Encounter Problems       Encounter Problems (Active)       Balance       standing balance (Progressing)       Start:  07/12/24    Expected End:  07/26/24       PT will demonstrate good static standing balance with modified independence using a walker             Mobility       ambulation (Progressing)       Start:  07/12/24    Expected End:  07/26/24       Pt will ambulate >50' with modified independence using a walker to promote safe home navigation            PT Transfers       bed mobility (Progressing)       Start:  07/12/24    Expected End:  07/26/24       Pt will perform supine<>sit bed mobility independently          sit to stand  (Progressing)       Start:  07/12/24    Expected End:  07/26/24       Pt will perform sit<>stand transfer with modified independence using a walker         bed to chair (Progressing)       Start:  07/12/24    Expected End:  07/26/24       Pt will perform bed<>chair transfer with modified independence using a walker            Safety       safe mobility techniques (Progressing)       Start:  07/12/24    Expected End:  07/26/24       Pt will demonstrate safe mobility techniques for all bed mobility, transfers, and ambulation         WB precautions (Progressing)       Start:  07/12/24    Expected End:  07/26/24       Pt will recall and demonstrate adherence to RLE NWB at all times                Education Documentation  Precautions, taught by CHINO Bronson at 7/12/2024  4:00 PM.  Learner: Patient  Readiness: Acceptance  Method: Explanation  Response: Verbalizes Understanding, Demonstrated Understanding  Comment: safe mobility techniques, proper AD, WB precautions    Body Mechanics, taught by CHINO Bronson at 7/12/2024  4:00 PM.  Learner: Patient  Readiness: Acceptance  Method: Explanation  Response: Verbalizes Understanding, Demonstrated Understanding  Comment: safe mobility techniques, proper AD, WB precautions    Mobility Training, taught by CHINO Bronson at  7/12/2024  4:00 PM.  Learner: Patient  Readiness: Acceptance  Method: Explanation  Response: Verbalizes Understanding, Demonstrated Understanding  Comment: safe mobility techniques, proper AD, WB precautions    Education Comments  No comments found.

## 2024-07-12 NOTE — ANESTHESIA POSTPROCEDURE EVALUATION
Patient: Claude Coley    Procedure Summary       Date: 07/12/24 Room / Location: [facility] 04 / [facility]    Anesthesia Start: 1301 Anesthesia Stop: 1342    Procedure: Incision and Drainage Bone Lower Extremity, Wound Debridement, Application Allograft (Right: Foot) Diagnosis:       Cellulitis of right foot      Non-pressure chronic ulcer of other part of right foot with fat layer exposed (Multi)      Type 2 diabetes mellitus with right diabetic foot ulcer (Multi)      (Cellulitis of right foot [L03.115])      (Non-pressure chronic ulcer of other part of right foot with fat layer exposed (Multi) [L97.512])      (Type 2 diabetes mellitus with right diabetic foot ulcer (Multi) [E11.621, L97.519])    Surgeons: Sonido Bolaños DPM Responsible Provider: Wilmer Hurst MD    Anesthesia Type: MAC ASA Status: 3            Anesthesia Type: MAC    Vitals Value Taken Time   /59 07/12/24 1345   Temp 36 °C (96.8 °F) 07/12/24 1340   Pulse 59 07/12/24 1347   Resp 16 07/12/24 1347   SpO2 99 % 07/12/24 1347   Vitals shown include unfiled device data.    Anesthesia Post Evaluation    Patient location during evaluation: PACU  Patient participation: complete - patient participated  Level of consciousness: sleepy but conscious  Pain score: 2  Pain management: adequate  Multimodal analgesia pain management approach  Airway patency: patent  Cardiovascular status: acceptable  Respiratory status: acceptable  Hydration status: acceptable  Postoperative Nausea and Vomiting: none        No notable events documented.

## 2024-07-12 NOTE — CONSULTS
PODIATRY CONSULT NOTE    SERVICE DATE: 7/12/2024   SERVICE TIME:  0615    REASON FOR CONSULT: infected wound with osteomyelitis, bleeding  REQUESTING PHYSICIAN: Connie Neumann MD  PRIMARY CARE PHYSICIAN: Darryl Jenkins MD    Subjective   HPI:  Patient Claude Coley is a 63 y.o. male with PMH of CHF, DM2, charcot  right foot who presents for right foot infection. He has had extensive intervention for his right foot ulcer, is s/p partial 5th ray resection with wound debridement and application of graft (1/26/24), wound debridement, peroneus brevis muscle flap, application of ex fix (3/27/24), and removal of ex fix, wound debridement, prep of graft site and STSG application (4/24/24). He is followed by Dr. Perry weekly.    Podiatry was consulted for infected wound right foot. He was noted to have wound infection several days ago and was prescribed PO doxycycline and ciprofloxacin several days ago, discussed going to the hospital if it failed to improve. He indicates he has been taking his abx. He noticed more drainage than usual in his boot the last few days so became concerned and went to the ED. Denies constitutional symptoms nausea, vomiting, fever, chills, chest pain, deep calf pain, shortness of breath.    ROS: 10-point review of systems was performed and is otherwise negative except as noted in HPI.  PMH: Reviewed/documented below.  PSH:  Noncontributory except per HPI   FH: Reviewed and noncontributory   SOCIAL:  Reviewed/documented below.  ALLERGIES: Reviewed/documented below.  MEDS: Reviewed/documented below.  VS: Reviewed/documented below.    Past Medical History:   Diagnosis Date    CHF (congestive heart failure) (Multi)     Diabetes mellitus (Multi)     Hypertension      History reviewed. No pertinent surgical history.  No family history on file.  Social History     Tobacco Use    Smoking status: Some Days     Current packs/day: 1.00     Average packs/day: 1 pack/day for 20.0 years  (20.0 ttl pk-yrs)     Types: Cigarettes    Smokeless tobacco: Never   Substance Use Topics    Alcohol use: Yes     Alcohol/week: 12.0 standard drinks of alcohol     Types: 12 Cans of beer per week    Drug use: Never      Medications Prior to Admission   Medication Sig Dispense Refill Last Dose    acetaminophen (Tylenol) 325 mg tablet Take 2 tablets (650 mg) by mouth every 4 hours if needed for mild pain (1 - 3). 30 tablet 0     aspirin 81 mg EC tablet Take 1 tablet (81 mg) by mouth once daily.       atorvastatin (Lipitor) 40 mg tablet Take 1 tablet (40 mg) by mouth once daily.       carvedilol (Coreg) 12.5 mg tablet Take 1 tablet (12.5 mg) by mouth 2 times a day with meals.       cyclobenzaprine (Flexeril) 10 mg tablet Take 1 tablet (10 mg) by mouth 3 times a day as needed for muscle spasms for up to 15 days. 45 tablet 0     finasteride (Proscar) 5 mg tablet Take 1 tablet (5 mg) by mouth once daily. Do not crush, chew, or split.       furosemide (Lasix) 20 mg tablet Take 1 tablet (20 mg) by mouth once daily.       gemfibrozil (Lopid) 600 mg tablet Take 1 tablet (600 mg) by mouth 2 times a day before meals.       heparin sodium,porcine (heparin, porcine,) 5,000 unit/mL injection Inject 1 mL (5,000 Units) under the skin every 8 hours. 100 mL 0     lisinopril 20 mg tablet Take 1 tablet (20 mg) by mouth once daily.       metFORMIN, OSM, (Fortamet) 500 mg 24 hr tablet Take 1 tablet (500 mg) by mouth once daily in the evening. Take with meals. Do not crush, chew, or split.       OLANZapine (ZyPREXA) 20 mg tablet Take 1 tablet (20 mg) by mouth once daily at bedtime.       pantoprazole (ProtoNix) 40 mg EC tablet Take 1 tablet (40 mg) by mouth once daily in the morning. Take before meals. Do not crush, chew, or split. Do not start before April 2, 2024. 90 tablet 0     polyethylene glycol (Glycolax, Miralax) 17 gram/dose powder Mix 17 grams (1 capful) in liquid and drink by mouth and mix with water or juice once daily. Do not  start before April 2, 2024. 238 g 0     spironolactone (Aldactone) 25 mg tablet Take 1 tablet (25 mg) by mouth once daily.           Medications:  Scheduled Meds: atorvastatin, 40 mg, oral, Nightly  carvedilol, 12.5 mg, oral, BID  finasteride, 5 mg, oral, Daily  gemfibrozil, 600 mg, oral, BID AC  insulin lispro, 0-10 Units, subcutaneous, TID  OLANZapine, 20 mg, oral, Nightly  pantoprazole, 40 mg, oral, Daily before breakfast  piperacillin-tazobactam, 3.375 g, intravenous, q6h  polyethylene glycol, 17 g, oral, Daily  vancomycin, 1,500 mg, intravenous, q24h      Continuous Infusions:    PRN Meds: PRN medications: acetaminophen, dextrose, dextrose, glucagon, glucagon, vancomycin    Allergies as of 07/11/2024 - Reviewed 07/11/2024   Allergen Reaction Noted    Bee sting kit Anaphylaxis 12/07/2019    Tramadol Rash 01/07/2020            Objective   PHYSICAL EXAM:  Physical Exam Performed:  Vitals:    07/12/24 0345   BP: 106/56   Pulse: 66   Resp: 16   Temp: 36.7 °C (98.1 °F)   SpO2: 95%     Body mass index is 29.98 kg/m².    Patient is AOx3 and in no acute distress. Patient is alert and cooperative. Sitting comfortably in bed with dressing clean, dry and intact. Heel off-loading boots in place B/L.    Vascular: Palpable DP/PT pulses B/L. Moderate non-pitting edema noted right foot. Hair growth absent B/L. CFT brisk to B/L hallux. Temperature is warm to cool from tibial tuberosity to distal digits B/L. No lymphatic streaking noted B/L.    Musculoskeletal: Gross active and passive ROM intact to age and activity level. Moves all extremities spontaneously. No pain to palpation at feet B/L.     Neurological: Absent light touch sensation B/L. Pain stimuli diminished B/L.     Dermatologic: Nails 1-5 are within normal limits for thickness and length B/L. Skin appears diffusely xerotic B/L. Web spaces 1-4 B/L are clean dry, intact left foot, 1-3 right foot (h/o amputation right foot). No hyperkeratotic tissue noted B/L. A stable  eschar noted to his proximal anterior shin as well, which measures 2cm x 1.5cm.    Wound:  Measurements: 12cm x 8cm x 0.5cm deep at proximal lateral curved portion that extends an additional 6cm  Mixed wound base of 80% granular tissue with 20% various areas of fibrotic tissue.  Moderate serosanguinous drainage with fibrotic slough.   Minimal basil-wound maceration.   Mild basil-wound erythema.   Mild evidence of cellulitis which does not appear to be ascending  No palpable fluctuance. Mild malodor improved since several days ago. Mild increased warmth.   No probe to bone. Minimal tunneling or tracking.   No undermining. Skin edges irregular but intact.    LABS:   Results for orders placed or performed during the hospital encounter of 07/11/24 (from the past 24 hour(s))   CBC and Auto Differential   Result Value Ref Range    WBC 8.0 4.4 - 11.3 x10*3/uL    nRBC 0.0 0.0 - 0.0 /100 WBCs    RBC 3.59 (L) 4.50 - 5.90 x10*6/uL    Hemoglobin 9.8 (L) 13.5 - 17.5 g/dL    Hematocrit 29.1 (L) 41.0 - 52.0 %    MCV 81 80 - 100 fL    MCH 27.3 26.0 - 34.0 pg    MCHC 33.7 32.0 - 36.0 g/dL    RDW 15.9 (H) 11.5 - 14.5 %    Platelets 203 150 - 450 x10*3/uL    Neutrophils % 67.3 40.0 - 80.0 %    Immature Granulocytes %, Automated 1.2 (H) 0.0 - 0.9 %    Lymphocytes % 17.8 13.0 - 44.0 %    Monocytes % 7.5 2.0 - 10.0 %    Eosinophils % 5.6 0.0 - 6.0 %    Basophils % 0.6 0.0 - 2.0 %    Neutrophils Absolute 5.40 1.20 - 7.70 x10*3/uL    Immature Granulocytes Absolute, Automated 0.10 0.00 - 0.70 x10*3/uL    Lymphocytes Absolute 1.43 1.20 - 4.80 x10*3/uL    Monocytes Absolute 0.60 0.10 - 1.00 x10*3/uL    Eosinophils Absolute 0.45 0.00 - 0.70 x10*3/uL    Basophils Absolute 0.05 0.00 - 0.10 x10*3/uL   Comprehensive Metabolic Panel   Result Value Ref Range    Glucose 112 (H) 65 - 99 mg/dL    Sodium 132 (L) 133 - 145 mmol/L    Potassium 5.1 3.4 - 5.1 mmol/L    Chloride 98 97 - 107 mmol/L    Bicarbonate 21 (L) 24 - 31 mmol/L    Urea Nitrogen 51 (H) 8  - 25 mg/dL    Creatinine 1.50 0.40 - 1.60 mg/dL    eGFR 53 (L) >60 mL/min/1.73m*2    Calcium 9.5 8.5 - 10.4 mg/dL    Albumin 3.7 3.5 - 5.0 g/dL    Alkaline Phosphatase 119 35 - 125 U/L    Total Protein 7.2 5.9 - 7.9 g/dL    AST 15 5 - 40 U/L    Bilirubin, Total <0.2 0.1 - 1.2 mg/dL    ALT 11 5 - 40 U/L    Anion Gap 13 <=19 mmol/L   Blood Gas Lactic Acid, Venous   Result Value Ref Range    POCT Lactate, Venous 0.7 0.4 - 2.0 mmol/L   Blood Culture    Specimen: Peripheral Venipuncture; Blood culture   Result Value Ref Range    Blood Culture Loaded on Instrument - Culture in progress    Blood Culture    Specimen: Peripheral Venipuncture; Blood culture   Result Value Ref Range    Blood Culture Loaded on Instrument - Culture in progress    Magnesium   Result Value Ref Range    Magnesium 1.80 1.60 - 3.10 mg/dL   C-reactive protein   Result Value Ref Range    C-Reactive Protein 5.80 (H) 0.00 - 2.00 mg/dL   Sedimentation rate, automated   Result Value Ref Range    Sedimentation Rate 69 (H) 0 - 20 mm/h   Urinalysis with Reflex Culture and Microscopic   Result Value Ref Range    Color, Urine Light-Yellow Light-Yellow, Yellow, Dark-Yellow    Appearance, Urine Clear Clear    Specific Gravity, Urine 1.009 1.005 - 1.035    pH, Urine 5.0 5.0, 5.5, 6.0, 6.5, 7.0, 7.5, 8.0    Protein, Urine NEGATIVE NEGATIVE, 10 (TRACE), 20 (TRACE) mg/dL    Glucose, Urine Normal Normal mg/dL    Blood, Urine NEGATIVE NEGATIVE    Ketones, Urine NEGATIVE NEGATIVE mg/dL    Bilirubin, Urine NEGATIVE NEGATIVE    Urobilinogen, Urine Normal Normal mg/dL    Nitrite, Urine NEGATIVE NEGATIVE    Leukocyte Esterase, Urine NEGATIVE NEGATIVE   POCT GLUCOSE   Result Value Ref Range    POCT Glucose 94 74 - 99 mg/dL   CBC and Auto Differential   Result Value Ref Range    WBC 5.7 4.4 - 11.3 x10*3/uL    nRBC 0.0 0.0 - 0.0 /100 WBCs    RBC 3.35 (L) 4.50 - 5.90 x10*6/uL    Hemoglobin 9.0 (L) 13.5 - 17.5 g/dL    Hematocrit 27.2 (L) 41.0 - 52.0 %    MCV 81 80 - 100 fL     MCH 26.9 26.0 - 34.0 pg    MCHC 33.1 32.0 - 36.0 g/dL    RDW 15.8 (H) 11.5 - 14.5 %    Platelets 172 150 - 450 x10*3/uL    Neutrophils % 63.2 40.0 - 80.0 %    Immature Granulocytes %, Automated 1.1 (H) 0.0 - 0.9 %    Lymphocytes % 20.2 13.0 - 44.0 %    Monocytes % 8.3 2.0 - 10.0 %    Eosinophils % 6.3 0.0 - 6.0 %    Basophils % 0.9 0.0 - 2.0 %    Neutrophils Absolute 3.60 1.20 - 7.70 x10*3/uL    Immature Granulocytes Absolute, Automated 0.06 0.00 - 0.70 x10*3/uL    Lymphocytes Absolute 1.15 (L) 1.20 - 4.80 x10*3/uL    Monocytes Absolute 0.47 0.10 - 1.00 x10*3/uL    Eosinophils Absolute 0.36 0.00 - 0.70 x10*3/uL    Basophils Absolute 0.05 0.00 - 0.10 x10*3/uL   Type And Screen   Result Value Ref Range    ABO TYPE B     Rh TYPE POS     ANTIBODY SCREEN NEG    Protime-INR   Result Value Ref Range    Protime 11.3 9.3 - 12.7 seconds    INR 1.1 0.9 - 1.2   NT-PROBNP   Result Value Ref Range    PROBNP 603 (H) 0 - 177 pg/mL   Creatine Kinase   Result Value Ref Range    Creatine Kinase 29 24 - 195 U/L   Serial Troponin, Initial ([facility])   Result Value Ref Range    Troponin T, High Sensitivity 51 (HH) <=14 ng/L   Comprehensive Metabolic Panel   Result Value Ref Range    Glucose 73 65 - 99 mg/dL    Sodium 132 (L) 133 - 145 mmol/L    Potassium 4.3 3.4 - 5.1 mmol/L    Chloride 102 97 - 107 mmol/L    Bicarbonate 18 (L) 24 - 31 mmol/L    Urea Nitrogen 42 (H) 8 - 25 mg/dL    Creatinine 1.20 0.40 - 1.60 mg/dL    eGFR 69 >60 mL/min/1.73m*2    Calcium 9.1 8.5 - 10.4 mg/dL    Albumin 3.2 (L) 3.5 - 5.0 g/dL    Alkaline Phosphatase 106 35 - 125 U/L    Total Protein 6.2 5.9 - 7.9 g/dL    AST 14 5 - 40 U/L    Bilirubin, Total 0.2 0.1 - 1.2 mg/dL    ALT 10 5 - 40 U/L    Anion Gap 12 <=19 mmol/L      Lab Results   Component Value Date    HGBA1C 4.4 03/19/2024      Lab Results   Component Value Date    CRP 5.80 (H) 07/11/2024      Lab Results   Component Value Date    SEDRATE 69 (H) 07/11/2024        Results from last 7 days   Lab Units  07/12/24  0450   WBC AUTO x10*3/uL 5.7   RBC AUTO x10*6/uL 3.35*   HEMOGLOBIN g/dL 9.0*   HEMATOCRIT % 27.2*     Results from last 7 days   Lab Units 07/12/24  0451 07/11/24  1555   SODIUM mmol/L 132* 132*   POTASSIUM mmol/L 4.3 5.1   CHLORIDE mmol/L 102 98   CO2 mmol/L 18* 21*   BUN mg/dL 42* 51*   CREATININE mg/dL 1.20 1.50   CALCIUM mg/dL 9.1 9.5   MAGNESIUM mg/dL  --  1.80   BILIRUBIN TOTAL mg/dL 0.2 <0.2   ALT U/L 10 11   AST U/L 14 15     Results from last 7 days   Lab Units 07/11/24  1755   COLOR U  Light-Yellow   APPEARANCE U  Clear   PH U  5.0   SPEC GRAV UR  1.009   PROTEIN U mg/dL NEGATIVE   BLOOD UR  NEGATIVE   NITRITE U  NEGATIVE       IMAGING REVIEW:  CT foot right wo IV contrast    Result Date: 7/11/2024  Interpreted By:  Luan Esposito, STUDY: CT FOOT RIGHT WO IV CONTRAST;  7/11/2024 6:42 pm   INDICATION: Signs/Symptoms:Osteomyelitis question deep tissue abscess.   COMPARISON: Right foot radiographs of 11/20/2024   ACCESSION NUMBER(S): [id number]   ORDERING CLINICIAN: NIRMALA LIM   TECHNIQUE: CT imaging of the right foot was obtained without contrast. Coronal and sagittal reformatted images were performed. 3D reconstructed images were not performed at time of dictation therefore not used during interpretation.   FINDINGS: OSSEOUS STRUCTURES: There is a large area of ulceration along the lateral aspect of the ankle and hindfoot extending to the bone surface of the 4th and 5th metatarsal bones. There is destructive osseous changes involving the head and neck of the 4th metatarsal bone consistent with osteomyelitis. The 4th proximal phalanx is dislocated dorsally. There is extensive osseous destruction and erosive change involving the 5th metatarsal base with intraosseous and adjacent soft tissue gas consistent with osteomyelitis. There is multiple areas of confluent low-density soft tissue that likely relates to fluid or phlegmonous changes in this includes a 3.9 cm x 1.7 cm by 3.5 cm area  along the dorsal aspect of the cuboid bone and lateral cuneiform bone and another confluent area of low density adjacent to the ulceration at base of 5th metatarsal bone that measures approximately 3.9 cm x 0.7 cm x 1.0 cm. There is diffuse periosteal new bone formation involving the 2nd-4th metatarsal bones that may be also reactive or indicative of chronic osteomyelitis. There are tubular bony defects within the medial cuneiform bone and calcaneus that are fluid-filled and communicate with the adjacent soft tissue. Within these bony defects is soft tissue thickening and in the case of the calcaneus, small amount of fluid collection measuring 1.7 cm x 0.8 cm. This fluid filled tract appears to extend toward the skin surface. Correlate for sinus tract formation. There is diffuse subcutaneous edema about the distal leg, ankle, foot likely representing cellulitis. Confluent areas of severe edema noted along the distal forefoot and toes.   There is diffuse muscle atrophy.       Large area of ulceration along the lateral aspect of the ankle and hindfoot extending to the bone surface of the 4th and 5th metatarsal bones. There is destructive osseous changes involving the head and neck of the 4th metatarsal bone consistent with osteomyelitis. The 4th proximal phalanx is dislocated dorsally. There is extensive osseous destruction and erosive change involving the 5th metatarsal base with intraosseous and adjacent soft tissue gas consistent with osteomyelitis. There is multiple areas of confluent low-density soft tissue that likely relates to fluid or phlegmonous changes in this includes a 3.9 cm x 1.7 cm by 3.5 cm area along the dorsal aspect of the cuboid bone and lateral cuneiform bone and another confluent area of low density adjacent to the ulceration at base of 5th metatarsal bone that measures approximately 3.9 cm x 0.7 cm x 1.0 cm. There is diffuse periosteal new bone formation involving the 2nd-4th metatarsal bones  that may be also reactive or indicative of chronic osteomyelitis. There are tubular bony defects within the medial cuneiform bone and calcaneus that are fluid-filled and communicate with the adjacent soft tissue. Within these bony defects is soft tissue thickening and in the case of the calcaneus, small amount of fluid collection measuring 1.7 cm x 0.8 cm. This fluid filled tract appears to extend toward the skin surface. Correlate for sinus tract formation. There is diffuse subcutaneous edema about the distal leg, ankle, foot likely representing cellulitis.   MACRO: None.   Signed by: Luan Esposito 7/11/2024 7:27 PM Dictation workstation:   [id number]    XR chest 1 view    Result Date: 7/11/2024  Interpreted By:  Liu Ramirez, STUDY: XR CHEST 1 VIEW;  7/11/2024 6:40 pm   INDICATION: Signs/Symptoms:History of heart failure.   COMPARISON: 04/24/2024   ACCESSION NUMBER(S): [id number]   ORDERING CLINICIAN: NIRMALA LIM   FINDINGS:         CARDIOMEDIASTINAL SILHOUETTE: Cardiomediastinal silhouette is normal in size and configuration.   LUNGS: Lungs are clear.   ABDOMEN: No remarkable upper abdominal findings.   BONES: No acute osseous changes.       1.  No evidence of acute cardiopulmonary process.       MACRO: None   Signed by: Liu Ramirez 7/11/2024 6:46 PM Dictation workstation:   [id number]    XR foot right 3+ views    Result Date: 7/11/2024  Interpreted By:  Liu Ramirez, STUDY: XR FOOT RIGHT 3+ VIEWS; ;  7/11/2024 5:05 pm   INDICATION: foot infection.   COMPARISON: 03/19/2024   ACCESSION NUMBER(S): [id number]   ORDERING CLINICIAN: HAYLEE MI   FINDINGS: Right foot, three views   The patient is status post partial amputation of the 5th ray. There is interval osseous erosive changes and irregularity at the 5th metatarsal stump with heterotopic osseous productive change at the lateral aspect of the hindfoot. Extensive periosteal reaction in the 4th metatarsal as well with erosive  changes and osseous destruction of the 4th metatarsal head. There is periosteal reaction irregularity at the 4th proximal phalangeal base. There is moderate to severe periosteal reaction in the 3rd metatarsal diaphysis. Gross material at the lateral aspect of the forefoot likely along a skin and soft tissue defect. There is severe osteopenia.       1. Osseous erosions with osseous destruction of the 4th metatarsal head severe periosteal reaction along the 4th metatarsal diaphysis. Periosteal reaction and irregularity at the 4th proximal phalanx as well. Findings compatible with osteomyelitis and potential septic arthritis of 4th MTP joint. 2. Osseous irregularity and destruction of the 5th metatarsal stump along with extensive osseous productive changes. Findings are also consistent with osteomyelitis. 3. Periosteal reaction along the 3rd metatarsal diaphysis may be reactive or represent additional focus of osteomyelitis. 4. MRI can be performed to evaluate extent of disease       MACRO: None   Signed by: Liu Ramirez 7/11/2024 5:12 PM Dictation workstation:   [id number]            Assessment/Plan   ASSESSMENT & PLAN:    Patient Claude Coley is a 63 y.o. male with PMH of CHF, DM2, charcot  right foot who presents for right foot infected ulcer. He has had extensive intervention for his right foot ulcer including peroneus brevis muscle flap and STSG. He is followed by Dr. Perry weekly.    #Cellulitis right foot  #Chronic non-pressure ulcer with fat layer exposed right foot  #Chronic osteomyelitis right foot  #Charcot foot RLE  #T2DM with peripheral polyneuropathy  #Localized edema, RLE  #PVD    - Patient was seen and evaluated; all findings were discussed and all questions were answered to patient's satisfaction.  - Charts, labs, vitals and imaging all reviewed.   - Imaging: Imaging with periosteal reaction to 3rd and 4th metatarsal shafts and erosive changes to 5th met stump and 4th proximal phalanx.  -  Labs: WBC 5.7 down from 8.0 yesterday, ESR 69, CRP 5.8  - Wound culture: Cx to be taken intra-op.   - Blood culture: NTD.    Plan:  - Abx: IV vancomycin and zosyn.  - Pain Regimen: per primary  - CT scan reviewed. Findings are consistent with Charcot. Areas of bone loss in the calcaneus and cuneiform are likely from the pin sites from the external fixation device that was recently removed.   - Patient schedule for debridement with graft application today.  - Surgery scheduled for today as an add-on. Has been NPO since midnight.  - After procedure dressing will be left intact until follow-up in the clinic.  - Patient will be cleared for discharge from a podiatry standpoint after.  - Dressings: betadine, adaptic, 4x4s ABD, kerlix, ACE up the knee.  - Nursing staff is able to reinforce dressing if & as necessary until next day’s dressing change. Thank you.  - Podiatry will continue to follow while in house.    DVT ppx: Not currently on thinners since he was bleeding through his dressing yesterday.  Weightbearing: NWB for today.  Discharge: Pt to follow up 1 week after discharge with Dr. Perry    Case to be discussed with attending, A&P above reflects a tentative plan. Please await for the final signature from the attending physician on service Dr. Bolaños.    Bobo Crenshaw DPM PGY-3  Podiatric Medicine & Surgery  [facility]            SIGNATURE: Bobo Crenshaw DPM PATIENT NAME: Claude Coley   DATE: July 12, 2024 MRN: [id number]   TIME: 6:38 AM CONTACT: Haiku message

## 2024-07-12 NOTE — PROGRESS NOTES
Vancomycin Dosing by Pharmacy- FOLLOW UP    Claude Coley is a 63 y.o. year old male who Pharmacy has been consulted for vancomycin dosing for osteomyelitis/septic arthritis. Based on the patient's indication and renal status this patient is being dosed based on a goal AUC of 400-600.     Renal function is currently improving.    Current vancomycin dose: 1500 mg given every 24 hours    Estimated vancomycin AUC on current dose: 397 mg/L.hr     Visit Vitals  /61 (BP Location: Right arm, Patient Position: Lying)   Pulse 68   Temp 36.5 °C (97.7 °F) (Temporal)   Resp 15        Lab Results   Component Value Date    CREATININE 1.20 2024    CREATININE 1.50 2024    CREATININE 1.00 2024    CREATININE 1.00 2024        Patient weight is as follows:   Vitals:    24 1516   Weight: 106 kg (233 lb 7.5 oz)       Cultures:  No results found for the encounter in last 14 days.       I/O last 3 completed shifts:  In: 1500 (14.2 mL/kg) [IV Piggyback:1500]  Out: 675 (6.4 mL/kg) [Urine:675 (0.2 mL/kg/hr)]  Weight: 105.9 kg   I/O during current shift:  I/O this shift:  In: 50 [IV Piggyback:50]  Out: 800 [Urine:800]    Temp (24hrs), Av.4 °C (97.5 °F), Min:35.6 °C (96.1 °F), Max:36.9 °C (98.4 °F)      Assessment/Plan    Below goal AUC. Orders placed for new vancomcyin regimen of 1000 mg every 12 hours to begin at 1100 .     This dosing regimen is predicted by [employer] to result in the following pharmacokinetic parameters:  Loading dose: N/A  Regimen: 1000 mg IV every 12 hours.  Start time: 04:07 on 2024  Exposure target: AUC24 (range)400-600 mg/L.hr   AUC24,ss: 514 mg/L.hr  Probability of AUC24 > 400: 76 %  Ctrough,ss: 17.3 mg/L  Probability of Ctrough,ss > 20: 36 %  Probability of nephrotoxicity (Lodise ADRYAN ): 13 %    The next level will be obtained on  at 0500. May be obtained sooner if clinically indicated.   Will continue to monitor renal function daily while on vancomycin and  order serum creatinine at least every 48 hours if not already ordered.  Follow for continued vancomycin needs, clinical response, and signs/symptoms of toxicity.       Obdulia Ayala, PharmD

## 2024-07-12 NOTE — CARE PLAN
The patient's goals for the shift include      The clinical goals for the shift include pain management, wound care, safety awareness      Problem: Safety - Adult  Goal: Free from fall injury  Outcome: Progressing     Problem: Pain - Adult  Goal: Verbalizes/displays adequate comfort level or baseline comfort level  Outcome: Progressing     Problem: Chronic Conditions and Co-morbidities  Goal: Patient's chronic conditions and co-morbidity symptoms are monitored and maintained or improved  Outcome: Progressing

## 2024-07-12 NOTE — NURSING NOTE
Patient back to unit from PACU. Dressing to right foot is c/d/I. Pt ordering food at this time. No complaints of pain.

## 2024-07-12 NOTE — PROGRESS NOTES
07/12/24 1122   Discharge Planning   Living Arrangements Alone   Support Systems Family members   Assistance Needed independent with walker   Type of Residence Private residence   Number of Stairs to Enter Residence 0   Number of Stairs Within Residence 0   Do you have animals or pets at home? No   Who is requesting discharge planning? Provider   Home or Post Acute Services In home services   Type of Home Care Services Home PT;Home nursing visits   Expected Discharge Disposition HH Services   Does the patient need discharge transport arranged? No   Patient Choice   Provider Choice list and CMS website (https://medicare.gov/care-compare#search) for post-acute Quality and Resource Measure Data were provided and reviewed with: Patient   Patient / Family choosing to utilize agency / facility established prior to hospitalization Yes  ([facility])     OR add on today.  Wants to resume [facility] upon discharge.  Careport referral placed.

## 2024-07-12 NOTE — PROGRESS NOTES
Claude Coley is a 63 y.o. male on day 1 of admission presenting with Osteomyelitis of fourth toe of right foot (Multi).      Subjective   He has no pain.  Plans per podiatry for debridement today       Objective     Last Recorded Vitals  /61 (BP Location: Right arm, Patient Position: Lying)   Pulse 68   Temp 36.5 °C (97.7 °F) (Temporal)   Resp 15   Wt 106 kg (233 lb 7.5 oz)   SpO2 98%   Intake/Output last 3 Shifts:    Intake/Output Summary (Last 24 hours) at 7/12/2024 1004  Last data filed at 7/12/2024 0950  Gross per 24 hour   Intake 1550 ml   Output 1475 ml   Net 75 ml       Physical Exam  Alert oriented x 3 cooperative no discharge  Chest clear  Heart regular  Abdomen soft nontender  Extremities no edema right foot in dressing no further bleeding  Neurologic examination nonfocal  Psych no active psychosis  Relevant Results  Reviewed including CAT scan  Assessment/Plan     Principal Problem:    Osteomyelitis of fourth toe of right foot (Multi)  Active Problems:    Type 2 diabetes mellitus with right diabetic foot ulcer (Multi)    Cellulitis of right foot    Non-pressure chronic ulcer of 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

## 2024-07-12 NOTE — OP NOTE
PODIATRIC OPERATIVE REPORT    LOG ID: [id number]  SURGERY/PROCEDURE DATE: 7/11/2024 - 7/12/2024  OR LOCATION: [facility]    SURGEON(S)/PROCEDURALIST(S) AND ASSISTANT(S):  Primary: Sonido Bolaños DPM  Resident - Assisting: Bobo Crenshaw DPM/PGY-3  Erik Delgado DPM/PGY-3    OTHER OR STAFF:  Circulator: Dania Bary RN  Scrub Person: Cas Olea RN; Breann Cartwright RN    SURGERY/PROCEDURE(S):    (1) Incision and Drainage Bone Lower Extremity, Wound Debridement, Application Allograft  79721 - DE INCISION BONE CORTEX FOOT  (2) Debridement of wound down to and including subcutaneous tissue, right ( 50505 + 11045x2)  (3)  application of skin substitute, right (82631)    PRE-OP/PRE-PROCEDURE DIAGNOSIS:   (1) Cellulitis of right foot [L03.115]  (2) Non-pressure chronic ulcer of other part of right foot with fat layer exposed (Multi) [L97.512]  (3) Type 2 diabetes mellitus with right diabetic foot ulcer (Multi) [E11.621, L97.519]    POST-OP/POST-PROCEDURE DIAGNOSIS: Same as Pre-Op    ANESTHESIA:   Anesthesia: Monitor Anesthesia Care  ASA: III  Anesthesia Staff: Anesthesiologist: Wilmer Hurst MD  Intra-op Medications:   Administrations occurring from 1245 to 1430 on 07/12/24:   Medication Name Total Dose   acetaminophen (Tylenol) tablet 650 mg Cannot be calculated   atorvastatin (Lipitor) tablet 40 mg Cannot be calculated   carvedilol (Coreg) tablet 12.5 mg Cannot be calculated   dextrose 50 % injection 12.5 g Cannot be calculated   dextrose 50 % injection 25 g Cannot be calculated   finasteride (Proscar) tablet 5 mg Cannot be calculated   gemfibrozil (Lopid) tablet 600 mg Cannot be calculated   glucagon (Glucagen) injection 1 mg Cannot be calculated   glucagon (Glucagen) injection 1 mg Cannot be calculated   heparin (porcine) injection 5,000 Units Cannot be calculated   insulin lispro (HumaLOG) injection 0-10 Units Cannot be calculated   OLANZapine (ZyPREXA) tablet 20 mg Cannot be calculated   pantoprazole (ProtoNix)  EC tablet 40 mg Cannot be calculated   piperacillin-tazobactam (Zosyn) 3.375 g in dextrose (iso) IV 50 mL Cannot be calculated   polyethylene glycol (Glycolax, Miralax) packet 17 g Cannot be calculated   vancomycin (Vancocin) pharmacy to dose - pharmacy monitoring Cannot be calculated   vancomycin (Xellia) 1 g in diluent combination  mL Cannot be calculated       ESTIMATED BLOOD LOSS: 5 cc    SPECIMENS: wound right foot    IMPLANTABLE DEVICES:  Nothing was implanted during the procedure    FINDINGS: Intraoperative findings consistent with clinical and radiographic findings.    DRAINS: N/A    TOURNIQUET TIMES:  N/A    COMPLICATIONS: None; patient tolerated the procedure well.       SURGERY/PROCEDURE DETAILS:  INDICATIONS FOR PROCEDURE:   Patient Claude Coley is a 63 y.o. male with PMH of CHF, DM2, charcot  right foot who presents for right foot infection. He has had extensive intervention for his right foot ulcer, is s/p partial 5th ray resection with wound debridement and application of graft (1/26/24), wound debridement, peroneus brevis muscle flap, application of ex fix (3/27/24), and removal of ex fix, wound debridement, prep of graft site and STSG application (4/24/24). He is followed by Dr. Perry weekly. He presented from the [facility] ER yesterday with worsening of infection on his right foot ulcer. He reported compliance with his prescribed outpatient PO antibiotics doxycycline and ciprofloxacin which failed to show improvement. He was put on IV antibiotics while in-house but to better control infective source and get a culture to help with antibiotic coverage, incision to bone cortex was important to control infective source with debridement and application of graft to help with wound healing as well. The nature of the deformity, problems anticipated procedures, recovery/convalescence, risks/complications including but not limited to numbness, CRPS, over/under correction, problems healing of soft  tissue or bone, postoperative wound infection, wound dehiscence, DVT and/or persistent pain/disability have been explained to the patient in detail. An updated H&P and consent have been completed prior to today’s surgical intervention. The patient states that they have been NPO since midnight. No guarantees were given or implied, but it is expected that the patient will have a favorable outcome.  It is with this understanding that we proceed.     PRE-PROCEDURE INFORMATION:  In pre-op holding area, the lower extremity to be operated on was clearly marked and the patient verified correct laterality of the marking.  The patient was brought to the operating room and placed on the operating table in the supine position.  A timeout was performed in which identification of the correct patient, procedure, location, and materials was done. No tourniquet used during this procedure. Following IV sedation, the right foot was then scrubbed, prepped and draped in the usual aseptic manner.     DESCRIPTION OF PROCEDURE:     Procedure 1:   Attention was directed to the plantar aspect of the patient's right foot.  Upon inspection it was noted that 2 areas of ulceration probed down to the level of bone.  The base of the 5th metatarsal remnant as well as the 4th metatarsal head.  Fourth metatarsal head appears stable at this time.  There was some mild fragmentation noted to the base of the 5th metatarsal. A  15. Blade was then used to incise the convex and a rongeur was then used to clear any fragmentation of bone and nonviable tissue from the area.  A culture swab was then placed in the area and passed the back table elevation.      Procedure 2:  At this time attention was then directed to the right lower extremity where the ulceration is noted with fat layer exposed. The wound was observed along his lateral right foot extending to plantar foot. It had an irregular shape that we measured separately to help delineate shape and  dimensions. It was measured as 12 cm x 2 cm extending along lateral right foot connecting with a more rounded shaped plantar ulcer measuring 6 cm x 6 cm for a total surface area of 50 square cm. Debridement was performed utilizing excisional debridement with a #15 blade and a curette to remove any necrotic and fibrotic tissue at the site of the ulceration down to and including to the level of   Subcutaneous tissue.  There was no purulence expressed at this point in time. The area was inspected and any areas of tracking, especially along the course of tendons, were also drained and irrigated appropriately with 3 L sterile saline via pulse lavage.      Procedure 3:   At this time Myriad Morsells was prepped on the back table per the "[employer]" user manual with a mix of saline and letting sit. After draining any extra fluid to allow bioscaffold to be better applied to the wound, a Sayer elevator was used to smear it on the wound bed to promote healing. Myriad Matrix sheets were then applied over the wound in its entirety and secured to the wound edge with skin staples. Adaptic was then applied over the top of this to further prevent pulling off during dressing changes and this was secured to the skin with skin staples as well. Finally, a saline wet to dry dressing was applied to the wound with saline-soaked 4x4 gauze, dry 4x4 gauze, ABD, kerlix, 2 layers of specialist padding, and an ACE wrap.    POSTOPERATIVE INFORMATION: The patient tolerated the above noted procedure and anesthesia well and was transferred to the PACU with vital signs stable, and vascular status intact with capillary refill intact to the most distal aspect of the operative extremity. Postoperative instructions reviewed in detail with the patient and family with written instructions provided. Patient will return to floor. Should any problems, questions, or concerns arise, primary team to [facility] or [facility] podiatry team.    This is Bobo Crenshaw  MEGAM dictating the operative note for Dr. Miky DPM.      Attending Attestation:       SIGNATURE: Bobo Crenshaw DPM PATIENT NAME: Claude Coley   DATE: July 12, 2024 MRN: [id number]   TIME: 1:45 PM

## 2024-07-12 NOTE — CARE PLAN
Problem: Diabetes  Goal: Achieve decreasing blood glucose levels by end of shift  Outcome: Progressing  Goal: Increase stability of blood glucose readings by end of shift  Outcome: Progressing  Goal: Decrease in ketones present in urine by end of shift  Outcome: Progressing  Goal: Maintain electrolyte levels within acceptable range throughout shift  Outcome: Progressing  Goal: Maintain glucose levels >70mg/dl to <250mg/dl throughout shift  Outcome: Progressing  Goal: No changes in neurological exam by end of shift  Outcome: Progressing  Goal: Learn about and adhere to nutrition recommendations by end of shift  Outcome: Progressing  Goal: Vital signs within normal range for age by end of shift  Outcome: Progressing  Goal: Increase self care and/or family involovement by end of shift  Outcome: Progressing  Goal: Receive DSME education by end of shift  Outcome: Progressing     Problem: Pain - Adult  Goal: Verbalizes/displays adequate comfort level or baseline comfort level  Outcome: Progressing     Problem: Safety - Adult  Goal: Free from fall injury  Outcome: Progressing     Problem: Discharge Planning  Goal: Discharge to home or other facility with appropriate resources  Outcome: Progressing     Problem: Chronic Conditions and Co-morbidities  Goal: Patient's chronic conditions and co-morbidity symptoms are monitored and maintained or improved  Outcome: Progressing     Problem: Fall/Injury  Goal: Not fall by end of shift  Outcome: Progressing  Goal: Be free from injury by end of the shift  Outcome: Progressing  Goal: Verbalize understanding of personal risk factors for fall in the hospital  Outcome: Progressing  Goal: Verbalize understanding of risk factor reduction measures to prevent injury from fall in the home  Outcome: Progressing  Goal: Use assistive devices by end of the shift  Outcome: Progressing  Goal: Pace activities to prevent fatigue by end of the shift  Outcome: Progressing     Problem: Pain  Goal:  Takes deep breaths with improved pain control throughout the shift  Outcome: Progressing  Goal: Turns in bed with improved pain control throughout the shift  Outcome: Progressing  Goal: Walks with improved pain control throughout the shift  Outcome: Progressing  Goal: Performs ADL's with improved pain control throughout shift  Outcome: Progressing  Goal: Participates in PT with improved pain control throughout the shift  Outcome: Progressing  Goal: Free from opioid side effects throughout the shift  Outcome: Progressing  Goal: Free from acute confusion related to pain meds throughout the shift  Outcome: Progressing   The patient's goals for the shift include      The clinical goals for the shift include pain management, wound care, safety awareness    Over the shift, the patient did not make progress toward the following goals. Barriers to progression include pain, wound, fall risk. Recommendations to address these barriers include pain medication, wound care, fall risk interventions.

## 2024-07-12 NOTE — ANESTHESIA PREPROCEDURE EVALUATION
Patient: Claude Coley    Procedure Information       Date/Time: 07/12/24 3157    Procedure: Incision and Drainage Bone Lower Extremity, Wound Debridement, Application Allograft (Right)    Location: [facility] / [facility]    Surgeons: Sonido Bolaños DPM            Relevant Problems   Cardiac   (+) CHF (congestive heart failure) (Multi)   (+) HTN (hypertension)   (+) Hyperlipidemia      /Renal   (+) BPH (benign prostatic hyperplasia)      Endocrine   (+) Diabetes mellitus, type 2 (Multi)   (+) Type 2 diabetes mellitus with right diabetic foot ulcer (Multi)      Hematology   (+) Anemia      ID   (+) Gas gangrene of foot (Multi)   (+) Osteomyelitis of fourth toe of right foot (Multi)       Clinical information reviewed:   Tobacco  Allergies    Med Hx  Surg Hx   Fam Hx  Soc Hx        NPO Detail:  No data recorded     Physical Exam    Airway  Mallampati: II  Neck ROM: limited     Cardiovascular   Rhythm: regular  Rate: normal     Dental - normal exam     Pulmonary   Breath sounds clear to auscultation     Abdominal   Abdomen: soft             Anesthesia Plan    History of general anesthesia?: yes  History of complications of general anesthesia?: no    ASA 3     general     The patient is not a current smoker.    intravenous induction   Postoperative administration of opioids is intended.  Anesthetic plan and risks discussed with patient.    Plan discussed with CRNA, attending and CAA.

## 2024-07-12 NOTE — CONSULTS
Inpatient consult to Infectious Diseases  Consult performed by: Bere Jacobo, APRN-CNP  Consult ordered by: Connie Neumann MD  Reason for consult: Right foot osteomyelitis          Primary MD: Darryl Jenkins MD      History Of Present Illness  Claude Coley is a 63 y.o. male presented to the emergency room with worsening right foot infection.  He has history of Charcot left foot with extensive surgical intervention.  History of MRSA infection, bacteroides, strep.  Discharged in April 2024 IV vancomycin till 5/13/2024 for MRSA infection.  He reports he was doing well until approximately 2 weeks ago.  He reports he noticed right foot swelling.  He reports he followed up with podiatry several days ago and was prescribed oral doxycycline and ciprofloxacin.  He reports he was taking the medication without improvement of symptoms.  He reported malodor and increased drainage.  He denies fever chills nausea vomiting or diarrhea.  He denies shortness of breath cough or chest pain.  Labs with no leukocytosis.  CT of right foot showed large area of ulceration along the lateral aspect of ankle and hindfoot extending to bone surface of fourth and fifth metatarsal bones.  Destructive osseous changes consistent with osteomyelitis. Small fluid collection.  Chest xray unremarkable.   He was evaluated by podiatry who plan irrigation and debridement today.  He is on IV Zosyn, vancomycin.     Past Medical History  He has a past medical history of CHF (congestive heart failure) (Multi), Diabetes mellitus (Multi), and Hypertension.    Surgical History  He has no past surgical history on file.     Social History     Occupational History    Not on file   Tobacco Use    Smoking status: Some Days     Current packs/day: 1.00     Average packs/day: 1 pack/day for 20.0 years (20.0 ttl pk-yrs)     Types: Cigarettes    Smokeless tobacco: Never   Substance and Sexual Activity    Alcohol use: Yes     Alcohol/week: 12.0  standard drinks of alcohol     Types: 12 Cans of beer per week    Drug use: Never    Sexual activity: Not on file     Travel History   Travel since 06/12/24    No documented travel since 06/12/24              Family History  No family history on file.  Allergies  Bee sting kit and Tramadol     Immunization History   Administered Date(s) Administered    Pfizer COVID-19 vaccine, Fall 2023, 12 years and older, (30mcg/0.3mL) 10/26/2023     Medications  Home medications:  Medications Prior to Admission   Medication Sig Dispense Refill Last Dose    acetaminophen (Tylenol) 325 mg tablet Take 2 tablets (650 mg) by mouth every 4 hours if needed for mild pain (1 - 3). 30 tablet 0     aspirin 81 mg EC tablet Take 1 tablet (81 mg) by mouth once daily.       atorvastatin (Lipitor) 40 mg tablet Take 1 tablet (40 mg) by mouth once daily.       carvedilol (Coreg) 12.5 mg tablet Take 1 tablet (12.5 mg) by mouth 2 times a day with meals.       cyclobenzaprine (Flexeril) 10 mg tablet Take 1 tablet (10 mg) by mouth 3 times a day as needed for muscle spasms for up to 15 days. 45 tablet 0     finasteride (Proscar) 5 mg tablet Take 1 tablet (5 mg) by mouth once daily. Do not crush, chew, or split.       furosemide (Lasix) 20 mg tablet Take 1 tablet (20 mg) by mouth once daily.       gemfibrozil (Lopid) 600 mg tablet Take 1 tablet (600 mg) by mouth 2 times a day before meals.       heparin sodium,porcine (heparin, porcine,) 5,000 unit/mL injection Inject 1 mL (5,000 Units) under the skin every 8 hours. 100 mL 0     lisinopril 20 mg tablet Take 1 tablet (20 mg) by mouth once daily.       metFORMIN, OSM, (Fortamet) 500 mg 24 hr tablet Take 1 tablet (500 mg) by mouth once daily in the evening. Take with meals. Do not crush, chew, or split.       OLANZapine (ZyPREXA) 20 mg tablet Take 1 tablet (20 mg) by mouth once daily at bedtime.       pantoprazole (ProtoNix) 40 mg EC tablet Take 1 tablet (40 mg) by mouth once daily in the morning. Take  "before meals. Do not crush, chew, or split. Do not start before April 2, 2024. 90 tablet 0     polyethylene glycol (Glycolax, Miralax) 17 gram/dose powder Mix 17 grams (1 capful) in liquid and drink by mouth and mix with water or juice once daily. Do not start before April 2, 2024. 238 g 0     spironolactone (Aldactone) 25 mg tablet Take 1 tablet (25 mg) by mouth once daily.        Current medications:  Scheduled medications  atorvastatin, 40 mg, oral, Nightly  carvedilol, 12.5 mg, oral, BID  finasteride, 5 mg, oral, Daily  gemfibrozil, 600 mg, oral, BID AC  insulin lispro, 0-10 Units, subcutaneous, TID  OLANZapine, 20 mg, oral, Nightly  pantoprazole, 40 mg, oral, Daily before breakfast  piperacillin-tazobactam, 3.375 g, intravenous, q6h  polyethylene glycol, 17 g, oral, Daily  vancomycin, 1,500 mg, intravenous, q24h      Continuous medications     PRN medications  PRN medications: acetaminophen, dextrose, dextrose, glucagon, glucagon, vancomycin    Review of Systems   Constitutional: Negative.    HENT: Negative.     Eyes: Negative.    Respiratory: Negative.     Cardiovascular: Negative.    Gastrointestinal: Negative.    Endocrine: Negative.    Genitourinary: Negative.    Musculoskeletal: Negative.    Skin:  Positive for wound.   Neurological: Negative.    Psychiatric/Behavioral: Negative.          Objective  Range of Vitals (last 24 hours)  Heart Rate:  [66-75]   Temp:  [35.6 °C (96.1 °F)-36.9 °C (98.4 °F)]   Resp:  [15-18]   BP: (106-145)/(49-75)   Height:  [188 cm (6' 2\")]   Weight:  [106 kg (233 lb 7.5 oz)]   SpO2:  [95 %-100 %]   Daily Weight  07/11/24 : 106 kg (233 lb 7.5 oz)    Body mass index is 29.98 kg/m².     Physical Exam  Constitutional:       Appearance: Normal appearance.   HENT:      Head: Normocephalic and atraumatic.      Nose: Nose normal.      Mouth/Throat:      Mouth: Mucous membranes are moist.      Pharynx: Oropharynx is clear.   Eyes:      General: No scleral icterus.  Cardiovascular:      " Rate and Rhythm: Normal rate and regular rhythm.   Pulmonary:      Effort: Pulmonary effort is normal.      Breath sounds: Normal breath sounds.   Abdominal:      General: Bowel sounds are normal.      Palpations: Abdomen is soft.   Musculoskeletal:         General: Normal range of motion.      Cervical back: Normal range of motion and neck supple.      Comments: Right foot swelling, Right foot lateral ulcer with dressing intact   Skin:     General: Skin is warm and dry.   Neurological:      General: No focal deficit present.      Mental Status: He is alert and oriented to person, place, and time. Mental status is at baseline.      Sensory: Sensory deficit: .img.   Psychiatric:         Mood and Affect: Mood normal.         Behavior: Behavior normal.          Relevant Results  Outside Hospital Results  No  Labs  Results from last 72 hours   Lab Units 07/12/24  0450 07/11/24  1555   WBC AUTO x10*3/uL 5.7 8.0   HEMOGLOBIN g/dL 9.0* 9.8*   HEMATOCRIT % 27.2* 29.1*   PLATELETS AUTO x10*3/uL 172 203   NEUTROS PCT AUTO % 63.2 67.3   LYMPHS PCT AUTO % 20.2 17.8   MONOS PCT AUTO % 8.3 7.5   EOS PCT AUTO % 6.3 5.6     Results from last 72 hours   Lab Units 07/12/24  0451 07/11/24  1555   SODIUM mmol/L 132* 132*   POTASSIUM mmol/L 4.3 5.1   CHLORIDE mmol/L 102 98   CO2 mmol/L 18* 21*   BUN mg/dL 42* 51*   CREATININE mg/dL 1.20 1.50   GLUCOSE mg/dL 73 112*   CALCIUM mg/dL 9.1 9.5   ANION GAP mmol/L 12 13   EGFR mL/min/1.73m*2 69 53*     Results from last 72 hours   Lab Units 07/12/24  0451 07/11/24  1555   ALK PHOS U/L 106 119   BILIRUBIN TOTAL mg/dL 0.2 <0.2   PROTEIN TOTAL g/dL 6.2 7.2   ALT U/L 10 11   AST U/L 14 15   ALBUMIN g/dL 3.2* 3.7     Estimated Creatinine Clearance: 83.8 mL/min (by C-G formula based on SCr of 1.2 mg/dL).  C-Reactive Protein   Date Value Ref Range Status   07/11/2024 5.80 (H) 0.00 - 2.00 mg/dL Final     Sedimentation Rate   Date Value Ref Range Status   07/11/2024 69 (H) 0 - 20 mm/h Final  "  03/19/2024 47 (H) 0 - 20 mm/h Final     No results found for: \"HIV1X2\", \"HIVCONF\", \"HZGCXT8KR\"  No results found for: \"HEPCABINIT\", \"HEPCAB\", \"HCVPCRQUANT\"  Microbiology  Blood culture pending         Imaging   CT foot right wo IV contrast    Result Date: 7/11/2024  Interpreted By:  Luan Esposito, STUDY: CT FOOT RIGHT WO IV CONTRAST;  7/11/2024 6:42 pm   INDICATION: Signs/Symptoms:Osteomyelitis question deep tissue abscess.   COMPARISON: Right foot radiographs of 11/20/2024   ACCESSION NUMBER(S): [id number]   ORDERING CLINICIAN: NIRMALA LIM   TECHNIQUE: CT imaging of the right foot was obtained without contrast. Coronal and sagittal reformatted images were performed. 3D reconstructed images were not performed at time of dictation therefore not used during interpretation.   FINDINGS: OSSEOUS STRUCTURES: There is a large area of ulceration along the lateral aspect of the ankle and hindfoot extending to the bone surface of the 4th and 5th metatarsal bones. There is destructive osseous changes involving the head and neck of the 4th metatarsal bone consistent with osteomyelitis. The 4th proximal phalanx is dislocated dorsally. There is extensive osseous destruction and erosive change involving the 5th metatarsal base with intraosseous and adjacent soft tissue gas consistent with osteomyelitis. There is multiple areas of confluent low-density soft tissue that likely relates to fluid or phlegmonous changes in this includes a 3.9 cm x 1.7 cm by 3.5 cm area along the dorsal aspect of the cuboid bone and lateral cuneiform bone and another confluent area of low density adjacent to the ulceration at base of 5th metatarsal bone that measures approximately 3.9 cm x 0.7 cm x 1.0 cm. There is diffuse periosteal new bone formation involving the 2nd-4th metatarsal bones that may be also reactive or indicative of chronic osteomyelitis. There are tubular bony defects within the medial cuneiform bone and calcaneus that " are fluid-filled and communicate with the adjacent soft tissue. Within these bony defects is soft tissue thickening and in the case of the calcaneus, small amount of fluid collection measuring 1.7 cm x 0.8 cm. This fluid filled tract appears to extend toward the skin surface. Correlate for sinus tract formation. There is diffuse subcutaneous edema about the distal leg, ankle, foot likely representing cellulitis. Confluent areas of severe edema noted along the distal forefoot and toes.   There is diffuse muscle atrophy.       Large area of ulceration along the lateral aspect of the ankle and hindfoot extending to the bone surface of the 4th and 5th metatarsal bones. There is destructive osseous changes involving the head and neck of the 4th metatarsal bone consistent with osteomyelitis. The 4th proximal phalanx is dislocated dorsally. There is extensive osseous destruction and erosive change involving the 5th metatarsal base with intraosseous and adjacent soft tissue gas consistent with osteomyelitis. There is multiple areas of confluent low-density soft tissue that likely relates to fluid or phlegmonous changes in this includes a 3.9 cm x 1.7 cm by 3.5 cm area along the dorsal aspect of the cuboid bone and lateral cuneiform bone and another confluent area of low density adjacent to the ulceration at base of 5th metatarsal bone that measures approximately 3.9 cm x 0.7 cm x 1.0 cm. There is diffuse periosteal new bone formation involving the 2nd-4th metatarsal bones that may be also reactive or indicative of chronic osteomyelitis. There are tubular bony defects within the medial cuneiform bone and calcaneus that are fluid-filled and communicate with the adjacent soft tissue. Within these bony defects is soft tissue thickening and in the case of the calcaneus, small amount of fluid collection measuring 1.7 cm x 0.8 cm. This fluid filled tract appears to extend toward the skin surface. Correlate for sinus tract  formation. There is diffuse subcutaneous edema about the distal leg, ankle, foot likely representing cellulitis.   MACRO: None.   Signed by: Luan Esposito 7/11/2024 7:27 PM Dictation workstation:   [id number]    XR chest 1 view    Result Date: 7/11/2024  Interpreted By:  Liu Ramirez, STUDY: XR CHEST 1 VIEW;  7/11/2024 6:40 pm   INDICATION: Signs/Symptoms:History of heart failure.   COMPARISON: 04/24/2024   ACCESSION NUMBER(S): [id number]   ORDERING CLINICIAN: NIRMALA LIM   FINDINGS:         CARDIOMEDIASTINAL SILHOUETTE: Cardiomediastinal silhouette is normal in size and configuration.   LUNGS: Lungs are clear.   ABDOMEN: No remarkable upper abdominal findings.   BONES: No acute osseous changes.       1.  No evidence of acute cardiopulmonary process.       MACRO: None   Signed by: Liu Ramirez 7/11/2024 6:46 PM Dictation workstation:   [id number]    XR foot right 3+ views    Result Date: 7/11/2024  Interpreted By:  Liu Ramirez, STUDY: XR FOOT RIGHT 3+ VIEWS; ;  7/11/2024 5:05 pm   INDICATION: foot infection.   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 There is interval osseous erosive changes and irregularity at the 5th metatarsal stump with heterotopic osseous productive change at the lateral aspect of the hindfoot. Extensive periosteal reaction in the 4th metatarsal as well with erosive changes and osseous destruction of the 4th metatarsal head. There is periosteal reaction irregularity at the 4th proximal phalangeal base. There is moderate to severe periosteal reaction in the 3rd metatarsal diaphysis. Gross material at the lateral aspect of the forefoot likely along a skin and soft tissue defect. There is severe osteopenia.       1. Osseous erosions with osseous destruction of the 4th metatarsal head severe periosteal reaction along  the 4th metatarsal diaphysis. Periosteal reaction and irregularity at the 4th proximal phalanx as well. Findings compatible with osteomyelitis and potential septic arthritis of 4th MTP joint. 2. Osseous irregularity and destruction of the 5th metatarsal stump along with extensive osseous productive changes. Findings are also consistent with osteomyelitis. 3. Periosteal reaction along the 3rd metatarsal diaphysis may be reactive or represent additional focus of osteomyelitis. 4. MRI can be performed to evaluate extent of disease       MACRO: None   Signed by: Liu Ramirez 7/11/2024 5:12 PM Dictation workstation:   [id number]     Assessment/Plan   Type 2 diabetes mellitus   Infected right diabetic foot ulcer   Chronic right foot osteomyelitis   History of MRSA, strep, bacteroides     IV vancomycin  IV zosyn   Monitor renal function  Follow up intraoperative culture/findings   Supportive care  Local care  Offloading  Monitor temperature and WBC  Podiatry  follow up-plan I & D today    Total time spent caring for the patient today was 35 minutes. This includes time spent before the visit reviewing the chart, time spent during the visit, and time spent after the visit on documentation.     Bere Jacobo, APRN-CNP

## 2024-07-12 NOTE — CONSULTS
"Nutrition Assessement Note    Nutrition Assessment    Reason for Assessment: Admission nursing screening    Reason for Hospital Admission:  Claude Coley is a 63 y.o. male who is admitted for R foot wound with osteomyelitis. NPO for wound debridement today.     Past Medical History:   Diagnosis Date    CHF (congestive heart failure) (Multi)     Diabetes mellitus (Multi)     Hypertension      Nutrition History:  Food and Nutrient History: currently NPO but reports having a good appetite at this time. states his appetite has been decreased for the past couple weeks. typically carb counts at home with a goal of eating ~60g carbs/meal. sometimes drinks ensure to increase protein intake and aid in wound healing. he is agreeable to supplements to aid in healing while he is admitted.    Anthropometrics:  Ht: 188 cm (6' 2\"), Wt: 106 kg (233 lb 7.5 oz), BMI: 29.96  IBW/kg (Dietitian Calculated): 86.36 kg  Percent of IBW: 123 %    Weight Change:  Daily Weight  07/11/24 : 106 kg (233 lb 7.5 oz)  05/07/24 : 118 kg (260 lb)  05/05/24 : 115 kg (252 lb 8 oz)  05/02/24 : 118 kg (260 lb)  04/30/24 : 118 kg (260 lb)  04/25/24 : 118 kg (260 lb)  04/24/24 : 113 kg (249 lb)  04/18/24 : 118 kg (260 lb)  04/04/24 : 118 kg (260 lb)  03/27/24 : 118 kg (260 lb 2.3 oz)     Weight History / % Weight Change: significant wt loss noted in wt hx but pt does not believe it is accurate. per pt, he weighed 243# on 5/1/24. bedscale wt of 231# (with 2 blankets) at this visit. per pt report, he has lost ~10# over the past ~2 months  Significant Weight Loss: No  Interpretation of Weight Loss:  (10# (4.1%) wt loss over ~2 months per pt report)    Nutrition Focused Physical Exam Findings:   Edema  Edema: +1 trace  Edema Location: BLE    Physical Findings (Nutrition Deficiency/Toxicity)  Skin: Positive (R foot wound)    Nutrition Significant Labs:  Lab Results   Component Value Date    WBC 5.7 07/12/2024    HGB 9.0 (L) 07/12/2024    HCT 27.2 (L) " 07/12/2024     07/12/2024    CHOL 211 (H) 07/09/2018    TRIG 273 (H) 07/09/2018    HDL 54 07/09/2018    ALT 10 07/12/2024    AST 14 07/12/2024     (L) 07/12/2024    K 4.3 07/12/2024     07/12/2024    CREATININE 1.20 07/12/2024    BUN 42 (H) 07/12/2024    CO2 18 (L) 07/12/2024    INR 1.1 07/12/2024    HGBA1C 4.4 03/19/2024     Nutrition Specific Medications:  [Transfer Hold] atorvastatin, 40 mg, oral, Nightly  [Transfer Hold] carvedilol, 12.5 mg, oral, BID  [Transfer Hold] finasteride, 5 mg, oral, Daily  [Transfer Hold] gemfibrozil, 600 mg, oral, BID AC  [Transfer Hold] heparin, 5,000 Units, subcutaneous, q8h  [Transfer Hold] insulin lispro, 0-10 Units, subcutaneous, TID  [Transfer Hold] OLANZapine, 20 mg, oral, Nightly  [Transfer Hold] pantoprazole, 40 mg, oral, Daily before breakfast  [Transfer Hold] piperacillin-tazobactam, 3.375 g, intravenous, q6h  [Transfer Hold] polyethylene glycol, 17 g, oral, Daily  [Transfer Hold] vancomycin, 1 g, intravenous, q12h      Dietary Orders (From admission, onward)       Start     Ordered    07/12/24 1338  Oral nutritional supplements  Until discontinued        Comments: Orange - provide when diet advanced   Question Answer Comment   Deliver with Breakfast    Deliver with Dinner    Select supplement: Walt        07/12/24 1337    07/12/24 1337  Oral nutritional supplements  Until discontinued        Comments: Chocolate - provide when diet advanced   Question Answer Comment   Deliver with All meals    Select supplement: Ensure High Protein        07/12/24 1337    07/12/24 0001  NPO Diet; Effective midnight  Diet effective midnight         07/11/24 1591                  Estimated Needs:   Estimated Energy Needs  Total Energy Estimated Needs (kCal): 2591 kCal  Total Estimated Energy Need per Day (kCal/kg): 30 kCal/kg  Method for Estimating Needs: IBW    Estimated Protein Needs  Total Protein Estimated Needs (g): 104 g  Total Protein Estimated Needs (g/kg): 1.2  g/kg  Method for Estimating Needs: IBW    Estimated Fluid Needs  Method for Estimating Needs: 1 ml/kcal        Nutrition Diagnosis   Nutrition Diagnosis:  Nutrition Diagnosis  Patient has Nutrition Diagnosis: Yes  Diagnosis Status (1): New  Nutrition Diagnosis 1: Increased nutrient needs  Related to (1): increased demand for nutrients  As Evidenced by (1): conditions associated with diagnosis       Nutrition Interventions/Recommendations   Nutrition Interventions and Recommendations:    Nutrition Prescription:  Individualized Nutrition Prescription Provided for : 2591 kcals and 104g protein to be provided via diet and supplements when diet advanced    Nutrition Interventions:   Food and/or Nutrient Delivery Interventions  Interventions: Meals and snacks, Medical food supplement  Meals and Snacks: Carbohydrate-modified diet  Goal: when able to advance, CCD 75g diet per pt request  Medical Food Supplement: Commercial beverage  Goal: chocolate ensure high protein TID daily to provide 160 kcals and 16g protein each, orange deo BID to aid in wound healing    Education Documentation  No documentation found.           Nutrition Monitoring and Evaluation   Monitoring/Evaluation:   Food/Nutrient Related History Monitoring  Monitoring and Evaluation Plan: Energy intake  Energy Intake: Estimated energy intake  Criteria: monitor for diet advancement    Body Composition/Growth/Weight History  Monitoring and Evaluation Plan: Weight  Weight: Measured weight  Criteria: pt will maintain wt at this time    Nutrition Focused Physical Findings  Monitoring and Evaluation Plan: Skin  Skin: Impaired wound healing  Criteria: pt will show signs of improvement in skin integrity       Time Spent/Follow-up:   Follow Up  Time Spent (min): 30 minutes  Last Date of Nutrition Visit: 07/12/24  Nutrition Follow-Up Needed?: 5-7 days  Follow up Comment: 7/17/24

## 2024-07-12 NOTE — CONSULTS
Inpatient consult to Cardiology  Consult performed by: Mark Franz DO  Consult ordered by: Connie Neumann MD  Reason for consult: Preoperative cardiac risk stratification        History Of Present Illness:    Claude Coley is a 63 y.o. male presenting with right foot infection.  He has a longstanding history of Charcot foot and nonhealing right foot wound that has been complicated by gas gangrene and now osteomyelitis.  He has been hospitalized several times this year with 3 separate surgical debridements the last being in April.  He was discharged to a nursing home where he spent 4 weeks there but is now home.  He comes in with generalized malaise and now radiographic evidence of osteomyelitis.  Plan initially was for IV antibiotics and pain control, however podiatry feels this will likely require another surgical intervention.  We have been asked to comment on his cardiac risk.    As far as his cardiac history is concerned he has a history of atherosclerotic disease with an LAD  discovered in 2020 after a myocardial SPECT stress test identified a large fixed perfusion defect in the anterior wall.  Cardiac MRI at the time did demonstrate some viability in the anterior wall thus surgical revascularization versus a  PCI were offered.  He elected to pursue medical therapy and has actually done rather well with this.  He has an echocardiogram from June 2022 demonstrating left ventricular systolic function recovery to 59% with some mild resting anterior wall motion abnormalities.  He follows with Dr. Young at the [facility] but has not seen him for over a year, February 2023 due to multiple cancellations and rescheduling's.  He remains on a stable cardiac regimen which includes a beta-blocker ACE inhibitor and loop diuretic in addition to an aspirin and high potency statin.  Historically his LDL has been less than 70.  Blood pressure has been controlled.  As far as his functional capacity is  largely unknown as he is mostly limited by this foot wound.  He does ambulate around his apartment with no problems, cooks his own dinner and cares for himself.  He denies any chest pain or shortness of breath no dizziness or palpitations.  He has no lower extremity swelling.  He weighs himself regularly which has been stable at 233 pounds.    Review of Systems   Constitutional: Positive for malaise/fatigue. Negative for diaphoresis, fever, weight gain and weight loss.   Eyes:  Negative for visual disturbance.   Cardiovascular:  Negative for chest pain, claudication, dyspnea on exertion, irregular heartbeat, leg swelling, near-syncope, orthopnea, palpitations, paroxysmal nocturnal dyspnea and syncope.   Respiratory:  Negative for cough, shortness of breath and wheezing.    Musculoskeletal:  Negative for muscle weakness and myalgias.   Neurological:  Negative for dizziness and weakness.   All other systems reviewed and are negative.         Last Recorded Vitals:  Vitals:    07/11/24 1900 07/11/24 2346 07/12/24 0345 07/12/24 0727   BP: 139/60 (!) 113/49 106/56 114/61   BP Location: Left arm Left arm Left arm Right arm   Patient Position: Sitting Sitting Lying Lying   Pulse: 70 68 66 68   Resp: 16 17 16 15   Temp: 35.6 °C (96.1 °F) 35.9 °C (96.6 °F) 36.7 °C (98.1 °F) 36.5 °C (97.7 °F)   TempSrc: Temporal Temporal Temporal Temporal   SpO2: 100% 99% 95% 98%   Weight:       Height:           Last Labs:  CBC - 7/12/2024:  4:50 AM  5.7 9.0 172    27.2      CMP - 7/12/2024:  4:51 AM  9.1 6.2 14 --- 0.2   _ 3.2 10 106      PTT - No results in last year.  1.1   11.3 _     Hemoglobin A1C   Date/Time Value Ref Range Status   03/19/2024 12:46 PM 4.4 See below % Final   01/23/2024 10:59 PM 4.5 See below % Final     LDL Calculated   Date/Time Value Ref Range Status   07/09/2018 09:22  65 - 130 MG/DL Final      Last I/O:  I/O last 3 completed shifts:  In: 1500 (14.2 mL/kg) [IV Piggyback:1500]  Out: 675 (6.4 mL/kg) [Urine:675  "(0.2 mL/kg/hr)]  Weight: 105.9 kg     Past Cardiology Tests (Last 3 Years):  EKG:  ECG 12 lead 01/23/2024    Echo:  No results found for this or any previous visit from the past 1095 days.    Ejection Fractions:  No results found for: \"EF\"  Cath:  No results found for this or any previous visit from the past 1095 days.    Stress Test:  No results found for this or any previous visit from the past 1095 days.    Cardiac Imaging:  No results found for this or any previous visit from the past 1095 days.      Past Medical History:  He has a past medical history of CHF (congestive heart failure) (Multi), Diabetes mellitus (Multi), and Hypertension.    Past Surgical History:  He has no past surgical history on file.      Social History:  He reports that he has been smoking cigarettes. He has a 20 pack-year smoking history. He has never used smokeless tobacco. He reports current alcohol use of about 12.0 standard drinks of alcohol per week. He reports that he does not use drugs.    Family History:  No family history on file.     Allergies:  Bee sting kit and Tramadol    Inpatient Medications:  Scheduled medications   Medication Dose Route Frequency    atorvastatin  40 mg oral Nightly    carvedilol  12.5 mg oral BID    finasteride  5 mg oral Daily    gemfibrozil  600 mg oral BID AC    insulin lispro  0-10 Units subcutaneous TID    OLANZapine  20 mg oral Nightly    pantoprazole  40 mg oral Daily before breakfast    piperacillin-tazobactam  3.375 g intravenous q6h    polyethylene glycol  17 g oral Daily    vancomycin  1,500 mg intravenous q24h     PRN medications   Medication    acetaminophen    dextrose    dextrose    glucagon    glucagon    vancomycin     Continuous Medications   Medication Dose Last Rate     Outpatient Medications:  Current Outpatient Medications   Medication Instructions    acetaminophen (TYLENOL) 650 mg, oral, Every 4 hours PRN    aspirin 81 mg, oral, Daily    atorvastatin (LIPITOR) 40 mg, oral, Daily    " carvedilol (COREG) 12.5 mg, oral, 2 times daily (morning and late afternoon)    cyclobenzaprine (FLEXERIL) 10 mg, oral, 3 times daily PRN    finasteride (PROSCAR) 5 mg, oral, Daily, Do not crush, chew, or split.    furosemide (LASIX) 20 mg, oral, Daily    gemfibrozil (LOPID) 600 mg, oral, 2 times daily before meals    heparin (porcine) 5,000 Units, subcutaneous, Every 8 hours    lisinopril 20 mg, oral, Daily    metFORMIN (OSM) (FORTAMET) 500 mg, oral, Daily with evening meal, Do not crush, chew, or split.    OLANZapine (ZYPREXA) 20 mg, oral, Nightly    pantoprazole (PROTONIX) 40 mg, oral, Daily before breakfast, Do not crush, chew, or split.    polyethylene glycol (Glycolax, Miralax) 17 gram/dose powder Mix 17 grams (1 capful) in liquid and drink by mouth and mix with water or juice once daily. Do not start before April 2, 2024.    spironolactone (ALDACTONE) 25 mg, oral, Daily       Physical Exam:   Gen: NAD   Neck: no JVD, carotid upstroke is brisk and without delay   Heart: rrr, s1s2+ no mrg   Lungs: CTA   Ext: warm no edema     Assessment/Plan   Right foot osteomyelitis  Preoperative cardiac risk stratification  Atherosclerotic heart disease  Ischemic cardiomyopathy  Chronic heart failure with preserved ejection fraction  Hypertension  Non-insulin-dependent type 2 diabetes mellitus    7/12: If we are to use the RCRI risk  he would be class III risk which puts him at a 10% 30-day risk of adverse cardiac events.  His proBNP is essentially normal, and high-sensitivity troponins are where they always are at 50.  Clinically I feel that his cardiac status is rather stable. The patient is of moderate risk for a surgical procedure that carries an inherent moderate risk of adverse cardiac events. In the absence of acute coronary syndrome, acutely decompensated heart failure, hemodynamically significant valvular disease, or malignant arrhythmia I would consider this risk acceptable to proceed without further  cardiac workup or intervention.  Will continue to follow this patient with you.    Peripheral IV 07/11/24 20 G Right Antecubital (Active)   Site Assessment Clean;Dry;Intact 07/11/24 2045   Dressing Status Clean;Dry;Occlusive 07/11/24 2045   Number of days: 1       Code Status:  Full Code    I spent 45 minutes in the professional and overall care of this patient.        Mark Franz, DO

## 2024-07-13 LAB
ALBUMIN SERPL-MCNC: 3.3 G/DL (ref 3.5–5)
ALP BLD-CCNC: 93 U/L (ref 35–125)
ALT SERPL-CCNC: 10 U/L (ref 5–40)
ANION GAP SERPL CALC-SCNC: 12 MMOL/L
AST SERPL-CCNC: 14 U/L (ref 5–40)
BASOPHILS # BLD AUTO: 0.05 X10*3/UL (ref 0–0.1)
BASOPHILS NFR BLD AUTO: 1.1 %
BILIRUB SERPL-MCNC: <0.2 MG/DL (ref 0.1–1.2)
BUN SERPL-MCNC: 41 MG/DL (ref 8–25)
CALCIUM SERPL-MCNC: 9.2 MG/DL (ref 8.5–10.4)
CHLORIDE SERPL-SCNC: 102 MMOL/L (ref 97–107)
CO2 SERPL-SCNC: 21 MMOL/L (ref 24–31)
CREAT SERPL-MCNC: 1.4 MG/DL (ref 0.4–1.6)
EGFRCR SERPLBLD CKD-EPI 2021: 57 ML/MIN/1.73M*2
EOSINOPHIL # BLD AUTO: 0.33 X10*3/UL (ref 0–0.7)
EOSINOPHIL NFR BLD AUTO: 7.2 %
ERYTHROCYTE [DISTWIDTH] IN BLOOD BY AUTOMATED COUNT: 16 % (ref 11.5–14.5)
GLUCOSE BLD MANUAL STRIP-MCNC: 103 MG/DL (ref 74–99)
GLUCOSE BLD MANUAL STRIP-MCNC: 125 MG/DL (ref 74–99)
GLUCOSE BLD MANUAL STRIP-MCNC: 81 MG/DL (ref 74–99)
GLUCOSE BLD MANUAL STRIP-MCNC: 83 MG/DL (ref 74–99)
GLUCOSE SERPL-MCNC: 80 MG/DL (ref 65–99)
HCT VFR BLD AUTO: 25.1 % (ref 41–52)
HGB BLD-MCNC: 8.2 G/DL (ref 13.5–17.5)
IMM GRANULOCYTES # BLD AUTO: 0.05 X10*3/UL (ref 0–0.7)
IMM GRANULOCYTES NFR BLD AUTO: 1.1 % (ref 0–0.9)
LYMPHOCYTES # BLD AUTO: 1.5 X10*3/UL (ref 1.2–4.8)
LYMPHOCYTES NFR BLD AUTO: 32.9 %
MCH RBC QN AUTO: 26.7 PG (ref 26–34)
MCHC RBC AUTO-ENTMCNC: 32.7 G/DL (ref 32–36)
MCV RBC AUTO: 82 FL (ref 80–100)
MONOCYTES # BLD AUTO: 0.44 X10*3/UL (ref 0.1–1)
MONOCYTES NFR BLD AUTO: 9.6 %
NEUTROPHILS # BLD AUTO: 2.19 X10*3/UL (ref 1.2–7.7)
NEUTROPHILS NFR BLD AUTO: 48.1 %
NRBC BLD-RTO: 0 /100 WBCS (ref 0–0)
PLATELET # BLD AUTO: 151 X10*3/UL (ref 150–450)
POTASSIUM SERPL-SCNC: 4.5 MMOL/L (ref 3.4–5.1)
PROT SERPL-MCNC: 6.2 G/DL (ref 5.9–7.9)
RBC # BLD AUTO: 3.07 X10*6/UL (ref 4.5–5.9)
SODIUM SERPL-SCNC: 135 MMOL/L (ref 133–145)
VANCOMYCIN SERPL-MCNC: 24.1 UG/ML (ref 10–20)
WBC # BLD AUTO: 4.6 X10*3/UL (ref 4.4–11.3)

## 2024-07-13 PROCEDURE — 36415 COLL VENOUS BLD VENIPUNCTURE: CPT

## 2024-07-13 PROCEDURE — 80202 ASSAY OF VANCOMYCIN: CPT

## 2024-07-13 PROCEDURE — 2500000004 HC RX 250 GENERAL PHARMACY W/ HCPCS (ALT 636 FOR OP/ED): Mod: JZ

## 2024-07-13 PROCEDURE — 82947 ASSAY GLUCOSE BLOOD QUANT: CPT

## 2024-07-13 PROCEDURE — 97110 THERAPEUTIC EXERCISES: CPT | Mod: GP

## 2024-07-13 PROCEDURE — 99232 SBSQ HOSP IP/OBS MODERATE 35: CPT | Performed by: INTERNAL MEDICINE

## 2024-07-13 PROCEDURE — 85025 COMPLETE CBC W/AUTO DIFF WBC: CPT

## 2024-07-13 PROCEDURE — 84075 ASSAY ALKALINE PHOSPHATASE: CPT

## 2024-07-13 PROCEDURE — 1200000002 HC GENERAL ROOM WITH TELEMETRY DAILY

## 2024-07-13 PROCEDURE — 2500000002 HC RX 250 W HCPCS SELF ADMINISTERED DRUGS (ALT 637 FOR MEDICARE OP, ALT 636 FOR OP/ED)

## 2024-07-13 PROCEDURE — 97116 GAIT TRAINING THERAPY: CPT | Mod: GP

## 2024-07-13 PROCEDURE — 2500000001 HC RX 250 WO HCPCS SELF ADMINISTERED DRUGS (ALT 637 FOR MEDICARE OP)

## 2024-07-13 PROCEDURE — 2500000004 HC RX 250 GENERAL PHARMACY W/ HCPCS (ALT 636 FOR OP/ED)

## 2024-07-13 PROCEDURE — 2500000001 HC RX 250 WO HCPCS SELF ADMINISTERED DRUGS (ALT 637 FOR MEDICARE OP): Performed by: INTERNAL MEDICINE

## 2024-07-13 RX ORDER — VANCOMYCIN 1.75 G/350ML
1250 INJECTION, SOLUTION INTRAVENOUS EVERY 24 HOURS
Status: DISCONTINUED | OUTPATIENT
Start: 2024-07-13 | End: 2024-07-15

## 2024-07-13 RX ORDER — L. ACIDOPHILUS/L.BULGARICUS 1MM CELL
1 TABLET ORAL 2 TIMES DAILY
Status: DISCONTINUED | OUTPATIENT
Start: 2024-07-13 | End: 2024-07-16 | Stop reason: HOSPADM

## 2024-07-13 ASSESSMENT — COGNITIVE AND FUNCTIONAL STATUS - GENERAL
MOBILITY SCORE: 15
MOVING TO AND FROM BED TO CHAIR: A LITTLE
STANDING UP FROM CHAIR USING ARMS: A LITTLE
MOVING TO AND FROM BED TO CHAIR: A LOT
WALKING IN HOSPITAL ROOM: TOTAL
DAILY ACTIVITIY SCORE: 24
STANDING UP FROM CHAIR USING ARMS: A LITTLE
WALKING IN HOSPITAL ROOM: TOTAL
MOBILITY SCORE: 14
STANDING UP FROM CHAIR USING ARMS: A LITTLE
CLIMB 3 TO 5 STEPS WITH RAILING: TOTAL
MOVING TO AND FROM BED TO CHAIR: A LITTLE
TURNING FROM BACK TO SIDE WHILE IN FLAT BAD: A LITTLE
WALKING IN HOSPITAL ROOM: TOTAL
CLIMB 3 TO 5 STEPS WITH RAILING: TOTAL
DAILY ACTIVITIY SCORE: 24
MOBILITY SCORE: 16
TURNING FROM BACK TO SIDE WHILE IN FLAT BAD: A LITTLE
CLIMB 3 TO 5 STEPS WITH RAILING: TOTAL

## 2024-07-13 ASSESSMENT — PAIN SCALES - GENERAL
PAINLEVEL_OUTOF10: 0 - NO PAIN
PAINLEVEL_OUTOF10: 0 - NO PAIN
PAINLEVEL_OUTOF10: 4

## 2024-07-13 ASSESSMENT — PAIN - FUNCTIONAL ASSESSMENT
PAIN_FUNCTIONAL_ASSESSMENT: 0-10
PAIN_FUNCTIONAL_ASSESSMENT: 0-10

## 2024-07-13 ASSESSMENT — PAIN DESCRIPTION - DESCRIPTORS: DESCRIPTORS: BURNING

## 2024-07-13 NOTE — PROGRESS NOTES
Claude Coley is a 63 y.o. male on day 2 of admission presenting with Osteomyelitis of fourth toe of right foot (Multi).    Subjective   Interval History:   Afebrile, no chills  No right foot.  No chest pain or shortness of breath  No nausea vomiting or diarrhea    Review of Systems   All other systems reviewed and are negative.      Objective   Range of Vitals (last 24 hours)  Heart Rate:  [54-85]   Temp:  [36 °C (96.8 °F)-36.7 °C (98.1 °F)]   Resp:  [13-18]   BP: ()/(48-75)   SpO2:  [96 %-100 %]   Daily Weight  07/11/24 : 106 kg (233 lb 7.5 oz)    Body mass index is 29.98 kg/m².    Physical Exam  Constitutional:       Appearance: Normal appearance.   HENT:      Head: Normocephalic and atraumatic.      Nose: Nose normal.      Mouth/Throat:      Mouth: Mucous membranes are moist.      Pharynx: Oropharynx is clear.   Eyes:      General: No scleral icterus.  Cardiovascular:      Rate and Rhythm: Normal rate and regular rhythm.   Pulmonary:      Effort: Pulmonary effort is normal.      Breath sounds: Normal breath sounds.   Abdominal:      General: Bowel sounds are normal.      Palpations: Abdomen is soft.   Musculoskeletal:         General: Normal range of motion.      Cervical back: Normal range of motion and neck supple.      Comments: Right foot swelling, Right foot lateral ulcer with dressing intact   Skin:     General: Skin is warm and dry.   Neurological:      General: No focal deficit present.      Mental Status: He is alert and oriented to person, place, and time. Mental status is at baseline.      Sensory: Sensory deficit: .img.   Psychiatric:         Mood and Affect: Mood normal.        Antibiotics  sodium chloride 0.9 % bolus 1,000 mL  piperacillin-tazobactam (Zosyn) 4.5 g in dextrose (iso)  mL  vancomycin (Xellia) 2 g in diluent combination  mL  acetaminophen (Tylenol) tablet 650 mg  atorvastatin (Lipitor) tablet 40 mg  carvedilol (Coreg) tablet 12.5 mg  finasteride (Proscar) tablet 5  mg  furosemide (Lasix) tablet 20 mg  gemfibrozil (Lopid) tablet 600 mg  OLANZapine (ZyPREXA) tablet 20 mg  pantoprazole (ProtoNix) EC tablet 40 mg  polyethylene glycol (Glycolax, Miralax) powder 17 g  piperacillin-tazobactam (Zosyn) 3.375 g in dextrose (iso) IV 50 mL  vancomycin (Vancocin) pharmacy to dose - pharmacy monitoring  glucagon (Glucagen) injection 1 mg  dextrose 50 % injection 25 g  glucagon (Glucagen) injection 1 mg  dextrose 50 % injection 12.5 g  insulin lispro (HumaLOG) injection 0-10 Units  vancomycin (Xellia) 1.5 g in diluent combination  mL  polyethylene glycol (Glycolax, Miralax) packet 17 g  vancomycin (Xellia) 1 g in diluent combination  mL  heparin (porcine) injection 5,000 Units  oxygen (O2) therapy  HYDROmorphone PF (Dilaudid) injection 0.2 mg  HYDROmorphone (Dilaudid) injection 0.5 mg  ondansetron (Zofran) injection 4 mg  promethazine (Phenergan) 6.25 mg in sodium chloride 0.9% 50 mL IV  labetaloL (Normodyne,Trandate) injection 10 mg  hydrALAZINE (Apresoline) injection 10 mg  diphenhydrAMINE (BENADryl) injection 12.5 mg  meperidine PF (Demerol) injection 12.5 mg  albuterol 2.5 mg /3 mL (0.083 %) nebulizer solution 2.5 mg  oxyCODONE ER (OxyCONTIN) 12 hr tablet 10 mg  ePHEDrine injection 50 mg  vancomycin (Xellia) 1,250 mg in diluent combination  mL  lactobacillus acidophilus tablet 1 tablet      Relevant Results  Labs  Results from last 72 hours   Lab Units 07/13/24  0458 07/12/24  0450 07/11/24  1555   WBC AUTO x10*3/uL 4.6 5.7 8.0   HEMOGLOBIN g/dL 8.2* 9.0* 9.8*   HEMATOCRIT % 25.1* 27.2* 29.1*   PLATELETS AUTO x10*3/uL 151 172 203   NEUTROS PCT AUTO % 48.1 63.2 67.3   LYMPHS PCT AUTO % 32.9 20.2 17.8   MONOS PCT AUTO % 9.6 8.3 7.5   EOS PCT AUTO % 7.2 6.3 5.6     Results from last 72 hours   Lab Units 07/13/24  0458 07/12/24  0451 07/11/24  1555   SODIUM mmol/L 135 132* 132*   POTASSIUM mmol/L 4.5 4.3 5.1   CHLORIDE mmol/L 102 102 98   CO2 mmol/L 21* 18* 21*   BUN mg/dL  41* 42* 51*   CREATININE mg/dL 1.40 1.20 1.50   GLUCOSE mg/dL 80 73 112*   CALCIUM mg/dL 9.2 9.1 9.5   ANION GAP mmol/L 12 12 13   EGFR mL/min/1.73m*2 57* 69 53*     Results from last 72 hours   Lab Units 07/13/24  0458 07/12/24  0451 07/11/24  1555   ALK PHOS U/L 93 106 119   BILIRUBIN TOTAL mg/dL <0.2 0.2 <0.2   PROTEIN TOTAL g/dL 6.2 6.2 7.2   ALT U/L 10 10 11   AST U/L 14 14 15   ALBUMIN g/dL 3.3* 3.2* 3.7     Estimated Creatinine Clearance: 71.9 mL/min (by C-G formula based on SCr of 1.4 mg/dL).  C-Reactive Protein   Date Value Ref Range Status   07/11/2024 5.80 (H) 0.00 - 2.00 mg/dL Final     Microbiology  Reviewed-blood and wound cultures pending  Imaging  CT foot right wo IV contrast    Result Date: 7/11/2024  Interpreted By:  Luan Esposito, STUDY: CT FOOT RIGHT WO IV CONTRAST;  7/11/2024 6:42 pm   INDICATION: Signs/Symptoms:Osteomyelitis question deep tissue abscess.   COMPARISON: Right foot radiographs of 11/20/2024   ACCESSION NUMBER(S): [id number]   ORDERING CLINICIAN: NIRMALA LIM   TECHNIQUE: CT imaging of the right foot was obtained without contrast. Coronal and sagittal reformatted images were performed. 3D reconstructed images were not performed at time of dictation therefore not used during interpretation.   FINDINGS: OSSEOUS STRUCTURES: There is a large area of ulceration along the lateral aspect of the ankle and hindfoot extending to the bone surface of the 4th and 5th metatarsal bones. There is destructive osseous changes involving the head and neck of the 4th metatarsal bone consistent with osteomyelitis. The 4th proximal phalanx is dislocated dorsally. There is extensive osseous destruction and erosive change involving the 5th metatarsal base with intraosseous and adjacent soft tissue gas consistent with osteomyelitis. There is multiple areas of confluent low-density soft tissue that likely relates to fluid or phlegmonous changes in this includes a 3.9 cm x 1.7 cm by 3.5 cm area  along the dorsal aspect of the cuboid bone and lateral cuneiform bone and another confluent area of low density adjacent to the ulceration at base of 5th metatarsal bone that measures approximately 3.9 cm x 0.7 cm x 1.0 cm. There is diffuse periosteal new bone formation involving the 2nd-4th metatarsal bones that may be also reactive or indicative of chronic osteomyelitis. There are tubular bony defects within the medial cuneiform bone and calcaneus that are fluid-filled and communicate with the adjacent soft tissue. Within these bony defects is soft tissue thickening and in the case of the calcaneus, small amount of fluid collection measuring 1.7 cm x 0.8 cm. This fluid filled tract appears to extend toward the skin surface. Correlate for sinus tract formation. There is diffuse subcutaneous edema about the distal leg, ankle, foot likely representing cellulitis. Confluent areas of severe edema noted along the distal forefoot and toes.   There is diffuse muscle atrophy.       Large area of ulceration along the lateral aspect of the ankle and hindfoot extending to the bone surface of the 4th and 5th metatarsal bones. There is destructive osseous changes involving the head and neck of the 4th metatarsal bone consistent with osteomyelitis. The 4th proximal phalanx is dislocated dorsally. There is extensive osseous destruction and erosive change involving the 5th metatarsal base with intraosseous and adjacent soft tissue gas consistent with osteomyelitis. There is multiple areas of confluent low-density soft tissue that likely relates to fluid or phlegmonous changes in this includes a 3.9 cm x 1.7 cm by 3.5 cm area along the dorsal aspect of the cuboid bone and lateral cuneiform bone and another confluent area of low density adjacent to the ulceration at base of 5th metatarsal bone that measures approximately 3.9 cm x 0.7 cm x 1.0 cm. There is diffuse periosteal new bone formation involving the 2nd-4th metatarsal bones  that may be also reactive or indicative of chronic osteomyelitis. There are tubular bony defects within the medial cuneiform bone and calcaneus that are fluid-filled and communicate with the adjacent soft tissue. Within these bony defects is soft tissue thickening and in the case of the calcaneus, small amount of fluid collection measuring 1.7 cm x 0.8 cm. This fluid filled tract appears to extend toward the skin surface. Correlate for sinus tract formation. There is diffuse subcutaneous edema about the distal leg, ankle, foot likely representing cellulitis.   MACRO: None.   Signed by: Luan Esposito 7/11/2024 7:27 PM Dictation workstation:   [id number]    XR chest 1 view    Result Date: 7/11/2024  Interpreted By:  Liu Ramirez, STUDY: XR CHEST 1 VIEW;  7/11/2024 6:40 pm   INDICATION: Signs/Symptoms:History of heart failure.   COMPARISON: 04/24/2024   ACCESSION NUMBER(S): [id number]   ORDERING CLINICIAN: NIRMALA LIM   FINDINGS:         CARDIOMEDIASTINAL SILHOUETTE: Cardiomediastinal silhouette is normal in size and configuration.   LUNGS: Lungs are clear.   ABDOMEN: No remarkable upper abdominal findings.   BONES: No acute osseous changes.       1.  No evidence of acute cardiopulmonary process.       MACRO: None   Signed by: Liu Ramirez 7/11/2024 6:46 PM Dictation workstation:   [id number]    XR foot right 3+ views    Result Date: 7/11/2024  Interpreted By:  Liu Ramirez, STUDY: XR FOOT RIGHT 3+ VIEWS; ;  7/11/2024 5:05 pm   INDICATION: foot infection.   COMPARISON: 03/19/2024   ACCESSION NUMBER(S): [id number]   ORDERING CLINICIAN: HAYLEE MI   FINDINGS: Right foot, three views   The patient is status post partial amputation of the 5th ray. There is interval osseous erosive changes and irregularity at the 5th metatarsal stump with heterotopic osseous productive change at the lateral aspect of the hindfoot. Extensive periosteal reaction in the 4th metatarsal as well with erosive  changes and osseous destruction of the 4th metatarsal head. There is periosteal reaction irregularity at the 4th proximal phalangeal base. There is moderate to severe periosteal reaction in the 3rd metatarsal diaphysis. Gross material at the lateral aspect of the forefoot likely along a skin and soft tissue defect. There is severe osteopenia.       1. Osseous erosions with osseous destruction of the 4th metatarsal head severe periosteal reaction along the 4th metatarsal diaphysis. Periosteal reaction and irregularity at the 4th proximal phalanx as well. Findings compatible with osteomyelitis and potential septic arthritis of 4th MTP joint. 2. Osseous irregularity and destruction of the 5th metatarsal stump along with extensive osseous productive changes. Findings are also consistent with osteomyelitis. 3. Periosteal reaction along the 3rd metatarsal diaphysis may be reactive or represent additional focus of osteomyelitis. 4. MRI can be performed to evaluate extent of disease       MACRO: None   Signed by: Liu Ramirez 7/11/2024 5:12 PM Dictation workstation:   [id number]    Assessment/Plan   Type 2 diabetes mellitus with peripheral angiopathy without gangrene  Infected right diabetic foot ulcer   Chronic right foot osteomyelitis   History of MRSA, strep, bacteroides      IV vancomycin  IV zosyn   Monitor renal function  Follow up intraoperative culture/findings   Supportive care  Local care  Offloading  Monitor temperature and WBC  Further recommendations based on culture results    Srikanth Saravia MD

## 2024-07-13 NOTE — PROGRESS NOTES
Claude Coley is a 63 y.o. male on day 2 of admission presenting with Osteomyelitis of fourth toe of right foot (Multi).      Subjective   he has no pain in the operated foot  No shortness of breath or chest pain requesting probiotic       Objective     Last Recorded Vitals  /57 (BP Location: Right arm, Patient Position: Lying)   Pulse 54   Temp 36.5 °C (97.7 °F) (Temporal)   Resp 18   Wt 106 kg (233 lb 7.5 oz)   SpO2 99%   Intake/Output last 3 Shifts:    Intake/Output Summary (Last 24 hours) at 7/13/2024 1012  Last data filed at 7/13/2024 0900  Gross per 24 hour   Intake 1450 ml   Output 2450 ml   Net -1000 ml       Physical Exam  Alert oriented x 3 cooperative no distress  Chest clear  Heart regular  Abdomen soft nontender  Extremities no peripheral edema good pedal pulse on the left right foot is in a bulky dressing  Neurologic examination gross motor sensor nonfocal  Relevant Results  Reviewed  Assessment/Plan     Principal Problem:    Osteomyelitis of fourth toe of right foot (Multi)  Active Problems:    Type 2 diabetes mellitus with right diabetic foot ulcer (Multi)    Cellulitis of right foot    Non-pressure chronic ulcer of other part of right foot with fat layer exposed (Multi)      July 13     Wound infection of right foot with osteomyelitis   broad-spectrum antibiotics debridement per podiatry antibiotics and cultures to be managed by ID   dehydration   seems improved   congestive heart failure cardiology notes reviewed   questionable sepsis    this point I do not feel that he is acutely septic  Diabetes mellitus type 2 diabetic diet sliding scale  Schizophrenia continue Zyprexa  DVT prophylaxis bleeding had stopped will start heparin twice daily  Connie Neumann MD    Await culture results he may need skilled nursing facility placement if unable to ambulate    Connie Neumann MD

## 2024-07-13 NOTE — PROGRESS NOTES
Vancomycin Dosing by Pharmacy- FOLLOW UP    Claude Coley is a 63 y.o. year old male who Pharmacy has been consulted for vancomycin dosing for osteomyelitis/septic arthritis. Based on the patient's indication and renal status this patient is being dosed based on a goal AUC of 400-600.     Renal function is currently stable.    Current vancomycin dose: 1000 mg given every 12 hours    Estimated vancomycin AUC on current dose: 721 mg/L.hr     Visit Vitals  /57 (BP Location: Right arm, Patient Position: Lying)   Pulse 54   Temp 36.5 °C (97.7 °F) (Temporal)   Resp 18        Lab Results   Component Value Date    CREATININE 1.40 2024    CREATININE 1.20 2024    CREATININE 1.50 2024    CREATININE 1.00 2024        Patient weight is as follows:   Vitals:    24 1516   Weight: 106 kg (233 lb 7.5 oz)       Cultures:  No results found for the encounter in last 14 days.       I/O last 3 completed shifts:  In: 1200 (11.3 mL/kg) [P.O.:650; IV Piggyback:550]  Out: 3425 (32.3 mL/kg) [Urine:3425 (0.9 mL/kg/hr)]  Weight: 105.9 kg   I/O during current shift:  No intake/output data recorded.    Temp (24hrs), Av.4 °C (97.6 °F), Min:36 °C (96.8 °F), Max:36.8 °C (98.2 °F)      Assessment/Plan    Above goal AUC. Orders placed for new vancomcyin regimen of 1250 every 24 hours to begin at 1200.    This dosing regimen is predicted by [employer] to result in the following pharmacokinetic parameters:  Loading dose: N/A  Regimen: 1250 mg IV every 24 hours.  Start time: 10:53 on 2024  Exposure target: AUC24 (range)400-600 mg/L.hr   AUC24,ss: 472 mg/L.hr  Probability of AUC24 > 400: 82 %  Ctrough,ss: 13.5 mg/L  Probability of Ctrough,ss > 20: 8 %  Probability of nephrotoxicity (Lodise ADRYAN ): 9 %      The next level will be obtained on 7-14-24 at 0500 hr. May be obtained sooner if clinically indicated.   Will continue to monitor renal function daily while on vancomycin and order serum creatinine at  least every 48 hours if not already ordered.  Follow for continued vancomycin needs, clinical response, and signs/symptoms of toxicity.       Lee Lagos, PharmD

## 2024-07-13 NOTE — CARE PLAN
The patient's goals for the shift include      The clinical goals for the shift include pain mngt and safety      Problem: Safety - Adult  Goal: Free from fall injury  Flowsheets (Taken 7/13/2024 0209)  Free from fall injury:   Instruct family/caregiver on patient safety   Based on caregiver fall risk screen, instruct family/caregiver to ask for assistance with transferring infant if caregiver noted to have fall risk factors     Problem: Discharge Planning  Goal: Discharge to home or other facility with appropriate resources  Flowsheets (Taken 7/13/2024 0209)  Discharge to home or other facility with appropriate resources:   Identify barriers to discharge with patient and caregiver   Identify discharge learning needs (meds, wound care, etc)   Refer to discharge planning if patient needs post-hospital services based on physician order or complex needs related to functional status, cognitive ability or social support system     Problem: Chronic Conditions and Co-morbidities  Goal: Patient's chronic conditions and co-morbidity symptoms are monitored and maintained or improved  Flowsheets (Taken 7/13/2024 0209)  Care Plan - Patient's Chronic Conditions and Co-Morbidity Symptoms are Monitored and Maintained or Improved:   Monitor and assess patient's chronic conditions and comorbid symptoms for stability, deterioration, or improvement   Collaborate with multidisciplinary team to address chronic and comorbid conditions and prevent exacerbation or deterioration     Problem: Fall/Injury  Goal: Not fall by end of shift  Outcome: Progressing  Goal: Be free from injury by end of the shift  Outcome: Progressing  Goal: Verbalize understanding of personal risk factors for fall in the hospital  Outcome: Progressing

## 2024-07-13 NOTE — CARE PLAN
The patient's goals for the shift include      The clinical goals for the shift include maintain patient safety, encourage activity, and IV antibiotics       Problem: Diabetes  Goal: Achieve decreasing blood glucose levels by end of shift  Outcome: Progressing  Goal: Increase stability of blood glucose readings by end of shift  Outcome: Progressing  Goal: Decrease in ketones present in urine by end of shift  Outcome: Progressing  Goal: Maintain electrolyte levels within acceptable range throughout shift  Outcome: Progressing  Goal: Maintain glucose levels >70mg/dl to <250mg/dl throughout shift  Outcome: Progressing  Goal: No changes in neurological exam by end of shift  Outcome: Progressing  Goal: Learn about and adhere to nutrition recommendations by end of shift  Outcome: Progressing  Goal: Vital signs within normal range for age by end of shift  Outcome: Progressing  Goal: Increase self care and/or family involovement by end of shift  Outcome: Progressing  Goal: Receive DSME education by end of shift  Outcome: Progressing     Problem: Safety - Adult  Goal: Free from fall injury  Outcome: Progressing     Problem: Discharge Planning  Goal: Discharge to home or other facility with appropriate resources  Outcome: Progressing     Problem: Chronic Conditions and Co-morbidities  Goal: Patient's chronic conditions and co-morbidity symptoms are monitored and maintained or improved  Outcome: Progressing     Problem: Fall/Injury  Goal: Not fall by end of shift  Outcome: Progressing  Goal: Be free from injury by end of the shift  Outcome: Progressing  Goal: Verbalize understanding of personal risk factors for fall in the hospital  Outcome: Progressing  Goal: Verbalize understanding of risk factor reduction measures to prevent injury from fall in the home  Outcome: Progressing  Goal: Use assistive devices by end of the shift  Outcome: Progressing  Goal: Pace activities to prevent fatigue by end of the shift  Outcome:  Progressing     Problem: Pain  Goal: Takes deep breaths with improved pain control throughout the shift  Outcome: Progressing  Goal: Turns in bed with improved pain control throughout the shift  Outcome: Progressing  Goal: Walks with improved pain control throughout the shift  Outcome: Progressing  Goal: Performs ADL's with improved pain control throughout shift  Outcome: Progressing  Goal: Participates in PT with improved pain control throughout the shift  Outcome: Progressing  Goal: Free from opioid side effects throughout the shift  Outcome: Progressing  Goal: Free from acute confusion related to pain meds throughout the shift  Outcome: Progressing

## 2024-07-13 NOTE — PROGRESS NOTES
"Claude Coley is a 63 y.o. male on day 2 of admission presenting with Osteomyelitis of fourth toe of right foot (Multi).    Subjective   Resting comfortably       Objective     Physical Exam   Gen: NAD   Neck: no JVD, carotid upstroke is brisk and without delay   Heart: rrr, s1s2+ no mrg   Lungs: CTA   Ext: warm no edema  Last Recorded Vitals  Blood pressure 109/57, pulse 54, temperature 36.5 °C (97.7 °F), temperature source Temporal, resp. rate 18, height 1.88 m (6' 2\"), weight 106 kg (233 lb 7.5 oz), SpO2 99%.  Intake/Output last 3 Shifts:  I/O last 3 completed shifts:  In: 1200 (11.3 mL/kg) [P.O.:650; IV Piggyback:550]  Out: 3425 (32.3 mL/kg) [Urine:3425 (0.9 mL/kg/hr)]  Weight: 105.9 kg         Assessment/Plan   Principal Problem:    Osteomyelitis of fourth toe of right foot (Multi)  Active Problems:    Type 2 diabetes mellitus with right diabetic foot ulcer (Multi)    Cellulitis of right foot    Non-pressure chronic ulcer of other part of right foot with fat layer exposed (Multi)    Right foot osteomyelitis  Preoperative cardiac risk stratification  Atherosclerotic heart disease  Ischemic cardiomyopathy  Chronic heart failure with preserved ejection fraction  Hypertension  Non-insulin-dependent type 2 diabetes mellitus     7/12: If we are to use the RCRI risk  he would be class III risk which puts him at a 10% 30-day risk of adverse cardiac events.  His proBNP is essentially normal, and high-sensitivity troponins are where they always are at 50.  Clinically I feel that his cardiac status is rather stable. The patient is of moderate risk for a surgical procedure that carries an inherent moderate risk of adverse cardiac events. In the absence of acute coronary syndrome, acutely decompensated heart failure, hemodynamically significant valvular disease, or malignant arrhythmia I would consider this risk acceptable to proceed without further cardiac workup or intervention.  Will continue to follow this " patient with you.    7/13: He is status post surgical debridement postoperative day 1.  He is doing well, hemodynamically stable and without complaints.  Will sign off.  Recommended that he follow-up with his cardiologist at [facility] after he is discharged from the nursing facility.       I spent 35 minutes in the professional and overall care of this patient.      Mark Franz, DO

## 2024-07-13 NOTE — PROGRESS NOTES
PODIATRY SERVICE CONSULT PROGRESS NOTE    SERVICE DATE: 7/13/2024   SERVICE TIME:  1015    Subjective   INTERVAL HPI:   POD 1 I&D, wound debridement, and application allograft (DOS 7/12/24)  Pt was seen at bedside.  Pain well controlled.  Patient denies any constitutional symptoms.   No other pedal complaints.   Dressing c/d/I. Dressing left in place    Medications:  Scheduled Meds: atorvastatin, 40 mg, oral, Nightly  carvedilol, 12.5 mg, oral, BID  finasteride, 5 mg, oral, Daily  gemfibrozil, 600 mg, oral, BID AC  heparin, 5,000 Units, subcutaneous, q8h  insulin lispro, 0-10 Units, subcutaneous, TID  lactobacillus acidophilus, 1 capsule, oral, BID  OLANZapine, 20 mg, oral, Nightly  pantoprazole, 40 mg, oral, Daily before breakfast  piperacillin-tazobactam, 3.375 g, intravenous, q6h  polyethylene glycol, 17 g, oral, Daily  vancomycin, 1,250 mg, intravenous, q24h      Continuous Infusions:    PRN Meds: PRN medications: acetaminophen, dextrose, dextrose, glucagon, glucagon, oxyCODONE ER, vancomycin         Objective   PHYSICAL EXAM:  Physical Exam Performed:  Vitals:    07/13/24 0711   BP: 109/57   Pulse: 54   Resp: 18   Temp: 36.5 °C (97.7 °F)   SpO2: 99%     Body mass index is 29.98 kg/m².    Patient is AOx3 and in no acute distress. Patient is alert and cooperative. Sitting comfortably in bed with dressing clean, dry and intact. RLE dressing left in place, pre-op exam findings below.     Intact popliteal pulses, intact distal capillary refill <3sec, no increased warmth    Vascular: Palpable DP/PT pulses B/L. Moderate non-pitting edema noted right foot. Hair growth absent B/L. CFT brisk to B/L hallux. Temperature is warm to cool from tibial tuberosity to distal digits B/L. No lymphatic streaking noted B/L.     Musculoskeletal: Gross active and passive ROM intact to age and activity level. Moves all extremities spontaneously. No pain to palpation at feet B/L.      Neurological: Absent light touch sensation B/L. Pain  stimuli diminished B/L.      Dermatologic: Nails 1-5 are within normal limits for thickness and length B/L. Skin appears diffusely xerotic B/L. Web spaces 1-4 B/L are clean dry, intact left foot, 1-3 right foot (h/o amputation right foot). No hyperkeratotic tissue noted B/L. A stable eschar noted to his proximal anterior shin as well, which measures 2cm x 1.5cm.     Wound:  Measurements: 12cm x 8cm x 0.5cm deep at proximal lateral curved portion that extends an additional 6cm  Mixed wound base of 80% granular tissue with 20% various areas of fibrotic tissue.  Moderate serosanguinous drainage with fibrotic slough.   Minimal basil-wound maceration.   Mild basil-wound erythema.   Mild evidence of cellulitis which does not appear to be ascending  No palpable fluctuance. Mild malodor improved since several days ago. Mild increased warmth.   No probe to bone. Minimal tunneling or tracking.   No undermining. Skin edges irregular but intact.         LABS:   Results for orders placed or performed during the hospital encounter of 07/11/24 (from the past 24 hour(s))   POCT GLUCOSE   Result Value Ref Range    POCT Glucose 90 74 - 99 mg/dL   Serial Troponin, 6 Hour ([facility])   Result Value Ref Range    Troponin T, High Sensitivity 45 (HH) <=14 ng/L   POCT GLUCOSE   Result Value Ref Range    POCT Glucose 159 (H) 74 - 99 mg/dL   POCT GLUCOSE   Result Value Ref Range    POCT Glucose 78 74 - 99 mg/dL   CBC and Auto Differential   Result Value Ref Range    WBC 4.6 4.4 - 11.3 x10*3/uL    nRBC 0.0 0.0 - 0.0 /100 WBCs    RBC 3.07 (L) 4.50 - 5.90 x10*6/uL    Hemoglobin 8.2 (L) 13.5 - 17.5 g/dL    Hematocrit 25.1 (L) 41.0 - 52.0 %    MCV 82 80 - 100 fL    MCH 26.7 26.0 - 34.0 pg    MCHC 32.7 32.0 - 36.0 g/dL    RDW 16.0 (H) 11.5 - 14.5 %    Platelets 151 150 - 450 x10*3/uL    Neutrophils % 48.1 40.0 - 80.0 %    Immature Granulocytes %, Automated 1.1 (H) 0.0 - 0.9 %    Lymphocytes % 32.9 13.0 - 44.0 %    Monocytes % 9.6 2.0 - 10.0 %     Eosinophils % 7.2 0.0 - 6.0 %    Basophils % 1.1 0.0 - 2.0 %    Neutrophils Absolute 2.19 1.20 - 7.70 x10*3/uL    Immature Granulocytes Absolute, Automated 0.05 0.00 - 0.70 x10*3/uL    Lymphocytes Absolute 1.50 1.20 - 4.80 x10*3/uL    Monocytes Absolute 0.44 0.10 - 1.00 x10*3/uL    Eosinophils Absolute 0.33 0.00 - 0.70 x10*3/uL    Basophils Absolute 0.05 0.00 - 0.10 x10*3/uL   Comprehensive Metabolic Panel   Result Value Ref Range    Glucose 80 65 - 99 mg/dL    Sodium 135 133 - 145 mmol/L    Potassium 4.5 3.4 - 5.1 mmol/L    Chloride 102 97 - 107 mmol/L    Bicarbonate 21 (L) 24 - 31 mmol/L    Urea Nitrogen 41 (H) 8 - 25 mg/dL    Creatinine 1.40 0.40 - 1.60 mg/dL    eGFR 57 (L) >60 mL/min/1.73m*2    Calcium 9.2 8.5 - 10.4 mg/dL    Albumin 3.3 (L) 3.5 - 5.0 g/dL    Alkaline Phosphatase 93 35 - 125 U/L    Total Protein 6.2 5.9 - 7.9 g/dL    AST 14 5 - 40 U/L    Bilirubin, Total <0.2 0.1 - 1.2 mg/dL    ALT 10 5 - 40 U/L    Anion Gap 12 <=19 mmol/L   Vancomycin   Result Value Ref Range    Vancomycin 24.1 (H) 10.0 - 20.0 ug/mL   POCT GLUCOSE   Result Value Ref Range    POCT Glucose 81 74 - 99 mg/dL      Lab Results   Component Value Date    HGBA1C 4.4 03/19/2024      Lab Results   Component Value Date    CRP 5.80 (H) 07/11/2024      Lab Results   Component Value Date    SEDRATE 69 (H) 07/11/2024        Results from last 7 days   Lab Units 07/13/24  0458   WBC AUTO x10*3/uL 4.6   RBC AUTO x10*6/uL 3.07*   HEMOGLOBIN g/dL 8.2*   HEMATOCRIT % 25.1*     Results from last 7 days   Lab Units 07/13/24  0458 07/12/24  0451 07/11/24  1555   SODIUM mmol/L 135   < > 132*   POTASSIUM mmol/L 4.5   < > 5.1   CHLORIDE mmol/L 102   < > 98   CO2 mmol/L 21*   < > 21*   BUN mg/dL 41*   < > 51*   CREATININE mg/dL 1.40   < > 1.50   CALCIUM mg/dL 9.2   < > 9.5   MAGNESIUM mg/dL  --   --  1.80   BILIRUBIN TOTAL mg/dL <0.2   < > <0.2   ALT U/L 10   < > 11   AST U/L 14   < > 15    < > = values in this interval not displayed.     Results from last  7 days   Lab Units 07/11/24  1755   COLOR U  Light-Yellow   APPEARANCE U  Clear   PH U  5.0   SPEC GRAV UR  1.009   PROTEIN U mg/dL NEGATIVE   BLOOD UR  NEGATIVE   NITRITE U  NEGATIVE       IMAGING REVIEW:  CT foot right wo IV contrast    Result Date: 7/11/2024  Interpreted By:  Luan Esposito, STUDY: CT FOOT RIGHT WO IV CONTRAST;  7/11/2024 6:42 pm   INDICATION: Signs/Symptoms:Osteomyelitis question deep tissue abscess.   COMPARISON: Right foot radiographs of 11/20/2024   ACCESSION NUMBER(S): [id number]   ORDERING CLINICIAN: NIRMALA LIM   TECHNIQUE: CT imaging of the right foot was obtained without contrast. Coronal and sagittal reformatted images were performed. 3D reconstructed images were not performed at time of dictation therefore not used during interpretation.   FINDINGS: OSSEOUS STRUCTURES: There is a large area of ulceration along the lateral aspect of the ankle and hindfoot extending to the bone surface of the 4th and 5th metatarsal bones. There is destructive osseous changes involving the head and neck of the 4th metatarsal bone consistent with osteomyelitis. The 4th proximal phalanx is dislocated dorsally. There is extensive osseous destruction and erosive change involving the 5th metatarsal base with intraosseous and adjacent soft tissue gas consistent with osteomyelitis. There is multiple areas of confluent low-density soft tissue that likely relates to fluid or phlegmonous changes in this includes a 3.9 cm x 1.7 cm by 3.5 cm area along the dorsal aspect of the cuboid bone and lateral cuneiform bone and another confluent area of low density adjacent to the ulceration at base of 5th metatarsal bone that measures approximately 3.9 cm x 0.7 cm x 1.0 cm. There is diffuse periosteal new bone formation involving the 2nd-4th metatarsal bones that may be also reactive or indicative of chronic osteomyelitis. There are tubular bony defects within the medial cuneiform bone and calcaneus that are  fluid-filled and communicate with the adjacent soft tissue. Within these bony defects is soft tissue thickening and in the case of the calcaneus, small amount of fluid collection measuring 1.7 cm x 0.8 cm. This fluid filled tract appears to extend toward the skin surface. Correlate for sinus tract formation. There is diffuse subcutaneous edema about the distal leg, ankle, foot likely representing cellulitis. Confluent areas of severe edema noted along the distal forefoot and toes.   There is diffuse muscle atrophy.       Large area of ulceration along the lateral aspect of the ankle and hindfoot extending to the bone surface of the 4th and 5th metatarsal bones. There is destructive osseous changes involving the head and neck of the 4th metatarsal bone consistent with osteomyelitis. The 4th proximal phalanx is dislocated dorsally. There is extensive osseous destruction and erosive change involving the 5th metatarsal base with intraosseous and adjacent soft tissue gas consistent with osteomyelitis. There is multiple areas of confluent low-density soft tissue that likely relates to fluid or phlegmonous changes in this includes a 3.9 cm x 1.7 cm by 3.5 cm area along the dorsal aspect of the cuboid bone and lateral cuneiform bone and another confluent area of low density adjacent to the ulceration at base of 5th metatarsal bone that measures approximately 3.9 cm x 0.7 cm x 1.0 cm. There is diffuse periosteal new bone formation involving the 2nd-4th metatarsal bones that may be also reactive or indicative of chronic osteomyelitis. There are tubular bony defects within the medial cuneiform bone and calcaneus that are fluid-filled and communicate with the adjacent soft tissue. Within these bony defects is soft tissue thickening and in the case of the calcaneus, small amount of fluid collection measuring 1.7 cm x 0.8 cm. This fluid filled tract appears to extend toward the skin surface. Correlate for sinus tract formation.  There is diffuse subcutaneous edema about the distal leg, ankle, foot likely representing cellulitis.   MACRO: None.   Signed by: Luan Esposito 7/11/2024 7:27 PM Dictation workstation:   [id number]    XR chest 1 view    Result Date: 7/11/2024  Interpreted By:  Liu Ramirez, STUDY: XR CHEST 1 VIEW;  7/11/2024 6:40 pm   INDICATION: Signs/Symptoms:History of heart failure.   COMPARISON: 04/24/2024   ACCESSION NUMBER(S): [id number]   ORDERING CLINICIAN: NIRMALA LIM   FINDINGS:         CARDIOMEDIASTINAL SILHOUETTE: Cardiomediastinal silhouette is normal in size and configuration.   LUNGS: Lungs are clear.   ABDOMEN: No remarkable upper abdominal findings.   BONES: No acute osseous changes.       1.  No evidence of acute cardiopulmonary process.       MACRO: None   Signed by: Liu Ramirez 7/11/2024 6:46 PM Dictation workstation:   [id number]    XR foot right 3+ views    Result Date: 7/11/2024  Interpreted By:  Liu Ramirez, STUDY: XR FOOT RIGHT 3+ VIEWS; ;  7/11/2024 5:05 pm   INDICATION: foot infection.   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 There is interval osseous erosive changes and irregularity at the 5th metatarsal stump with heterotopic osseous productive change at the lateral aspect of the hindfoot. Extensive periosteal reaction in the 4th metatarsal as well with erosive changes and osseous destruction of the 4th metatarsal head. There is periosteal reaction irregularity at the 4th proximal phalangeal base. There is moderate to severe periosteal reaction in the 3rd metatarsal diaphysis. Gross material at the lateral aspect of the forefoot likely along a skin and soft tissue defect. There is severe osteopenia.       1. Osseous erosions with osseous destruction of the 4th metatarsal head severe periosteal reaction along the 4th  metatarsal diaphysis. Periosteal reaction and irregularity at the 4th proximal phalanx as well. Findings compatible with osteomyelitis and potential septic arthritis of 4th MTP joint. 2. Osseous irregularity and destruction of the 5th metatarsal stump along with extensive osseous productive changes. Findings are also consistent with osteomyelitis. 3. Periosteal reaction along the 3rd metatarsal diaphysis may be reactive or represent additional focus of osteomyelitis. 4. MRI can be performed to evaluate extent of disease       MACRO: None   Signed by: Liu Ramirez 7/11/2024 5:12 PM Dictation workstation:   [id number]            Assessment/Plan   ASSESSMENT & PLAN:    #Cellulitis right foot  #Chronic non-pressure ulcer with fat layer exposed right foot  #Chronic osteomyelitis right foot  #Charcot foot RLE  #T2DM with peripheral polyneuropathy  #Localized edema, RLE  #PVD    POD 1 I&D, wound debridement, and application allograft (DOS 7/12/24)    - Patient was seen and evaluated; all findings were discussed and all questions were answered to patient's satisfaction.  - Charts, labs, vitals and imaging all reviewed.   - Imaging: Imaging with periosteal reaction to 3rd and 4th metatarsal shafts and erosive changes to 5th met stump and 4th proximal phalanx.  - CT scan reviewed. Findings are consistent with Charcot. Areas of bone loss in the calcaneus and cuneiform are likely from the pin sites from the external fixation device that was recently removed.   - Labs: WBC 4.6, ESR 69, CRP 5.8, Hgb 8.2  - Wound culture: collected intra-op, pending  - Blood culture: NTD.    Plan:  - Abx: IV vanco and zosyn, per primary/ID  - Appreciated ID input regarding abx selection and PICC line management   - Pain Regimen: per primary  - Dressing to stay intact until follow up in clinic. Please contact podiatry team if dressing becomes soiled.   - Dressings: saline WTD over graft, dry 4x4, ABD, kerlix, 2 layers specialist padding, ace  wrap.   - Nursing staff is able to change/reinforce dressing if & as necessary until next day’s dressing change. Thank you.  - Podiatry will continue to follow while in house.    DVT ppx: per primary  Weightbearing: NWB RLE  Discharge: Pt to follow up 1 week after discharge with Dr. Perry/Miky    Patient may benefit with discharge to SNF to assist with ADLs and wound care    Case to be discussed with attending, A&P above reflects a tentative plan. Please await for the final signature from the attending physician on service.    Hannah Delgado DPM PGY-3  Podiatric Medicine & Surgery  Please Kenneth message me with any questions or concerns.            SIGNATURE: Hannah Delgado DPM PATIENT NAME: Claude Coley   DATE: July 13, 2024 MRN: [id number]   TIME: 10:25 AM CONTACT: Haiku Message

## 2024-07-13 NOTE — PROGRESS NOTES
Physical Therapy    Physical Therapy Treatment    Patient Name: Claude Coley  MRN: [id number]  Today's Date: 7/13/2024  Time Calculation  Start Time: 1053  Stop Time: 1120  Time Calculation (min): 27 min    Assessment/Plan   PT Assessment  PT Assessment Results: Decreased strength, Decreased endurance, Decreased range of motion, Impaired balance, Decreased mobility, Impaired judgement, Decreased safety awareness, Decreased skin integrity, Orthopedic restrictions, Decreased cognition, Pain  Rehab Prognosis: Good  Barriers to Discharge: decreased tolerance, assist required, lives alone  Evaluation/Treatment Tolerance: Patient limited by fatigue  Medical Staff Made Aware: Yes  Strengths: Rehab experience  Barriers to Participation: Comorbidities  End of Session Communication: Bedside nurse, PCT/NA/CTA  Assessment Comment: Pt gives effort, able to get to chair today, maintain NWB RLE, fatigues easily.  End of Session Patient Position: Up in chair, Alarm on (RLE elevated on chair w/ pillow)  PT Plan  Inpatient/Swing Bed or Outpatient: Inpatient  PT Plan  Treatment/Interventions: Bed mobility, Transfer training, Gait training, Balance training, Strengthening, Endurance training, Therapeutic exercise, Therapeutic activity  PT Plan: Ongoing PT  PT Frequency: 5 times per week  PT Discharge Recommendations: Moderate intensity level of continued care  Equipment Recommended upon Discharge: Wheeled walker  PT Recommended Transfer Status: Assist x1, Assistive device  PT - OK to Discharge: Yes      General Visit Information:   PT  Visit  PT Received On: 07/13/24  General  Reason for Referral: recent surg  Referred By: Dr. Bolaños  Past Medical History Relevant to Rehab: Including but not limited to CHF, DM,  and schizophrenia  Family/Caregiver Present: No  Prior to Session Communication: Bedside nurse  Patient Position Received: Bed, 3 rail up, Alarm off, not on at start of session  Preferred Learning Style: verbal,  visual  General Comment: Pt agreeable to PT session; cleared per nurse    Subjective   Precautions:  Precautions  Hearing/Visual Limitations: glasses  LE Weight Bearing Status: Right Non-Weight Bearing  Medical Precautions: Fall precautions    Objective   Pain:  Pain Assessment  Pain Assessment: 0-10  0-10 (Numeric) Pain Score: 4  Pain Type: Surgical pain  Pain Location: Foot  Pain Orientation: Right  Pain Descriptors: Burning  Pain Interventions:  (nurse informed)  Coordination:  Coordination Comment: Fair w/ managing NWB RLE  Postural Control:  Postural Control  Posture Comment: forward rounded shoulders  Dynamic Standing Balance  Dynamic Standing-Balance Support: Bilateral upper extremity supported  Dynamic Standing-Balance:  (hops at bedsisde)  Dynamic Standing-Comments: Fair-    Activity Tolerance:  Activity Tolerance  Endurance: Decreased tolerance for upright activites  Activity Tolerance Comments: Fair-/Poor+; quick fatigue w/ NWB status  Treatments:  Therapeutic Exercise  Therapeutic Exercise Performed: Yes  Therapeutic Exercise Activity 1: Pt performed seated Lt ankle pumps, bilat hip flexion, LAQs, carina hip add and abduction x 15 reps    Bed Mobility 1  Bed Mobility 1: Supine to sitting  Level of Assistance 1: Distant supervision  Bed Mobility Comments 1: tp Lt EOB, HOB elevated slightly    Ambulation/Gait Training 1  Surface 1: Level tile  Device 1: Rolling walker  Gait Support Devices: Other (Comment) (shoe donned Lt foot)  Assistance 1: Minimum assistance  Comments/Distance (ft) 1: Pt able to hop along EOB Lt x 4, Rt x 1, NWB RLE, then took seated rest break before amb ~4' to bedside chair, NWB RLE, mild unsteadiness but no LOB, (+) fatigue by end of amb.  Transfer 1  Technique 1: Sit to stand, Stand to sit  Transfer Device 1: Walker, Gait belt  Transfer Level of Assistance 1: Minimum assistance  Trials/Comments 1: from/to EOB, also practiced to/from chair w/ arms, seat padded for increased  height.    Outcome Measures:  [facility] Basic Mobility  Turning from your back to your side while in a flat bed without using bedrails: None  Moving from lying on your back to sitting on the side of a flat bed without using bedrails: A little  Moving to and from bed to chair (including a wheelchair): A little  Standing up from a chair using your arms (e.g. wheelchair or bedside chair): A little  To walk in hospital room: Total  Climbing 3-5 steps with railing: Total  Basic Mobility - Total Score: 15    Education Documentation  Precautions, taught by Lolita Linda PT at 7/13/2024 11:40 AM.  Learner: Patient  Readiness: Acceptance  Method: Explanation, Demonstration  Response: Verbalizes Understanding    Home Exercise Program, taught by Lolita Linda PT at 7/13/2024 11:40 AM.  Learner: Patient  Readiness: Acceptance  Method: Explanation, Demonstration  Response: Verbalizes Understanding    Mobility Training, taught by Lolita Linda PT at 7/13/2024 11:40 AM.  Learner: Patient  Readiness: Acceptance  Method: Explanation, Demonstration  Response: Verbalizes Understanding    Education Comments  No comments found.        OP EDUCATION:       Encounter Problems       Encounter Problems (Active)       Balance       standing balance (Progressing)       Start:  07/12/24    Expected End:  07/26/24       PT will demonstrate good static standing balance with modified independence using a walker             Mobility       ambulation (Progressing)       Start:  07/12/24    Expected End:  07/26/24       Pt will ambulate >50' with modified independence using a walker to promote safe home navigation            PT Transfers       bed mobility (Progressing)       Start:  07/12/24    Expected End:  07/26/24       Pt will perform supine<>sit bed mobility independently          sit to stand  (Progressing)       Start:  07/12/24    Expected End:  07/26/24       Pt will perform sit<>stand transfer with modified independence using a walker          bed to chair (Progressing)       Start:  07/12/24    Expected End:  07/26/24       Pt will perform bed<>chair transfer with modified independence using a walker            Safety       safe mobility techniques (Progressing)       Start:  07/12/24    Expected End:  07/26/24       Pt will demonstrate safe mobility techniques for all bed mobility, transfers, and ambulation         WB precautions (Progressing)       Start:  07/12/24    Expected End:  07/26/24       Pt will recall and demonstrate adherence to RLE NWB at all times

## 2024-07-14 VITALS
OXYGEN SATURATION: 95 % | BODY MASS INDEX: 29.96 KG/M2 | WEIGHT: 233.47 LBS | RESPIRATION RATE: 17 BRPM | HEART RATE: 63 BPM | HEIGHT: 74 IN | DIASTOLIC BLOOD PRESSURE: 52 MMHG | TEMPERATURE: 98.6 F | SYSTOLIC BLOOD PRESSURE: 109 MMHG

## 2024-07-14 LAB
ALBUMIN SERPL-MCNC: 3.2 G/DL (ref 3.5–5)
ALP BLD-CCNC: 89 U/L (ref 35–125)
ALT SERPL-CCNC: 10 U/L (ref 5–40)
ANION GAP SERPL CALC-SCNC: 12 MMOL/L
AST SERPL-CCNC: 14 U/L (ref 5–40)
BACTERIA BLD CULT: NORMAL
BACTERIA BLD CULT: NORMAL
BACTERIA SPEC CULT: NORMAL
BASOPHILS # BLD AUTO: 0.04 X10*3/UL (ref 0–0.1)
BASOPHILS NFR BLD AUTO: 0.8 %
BILIRUB SERPL-MCNC: <0.2 MG/DL (ref 0.1–1.2)
BUN SERPL-MCNC: 37 MG/DL (ref 8–25)
CALCIUM SERPL-MCNC: 9.2 MG/DL (ref 8.5–10.4)
CHLORIDE SERPL-SCNC: 101 MMOL/L (ref 97–107)
CO2 SERPL-SCNC: 21 MMOL/L (ref 24–31)
CREAT SERPL-MCNC: 1.1 MG/DL (ref 0.4–1.6)
EGFRCR SERPLBLD CKD-EPI 2021: 76 ML/MIN/1.73M*2
EOSINOPHIL # BLD AUTO: 0.34 X10*3/UL (ref 0–0.7)
EOSINOPHIL NFR BLD AUTO: 6.5 %
ERYTHROCYTE [DISTWIDTH] IN BLOOD BY AUTOMATED COUNT: 16.1 % (ref 11.5–14.5)
GLUCOSE BLD MANUAL STRIP-MCNC: 117 MG/DL (ref 74–99)
GLUCOSE BLD MANUAL STRIP-MCNC: 118 MG/DL (ref 74–99)
GLUCOSE BLD MANUAL STRIP-MCNC: 137 MG/DL (ref 74–99)
GLUCOSE BLD MANUAL STRIP-MCNC: 97 MG/DL (ref 74–99)
GLUCOSE SERPL-MCNC: 84 MG/DL (ref 65–99)
GRAM STN SPEC: NORMAL
GRAM STN SPEC: NORMAL
HCT VFR BLD AUTO: 25.7 % (ref 41–52)
HGB BLD-MCNC: 8.4 G/DL (ref 13.5–17.5)
IMM GRANULOCYTES # BLD AUTO: 0.03 X10*3/UL (ref 0–0.7)
IMM GRANULOCYTES NFR BLD AUTO: 0.6 % (ref 0–0.9)
LYMPHOCYTES # BLD AUTO: 1.5 X10*3/UL (ref 1.2–4.8)
LYMPHOCYTES NFR BLD AUTO: 28.6 %
MCH RBC QN AUTO: 26.7 PG (ref 26–34)
MCHC RBC AUTO-ENTMCNC: 32.7 G/DL (ref 32–36)
MCV RBC AUTO: 82 FL (ref 80–100)
MONOCYTES # BLD AUTO: 0.42 X10*3/UL (ref 0.1–1)
MONOCYTES NFR BLD AUTO: 8 %
NEUTROPHILS # BLD AUTO: 2.91 X10*3/UL (ref 1.2–7.7)
NEUTROPHILS NFR BLD AUTO: 55.5 %
NRBC BLD-RTO: 0 /100 WBCS (ref 0–0)
PLATELET # BLD AUTO: 152 X10*3/UL (ref 150–450)
POTASSIUM SERPL-SCNC: 4.2 MMOL/L (ref 3.4–5.1)
PROT SERPL-MCNC: 5.9 G/DL (ref 5.9–7.9)
RBC # BLD AUTO: 3.15 X10*6/UL (ref 4.5–5.9)
SODIUM SERPL-SCNC: 134 MMOL/L (ref 133–145)
VANCOMYCIN SERPL-MCNC: 17.7 UG/ML (ref 10–20)
WBC # BLD AUTO: 5.2 X10*3/UL (ref 4.4–11.3)

## 2024-07-14 PROCEDURE — 82947 ASSAY GLUCOSE BLOOD QUANT: CPT

## 2024-07-14 PROCEDURE — 80053 COMPREHEN METABOLIC PANEL: CPT | Performed by: INTERNAL MEDICINE

## 2024-07-14 PROCEDURE — 2500000004 HC RX 250 GENERAL PHARMACY W/ HCPCS (ALT 636 FOR OP/ED)

## 2024-07-14 PROCEDURE — 2500000004 HC RX 250 GENERAL PHARMACY W/ HCPCS (ALT 636 FOR OP/ED): Mod: JZ

## 2024-07-14 PROCEDURE — 97110 THERAPEUTIC EXERCISES: CPT | Mod: GP,CQ

## 2024-07-14 PROCEDURE — 2500000001 HC RX 250 WO HCPCS SELF ADMINISTERED DRUGS (ALT 637 FOR MEDICARE OP): Performed by: INTERNAL MEDICINE

## 2024-07-14 PROCEDURE — 85025 COMPLETE CBC W/AUTO DIFF WBC: CPT | Performed by: INTERNAL MEDICINE

## 2024-07-14 PROCEDURE — 97116 GAIT TRAINING THERAPY: CPT | Mod: GP,CQ

## 2024-07-14 PROCEDURE — 80202 ASSAY OF VANCOMYCIN: CPT

## 2024-07-14 PROCEDURE — 2500000002 HC RX 250 W HCPCS SELF ADMINISTERED DRUGS (ALT 637 FOR MEDICARE OP, ALT 636 FOR OP/ED)

## 2024-07-14 PROCEDURE — 2500000001 HC RX 250 WO HCPCS SELF ADMINISTERED DRUGS (ALT 637 FOR MEDICARE OP): Performed by: EMERGENCY MEDICINE

## 2024-07-14 PROCEDURE — 36415 COLL VENOUS BLD VENIPUNCTURE: CPT | Performed by: INTERNAL MEDICINE

## 2024-07-14 PROCEDURE — 1200000002 HC GENERAL ROOM WITH TELEMETRY DAILY

## 2024-07-14 PROCEDURE — 2500000001 HC RX 250 WO HCPCS SELF ADMINISTERED DRUGS (ALT 637 FOR MEDICARE OP)

## 2024-07-14 RX ORDER — COLCHICINE 0.6 MG/1
0.6 TABLET ORAL EVERY 12 HOURS PRN
Status: DISCONTINUED | OUTPATIENT
Start: 2024-07-14 | End: 2024-07-16 | Stop reason: HOSPADM

## 2024-07-14 RX ORDER — COLCHICINE 0.6 MG/1
0.6 TABLET ORAL EVERY 8 HOURS PRN
COMMUNITY
Start: 2024-05-30

## 2024-07-14 RX ORDER — BISACODYL 5 MG
5 TABLET, DELAYED RELEASE (ENTERIC COATED) ORAL DAILY PRN
Status: DISCONTINUED | OUTPATIENT
Start: 2024-07-14 | End: 2024-07-16 | Stop reason: HOSPADM

## 2024-07-14 ASSESSMENT — COGNITIVE AND FUNCTIONAL STATUS - GENERAL
MOBILITY SCORE: 17
STANDING UP FROM CHAIR USING ARMS: A LITTLE
WALKING IN HOSPITAL ROOM: TOTAL
TOILETING: A LITTLE
TOILETING: A LITTLE
WALKING IN HOSPITAL ROOM: A LOT
HELP NEEDED FOR BATHING: A LITTLE
DRESSING REGULAR LOWER BODY CLOTHING: A LITTLE
CLIMB 3 TO 5 STEPS WITH RAILING: TOTAL
TURNING FROM BACK TO SIDE WHILE IN FLAT BAD: A LITTLE
MOVING TO AND FROM BED TO CHAIR: A LITTLE
MOBILITY SCORE: 17
STANDING UP FROM CHAIR USING ARMS: A LITTLE
HELP NEEDED FOR BATHING: A LITTLE
MOBILITY SCORE: 15
CLIMB 3 TO 5 STEPS WITH RAILING: TOTAL
CLIMB 3 TO 5 STEPS WITH RAILING: TOTAL
WALKING IN HOSPITAL ROOM: A LOT
DRESSING REGULAR UPPER BODY CLOTHING: A LITTLE
DRESSING REGULAR LOWER BODY CLOTHING: A LITTLE
DRESSING REGULAR UPPER BODY CLOTHING: A LITTLE
MOVING TO AND FROM BED TO CHAIR: A LITTLE
DAILY ACTIVITIY SCORE: 20
STANDING UP FROM CHAIR USING ARMS: A LITTLE
MOVING TO AND FROM BED TO CHAIR: A LITTLE
DAILY ACTIVITIY SCORE: 20

## 2024-07-14 ASSESSMENT — PAIN - FUNCTIONAL ASSESSMENT
PAIN_FUNCTIONAL_ASSESSMENT: 0-10
PAIN_FUNCTIONAL_ASSESSMENT: 0-10

## 2024-07-14 ASSESSMENT — PAIN SCALES - GENERAL
PAINLEVEL_OUTOF10: 4
PAINLEVEL_OUTOF10: 5 - MODERATE PAIN
PAINLEVEL_OUTOF10: 0 - NO PAIN
PAINLEVEL_OUTOF10: 4
PAINLEVEL_OUTOF10: 0 - NO PAIN

## 2024-07-14 ASSESSMENT — PAIN DESCRIPTION - LOCATION
LOCATION: KNEE
LOCATION: FOOT

## 2024-07-14 ASSESSMENT — PAIN DESCRIPTION - ORIENTATION
ORIENTATION: LEFT
ORIENTATION: RIGHT

## 2024-07-14 NOTE — PROGRESS NOTES
Physical Therapy    Physical Therapy Treatment    Patient Name: Claude Coley  MRN: [id number]  Today's Date: 7/14/2024  Time Calculation  Start Time: 0946  Stop Time: 1012  Time Calculation (min): 26 min    Assessment/Plan   PT Assessment  Rehab Prognosis: Good  Assessment Comment: Pt was able to ambulate an increased distance today, able to remain NWB with R LE. Pt demonstrates compliance with exercises using proper techniques, tolerated all without complaint. Pt plans to be discharged tomorrow to SNF. Pt returned to supine with HOB elevated, needs in reach, nurse notified, alarm on.  End of Session Patient Position: Bed, 4 rail up, Alarm on  PT Plan  Inpatient/Swing Bed or Outpatient: Inpatient  PT Plan  Treatment/Interventions: Bed mobility, Transfer training, Gait training, Therapeutic exercise  PT Plan: Ongoing PT  PT Frequency: 5 times per week  PT Discharge Recommendations: Moderate intensity level of continued care  Equipment Recommended upon Discharge: Wheeled walker  PT Recommended Transfer Status: Contact guard  PT - OK to Discharge: Yes      General Visit Information:   PT  Visit  PT Received On: 07/14/24  General  General Comment: Pt supine with HOB elevated, agreeable to PT, cleared by nurse to be seen.    Subjective   Precautions:  Precautions  LE Weight Bearing Status: Right Non-Weight Bearing  Precautions Comment: falls      Objective   Pain:  Pain Assessment  Pain Assessment: 0-10  0-10 (Numeric) Pain Score: 4  Pain Location: Foot  Pain Orientation: Right  Response to Interventions: 4    Treatments:  Therapeutic Exercise  Therapeutic Exercise Performed: Yes  Therapeutic Exercise Activity 1: Seated: Bilateral LAQ , hip adduction iso, hip abduction iso , hip flexion x15  Therapeutic Exercise Activity 2: Seated: L ankle pumps    Bed Mobility  Bed Mobility: Yes  Bed Mobility 1  Bed Mobility 1: Supine to sitting  Level of Assistance 1: Close supervision  Bed Mobility Comments 1: HOB elevated  Bed  Mobility 2  Bed Mobility  2: Sitting to supine  Level of Assistance 2: Close supervision  Bed Mobility Comments 2: HOB elevated    Ambulation/Gait Training  Ambulation/Gait Training Performed: Yes  Ambulation/Gait Training 1  Surface 1: Level tile  Device 1: Rolling walker  Assistance 1: Contact guard  Quality of Gait 1:  (L LE hopping)  Comments/Distance (ft) 1: 4 ft x1 forward/backward hops, 10 ft x1 around room  Transfers  Transfer: Yes  Transfer 1  Transfer From 1: Bed to, Sit to  Transfer to 1: Stand  Technique 1: Sit to stand  Transfer Device 1: Walker  Transfer Level of Assistance 1: Close supervision  Transfers 2  Transfer From 2: Stand to  Transfer to 2: Sit, Bed  Technique 2: Stand to sit  Transfer Device 2: Walker  Transfer Level of Assistance 2: Close supervision    Outcome Measures:  [facility] Basic Mobility  Turning from your back to your side while in a flat bed without using bedrails: None  Moving from lying on your back to sitting on the side of a flat bed without using bedrails: None  Moving to and from bed to chair (including a wheelchair): A little  Standing up from a chair using your arms (e.g. wheelchair or bedside chair): A little  To walk in hospital room: A lot  Climbing 3-5 steps with railing: Total  Basic Mobility - Total Score: 17    Education Documentation  Handouts, taught by Melvin Raya PTA at 7/14/2024 10:29 AM.  Learner: Patient  Readiness: Acceptance  Method: Explanation  Response: Verbalizes Understanding    Precautions, taught by Melvin Raya PTA at 7/14/2024 10:29 AM.  Learner: Patient  Readiness: Acceptance  Method: Explanation  Response: Verbalizes Understanding    Body Mechanics, taught by Melvin Raya PTA at 7/14/2024 10:29 AM.  Learner: Patient  Readiness: Acceptance  Method: Explanation  Response: Verbalizes Understanding    Home Exercise Program, taught by Melvin Raya PTA at 7/14/2024 10:29 AM.  Learner: Patient  Readiness:  Acceptance  Method: Explanation  Response: Verbalizes Understanding    Mobility Training, taught by Melvin Raya PTA at 7/14/2024 10:29 AM.  Learner: Patient  Readiness: Acceptance  Method: Explanation  Response: Verbalizes Understanding    Education Comments  No comments found.        OP EDUCATION:  Outpatient Education  Individual(s) Educated: Patient  Patient/Caregiver Demonstrated Understanding: yes  Patient Response to Education: Patient/Caregiver Verbalized Understanding of Information  Education Comment: NWB with R LE    Encounter Problems       Encounter Problems (Active)       Balance       standing balance (Progressing)       Start:  07/12/24    Expected End:  07/26/24       PT will demonstrate good static standing balance with modified independence using a walker             Mobility       ambulation (Progressing)       Start:  07/12/24    Expected End:  07/26/24       Pt will ambulate >50' with modified independence using a walker to promote safe home navigation            PT Transfers       bed mobility (Progressing)       Start:  07/12/24    Expected End:  07/26/24       Pt will perform supine<>sit bed mobility independently          sit to stand  (Progressing)       Start:  07/12/24    Expected End:  07/26/24       Pt will perform sit<>stand transfer with modified independence using a walker         bed to chair (Progressing)       Start:  07/12/24    Expected End:  07/26/24       Pt will perform bed<>chair transfer with modified independence using a walker            Safety       safe mobility techniques (Progressing)       Start:  07/12/24    Expected End:  07/26/24       Pt will demonstrate safe mobility techniques for all bed mobility, transfers, and ambulation         WB precautions (Progressing)       Start:  07/12/24    Expected End:  07/26/24       Pt will recall and demonstrate adherence to RLE NWB at all times

## 2024-07-14 NOTE — PROGRESS NOTES
Claude Coley is a 63 y.o. male on day 3 of admission presenting with Osteomyelitis of fourth toe of right foot (Multi).      Subjective   He is requesting Dulcolax as needed  No foot pain  No shortness of breath chest pain abdominal pain  Reviewed input from PT and ID       Objective     Last Recorded Vitals  /50 (BP Location: Right arm, Patient Position: Lying)   Pulse 55   Temp 36 °C (96.8 °F) (Temporal)   Resp 16   Wt 106 kg (233 lb 7.5 oz)   SpO2 99%   Intake/Output last 3 Shifts:    Intake/Output Summary (Last 24 hours) at 7/14/2024 0916  Last data filed at 7/14/2024 0835  Gross per 24 hour   Intake 2250 ml   Output 2800 ml   Net -550 ml       Physical Exam  Alert oriented x 3 cooperative on room air  Chest clear  Heart regular  Abdomen soft nontender  Extremities no edema in the left right foot in a dressing  Neurologic exam gross nonfocal  Relevant Results  Labs reviewed  Assessment/Plan     Principal Problem:    Osteomyelitis of fourth toe of right foot (Multi)  Active Problems:    Type 2 diabetes mellitus with right diabetic foot ulcer (Multi)    Cellulitis of right foot    Non-pressure chronic ulcer of other part of right foot with fat layer exposed (Multi)      July 14    Wound infection of right foot with osteomyelitis   broad-spectrum antibiotics debridement per podiatry antibiotics and cultures to be managed by ID   dehydration   seems improved   congestive heart failure cardiology notes reviewed   questionable sepsis    this point I do not feel that he is acutely septic  Diabetes mellitus type 2 diabetic diet sliding scale  Schizophrenia continue Zyprexa  DVT prophylaxis bleeding had stopped will start heparin twice daily  Connie Neumann MD     Await culture results he may need skilled nursing facility placement if unable to ambulate             Connie Neumann MD

## 2024-07-14 NOTE — PROGRESS NOTES
PODIATRY SERVICE CONSULT PROGRESS NOTE     SERVICE DATE: 7/14/2024       SERVICE TIME:  1215        Subjective  INTERVAL HPI:   POD 2 I&D, wound debridement, and application allograft (DOS 7/12/24)  Pt was seen at bedside.  Pain well controlled.  Patient denies any constitutional symptoms.   No other pedal complaints.   Dressing c/d/I. Dressing left in place     Medications:  Scheduled Meds: atorvastatin, 40 mg, oral, Nightly  carvedilol, 12.5 mg, oral, BID  finasteride, 5 mg, oral, Daily  gemfibrozil, 600 mg, oral, BID AC  heparin, 5,000 Units, subcutaneous, q8h  insulin lispro, 0-10 Units, subcutaneous, TID  lactobacillus acidophilus, 1 capsule, oral, BID  OLANZapine, 20 mg, oral, Nightly  pantoprazole, 40 mg, oral, Daily before breakfast  piperacillin-tazobactam, 3.375 g, intravenous, q6h  polyethylene glycol, 17 g, oral, Daily  vancomycin, 1,250 mg, intravenous, q24h        Continuous Infusions:    PRN Meds: PRN medications: acetaminophen, dextrose, dextrose, glucagon, glucagon, oxyCODONE ER, vancomycin                 Objective  PHYSICAL EXAM:  Physical Exam Performed:      Vitals:     07/13/24 0711   BP: 109/57   Pulse: 54   Resp: 18   Temp: 36.5 °C (97.7 °F)   SpO2: 99%      Body mass index is 29.98 kg/m².     Patient is AOx3 and in no acute distress. Patient is alert and cooperative. Sitting comfortably in bed with dressing clean, dry and intact. RLE dressing left in place, pre-op exam findings below.      Intact popliteal pulses, intact distal capillary refill <3sec, no increased warmth     Vascular: Palpable DP/PT pulses B/L. Moderate non-pitting edema noted right foot. Hair growth absent B/L. CFT brisk to B/L hallux. Temperature is warm to cool from tibial tuberosity to distal digits B/L. No lymphatic streaking noted B/L.     Musculoskeletal: Gross active and passive ROM intact to age and activity level. Moves all extremities spontaneously. No pain to palpation at feet B/L.      Neurological: Absent  light touch sensation B/L. Pain stimuli diminished B/L.      Dermatologic: Nails 1-5 are within normal limits for thickness and length B/L. Skin appears diffusely xerotic B/L. Web spaces 1-4 B/L are clean dry, intact left foot, 1-3 right foot (h/o amputation right foot). No hyperkeratotic tissue noted B/L. A stable eschar noted to his proximal anterior shin as well, which measures 2cm x 1.5cm.     Wound:  Measurements: 12cm x 8cm x 0.5cm deep at proximal lateral curved portion that extends an additional 6cm  Mixed wound base of 80% granular tissue with 20% various areas of fibrotic tissue.  Moderate serosanguinous drainage with fibrotic slough.   Minimal basil-wound maceration.   Mild basil-wound erythema.   Mild evidence of cellulitis which does not appear to be ascending  No palpable fluctuance. Mild malodor improved since several days ago. Mild increased warmth.   No probe to bone. Minimal tunneling or tracking.   No undermining. Skin edges irregular but intact.           LABS:         Results for orders placed or performed during the hospital encounter of 07/11/24 (from the past 24 hour(s))   POCT GLUCOSE   Result Value Ref Range     POCT Glucose 90 74 - 99 mg/dL   Serial Troponin, 6 Hour ([facility])   Result Value Ref Range     Troponin T, High Sensitivity 45 (HH) <=14 ng/L   POCT GLUCOSE   Result Value Ref Range     POCT Glucose 159 (H) 74 - 99 mg/dL   POCT GLUCOSE   Result Value Ref Range     POCT Glucose 78 74 - 99 mg/dL   CBC and Auto Differential   Result Value Ref Range     WBC 4.6 4.4 - 11.3 x10*3/uL     nRBC 0.0 0.0 - 0.0 /100 WBCs     RBC 3.07 (L) 4.50 - 5.90 x10*6/uL     Hemoglobin 8.2 (L) 13.5 - 17.5 g/dL     Hematocrit 25.1 (L) 41.0 - 52.0 %     MCV 82 80 - 100 fL     MCH 26.7 26.0 - 34.0 pg     MCHC 32.7 32.0 - 36.0 g/dL     RDW 16.0 (H) 11.5 - 14.5 %     Platelets 151 150 - 450 x10*3/uL     Neutrophils % 48.1 40.0 - 80.0 %     Immature Granulocytes %, Automated 1.1 (H) 0.0 - 0.9 %     Lymphocytes % 32.9  13.0 - 44.0 %     Monocytes % 9.6 2.0 - 10.0 %     Eosinophils % 7.2 0.0 - 6.0 %     Basophils % 1.1 0.0 - 2.0 %     Neutrophils Absolute 2.19 1.20 - 7.70 x10*3/uL     Immature Granulocytes Absolute, Automated 0.05 0.00 - 0.70 x10*3/uL     Lymphocytes Absolute 1.50 1.20 - 4.80 x10*3/uL     Monocytes Absolute 0.44 0.10 - 1.00 x10*3/uL     Eosinophils Absolute 0.33 0.00 - 0.70 x10*3/uL     Basophils Absolute 0.05 0.00 - 0.10 x10*3/uL   Comprehensive Metabolic Panel   Result Value Ref Range     Glucose 80 65 - 99 mg/dL     Sodium 135 133 - 145 mmol/L     Potassium 4.5 3.4 - 5.1 mmol/L     Chloride 102 97 - 107 mmol/L     Bicarbonate 21 (L) 24 - 31 mmol/L     Urea Nitrogen 41 (H) 8 - 25 mg/dL     Creatinine 1.40 0.40 - 1.60 mg/dL     eGFR 57 (L) >60 mL/min/1.73m*2     Calcium 9.2 8.5 - 10.4 mg/dL     Albumin 3.3 (L) 3.5 - 5.0 g/dL     Alkaline Phosphatase 93 35 - 125 U/L     Total Protein 6.2 5.9 - 7.9 g/dL     AST 14 5 - 40 U/L     Bilirubin, Total <0.2 0.1 - 1.2 mg/dL     ALT 10 5 - 40 U/L     Anion Gap 12 <=19 mmol/L   Vancomycin   Result Value Ref Range     Vancomycin 24.1 (H) 10.0 - 20.0 ug/mL   POCT GLUCOSE   Result Value Ref Range     POCT Glucose 81 74 - 99 mg/dL            Lab Results   Component Value Date     HGBA1C 4.4 03/19/2024            Lab Results   Component Value Date     CRP 5.80 (H) 07/11/2024            Lab Results   Component Value Date     SEDRATE 69 (H) 07/11/2024              Results from last 7 days   Lab Units 07/13/24  0458   WBC AUTO x10*3/uL 4.6   RBC AUTO x10*6/uL 3.07*   HEMOGLOBIN g/dL 8.2*   HEMATOCRIT % 25.1*             Results from last 7 days   Lab Units 07/13/24  0458 07/12/24  0451 07/11/24  1555   SODIUM mmol/L 135   < > 132*   POTASSIUM mmol/L 4.5   < > 5.1   CHLORIDE mmol/L 102   < > 98   CO2 mmol/L 21*   < > 21*   BUN mg/dL 41*   < > 51*   CREATININE mg/dL 1.40   < > 1.50   CALCIUM mg/dL 9.2   < > 9.5   MAGNESIUM mg/dL  --   --  1.80   BILIRUBIN TOTAL mg/dL <0.2   < > <0.2    ALT U/L 10   < > 11   AST U/L 14   < > 15    < > = values in this interval not displayed.           Results from last 7 days   Lab Units 07/11/24  1755   COLOR U   Light-Yellow   APPEARANCE U   Clear   PH U   5.0   SPEC GRAV UR   1.009   PROTEIN U mg/dL NEGATIVE   BLOOD UR   NEGATIVE   NITRITE U   NEGATIVE         IMAGING REVIEW:  CT foot right wo IV contrast     Result Date: 7/11/2024  Interpreted By:  Luan Esposito, STUDY: CT FOOT RIGHT WO IV CONTRAST;  7/11/2024 6:42 pm   INDICATION: Signs/Symptoms:Osteomyelitis question deep tissue abscess.   COMPARISON: Right foot radiographs of 11/20/2024   ACCESSION NUMBER(S): [id number]   ORDERING CLINICIAN: NIRMALA LIM   TECHNIQUE: CT imaging of the right foot was obtained without contrast. Coronal and sagittal reformatted images were performed. 3D reconstructed images were not performed at time of dictation therefore not used during interpretation.   FINDINGS: OSSEOUS STRUCTURES: There is a large area of ulceration along the lateral aspect of the ankle and hindfoot extending to the bone surface of the 4th and 5th metatarsal bones. There is destructive osseous changes involving the head and neck of the 4th metatarsal bone consistent with osteomyelitis. The 4th proximal phalanx is dislocated dorsally. There is extensive osseous destruction and erosive change involving the 5th metatarsal base with intraosseous and adjacent soft tissue gas consistent with osteomyelitis. There is multiple areas of confluent low-density soft tissue that likely relates to fluid or phlegmonous changes in this includes a 3.9 cm x 1.7 cm by 3.5 cm area along the dorsal aspect of the cuboid bone and lateral cuneiform bone and another confluent area of low density adjacent to the ulceration at base of 5th metatarsal bone that measures approximately 3.9 cm x 0.7 cm x 1.0 cm. There is diffuse periosteal new bone formation involving the 2nd-4th metatarsal bones that may be also reactive  or indicative of chronic osteomyelitis. There are tubular bony defects within the medial cuneiform bone and calcaneus that are fluid-filled and communicate with the adjacent soft tissue. Within these bony defects is soft tissue thickening and in the case of the calcaneus, small amount of fluid collection measuring 1.7 cm x 0.8 cm. This fluid filled tract appears to extend toward the skin surface. Correlate for sinus tract formation. There is diffuse subcutaneous edema about the distal leg, ankle, foot likely representing cellulitis. Confluent areas of severe edema noted along the distal forefoot and toes.   There is diffuse muscle atrophy.        Large area of ulceration along the lateral aspect of the ankle and hindfoot extending to the bone surface of the 4th and 5th metatarsal bones. There is destructive osseous changes involving the head and neck of the 4th metatarsal bone consistent with osteomyelitis. The 4th proximal phalanx is dislocated dorsally. There is extensive osseous destruction and erosive change involving the 5th metatarsal base with intraosseous and adjacent soft tissue gas consistent with osteomyelitis. There is multiple areas of confluent low-density soft tissue that likely relates to fluid or phlegmonous changes in this includes a 3.9 cm x 1.7 cm by 3.5 cm area along the dorsal aspect of the cuboid bone and lateral cuneiform bone and another confluent area of low density adjacent to the ulceration at base of 5th metatarsal bone that measures approximately 3.9 cm x 0.7 cm x 1.0 cm. There is diffuse periosteal new bone formation involving the 2nd-4th metatarsal bones that may be also reactive or indicative of chronic osteomyelitis. There are tubular bony defects within the medial cuneiform bone and calcaneus that are fluid-filled and communicate with the adjacent soft tissue. Within these bony defects is soft tissue thickening and in the case of the calcaneus, small amount of fluid collection  measuring 1.7 cm x 0.8 cm. This fluid filled tract appears to extend toward the skin surface. Correlate for sinus tract formation. There is diffuse subcutaneous edema about the distal leg, ankle, foot likely representing cellulitis.   MACRO: None.   Signed by: Luan Esposito 7/11/2024 7:27 PM Dictation workstation:   [id number]     XR chest 1 view     Result Date: 7/11/2024  Interpreted By:  Liu Ramirez, STUDY: XR CHEST 1 VIEW;  7/11/2024 6:40 pm   INDICATION: Signs/Symptoms:History of heart failure.   COMPARISON: 04/24/2024   ACCESSION NUMBER(S): [id number]   ORDERING CLINICIAN: NIRMALA LIM   FINDINGS:         CARDIOMEDIASTINAL SILHOUETTE: Cardiomediastinal silhouette is normal in size and configuration.   LUNGS: Lungs are clear.   ABDOMEN: No remarkable upper abdominal findings.   BONES: No acute osseous changes.        1.  No evidence of acute cardiopulmonary process.       MACRO: None   Signed by: Liu Ramirez 7/11/2024 6:46 PM Dictation workstation:   [id number]     XR foot right 3+ views     Result Date: 7/11/2024  Interpreted By:  Liu Ramirez, STUDY: XR FOOT RIGHT 3+ VIEWS; ;  7/11/2024 5:05 pm   INDICATION: foot infection.   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 There is interval osseous erosive changes and irregularity at the 5th metatarsal stump with heterotopic osseous productive change at the lateral aspect of the hindfoot. Extensive periosteal reaction in the 4th metatarsal as well with erosive changes and osseous destruction of the 4th metatarsal head. There is periosteal reaction irregularity at the 4th proximal phalangeal base. There is moderate to severe periosteal reaction in the 3rd metatarsal diaphysis. Gross material at the lateral aspect of the forefoot likely along a skin and soft tissue defect. There is severe  osteopenia.        1. Osseous erosions with osseous destruction of the 4th metatarsal head severe periosteal reaction along the 4th metatarsal diaphysis. Periosteal reaction and irregularity at the 4th proximal phalanx as well. Findings compatible with osteomyelitis and potential septic arthritis of 4th MTP joint. 2. Osseous irregularity and destruction of the 5th metatarsal stump along with extensive osseous productive changes. Findings are also consistent with osteomyelitis. 3. Periosteal reaction along the 3rd metatarsal diaphysis may be reactive or represent additional focus of osteomyelitis. 4. MRI can be performed to evaluate extent of disease       MACRO: None   Signed by: Liu Ramirez 7/11/2024 5:12 PM Dictation workstation:   [id number]                    Assessment/Plan  ASSESSMENT & PLAN:     #Cellulitis right foot  #Chronic non-pressure ulcer with fat layer exposed right foot  #Chronic osteomyelitis right foot  #Charcot foot RLE  #T2DM with peripheral polyneuropathy  #Localized edema, RLE  #PVD     POD 1 I&D, wound debridement, and application allograft (DOS 7/12/24)     - Patient was seen and evaluated; all findings were discussed and all questions were answered to patient's satisfaction.  - Charts, labs, vitals and imaging all reviewed.   - Imaging: Imaging with periosteal reaction to 3rd and 4th metatarsal shafts and erosive changes to 5th met stump and 4th proximal phalanx.  - CT scan reviewed. Findings are consistent with Charcot. Areas of bone loss in the calcaneus and cuneiform are likely from the pin sites from the external fixation device that was recently removed.   - Labs: WBC 5.2, ESR 69, CRP 5.8, Hgb 8.4  - Wound culture: collected intra-op, pending. Polymorphic leukocystes on gram stain, no organisms  - Blood culture: NTD.     Plan:  - Abx: IV vanco and zosyn, per primary/ID  - Appreciated ID input regarding abx selection and PICC line management   - Pain Regimen: per primary  -  Dressing to stay intact until follow up in clinic. Please contact podiatry team if dressing becomes soiled.   - Dressings: saline WTD over graft, dry 4x4, ABD, kerlix, 2 layers specialist padding, ace wrap.   - Nursing staff is able to change/reinforce dressing if & as necessary until next day’s dressing change. Please notify team if dressing change is needed. Thank you.  - No contraindication to DC once wound cx finalized and appropriate abx selection had been made per primary/ID  - Podiatry will continue to follow while in house.     DVT ppx: per primary  Weightbearing: NWB RLE, pt to remain NWB upon discharge  Discharge: Pt to follow up 1 week after discharge with Dr. Perry/Miky     Patient may benefit with discharge to SNF to assist with ADLs and wound care     Case to be discussed with attending, A&P above reflects a tentative plan. Please await for the final signature from the attending physician on service.     Hannah Delgado DPM PGY-3  Podiatric Medicine & Surgery  Please Kenneth message me with any questions or concerns.             SIGNATURE: Hannah Delgado DPM PATIENT NAME: Claude Coley   DATE: July 14, 2024 MRN: [id number]   TIME: 12:14 PM CONTACT: Haiku Message

## 2024-07-14 NOTE — PROGRESS NOTES
Vancomycin Dosing by Pharmacy- FOLLOW UP    Claude Coley is a 63 y.o. year old male who Pharmacy has been consulted for vancomycin dosing for osteomyelitis/septic arthritis. Based on the patient's indication and renal status this patient is being dosed based on a goal AUC of 400-600.     Renal function is currently stable.    Current vancomycin dose: 1250 mg given every 24 hours    Estimated vancomycin AUC on current dose: 406 mg/L.hr     Visit Vitals  /50 (BP Location: Right arm, Patient Position: Lying)   Pulse 55   Temp 36 °C (96.8 °F) (Temporal)   Resp 16        Lab Results   Component Value Date    CREATININE 1.10 2024    CREATININE 1.40 2024    CREATININE 1.20 2024    CREATININE 1.50 2024        Patient weight is as follows:   Vitals:    24 1516   Weight: 106 kg (233 lb 7.5 oz)       Cultures:  No results found for the encounter in last 14 days.       I/O last 3 completed shifts:  In: 2850 (26.9 mL/kg) [P.O.:2100; IV Piggyback:750]  Out: 5100 (48.2 mL/kg) [Urine:5100 (1.3 mL/kg/hr)]  Weight: 105.9 kg   I/O during current shift:  No intake/output data recorded.    Temp (24hrs), Av.4 °C (97.5 °F), Min:36 °C (96.8 °F), Max:36.9 °C (98.4 °F)      Assessment/Plan    Within goal AUC range. Continue current vancomycin regimen.    This dosing regimen is predicted by [employer] to result in the following pharmacokinetic parameters:  Loading dose: N/A  Regimen: 1250 mg IV every 24 hours.  Start time: 00:26 on 2024  Exposure target: AUC24 (range)400-600 mg/L.hr   AUC24,ss: 406 mg/L.hr  Probability of AUC24 > 400: 54 %  Ctrough,ss: 11 mg/L  Probability of Ctrough,ss > 20: 0 %  Probability of nephrotoxicity (Lodise ADRYAN ): 7 %      The next level will be obtained on 24 at 0500. May be obtained sooner if clinically indicated.   Will continue to monitor renal function daily while on vancomycin and order serum creatinine at least every 48 hours if not already  ordered.  Follow for continued vancomycin needs, clinical response, and signs/symptoms of toxicity.       Lee Lagos, PharmD

## 2024-07-14 NOTE — PROGRESS NOTES
Claude Coley is a 63 y.o. male on day 3 of admission presenting with Osteomyelitis of fourth toe of right foot (Multi).    Subjective   Interval History:   Afebrile, no chills  Mild right foot pain  No chest pain or shortness of breath  No nausea vomiting or diarrhea    Review of Systems   All other systems reviewed and are negative.      Objective   Range of Vitals (last 24 hours)  Heart Rate:  [55-85]   Temp:  [36 °C (96.8 °F)-36.9 °C (98.4 °F)]   Resp:  [15-18]   BP: (108-128)/(49-75)   SpO2:  [96 %-99 %]   Daily Weight  07/11/24 : 106 kg (233 lb 7.5 oz)    Body mass index is 29.98 kg/m².    Physical Exam  Constitutional:       Appearance: Normal appearance.   HENT:      Head: Normocephalic and atraumatic.      Nose: Nose normal.      Mouth/Throat:      Mouth: Mucous membranes are moist.      Pharynx: Oropharynx is clear.   Eyes:      General: No scleral icterus.  Cardiovascular:      Rate and Rhythm: Normal rate and regular rhythm.   Pulmonary:      Effort: Pulmonary effort is normal.      Breath sounds: Normal breath sounds.   Abdominal:      General: Bowel sounds are normal.      Palpations: Abdomen is soft.   Musculoskeletal:         General: Normal range of motion.      Cervical back: Normal range of motion and neck supple.      Comments: Right foot swelling, Right foot lateral ulcer with dressing intact   Skin:     General: Skin is warm and dry.   Neurological:      General: No focal deficit present.      Mental Status: He is alert and oriented to person, place, and time. Mental status is at baseline.      Sensory: Sensory deficit: .img.   Psychiatric:         Mood and Affect: Mood normal.        Antibiotics  sodium chloride 0.9 % bolus 1,000 mL  piperacillin-tazobactam (Zosyn) 4.5 g in dextrose (iso)  mL  vancomycin (Xellia) 2 g in diluent combination  mL  acetaminophen (Tylenol) tablet 650 mg  atorvastatin (Lipitor) tablet 40 mg  carvedilol (Coreg) tablet 12.5 mg  finasteride (Proscar)  tablet 5 mg  furosemide (Lasix) tablet 20 mg  gemfibrozil (Lopid) tablet 600 mg  OLANZapine (ZyPREXA) tablet 20 mg  pantoprazole (ProtoNix) EC tablet 40 mg  polyethylene glycol (Glycolax, Miralax) powder 17 g  piperacillin-tazobactam (Zosyn) 3.375 g in dextrose (iso) IV 50 mL  vancomycin (Vancocin) pharmacy to dose - pharmacy monitoring  glucagon (Glucagen) injection 1 mg  dextrose 50 % injection 25 g  glucagon (Glucagen) injection 1 mg  dextrose 50 % injection 12.5 g  insulin lispro (HumaLOG) injection 0-10 Units  vancomycin (Xellia) 1.5 g in diluent combination  mL  polyethylene glycol (Glycolax, Miralax) packet 17 g  vancomycin (Xellia) 1 g in diluent combination  mL  heparin (porcine) injection 5,000 Units  oxygen (O2) therapy  HYDROmorphone PF (Dilaudid) injection 0.2 mg  HYDROmorphone (Dilaudid) injection 0.5 mg  ondansetron (Zofran) injection 4 mg  promethazine (Phenergan) 6.25 mg in sodium chloride 0.9% 50 mL IV  labetaloL (Normodyne,Trandate) injection 10 mg  hydrALAZINE (Apresoline) injection 10 mg  diphenhydrAMINE (BENADryl) injection 12.5 mg  meperidine PF (Demerol) injection 12.5 mg  albuterol 2.5 mg /3 mL (0.083 %) nebulizer solution 2.5 mg  oxyCODONE ER (OxyCONTIN) 12 hr tablet 10 mg  ePHEDrine injection 50 mg  vancomycin (Xellia) 1,250 mg in diluent combination  mL  lactobacillus acidophilus tablet 1 tablet  bisacodyl (Dulcolax) EC tablet 5 mg      Relevant Results  Labs  Results from last 72 hours   Lab Units 07/14/24  0543 07/13/24  0458 07/12/24  0450   WBC AUTO x10*3/uL 5.2 4.6 5.7   HEMOGLOBIN g/dL 8.4* 8.2* 9.0*   HEMATOCRIT % 25.7* 25.1* 27.2*   PLATELETS AUTO x10*3/uL 152 151 172   NEUTROS PCT AUTO % 55.5 48.1 63.2   LYMPHS PCT AUTO % 28.6 32.9 20.2   MONOS PCT AUTO % 8.0 9.6 8.3   EOS PCT AUTO % 6.5 7.2 6.3     Results from last 72 hours   Lab Units 07/14/24  0543 07/13/24  0458 07/12/24  0451   SODIUM mmol/L 134 135 132*   POTASSIUM mmol/L 4.2 4.5 4.3   CHLORIDE mmol/L 101  102 102   CO2 mmol/L 21* 21* 18*   BUN mg/dL 37* 41* 42*   CREATININE mg/dL 1.10 1.40 1.20   GLUCOSE mg/dL 84 80 73   CALCIUM mg/dL 9.2 9.2 9.1   ANION GAP mmol/L 12 12 12   EGFR mL/min/1.73m*2 76 57* 69     Results from last 72 hours   Lab Units 07/14/24  0543 07/13/24  0458 07/12/24  0451   ALK PHOS U/L 89 93 106   BILIRUBIN TOTAL mg/dL <0.2 <0.2 0.2   PROTEIN TOTAL g/dL 5.9 6.2 6.2   ALT U/L 10 10 10   AST U/L 14 14 14   ALBUMIN g/dL 3.2* 3.3* 3.2*     Estimated Creatinine Clearance: 91.5 mL/min (by C-G formula based on SCr of 1.1 mg/dL).  C-Reactive Protein   Date Value Ref Range Status   07/11/2024 5.80 (H) 0.00 - 2.00 mg/dL Final     Microbiology  Reviewed-pending blood and wound cultures  No results found for the last 90 days.      Imaging  CT foot right wo IV contrast    Result Date: 7/11/2024  Interpreted By:  Luan Esposito, STUDY: CT FOOT RIGHT WO IV CONTRAST;  7/11/2024 6:42 pm   INDICATION: Signs/Symptoms:Osteomyelitis question deep tissue abscess.   COMPARISON: Right foot radiographs of 11/20/2024   ACCESSION NUMBER(S): [id number]   ORDERING CLINICIAN: NIRMALA LIM   TECHNIQUE: CT imaging of the right foot was obtained without contrast. Coronal and sagittal reformatted images were performed. 3D reconstructed images were not performed at time of dictation therefore not used during interpretation.   FINDINGS: OSSEOUS STRUCTURES: There is a large area of ulceration along the lateral aspect of the ankle and hindfoot extending to the bone surface of the 4th and 5th metatarsal bones. There is destructive osseous changes involving the head and neck of the 4th metatarsal bone consistent with osteomyelitis. The 4th proximal phalanx is dislocated dorsally. There is extensive osseous destruction and erosive change involving the 5th metatarsal base with intraosseous and adjacent soft tissue gas consistent with osteomyelitis. There is multiple areas of confluent low-density soft tissue that likely  relates to fluid or phlegmonous changes in this includes a 3.9 cm x 1.7 cm by 3.5 cm area along the dorsal aspect of the cuboid bone and lateral cuneiform bone and another confluent area of low density adjacent to the ulceration at base of 5th metatarsal bone that measures approximately 3.9 cm x 0.7 cm x 1.0 cm. There is diffuse periosteal new bone formation involving the 2nd-4th metatarsal bones that may be also reactive or indicative of chronic osteomyelitis. There are tubular bony defects within the medial cuneiform bone and calcaneus that are fluid-filled and communicate with the adjacent soft tissue. Within these bony defects is soft tissue thickening and in the case of the calcaneus, small amount of fluid collection measuring 1.7 cm x 0.8 cm. This fluid filled tract appears to extend toward the skin surface. Correlate for sinus tract formation. There is diffuse subcutaneous edema about the distal leg, ankle, foot likely representing cellulitis. Confluent areas of severe edema noted along the distal forefoot and toes.   There is diffuse muscle atrophy.       Large area of ulceration along the lateral aspect of the ankle and hindfoot extending to the bone surface of the 4th and 5th metatarsal bones. There is destructive osseous changes involving the head and neck of the 4th metatarsal bone consistent with osteomyelitis. The 4th proximal phalanx is dislocated dorsally. There is extensive osseous destruction and erosive change involving the 5th metatarsal base with intraosseous and adjacent soft tissue gas consistent with osteomyelitis. There is multiple areas of confluent low-density soft tissue that likely relates to fluid or phlegmonous changes in this includes a 3.9 cm x 1.7 cm by 3.5 cm area along the dorsal aspect of the cuboid bone and lateral cuneiform bone and another confluent area of low density adjacent to the ulceration at base of 5th metatarsal bone that measures approximately 3.9 cm x 0.7 cm x 1.0  cm. There is diffuse periosteal new bone formation involving the 2nd-4th metatarsal bones that may be also reactive or indicative of chronic osteomyelitis. There are tubular bony defects within the medial cuneiform bone and calcaneus that are fluid-filled and communicate with the adjacent soft tissue. Within these bony defects is soft tissue thickening and in the case of the calcaneus, small amount of fluid collection measuring 1.7 cm x 0.8 cm. This fluid filled tract appears to extend toward the skin surface. Correlate for sinus tract formation. There is diffuse subcutaneous edema about the distal leg, ankle, foot likely representing cellulitis.   MACRO: None.   Signed by: Luan Esposito 7/11/2024 7:27 PM Dictation workstation:   [id number]    XR chest 1 view    Result Date: 7/11/2024  Interpreted By:  Liu Ramirez, STUDY: XR CHEST 1 VIEW;  7/11/2024 6:40 pm   INDICATION: Signs/Symptoms:History of heart failure.   COMPARISON: 04/24/2024   ACCESSION NUMBER(S): [id number]   ORDERING CLINICIAN: NIRMALA LIM   FINDINGS:         CARDIOMEDIASTINAL SILHOUETTE: Cardiomediastinal silhouette is normal in size and configuration.   LUNGS: Lungs are clear.   ABDOMEN: No remarkable upper abdominal findings.   BONES: No acute osseous changes.       1.  No evidence of acute cardiopulmonary process.       MACRO: None   Signed by: Liu Ramirez 7/11/2024 6:46 PM Dictation workstation:   [id number]    XR foot right 3+ views    Result Date: 7/11/2024  Interpreted By:  Liu Ramirez, STUDY: XR FOOT RIGHT 3+ VIEWS; ;  7/11/2024 5:05 pm   INDICATION: foot infection.   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 There is interval osseous erosive changes and irregularity at the 5th metatarsal stump with heterotopic osseous productive change at the lateral aspect of  the hindfoot. Extensive periosteal reaction in the 4th metatarsal as well with erosive changes and osseous destruction of the 4th metatarsal head. There is periosteal reaction irregularity at the 4th proximal phalangeal base. There is moderate to severe periosteal reaction in the 3rd metatarsal diaphysis. Gross material at the lateral aspect of the forefoot likely along a skin and soft tissue defect. There is severe osteopenia.       1. Osseous erosions with osseous destruction of the 4th metatarsal head severe periosteal reaction along the 4th metatarsal diaphysis. Periosteal reaction and irregularity at the 4th proximal phalanx as well. Findings compatible with osteomyelitis and potential septic arthritis of 4th MTP joint. 2. Osseous irregularity and destruction of the 5th metatarsal stump along with extensive osseous productive changes. Findings are also consistent with osteomyelitis. 3. Periosteal reaction along the 3rd metatarsal diaphysis may be reactive or represent additional focus of osteomyelitis. 4. MRI can be performed to evaluate extent of disease       MACRO: None   Signed by: Liu Ramirez 7/11/2024 5:12 PM Dictation workstation:   [id number]     Assessment/Plan   Type 2 diabetes mellitus with peripheral angiopathy without gangrene  Infected right diabetic foot ulcer   Chronic right foot osteomyelitis   History of MRSA, strep, bacteroides      IV vancomycin  IV zosyn   Monitor renal function  Follow up intraoperative culture/findings   Supportive care  Local care  Offloading  Monitor temperature and WBC  Further recommendations based on culture results-consider doxycycline for 2 weeks postop if cultures remain negative    Srikanth Saravia MD

## 2024-07-14 NOTE — CARE PLAN
The patient's goals for the shift include      The clinical goals for the shift include IV antibioitcs, maintain patient safety

## 2024-07-14 NOTE — CARE PLAN
The patient's goals for the shift include      The clinical goals for the shift include IV antibiotics, maintain patient safety, and encourage activity       Problem: Diabetes  Goal: Achieve decreasing blood glucose levels by end of shift  Outcome: Progressing  Goal: Increase stability of blood glucose readings by end of shift  Outcome: Progressing  Goal: Decrease in ketones present in urine by end of shift  Outcome: Progressing  Goal: Maintain electrolyte levels within acceptable range throughout shift  Outcome: Progressing  Goal: Maintain glucose levels >70mg/dl to <250mg/dl throughout shift  Outcome: Progressing  Goal: No changes in neurological exam by end of shift  Outcome: Progressing  Goal: Learn about and adhere to nutrition recommendations by end of shift  Outcome: Progressing  Goal: Vital signs within normal range for age by end of shift  Outcome: Progressing  Goal: Increase self care and/or family involovement by end of shift  Outcome: Progressing  Goal: Receive DSME education by end of shift  Outcome: Progressing     Problem: Safety - Adult  Goal: Free from fall injury  Outcome: Progressing     Problem: Discharge Planning  Goal: Discharge to home or other facility with appropriate resources  Outcome: Progressing     Problem: Chronic Conditions and Co-morbidities  Goal: Patient's chronic conditions and co-morbidity symptoms are monitored and maintained or improved  Outcome: Progressing     Problem: Fall/Injury  Goal: Not fall by end of shift  Outcome: Progressing  Goal: Be free from injury by end of the shift  Outcome: Progressing  Goal: Verbalize understanding of personal risk factors for fall in the hospital  Outcome: Progressing  Goal: Verbalize understanding of risk factor reduction measures to prevent injury from fall in the home  Outcome: Progressing  Goal: Use assistive devices by end of the shift  Outcome: Progressing  Goal: Pace activities to prevent fatigue by end of the shift  Outcome:  Progressing     Problem: Pain  Goal: Takes deep breaths with improved pain control throughout the shift  Outcome: Progressing  Goal: Turns in bed with improved pain control throughout the shift  Outcome: Progressing  Goal: Walks with improved pain control throughout the shift  Outcome: Progressing  Goal: Performs ADL's with improved pain control throughout shift  Outcome: Progressing  Goal: Participates in PT with improved pain control throughout the shift  Outcome: Progressing  Goal: Free from opioid side effects throughout the shift  Outcome: Progressing  Goal: Free from acute confusion related to pain meds throughout the shift  Outcome: Progressing

## 2024-07-15 ENCOUNTER — APPOINTMENT (OUTPATIENT)
Dept: CARDIOLOGY | Facility: HOSPITAL | Age: 64
DRG: 464 | End: 2024-07-15
Payer: MEDICARE

## 2024-07-15 LAB
ALBUMIN SERPL-MCNC: 3.3 G/DL (ref 3.5–5)
ALP BLD-CCNC: 91 U/L (ref 35–125)
ALT SERPL-CCNC: 9 U/L (ref 5–40)
ANION GAP SERPL CALC-SCNC: 10 MMOL/L
AST SERPL-CCNC: 12 U/L (ref 5–40)
BACTERIA BLD CULT: NORMAL
BACTERIA BLD CULT: NORMAL
BACTERIA SPEC CULT: NORMAL
BASOPHILS # BLD AUTO: 0.04 X10*3/UL (ref 0–0.1)
BASOPHILS NFR BLD AUTO: 0.6 %
BILIRUB SERPL-MCNC: 0.2 MG/DL (ref 0.1–1.2)
BUN SERPL-MCNC: 34 MG/DL (ref 8–25)
CALCIUM SERPL-MCNC: 9.1 MG/DL (ref 8.5–10.4)
CHLORIDE SERPL-SCNC: 104 MMOL/L (ref 97–107)
CO2 SERPL-SCNC: 24 MMOL/L (ref 24–31)
CREAT SERPL-MCNC: 1 MG/DL (ref 0.4–1.6)
EGFRCR SERPLBLD CKD-EPI 2021: 86 ML/MIN/1.73M*2
EOSINOPHIL # BLD AUTO: 0.44 X10*3/UL (ref 0–0.7)
EOSINOPHIL NFR BLD AUTO: 6.3 %
ERYTHROCYTE [DISTWIDTH] IN BLOOD BY AUTOMATED COUNT: 15.8 % (ref 11.5–14.5)
GLUCOSE BLD MANUAL STRIP-MCNC: 114 MG/DL (ref 74–99)
GLUCOSE BLD MANUAL STRIP-MCNC: 124 MG/DL (ref 74–99)
GLUCOSE BLD MANUAL STRIP-MCNC: 149 MG/DL (ref 74–99)
GLUCOSE BLD MANUAL STRIP-MCNC: 91 MG/DL (ref 74–99)
GLUCOSE SERPL-MCNC: 88 MG/DL (ref 65–99)
GRAM STN SPEC: NORMAL
GRAM STN SPEC: NORMAL
HCT VFR BLD AUTO: 26.2 % (ref 41–52)
HGB BLD-MCNC: 8.8 G/DL (ref 13.5–17.5)
IMM GRANULOCYTES # BLD AUTO: 0.05 X10*3/UL (ref 0–0.7)
IMM GRANULOCYTES NFR BLD AUTO: 0.7 % (ref 0–0.9)
LYMPHOCYTES # BLD AUTO: 1.25 X10*3/UL (ref 1.2–4.8)
LYMPHOCYTES NFR BLD AUTO: 17.8 %
MCH RBC QN AUTO: 27.2 PG (ref 26–34)
MCHC RBC AUTO-ENTMCNC: 33.6 G/DL (ref 32–36)
MCV RBC AUTO: 81 FL (ref 80–100)
MONOCYTES # BLD AUTO: 0.61 X10*3/UL (ref 0.1–1)
MONOCYTES NFR BLD AUTO: 8.7 %
NEUTROPHILS # BLD AUTO: 4.64 X10*3/UL (ref 1.2–7.7)
NEUTROPHILS NFR BLD AUTO: 65.9 %
NRBC BLD-RTO: 0 /100 WBCS (ref 0–0)
PLATELET # BLD AUTO: 171 X10*3/UL (ref 150–450)
POTASSIUM SERPL-SCNC: 4.1 MMOL/L (ref 3.4–5.1)
PROT SERPL-MCNC: 6.2 G/DL (ref 5.9–7.9)
RBC # BLD AUTO: 3.23 X10*6/UL (ref 4.5–5.9)
SODIUM SERPL-SCNC: 138 MMOL/L (ref 133–145)
WBC # BLD AUTO: 7 X10*3/UL (ref 4.4–11.3)

## 2024-07-15 PROCEDURE — 2500000004 HC RX 250 GENERAL PHARMACY W/ HCPCS (ALT 636 FOR OP/ED)

## 2024-07-15 PROCEDURE — 2500000002 HC RX 250 W HCPCS SELF ADMINISTERED DRUGS (ALT 637 FOR MEDICARE OP, ALT 636 FOR OP/ED)

## 2024-07-15 PROCEDURE — 02HV33Z INSERTION OF INFUSION DEVICE INTO SUPERIOR VENA CAVA, PERCUTANEOUS APPROACH: ICD-10-PCS | Performed by: INTERNAL MEDICINE

## 2024-07-15 PROCEDURE — 1200000002 HC GENERAL ROOM WITH TELEMETRY DAILY

## 2024-07-15 PROCEDURE — 2500000001 HC RX 250 WO HCPCS SELF ADMINISTERED DRUGS (ALT 637 FOR MEDICARE OP)

## 2024-07-15 PROCEDURE — 97110 THERAPEUTIC EXERCISES: CPT | Mod: GP,CQ

## 2024-07-15 PROCEDURE — 85025 COMPLETE CBC W/AUTO DIFF WBC: CPT | Performed by: INTERNAL MEDICINE

## 2024-07-15 PROCEDURE — 82947 ASSAY GLUCOSE BLOOD QUANT: CPT

## 2024-07-15 PROCEDURE — 36569 INSJ PICC 5 YR+ W/O IMAGING: CPT

## 2024-07-15 PROCEDURE — 2500000001 HC RX 250 WO HCPCS SELF ADMINISTERED DRUGS (ALT 637 FOR MEDICARE OP): Performed by: EMERGENCY MEDICINE

## 2024-07-15 PROCEDURE — 80053 COMPREHEN METABOLIC PANEL: CPT | Performed by: INTERNAL MEDICINE

## 2024-07-15 PROCEDURE — 2500000001 HC RX 250 WO HCPCS SELF ADMINISTERED DRUGS (ALT 637 FOR MEDICARE OP): Performed by: STUDENT IN AN ORGANIZED HEALTH CARE EDUCATION/TRAINING PROGRAM

## 2024-07-15 PROCEDURE — 2500000001 HC RX 250 WO HCPCS SELF ADMINISTERED DRUGS (ALT 637 FOR MEDICARE OP): Performed by: INTERNAL MEDICINE

## 2024-07-15 PROCEDURE — 97116 GAIT TRAINING THERAPY: CPT | Mod: GP,CQ

## 2024-07-15 PROCEDURE — 2500000004 HC RX 250 GENERAL PHARMACY W/ HCPCS (ALT 636 FOR OP/ED): Mod: JZ

## 2024-07-15 PROCEDURE — 36415 COLL VENOUS BLD VENIPUNCTURE: CPT | Performed by: INTERNAL MEDICINE

## 2024-07-15 PROCEDURE — 87081 CULTURE SCREEN ONLY: CPT | Mod: TRILAB | Performed by: INTERNAL MEDICINE

## 2024-07-15 RX ORDER — PIPERACILLIN SODIUM, TAZOBACTAM SODIUM 3; .375 G/15ML; G/15ML
3.38 INJECTION, POWDER, LYOPHILIZED, FOR SOLUTION INTRAVENOUS EVERY 6 HOURS
Qty: 405 G | Refills: 1 | Status: SHIPPED | OUTPATIENT
Start: 2024-07-15 | End: 2024-08-22

## 2024-07-15 RX ORDER — VANCOMYCIN 1.5 G/300ML
1500 INJECTION, SOLUTION INTRAVENOUS EVERY 24 HOURS
Status: DISCONTINUED | OUTPATIENT
Start: 2024-07-15 | End: 2024-07-16 | Stop reason: HOSPADM

## 2024-07-15 RX ORDER — LIDOCAINE HYDROCHLORIDE 10 MG/ML
5 INJECTION INFILTRATION; PERINEURAL ONCE
Status: DISCONTINUED | OUTPATIENT
Start: 2024-07-15 | End: 2024-07-16 | Stop reason: HOSPADM

## 2024-07-15 RX ORDER — VANCOMYCIN HYDROCHLORIDE
1.5 EVERY 12 HOURS
Qty: 250 ML | Refills: 37 | Status: SHIPPED | OUTPATIENT
Start: 2024-07-15 | End: 2024-08-22

## 2024-07-15 RX ORDER — OXYCODONE HYDROCHLORIDE 5 MG/1
5 TABLET ORAL EVERY 6 HOURS PRN
Status: COMPLETED | OUTPATIENT
Start: 2024-07-15 | End: 2024-07-15

## 2024-07-15 ASSESSMENT — PAIN SCALES - GENERAL
PAINLEVEL_OUTOF10: 7
PAINLEVEL_OUTOF10: 8
PAINLEVEL_OUTOF10: 9
PAINLEVEL_OUTOF10: 7
PAINLEVEL_OUTOF10: 9
PAINLEVEL_OUTOF10: 8

## 2024-07-15 ASSESSMENT — COGNITIVE AND FUNCTIONAL STATUS - GENERAL
CLIMB 3 TO 5 STEPS WITH RAILING: TOTAL
STANDING UP FROM CHAIR USING ARMS: A LITTLE
WALKING IN HOSPITAL ROOM: A LOT
WALKING IN HOSPITAL ROOM: A LOT
STANDING UP FROM CHAIR USING ARMS: A LITTLE
MOVING TO AND FROM BED TO CHAIR: A LITTLE
DRESSING REGULAR UPPER BODY CLOTHING: A LITTLE
CLIMB 3 TO 5 STEPS WITH RAILING: TOTAL
HELP NEEDED FOR BATHING: A LITTLE
DAILY ACTIVITIY SCORE: 20
DRESSING REGULAR LOWER BODY CLOTHING: A LITTLE
MOVING TO AND FROM BED TO CHAIR: A LITTLE
MOBILITY SCORE: 17
TOILETING: A LITTLE
MOBILITY SCORE: 17

## 2024-07-15 ASSESSMENT — PAIN DESCRIPTION - ORIENTATION
ORIENTATION: RIGHT
ORIENTATION: RIGHT

## 2024-07-15 ASSESSMENT — PAIN - FUNCTIONAL ASSESSMENT
PAIN_FUNCTIONAL_ASSESSMENT: 0-10

## 2024-07-15 ASSESSMENT — PAIN DESCRIPTION - DESCRIPTORS: DESCRIPTORS: BURNING

## 2024-07-15 ASSESSMENT — PAIN DESCRIPTION - LOCATION: LOCATION: FOOT

## 2024-07-15 NOTE — PROCEDURES
Vascular Access Team Procedure Note     Visit Date: 7/15/2024      Patient Name: Claude Coley         MRN: [id number]             Procedure: Education provided and consent obtained. Pt positioned for comfort and lidocaine from kit administered subcutaneously prior to venipuncture. 5 Fr double lumen PICC placed to R Basilic vein without difficulty. Placement verified via sapiens. Both lumens flush easily with brisk blood return. CHG dressing, Curos caps, and limb alert bracelet applied. Pt tolerated procedure well. Inpatient RN aware PICC is OK to use.                          Joan Sharma RN  7/15/2024  6:34 PM

## 2024-07-15 NOTE — PROGRESS NOTES
Physical Therapy    Physical Therapy Treatment    Patient Name: Claude Coley  MRN: [id number]  Today's Date: 7/15/2024  Time Calculation  Start Time: 0830  Stop Time: 0853  Time Calculation (min): 23 min    Assessment/Plan   PT Assessment  Barriers to Discharge: Assist for 4 feeet from bed to bedside NWB R LE hopping  End of Session Communication: Bedside nurse  Assessment Comment: 4' x 1 with RW with Min /contact guard assist Mild unsteadiness  End of Session Patient Position: Up in chair, Alarm on  PT Plan  Inpatient/Swing Bed or Outpatient: Inpatient  PT Plan  Treatment/Interventions: Bed mobility, Transfer training, Gait training, Therapeutic exercise, Home exercise program  PT Plan: Ongoing PT  PT Frequency: 5 times per week  PT Discharge Recommendations: Moderate intensity level of continued care  Equipment Recommended upon Discharge: Wheeled walker  PT Recommended Transfer Status: Assist x1, Contact guard  PT - OK to Discharge: Yes      General Visit Information:   PT  Visit  PT Received On: 07/15/24  General  Reason for Referral: recent surg  Referred By: Dr. Bolaños  Past Medical History Relevant to Rehab: Including but not limited to CHF, DM,  and schizophrenia  Prior to Session Communication: Bedside nurse  Patient Position Received: Bed, 3 rail up, Alarm on  Preferred Learning Style: verbal, visual  General Comment: Cleared by nurse to see. Patient agreeable to therapy    Subjective   Precautions:  Precautions  Hearing/Visual Limitations: glasses  LE Weight Bearing Status: Right Non-Weight Bearing  Medical Precautions: Fall precautions  Precautions Comment: falls  Vital Signs:       Objective   Pain:  Pain Assessment  Pain Assessment: 0-10  0-10 (Numeric) Pain Score: 7  Pain Type: Surgical pain  Pain Location: Foot (C/o gout R knee)  Pain Orientation: Right  Pain Descriptors: Burning  Pain Frequency: Constant/continuous  Cognition:  Cognition  Overall Cognitive Status: Within Functional Limits  Insight:  Mild  Impulsive: Mildly  Processing Speed: Within funtional limits  Coordination:     Postural Control:  Postural Control  Posture Comment: forward rounded shoulders  Dynamic Standing Balance  Dynamic Standing-Balance Support: Bilateral upper extremity supported  Dynamic Standing-Balance:  (Patient hops to bedside chair)  Dynamic Standing-Comments: with RW with CGA/Min A for safety  Extremity/Trunk Assessments:    Activity Tolerance:     Treatments:  Therapeutic Exercise  Therapeutic Exercise Performed: Yes  Therapeutic Exercise Activity 1: L  LE supine ther ex: ankle pumps,quad/gluteal sets,heelsides, SAQs,hip abduction/adduction x 20 C/o R knee gout pain    Bed Mobility  Bed Mobility: Yes  Bed Mobility 1  Bed Mobility 1: Supine to sitting  Level of Assistance 1: Close supervision  Bed Mobility Comments 1: HOB elevated    Ambulation/Gait Training  Ambulation/Gait Training Performed: Yes  Ambulation/Gait Training 1  Surface 1: Level tile  Device 1: Rolling walker  Gait Support Devices:  (No L shoe today)  Assistance 1: Contact guard  Comments/Distance (ft) 1: 4 feet hopping to bedside chair with contactb guard assist  Transfers  Transfer: Yes  Transfer 1  Transfer From 1: Bed to  Transfer to 1: Stand  Technique 1: Sit to stand  Transfer Device 1: Walker  Transfer Level of Assistance 1: Contact guard  Trials/Comments 1: x 1 trial from EOB to bedside chair with close supervision  Transfers 2  Transfer From 2: Stand to  Transfer to 2: Sit, Chair with arms  Technique 2: Stand to sit  Transfer Device 2: Walker  Transfer Level of Assistance 2: Close supervision  Trials/Comments 2: x 1 trial Min/contact guard assist cues for safe hand placement    Outcome Measures:  AMPA Basic Mobility  Turning from your back to your side while in a flat bed without using bedrails: None  Moving from lying on your back to sitting on the side of a flat bed without using bedrails: None  Moving to and from bed to chair (including a  wheelchair): A little  Standing up from a chair using your arms (e.g. wheelchair or bedside chair): A little  To walk in hospital room: A lot  Climbing 3-5 steps with railing: Total  Basic Mobility - Total Score: 17    Education Documentation  Precautions, taught by Ivana Severino PTA at 7/15/2024  9:15 AM.  Learner: Patient  Readiness: Acceptance  Method: Teach-back  Response: Verbalizes Understanding, Needs Reinforcement    Body Mechanics, taught by Ivana Severino PTA at 7/15/2024  9:15 AM.  Learner: Patient  Readiness: Acceptance  Method: Teach-back  Response: Verbalizes Understanding, Needs Reinforcement    Home Exercise Program, taught by Ivana Severino PTA at 7/15/2024  9:15 AM.  Learner: Patient  Readiness: Acceptance  Method: Teach-back  Response: Verbalizes Understanding, Needs Reinforcement    Mobility Training, taught by Ivana Severino PTA at 7/15/2024  9:15 AM.  Learner: Patient  Readiness: Acceptance  Method: Teach-back  Response: Verbalizes Understanding, Needs Reinforcement    Education Comments  No comments found.        OP EDUCATION:  Outpatient Education  Individual(s) Educated: Patient  Education Comment: NWB with [facility]    Encounter Problems       Encounter Problems (Active)       Balance       standing balance (Progressing)       Start:  07/12/24    Expected End:  07/26/24       PT will demonstrate good static standing balance with modified independence using a walker             Mobility       ambulation (Progressing)       Start:  07/12/24    Expected End:  07/26/24       Pt will ambulate >50' with modified independence using a walker to promote safe home navigation            PT Transfers       bed mobility (Progressing)       Start:  07/12/24    Expected End:  07/26/24       Pt will perform supine<>sit bed mobility independently          sit to stand  (Progressing)       Start:  07/12/24    Expected End:  07/26/24       Pt will perform sit<>stand transfer with modified  independence using a walker         bed to chair (Progressing)       Start:  07/12/24    Expected End:  07/26/24       Pt will perform bed<>chair transfer with modified independence using a walker            Safety       safe mobility techniques (Progressing)       Start:  07/12/24    Expected End:  07/26/24       Pt will demonstrate safe mobility techniques for all bed mobility, transfers, and ambulation         WB precautions (Progressing)       Start:  07/12/24    Expected End:  07/26/24       Pt will recall and demonstrate adherence to RLE NWB at all times

## 2024-07-15 NOTE — PROGRESS NOTES
Claude Coley is a 63 y.o. male on day 4 of admission presenting with Osteomyelitis of fourth toe of right foot (Multi).      Subjective   Sitting up in a chair no acute distress doing well  Reviewed ID notes     Objective     Last Recorded Vitals  /60 (BP Location: Right arm, Patient Position: Lying)   Pulse 79   Temp 36.8 °C (98.2 °F) (Temporal)   Resp 16   Wt 106 kg (233 lb 7.5 oz)   SpO2 96%   Intake/Output last 3 Shifts:    Intake/Output Summary (Last 24 hours) at 7/15/2024 0953  Last data filed at 7/15/2024 0946  Gross per 24 hour   Intake 1750 ml   Output 2800 ml   Net -1050 ml       Physical Exam  Alert oriented x 3 cooperative  Chest clear  Heart regular  Abdomen soft nontender  Extremities no edema right foot in dressing and change  Neurologic exam focal  Relevant Results  Results reviewed tissue culture still pending  Assessment/Plan     Principal Problem:    Osteomyelitis of fourth toe of right foot (Multi)  Active Problems:    Type 2 diabetes mellitus with right diabetic foot ulcer (Multi)    Cellulitis of right foot    Non-pressure chronic ulcer of other part of right foot with fat layer exposed (Multi)      July 15     Wound infection of right foot with osteomyelitis   broad-spectrum antibiotics debridement per podiatry antibiotics and cultures to be managed by ID   dehydration   seems improved   congestive heart failure cardiology notes reviewed   questionable sepsis    this point I do not feel that he is acutely septic  Diabetes mellitus type 2 diabetic diet sliding scale  Schizophrenia continue Zyprexa  DVT prophylaxis bleeding had stopped will start heparin twice daily  Connie Neumann MD     Await culture results he may need skilled nursing facility placement if unable to ambulate.  He is about to choose skilled nursing facility of his choice.  ID recommends doxycycline for 2 weeks if cultures remain negative but these are still pending        Connie Neumann MD

## 2024-07-15 NOTE — CARE PLAN
Problem: Diabetes  Goal: Achieve decreasing blood glucose levels by end of shift  Outcome: Progressing  Goal: Increase stability of blood glucose readings by end of shift  Outcome: Progressing  Goal: Maintain electrolyte levels within acceptable range throughout shift  Outcome: Progressing  Goal: Maintain glucose levels >70mg/dl to <250mg/dl throughout shift  Outcome: Progressing  Goal: No changes in neurological exam by end of shift  Outcome: Progressing  Goal: Vital signs within normal range for age by end of shift  Outcome: Progressing     Problem: Discharge Planning  Goal: Discharge to home or other facility with appropriate resources  Outcome: Progressing     Problem: Chronic Conditions and Co-morbidities  Goal: Patient's chronic conditions and co-morbidity symptoms are monitored and maintained or improved  Outcome: Progressing     Problem: Fall/Injury  Goal: Not fall by end of shift  Outcome: Progressing  Goal: Be free from injury by end of the shift  Outcome: Progressing  Goal: Verbalize understanding of personal risk factors for fall in the hospital  Outcome: Progressing  Goal: Verbalize understanding of risk factor reduction measures to prevent injury from fall in the home  Outcome: Progressing  Goal: Use assistive devices by end of the shift  Outcome: Progressing  Goal: Pace activities to prevent fatigue by end of the shift  Outcome: Progressing     Problem: Pain  Goal: Takes deep breaths with improved pain control throughout the shift  Outcome: Progressing  Goal: Turns in bed with improved pain control throughout the shift  Outcome: Progressing  Goal: Walks with improved pain control throughout the shift  Outcome: Progressing  Goal: Performs ADL's with improved pain control throughout shift  Outcome: Progressing  Goal: Free from opioid side effects throughout the shift  Outcome: Progressing  Goal: Free from acute confusion related to pain meds throughout the shift  Outcome: Progressing   The patient's  goals for the shift include  rest    The clinical goals for the shift include IV antibiotics, pain mangement, safety, promote rest      07/15/24 at 3:47 AM - Maria Teresa Byrd RN

## 2024-07-15 NOTE — PROGRESS NOTES
PODIATRY SERVICE CONSULT PROGRESS NOTE    SERVICE DATE: 7/15/2024   SERVICE TIME:  1130    Subjective   INTERVAL HPI:   POD 3 I&D, wound debridement, and application allograft (DOS 7/12/24)  Pt was seen at bedside.  Pain well controlled.  Patient denies any constitutional symptoms.   No other pedal complaints.   Dressing c/d/I. Dressing left in place  Intra-op cx growing mixed gram + and gram -  Anticipate DC to SNF     Medications:  Scheduled Meds: atorvastatin, 40 mg, oral, Nightly  carvedilol, 12.5 mg, oral, BID  finasteride, 5 mg, oral, Daily  gemfibrozil, 600 mg, oral, BID AC  heparin, 5,000 Units, subcutaneous, q8h  insulin lispro, 0-10 Units, subcutaneous, TID  lactobacillus acidophilus, 1 tablet, oral, BID  OLANZapine, 20 mg, oral, Nightly  pantoprazole, 40 mg, oral, Daily before breakfast  piperacillin-tazobactam, 3.375 g, intravenous, q6h  polyethylene glycol, 17 g, oral, Daily  vancomycin, 1,500 mg, intravenous, q24h      Continuous Infusions:    PRN Meds: PRN medications: acetaminophen, bisacodyl, colchicine, dextrose, glucagon, vancomycin         Objective   PHYSICAL EXAM:  Physical Exam Performed:  Vitals:    07/15/24 1120   BP: 127/60   Pulse: 62   Resp: 16   Temp: 36.1 °C (97 °F)   SpO2: 99%     Body mass index is 29.98 kg/m².    Patient is AOx3 and in no acute distress. Patient is alert and cooperative. Sitting comfortably in bed with dressing clean, dry and intact. RLE dressing left in place, pre-op exam findings below.      Intact popliteal pulses, intact distal capillary refill <3sec, no increased warmth     Vascular: Palpable DP/PT pulses B/L. Moderate non-pitting edema noted right foot. Hair growth absent B/L. CFT brisk to B/L hallux. Temperature is warm to cool from tibial tuberosity to distal digits B/L. No lymphatic streaking noted B/L.     Musculoskeletal: Gross active and passive ROM intact to age and activity level. Moves all extremities spontaneously. No pain to palpation at feet B/L.       Neurological: Absent light touch sensation B/L. Pain stimuli diminished B/L.      Dermatologic: Nails 1-5 are within normal limits for thickness and length B/L. Skin appears diffusely xerotic B/L. Web spaces 1-4 B/L are clean dry, intact left foot, 1-3 right foot (h/o amputation right foot). No hyperkeratotic tissue noted B/L. A stable eschar noted to his proximal anterior shin as well, which measures 2cm x 1.5cm.     Wound:  Measurements: 12cm x 8cm x 0.5cm deep at proximal lateral curved portion that extends an additional 6cm  Mixed wound base of 80% granular tissue with 20% various areas of fibrotic tissue.  Moderate serosanguinous drainage with fibrotic slough.   Minimal basil-wound maceration.   Mild basil-wound erythema.   Mild evidence of cellulitis which does not appear to be ascending  No palpable fluctuance. Mild malodor improved since several days ago. Mild increased warmth.   No probe to bone. Minimal tunneling or tracking.   No undermining. Skin edges irregular but intact.      LABS:   Results for orders placed or performed during the hospital encounter of 07/11/24 (from the past 24 hour(s))   POCT GLUCOSE   Result Value Ref Range    POCT Glucose 137 (H) 74 - 99 mg/dL   POCT GLUCOSE   Result Value Ref Range    POCT Glucose 118 (H) 74 - 99 mg/dL   CBC and Auto Differential   Result Value Ref Range    WBC 7.0 4.4 - 11.3 x10*3/uL    nRBC 0.0 0.0 - 0.0 /100 WBCs    RBC 3.23 (L) 4.50 - 5.90 x10*6/uL    Hemoglobin 8.8 (L) 13.5 - 17.5 g/dL    Hematocrit 26.2 (L) 41.0 - 52.0 %    MCV 81 80 - 100 fL    MCH 27.2 26.0 - 34.0 pg    MCHC 33.6 32.0 - 36.0 g/dL    RDW 15.8 (H) 11.5 - 14.5 %    Platelets 171 150 - 450 x10*3/uL    Neutrophils % 65.9 40.0 - 80.0 %    Immature Granulocytes %, Automated 0.7 0.0 - 0.9 %    Lymphocytes % 17.8 13.0 - 44.0 %    Monocytes % 8.7 2.0 - 10.0 %    Eosinophils % 6.3 0.0 - 6.0 %    Basophils % 0.6 0.0 - 2.0 %    Neutrophils Absolute 4.64 1.20 - 7.70 x10*3/uL    Immature Granulocytes  Absolute, Automated 0.05 0.00 - 0.70 x10*3/uL    Lymphocytes Absolute 1.25 1.20 - 4.80 x10*3/uL    Monocytes Absolute 0.61 0.10 - 1.00 x10*3/uL    Eosinophils Absolute 0.44 0.00 - 0.70 x10*3/uL    Basophils Absolute 0.04 0.00 - 0.10 x10*3/uL   Comprehensive Metabolic Panel   Result Value Ref Range    Glucose 88 65 - 99 mg/dL    Sodium 138 133 - 145 mmol/L    Potassium 4.1 3.4 - 5.1 mmol/L    Chloride 104 97 - 107 mmol/L    Bicarbonate 24 24 - 31 mmol/L    Urea Nitrogen 34 (H) 8 - 25 mg/dL    Creatinine 1.00 0.40 - 1.60 mg/dL    eGFR 86 >60 mL/min/1.73m*2    Calcium 9.1 8.5 - 10.4 mg/dL    Albumin 3.3 (L) 3.5 - 5.0 g/dL    Alkaline Phosphatase 91 35 - 125 U/L    Total Protein 6.2 5.9 - 7.9 g/dL    AST 12 5 - 40 U/L    Bilirubin, Total 0.2 0.1 - 1.2 mg/dL    ALT 9 5 - 40 U/L    Anion Gap 10 <=19 mmol/L   POCT GLUCOSE   Result Value Ref Range    POCT Glucose 91 74 - 99 mg/dL   POCT GLUCOSE   Result Value Ref Range    POCT Glucose 124 (H) 74 - 99 mg/dL      Lab Results   Component Value Date    HGBA1C 4.4 03/19/2024      Lab Results   Component Value Date    CRP 5.80 (H) 07/11/2024      Lab Results   Component Value Date    SEDRATE 69 (H) 07/11/2024        Results from last 7 days   Lab Units 07/15/24  0428   WBC AUTO x10*3/uL 7.0   RBC AUTO x10*6/uL 3.23*   HEMOGLOBIN g/dL 8.8*   HEMATOCRIT % 26.2*     Results from last 7 days   Lab Units 07/15/24  0428 07/12/24  0451 07/11/24  1555   SODIUM mmol/L 138   < > 132*   POTASSIUM mmol/L 4.1   < > 5.1   CHLORIDE mmol/L 104   < > 98   CO2 mmol/L 24   < > 21*   BUN mg/dL 34*   < > 51*   CREATININE mg/dL 1.00   < > 1.50   CALCIUM mg/dL 9.1   < > 9.5   MAGNESIUM mg/dL  --   --  1.80   BILIRUBIN TOTAL mg/dL 0.2   < > <0.2   ALT U/L 9   < > 11   AST U/L 12   < > 15    < > = values in this interval not displayed.     Results from last 7 days   Lab Units 07/11/24  1755   COLOR U  Light-Yellow   APPEARANCE U  Clear   PH U  5.0   SPEC GRAV UR  1.009   PROTEIN U mg/dL NEGATIVE   BLOOD  UR  NEGATIVE   NITRITE U  NEGATIVE       IMAGING REVIEW:  CT foot right wo IV contrast    Result Date: 7/11/2024  Interpreted By:  Luan Esposito, STUDY: CT FOOT RIGHT WO IV CONTRAST;  7/11/2024 6:42 pm   INDICATION: Signs/Symptoms:Osteomyelitis question deep tissue abscess.   COMPARISON: Right foot radiographs of 11/20/2024   ACCESSION NUMBER(S): [id number]   ORDERING CLINICIAN: NIRMALA LIM   TECHNIQUE: CT imaging of the right foot was obtained without contrast. Coronal and sagittal reformatted images were performed. 3D reconstructed images were not performed at time of dictation therefore not used during interpretation.   FINDINGS: OSSEOUS STRUCTURES: There is a large area of ulceration along the lateral aspect of the ankle and hindfoot extending to the bone surface of the 4th and 5th metatarsal bones. There is destructive osseous changes involving the head and neck of the 4th metatarsal bone consistent with osteomyelitis. The 4th proximal phalanx is dislocated dorsally. There is extensive osseous destruction and erosive change involving the 5th metatarsal base with intraosseous and adjacent soft tissue gas consistent with osteomyelitis. There is multiple areas of confluent low-density soft tissue that likely relates to fluid or phlegmonous changes in this includes a 3.9 cm x 1.7 cm by 3.5 cm area along the dorsal aspect of the cuboid bone and lateral cuneiform bone and another confluent area of low density adjacent to the ulceration at base of 5th metatarsal bone that measures approximately 3.9 cm x 0.7 cm x 1.0 cm. There is diffuse periosteal new bone formation involving the 2nd-4th metatarsal bones that may be also reactive or indicative of chronic osteomyelitis. There are tubular bony defects within the medial cuneiform bone and calcaneus that are fluid-filled and communicate with the adjacent soft tissue. Within these bony defects is soft tissue thickening and in the case of the calcaneus, small  amount of fluid collection measuring 1.7 cm x 0.8 cm. This fluid filled tract appears to extend toward the skin surface. Correlate for sinus tract formation. There is diffuse subcutaneous edema about the distal leg, ankle, foot likely representing cellulitis. Confluent areas of severe edema noted along the distal forefoot and toes.   There is diffuse muscle atrophy.       Large area of ulceration along the lateral aspect of the ankle and hindfoot extending to the bone surface of the 4th and 5th metatarsal bones. There is destructive osseous changes involving the head and neck of the 4th metatarsal bone consistent with osteomyelitis. The 4th proximal phalanx is dislocated dorsally. There is extensive osseous destruction and erosive change involving the 5th metatarsal base with intraosseous and adjacent soft tissue gas consistent with osteomyelitis. There is multiple areas of confluent low-density soft tissue that likely relates to fluid or phlegmonous changes in this includes a 3.9 cm x 1.7 cm by 3.5 cm area along the dorsal aspect of the cuboid bone and lateral cuneiform bone and another confluent area of low density adjacent to the ulceration at base of 5th metatarsal bone that measures approximately 3.9 cm x 0.7 cm x 1.0 cm. There is diffuse periosteal new bone formation involving the 2nd-4th metatarsal bones that may be also reactive or indicative of chronic osteomyelitis. There are tubular bony defects within the medial cuneiform bone and calcaneus that are fluid-filled and communicate with the adjacent soft tissue. Within these bony defects is soft tissue thickening and in the case of the calcaneus, small amount of fluid collection measuring 1.7 cm x 0.8 cm. This fluid filled tract appears to extend toward the skin surface. Correlate for sinus tract formation. There is diffuse subcutaneous edema about the distal leg, ankle, foot likely representing cellulitis.   MACRO: None.   Signed by: Luan Esposito  7/11/2024 7:27 PM Dictation workstation:   [id number]    XR chest 1 view    Result Date: 7/11/2024  Interpreted By:  Liu Ramirez, STUDY: XR CHEST 1 VIEW;  7/11/2024 6:40 pm   INDICATION: Signs/Symptoms:History of heart failure.   COMPARISON: 04/24/2024   ACCESSION NUMBER(S): [id number]   ORDERING CLINICIAN: NIRMALA LMI   FINDINGS:         CARDIOMEDIASTINAL SILHOUETTE: Cardiomediastinal silhouette is normal in size and configuration.   LUNGS: Lungs are clear.   ABDOMEN: No remarkable upper abdominal findings.   BONES: No acute osseous changes.       1.  No evidence of acute cardiopulmonary process.       MACRO: None   Signed by: Liu Ramirez 7/11/2024 6:46 PM Dictation workstation:   [id number]    XR foot right 3+ views    Result Date: 7/11/2024  Interpreted By:  Liu Ramirez, STUDY: XR FOOT RIGHT 3+ VIEWS; ;  7/11/2024 5:05 pm   INDICATION: foot infection.   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 There is interval osseous erosive changes and irregularity at the 5th metatarsal stump with heterotopic osseous productive change at the lateral aspect of the hindfoot. Extensive periosteal reaction in the 4th metatarsal as well with erosive changes and osseous destruction of the 4th metatarsal head. There is periosteal reaction irregularity at the 4th proximal phalangeal base. There is moderate to severe periosteal reaction in the 3rd metatarsal diaphysis. Gross material at the lateral aspect of the forefoot likely along a skin and soft tissue defect. There is severe osteopenia.       1. Osseous erosions with osseous destruction of the 4th metatarsal head severe periosteal reaction along the 4th metatarsal diaphysis. Periosteal reaction and irregularity at the 4th proximal phalanx as well. Findings compatible with osteomyelitis and potential  septic arthritis of 4th MTP joint. 2. Osseous irregularity and destruction of the 5th metatarsal stump along with extensive osseous productive changes. Findings are also consistent with osteomyelitis. 3. Periosteal reaction along the 3rd metatarsal diaphysis may be reactive or represent additional focus of osteomyelitis. 4. MRI can be performed to evaluate extent of disease       MACRO: None   Signed by: Liu Ramirez 7/11/2024 5:12 PM Dictation workstation:   [id number]            Assessment/Plan   ASSESSMENT & PLAN:    #Cellulitis right foot  #Chronic non-pressure ulcer with fat layer exposed right foot  #Chronic osteomyelitis right foot  #Charcot foot RLE  #T2DM with peripheral polyneuropathy  #Localized edema, RLE  #PVD     POD 3 I&D, wound debridement, and application allograft (DOS 7/12/24)     - Patient was seen and evaluated; all findings were discussed and all questions were answered to patient's satisfaction.  - Charts, labs, vitals and imaging all reviewed.   - Imaging: Imaging with periosteal reaction to 3rd and 4th metatarsal shafts and erosive changes to 5th met stump and 4th proximal phalanx.  - CT scan reviewed. Findings are consistent with Charcot. Areas of bone loss in the calcaneus and cuneiform are likely from the pin sites from the external fixation device that was recently removed.   - Labs: WBC 7.0, ESR 69, CRP 5.8, Hgb 8.8  - Wound culture: collected intra-op, pending. Rare gram + and gram -, awaiting speciation and sensitivities  - Blood culture: NTD.     Plan:  - Abx: IV vanco and zosyn, per primary/ID  - Appreciated ID input regarding abx selection and PICC line management   - Pain Regimen: per primary  - Dressing to stay intact until follow up in clinic. Please contact podiatry team if dressing becomes soiled.   - Dressings: saline WTD over graft, dry 4x4, ABD, kerlix, 2 layers specialist padding, ace wrap. Dressing to be changed Wednesday if still in house by podiatry team.   -  Nursing staff is able to change/reinforce dressing if & as necessary until next day’s dressing change. Please notify team if dressing change is needed. Thank you.  - No contraindication to DC once wound cx finalized and appropriate abx selection had been made per primary/ID  - Podiatry will continue to follow while in house.     DVT ppx: per primary  Weightbearing: NWB RLE, pt to remain NWB upon discharge  Discharge: Pt to follow up 1 week after discharge with Dr. Perry/Miky     Patient may benefit with discharge to SNF to assist with ADLs and wound care     Case to be discussed with attending, A&P above reflects a tentative plan. Please await for the final signature from the attending physician on service.     Hannah Delgado DPM PGY-3  Podiatric Medicine & Surgery  Please Kenneth message me with any questions or concerns.              SIGNATURE: Hannah Delgado DPM PATIENT NAME: Claude Coley   DATE: July 15, 2024 MRN: [id number]   TIME: 11:33 AM CONTACT: Haiku Message

## 2024-07-15 NOTE — PROGRESS NOTES
Claude Coley is a 63 y.o. male on day 4 of admission presenting with Osteomyelitis of fourth toe of right foot (Multi).    Subjective   Interval History:   Patient seen and examined  Febrile discussion with podiatry-requests prolonged IV antibiotic therapy-will switch from change recommendations to vancomycin and Zosyn for 6 weeks    Review of Systems   All other systems reviewed and are negative.      Objective   Range of Vitals (last 24 hours)  Heart Rate:  [55-79]   Temp:  [36 °C (96.8 °F)-37 °C (98.6 °F)]   Resp:  [16-17]   BP: (109-133)/(52-60)   SpO2:  [95 %-99 %]   Daily Weight  07/11/24 : 106 kg (233 lb 7.5 oz)    Body mass index is 29.98 kg/m².    Physical Exam  Constitutional:       Appearance: Normal appearance.   HENT:      Head: Normocephalic and atraumatic.      Nose: Nose normal.      Mouth/Throat:      Mouth: Mucous membranes are moist.      Pharynx: Oropharynx is clear.   Eyes:      General: No scleral icterus.  Cardiovascular:      Rate and Rhythm: Normal rate and regular rhythm.   Pulmonary:      Effort: Pulmonary effort is normal.      Breath sounds: Normal breath sounds.   Abdominal:      General: Bowel sounds are normal.      Palpations: Abdomen is soft.   Musculoskeletal:         General: Normal range of motion.      Cervical back: Normal range of motion and neck supple.      Comments: Right foot swelling, Right foot lateral ulcer with dressing intact   Skin:     General: Skin is warm and dry.   Neurological:      General: No focal deficit present.      Mental Status: He is alert and oriented to person, place, and time. Mental status is at baseline.      Sensory: Sensory deficit: .img.   Psychiatric:         Mood and Affect: Mood normal.        Antibiotics  sodium chloride 0.9 % bolus 1,000 mL  piperacillin-tazobactam (Zosyn) 4.5 g in dextrose (iso)  mL  vancomycin (Xellia) 2 g in diluent combination  mL  acetaminophen (Tylenol) tablet 650 mg  atorvastatin (Lipitor) tablet 40  mg  carvedilol (Coreg) tablet 12.5 mg  finasteride (Proscar) tablet 5 mg  furosemide (Lasix) tablet 20 mg  gemfibrozil (Lopid) tablet 600 mg  OLANZapine (ZyPREXA) tablet 20 mg  pantoprazole (ProtoNix) EC tablet 40 mg  polyethylene glycol (Glycolax, Miralax) powder 17 g  piperacillin-tazobactam (Zosyn) 3.375 g in dextrose (iso) IV 50 mL  vancomycin (Vancocin) pharmacy to dose - pharmacy monitoring  glucagon (Glucagen) injection 1 mg  dextrose 50 % injection 25 g  glucagon (Glucagen) injection 1 mg  dextrose 50 % injection 12.5 g  insulin lispro (HumaLOG) injection 0-10 Units  vancomycin (Xellia) 1.5 g in diluent combination  mL  polyethylene glycol (Glycolax, Miralax) packet 17 g  vancomycin (Xellia) 1 g in diluent combination  mL  heparin (porcine) injection 5,000 Units  oxygen (O2) therapy  HYDROmorphone PF (Dilaudid) injection 0.2 mg  HYDROmorphone (Dilaudid) injection 0.5 mg  ondansetron (Zofran) injection 4 mg  promethazine (Phenergan) 6.25 mg in sodium chloride 0.9% 50 mL IV  labetaloL (Normodyne,Trandate) injection 10 mg  hydrALAZINE (Apresoline) injection 10 mg  diphenhydrAMINE (BENADryl) injection 12.5 mg  meperidine PF (Demerol) injection 12.5 mg  albuterol 2.5 mg /3 mL (0.083 %) nebulizer solution 2.5 mg  oxyCODONE ER (OxyCONTIN) 12 hr tablet 10 mg  ePHEDrine injection 50 mg  vancomycin (Xellia) 1,250 mg in diluent combination  mL  lactobacillus acidophilus tablet 1 tablet  bisacodyl (Dulcolax) EC tablet 5 mg  colchicine tablet  colchicine tablet 0.6 mg  vancomycin (Xellia) 1.5 g in diluent combination  mL      Relevant Results  Labs  Results from last 72 hours   Lab Units 07/15/24  0428 07/14/24  0543 07/13/24  0458   WBC AUTO x10*3/uL 7.0 5.2 4.6   HEMOGLOBIN g/dL 8.8* 8.4* 8.2*   HEMATOCRIT % 26.2* 25.7* 25.1*   PLATELETS AUTO x10*3/uL 171 152 151   NEUTROS PCT AUTO % 65.9 55.5 48.1   LYMPHS PCT AUTO % 17.8 28.6 32.9   MONOS PCT AUTO % 8.7 8.0 9.6   EOS PCT AUTO % 6.3 6.5 7.2      Results from last 72 hours   Lab Units 07/15/24  0428 07/14/24  0543 07/13/24  0458   SODIUM mmol/L 138 134 135   POTASSIUM mmol/L 4.1 4.2 4.5   CHLORIDE mmol/L 104 101 102   CO2 mmol/L 24 21* 21*   BUN mg/dL 34* 37* 41*   CREATININE mg/dL 1.00 1.10 1.40   GLUCOSE mg/dL 88 84 80   CALCIUM mg/dL 9.1 9.2 9.2   ANION GAP mmol/L 10 12 12   EGFR mL/min/1.73m*2 86 76 57*     Results from last 72 hours   Lab Units 07/15/24  0428 07/14/24  0543 07/13/24  0458   ALK PHOS U/L 91 89 93   BILIRUBIN TOTAL mg/dL 0.2 <0.2 <0.2   PROTEIN TOTAL g/dL 6.2 5.9 6.2   ALT U/L 9 10 10   AST U/L 12 14 14   ALBUMIN g/dL 3.3* 3.2* 3.3*     Estimated Creatinine Clearance: 100.6 mL/min (by C-G formula based on SCr of 1 mg/dL).  C-Reactive Protein   Date Value Ref Range Status   07/11/2024 5.80 (H) 0.00 - 2.00 mg/dL Final     Microbiology  Susceptibility data from last 14 days.  Collected Specimen Info Organism   07/12/24 Swab from [facility] Mixed Gram-Positive and Gram-Negative Bacteria     Imaging  CT foot right wo IV contrast    Result Date: 7/11/2024  Interpreted By:  Luan Esposito, STUDY: CT FOOT RIGHT WO IV CONTRAST;  7/11/2024 6:42 pm   INDICATION: Signs/Symptoms:Osteomyelitis question deep tissue abscess.   COMPARISON: Right foot radiographs of 11/20/2024   ACCESSION NUMBER(S): [id number]   ORDERING CLINICIAN: NIRMALA LIM   TECHNIQUE: CT imaging of the right foot was obtained without contrast. Coronal and sagittal reformatted images were performed. 3D reconstructed images were not performed at time of dictation therefore not used during interpretation.   FINDINGS: OSSEOUS STRUCTURES: There is a large area of ulceration along the lateral aspect of the ankle and hindfoot extending to the bone surface of the 4th and 5th metatarsal bones. There is destructive osseous changes involving the head and neck of the 4th metatarsal bone consistent with osteomyelitis. The 4th proximal phalanx is dislocated dorsally. There is  extensive osseous destruction and erosive change involving the 5th metatarsal base with intraosseous and adjacent soft tissue gas consistent with osteomyelitis. There is multiple areas of confluent low-density soft tissue that likely relates to fluid or phlegmonous changes in this includes a 3.9 cm x 1.7 cm by 3.5 cm area along the dorsal aspect of the cuboid bone and lateral cuneiform bone and another confluent area of low density adjacent to the ulceration at base of 5th metatarsal bone that measures approximately 3.9 cm x 0.7 cm x 1.0 cm. There is diffuse periosteal new bone formation involving the 2nd-4th metatarsal bones that may be also reactive or indicative of chronic osteomyelitis. There are tubular bony defects within the medial cuneiform bone and calcaneus that are fluid-filled and communicate with the adjacent soft tissue. Within these bony defects is soft tissue thickening and in the case of the calcaneus, small amount of fluid collection measuring 1.7 cm x 0.8 cm. This fluid filled tract appears to extend toward the skin surface. Correlate for sinus tract formation. There is diffuse subcutaneous edema about the distal leg, ankle, foot likely representing cellulitis. Confluent areas of severe edema noted along the distal forefoot and toes.   There is diffuse muscle atrophy.       Large area of ulceration along the lateral aspect of the ankle and hindfoot extending to the bone surface of the 4th and 5th metatarsal bones. There is destructive osseous changes involving the head and neck of the 4th metatarsal bone consistent with osteomyelitis. The 4th proximal phalanx is dislocated dorsally. There is extensive osseous destruction and erosive change involving the 5th metatarsal base with intraosseous and adjacent soft tissue gas consistent with osteomyelitis. There is multiple areas of confluent low-density soft tissue that likely relates to fluid or phlegmonous changes in this includes a 3.9 cm x 1.7 cm by  3.5 cm area along the dorsal aspect of the cuboid bone and lateral cuneiform bone and another confluent area of low density adjacent to the ulceration at base of 5th metatarsal bone that measures approximately 3.9 cm x 0.7 cm x 1.0 cm. There is diffuse periosteal new bone formation involving the 2nd-4th metatarsal bones that may be also reactive or indicative of chronic osteomyelitis. There are tubular bony defects within the medial cuneiform bone and calcaneus that are fluid-filled and communicate with the adjacent soft tissue. Within these bony defects is soft tissue thickening and in the case of the calcaneus, small amount of fluid collection measuring 1.7 cm x 0.8 cm. This fluid filled tract appears to extend toward the skin surface. Correlate for sinus tract formation. There is diffuse subcutaneous edema about the distal leg, ankle, foot likely representing cellulitis.   MACRO: None.   Signed by: Luan Esposito 7/11/2024 7:27 PM Dictation workstation:   [id number]    XR chest 1 view    Result Date: 7/11/2024  Interpreted By:  Liu Ramirez, STUDY: XR CHEST 1 VIEW;  7/11/2024 6:40 pm   INDICATION: Signs/Symptoms:History of heart failure.   COMPARISON: 04/24/2024   ACCESSION NUMBER(S): [id number]   ORDERING CLINICIAN: NIRMALA LIM   FINDINGS:         CARDIOMEDIASTINAL SILHOUETTE: Cardiomediastinal silhouette is normal in size and configuration.   LUNGS: Lungs are clear.   ABDOMEN: No remarkable upper abdominal findings.   BONES: No acute osseous changes.       1.  No evidence of acute cardiopulmonary process.       MACRO: None   Signed by: Liu Ramirez 7/11/2024 6:46 PM Dictation workstation:   [id number]    XR foot right 3+ views    Result Date: 7/11/2024  Interpreted By:  Liu Ramirez, STUDY: XR FOOT RIGHT 3+ VIEWS; ;  7/11/2024 5:05 pm   INDICATION: foot infection.   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 There is interval osseous erosive changes and irregularity at the 5th metatarsal stump with heterotopic osseous productive change at the lateral aspect of the hindfoot. Extensive periosteal reaction in the 4th metatarsal as well with erosive changes and osseous destruction of the 4th metatarsal head. There is periosteal reaction irregularity at the 4th proximal phalangeal base. There is moderate to severe periosteal reaction in the 3rd metatarsal diaphysis. Gross material at the lateral aspect of the forefoot likely along a skin and soft tissue defect. There is severe osteopenia.       1. Osseous erosions with osseous destruction of the 4th metatarsal head severe periosteal reaction along the 4th metatarsal diaphysis. Periosteal reaction and irregularity at the 4th proximal phalanx as well. Findings compatible with osteomyelitis and potential septic arthritis of 4th MTP joint. 2. Osseous irregularity and destruction of the 5th metatarsal stump along with extensive osseous productive changes. Findings are also consistent with osteomyelitis. 3. Periosteal reaction along the 3rd metatarsal diaphysis may be reactive or represent additional focus of osteomyelitis. 4. MRI can be performed to evaluate extent of disease       MACRO: None   Signed by: Liu Ramirez 7/11/2024 5:12 PM Dictation workstation:   [id number]        Assessment/Plan     Type 2 diabetes mellitus with peripheral angiopathy without gangrene  Infected right diabetic foot ulcer   Chronic right foot osteomyelitis   History of MRSA, strep, bacteroides      IV vancomycin  IV zosyn   Monitor renal function  Supportive care  Local care  Offloading  PICC line placement  IV Vancomycin and Zosyn till 8/22/2024    Srikanth Saravia MD

## 2024-07-15 NOTE — CARE PLAN
The patient's goals for the shift include      The clinical goals for the shift include IV antibiotics, maintain patient safety, awaiting lab results       Problem: Diabetes  Goal: Achieve decreasing blood glucose levels by end of shift  Outcome: Progressing  Goal: Increase stability of blood glucose readings by end of shift  Outcome: Progressing  Goal: Maintain electrolyte levels within acceptable range throughout shift  Outcome: Progressing  Goal: Maintain glucose levels >70mg/dl to <250mg/dl throughout shift  Outcome: Progressing  Goal: No changes in neurological exam by end of shift  Outcome: Progressing  Goal: Learn about and adhere to nutrition recommendations by end of shift  Outcome: Progressing  Goal: Vital signs within normal range for age by end of shift  Outcome: Progressing  Goal: Increase self care and/or family involovement by end of shift  Outcome: Progressing  Goal: Receive DSME education by end of shift  Outcome: Progressing     Problem: Discharge Planning  Goal: Discharge to home or other facility with appropriate resources  Outcome: Progressing     Problem: Chronic Conditions and Co-morbidities  Goal: Patient's chronic conditions and co-morbidity symptoms are monitored and maintained or improved  Outcome: Progressing     Problem: Fall/Injury  Goal: Not fall by end of shift  Outcome: Progressing  Goal: Be free from injury by end of the shift  Outcome: Progressing  Goal: Verbalize understanding of personal risk factors for fall in the hospital  Outcome: Progressing  Goal: Verbalize understanding of risk factor reduction measures to prevent injury from fall in the home  Outcome: Progressing  Goal: Use assistive devices by end of the shift  Outcome: Progressing  Goal: Pace activities to prevent fatigue by end of the shift  Outcome: Progressing     Problem: Pain  Goal: Takes deep breaths with improved pain control throughout the shift  Outcome: Progressing  Goal: Turns in bed with improved pain  control throughout the shift  Outcome: Progressing  Goal: Walks with improved pain control throughout the shift  Outcome: Progressing  Goal: Performs ADL's with improved pain control throughout shift  Outcome: Progressing  Goal: Participates in PT with improved pain control throughout the shift  Outcome: Progressing  Goal: Free from opioid side effects throughout the shift  Outcome: Progressing  Goal: Free from acute confusion related to pain meds throughout the shift  Outcome: Progressing

## 2024-07-15 NOTE — PROGRESS NOTES
Vancomycin Dosing by Pharmacy- FOLLOW UP    Claude Coley is a 63 y.o. year old male who Pharmacy has been consulted for vancomycin dosing for bone and joint infection. Based on the patient's indication and renal status this patient is being dosed based on a goal AUC of 400-600.     Renal function is currently improving.    Current vancomycin dose: 1250 mg given every 24 hours    Estimated vancomycin AUC on current dose: 369 mg/L.hr     Visit Vitals  /60 (BP Location: Right arm, Patient Position: Lying)   Pulse 79   Temp 36.8 °C (98.2 °F) (Temporal)   Resp 16        Lab Results   Component Value Date    CREATININE 1.00 07/15/2024    CREATININE 1.10 2024    CREATININE 1.40 2024    CREATININE 1.20 2024        Patient weight is as follows:   Vitals:    24 1516   Weight: 106 kg (233 lb 7.5 oz)       Cultures:  No results found for the encounter in last 14 days.       I/O last 3 completed shifts:  In: 2650 (25 mL/kg) [P.O.:2100; IV Piggyback:550]  Out: 4050 (38.2 mL/kg) [Urine:4050 (1.1 mL/kg/hr)]  Weight: 105.9 kg   I/O during current shift:  I/O this shift:  In: 400 [P.O.:400]  Out: 300 [Urine:300]    Temp (24hrs), Av.6 °C (97.9 °F), Min:36 °C (96.8 °F), Max:37 °C (98.6 °F)      Assessment/Plan    Below goal AUC. Orders placed for new vancomcyin regimen of 1500 every 24 hours to begin at 1200.     This dosing regimen is predicted by [employer] to result in the following pharmacokinetic parameters:  <<<<<[employer] DATA>>>>>  Loading dose: N/A  Regimen: 1500 mg IV every 24 hours.  Start time: 11:39 on 07/15/2024  Exposure target: AUC24 (range)400-600 mg/L.hr   AUC24,ss: 439 mg/L.hr  Probability of AUC24 > 400: 79 %  Ctrough,ss: 11.5 mg/L  Probability of Ctrough,ss > 20: 0 %  Probability of nephrotoxicity (Lodise ADRYAN ): 7 %    The next level will be obtained on  at 0500. May be obtained sooner if clinically indicated.   Will continue to monitor renal function daily while on  vancomycin and order serum creatinine at least every 48 hours if not already ordered.  Follow for continued vancomycin needs, clinical response, and signs/symptoms of toxicity.       HAWK Coy, PharmD

## 2024-07-15 NOTE — PROGRESS NOTES
07/15/24 0802   Discharge Planning   Expected Discharge Disposition SNF   Does the patient need discharge transport arranged? Yes   RoundTrip coordination needed? Yes   Has discharge transport been arranged? No   Patient Choice   Provider Choice list and CMS website (https://medicare.gov/care-compare#search) for post-acute Quality and Resource Measure Data were provided and reviewed with: Patient     Plan changed to SNF.  NWB on that operative foot.  Received choice and sent in Careport to facility.  Will need precert.    1517- PLAN IS TO self pay at SNF.  Patient states he can't go home.  He has used up all his SNF days.    Checking with family members and will give me decision in AM as to which facility.

## 2024-07-16 VITALS
BODY MASS INDEX: 29.96 KG/M2 | TEMPERATURE: 97 F | OXYGEN SATURATION: 97 % | HEIGHT: 74 IN | RESPIRATION RATE: 16 BRPM | HEART RATE: 64 BPM | DIASTOLIC BLOOD PRESSURE: 53 MMHG | SYSTOLIC BLOOD PRESSURE: 119 MMHG | WEIGHT: 233.47 LBS

## 2024-07-16 LAB
ALBUMIN SERPL-MCNC: 3.1 G/DL (ref 3.5–5)
ALP BLD-CCNC: 79 U/L (ref 35–125)
ALT SERPL-CCNC: 9 U/L (ref 5–40)
ANION GAP SERPL CALC-SCNC: 12 MMOL/L
AST SERPL-CCNC: 12 U/L (ref 5–40)
BASOPHILS # BLD AUTO: 0.02 X10*3/UL (ref 0–0.1)
BASOPHILS NFR BLD AUTO: 0.3 %
BILIRUB SERPL-MCNC: 0.2 MG/DL (ref 0.1–1.2)
BUN SERPL-MCNC: 33 MG/DL (ref 8–25)
CALCIUM SERPL-MCNC: 8.6 MG/DL (ref 8.5–10.4)
CHLORIDE SERPL-SCNC: 100 MMOL/L (ref 97–107)
CO2 SERPL-SCNC: 22 MMOL/L (ref 24–31)
CREAT SERPL-MCNC: 1.1 MG/DL (ref 0.4–1.6)
EGFRCR SERPLBLD CKD-EPI 2021: 76 ML/MIN/1.73M*2
EOSINOPHIL # BLD AUTO: 0.39 X10*3/UL (ref 0–0.7)
EOSINOPHIL NFR BLD AUTO: 5.2 %
ERYTHROCYTE [DISTWIDTH] IN BLOOD BY AUTOMATED COUNT: 15.9 % (ref 11.5–14.5)
GLUCOSE BLD MANUAL STRIP-MCNC: 123 MG/DL (ref 74–99)
GLUCOSE BLD MANUAL STRIP-MCNC: 96 MG/DL (ref 74–99)
GLUCOSE SERPL-MCNC: 91 MG/DL (ref 65–99)
HCT VFR BLD AUTO: 24.9 % (ref 41–52)
HGB BLD-MCNC: 8.2 G/DL (ref 13.5–17.5)
IMM GRANULOCYTES # BLD AUTO: 0.06 X10*3/UL (ref 0–0.7)
IMM GRANULOCYTES NFR BLD AUTO: 0.8 % (ref 0–0.9)
LYMPHOCYTES # BLD AUTO: 1.36 X10*3/UL (ref 1.2–4.8)
LYMPHOCYTES NFR BLD AUTO: 18.1 %
MCH RBC QN AUTO: 26.9 PG (ref 26–34)
MCHC RBC AUTO-ENTMCNC: 32.9 G/DL (ref 32–36)
MCV RBC AUTO: 82 FL (ref 80–100)
MONOCYTES # BLD AUTO: 0.71 X10*3/UL (ref 0.1–1)
MONOCYTES NFR BLD AUTO: 9.4 %
NEUTROPHILS # BLD AUTO: 4.99 X10*3/UL (ref 1.2–7.7)
NEUTROPHILS NFR BLD AUTO: 66.2 %
NRBC BLD-RTO: 0 /100 WBCS (ref 0–0)
PLATELET # BLD AUTO: 160 X10*3/UL (ref 150–450)
POTASSIUM SERPL-SCNC: 3.9 MMOL/L (ref 3.4–5.1)
PROT SERPL-MCNC: 6.1 G/DL (ref 5.9–7.9)
RBC # BLD AUTO: 3.05 X10*6/UL (ref 4.5–5.9)
SODIUM SERPL-SCNC: 134 MMOL/L (ref 133–145)
VANCOMYCIN SERPL-MCNC: 15 UG/ML (ref 10–20)
WBC # BLD AUTO: 7.5 X10*3/UL (ref 4.4–11.3)

## 2024-07-16 PROCEDURE — 2500000004 HC RX 250 GENERAL PHARMACY W/ HCPCS (ALT 636 FOR OP/ED)

## 2024-07-16 PROCEDURE — 80202 ASSAY OF VANCOMYCIN: CPT

## 2024-07-16 PROCEDURE — 2500000001 HC RX 250 WO HCPCS SELF ADMINISTERED DRUGS (ALT 637 FOR MEDICARE OP)

## 2024-07-16 PROCEDURE — 97110 THERAPEUTIC EXERCISES: CPT | Mod: GP,CQ

## 2024-07-16 PROCEDURE — 2500000004 HC RX 250 GENERAL PHARMACY W/ HCPCS (ALT 636 FOR OP/ED): Mod: JZ

## 2024-07-16 PROCEDURE — 2500000001 HC RX 250 WO HCPCS SELF ADMINISTERED DRUGS (ALT 637 FOR MEDICARE OP): Performed by: REGISTERED NURSE

## 2024-07-16 PROCEDURE — 82947 ASSAY GLUCOSE BLOOD QUANT: CPT

## 2024-07-16 PROCEDURE — 80053 COMPREHEN METABOLIC PANEL: CPT | Performed by: INTERNAL MEDICINE

## 2024-07-16 PROCEDURE — 2500000001 HC RX 250 WO HCPCS SELF ADMINISTERED DRUGS (ALT 637 FOR MEDICARE OP): Performed by: INTERNAL MEDICINE

## 2024-07-16 PROCEDURE — 97116 GAIT TRAINING THERAPY: CPT | Mod: GP,CQ

## 2024-07-16 PROCEDURE — 85025 COMPLETE CBC W/AUTO DIFF WBC: CPT | Performed by: INTERNAL MEDICINE

## 2024-07-16 RX ORDER — OXYCODONE HYDROCHLORIDE 5 MG/1
5 TABLET ORAL EVERY 6 HOURS PRN
Status: DISCONTINUED | OUTPATIENT
Start: 2024-07-16 | End: 2024-07-16 | Stop reason: HOSPADM

## 2024-07-16 ASSESSMENT — PAIN DESCRIPTION - ORIENTATION
ORIENTATION: RIGHT
ORIENTATION: RIGHT

## 2024-07-16 ASSESSMENT — PAIN SCALES - GENERAL
PAINLEVEL_OUTOF10: 6
PAINLEVEL_OUTOF10: 7
PAINLEVEL_OUTOF10: 8
PAINLEVEL_OUTOF10: 7
PAINLEVEL_OUTOF10: 8
PAINLEVEL_OUTOF10: 5 - MODERATE PAIN

## 2024-07-16 ASSESSMENT — COGNITIVE AND FUNCTIONAL STATUS - GENERAL
MOBILITY SCORE: 16
TURNING FROM BACK TO SIDE WHILE IN FLAT BAD: A LITTLE
MOVING TO AND FROM BED TO CHAIR: A LITTLE
STANDING UP FROM CHAIR USING ARMS: A LITTLE
WALKING IN HOSPITAL ROOM: A LOT
CLIMB 3 TO 5 STEPS WITH RAILING: TOTAL

## 2024-07-16 ASSESSMENT — PAIN - FUNCTIONAL ASSESSMENT
PAIN_FUNCTIONAL_ASSESSMENT: 0-10

## 2024-07-16 ASSESSMENT — PAIN DESCRIPTION - LOCATION
LOCATION: LEG
LOCATION: FOOT

## 2024-07-16 NOTE — PROGRESS NOTES
PODIATRY SERVICE CONSULT PROGRESS NOTE    SERVICE DATE: 7/16/2024   SERVICE TIME:  0700    Subjective   INTERVAL HPI:   POD #4 s/p I&D, wound debridement, application of allograft.  Pt was seen at bedside. Pain well controlled. Patient denies any constitutional symptoms.  No other pedal complaints.     Medications:  Scheduled Meds: atorvastatin, 40 mg, oral, Nightly  carvedilol, 12.5 mg, oral, BID  finasteride, 5 mg, oral, Daily  gemfibrozil, 600 mg, oral, BID AC  heparin, 5,000 Units, subcutaneous, q8h  insulin lispro, 0-10 Units, subcutaneous, TID  lactobacillus acidophilus, 1 tablet, oral, BID  lidocaine, 5 mL, infiltration, Once  OLANZapine, 20 mg, oral, Nightly  pantoprazole, 40 mg, oral, Daily before breakfast  piperacillin-tazobactam, 3.375 g, intravenous, q6h  polyethylene glycol, 17 g, oral, Daily  vancomycin, 1,500 mg, intravenous, q24h      Continuous Infusions:    PRN Meds: PRN medications: acetaminophen, alteplase, bisacodyl, colchicine, dextrose, glucagon, oxyCODONE, vancomycin         Objective   PHYSICAL EXAM:  Physical Exam Performed:  Vitals:    07/16/24 0258   BP: (!) 115/46   Pulse: 86   Resp: 17   Temp: 37 °C (98.6 °F)   SpO2: 96%     Body mass index is 29.98 kg/m².    Patient is AOx3 and in no acute distress. Patient is alert and cooperative. Sitting comfortably in bed with dressing clean, dry and intact. RLE dressing left in place, pre-op exam findings below.      Intact popliteal pulses, intact distal capillary refill <3sec, no increased warmth     Vascular: Palpable DP/PT pulses B/L. Moderate non-pitting edema noted right foot. Hair growth absent B/L. CFT brisk to B/L hallux. Temperature is warm to cool from tibial tuberosity to distal digits B/L. No lymphatic streaking noted B/L.     Musculoskeletal: Gross active and passive ROM intact to age and activity level. Moves all extremities spontaneously. No pain to palpation at feet B/L.      Neurological: Absent light touch sensation B/L. Pain  stimuli diminished B/L.      Dermatologic: Nails 1-5 are within normal limits for thickness and length B/L. Skin appears diffusely xerotic B/L. Web spaces 1-4 B/L are clean dry, intact left foot, 1-3 right foot (h/o amputation right foot). No hyperkeratotic tissue noted B/L. A stable eschar noted to his proximal anterior shin as well, which measures 2cm x 1.5cm.     Wound:  Measurements: 12cm x 8cm x 0.5cm deep at proximal lateral curved portion that extends an additional 6cm  Mixed wound base of 80% granular tissue with 20% various areas of fibrotic tissue.  Moderate serosanguinous drainage with fibrotic slough.   Minimal basil-wound maceration.   Mild basil-wound erythema.   Mild evidence of cellulitis which does not appear to be ascending  No palpable fluctuance. Mild malodor improved since several days ago. Mild increased warmth.   No probe to bone. Minimal tunneling or tracking.   No undermining. Skin edges irregular but intact.      LABS:   Results for orders placed or performed during the hospital encounter of 07/11/24 (from the past 24 hour(s))   POCT GLUCOSE   Result Value Ref Range    POCT Glucose 91 74 - 99 mg/dL   POCT GLUCOSE   Result Value Ref Range    POCT Glucose 124 (H) 74 - 99 mg/dL   POCT GLUCOSE   Result Value Ref Range    POCT Glucose 114 (H) 74 - 99 mg/dL   POCT GLUCOSE   Result Value Ref Range    POCT Glucose 149 (H) 74 - 99 mg/dL   Vancomycin   Result Value Ref Range    Vancomycin 15.0 10.0 - 20.0 ug/mL   CBC and Auto Differential   Result Value Ref Range    WBC 7.5 4.4 - 11.3 x10*3/uL    nRBC 0.0 0.0 - 0.0 /100 WBCs    RBC 3.05 (L) 4.50 - 5.90 x10*6/uL    Hemoglobin 8.2 (L) 13.5 - 17.5 g/dL    Hematocrit 24.9 (L) 41.0 - 52.0 %    MCV 82 80 - 100 fL    MCH 26.9 26.0 - 34.0 pg    MCHC 32.9 32.0 - 36.0 g/dL    RDW 15.9 (H) 11.5 - 14.5 %    Platelets 160 150 - 450 x10*3/uL    Neutrophils % 66.2 40.0 - 80.0 %    Immature Granulocytes %, Automated 0.8 0.0 - 0.9 %    Lymphocytes % 18.1 13.0 - 44.0 %     Monocytes % 9.4 2.0 - 10.0 %    Eosinophils % 5.2 0.0 - 6.0 %    Basophils % 0.3 0.0 - 2.0 %    Neutrophils Absolute 4.99 1.20 - 7.70 x10*3/uL    Immature Granulocytes Absolute, Automated 0.06 0.00 - 0.70 x10*3/uL    Lymphocytes Absolute 1.36 1.20 - 4.80 x10*3/uL    Monocytes Absolute 0.71 0.10 - 1.00 x10*3/uL    Eosinophils Absolute 0.39 0.00 - 0.70 x10*3/uL    Basophils Absolute 0.02 0.00 - 0.10 x10*3/uL   Comprehensive Metabolic Panel   Result Value Ref Range    Glucose 91 65 - 99 mg/dL    Sodium 134 133 - 145 mmol/L    Potassium 3.9 3.4 - 5.1 mmol/L    Chloride 100 97 - 107 mmol/L    Bicarbonate 22 (L) 24 - 31 mmol/L    Urea Nitrogen 33 (H) 8 - 25 mg/dL    Creatinine 1.10 0.40 - 1.60 mg/dL    eGFR 76 >60 mL/min/1.73m*2    Calcium 8.6 8.5 - 10.4 mg/dL    Albumin 3.1 (L) 3.5 - 5.0 g/dL    Alkaline Phosphatase 79 35 - 125 U/L    Total Protein 6.1 5.9 - 7.9 g/dL    AST 12 5 - 40 U/L    Bilirubin, Total 0.2 0.1 - 1.2 mg/dL    ALT 9 5 - 40 U/L    Anion Gap 12 <=19 mmol/L      Lab Results   Component Value Date    HGBA1C 4.4 03/19/2024      Lab Results   Component Value Date    CRP 5.80 (H) 07/11/2024      Lab Results   Component Value Date    SEDRATE 69 (H) 07/11/2024        Results from last 7 days   Lab Units 07/16/24  0526   WBC AUTO x10*3/uL 7.5   RBC AUTO x10*6/uL 3.05*   HEMOGLOBIN g/dL 8.2*   HEMATOCRIT % 24.9*     Results from last 7 days   Lab Units 07/16/24  0526 07/12/24  0451 07/11/24  1555   SODIUM mmol/L 134   < > 132*   POTASSIUM mmol/L 3.9   < > 5.1   CHLORIDE mmol/L 100   < > 98   CO2 mmol/L 22*   < > 21*   BUN mg/dL 33*   < > 51*   CREATININE mg/dL 1.10   < > 1.50   CALCIUM mg/dL 8.6   < > 9.5   MAGNESIUM mg/dL  --   --  1.80   BILIRUBIN TOTAL mg/dL 0.2   < > <0.2   ALT U/L 9   < > 11   AST U/L 12   < > 15    < > = values in this interval not displayed.     Results from last 7 days   Lab Units 07/11/24  2820   COLOR U  Light-Yellow   APPEARANCE U  Clear   PH U  5.0   SPEC GRAV UR  1.009   PROTEIN  U mg/dL NEGATIVE   BLOOD UR  NEGATIVE   NITRITE U  NEGATIVE       IMAGING REVIEW:  Bedside PICC Imaging    Result Date: 7/15/2024  These images are not reportable by radiology and will not be interpreted by  Radiologists.    CT foot right wo IV contrast    Result Date: 7/11/2024  Interpreted By:  Luan Esposito, STUDY: CT FOOT RIGHT WO IV CONTRAST;  7/11/2024 6:42 pm   INDICATION: Signs/Symptoms:Osteomyelitis question deep tissue abscess.   COMPARISON: Right foot radiographs of 11/20/2024   ACCESSION NUMBER(S): [id number]   ORDERING CLINICIAN: NIRMALA LIM   TECHNIQUE: CT imaging of the right foot was obtained without contrast. Coronal and sagittal reformatted images were performed. 3D reconstructed images were not performed at time of dictation therefore not used during interpretation.   FINDINGS: OSSEOUS STRUCTURES: There is a large area of ulceration along the lateral aspect of the ankle and hindfoot extending to the bone surface of the 4th and 5th metatarsal bones. There is destructive osseous changes involving the head and neck of the 4th metatarsal bone consistent with osteomyelitis. The 4th proximal phalanx is dislocated dorsally. There is extensive osseous destruction and erosive change involving the 5th metatarsal base with intraosseous and adjacent soft tissue gas consistent with osteomyelitis. There is multiple areas of confluent low-density soft tissue that likely relates to fluid or phlegmonous changes in this includes a 3.9 cm x 1.7 cm by 3.5 cm area along the dorsal aspect of the cuboid bone and lateral cuneiform bone and another confluent area of low density adjacent to the ulceration at base of 5th metatarsal bone that measures approximately 3.9 cm x 0.7 cm x 1.0 cm. There is diffuse periosteal new bone formation involving the 2nd-4th metatarsal bones that may be also reactive or indicative of chronic osteomyelitis. There are tubular bony defects within the medial cuneiform bone and  calcaneus that are fluid-filled and communicate with the adjacent soft tissue. Within these bony defects is soft tissue thickening and in the case of the calcaneus, small amount of fluid collection measuring 1.7 cm x 0.8 cm. This fluid filled tract appears to extend toward the skin surface. Correlate for sinus tract formation. There is diffuse subcutaneous edema about the distal leg, ankle, foot likely representing cellulitis. Confluent areas of severe edema noted along the distal forefoot and toes.   There is diffuse muscle atrophy.       Large area of ulceration along the lateral aspect of the ankle and hindfoot extending to the bone surface of the 4th and 5th metatarsal bones. There is destructive osseous changes involving the head and neck of the 4th metatarsal bone consistent with osteomyelitis. The 4th proximal phalanx is dislocated dorsally. There is extensive osseous destruction and erosive change involving the 5th metatarsal base with intraosseous and adjacent soft tissue gas consistent with osteomyelitis. There is multiple areas of confluent low-density soft tissue that likely relates to fluid or phlegmonous changes in this includes a 3.9 cm x 1.7 cm by 3.5 cm area along the dorsal aspect of the cuboid bone and lateral cuneiform bone and another confluent area of low density adjacent to the ulceration at base of 5th metatarsal bone that measures approximately 3.9 cm x 0.7 cm x 1.0 cm. There is diffuse periosteal new bone formation involving the 2nd-4th metatarsal bones that may be also reactive or indicative of chronic osteomyelitis. There are tubular bony defects within the medial cuneiform bone and calcaneus that are fluid-filled and communicate with the adjacent soft tissue. Within these bony defects is soft tissue thickening and in the case of the calcaneus, small amount of fluid collection measuring 1.7 cm x 0.8 cm. This fluid filled tract appears to extend toward the skin surface. Correlate for sinus  tract formation. There is diffuse subcutaneous edema about the distal leg, ankle, foot likely representing cellulitis.   MACRO: None.   Signed by: Luan Esposito 7/11/2024 7:27 PM Dictation workstation:   [id number]    XR chest 1 view    Result Date: 7/11/2024  Interpreted By:  Liu Ramirez, STUDY: XR CHEST 1 VIEW;  7/11/2024 6:40 pm   INDICATION: Signs/Symptoms:History of heart failure.   COMPARISON: 04/24/2024   ACCESSION NUMBER(S): [id number]   ORDERING CLINICIAN: NIRMALA LIM   FINDINGS:         CARDIOMEDIASTINAL SILHOUETTE: Cardiomediastinal silhouette is normal in size and configuration.   LUNGS: Lungs are clear.   ABDOMEN: No remarkable upper abdominal findings.   BONES: No acute osseous changes.       1.  No evidence of acute cardiopulmonary process.       MACRO: None   Signed by: Liu Ramirez 7/11/2024 6:46 PM Dictation workstation:   [id number]    XR foot right 3+ views    Result Date: 7/11/2024  Interpreted By:  Liu Ramirez, STUDY: XR FOOT RIGHT 3+ VIEWS; ;  7/11/2024 5:05 pm   INDICATION: foot infection.   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 There is interval osseous erosive changes and irregularity at the 5th metatarsal stump with heterotopic osseous productive change at the lateral aspect of the hindfoot. Extensive periosteal reaction in the 4th metatarsal as well with erosive changes and osseous destruction of the 4th metatarsal head. There is periosteal reaction irregularity at the 4th proximal phalangeal base. There is moderate to severe periosteal reaction in the 3rd metatarsal diaphysis. Gross material at the lateral aspect of the forefoot likely along a skin and soft tissue defect. There is severe osteopenia.       1. Osseous erosions with osseous destruction of the 4th metatarsal head severe periosteal reaction  along the 4th metatarsal diaphysis. Periosteal reaction and irregularity at the 4th proximal phalanx as well. Findings compatible with osteomyelitis and potential septic arthritis of 4th MTP joint. 2. Osseous irregularity and destruction of the 5th metatarsal stump along with extensive osseous productive changes. Findings are also consistent with osteomyelitis. 3. Periosteal reaction along the 3rd metatarsal diaphysis may be reactive or represent additional focus of osteomyelitis. 4. MRI can be performed to evaluate extent of disease       MACRO: None   Signed by: Liu Ramirez 7/11/2024 5:12 PM Dictation workstation:   [id number]            Assessment/Plan   ASSESSMENT & PLAN:    #POD #4 s/p I&D, wound debridement, application of allograft (DOS 7/12/24)  #Cellulitis right foot  #Chronic non-pressure ulcer with fat layer exposed right foot  #Chronic osteomyelitis right foot  #Charcot foot RLE  #T2DM with peripheral polyneuropathy  #Localized edema, RLE  #PVD     POD 3 I&D, wound debridement, and application allograft (DOS 7/12/24)     - Patient was seen and evaluated; all findings were discussed and all questions were answered to patient's satisfaction.  - Charts, labs, vitals and imaging all reviewed.   - Imaging: Imaging with periosteal reaction to 3rd and 4th metatarsal shafts and erosive changes to 5th met stump and 4th proximal phalanx.  - CT scan reviewed. Findings are consistent with Charcot. Areas of bone loss in the calcaneus and cuneiform are likely from the pin sites from the external fixation device that was recently removed.   - Labs: WBC 7.0, ESR 69, CRP 5.8, Hgb 8.8  - Wound culture: collected intra-op, pending. Rare gram + and gram -, awaiting speciation and C&S.  - Blood culture: NTD.     Plan:  - Abx: IV vanc and zosyn, per ID. PICC in place with plan for 6 weeks.  - Appreciated ID input regarding abx selection and PICC line management   - Pain Regimen: per primary  - Dressing to stay intact  until follow up in clinic. Please contact podiatry team if dressing becomes soiled.   - Dressings: saline WTD over graft, dry 4x4, ABD, kerlix, 2 layers specialist padding, ace wrap. Dressing to be changed Wednesday if still in house by podiatry team.   - Nursing staff is able to change/reinforce dressing if & as necessary until next day’s dressing change. Please notify team if dressing change is needed. Thank you.  - No contraindication to DC from a podiatry standpoint.  - Podiatry will continue to follow while in house.     DVT ppx: per primary  Weightbearing: NWB RLE, pt to remain NWB upon discharge  Discharge: Pt to follow up 1 week after discharge with Dr. Perry/Miky     Patient may benefit with discharge to SNF to assist with ADLs and wound care     Case to be discussed with attending, A&P above reflects a tentative plan. Please await for the final signature from the attending physician on service.    Bobo Crenshaw DPM/PGY-3  Podiatric Medicine & Surgery  [facility]           SIGNATURE: Bobo Crenshaw DPM PATIENT NAME: Claude Coley   DATE: July 16, 2024 MRN: [id number]   TIME: 7:08 AM CONTACT: Haiku Message

## 2024-07-16 NOTE — NURSING NOTE
Nurse-to-nurse report called and left with receiving nurse at [facility]. Patient en route. No additional needs at this time.

## 2024-07-16 NOTE — PROGRESS NOTES
07/16/24 1021   Discharge Planning   Expected Discharge Disposition SNF   Does the patient need discharge transport arranged? Yes   RoundTrip coordination needed? Yes   Has discharge transport been arranged? No   What day is the transport expected? 07/16/24     [facility] for skilled rehab.  Patient will self pay since out of SNF days.  Able to go from Case Management standpoint.  Waiting on medical clearance.

## 2024-07-16 NOTE — PROGRESS NOTES
Vancomycin Dosing by Pharmacy- FOLLOW UP  Claude Coley is a 63 y.o. year old male who Pharmacy has been consulted for vancomycin dosing for bone and joint infection. Based on the patient's indication and renal status this patient is being dosed based on a goal AUC of 400-600.     Renal function is currently stable.    Current vancomycin dose: 1500 mg given every 24 hours    Most recent random level: 15 mcg/mL    Visit Vitals  BP 97/57 (BP Location: Left arm, Patient Position: Sitting)   Pulse 95   Temp 37.1 °C (98.8 °F) (Temporal)   Resp 18      Lab Results   Component Value Date    CREATININE 1.10 2024    CREATININE 1.00 07/15/2024    CREATININE 1.10 2024    CREATININE 1.40 2024      Patient weight is as follows:   Vitals:    24 1516   Weight: 106 kg (233 lb 7.5 oz)     Cultures:  Susceptibility data for the encounter in last 14 days.  Collected Specimen Info Organism   24 Swab from ABSCESS Mixed Gram-Positive and Gram-Negative Bacteria        I/O last 3 completed shifts:  In: 1840 (17.4 mL/kg) [P.O.:1340; IV Piggyback:500]  Out: 2925 (27.6 mL/kg) [Urine:2925 (0.8 mL/kg/hr)]  Weight: 105.9 kg   I/O during current shift:  I/O this shift:  In: 400 [P.O.:400]  Out: -     Temp (24hrs), Av.9 °C (98.4 °F), Min:36.1 °C (97 °F), Max:37.6 °C (99.7 °F)      Assessment/Plan  Within goal AUC range. Continue current vancomycin regimen.    This dosing regimen is predicted by [employer] to result in the following pharmacokinetic parameters:  Regimen: 1500 mg IV every 24 hours.  Start time: 12:00 on 2024  Exposure target: AUC24 (range)400-600 mg/L.hr   AUC24,ss: 462 mg/L.hr  Probability of AUC24 > 400: 95 %  Ctrough,ss: 12.5 mg/L  Probability of Ctrough,ss > 20: 0 %  Probability of nephrotoxicity (Lodise ADRYAN ): 8 %    The next level will be obtained on  at 05:00. May be obtained sooner if clinically indicated.   Will continue to monitor renal function daily while on vancomycin and  order serum creatinine at least every 48 hours if not already ordered.  Follow for continued vancomycin needs, clinical response, and signs/symptoms of toxicity.       Loni Dockery, PharmD

## 2024-07-16 NOTE — CARE PLAN
The patient's goals for the shift include  monitor labs and vitals, pain management, iv antibiotics, maintain patient safety     The clinical goals for the shift include IV antibiotics, maintain patient safety, and waiting for lab results    Over the shift, the patient did not make progress toward the following goals. Barriers to progression include none. Recommendations to address these barriers include none.      07/16/24 at 12:26 AM - CLAUDE YU RN

## 2024-07-16 NOTE — PROGRESS NOTES
Claude Coley is a 63 y.o. male on day 5 of admission presenting with Osteomyelitis of fourth toe of right foot (Multi).    Subjective   Interval History:   No new complaints  PICC line placed    Review of Systems   All other systems reviewed and are negative.      Objective   Range of Vitals (last 24 hours)  Heart Rate:  [64-95]   Temp:  [36.1 °C (97 °F)-37.6 °C (99.7 °F)]   Resp:  [16-18]   BP: ()/(42-64)   SpO2:  [94 %-98 %]   Daily Weight  07/11/24 : 106 kg (233 lb 7.5 oz)    Body mass index is 29.98 kg/m².    Physical Exam  HENT:      Head: Normocephalic and atraumatic.      Nose: Nose normal.   Skin:     Comments: Right foot dressing   Neurological:      Mental Status: He is alert and oriented to person, place, and time.   Psychiatric:         Behavior: Behavior is cooperative.           Antibiotics  sodium chloride 0.9 % bolus 1,000 mL  piperacillin-tazobactam (Zosyn) 4.5 g in dextrose (iso)  mL  vancomycin (Xellia) 2 g in diluent combination  mL  acetaminophen (Tylenol) tablet 650 mg  atorvastatin (Lipitor) tablet 40 mg  carvedilol (Coreg) tablet 12.5 mg  finasteride (Proscar) tablet 5 mg  furosemide (Lasix) tablet 20 mg  gemfibrozil (Lopid) tablet 600 mg  OLANZapine (ZyPREXA) tablet 20 mg  pantoprazole (ProtoNix) EC tablet 40 mg  polyethylene glycol (Glycolax, Miralax) powder 17 g  piperacillin-tazobactam (Zosyn) 3.375 g in dextrose (iso) IV 50 mL  vancomycin (Vancocin) pharmacy to dose - pharmacy monitoring  glucagon (Glucagen) injection 1 mg  dextrose 50 % injection 25 g  glucagon (Glucagen) injection 1 mg  dextrose 50 % injection 12.5 g  insulin lispro (HumaLOG) injection 0-10 Units  vancomycin (Xellia) 1.5 g in diluent combination  mL  polyethylene glycol (Glycolax, Miralax) packet 17 g  vancomycin (Xellia) 1 g in diluent combination  mL  heparin (porcine) injection 5,000 Units  oxygen (O2) therapy  HYDROmorphone PF (Dilaudid) injection 0.2 mg  HYDROmorphone (Dilaudid)  injection 0.5 mg  ondansetron (Zofran) injection 4 mg  promethazine (Phenergan) 6.25 mg in sodium chloride 0.9% 50 mL IV  labetaloL (Normodyne,Trandate) injection 10 mg  hydrALAZINE (Apresoline) injection 10 mg  diphenhydrAMINE (BENADryl) injection 12.5 mg  meperidine PF (Demerol) injection 12.5 mg  albuterol 2.5 mg /3 mL (0.083 %) nebulizer solution 2.5 mg  oxyCODONE ER (OxyCONTIN) 12 hr tablet 10 mg  ePHEDrine injection 50 mg  vancomycin (Xellia) 1,250 mg in diluent combination  mL  lactobacillus acidophilus tablet 1 tablet  bisacodyl (Dulcolax) EC tablet 5 mg  colchicine tablet  colchicine tablet 0.6 mg  vancomycin (Xellia) 1.5 g in diluent combination  mL  lidocaine (Xylocaine) 10 mg/mL (1 %) injection 5 mL  alteplase (Cathflo Activase) injection 2 mg    piperacillin-tazobactam (Zosyn) IV injection  oxyCODONE (Roxicodone) immediate release tablet 5 mg  oxyCODONE (Roxicodone) immediate release tablet 5 mg      Relevant Results  Labs  Results from last 72 hours   Lab Units 07/16/24  0526 07/15/24  0428 07/14/24  0543   WBC AUTO x10*3/uL 7.5 7.0 5.2   HEMOGLOBIN g/dL 8.2* 8.8* 8.4*   HEMATOCRIT % 24.9* 26.2* 25.7*   PLATELETS AUTO x10*3/uL 160 171 152   NEUTROS PCT AUTO % 66.2 65.9 55.5   LYMPHS PCT AUTO % 18.1 17.8 28.6   MONOS PCT AUTO % 9.4 8.7 8.0   EOS PCT AUTO % 5.2 6.3 6.5     Results from last 72 hours   Lab Units 07/16/24  0526 07/15/24  0428 07/14/24  0543   SODIUM mmol/L 134 138 134   POTASSIUM mmol/L 3.9 4.1 4.2   CHLORIDE mmol/L 100 104 101   CO2 mmol/L 22* 24 21*   BUN mg/dL 33* 34* 37*   CREATININE mg/dL 1.10 1.00 1.10   GLUCOSE mg/dL 91 88 84   CALCIUM mg/dL 8.6 9.1 9.2   ANION GAP mmol/L 12 10 12   EGFR mL/min/1.73m*2 76 86 76     Results from last 72 hours   Lab Units 07/16/24  0526 07/15/24  0428 07/14/24  0543   ALK PHOS U/L 79 91 89   BILIRUBIN TOTAL mg/dL 0.2 0.2 <0.2   PROTEIN TOTAL g/dL 6.1 6.2 5.9   ALT U/L 9 9 10   AST U/L 12 12 14   ALBUMIN g/dL 3.1* 3.3* 3.2*     Estimated  Creatinine Clearance: 91.5 mL/min (by C-G formula based on SCr of 1.1 mg/dL).  C-Reactive Protein   Date Value Ref Range Status   07/11/2024 5.80 (H) 0.00 - 2.00 mg/dL Final     Microbiology  Susceptibility data from last 14 days.  Collected Specimen Info Organism   07/12/24 Swab from ABSCESS Mixed Gram-Positive and Gram-Negative Bacteria     Imaging  Bedside PICC Imaging    Result Date: 7/15/2024  These images are not reportable by radiology and will not be interpreted by  Radiologists.    CT foot right wo IV contrast    Result Date: 7/11/2024  Interpreted By:  Luan Esposito, STUDY: CT FOOT RIGHT WO IV CONTRAST;  7/11/2024 6:42 pm   INDICATION: Signs/Symptoms:Osteomyelitis question deep tissue abscess.   COMPARISON: Right foot radiographs of 11/20/2024   ACCESSION NUMBER(S): [id number]   ORDERING CLINICIAN: NIRMALA LIM   TECHNIQUE: CT imaging of the right foot was obtained without contrast. Coronal and sagittal reformatted images were performed. 3D reconstructed images were not performed at time of dictation therefore not used during interpretation.   FINDINGS: OSSEOUS STRUCTURES: There is a large area of ulceration along the lateral aspect of the ankle and hindfoot extending to the bone surface of the 4th and 5th metatarsal bones. There is destructive osseous changes involving the head and neck of the 4th metatarsal bone consistent with osteomyelitis. The 4th proximal phalanx is dislocated dorsally. There is extensive osseous destruction and erosive change involving the 5th metatarsal base with intraosseous and adjacent soft tissue gas consistent with osteomyelitis. There is multiple areas of confluent low-density soft tissue that likely relates to fluid or phlegmonous changes in this includes a 3.9 cm x 1.7 cm by 3.5 cm area along the dorsal aspect of the cuboid bone and lateral cuneiform bone and another confluent area of low density adjacent to the ulceration at base of 5th metatarsal bone that  measures approximately 3.9 cm x 0.7 cm x 1.0 cm. There is diffuse periosteal new bone formation involving the 2nd-4th metatarsal bones that may be also reactive or indicative of chronic osteomyelitis. There are tubular bony defects within the medial cuneiform bone and calcaneus that are fluid-filled and communicate with the adjacent soft tissue. Within these bony defects is soft tissue thickening and in the case of the calcaneus, small amount of fluid collection measuring 1.7 cm x 0.8 cm. This fluid filled tract appears to extend toward the skin surface. Correlate for sinus tract formation. There is diffuse subcutaneous edema about the distal leg, ankle, foot likely representing cellulitis. Confluent areas of severe edema noted along the distal forefoot and toes.   There is diffuse muscle atrophy.       Large area of ulceration along the lateral aspect of the ankle and hindfoot extending to the bone surface of the 4th and 5th metatarsal bones. There is destructive osseous changes involving the head and neck of the 4th metatarsal bone consistent with osteomyelitis. The 4th proximal phalanx is dislocated dorsally. There is extensive osseous destruction and erosive change involving the 5th metatarsal base with intraosseous and adjacent soft tissue gas consistent with osteomyelitis. There is multiple areas of confluent low-density soft tissue that likely relates to fluid or phlegmonous changes in this includes a 3.9 cm x 1.7 cm by 3.5 cm area along the dorsal aspect of the cuboid bone and lateral cuneiform bone and another confluent area of low density adjacent to the ulceration at base of 5th metatarsal bone that measures approximately 3.9 cm x 0.7 cm x 1.0 cm. There is diffuse periosteal new bone formation involving the 2nd-4th metatarsal bones that may be also reactive or indicative of chronic osteomyelitis. There are tubular bony defects within the medial cuneiform bone and calcaneus that are fluid-filled and  communicate with the adjacent soft tissue. Within these bony defects is soft tissue thickening and in the case of the calcaneus, small amount of fluid collection measuring 1.7 cm x 0.8 cm. This fluid filled tract appears to extend toward the skin surface. Correlate for sinus tract formation. There is diffuse subcutaneous edema about the distal leg, ankle, foot likely representing cellulitis.   MACRO: None.   Signed by: Luan Esposito 7/11/2024 7:27 PM Dictation workstation:   [id number]    XR chest 1 view    Result Date: 7/11/2024  Interpreted By:  Liu Ramirez, STUDY: XR CHEST 1 VIEW;  7/11/2024 6:40 pm   INDICATION: Signs/Symptoms:History of heart failure.   COMPARISON: 04/24/2024   ACCESSION NUMBER(S): [id number]   ORDERING CLINICIAN: NIRMALA LIM   FINDINGS:         CARDIOMEDIASTINAL SILHOUETTE: Cardiomediastinal silhouette is normal in size and configuration.   LUNGS: Lungs are clear.   ABDOMEN: No remarkable upper abdominal findings.   BONES: No acute osseous changes.       1.  No evidence of acute cardiopulmonary process.       MACRO: None   Signed by: Liu Ramirez 7/11/2024 6:46 PM Dictation workstation:   [id number]    XR foot right 3+ views    Result Date: 7/11/2024  Interpreted By:  Liu Ramirez, STUDY: XR FOOT RIGHT 3+ VIEWS; ;  7/11/2024 5:05 pm   INDICATION: foot infection.   COMPARISON: 03/19/2024   ACCESSION NUMBER(S): [id number]   ORDERING CLINICIAN: HAYLEE MI   FINDINGS: Right foot, three views   The patient is status post partial amputation of the 5th ray. There is interval osseous erosive changes and irregularity at the 5th metatarsal stump with heterotopic osseous productive change at the lateral aspect of the hindfoot. Extensive periosteal reaction in the 4th metatarsal as well with erosive changes and osseous destruction of the 4th metatarsal head. There is periosteal reaction irregularity at the 4th proximal phalangeal base. There is moderate to severe  periosteal reaction in the 3rd metatarsal diaphysis. Gross material at the lateral aspect of the forefoot likely along a skin and soft tissue defect. There is severe osteopenia.       1. Osseous erosions with osseous destruction of the 4th metatarsal head severe periosteal reaction along the 4th metatarsal diaphysis. Periosteal reaction and irregularity at the 4th proximal phalanx as well. Findings compatible with osteomyelitis and potential septic arthritis of 4th MTP joint. 2. Osseous irregularity and destruction of the 5th metatarsal stump along with extensive osseous productive changes. Findings are also consistent with osteomyelitis. 3. Periosteal reaction along the 3rd metatarsal diaphysis may be reactive or represent additional focus of osteomyelitis. 4. MRI can be performed to evaluate extent of disease       MACRO: None   Signed by: Liu Ramirez 7/11/2024 5:12 PM Dictation workstation:   [id number]     Assessment/Plan   Type 2 diabetes mellitus with peripheral angiopathy without gangrene  Infected right diabetic foot ulcer   Chronic right foot osteomyelitis   History of MRSA, strep, bacteroides      IV vancomycin  IV zosyn   Monitor renal function  Supportive care  Local care  Offloading  PICC line placement  IV Vancomycin and Zosyn till 8/22/2024  Follow-up in office in 1 month         Srikanth Saravia MD

## 2024-07-16 NOTE — CARE PLAN
The patient's goals for the shift include  manage pain, wound care, mobility, comfort and safety, rest.     The clinical goals for the shift include Monitor labs and VS, IV antibiotics, manage pain, encourage activity, maintain safety, no falls, promote rest, determine discharge plan.    No barriers, patient to be discharged to [facility] today. No additional needs at this time.       Problem: Diabetes  Goal: Achieve decreasing blood glucose levels by end of shift  Outcome: Adequate for Discharge  Goal: Increase stability of blood glucose readings by end of shift  Outcome: Adequate for Discharge  Goal: Maintain electrolyte levels within acceptable range throughout shift  Outcome: Adequate for Discharge  Goal: Maintain glucose levels >70mg/dl to <250mg/dl throughout shift  Outcome: Adequate for Discharge  Goal: No changes in neurological exam by end of shift  Outcome: Adequate for Discharge  Goal: Learn about and adhere to nutrition recommendations by end of shift  Outcome: Adequate for Discharge  Goal: Vital signs within normal range for age by end of shift  Outcome: Adequate for Discharge  Goal: Increase self care and/or family involovement by end of shift  Outcome: Adequate for Discharge  Goal: Receive DSME education by end of shift  Outcome: Adequate for Discharge     Problem: Discharge Planning  Goal: Discharge to home or other facility with appropriate resources  Outcome: Adequate for Discharge     Problem: Chronic Conditions and Co-morbidities  Goal: Patient's chronic conditions and co-morbidity symptoms are monitored and maintained or improved  Outcome: Adequate for Discharge     Problem: Fall/Injury  Goal: Not fall by end of shift  Outcome: Adequate for Discharge  Goal: Be free from injury by end of the shift  Outcome: Adequate for Discharge  Goal: Verbalize understanding of personal risk factors for fall in the hospital  Outcome: Adequate for Discharge  Goal: Verbalize understanding of risk factor  reduction measures to prevent injury from fall in the home  Outcome: Adequate for Discharge  Goal: Use assistive devices by end of the shift  Outcome: Adequate for Discharge  Goal: Pace activities to prevent fatigue by end of the shift  Outcome: Adequate for Discharge     Problem: Pain  Goal: Takes deep breaths with improved pain control throughout the shift  Outcome: Adequate for Discharge  Goal: Turns in bed with improved pain control throughout the shift  Outcome: Adequate for Discharge  Goal: Walks with improved pain control throughout the shift  Outcome: Adequate for Discharge  Goal: Performs ADL's with improved pain control throughout shift  Outcome: Adequate for Discharge  Goal: Participates in PT with improved pain control throughout the shift  Outcome: Adequate for Discharge  Goal: Free from opioid side effects throughout the shift  Outcome: Adequate for Discharge  Goal: Free from acute confusion related to pain meds throughout the shift  Outcome: Adequate for Discharge

## 2024-07-16 NOTE — DISCHARGE SUMMARY
Discharge Diagnosis  Osteomyelitis of fourth toe of right foot (Multi)    Discharge Meds     Your medication list        START taking these medications        Instructions Last Dose Given Next Dose Due   piperacillin-tazobactam 3.375 gram injection  Commonly known as: Zosyn      Infuse 3.375 g into a venous catheter every 6 hours.       vancomycin in 0.9 % sodium chl 1.5 gram/250 mL solution      Infuse 1.5 g into a venous catheter every 12 hours.              CONTINUE taking these medications        Instructions Last Dose Given Next Dose Due   acetaminophen 325 mg tablet  Commonly known as: Tylenol      Take 2 tablets (650 mg) by mouth every 4 hours if needed for mild pain (1 - 3).       aspirin 81 mg EC tablet           atorvastatin 40 mg tablet  Commonly known as: Lipitor           carvedilol 12.5 mg tablet  Commonly known as: Coreg           colchicine 0.6 mg tablet           cyclobenzaprine 10 mg tablet  Commonly known as: Flexeril      Take 1 tablet (10 mg) by mouth 3 times a day as needed for muscle spasms for up to 15 days.       finasteride 5 mg tablet  Commonly known as: Proscar           furosemide 20 mg tablet  Commonly known as: Lasix           gemfibrozil 600 mg tablet  Commonly known as: Lopid           heparin (porcine) 5,000 unit/mL injection      Inject 1 mL (5,000 Units) under the skin every 8 hours.       lisinopril 20 mg tablet           metFORMIN (OSM) 500 mg 24 hr tablet  Commonly known as: Fortamet           OLANZapine 20 mg tablet  Commonly known as: ZyPREXA           pantoprazole 40 mg EC tablet  Commonly known as: ProtoNix      Take 1 tablet (40 mg) by mouth once daily in the morning. Take before meals. Do not crush, chew, or split. Do not start before April 2, 2024.       polyethylene glycol 17 gram/dose powder  Commonly known as: Glycolax, Miralax      Mix 17 grams (1 capful) in liquid and drink by mouth and mix with water or juice once daily. Do not start before April 2, 2024.        spironolactone 25 mg tablet  Commonly known as: Aldactone                     Where to Get Your Medications        Information about where to get these medications is not yet available    Ask your nurse or doctor about these medications  piperacillin-tazobactam 3.375 gram injection  vancomycin in 0.9 % sodium chl 1.5 gram/250 mL solution         Test Results Pending At Discharge  Pending Labs       Order Current Status    Extra Urine Gray Tube Collected (07/11/24 3842)    Staphylococcus Aureus/MRSA Colonization, Culture - PICC Only In process    Urinalysis with Reflex Culture and Microscopic In process            Hospital Course   Claude Coley is a 63 y.o. male presenting with right foot infection.  This patient has had longstanding history of Charcot foot in the right foot and has been followed on a basically weekly basis by podiatry Dr. Perry.  Wound was getting worse and for that reason he was instructed to come the hospital for evaluation.  Here x-ray showed osteomyelitis and upon taking down of his dressing he started to bleed from the wound which is now better although not completely stopped.  He admitted to generalized malaise but denied fever chills denied nausea vomiting abdominal pain shortness of breath or any other symptoms.  Admission was requested for worsening wound infection with osteomyelitis.  The patient underwent I&D, wound debridement, application of allograft of the right foot.  He was treated with IV antibiotics.  PICC line was placed and the patient was discharged on IV Zosyn and vancomycin through 8/22 per infectious disease.  He was discharged to short-term SNF in a stable condition    Pertinent Physical Exam At Time of Discharge  Physical Exam  Alert oriented x 3 cooperative  Chest clear  Heart regular  Abdomen soft nontender  Extremities no edema right foot in dressing and change  Neurologic exam focal    Outpatient Follow-Up  No future appointments.      Robert Middleton MD

## 2024-07-16 NOTE — PROGRESS NOTES
Physical Therapy    Physical Therapy Treatment    Patient Name: Claude Coley  MRN: [id number]  Today's Date: 7/16/2024  Time Calculation  Start Time: 0952  Stop Time: 1016  Time Calculation (min): 24 min    Assessment/Plan   PT Assessment  PT Assessment Results: Decreased strength, Decreased endurance, Decreased range of motion, Impaired balance, Decreased mobility, Impaired judgement, Decreased safety awareness, Decreased skin integrity, Orthopedic restrictions, Decreased cognition, Pain  Rehab Prognosis: Good  Barriers to Discharge: Assist for limited distance to chair. Unable to ambulate short distances while maintaining NWBing right foot.  End of Session Patient Position: Up in chair, Alarm on  PT Plan  Inpatient/Swing Bed or Outpatient: Inpatient  PT Plan  Treatment/Interventions: Transfer training, Bed mobility, Gait training, Therapeutic exercise, Therapeutic activity  PT Plan: Ongoing PT  PT Frequency: 5 times per week  PT Discharge Recommendations: Moderate intensity level of continued care  Equipment Recommended upon Discharge: Wheeled walker  PT Recommended Transfer Status: Assist x1, Assistive device  PT - OK to Discharge: Yes      General Visit Information:   PT  Visit  PT Received On: 07/16/24  General  Prior to Session Communication: Bedside nurse  Patient Position Received: Bed, 3 rail up, Alarm on  Preferred Learning Style: verbal, visual  General Comment: Cleared by nurse to see. Patient agreeable to therapy    Subjective   Precautions:  Precautions  Hearing/Visual Limitations: glasses  LE Weight Bearing Status: Right Non-Weight Bearing  Medical Precautions: Fall precautions  Vital Signs:       Objective   Pain:  Pain Assessment  Pain Assessment: 0-10  0-10 (Numeric) Pain Score: 8  Pain Type: Acute pain  Pain Location: Leg (Mostly right knee per pt.)  Pain Orientation: Right  Cognition:  Cognition  Overall Cognitive Status: Within Functional Limits  Coordination:     Postural Control:      Extremity/Trunk Assessments:    Activity Tolerance:     Treatments:  Therapeutic Exercise  Therapeutic Exercise Activity 1: Pt performed seated ankle pumps/heel raises left x20. Bilat LE glute sets, LAQ, hip flexion, carina hip adduction and resisted hip abduction x20 reps each.    Bed Mobility 1  Bed Mobility 1: Supine to sitting  Level of Assistance 1: Close supervision  Bed Mobility Comments 1: Head of bed elevated    Ambulation/Gait Training 1  Surface 1: Level tile  Device 1: Rolling walker  Assistance 1: Contact guard, Moderate verbal cues  Comments/Distance (ft) 1: Pt used RW to take ~4-5 hop type steps to chair, mod verbal cues to adhere to NWBing right LE. Pt had tendency to rest right foot on floor despite cues for NWBing.  Transfer 1  Transfer From 1: Bed to  Transfer to 1: Stand  Technique 1: Sit to stand  Transfer Device 1: Walker  Transfer Level of Assistance 1: Contact guard  Transfers 2  Transfer From 2: Stand to  Transfer to 2: Sit, Chair with arms  Technique 2: Stand to sit  Transfer Level of Assistance 2: Close supervision    Outcome Measures:  [facility] Basic Mobility  Turning from your back to your side while in a flat bed without using bedrails: None  Moving from lying on your back to sitting on the side of a flat bed without using bedrails: A little  Moving to and from bed to chair (including a wheelchair): A little  Standing up from a chair using your arms (e.g. wheelchair or bedside chair): A little  To walk in hospital room: A lot  Climbing 3-5 steps with railing: Total  Basic Mobility - Total Score: 16    Education Documentation  Precautions, taught by Roseanna Stearns PTA at 7/16/2024 11:20 AM.  Learner: Patient  Readiness: Acceptance  Method: Explanation  Response: Verbalizes Understanding    Body Mechanics, taught by Roseanna Stearns PTA at 7/16/2024 11:20 AM.  Learner: Patient  Readiness: Acceptance  Method: Explanation  Response: Verbalizes Understanding    Home Exercise Program, taught by  Roseanna Stearns PTA at 7/16/2024 11:20 AM.  Learner: Patient  Readiness: Acceptance  Method: Explanation  Response: Verbalizes Understanding    Mobility Training, taught by Roseanna Stearns PTA at 7/16/2024 11:20 AM.  Learner: Patient  Readiness: Acceptance  Method: Explanation  Response: Verbalizes Understanding    Education Comments  No comments found.        OP EDUCATION:       Encounter Problems       Encounter Problems (Active)       Balance       standing balance (Progressing)       Start:  07/12/24    Expected End:  07/26/24       PT will demonstrate good static standing balance with modified independence using a walker             Mobility       ambulation (Progressing)       Start:  07/12/24    Expected End:  07/26/24       Pt will ambulate >50' with modified independence using a walker to promote safe home navigation            PT Transfers       bed mobility (Progressing)       Start:  07/12/24    Expected End:  07/26/24       Pt will perform supine<>sit bed mobility independently          sit to stand  (Progressing)       Start:  07/12/24    Expected End:  07/26/24       Pt will perform sit<>stand transfer with modified independence using a walker         bed to chair (Progressing)       Start:  07/12/24    Expected End:  07/26/24       Pt will perform bed<>chair transfer with modified independence using a walker            Safety       safe mobility techniques (Progressing)       Start:  07/12/24    Expected End:  07/26/24       Pt will demonstrate safe mobility techniques for all bed mobility, transfers, and ambulation         WB precautions (Progressing)       Start:  07/12/24    Expected End:  07/26/24       Pt will recall and demonstrate adherence to RLE NWB at all times

## 2024-07-17 LAB — STAPHYLOCOCCUS SPEC CULT: NORMAL

## 2024-07-23 ENCOUNTER — LAB REQUISITION (OUTPATIENT)
Dept: LAB | Facility: HOSPITAL | Age: 64
End: 2024-07-23
Payer: MEDICARE

## 2024-07-23 DIAGNOSIS — Z79.899 OTHER LONG TERM (CURRENT) DRUG THERAPY: ICD-10-CM

## 2024-07-23 LAB — VANCOMYCIN TROUGH SERPL-MCNC: 34.6 UG/ML (ref 10–20)

## 2024-07-23 PROCEDURE — 80202 ASSAY OF VANCOMYCIN: CPT

## 2024-08-09 ENCOUNTER — LAB (OUTPATIENT)
Dept: LAB | Facility: LAB | Age: 64
End: 2024-08-09
Payer: MEDICARE

## 2024-08-09 PROCEDURE — 86140 C-REACTIVE PROTEIN: CPT

## 2024-08-09 PROCEDURE — 80053 COMPREHEN METABOLIC PANEL: CPT

## 2024-08-09 PROCEDURE — 80202 ASSAY OF VANCOMYCIN: CPT

## 2024-10-31 DIAGNOSIS — L97.519 TYPE 2 DIABETES MELLITUS WITH RIGHT DIABETIC FOOT ULCER: ICD-10-CM

## 2024-10-31 DIAGNOSIS — E11.621 TYPE 2 DIABETES MELLITUS WITH RIGHT DIABETIC FOOT ULCER: ICD-10-CM

## 2024-10-31 DIAGNOSIS — A49.02 MRSA INFECTION: ICD-10-CM

## 2024-11-29 ENCOUNTER — APPOINTMENT (OUTPATIENT)
Dept: RADIOLOGY | Facility: HOSPITAL | Age: 64
End: 2024-11-29
Payer: MEDICARE

## 2024-11-29 ENCOUNTER — HOSPITAL ENCOUNTER (EMERGENCY)
Facility: HOSPITAL | Age: 64
Discharge: HOME | End: 2024-11-29
Payer: MEDICARE

## 2024-11-29 VITALS
HEART RATE: 90 BPM | HEIGHT: 74 IN | OXYGEN SATURATION: 99 % | BODY MASS INDEX: 30.95 KG/M2 | RESPIRATION RATE: 16 BRPM | SYSTOLIC BLOOD PRESSURE: 163 MMHG | DIASTOLIC BLOOD PRESSURE: 75 MMHG | TEMPERATURE: 98.4 F | WEIGHT: 241.18 LBS

## 2024-11-29 DIAGNOSIS — S91.301A OPEN WOUND OF RIGHT FOOT, INITIAL ENCOUNTER: ICD-10-CM

## 2024-11-29 DIAGNOSIS — M79.89 RIGHT LEG SWELLING: Primary | ICD-10-CM

## 2024-11-29 LAB
ANION GAP SERPL CALCULATED.3IONS-SCNC: 14 MMOL/L (ref 10–20)
BASOPHILS # BLD AUTO: 0.06 X10*3/UL (ref 0–0.1)
BASOPHILS NFR BLD AUTO: 0.9 %
BUN SERPL-MCNC: 54 MG/DL (ref 6–23)
CALCIUM SERPL-MCNC: 9.7 MG/DL (ref 8.6–10.3)
CHLORIDE SERPL-SCNC: 104 MMOL/L (ref 98–107)
CO2 SERPL-SCNC: 21 MMOL/L (ref 21–32)
CREAT SERPL-MCNC: 1.45 MG/DL (ref 0.5–1.3)
CRP SERPL-MCNC: 1.07 MG/DL
EGFRCR SERPLBLD CKD-EPI 2021: 55 ML/MIN/1.73M*2
EOSINOPHIL # BLD AUTO: 0.24 X10*3/UL (ref 0–0.7)
EOSINOPHIL NFR BLD AUTO: 3.4 %
ERYTHROCYTE [DISTWIDTH] IN BLOOD BY AUTOMATED COUNT: 14.9 % (ref 11.5–14.5)
ERYTHROCYTE [SEDIMENTATION RATE] IN BLOOD BY WESTERGREN METHOD: 39 MM/H (ref 0–20)
GLUCOSE SERPL-MCNC: 111 MG/DL (ref 74–99)
HCT VFR BLD AUTO: 35.7 % (ref 41–52)
HGB BLD-MCNC: 12.2 G/DL (ref 13.5–17.5)
IMM GRANULOCYTES # BLD AUTO: 0.04 X10*3/UL (ref 0–0.7)
IMM GRANULOCYTES NFR BLD AUTO: 0.6 % (ref 0–0.9)
LYMPHOCYTES # BLD AUTO: 0.9 X10*3/UL (ref 1.2–4.8)
LYMPHOCYTES NFR BLD AUTO: 12.8 %
MCH RBC QN AUTO: 29.7 PG (ref 26–34)
MCHC RBC AUTO-ENTMCNC: 34.2 G/DL (ref 32–36)
MCV RBC AUTO: 87 FL (ref 80–100)
MONOCYTES # BLD AUTO: 0.39 X10*3/UL (ref 0.1–1)
MONOCYTES NFR BLD AUTO: 5.5 %
NEUTROPHILS # BLD AUTO: 5.42 X10*3/UL (ref 1.2–7.7)
NEUTROPHILS NFR BLD AUTO: 76.8 %
NRBC BLD-RTO: 0 /100 WBCS (ref 0–0)
PLATELET # BLD AUTO: 122 X10*3/UL (ref 150–450)
POTASSIUM SERPL-SCNC: 4.6 MMOL/L (ref 3.5–5.3)
RBC # BLD AUTO: 4.11 X10*6/UL (ref 4.5–5.9)
SODIUM SERPL-SCNC: 134 MMOL/L (ref 136–145)
WBC # BLD AUTO: 7.1 X10*3/UL (ref 4.4–11.3)

## 2024-11-29 PROCEDURE — 86140 C-REACTIVE PROTEIN: CPT

## 2024-11-29 PROCEDURE — 73630 X-RAY EXAM OF FOOT: CPT | Mod: RIGHT SIDE | Performed by: RADIOLOGY

## 2024-11-29 PROCEDURE — 85652 RBC SED RATE AUTOMATED: CPT

## 2024-11-29 PROCEDURE — 99284 EMERGENCY DEPT VISIT MOD MDM: CPT | Mod: 25

## 2024-11-29 PROCEDURE — 36415 COLL VENOUS BLD VENIPUNCTURE: CPT

## 2024-11-29 PROCEDURE — 93971 EXTREMITY STUDY: CPT

## 2024-11-29 PROCEDURE — 80048 BASIC METABOLIC PNL TOTAL CA: CPT

## 2024-11-29 PROCEDURE — 87040 BLOOD CULTURE FOR BACTERIA: CPT | Mod: TRILAB

## 2024-11-29 PROCEDURE — 93971 EXTREMITY STUDY: CPT | Performed by: RADIOLOGY

## 2024-11-29 PROCEDURE — 73630 X-RAY EXAM OF FOOT: CPT | Mod: RT

## 2024-11-29 PROCEDURE — 85025 COMPLETE CBC W/AUTO DIFF WBC: CPT

## 2024-11-29 ASSESSMENT — PAIN SCALES - GENERAL
PAINLEVEL_OUTOF10: 0 - NO PAIN
PAINLEVEL_OUTOF10: 0 - NO PAIN

## 2024-11-29 ASSESSMENT — PAIN - FUNCTIONAL ASSESSMENT: PAIN_FUNCTIONAL_ASSESSMENT: 0-10

## 2024-11-29 NOTE — ED PROVIDER NOTES
HPI   Chief Complaint   Patient presents with    Foot Swelling     Pt complains swelling in the right foot.  Had an amputation of the pinky toe in January.  Was on antibiotics.  States he was getting better, but has been getting worse over the past 3 days.       Patient is a 63-year-old male with past medical history of Charcot foot is seen by podiatry, osteomyelitis of right foot presenting from home with concern for right foot swelling.  He states that getting swollen for the last 3 days.  He states sometime in January he had a surgery procedure to remove his right fourth digit to his right foot because of an infection.  He states that he was on antibiotics and has had no complications.  He states that he saw his podiatrist last Tuesday and had no complications.  He states over the last 3 days, the swelling is gotten worse.  Does endorse mild drainage from a wound on the bottom of his foot.  Denies history of blood clots.  Patient denies fevers, chills, cough, sore throat, runny nose, chest pain, shortness of breath, abdominal pain, nausea, vomiting, diarrhea or urinary complaints.              Patient History   Past Medical History:   Diagnosis Date    CHF (congestive heart failure) (Multi)     Diabetes mellitus (Multi)     Hypertension      No past surgical history on file.  No family history on file.  Social History     Tobacco Use    Smoking status: Some Days     Current packs/day: 1.00     Average packs/day: 1 pack/day for 20.0 years (20.0 ttl pk-yrs)     Types: Cigarettes    Smokeless tobacco: Never   Substance Use Topics    Alcohol use: Yes     Alcohol/week: 12.0 standard drinks of alcohol     Types: 12 Cans of beer per week    Drug use: Never       Physical Exam   ED Triage Vitals [11/29/24 1718]   Temperature Heart Rate Respirations BP   36.9 °C (98.4 °F) 90 16 163/75      Pulse Ox Temp src Heart Rate Source Patient Position   99 % -- -- --      BP Location FiO2 (%)     -- --       Physical Exam  Vitals  and nursing note reviewed.   Constitutional:       Appearance: He is well-developed.   HENT:      Head: Normocephalic and atraumatic.      Nose: Nose normal.      Mouth/Throat:      Mouth: Mucous membranes are moist.      Pharynx: Oropharynx is clear.   Eyes:      Extraocular Movements: Extraocular movements intact.      Conjunctiva/sclera: Conjunctivae normal.      Pupils: Pupils are equal, round, and reactive to light.   Cardiovascular:      Rate and Rhythm: Normal rate and regular rhythm.      Pulses: Normal pulses.      Heart sounds: Normal heart sounds. No murmur heard.  Pulmonary:      Effort: Pulmonary effort is normal. No respiratory distress.      Breath sounds: Normal breath sounds.   Abdominal:      General: Abdomen is flat.      Palpations: Abdomen is soft.      Tenderness: There is no abdominal tenderness.   Musculoskeletal:         General: Swelling present. Normal range of motion.      Cervical back: Normal range of motion and neck supple.      Comments: Swelling present to the right lower extremity and right lower foot   Skin:     General: Skin is warm and dry.      Capillary Refill: Capillary refill takes less than 2 seconds.      Comments: Open wound to base of right foot, not active drainage.  Mild erythema present of the right foot   Neurological:      General: No focal deficit present.      Mental Status: He is alert and oriented to person, place, and time.   Psychiatric:         Mood and Affect: Mood normal.         Behavior: Behavior normal.           ED Course & MDM   ED Course as of 11/29/24 1955 Fri Nov 29, 2024 1802 Patient is reportedly on doxycycline 100 mg twice daily. [AR]      ED Course User Index  [AR] Abdifatah Ayers PA-C         Diagnoses as of 11/29/24 1955   Right leg swelling   Open wound of right foot, initial encounter                 No data recorded     [place] Coma Scale Score: 15 (11/29/24 1907 : Obdulia Moya, JEANNA)                           Medical Decision  Making  Patient is a 63-year-old male with past medical history of Charcot foot, osteomyelitis of right fourth toe presenting with concerns for right foot and right leg swelling.  Ultrasound ordered.  Lab work, x-ray ordered.  Condition considered include but not limited: Cellulitis, osteomyelitis, dependent edema, DVT.    Attending physician was available for consultation on this patient.  CBC is without leukocytosis but does show signs of anemia with hemoglobin of 11.1.  BMP without significant electrolyte abnormality but signs of chronic kidney disease.  Sed rate is elevated at 39 but improved compared to prior.  CRP is elevated at 1.07 but improved from prior.  Duplex ultrasound of the right lower quadrant is without acute findings for DVT.  X-ray of right foot without acute osteomyelitis.    Patient does note that he is on doxycycline 100 mg twice daily.  He is been on this chronically and states that he has antibiotics until Monday.  I believe this patient is at low risk for complication, and a disposition of discharge is acceptable.  Return to the Emergency Department if new or worsening symptoms including headache, fever, chills, chest pain, shortness of breath, syncope, near syncope, abdominal pain, nausea, vomiting,  diarrhea, or worsening pain.  Educated patient to continue these antibiotics as prescribed.  Recommended he call his podiatrist and follow-up to further discuss.  At this time there is no active or worsening infection requiring admission.  Patient is agreeable to a disposition of discharge with follow-up with primary care/podiatry in the next several days.    Portions of this note made with Dragon software, please be mindful of potential grammatical errors.        Labs Reviewed   CBC WITH AUTO DIFFERENTIAL - Abnormal       Result Value    WBC 7.1      nRBC 0.0      RBC 4.11 (*)     Hemoglobin 12.2 (*)     Hematocrit 35.7 (*)     MCV 87      MCH 29.7      MCHC 34.2      RDW 14.9 (*)      Platelets 122 (*)     Neutrophils % 76.8      Immature Granulocytes %, Automated 0.6      Lymphocytes % 12.8      Monocytes % 5.5      Eosinophils % 3.4      Basophils % 0.9      Neutrophils Absolute 5.42      Immature Granulocytes Absolute, Automated 0.04      Lymphocytes Absolute 0.90 (*)     Monocytes Absolute 0.39      Eosinophils Absolute 0.24      Basophils Absolute 0.06     BASIC METABOLIC PANEL - Abnormal    Glucose 111 (*)     Sodium 134 (*)     Potassium 4.6      Chloride 104      Bicarbonate 21      Anion Gap 14      Urea Nitrogen 54 (*)     Creatinine 1.45 (*)     eGFR 55 (*)     Calcium 9.7     SEDIMENTATION RATE, AUTOMATED - Abnormal    Sedimentation Rate 39 (*)    C-REACTIVE PROTEIN - Abnormal    C-Reactive Protein 1.07 (*)    BLOOD CULTURE   BLOOD CULTURE         XR foot right 3+ views   Final Result   Chronic bony changes without obvious acute osteomyelitis although   limited accuracy given the underlying abnormalities. Depending on   concern short-term radiographic follow-up and or MRI correlation   recommended.             MACRO:   None        Signed by: Abram Curtis 11/29/2024 6:59 PM   Dictation workstation:   [id number]      Lower extremity venous duplex right   Final Result   Negative study.  No deep venous thrombosis of the  right lower   extremity.        MACRO:   None        Signed by: Nael Moore 11/29/2024 6:46 PM   Dictation workstation:   [id number]            Procedure  Procedures     Abdifatah Ayers PA-C  11/29/24 1955

## 2024-11-29 NOTE — Clinical Note
Claude Coley was seen and treated in our emergency department on 11/29/2024.  He may return to work on 12/03/2024.       If you have any questions or concerns, please don't hesitate to call.      Abdifatah Ayers PA-C

## 2024-11-30 NOTE — DISCHARGE INSTRUCTIONS
Please take your antibiotics until completion.  Follow-up with the podiatry team.    Be sure to take all medications, over the counter medications or prescription medications only as directed.    Be sure to follow up as directed in 1-2 days. All of the details of your follow up instructions are detailed in the follow up section of this packet.    If you are being discharged with any pains medications or muscle relaxers (norco, Vicodin, hydrocodone products, Percocet, oxycodone products, flexeril, cyclobenzaprine, robaxin, norflex, brand or generic, or any other pain controlling medications with the exception of Ibuprofen and regular Tylenol, do not drive or operate machinery, climb ladders or participate in any activity that could potentially put yourself or others at risk should you get dizzy, or be/feel impaired at all.    Return to emergency room without delay for ANY new or worsening pains or for any other symptoms or concerns. Return with worsening pains, nausea, vomiting, trouble breathing, palpitations, shortness of breath, inability to pass stool or urine, loss of control of stool or urine, any numbness or tingling (that is not normal for you), uncontrolled fevers, the passing of blood or other material in stool or urine, rashes, pains or for any other symptoms or concerns you may have. You are always welcome to return to the ER at any time for any reason or for any other concerns you may have.

## 2024-12-01 LAB
BACTERIA BLD CULT: NORMAL
BACTERIA BLD CULT: NORMAL

## 2024-12-04 LAB
BACTERIA BLD CULT: NORMAL
BACTERIA BLD CULT: NORMAL

## (undated) DEVICE — Device

## (undated) DEVICE — SUTURE, ETHILON, 3-0, 18 IN, PS1, BLACK

## (undated) DEVICE — DEPRESSOR, TONGUE, 6 IN, WOOD, STERILE, LF

## (undated) DEVICE — OINTMENT, TOPICAL, BACITRACIN

## (undated) DEVICE — BLADE, OSCILLATING/SAGITTAL, 25MM X 9MM

## (undated) DEVICE — DRAPE, TIBURON, SPLIT SHEET, REINF ADH STRIP, 77X108

## (undated) DEVICE — GLOVE, SURGICAL, PROTEXIS PI ORTHO, 7.5, PF, LF

## (undated) DEVICE — BANDAGE, GAUZE, 6 PLY, KERLIX, 2.25 IN X 3 YD, STERILE

## (undated) DEVICE — DRESSING, ADAPTIC, NON-ADHERENT, 3 X 8 IN

## (undated) DEVICE — SUTURE, VICRYL, 3-0, 27 IN, SH, VIOLET

## (undated) DEVICE — SOLUTION, INJECTION, SODIUM CHLORIDE 9%, 3000ML

## (undated) DEVICE — BANDAGE, ELASTIC, ACE, ACE, DOUBLE LENGTH, 6 X 550 IN, LF

## (undated) DEVICE — DRESSING, GAUZE, PETROLATUM, XEROFORM, 5 X 9 IN, STERILE

## (undated) DEVICE — SLEEVE, VASO PRESS, CALF GARMENT, MEDIUM, GREEN

## (undated) DEVICE — COVER, XRAY, CASSETTE, 21 X 40 IN, STERILE

## (undated) DEVICE — GLOVE, SURGICAL, PROTEXIS PI ORTHO, 8.0, PF, LF

## (undated) DEVICE — DRESSING, GAUZE, PAD, 4 X 4 IN, STERILE

## (undated) DEVICE — DRESSING, WOUND, MEPITEL, 4X8

## (undated) DEVICE — SPONGE, LAP, XRAY DECT, 18IN X 18IN, W/MASTER DMT, STERILE

## (undated) DEVICE — TUBING, IRRIGATION, HIGH FLOW, HAND PIECE SET

## (undated) DEVICE — BANDAGE, ELASTIC, PREMIUM, SELF-CLOSE, 6 IN X 5.5 YD, STERILE

## (undated) DEVICE — GLOVE, SURGICAL, PROTEXIS PI BLUE W/NEUTHERA, 8.5, PF, LF

## (undated) DEVICE — BANDAGE, ADHESIVE, STRIP, FLEXIBLE, CURITY, 2 X 3.5 IN, FABRIC

## (undated) DEVICE — BANDAGE, ELASTIC, ACE, W/CLIP, 3 IN X 5 YD, NS

## (undated) DEVICE — SOLUTION, IRRIGATION, X RX SODIUM CHL 0.9%, 1000ML BTL

## (undated) DEVICE — APPLIER, LIGACLIP, MULTIPLE, SMALL 9-3/8IN

## (undated) DEVICE — SUTURE, MONOCRYL, 4-0, 27 IN, PS-2, UNDYED

## (undated) DEVICE — SUTURE, VICRYL, 2-0, 27 IN, SH, UNDYED

## (undated) DEVICE — DRESSING, NON-ADHERENT, OIL EMULSION, CURITY, 3 X 8 IN, STERILE

## (undated) DEVICE — STAY SET, SURGICAL, 12MM BLUNT HOOK, 8/PK

## (undated) DEVICE — PADDING, CAST, SOFTROLL, 4 IN X 4 YD, STERILE

## (undated) DEVICE — DRESSING, GAUZE, SUPER FLUFF, 7.75 X 8.75 IN, STERILE

## (undated) DEVICE — SYRINGE, 60 CC, IRRIGATION, BULB, CONTRO-BULB, PAPER POUCH

## (undated) DEVICE — DRAIN, WOUND, FLAT, HUBLESS, FULL LENGTH PERFORATION, 10 MM X 20 CM, SILICONE

## (undated) DEVICE — APPLIER, MULTIPLE CLIP, LIGACLIP, W/20 MEDIUM, 11.5 APPLIER

## (undated) DEVICE — VESSEL LOOP, RED MAXI, 2 CARD

## (undated) DEVICE — PADDING, CAST, WYTEX, 4 IN X 4 YD, LF